# Patient Record
Sex: FEMALE | Race: AMERICAN INDIAN OR ALASKA NATIVE | Employment: UNEMPLOYED
[De-identification: names, ages, dates, MRNs, and addresses within clinical notes are randomized per-mention and may not be internally consistent; named-entity substitution may affect disease eponyms.]

---

## 2017-06-03 ENCOUNTER — HEALTH MAINTENANCE LETTER (OUTPATIENT)
Age: 37
End: 2017-06-03

## 2021-07-01 ENCOUNTER — APPOINTMENT (OUTPATIENT)
Dept: CT IMAGING | Facility: CLINIC | Age: 41
End: 2021-07-01
Attending: EMERGENCY MEDICINE
Payer: MEDICAID

## 2021-07-01 ENCOUNTER — HOSPITAL ENCOUNTER (INPATIENT)
Facility: CLINIC | Age: 41
LOS: 62 days | Discharge: HOME | End: 2021-09-01
Attending: EMERGENCY MEDICINE | Admitting: INTERNAL MEDICINE
Payer: MEDICAID

## 2021-07-01 ENCOUNTER — APPOINTMENT (OUTPATIENT)
Dept: GENERAL RADIOLOGY | Facility: CLINIC | Age: 41
End: 2021-07-01
Attending: EMERGENCY MEDICINE
Payer: MEDICAID

## 2021-07-01 DIAGNOSIS — I33.0 ENDOCARDITIS DUE TO METHICILLIN SUSCEPTIBLE STAPHYLOCOCCUS AUREUS (MSSA): ICD-10-CM

## 2021-07-01 DIAGNOSIS — R65.21 SEPTIC SHOCK (H): Primary | ICD-10-CM

## 2021-07-01 DIAGNOSIS — B95.61 ENDOCARDITIS DUE TO METHICILLIN SUSCEPTIBLE STAPHYLOCOCCUS AUREUS (MSSA): ICD-10-CM

## 2021-07-01 DIAGNOSIS — F11.20 OPIOID USE DISORDER, SEVERE, DEPENDENCE (H): ICD-10-CM

## 2021-07-01 DIAGNOSIS — B20 HUMAN IMMUNODEFICIENCY VIRUS (HIV) DISEASE (H): ICD-10-CM

## 2021-07-01 DIAGNOSIS — M86.9 OSTEOMYELITIS OF THIRD TOE OF LEFT FOOT (H): ICD-10-CM

## 2021-07-01 DIAGNOSIS — L97.524: ICD-10-CM

## 2021-07-01 DIAGNOSIS — A41.9 SEPTIC SHOCK (H): Primary | ICD-10-CM

## 2021-07-01 DIAGNOSIS — N10 PYELONEPHRITIS, ACUTE: ICD-10-CM

## 2021-07-01 DIAGNOSIS — R41.82 ALTERED MENTAL STATUS, UNSPECIFIED ALTERED MENTAL STATUS TYPE: ICD-10-CM

## 2021-07-01 DIAGNOSIS — A41.9 SEVERE SEPSIS (H): ICD-10-CM

## 2021-07-01 DIAGNOSIS — I05.9 ENDOCARDITIS OF MITRAL VALVE: ICD-10-CM

## 2021-07-01 DIAGNOSIS — F19.10 POLYSUBSTANCE ABUSE (H): ICD-10-CM

## 2021-07-01 DIAGNOSIS — R65.20 SEVERE SEPSIS (H): ICD-10-CM

## 2021-07-01 LAB
ABO + RH BLD: NORMAL
ABO + RH BLD: NORMAL
ALBUMIN SERPL-MCNC: 1.5 G/DL (ref 3.4–5)
ALBUMIN SERPL-MCNC: 1.7 G/DL (ref 3.4–5)
ALBUMIN UR-MCNC: 100 MG/DL
ALP SERPL-CCNC: 293 U/L (ref 40–150)
ALP SERPL-CCNC: 307 U/L (ref 40–150)
ALT SERPL W P-5'-P-CCNC: 31 U/L (ref 0–50)
ALT SERPL W P-5'-P-CCNC: 35 U/L (ref 0–50)
AMPHETAMINES UR QL SCN: POSITIVE
ANION GAP SERPL CALCULATED.3IONS-SCNC: 12 MMOL/L (ref 3–14)
ANION GAP SERPL CALCULATED.3IONS-SCNC: 12 MMOL/L (ref 3–14)
APPEARANCE UR: ABNORMAL
AST SERPL W P-5'-P-CCNC: 35 U/L (ref 0–45)
AST SERPL W P-5'-P-CCNC: 55 U/L (ref 0–45)
BACTERIA #/AREA URNS HPF: ABNORMAL /HPF
BARBITURATES UR QL: NEGATIVE
BASE DEFICIT BLDA-SCNC: 4.1 MMOL/L
BASE DEFICIT BLDA-SCNC: 4.2 MMOL/L
BASE DEFICIT BLDV-SCNC: 14.7 MMOL/L
BASOPHILS # BLD AUTO: 0 10E9/L (ref 0–0.2)
BASOPHILS NFR BLD AUTO: 0 %
BENZODIAZ UR QL: NEGATIVE
BILIRUB SERPL-MCNC: 1.7 MG/DL (ref 0.2–1.3)
BILIRUB SERPL-MCNC: 2 MG/DL (ref 0.2–1.3)
BILIRUB UR QL STRIP: ABNORMAL
BLD GP AB SCN SERPL QL: NORMAL
BLD PROD TYP BPU: NORMAL
BLD PROD TYP BPU: NORMAL
BLD UNIT ID BPU: 0
BLOOD BANK CMNT PATIENT-IMP: NORMAL
BLOOD PRODUCT CODE: NORMAL
BPU ID: NORMAL
BUN SERPL-MCNC: 27 MG/DL (ref 7–30)
BUN SERPL-MCNC: 35 MG/DL (ref 7–30)
CALCIUM SERPL-MCNC: 7 MG/DL (ref 8.5–10.1)
CALCIUM SERPL-MCNC: 7.7 MG/DL (ref 8.5–10.1)
CANNABINOIDS UR QL SCN: NEGATIVE
CHLORIDE SERPL-SCNC: 102 MMOL/L (ref 94–109)
CHLORIDE SERPL-SCNC: 108 MMOL/L (ref 94–109)
CK SERPL-CCNC: 68 U/L (ref 30–225)
CO2 BLDCOV-SCNC: 19 MMOL/L (ref 21–28)
CO2 SERPL-SCNC: 19 MMOL/L (ref 20–32)
CO2 SERPL-SCNC: 20 MMOL/L (ref 20–32)
COCAINE UR QL: NEGATIVE
COLOR UR AUTO: YELLOW
CREAT SERPL-MCNC: 1.29 MG/DL (ref 0.52–1.04)
CREAT SERPL-MCNC: 1.59 MG/DL (ref 0.52–1.04)
DIFFERENTIAL METHOD BLD: ABNORMAL
EOSINOPHIL # BLD AUTO: 0 10E9/L (ref 0–0.7)
EOSINOPHIL NFR BLD AUTO: 0 %
ERYTHROCYTE [DISTWIDTH] IN BLOOD BY AUTOMATED COUNT: 17 % (ref 10–15)
ERYTHROCYTE [DISTWIDTH] IN BLOOD BY AUTOMATED COUNT: 17.1 % (ref 10–15)
ETHANOL SERPL-MCNC: <0.01 G/DL
GFR SERPL CREATININE-BSD FRML MDRD: 40 ML/MIN/{1.73_M2}
GFR SERPL CREATININE-BSD FRML MDRD: 52 ML/MIN/{1.73_M2}
GLUCOSE SERPL-MCNC: 115 MG/DL (ref 70–99)
GLUCOSE SERPL-MCNC: 130 MG/DL (ref 70–99)
GLUCOSE UR STRIP-MCNC: NEGATIVE MG/DL
HCG SERPL QL: NEGATIVE
HCO3 BLD-SCNC: 20 MMOL/L (ref 21–28)
HCO3 BLD-SCNC: 20 MMOL/L (ref 21–28)
HCO3 BLDV-SCNC: 10 MMOL/L (ref 21–28)
HCT VFR BLD AUTO: 20.8 % (ref 35–47)
HCT VFR BLD AUTO: 22.4 % (ref 35–47)
HGB BLD-MCNC: 6.4 G/DL (ref 11.7–15.7)
HGB BLD-MCNC: 6.8 G/DL (ref 11.7–15.7)
HGB UR QL STRIP: ABNORMAL
HYALINE CASTS #/AREA URNS LPF: 3 /LPF (ref 0–2)
INR PPP: 1.23 (ref 0.86–1.14)
KETONES UR STRIP-MCNC: NEGATIVE MG/DL
LABORATORY COMMENT REPORT: NORMAL
LACTATE BLD-SCNC: 2.3 MMOL/L (ref 0.7–2)
LACTATE BLD-SCNC: 2.3 MMOL/L (ref 0.7–2.1)
LACTATE BLD-SCNC: 3.4 MMOL/L (ref 0.7–2)
LEUKOCYTE ESTERASE UR QL STRIP: ABNORMAL
LIPASE SERPL-CCNC: 23 U/L (ref 73–393)
LYMPHOCYTES # BLD AUTO: 0 10E9/L (ref 0.8–5.3)
LYMPHOCYTES NFR BLD AUTO: 0 %
MCH RBC QN AUTO: 20.4 PG (ref 26.5–33)
MCH RBC QN AUTO: 20.6 PG (ref 26.5–33)
MCHC RBC AUTO-ENTMCNC: 30.4 G/DL (ref 31.5–36.5)
MCHC RBC AUTO-ENTMCNC: 30.8 G/DL (ref 31.5–36.5)
MCV RBC AUTO: 67 FL (ref 78–100)
MCV RBC AUTO: 67 FL (ref 78–100)
MONOCYTES # BLD AUTO: 0.2 10E9/L (ref 0–1.3)
MONOCYTES NFR BLD AUTO: 1 %
MUCOUS THREADS #/AREA URNS LPF: PRESENT /LPF
NEUTROPHILS # BLD AUTO: 21.3 10E9/L (ref 1.6–8.3)
NEUTROPHILS NFR BLD AUTO: 99 %
NITRATE UR QL: NEGATIVE
NUM BPU REQUESTED: 2
O2/TOTAL GAS SETTING VFR VENT: 40 %
O2/TOTAL GAS SETTING VFR VENT: ABNORMAL %
O2/TOTAL GAS SETTING VFR VENT: ABNORMAL %
OPIATES UR QL SCN: POSITIVE
OXYHGB MFR BLD: 96 % (ref 92–100)
OXYHGB MFR BLD: 97 % (ref 92–100)
OXYHGB MFR BLDV: 89 %
PCO2 BLD: 31 MM HG (ref 35–45)
PCO2 BLD: 32 MM HG (ref 35–45)
PCO2 BLDV: 20 MM HG (ref 40–50)
PCO2 BLDV: 34 MM HG (ref 40–50)
PCP UR QL SCN: NEGATIVE
PH BLD: 7.41 PH (ref 7.35–7.45)
PH BLD: 7.42 PH (ref 7.35–7.45)
PH BLDV: 7.32 PH (ref 7.32–7.43)
PH BLDV: 7.36 PH (ref 7.32–7.43)
PH UR STRIP: 6 PH (ref 5–7)
PLATELET # BLD AUTO: 114 10E9/L (ref 150–450)
PLATELET # BLD AUTO: 153 10E9/L (ref 150–450)
PLATELET # BLD EST: ABNORMAL 10*3/UL
PO2 BLD: 93 MM HG (ref 80–105)
PO2 BLD: 99 MM HG (ref 80–105)
PO2 BLDV: 45 MM HG (ref 25–47)
PO2 BLDV: 60 MM HG (ref 25–47)
POIKILOCYTOSIS BLD QL SMEAR: SLIGHT
POTASSIUM SERPL-SCNC: 2.7 MMOL/L (ref 3.4–5.3)
POTASSIUM SERPL-SCNC: 2.9 MMOL/L (ref 3.4–5.3)
PROT SERPL-MCNC: 5.8 G/DL (ref 6.8–8.8)
PROT SERPL-MCNC: 6.6 G/DL (ref 6.8–8.8)
RBC # BLD AUTO: 3.1 10E12/L (ref 3.8–5.2)
RBC # BLD AUTO: 3.34 10E12/L (ref 3.8–5.2)
RBC #/AREA URNS AUTO: 93 /HPF (ref 0–2)
SAO2 % BLDV FROM PO2: 80 %
SARS-COV-2 RNA RESP QL NAA+PROBE: NEGATIVE
SODIUM SERPL-SCNC: 134 MMOL/L (ref 133–144)
SODIUM SERPL-SCNC: 139 MMOL/L (ref 133–144)
SOURCE: ABNORMAL
SP GR UR STRIP: 1.02 (ref 1–1.03)
SPECIMEN EXP DATE BLD: NORMAL
SPECIMEN SOURCE: NORMAL
SQUAMOUS #/AREA URNS AUTO: <1 /HPF (ref 0–1)
TRANSFUSION STATUS PATIENT QL: NORMAL
TRANSFUSION STATUS PATIENT QL: NORMAL
TROPONIN I SERPL-MCNC: 0.37 UG/L (ref 0–0.04)
TROPONIN I SERPL-MCNC: 0.86 UG/L (ref 0–0.04)
TSH SERPL DL<=0.005 MIU/L-ACNC: 0.58 MU/L (ref 0.4–4)
UROBILINOGEN UR STRIP-MCNC: 8 MG/DL (ref 0–2)
WBC # BLD AUTO: 21.5 10E9/L (ref 4–11)
WBC # BLD AUTO: 21.7 10E9/L (ref 4–11)
WBC #/AREA URNS AUTO: 124 /HPF (ref 0–5)
WBC CLUMPS #/AREA URNS HPF: PRESENT /HPF

## 2021-07-01 PROCEDURE — 80307 DRUG TEST PRSMV CHEM ANLYZR: CPT | Performed by: EMERGENCY MEDICINE

## 2021-07-01 PROCEDURE — P9016 RBC LEUKOCYTES REDUCED: HCPCS | Performed by: EMERGENCY MEDICINE

## 2021-07-01 PROCEDURE — 84703 CHORIONIC GONADOTROPIN ASSAY: CPT | Performed by: EMERGENCY MEDICINE

## 2021-07-01 PROCEDURE — 86900 BLOOD TYPING SEROLOGIC ABO: CPT | Performed by: EMERGENCY MEDICINE

## 2021-07-01 PROCEDURE — 83605 ASSAY OF LACTIC ACID: CPT | Mod: 91 | Performed by: INTERNAL MEDICINE

## 2021-07-01 PROCEDURE — 85025 COMPLETE CBC W/AUTO DIFF WBC: CPT | Performed by: EMERGENCY MEDICINE

## 2021-07-01 PROCEDURE — 36600 WITHDRAWAL OF ARTERIAL BLOOD: CPT

## 2021-07-01 PROCEDURE — 82805 BLOOD GASES W/O2 SATURATION: CPT | Mod: 91 | Performed by: ANESTHESIOLOGY

## 2021-07-01 PROCEDURE — 96376 TX/PRO/DX INJ SAME DRUG ADON: CPT

## 2021-07-01 PROCEDURE — 71260 CT THORAX DX C+: CPT

## 2021-07-01 PROCEDURE — 82803 BLOOD GASES ANY COMBINATION: CPT

## 2021-07-01 PROCEDURE — 258N000003 HC RX IP 258 OP 636: Performed by: INTERNAL MEDICINE

## 2021-07-01 PROCEDURE — 36620 INSERTION CATHETER ARTERY: CPT | Performed by: ANESTHESIOLOGY

## 2021-07-01 PROCEDURE — 83605 ASSAY OF LACTIC ACID: CPT

## 2021-07-01 PROCEDURE — 96374 THER/PROPH/DIAG INJ IV PUSH: CPT | Mod: 59

## 2021-07-01 PROCEDURE — 86923 COMPATIBILITY TEST ELECTRIC: CPT | Performed by: EMERGENCY MEDICINE

## 2021-07-01 PROCEDURE — 87086 URINE CULTURE/COLONY COUNT: CPT | Performed by: EMERGENCY MEDICINE

## 2021-07-01 PROCEDURE — 70491 CT SOFT TISSUE NECK W/DYE: CPT

## 2021-07-01 PROCEDURE — 82805 BLOOD GASES W/O2 SATURATION: CPT | Mod: 91 | Performed by: INTERNAL MEDICINE

## 2021-07-01 PROCEDURE — 81001 URINALYSIS AUTO W/SCOPE: CPT | Mod: XU | Performed by: EMERGENCY MEDICINE

## 2021-07-01 PROCEDURE — 51702 INSERT TEMP BLADDER CATH: CPT

## 2021-07-01 PROCEDURE — 87635 SARS-COV-2 COVID-19 AMP PRB: CPT | Performed by: EMERGENCY MEDICINE

## 2021-07-01 PROCEDURE — 96368 THER/DIAG CONCURRENT INF: CPT

## 2021-07-01 PROCEDURE — 87800 DETECT AGNT MULT DNA DIREC: CPT | Performed by: EMERGENCY MEDICINE

## 2021-07-01 PROCEDURE — 999N000157 HC STATISTIC RCP TIME EA 10 MIN

## 2021-07-01 PROCEDURE — 999N001017 HC STATISTIC GLUCOSE BY METER IP

## 2021-07-01 PROCEDURE — 96372 THER/PROPH/DIAG INJ SC/IM: CPT | Performed by: EMERGENCY MEDICINE

## 2021-07-01 PROCEDURE — 250N000009 HC RX 250: Performed by: EMERGENCY MEDICINE

## 2021-07-01 PROCEDURE — 87040 BLOOD CULTURE FOR BACTERIA: CPT | Performed by: EMERGENCY MEDICINE

## 2021-07-01 PROCEDURE — 82550 ASSAY OF CK (CPK): CPT | Performed by: INTERNAL MEDICINE

## 2021-07-01 PROCEDURE — 999N000009 HC STATISTIC AIRWAY CARE

## 2021-07-01 PROCEDURE — 250N000011 HC RX IP 250 OP 636: Performed by: INTERNAL MEDICINE

## 2021-07-01 PROCEDURE — 85610 PROTHROMBIN TIME: CPT | Performed by: EMERGENCY MEDICINE

## 2021-07-01 PROCEDURE — 85027 COMPLETE CBC AUTOMATED: CPT | Performed by: INTERNAL MEDICINE

## 2021-07-01 PROCEDURE — 86850 RBC ANTIBODY SCREEN: CPT | Performed by: EMERGENCY MEDICINE

## 2021-07-01 PROCEDURE — 999N000158 HC STATISTIC RCP TIME ED VENT EA 10 MIN

## 2021-07-01 PROCEDURE — 250N000009 HC RX 250: Performed by: INTERNAL MEDICINE

## 2021-07-01 PROCEDURE — 200N000001 HC R&B ICU

## 2021-07-01 PROCEDURE — 82805 BLOOD GASES W/O2 SATURATION: CPT | Performed by: EMERGENCY MEDICINE

## 2021-07-01 PROCEDURE — 999N000065 XR CHEST PORT 1 VIEW

## 2021-07-01 PROCEDURE — 87077 CULTURE AEROBIC IDENTIFY: CPT | Performed by: INTERNAL MEDICINE

## 2021-07-01 PROCEDURE — 84484 ASSAY OF TROPONIN QUANT: CPT | Performed by: INTERNAL MEDICINE

## 2021-07-01 PROCEDURE — 96361 HYDRATE IV INFUSION ADD-ON: CPT

## 2021-07-01 PROCEDURE — 258N000003 HC RX IP 258 OP 636: Performed by: ANESTHESIOLOGY

## 2021-07-01 PROCEDURE — 86901 BLOOD TYPING SEROLOGIC RH(D): CPT | Performed by: EMERGENCY MEDICINE

## 2021-07-01 PROCEDURE — 5A1955Z RESPIRATORY VENTILATION, GREATER THAN 96 CONSECUTIVE HOURS: ICD-10-PCS | Performed by: INTERNAL MEDICINE

## 2021-07-01 PROCEDURE — 87040 BLOOD CULTURE FOR BACTERIA: CPT | Performed by: INTERNAL MEDICINE

## 2021-07-01 PROCEDURE — 96375 TX/PRO/DX INJ NEW DRUG ADDON: CPT

## 2021-07-01 PROCEDURE — 999N000040 HC STATISTIC CONSULT NO CHARGE VASC ACCESS

## 2021-07-01 PROCEDURE — 96365 THER/PROPH/DIAG IV INF INIT: CPT | Mod: 59

## 2021-07-01 PROCEDURE — 31500 INSERT EMERGENCY AIRWAY: CPT

## 2021-07-01 PROCEDURE — 83690 ASSAY OF LIPASE: CPT | Performed by: EMERGENCY MEDICINE

## 2021-07-01 PROCEDURE — 84484 ASSAY OF TROPONIN QUANT: CPT | Performed by: EMERGENCY MEDICINE

## 2021-07-01 PROCEDURE — 99292 CRITICAL CARE ADDL 30 MIN: CPT | Mod: 25

## 2021-07-01 PROCEDURE — 83605 ASSAY OF LACTIC ACID: CPT | Performed by: EMERGENCY MEDICINE

## 2021-07-01 PROCEDURE — 99291 CRITICAL CARE FIRST HOUR: CPT | Mod: 25

## 2021-07-01 PROCEDURE — 258N000003 HC RX IP 258 OP 636: Performed by: EMERGENCY MEDICINE

## 2021-07-01 PROCEDURE — 87088 URINE BACTERIA CULTURE: CPT | Performed by: EMERGENCY MEDICINE

## 2021-07-01 PROCEDURE — 250N000013 HC RX MED GY IP 250 OP 250 PS 637: Performed by: INTERNAL MEDICINE

## 2021-07-01 PROCEDURE — 80053 COMPREHEN METABOLIC PANEL: CPT | Performed by: EMERGENCY MEDICINE

## 2021-07-01 PROCEDURE — 250N000011 HC RX IP 250 OP 636

## 2021-07-01 PROCEDURE — 70450 CT HEAD/BRAIN W/O DYE: CPT

## 2021-07-01 PROCEDURE — C9803 HOPD COVID-19 SPEC COLLECT: HCPCS

## 2021-07-01 PROCEDURE — 250N000011 HC RX IP 250 OP 636: Performed by: ANESTHESIOLOGY

## 2021-07-01 PROCEDURE — 82077 ASSAY SPEC XCP UR&BREATH IA: CPT | Performed by: EMERGENCY MEDICINE

## 2021-07-01 PROCEDURE — 36556 INSERT NON-TUNNEL CV CATH: CPT

## 2021-07-01 PROCEDURE — 999N000185 HC STATISTIC TRANSPORT TIME EA 15 MIN

## 2021-07-01 PROCEDURE — 94002 VENT MGMT INPAT INIT DAY: CPT

## 2021-07-01 PROCEDURE — 84443 ASSAY THYROID STIM HORMONE: CPT | Performed by: EMERGENCY MEDICINE

## 2021-07-01 PROCEDURE — 99291 CRITICAL CARE FIRST HOUR: CPT | Performed by: INTERNAL MEDICINE

## 2021-07-01 PROCEDURE — 250N000011 HC RX IP 250 OP 636: Performed by: EMERGENCY MEDICINE

## 2021-07-01 PROCEDURE — 87186 SC STD MICRODIL/AGAR DIL: CPT | Performed by: EMERGENCY MEDICINE

## 2021-07-01 PROCEDURE — 80053 COMPREHEN METABOLIC PANEL: CPT | Performed by: INTERNAL MEDICINE

## 2021-07-01 PROCEDURE — 87077 CULTURE AEROBIC IDENTIFY: CPT | Performed by: EMERGENCY MEDICINE

## 2021-07-01 RX ORDER — DEXTROSE MONOHYDRATE 25 G/50ML
25-50 INJECTION, SOLUTION INTRAVENOUS
Status: DISCONTINUED | OUTPATIENT
Start: 2021-07-01 | End: 2021-07-03

## 2021-07-01 RX ORDER — WATER 10 ML/10ML
INJECTION INTRAMUSCULAR; INTRAVENOUS; SUBCUTANEOUS
Status: DISCONTINUED
Start: 2021-07-01 | End: 2021-07-01 | Stop reason: HOSPADM

## 2021-07-01 RX ORDER — ALBUTEROL SULFATE 0.83 MG/ML
2.5 SOLUTION RESPIRATORY (INHALATION) EVERY 6 HOURS PRN
Status: DISCONTINUED | OUTPATIENT
Start: 2021-07-01 | End: 2021-07-06

## 2021-07-01 RX ORDER — PROPOFOL 10 MG/ML
5-75 INJECTION, EMULSION INTRAVENOUS CONTINUOUS
Status: DISCONTINUED | OUTPATIENT
Start: 2021-07-01 | End: 2021-07-03

## 2021-07-01 RX ORDER — NALOXONE HYDROCHLORIDE 0.4 MG/ML
0.4 INJECTION, SOLUTION INTRAMUSCULAR; INTRAVENOUS; SUBCUTANEOUS
Status: DISCONTINUED | OUTPATIENT
Start: 2021-07-01 | End: 2021-09-01 | Stop reason: HOSPADM

## 2021-07-01 RX ORDER — SODIUM CHLORIDE 9 MG/ML
INJECTION, SOLUTION INTRAVENOUS CONTINUOUS
Status: DISCONTINUED | OUTPATIENT
Start: 2021-07-01 | End: 2021-07-13

## 2021-07-01 RX ORDER — FENTANYL CITRATE 50 UG/ML
100 INJECTION, SOLUTION INTRAMUSCULAR; INTRAVENOUS ONCE
Status: COMPLETED | OUTPATIENT
Start: 2021-07-01 | End: 2021-07-01

## 2021-07-01 RX ORDER — IOPAMIDOL 755 MG/ML
125 INJECTION, SOLUTION INTRAVASCULAR ONCE
Status: COMPLETED | OUTPATIENT
Start: 2021-07-01 | End: 2021-07-01

## 2021-07-01 RX ORDER — OLANZAPINE 10 MG/2ML
10 INJECTION, POWDER, FOR SOLUTION INTRAMUSCULAR ONCE
Status: COMPLETED | OUTPATIENT
Start: 2021-07-01 | End: 2021-07-01

## 2021-07-01 RX ORDER — MIDAZOLAM HCL IN 0.9 % NACL/PF 1 MG/ML
1-8 PLASTIC BAG, INJECTION (ML) INTRAVENOUS CONTINUOUS
Status: DISCONTINUED | OUTPATIENT
Start: 2021-07-01 | End: 2021-07-01

## 2021-07-01 RX ORDER — NICOTINE POLACRILEX 4 MG
15-30 LOZENGE BUCCAL
Status: DISCONTINUED | OUTPATIENT
Start: 2021-07-01 | End: 2021-07-03

## 2021-07-01 RX ORDER — NALOXONE HYDROCHLORIDE 0.4 MG/ML
0.2 INJECTION, SOLUTION INTRAMUSCULAR; INTRAVENOUS; SUBCUTANEOUS
Status: DISCONTINUED | OUTPATIENT
Start: 2021-07-01 | End: 2021-09-01 | Stop reason: HOSPADM

## 2021-07-01 RX ORDER — MORPHINE SULFATE 4 MG/ML
4 INJECTION, SOLUTION INTRAMUSCULAR; INTRAVENOUS ONCE
Status: COMPLETED | OUTPATIENT
Start: 2021-07-01 | End: 2021-07-01

## 2021-07-01 RX ORDER — PIPERACILLIN SODIUM, TAZOBACTAM SODIUM 3; .375 G/15ML; G/15ML
3.38 INJECTION, POWDER, LYOPHILIZED, FOR SOLUTION INTRAVENOUS EVERY 6 HOURS
Status: DISCONTINUED | OUTPATIENT
Start: 2021-07-01 | End: 2021-07-02

## 2021-07-01 RX ORDER — SODIUM CHLORIDE, SODIUM LACTATE, POTASSIUM CHLORIDE, CALCIUM CHLORIDE 600; 310; 30; 20 MG/100ML; MG/100ML; MG/100ML; MG/100ML
INJECTION, SOLUTION INTRAVENOUS CONTINUOUS
Status: DISCONTINUED | OUTPATIENT
Start: 2021-07-01 | End: 2021-07-05

## 2021-07-01 RX ORDER — PIPERACILLIN SODIUM, TAZOBACTAM SODIUM 4; .5 G/20ML; G/20ML
4.5 INJECTION, POWDER, LYOPHILIZED, FOR SOLUTION INTRAVENOUS ONCE
Status: COMPLETED | OUTPATIENT
Start: 2021-07-01 | End: 2021-07-01

## 2021-07-01 RX ORDER — CHLORHEXIDINE GLUCONATE ORAL RINSE 1.2 MG/ML
15 SOLUTION DENTAL EVERY 12 HOURS
Status: DISCONTINUED | OUTPATIENT
Start: 2021-07-01 | End: 2021-07-11 | Stop reason: CLARIF

## 2021-07-01 RX ORDER — PROPOFOL 10 MG/ML
INJECTION, EMULSION INTRAVENOUS
Status: COMPLETED
Start: 2021-07-01 | End: 2021-07-01

## 2021-07-01 RX ORDER — PROPOFOL 10 MG/ML
5-75 INJECTION, EMULSION INTRAVENOUS CONTINUOUS
Status: DISCONTINUED | OUTPATIENT
Start: 2021-07-01 | End: 2021-07-01

## 2021-07-01 RX ORDER — POTASSIUM CHLORIDE 29.8 MG/ML
20 INJECTION INTRAVENOUS ONCE
Status: COMPLETED | OUTPATIENT
Start: 2021-07-01 | End: 2021-07-01

## 2021-07-01 RX ORDER — HYDROMORPHONE HYDROCHLORIDE 1 MG/ML
INJECTION, SOLUTION INTRAMUSCULAR; INTRAVENOUS; SUBCUTANEOUS
Status: COMPLETED
Start: 2021-07-01 | End: 2021-07-01

## 2021-07-01 RX ORDER — PROPOFOL 10 MG/ML
10-20 INJECTION, EMULSION INTRAVENOUS EVERY 30 MIN PRN
Status: DISCONTINUED | OUTPATIENT
Start: 2021-07-01 | End: 2021-07-01

## 2021-07-01 RX ORDER — ALBUTEROL SULFATE 90 UG/1
6 AEROSOL, METERED RESPIRATORY (INHALATION) EVERY 4 HOURS
Status: DISCONTINUED | OUTPATIENT
Start: 2021-07-01 | End: 2021-07-01

## 2021-07-01 RX ORDER — NOREPINEPHRINE BITARTRATE 0.02 MG/ML
.01-.6 INJECTION, SOLUTION INTRAVENOUS CONTINUOUS
Status: DISCONTINUED | OUTPATIENT
Start: 2021-07-01 | End: 2021-07-12

## 2021-07-01 RX ORDER — POTASSIUM CHLORIDE 29.8 MG/ML
20 INJECTION INTRAVENOUS
Status: COMPLETED | OUTPATIENT
Start: 2021-07-01 | End: 2021-07-02

## 2021-07-01 RX ORDER — LORAZEPAM 2 MG/ML
2 INJECTION INTRAMUSCULAR ONCE
Status: COMPLETED | OUTPATIENT
Start: 2021-07-01 | End: 2021-07-01

## 2021-07-01 RX ORDER — SODIUM CHLORIDE 9 MG/ML
INJECTION, SOLUTION INTRAVENOUS ONCE
Status: COMPLETED | OUTPATIENT
Start: 2021-07-01 | End: 2021-07-01

## 2021-07-01 RX ORDER — MIDAZOLAM HCL IN 0.9 % NACL/PF 1 MG/ML
1-8 PLASTIC BAG, INJECTION (ML) INTRAVENOUS CONTINUOUS
Status: DISCONTINUED | OUTPATIENT
Start: 2021-07-01 | End: 2021-07-11

## 2021-07-01 RX ORDER — ACETAMINOPHEN 325 MG/1
650 TABLET ORAL EVERY 4 HOURS PRN
Status: DISCONTINUED | OUTPATIENT
Start: 2021-07-01 | End: 2021-08-03

## 2021-07-01 RX ORDER — PROPOFOL 10 MG/ML
10-20 INJECTION, EMULSION INTRAVENOUS EVERY 30 MIN PRN
Status: DISCONTINUED | OUTPATIENT
Start: 2021-07-01 | End: 2021-07-03

## 2021-07-01 RX ORDER — FENTANYL CITRATE 50 UG/ML
25-50 INJECTION, SOLUTION INTRAMUSCULAR; INTRAVENOUS
Status: DISCONTINUED | OUTPATIENT
Start: 2021-07-01 | End: 2021-07-01

## 2021-07-01 RX ORDER — CEFAZOLIN SODIUM 1 G/50ML
1750 SOLUTION INTRAVENOUS EVERY 24 HOURS
Status: DISCONTINUED | OUTPATIENT
Start: 2021-07-02 | End: 2021-07-01 | Stop reason: DRUGHIGH

## 2021-07-01 RX ADMIN — SODIUM CHLORIDE: 9 INJECTION, SOLUTION INTRAVENOUS at 16:30

## 2021-07-01 RX ADMIN — POTASSIUM CHLORIDE 20 MEQ: 400 INJECTION, SOLUTION INTRAVENOUS at 19:05

## 2021-07-01 RX ADMIN — FENTANYL CITRATE 100 MCG: 50 INJECTION INTRAMUSCULAR; INTRAVENOUS at 13:16

## 2021-07-01 RX ADMIN — MORPHINE SULFATE 4 MG: 4 INJECTION, SOLUTION INTRAMUSCULAR; INTRAVENOUS at 12:28

## 2021-07-01 RX ADMIN — Medication 1 MG: at 14:40

## 2021-07-01 RX ADMIN — PIPERACILLIN SODIUM AND TAZOBACTAM SODIUM 3.38 G: 3; .375 INJECTION, POWDER, LYOPHILIZED, FOR SOLUTION INTRAVENOUS at 20:53

## 2021-07-01 RX ADMIN — SODIUM CHLORIDE 70 ML: 9 INJECTION, SOLUTION INTRAVENOUS at 13:40

## 2021-07-01 RX ADMIN — SODIUM CHLORIDE, POTASSIUM CHLORIDE, SODIUM LACTATE AND CALCIUM CHLORIDE 1000 ML: 600; 310; 30; 20 INJECTION, SOLUTION INTRAVENOUS at 12:29

## 2021-07-01 RX ADMIN — SODIUM CHLORIDE, POTASSIUM CHLORIDE, SODIUM LACTATE AND CALCIUM CHLORIDE 2580 ML: 600; 310; 30; 20 INJECTION, SOLUTION INTRAVENOUS at 13:59

## 2021-07-01 RX ADMIN — PROPOFOL 50 MCG/KG/MIN: 10 INJECTION, EMULSION INTRAVENOUS at 20:52

## 2021-07-01 RX ADMIN — OLANZAPINE 10 MG: 10 INJECTION, POWDER, FOR SOLUTION INTRAMUSCULAR at 13:54

## 2021-07-01 RX ADMIN — CHLORHEXIDINE GLUCONATE 15 ML: 1.2 SOLUTION ORAL at 19:09

## 2021-07-01 RX ADMIN — VANCOMYCIN HYDROCHLORIDE 2000 MG: 5 INJECTION, POWDER, LYOPHILIZED, FOR SOLUTION INTRAVENOUS at 16:27

## 2021-07-01 RX ADMIN — LORAZEPAM 2 MG: 2 INJECTION INTRAMUSCULAR; INTRAVENOUS at 15:32

## 2021-07-01 RX ADMIN — IOPAMIDOL 125 ML: 755 INJECTION, SOLUTION INTRAVENOUS at 13:40

## 2021-07-01 RX ADMIN — PROPOFOL 40 MCG/KG/MIN: 10 INJECTION, EMULSION INTRAVENOUS at 16:44

## 2021-07-01 RX ADMIN — HYDROMORPHONE HYDROCHLORIDE 1 MG: 1 INJECTION, SOLUTION INTRAMUSCULAR; INTRAVENOUS; SUBCUTANEOUS at 14:40

## 2021-07-01 RX ADMIN — Medication 2 MG/HR: at 16:42

## 2021-07-01 RX ADMIN — Medication 25 MCG/HR: at 19:26

## 2021-07-01 RX ADMIN — SODIUM CHLORIDE: 9 INJECTION, SOLUTION INTRAVENOUS at 19:06

## 2021-07-01 RX ADMIN — MIDAZOLAM HYDROCHLORIDE 2 MG: 1 INJECTION, SOLUTION INTRAMUSCULAR; INTRAVENOUS at 13:53

## 2021-07-01 RX ADMIN — Medication 0.03 MCG/KG/MIN: at 20:36

## 2021-07-01 RX ADMIN — OLANZAPINE 10 MG: 10 INJECTION, POWDER, FOR SOLUTION INTRAMUSCULAR at 12:29

## 2021-07-01 RX ADMIN — SODIUM CHLORIDE, POTASSIUM CHLORIDE, SODIUM LACTATE AND CALCIUM CHLORIDE 500 ML: 600; 310; 30; 20 INJECTION, SOLUTION INTRAVENOUS at 19:32

## 2021-07-01 RX ADMIN — MIDAZOLAM HYDROCHLORIDE 2 MG: 1 INJECTION, SOLUTION INTRAMUSCULAR; INTRAVENOUS at 13:32

## 2021-07-01 RX ADMIN — POTASSIUM CHLORIDE 20 MEQ: 400 INJECTION, SOLUTION INTRAVENOUS at 21:19

## 2021-07-01 RX ADMIN — PIPERACILLIN SODIUM AND TAZOBACTAM SODIUM 4.5 G: 4; .5 INJECTION, POWDER, LYOPHILIZED, FOR SOLUTION INTRAVENOUS at 13:59

## 2021-07-01 RX ADMIN — SODIUM CHLORIDE, POTASSIUM CHLORIDE, SODIUM LACTATE AND CALCIUM CHLORIDE: 600; 310; 30; 20 INJECTION, SOLUTION INTRAVENOUS at 22:44

## 2021-07-01 RX ADMIN — POTASSIUM CHLORIDE 20 MEQ: 400 INJECTION, SOLUTION INTRAVENOUS at 23:00

## 2021-07-01 RX ADMIN — SODIUM CHLORIDE, POTASSIUM CHLORIDE, SODIUM LACTATE AND CALCIUM CHLORIDE: 600; 310; 30; 20 INJECTION, SOLUTION INTRAVENOUS at 18:27

## 2021-07-01 ASSESSMENT — MIFFLIN-ST. JEOR: SCORE: 1598.5

## 2021-07-01 ASSESSMENT — ACTIVITIES OF DAILY LIVING (ADL): ADLS_ACUITY_SCORE: 19

## 2021-07-01 NOTE — H&P
[facility] Intensive Care Unit  History and Physical  July 1, 2021    Manas Hernandez   MRN# [id number]   Age: 41 year old YOB: 1980     Date of Admission: 7/1/2021    Primary care provider: Lisa Pollock          Assessment and plan :   Manas Hernandez is a 41 year old female with a history of polysubstance abuse admitted on 7/1/2021 for metabolic/toxic encephalopathy and pyelonephritis. She remains intubated for airway protection.      My assessment and plan for this patient is as follows:    Neurology/Psychiatry:   1. Toxic/metabolic encephalopathy, septic encephalopathy thought to be secondary to drug use vs infection. Positive UDS for amphetamine and opiates  2. Acute to subacute ischemic infarct, undetermined etiology.   3. ICU sedation on propofol.  4. Agitation  5. Analgesic  Plan  - Neuro CC consulted, appreciate assistance   - MRI per neuro  - we have ordered echocardiogram, Hgb A1c, lipid panel for stroke work up.   - Neuro checks q4h  - Propofol for sedation  - midazolam for sedation, wean as able, but may be needed given agitation in ED  - Fentanyl prn for pain  - RASS goal of -1 to -2.   - monitor for withdrawal.     Cardiovascular:    1. Hypotension responsive to fluids, likely secondary to sepsis  2. Elevated troponin, likely in the setting of hypotension/increased demand/meth use/sepsis.  3. Systolic murmur: unknown if new, with possible osler nodules on exam, concern for endocarditis   Plan  - Trend troponin to peak   - trend lactate  - Echocardiogram  - EKG pending  - no current pressor need     Pulmonary/Ventilator Management:   1. Mechanically ventilated for airway protection - CMV/AC (16/450/5/40%)  Plan  - Continue mechanical ventilation, wean as able  - if improving/stable, consider PST in AM with plan to extubate if successful.     GI/Nutrition :   1. Mildly elevated alkaline phosphate   2. Hyperbilirubinemia  3. Malnutrition  Plan  - Monitor CMP daily.  - NPO  while intubated  - if she cannot be extubated tomorrow, would start TF tomorrow.      Renal/Fluids/Electrolytes:   1. Pyelonephritis, with severe sepsis.   2. Hypokalemia  3. FORD - likely prerenal in the setting of low perfusion. R/o ATN with history of chronic drug use.  4. Metabolic acidosis with respiratory compensation, improving.  Plan  - Check ABG  - K+ supplementation: 20 mEq PO, 20 mEq IV  - Recheck K, likely needs further resuscitation  - MIVF tonight with LR at 125/hr  - AM CMP  - Avoid nephrotoxic agents such as NSAID, IV contrast unless specifically required  - Follow I/O's as appropriate.     Infectious Disease:   1. Severe sepsis, likely secondary to pyelonephritis.  2. Concern for possible endocarditis: murmur on exam, oslers nodules, new possible stroke (embolic?). Only one blood culture was collected in the ED prior to antibiotics, unfortunately   Plan  - Follow blood cultures, repeat and get second set now  - Vancomycin 1750 mg IV, then dose by level  - Zosyn 3.375 g IV q6h  - follow urine culture  - may need urology consult for source control pending course    Endocrine:   1. Stress induced hyperglycemia  Plan  - ICU insulin protocol, goal sugar <180  - follow BGs     Hematology/Oncology:   1. Microcytic anemia, likely 2/2 malnutrition and chronic polysubstance use; no signs of acute blood loss. S/p 1 unit pRBC.  2. Hx anemia (Hgb 7.3 in 11/2007).  Plan  - Recheck Hgb. Transfuse with Hgb < 7.  - Daily CBC     IV/Access:   1. Venous access - PIV right foot, right femoral CVC  2. Arterial access - none  Plan  - central access required and necessary     ICU Prophylaxis:   1. DVT: Mechanical   2. VAP: HOB 30 degrees, chlorhexidine rinse  3. Stress Ulcer: PPI  4. Restraints: Nonviolent soft two point restraints required and necessary for patient safety and continued cares and good effect as patient continues to pull at necessary lines, tubes despite education and distraction. Will readdress daily.   5.  Wound care - per unit routine   6. Feeding - NPO.  7. Family Update: updated by ICU team  8. Disposition - critically ill in ICU    Medications     lactated ringers       midazolam 2 mg/hr (07/01/21 1642)       sodium chloride (PF)  10 mL Intracatheter Q8H     sterile water (preservative free)         sterile water (preservative free)           History of Present Illness          Chief Complaint/ Reason for ICU Admission and HPI     Admitted for :   Chief Complaint   Patient presents with     Altered Mental Status      HPI obtained from: chart review    Ms. Hernandez is a 41 year old female with chronic polysubstance abuse reported by community home member to be mentally altered with days of intense vomiting, today not able to clean herself. Brought in by EMS with concerns for encephalopathy and hypotension responsive to fluid resusictation. She was agitated in the ED, sedated and intubated for airway protection. CT head showed possible new acute to subacute parietal lobe ischemic infarct, CXR without abnormalities, and CT abdomen concerning for pyelonephritis. Blood cultures were drawn and she was started on vancomycin and Zosyn. Transferred to the ICU for continued infectious workup and airway management.         Past Medical History:   Polysubstance abuse   Methadone maintenance treatment complicating pregnancy  Heroin addiction  Tobacco use disorder  Learning disabilities  Adult antisocial behavior         Past Surgical History:   Dilation and curettage - 2005         Social History:     Social History     Socioeconomic History     Marital status: Single     Spouse name: Not on file     Number of children: Not on file     Years of education: Not on file     Highest education level: Not on file   Occupational History     Not on file   Social Needs     Financial resource strain: Not on file     Food insecurity     Worry: Not on file     Inability: Not on file     Transportation needs     Medical: Not on file      Non-medical: Not on file   Tobacco Use     Smoking status: Current Every Day Smoker     Types: Cigarettes     Tobacco comment: 1-2   Substance and Sexual Activity     Alcohol use: No     Drug use: Yes     Types: Amphetamines, Methamphetamines, Opiates     Sexual activity: Yes     Partners: Male     Comment: pregnant   Lifestyle     Physical activity     Days per week: Not on file     Minutes per session: Not on file     Stress: Not on file   Relationships     Social connections     Talks on phone: Not on file     Gets together: Not on file     Attends [employer] service: Not on file     Active member of club or organization: Not on file     Attends meetings of clubs or organizations: Not on file     Relationship status: Not on file     Intimate partner violence     Fear of current or ex partner: Not on file     Emotionally abused: Not on file     Physically abused: Not on file     Forced sexual activity: Not on file   Other Topics Concern     Not on file   Social History Narrative     Not on file          Family History:     Family History   Problem Relation Age of Onset     Alcoholism Mother      Alcoholism Father      Family History Negative No family hx of             Allergies:     Codeine - rash         Medications:     Current Facility-Administered Medications   Medication     lactated ringers infusion     lidocaine 1 % 0.1-5 mL     midazolam (VERSED) drip - ADULT 100 mg/100 mL in NS (pre-mix)     sodium chloride (PF) 0.9% PF flush 10 mL     sodium chloride (PF) 0.9% PF flush 10-20 mL     sodium chloride (PF) 0.9% PF flush 5-50 mL     sterile water (preservative free) injection     sterile water (preservative free) injection     No current outpatient medications on file.            Review of Systems:   Unable to obtain, patient intubated/sedated         Physical Exam:   Vitals were reviewed  Physical Exam   Temp: 99.4  F (37.4  C) Temp src: Axillary Temp  Min: 99.4  F (37.4  C)  Max: 99.4  F (37.4  C) BP:  102/57 Pulse: 102   Resp: 25 SpO2: 100 % O2 Device: Mechanical Ventilator      Vitals:    07/01/21 1257   Weight: 86 kg (189 lb 9.5 oz)     No intake or output data in the 24 hours ending 07/01/21 1546    GEN: sedated, in no acute distress  HEENT: sclera anicteric, trachea midline, NGT in place. ETT in place.   PULM: unlabored synchronous with vent, clear anteriorly.   CV/COR: tachycardic, normal S1 and S2. 3/6 systolic murmur auscultated over LLSB  ABD: Mildly distended, soft. Hypoactive bowel sounds, no mass  MSK/EXT: No pitting edema.  WWP.  NEURO: sedated and not responsive to noxious stimuli. PEERL  : don in place draining dark yellow urine.  SKIN: Purple, nodular lesions of right 4th finger (Oslers nodes?). Dark red lesion on the medial great toe. Scattered petechial lesions on the soles of the feet bilaterally. No nail changes noted.   LINES: clean, dry intact        Ancillary Data:   All laboratory and imaging data reviewed.   ABG/vent data:   Ventilation Mode: CMV/AC  (Continuous Mandatory Ventilation/ Assist Control)  FiO2 (%): 60 %  Rate Set (breaths/minute): 16 breaths/min  Tidal Volume Set (mL): 450 mL  PEEP (cm H2O): 5 cmH2O  Oxygen Concentration (%): 60 %  Resp: 25    Recent Labs   Lab 07/01/21  1537   O2PER 50%        ROUTINE IP LABS  Recent Labs   Lab 07/01/21  1239   WBC 21.5*   HGB 6.8*   MCV 67*      INR 1.23*      POTASSIUM 2.9*   CHLORIDE 102   CO2 20   BUN 35*   CR 1.59*   ANIONGAP 12   DEREK 7.7*   *   ALBUMIN 1.7*   PROTTOTAL 6.6*   BILITOTAL 1.7*   ALKPHOS 307*   ALT 31   AST 35   TROPI 0.367*     Recent Results (from the past 24 hour(s))   CT Head w/o Contrast    Narrative    CT SCAN OF THE HEAD WITHOUT CONTRAST   7/1/2021 1:47 PM     HISTORY: Delirium; altered mental status    TECHNIQUE:  Axial images of the head and coronal reformations without  IV contrast material.  Radiation dose for this scan was reduced using  automated exposure control, adjustment of the mA  and/or kV according  to patient size, or iterative reconstruction technique.    COMPARISON: None.    FINDINGS: There is a focal 2 cm area of hypodensity involving gray and  white matter in the medial left parietal lobe consistent with acute to  subacute ischemic infarct. There is a minimal incidental arachnoid  cyst in the anterior portion of left middle cranial fossa. Ventricles  and subarachnoid spaces are otherwise within normal limits. There is  no evidence for intracranial hemorrhage, significant mass effect, or  skull fracture. Exam is mildly limited by motion artifact. Visualized  paranasal sinuses and mastoid air cells are clear.      Impression    IMPRESSION: Focal hypodensity in the medial left parietal lobe  consistent with acute to subacute ischemic infarct.    BRIT MARIANO MD          SYSTEM ID:  [id number]   CT Chest/Abdomen/Pelvis w Contrast    Narrative    CT CHEST/ABDOMEN/PELVIS WITH CONTRAST 7/1/2021 1:48 PM    CLINICAL HISTORY: Fever of unknown origin.    TECHNIQUE: CT scan of the chest, abdomen, and pelvis was performed  following injection of IV contrast. Multiplanar reformats were  obtained. Dose reduction techniques were used.   CONTRAST:  125 mL Isovue-370   COMPARISON: None.    FINDINGS: Multiple images were degraded due to patient motion  artifact.    LUNGS AND PLEURA: No pleural effusions. Mild scarring and/or  atelectasis at both lung bases. The lungs are otherwise clear where  visualized.    MEDIASTINUM/AXILLAE: No enlarged lymph nodes are identified. There is  a small pericardial effusion.    CORONARY ARTERY CALCIFICATION: None.    HEPATOBILIARY: The gallbladder appears contracted, and may contain  small gallstones. No hepatic masses are seen.    PANCREAS: Normal.    SPLEEN: Mild splenomegaly. The spleen measures up to 15.2 cm in  length.    ADRENAL GLANDS: Normal.    KIDNEYS/BLADDER: Patchy hypoenhancement in the mid and upper pole of  the right kidney posteriorly is suspicious for  pyelonephritis. The  left kidney is unremarkable. No hydronephrosis. Neri catheter in the  bladder. Small amount of air within the urinary bladder may be related  to the presence of the Neri catheter.    BOWEL: No bowel obstruction. No convincing evidence for colitis or  diverticulitis. Unremarkable appendix.    PELVIC ORGANS: Unremarkable.    LYMPH NODES: No enlarged lymph nodes are identified in the abdomen or  pelvis.    VASCULATURE: Unremarkable.    ADDITIONAL FINDINGS: Moderate-sized fat-containing paraumbilical  hernia.    MUSCULOSKELETAL: Degenerative changes in the lower lumbar spine.  Thoracic curve, convex left.      Impression    IMPRESSION:   1.  Patchy hypoenhancement in the mid and upper poles of the right  kidney posteriorly, suspicious for pyelonephritis.  2.  Moderate-sized fat-containing paraumbilical hernia.  3.  Mild splenomegaly.  4.  There are possible small gallstones within a contracted  gallbladder.    JEANETTE AMAYA MD          SYSTEM ID:  [id number]   Soft tissue neck CT w contrast    Narrative    CT SCAN OF THE NECK WITH CONTRAST  7/1/2021 1:50 PM     HISTORY: Evaluate abscess or deep space infection.    TECHNIQUE:  Axial images and coronal reformations.  A total of 125 mL  Isovue-370 was used for both the CAP and STN IV.  Radiation dose for  this scan was reduced using automated exposure control, adjustment of  the mA and/or kV according to patient size, or iterative  reconstruction technique.    COMPARISON: None.    FINDINGS: Exam is limited due to patient motion artifact. The  visualized paranasal sinuses and mastoid air cells are clear. The  visualized orbits are normal. The pharynx, larynx, and subglottic  trachea are normal. The thyroid gland and salivary glands appear  normal. There is no evidence for any lymphadenopathy. Vascular  structures unremarkable. Some mild degenerative changes are seen in  the lower cervical spine. No abnormal fluid collections are present.  Jugular  veins are very prominent, but this is felt to be within the  normal limits. Lung apices are clear.      Impression    IMPRESSION: Negative neck CT with contrast. Exam slightly limited by  motion artifact.    BRIT MARIANO MD          SYSTEM ID:  [id number]   XR Chest Port 1 View    Narrative    CHEST ONE VIEW PORTABLE    7/1/2021 2:33 PM     HISTORY: Post intubation    COMPARISON: CT chest, abdomen and pelvis 7/1/2021.      Impression    IMPRESSION: Portable chest. Lungs are hypoaerated, but otherwise  clear. Heart is enlarged. No pneumothorax. No definite pleural  effusions. Endotracheal tube terminates approximately 2 cm above the  arvind. Nasogastric tube extends below the left hemidiaphragm  presumably in the stomach.    RADHA PHILLIPS MD          SYSTEM ID:  [id number]       Time Spent on this Encounter   Billing:  I spent 60 minutes bedside and on the inpatient unit today managing the critical care of Manas Hernandez in relation to the issues listed in this note, excluding procedures and teaching.     Austin León MD    Department of Medicine, Division of Pulmonary, Allergy, Critical Care, and Sleep Medicine

## 2021-07-01 NOTE — PROGRESS NOTES
Pt transported from ED to ICU on arvind ventilatory with no respiratory complications.     David Medina, RT

## 2021-07-01 NOTE — ED PROVIDER NOTES
History   Chief Complaint:  Altered Mental Status     The history is provided by the EMS personnel. The history is limited by the condition of the patient.      Manas Hernandez is a 41 year old female with history of heroin dependence who presents with altered mental status. EMS tells us that they were called to a homeless community where a group of the patients friends told EMS that she recently had a miscarriage, was altered mentally to the point at which she was no longer able to go to the bathroom and clean herself. In addition, she had been vomiting intensely for a few days.     EMS tells us that the patient is frequently picked up for heroin overdose.     Review of Systems   Unable to perform ROS: Mental status change     Allergies:  Codeine     Medications:  The patient is not currently taking any prescribed medications.     Past Medical History:    Alcohol dependence  Anemia   Heroin dependence     Past Surgical History:    The patient denies past surgical history.      Family History:    The patient denies past family history.     Social History:  The patient arrives to the ED via EMS.   The patient uses heroin.     Physical Exam     Patient Vitals for the past 24 hrs:   BP Temp Temp src Pulse Resp SpO2 Weight   07/01/21 1650 106/58 -- -- 103 -- 100 % --   07/01/21 1645 105/60 -- -- 103 -- 99 % --   07/01/21 1620 -- -- -- -- -- 99 % --   07/01/21 1615 109/57 -- -- 105 -- 100 % --   07/01/21 1610 98/57 -- -- 105 -- 100 % --   07/01/21 1605 98/60 -- -- 105 -- 100 % --   07/01/21 1600 103/61 -- -- 106 -- 100 % --   07/01/21 1555 104/62 -- -- 108 -- 99 % --   07/01/21 1550 111/66 -- -- 106 -- 100 % --   07/01/21 1545 105/65 -- -- 110 -- 100 % --   07/01/21 1515 115/63 -- -- 110 -- 99 % --   07/01/21 1510 112/56 -- -- 110 -- 99 % --   07/01/21 1505 109/60 -- -- 112 -- 99 % --   07/01/21 1500 100/62 -- -- 111 -- 99 % --   07/01/21 1455 109/55 -- -- 112 -- 99 % --   07/01/21 1450 107/55 -- -- 112 -- 100 % --    07/01/21 1445 103/63 -- -- 112 25 100 % --   07/01/21 1440 102/60 -- -- 112 13 99 % --   07/01/21 1435 101/65 -- -- 111 13 99 % --   07/01/21 1430 127/76 -- -- 111 20 99 % --   07/01/21 1426 127/76 -- -- 110 -- 99 % --   07/01/21 1415 99/60 -- -- 111 27 100 % --   07/01/21 1400 99/56 -- -- 111 20 93 % --   07/01/21 1345 (!) 82/34 -- -- 109 23 -- --   07/01/21 1315 97/63 -- -- 111 -- -- --   07/01/21 1300 (!) 85/64 -- -- 105 -- 96 % --   07/01/21 1257 -- -- -- -- -- -- 86 kg (189 lb 9.5 oz)   07/01/21 1245 97/58 -- -- 104 -- 99 % --   07/01/21 1243 -- -- -- -- -- 99 % --   07/01/21 1230 (!) 105/90 -- -- 110 -- -- --   07/01/21 1222 99/63 99.4  F (37.4  C) Axillary 110 20 -- --       Physical Exam  Constitutional: Well developed, agitated tox appearance  Head: Atraumatic.   Neck:  no stridor  Eyes: no scleral icterus  Cardiovascular: Reg tachycardia, 2+ bilat radial pulses  Pulmonary/Chest: nml resp effort, Clear BS bilat  Abdominal: ND, soft, NT, no rebound or guarding   Ext: Warm, well perfused, no edema  Neurological: disoriented, GCS 12, symmetric facies, moves ext x4  Skin: Skin is warm and dry.   Psychiatric: Behavior is normal. Thought content normal.   Nursing note and vitals reviewed.      Emergency Department Course     Imaging:    CT Chest/Abdomen/Pelvis w contrast:   1.  Patchy hypoenhancement in the mid and upper poles of the right  kidney posteriorly, suspicious for pyelonephritis.  2.  Moderate-sized fat-containing paraumbilical hernia.  3.  Mild splenomegaly.  4.  There are possible small gallstones within a contracted  gallbladder. Reading per radiology.     CT Head w/o contrast:   Focal hypodensity in the medial left parietal lobe  consistent with acute to subacute ischemic infarct. Reading per radiology.     CT Soft Tissue Neck w contrast:   Negative neck CT with contrast. Exam slightly limited by  motion artifact. Reading per radiology.     XR Port Chest 1 View:  Portable chest. Lungs are  hypoaerated, but otherwise  clear. Heart is enlarged. No pneumothorax. No definite pleural  effusions. Endotracheal tube terminates approximately 2 cm above the  arvind. Nasogastric tube extends below the left hemidiaphragm  presumably in the stomach. Reading per radiology.    Laboratory:     CBC: WBC 21.5(H), HGB 6.8(L),    CMP: Potassium 2.9(L); Glucose 130(H); Urea Nitrogen 35(H); Creatinine 1.59(H); GFR 40(L); Calcium 7.7(L); Bilirubin 1.7(H); Albumin 1.7(L)Protein total 6.6(L); Alkaline Phosphate 307(H) o/w WNL   UA with microscopic: Bilirubin Small; Blood Large; Protein Albumins 100; Urobilinogen 8.0(H); Leukocyte Large; (H); RBC 93(H); WBC Clumps Present; Bacteria Moderate; Mucous Urine Present; Hyaline Casts 3(H) o/w WNL   Alcohol Ethyl - <0.01  Drug Abuse Screen Urine - Amphetamines Positive; Opiates Positive   TSH with free T4 reflex - 0.58  ABO/Rh - O positive   Lipase - 23(L)  HCG Qualitative Pregnancy - Negative  INR - 1.23(H)  Troponin (Collected 1239): 0.367(H)  Lactic acid (result time 1239) 3.4(H)   Symptomatic COVID19 Virus PCR by nasopharyngeal swab: Negative  Blood culture x 2 - Pending   Urine Culture - Pending     [facility]    -Intubation    Date/Time: 7/1/2021 2:08 PM  Performed by: Ayan Kowalski MD  Authorized by: Ayan Kowalski MD     ED EVALUATION:      Assessment Time: 7/1/2021 2:10 PM      I have performed an Emergency Department Evaluation including taking a history and physical examination, this evaluation will be documented in the electronic medical record for this ED encounter.     Indication: altered mental status, treatment facilitation    ASA Class: Class 2- mild systemic disease, no acute problems, no functional limitations    Mallampati: Grade 2- soft palate, base of uvula, tonsillar pillars, and portion of posterior pharyngeal wall visible    NPO Status: not NPO, emergent situation  UNIVERSAL PROTOCOL   Site Marked:  Yes  Prior Images Obtained and Reviewed:  Yes  Required items: Required blood products, implants, devices and special equipment available    Patient identity confirmed:  Arm band, provided demographic data and hospital-assigned identification number  Patient was reevaluated immediately before administering moderate or deep sedation or anesthesia  Confirmation Checklist:  Patient's identity using two indicators, relevant allergies, procedure was appropriate and matched the consent or emergent situation and correct equipment/implants were available  Time out: Immediately prior to the procedure a time out was called    Universal Protocol: the Joint Commission Universal Protocol was followed    Preparation: Patient was prepped and draped in usual sterile fashion          PRE-PROCEDURE DETAILS     Patient status:  Altered mental status    Mallampati score:  II    Pretreatment medications:  None    Paralytics:  Succinylcholine        SEDATION    Patient Sedated: Yes    Sedation Type:  Deep  Sedation:  Etomidate  Vital signs: Vital signs monitored during sedation    PROCEDURE DETAILS     Preoxygenation:  Nonrebreather mask    CPR in progress: no      Intubation method:  Oral    Oral intubation technique:  Video-assisted    Laryngoscope size: Hyperangulated     Tube size (mm):  7.5    Tube type:  Cuffed    Number of attempts:  1    Ventilation between attempts: no      Cricoid pressure: no      Tube visualized through cords: yes      PLACEMENT ASSESSMENT     ETT to lip:  23    Tube secured with:  Adhesive tape    Breath sounds:  Equal    Placement verification: chest rise, CXR verification, direct visualization, equal breath sounds and ETCO2 detector      CXR findings:  ETT in proper place  PROCEDURE   Patient Tolerance:  Patient tolerated the procedure well with no immediate complications    Length of time physician/provider present for 1:1 monitoring during sedation: 10M [facility]  Line    Date/Time: 7/1/2021 2:33 PM  Performed by: Claudy Brantley  Authorized by: Claudy Brantley       PRE-PROCEDURE DETAILS:     Hand hygiene: Hand hygiene performed prior to insertion      Sterile barrier technique: All elements of maximal sterile technique followed      Skin preparation:  2% chlorhexidine    Skin preparation agent: Skin preparation agent completely dried prior to procedure      PROCEDURE DETAILS:     Location:  R femoral    Site selection rationale:  Puncture to R neck    Patient position:  Flat    Procedural supplies:  Triple lumen    Catheter size:  7 Fr    Landmarks identified: yes      Ultrasound guidance: yes      Sterile ultrasound techniques: Sterile gel and sterile probe covers were used      Number of attempts:  1    Successful placement: yes      POST PROCEDURE DETAILS:      Post-procedure:  Dressing applied and line sutured    Assessment:  No pneumothorax on x-ray and free fluid flow (brown port w/o blood return but flushes)  PROCEDURE   Patient Tolerance:  Patient tolerated the procedure well with no immediate complications        Emergency Department Course:    Reviewed:  I reviewed nursing notes, vitals, past medical history and care everywhere    1216 I arrive at the patients bedside  1340 I recheck the patient at this time   1400 The decision to intubate the patient was made at this time  1415 Intubation begins  1422 Intubation noted as a success, central line placement next   1511 I spoke with Dr. Shetty, ICU, regarding this patient.     Interventions:  1228 Morphine 4 mg IV  1229 Zyprexa 10 mg IM   1316 Sublimaze 100 mcg IV  1332 Versed 2 mg IV  1340 Iopamidol 125 mL IV  1354 Zyprexa 10 mg IM  1359 Zosyn 3.5 g IV   1440 Dilaudid 1 mg  IV    Medications   sterile water (preservative free) injection (has no administration in time range)   lactated ringers infusion (has no administration in time range)   lidocaine 1 % 0.1-5 mL (has no administration in time range)   sodium  chloride (PF) 0.9% PF flush 5-50 mL (has no administration in time range)   sodium chloride (PF) 0.9% PF flush 10-20 mL (has no administration in time range)   sodium chloride (PF) 0.9% PF flush 10 mL (has no administration in time range)   vancomycin 2000 mg in 0.9% NaCl 500 ml intermittent infusion 2,000 mg (2,000 mg Intravenous New Bag 7/1/21 1627)   sterile water (preservative free) injection (has no administration in time range)   midazolam (VERSED) drip - ADULT 100 mg/100 mL in NS (pre-mix) (2 mg/hr Intravenous New Bag 7/1/21 1642)   OLANZapine (zyPREXA) injection 10 mg (10 mg Intramuscular Given 7/1/21 1229)   morphine (PF) injection 4 mg (4 mg Intravenous Given 7/1/21 1228)   lactated ringers BOLUS 1,000 mL (0 mLs Intravenous Stopped 7/1/21 1329)   fentaNYL (PF) (SUBLIMAZE) injection 100 mcg (100 mcg Intravenous Given 7/1/21 1316)   lactated ringers BOLUS 2,580 mL (2,580 mLs Intravenous New Bag 7/1/21 1359)   piperacillin-tazobactam (ZOSYN) 4.5 g vial to attach to  mL bag (0 g Intravenous Stopped 7/1/21 1407)   iopamidol (ISOVUE-370) solution 125 mL (125 mLs Intravenous Given 7/1/21 1340)   Saline (70 mLs Intravenous Given 7/1/21 1340)   midazolam (VERSED) injection 2 mg (2 mg Intravenous Given 7/1/21 1332)   OLANZapine (zyPREXA) injection 10 mg (10 mg Intramuscular Given 7/1/21 1354)   midazolam (VERSED) injection 2 mg (2 mg Intravenous Given 7/1/21 1353)   HYDROmorphone (DILAUDID) injection 1 mg (1 mg Intravenous Given 7/1/21 1440)   LORazepam (ATIVAN) injection 2 mg (2 mg Intravenous Given 7/1/21 1532)   propofol (DIPRIVAN) 1000 MG/100ML infusion (40 mcg/kg/min  New Bag 7/1/21 1644)     Disposition:  The patient was admitted to the hospital under the care of Dr. Troy, ICU.     Impression & Plan     CMS Diagnoses:   The patient has signs of Severe Sepsis        If one the following conditions is present, a 30 mL/kg bolus is recommended as part of the 6 hour bundle (IBW can be used for BMI >30,  "or document refusal/contraindication):      1.   Initial hypotension  defined as 2 bps < 90 or map < 65 in the 6hrs before or 6hrs after time zero.     2.  Lactate >4.     The patient has signs of Severe Sepsis as evidenced by:    1. 2 SIRS criteria, AND  2. Suspected infection, AND   3. Organ dysfunction: Lactic Acidosis with value >2.0    Time severe sepsis diagnosis confirmed: 1240  21 as this was the time when Lactate resulted, and the level was > 2.0    3 Hour Severe Sepsis Bundle Completion:    1. Initial Lactic Acid Result:   Recent Labs   Lab Test 21  1655 21  1239   LACT 2.3* 3.4*     2. Blood Cultures before Antibiotics: Yes  3. Broad Spectrum Antibiotics Administered:  yes       Anti-infectives (From admission through now)    Start     Dose/Rate Route Frequency Ordered Stop    21 1335  vancomycin 2000 mg in 0.9% NaCl 500 ml intermittent infusion 2,000 mg      2,000 mg  over 2 Hours Intravenous ONCE 21 1331      21 1315  piperacillin-tazobactam (ZOSYN) 4.5 g vial to attach to  mL bag     Note to Pharmacy: For [facility], [facility] and [facility]: For Zosyn-naive patients, use the \"Zosyn initial dose + extended infusion\" order panel.    4.5 g  over 30 Minutes Intravenous ONCE 21 1312 21 1407          4. Fluid volume administered in ED:  Full 30 mL/kg bolus given (see amount below).    BMI Readings from Last 1 Encounters:   No data found for BMI     30 mL/kg fluids based on weight: 2,580 mL  30 mL/kg fluids based on IBW (must be >= 60 inches tall): Patient ideal weight not available.                Severe Sepsis reassessment:  1. Repeat Lactic Acid Level: 2.4  2. MAP>65 after initial IVF bolus, will continue to monitor fluid status and vital signs        Medical Decision Makin-year-old female presenting with altered mental status, concern fever    Differential diagnosis includes sepsis, severe sepsis, bacteremia, UTI, meningitis, pneumonia, endocarditis.  Patient was " given sedation as noted above until facility work-up but given her continued agitation she was eventually intubated to facilitate treatment after returning from CT.   Labs demonstrate UA consistent with infection, elevated lactate level consistent with severe sepsis, elevated troponin likely secondary to demand ischemia, leukocytosis, anemia.  Upon establishment of severe sepsis, patient given broad-spectrum antibiotics and fluids.  CT imaging as noted above with findings concerning for pyelonephritis.  Patient was sedated with propofol and midazolam.  Her blood pressure improved and her lactate was noted to be improved on repeat check.  Blood transfusion ordered given significant anemia.  A central line was placed for access given the ability to place other peripherals.  Patient was admitted to the ICU for further evaluation and management.    Critical Care Time: was 65 minutes for this patient excluding procedures    Covid-19  Manas Hernandez was evaluated during a global COVID-19 pandemic, which necessitated consideration that the patient might be at risk for infection with the SARS-CoV-2 virus that causes COVID-19.   Applicable protocols for evaluation were followed during the patient's care.   COVID-19 was considered as part of the patient's evaluation. The plan for testing is:  a test was obtained during this visit.    Diagnosis:    ICD-10-CM    1. Pyelonephritis, acute  N10 Blood culture     ABO/Rh type and screen     Blood component     Blood component     Blood component     Blood component     Blood gas venous and oxyhgb     Lactic acid whole blood     ISTAT gases lactate clayton POCT     ISTAT gases lactate clayton POCT     CANCELED: Lactic acid whole blood   2. Altered mental status, unspecified altered mental status type  R41.82          Scribe Disclosure:  Claudy HATFIELD, am serving as a scribe at 12:18 AM on 7/1/2021 to document services personally performed by Ayan Kowalski MD, based on my  observations and the provider's statements to me         Ayan Kowalski MD  07/02/21 0906

## 2021-07-01 NOTE — ED NOTES
Report received. Patient visualized. Vitals are stable. Patient is intubated and sedated using propofol. Maximum dose of propofol is infusing. Patient remains restless. Soft restraints are in place. Versed drip to be initiated per order when central line is placed. Second set of blood cultures to taken prior to Abx start. Fluids are running per MD order. Patient to be admitted to ICU when bed is ready.

## 2021-07-01 NOTE — CONSULTS
"Vascular neurology note     We were consulted on this 41 year old lady who was found unresponsive, hypotensive and acidotic. She has history of polysubstance abuse and was found to be positive for amphetamine and opiates. She intubated for airway protection. Head CT showed an area of hypodensity in the left occipital lobe which could be acute or subacute stroke. I personally reviewed the images.    Antithrombotic therapy: hold until we rule out infective endocarditis. She already has blood cultures and TTE ordered.   BP goal: normotensive.   I will order MRI brain with contrast to better characterize this lesion and MRA head and neck to assess for any arterial stenosis or occlusion.     Will see her tomorrow morning. Please call with questions.     Kylie Garces MD, Msc, SALAZAR, FAAN   of Neurology  [facility]     07/01/2021 6:41 PM  To page me or covering stroke neurology team member, click here: [facility]COM  Choose \"On Call\" tab at top, then search dropdown box for \"Neurology Adult\" & press Enter, look for Neuro ICU/Stroke    "

## 2021-07-01 NOTE — PROGRESS NOTES
Prabhu's test performed prior to radial ABG draw. Collateral circulation confirmed.  Site:Right radial  Device:  Ventilation Mode: CMV/AC  (Continuous Mandatory Ventilation/ Assist Control)  FiO2 (%): 40 %  Rate Set (breaths/minute): 16 breaths/min  Tidal Volume Set (mL): 450 mL  PEEP (cm H2O): 5 cmH2O  Oxygen Concentration (%): 60 %  Resp: 26  Pat Heck, RT

## 2021-07-01 NOTE — ED NOTES
DATE:  7/1/2021   TIME OF RECEIPT FROM LAB:  1638  LAB TEST:  bicarb  LAB VALUE: 10  RESULTS GIVEN WITH READ-BACK TO (PROVIDER):  Dr. Kowalski   TIME LAB VALUE REPORTED TO PROVIDER: 4:43 PM

## 2021-07-01 NOTE — PROGRESS NOTES
RT at beside to assist with intubation. Pt sedated and intubated approximately 1410 with ETT size 7.5 at lip, tube placement confirmed with bilateral breath sounds and end-tidal CO2 color change,  then placed on ventilatory: AC/VC: , RR 16,+ 5 and 60% FiO2. Oxygen saturation at 99%. Will continue to monitor per MD order.       David Medina, RT

## 2021-07-01 NOTE — ED TRIAGE NOTES
Patient presents to the ED complaining of altered mental status.  Patient is having difficulty following directions upon arrival, but unable to walk.

## 2021-07-01 NOTE — ED NOTES
Report called to ICU RN. Patients vitally stable. To be transported to ICU at this time with RT, RN and EDT.

## 2021-07-01 NOTE — PROGRESS NOTES
Critical Care Progress Note  07/02/2021    Name: Manas Hernandez MRN#: [id number]   Age: 41 year old YOB: 1980     [facility] [place] Day[facility] 1           Problem List:   Active Problems:    Pyelonephritis, acute    Altered mental status, unspecified altered mental status type      Summary/Hospital Course:   Ms. Hernandez is a 41 year old female with chronic polysubstance abuse and IVDU presented on 7/1 with encephalopathy, hypotension, anemia, and hypotension responsive to fluid resusictation. She was agitated in the ED, sedated and intubated for airway protection. CT head showed possible new acute to subacute parietal lobe ischemic infarct, CXR without abnormalities, and CT abdomen concerning for pyelonephritis empirically treated with vancomycin and Zosyn. Transferred to the ICU for continued infectious workup and airway management. On exam there was concern for endocarditis. On 7/2, admission blood cultures returned with MSSA. Remains intubated on broad spectrum IV antibiotics (vancomycin, Zosyn) for suspected infective endocarditis (JAE pending) and possible pyelonephritis.     Assessment and plan :     Manas Hernandez is a 41 year old female with a history of chronic polysubstance abuse admitted on 7/1/2021 for encephalopathy requiring intubation, hypotension and acidosis concerning for sepsis secondary to S. Aureus bacteremia and infective endocarditis.  I have personally reviewed the daily labs, imaging studies, cultures and discussed the case with referring physician and consulting physicians.   My assessment and plan by system for this patient is as follows:    Neurology/Psychiatry:   1. Acute or subacute ischemic infarct  2. Toxic encephalopathy, septic encephalopathy - methicillin S. Aureus bacteremia on blood cultures (7/1)  3. ICU sedation - propofol and midazolam  3. Analgesia  4. Agitation  5. Polysubstance use  Plan  - Neuro CC following, appreciate assistance   - MRI and MRA for stroke workup  - Wean  propofol based on elevated triglycerides, will increase midazolam drip for sedation in its place. RASS goal of -1 to -2.  - Chemical dependency evaluation once extubated if agreeable   - Fentanyl drip and prn for pain  - Monitor for signs of withdrawal - UDS positive for amphetamines and opiates on admission    Cardiovascular:   1. Septic shock: now on norepinephrine and vasopressin  2. Suspect infective endocarditis 2/2 IVDU - TR on exam, possible osler nodules, acute infarct on head CT.   3. Elevated troponin   4. Hypertriglyceridemia - elevated TG, low HDL  Plan  - Continue vasopressors with MAP goal >65; wean as able. Currently on vasopressin and norepinephrine  - Add stress dose steroids with hydrocortisone 50 mg IV q6h  - EKG   - TTE to be done today, likely will need JAE eventually  - Appreciate cardiology assistance   - Trend troponin q4h to peak   - IV antibiotics as below    Pulmonary/Ventilator Management:   1. Mechanically ventilated for airway protection - CMV/AC (16/450/5/40)  Plan  - Continue full ventilatory support, hold on attempts at extubation today given worsening septic shock on vasopressors    GI/Nutrition :   1. Mildly elevated alkaline phosphate  2. Hyperbilirubinemia  3. Malnutrition  4. Stress ulcer prophylaxis   Plan  - Monitor CMP daily.  - NPO, will start tube feeds  - Continue pantoprazole 40 mg IV    Renal/Fluids/Electrolytes:   1. Possible pyelonephritis with septic shock - urine culture results pending.  2. Hypokalemia, resolved with supplementation but suspect still deficient.  3. FORD - likely secondary to severe sepsis, improving.  4. Metabolic acidosis with respiratory compensation, improving.  Plan  - Continue IV antibiotics as below  - K+ supplementation: 20 mEq IV  - Continue MIVF: /hr until tube feeds started.    - Monitor CMP daily  - Avoid nephrotoxic agents such as NSAID, IV contrast unless specifically required  - Follow I/O's as appropriate.    Infectious Disease:    1. Severe sepsis secondary to methicillin S. Aureus bacteremia, likely in the setting of infective endocarditis (Definite by [facility] criteria, with 1 major, 3 minor)  2. Possible pyelonephritis: based on CT and UA, awaiting UC (vs septic emboli to R kidney)   Plan  - Follow blood cultures, one culture positive for methicillin S. Aureus from 7/1  - Repeat blood cultures daily  - Follow UC  - Continue vancomycin IV (pharmacy dosing), Zosyn 3.375 g IV q6h, likely discontinue vancomycin tomorrow if cultures continue to show only MSSA  - Consider ID consult once echo results return.  - Monitor urine output while on IV antibiotics.    Endocrine:   1. Probable relative adrenal insufficiency in the setting of severe sepsis  2. Stress induced hyperglycemia, improved. A1c 5.7 at admit  Plan  - ICU insulin protocol, goal sugar <180  - Follow BGs  - Stress dose steroids - IV hydrocortisone 50 mg q6h as above     Hematology/Oncology:   1. Microcytic anemia, likely 2/2 malnutrition and chronic polysubstance use; no signs of acute blood loss. Stable Hgb (8.1) 2 units pRBC given.  2. Hx anemia (Hgb 7.3 in 11/2007).  Plan  - Daily CBC, Transfuse with Hgb < 7.    IV/Access:   1. Venous access - PIV right foot, right femoral CVC  2. Arterial access - femoral  Plan  - central access required and necessary    ICU Prophylaxis:   1. DVT: mechanical  2. VAP: HOB 30 degrees, chlorhexidine rinse  3. Stress Ulcer: PPI  4. Restraints: Nonviolent soft two point restraints required and necessary for patient safety and continued cares and good effect as patient continues to pull at necessary lines, tubes despite education and distraction. Will readdress daily.   5. Wound care - per unit routine   6. Feeding - TF  7. Family Update: unclear family contact.  8. Disposition - critically ill in ICU    Key goals for next 24 hours:   1.   Obtain TTE and possible JAE to evaluate for infective endocarditis  2.   Follow blood cultures  3.   Monitor BP, wean  pressors as able  4.   MRI brain         Interim History/Overnight Events:   Hypotensive overnight requiring pressors (vasopressin and norepinephrine). Blood culture positive for MSSA. Patient is intubated and unable to obtain subjective history.          Key Medications:       chlorhexidine  15 mL Mouth/Throat Q12H     hydrocortisone sodium succinate PF  50 mg Intravenous Q6H     pantoprazole (PROTONIX) IV  40 mg Intravenous Daily with breakfast     piperacillin-tazobactam  3.375 g Intravenous Q6H     potassium chloride  20 mEq Intravenous Once     sodium chloride (PF)  10 mL Intracatheter Q8H     vancomycin (VANCOCIN) IV  1,750 mg Intravenous Once     vancomycin place allen - receiving intermittent dosing  1 each Does not apply See Admin Instructions       fentaNYL 50 mcg/hr (07/02/21 1000)     lactated ringers 125 mL/hr at 07/02/21 1001     midazolam 3 mg/hr (07/02/21 1001)     norepinephrine 0.24 mcg/kg/min (07/02/21 1003)     propofol (DIPRIVAN) infusion 50 mcg/kg/min (07/02/21 1003)     sodium chloride 10 mL/hr at 07/02/21 1003     vasopressin 0.5 Units/hr (07/02/21 1004)            Physical Examination:   Temp:  [98.7  F (37.1  C)-100.4  F (38  C)] 100.4  F (38  C)  Pulse:  [] 97  Resp:  [13-64] 29  BP: ()/(34-90) 101/51  MAP:  [58 mmHg-73 mmHg] 70 mmHg  Arterial Line BP: ()/(45-56) 107/51  FiO2 (%):  [40 %-60 %] 40 %  SpO2:  [93 %-100 %] 98 %  No intake or output data in the 24 hours ending 07/01/21 1542  Wt Readings from Last 4 Encounters:   07/02/21 87.6 kg (193 lb 1.6 oz)   11/05/07 103.1 kg (227 lb 4 oz)   09/26/07 99.3 kg (219 lb)     BP - Mean:  [52-94] 91  Ventilation Mode: CMV/AC  (Continuous Mandatory Ventilation/ Assist Control)  FiO2 (%): 40 %  Rate Set (breaths/minute): 16 breaths/min  Tidal Volume Set (mL): 450 mL  PEEP (cm H2O): 5 cmH2O  Oxygen Concentration (%): 40 %  Resp: 29    Recent Labs   Lab 07/01/21  2251 07/01/21  1830 07/01/21  1537   PH 7.42 7.41  --    PCO2 31*  32*  --    PO2 93 99  --    HCO3 20* 20*  --    O2PER 40 40% 50%     GEN: sedated, in no acute distress  HEENT: sclera anicteric, trachea midline, NGT in place. ETT in place.   PULM: unlabored synchronous with vent, clear anteriorly.   CV/COR: tachycardic, normal S1 and S2. 3/6 systolic murmur auscultated over LLSB  ABD: Mildly distended, soft. Hypoactive bowel sounds, large midline hernia.  MSK/EXT: No pitting edema. WWP.  NEURO: sedated and not responsive to noxious stimuli. PEERL  : don in place draining dark yellow urine.  SKIN: Purple, nodular lesions of right 4th finger concerning for Oslers nodes. Dark red lesion on the medial great toe. Scattered petechial lesions on the soles of the feet bilaterally. No nail changes noted.   LINES: clean, dry intact         Data:   All data and imaging reviewed.    ROUTINE ICU LABS (Last four results)  CMP  Recent Labs   Lab 07/01/21  1239      POTASSIUM 2.9*   CHLORIDE 102   CO2 20   ANIONGAP 12   *   BUN 35*   CR 1.59*   GFRESTIMATED 40*   GFRESTBLACK 47*   DEREK 7.7*   PROTTOTAL 6.6*   ALBUMIN 1.7*   BILITOTAL 1.7*   ALKPHOS 307*   AST 35   ALT 31     CBC  Recent Labs   Lab 07/01/21  1239   WBC 21.5*   RBC 3.34*   HGB 6.8*   HCT 22.4*   MCV 67*   MCH 20.4*   MCHC 30.4*   RDW 17.0*        INR  Recent Labs   Lab 07/01/21  1239   INR 1.23*     Arterial Blood GasNo lab results found in last 7 days.    All cultures:  No results for input(s): CULT in the last 168 hours.  Recent Results (from the past 24 hour(s))   CT Head w/o Contrast    Narrative    CT SCAN OF THE HEAD WITHOUT CONTRAST   7/1/2021 1:47 PM     HISTORY: Delirium; altered mental status    TECHNIQUE:  Axial images of the head and coronal reformations without  IV contrast material.  Radiation dose for this scan was reduced using  automated exposure control, adjustment of the mA and/or kV according  to patient size, or iterative reconstruction technique.    COMPARISON: None.    FINDINGS: There  is a focal 2 cm area of hypodensity involving gray and  white matter in the medial left parietal lobe consistent with acute to  subacute ischemic infarct. There is a minimal incidental arachnoid  cyst in the anterior portion of left middle cranial fossa. Ventricles  and subarachnoid spaces are otherwise within normal limits. There is  no evidence for intracranial hemorrhage, significant mass effect, or  skull fracture. Exam is mildly limited by motion artifact. Visualized  paranasal sinuses and mastoid air cells are clear.      Impression    IMPRESSION: Focal hypodensity in the medial left parietal lobe  consistent with acute to subacute ischemic infarct.    BRIT MARIANO MD          SYSTEM ID:  [id number]   CT Chest/Abdomen/Pelvis w Contrast    Narrative    CT CHEST/ABDOMEN/PELVIS WITH CONTRAST 7/1/2021 1:48 PM    CLINICAL HISTORY: Fever of unknown origin.    TECHNIQUE: CT scan of the chest, abdomen, and pelvis was performed  following injection of IV contrast. Multiplanar reformats were  obtained. Dose reduction techniques were used.   CONTRAST:  125 mL Isovue-370   COMPARISON: None.    FINDINGS: Multiple images were degraded due to patient motion  artifact.    LUNGS AND PLEURA: No pleural effusions. Mild scarring and/or  atelectasis at both lung bases. The lungs are otherwise clear where  visualized.    MEDIASTINUM/AXILLAE: No enlarged lymph nodes are identified. There is  a small pericardial effusion.    CORONARY ARTERY CALCIFICATION: None.    HEPATOBILIARY: The gallbladder appears contracted, and may contain  small gallstones. No hepatic masses are seen.    PANCREAS: Normal.    SPLEEN: Mild splenomegaly. The spleen measures up to 15.2 cm in  length.    ADRENAL GLANDS: Normal.    KIDNEYS/BLADDER: Patchy hypoenhancement in the mid and upper pole of  the right kidney posteriorly is suspicious for pyelonephritis. The  left kidney is unremarkable. No hydronephrosis. Neri catheter in the  bladder. Small amount of air  within the urinary bladder may be related  to the presence of the Neri catheter.    BOWEL: No bowel obstruction. No convincing evidence for colitis or  diverticulitis. Unremarkable appendix.    PELVIC ORGANS: Unremarkable.    LYMPH NODES: No enlarged lymph nodes are identified in the abdomen or  pelvis.    VASCULATURE: Unremarkable.    ADDITIONAL FINDINGS: Moderate-sized fat-containing paraumbilical  hernia.    MUSCULOSKELETAL: Degenerative changes in the lower lumbar spine.  Thoracic curve, convex left.      Impression    IMPRESSION:   1.  Patchy hypoenhancement in the mid and upper poles of the right  kidney posteriorly, suspicious for pyelonephritis.  2.  Moderate-sized fat-containing paraumbilical hernia.  3.  Mild splenomegaly.  4.  There are possible small gallstones within a contracted  gallbladder.   Soft tissue neck CT w contrast    Narrative    CT SCAN OF THE NECK WITH CONTRAST  7/1/2021 1:50 PM     HISTORY: Evaluate abscess or deep space infection.    TECHNIQUE:  Axial images and coronal reformations.  A total of 125 mL  Isovue-370 was used for both the CAP and STN IV.  Radiation dose for  this scan was reduced using automated exposure control, adjustment of  the mA and/or kV according to patient size, or iterative  reconstruction technique.    COMPARISON: None.    FINDINGS: Exam is limited due to patient motion artifact. The  visualized paranasal sinuses and mastoid air cells are clear. The  visualized orbits are normal. The pharynx, larynx, and subglottic  trachea are normal. The thyroid gland and salivary glands appear  normal. There is no evidence for any lymphadenopathy. Vascular  structures unremarkable. Some mild degenerative changes are seen in  the lower cervical spine. No abnormal fluid collections are present.  Jugular veins are very prominent, but this is felt to be within the  normal limits. Lung apices are clear.      Impression    IMPRESSION: Negative neck CT with contrast. Exam slightly  limited by  motion artifact.    BRIT MARIANO MD          SYSTEM ID:  [id number]   XR Chest Port 1 View    Narrative    CHEST ONE VIEW PORTABLE    7/1/2021 2:33 PM     HISTORY: Post intubation    COMPARISON: CT chest, abdomen and pelvis 7/1/2021.      Impression    IMPRESSION: Portable chest. Lungs are hypoaerated, but otherwise  clear. Heart is enlarged. No pneumothorax. No definite pleural  effusions. Endotracheal tube terminates approximately 2 cm above the  arvind. Nasogastric tube extends below the left hemidiaphragm  presumably in the stomach.    ARDHA PHILLIPS MD          SYSTEM ID:  [id number]                 Billing: This patient is critically ill: Yes. Total critical care time today 45 min, excluding procedures and teaching.     Austin León MD    Department of Medicine, Division of Pulmonary, Allergy, Critical Care, and Sleep Medicine

## 2021-07-02 ENCOUNTER — APPOINTMENT (OUTPATIENT)
Dept: CARDIOLOGY | Facility: CLINIC | Age: 41
End: 2021-07-02
Attending: INTERNAL MEDICINE
Payer: MEDICAID

## 2021-07-02 PROBLEM — I33.0 ENDOCARDITIS DUE TO METHICILLIN SUSCEPTIBLE STAPHYLOCOCCUS AUREUS (MSSA): Status: ACTIVE | Noted: 2021-07-02

## 2021-07-02 PROBLEM — B95.61 ENDOCARDITIS DUE TO METHICILLIN SUSCEPTIBLE STAPHYLOCOCCUS AUREUS (MSSA): Status: ACTIVE | Noted: 2021-07-02

## 2021-07-02 LAB
ANION GAP SERPL CALCULATED.3IONS-SCNC: 10 MMOL/L (ref 3–14)
BUN SERPL-MCNC: 26 MG/DL (ref 7–30)
CALCIUM SERPL-MCNC: 7.3 MG/DL (ref 8.5–10.1)
CHLORIDE SERPL-SCNC: 110 MMOL/L (ref 94–109)
CHOLEST SERPL-MCNC: 80 MG/DL
CO2 SERPL-SCNC: 20 MMOL/L (ref 20–32)
CREAT SERPL-MCNC: 1.29 MG/DL (ref 0.52–1.04)
GFR SERPL CREATININE-BSD FRML MDRD: 52 ML/MIN/{1.73_M2}
GLUCOSE BLDC GLUCOMTR-MCNC: 107 MG/DL (ref 70–99)
GLUCOSE BLDC GLUCOMTR-MCNC: 113 MG/DL (ref 70–99)
GLUCOSE BLDC GLUCOMTR-MCNC: 116 MG/DL (ref 70–99)
GLUCOSE BLDC GLUCOMTR-MCNC: 124 MG/DL (ref 70–99)
GLUCOSE BLDC GLUCOMTR-MCNC: 143 MG/DL (ref 70–99)
GLUCOSE SERPL-MCNC: 109 MG/DL (ref 70–99)
HBA1C MFR BLD: 5.7 % (ref 0–5.6)
HDLC SERPL-MCNC: 8 MG/DL
HGB BLD-MCNC: 7.9 G/DL (ref 11.7–15.7)
HGB BLD-MCNC: 8.1 G/DL (ref 11.7–15.7)
LACTATE BLD-SCNC: 1.5 MMOL/L (ref 0.7–2)
LACTATE BLD-SCNC: 2.6 MMOL/L (ref 0.7–2)
LDLC SERPL CALC-MCNC: <1 MG/DL
MAGNESIUM SERPL-MCNC: 1.9 MG/DL (ref 1.6–2.3)
NONHDLC SERPL-MCNC: 72 MG/DL
PHOSPHATE SERPL-MCNC: 4 MG/DL (ref 2.5–4.5)
POTASSIUM SERPL-SCNC: 3.5 MMOL/L (ref 3.4–5.3)
POTASSIUM SERPL-SCNC: 4.9 MMOL/L (ref 3.4–5.3)
SODIUM SERPL-SCNC: 140 MMOL/L (ref 133–144)
TRIGL SERPL-MCNC: 382 MG/DL
TROPONIN I SERPL-MCNC: 0.52 UG/L (ref 0–0.04)
TROPONIN I SERPL-MCNC: 0.59 UG/L (ref 0–0.04)
TROPONIN I SERPL-MCNC: 0.63 UG/L (ref 0–0.04)
VANCOMYCIN SERPL-MCNC: 12.1 MG/L

## 2021-07-02 PROCEDURE — 94002 VENT MGMT INPAT INIT DAY: CPT

## 2021-07-02 PROCEDURE — 85018 HEMOGLOBIN: CPT | Performed by: INTERNAL MEDICINE

## 2021-07-02 PROCEDURE — 200N000001 HC R&B ICU

## 2021-07-02 PROCEDURE — 83605 ASSAY OF LACTIC ACID: CPT | Performed by: ANESTHESIOLOGY

## 2021-07-02 PROCEDURE — 250N000011 HC RX IP 250 OP 636: Performed by: INTERNAL MEDICINE

## 2021-07-02 PROCEDURE — 250N000013 HC RX MED GY IP 250 OP 250 PS 637: Performed by: INTERNAL MEDICINE

## 2021-07-02 PROCEDURE — 87389 HIV-1 AG W/HIV-1&-2 AB AG IA: CPT | Performed by: INTERNAL MEDICINE

## 2021-07-02 PROCEDURE — 999N000157 HC STATISTIC RCP TIME EA 10 MIN

## 2021-07-02 PROCEDURE — 999N000009 HC STATISTIC AIRWAY CARE

## 2021-07-02 PROCEDURE — 86803 HEPATITIS C AB TEST: CPT | Performed by: INTERNAL MEDICINE

## 2021-07-02 PROCEDURE — C9113 INJ PANTOPRAZOLE SODIUM, VIA: HCPCS | Performed by: INTERNAL MEDICINE

## 2021-07-02 PROCEDURE — 80061 LIPID PANEL: CPT | Performed by: INTERNAL MEDICINE

## 2021-07-02 PROCEDURE — 85018 HEMOGLOBIN: CPT | Performed by: ANESTHESIOLOGY

## 2021-07-02 PROCEDURE — 83735 ASSAY OF MAGNESIUM: CPT | Performed by: INTERNAL MEDICINE

## 2021-07-02 PROCEDURE — 84484 ASSAY OF TROPONIN QUANT: CPT | Performed by: INTERNAL MEDICINE

## 2021-07-02 PROCEDURE — 93306 TTE W/DOPPLER COMPLETE: CPT

## 2021-07-02 PROCEDURE — 250N000011 HC RX IP 250 OP 636: Performed by: ANESTHESIOLOGY

## 2021-07-02 PROCEDURE — 93005 ELECTROCARDIOGRAM TRACING: CPT

## 2021-07-02 PROCEDURE — 80048 BASIC METABOLIC PNL TOTAL CA: CPT | Performed by: ANESTHESIOLOGY

## 2021-07-02 PROCEDURE — 258N000003 HC RX IP 258 OP 636: Performed by: ANESTHESIOLOGY

## 2021-07-02 PROCEDURE — 86701 HIV-1ANTIBODY: CPT | Performed by: INTERNAL MEDICINE

## 2021-07-02 PROCEDURE — 84100 ASSAY OF PHOSPHORUS: CPT | Performed by: INTERNAL MEDICINE

## 2021-07-02 PROCEDURE — 250N000009 HC RX 250: Performed by: INTERNAL MEDICINE

## 2021-07-02 PROCEDURE — 86706 HEP B SURFACE ANTIBODY: CPT | Performed by: INTERNAL MEDICINE

## 2021-07-02 PROCEDURE — 83605 ASSAY OF LACTIC ACID: CPT | Performed by: INTERNAL MEDICINE

## 2021-07-02 PROCEDURE — 87077 CULTURE AEROBIC IDENTIFY: CPT | Performed by: INTERNAL MEDICINE

## 2021-07-02 PROCEDURE — 86702 HIV-2 ANTIBODY: CPT | Performed by: INTERNAL MEDICINE

## 2021-07-02 PROCEDURE — 99222 1ST HOSP IP/OBS MODERATE 55: CPT | Mod: 25 | Performed by: INTERNAL MEDICINE

## 2021-07-02 PROCEDURE — 87340 HEPATITIS B SURFACE AG IA: CPT | Performed by: INTERNAL MEDICINE

## 2021-07-02 PROCEDURE — 93306 TTE W/DOPPLER COMPLETE: CPT | Mod: 26 | Performed by: INTERNAL MEDICINE

## 2021-07-02 PROCEDURE — 3E043XZ INTRODUCTION OF VASOPRESSOR INTO CENTRAL VEIN, PERCUTANEOUS APPROACH: ICD-10-PCS | Performed by: INTERNAL MEDICINE

## 2021-07-02 PROCEDURE — 250N000009 HC RX 250: Performed by: SPECIALIST

## 2021-07-02 PROCEDURE — 99291 CRITICAL CARE FIRST HOUR: CPT | Performed by: INTERNAL MEDICINE

## 2021-07-02 PROCEDURE — 99222 1ST HOSP IP/OBS MODERATE 55: CPT | Performed by: SURGERY

## 2021-07-02 PROCEDURE — 258N000003 HC RX IP 258 OP 636: Performed by: INTERNAL MEDICINE

## 2021-07-02 PROCEDURE — 999N001017 HC STATISTIC GLUCOSE BY METER IP

## 2021-07-02 PROCEDURE — 80202 ASSAY OF VANCOMYCIN: CPT | Performed by: INTERNAL MEDICINE

## 2021-07-02 PROCEDURE — 99291 CRITICAL CARE FIRST HOUR: CPT | Mod: 25 | Performed by: INTERNAL MEDICINE

## 2021-07-02 PROCEDURE — 83036 HEMOGLOBIN GLYCOSYLATED A1C: CPT | Performed by: INTERNAL MEDICINE

## 2021-07-02 PROCEDURE — 84132 ASSAY OF SERUM POTASSIUM: CPT | Performed by: ANESTHESIOLOGY

## 2021-07-02 PROCEDURE — 87040 BLOOD CULTURE FOR BACTERIA: CPT | Performed by: INTERNAL MEDICINE

## 2021-07-02 RX ORDER — POTASSIUM CHLORIDE 29.8 MG/ML
20 INJECTION INTRAVENOUS ONCE
Status: COMPLETED | OUTPATIENT
Start: 2021-07-02 | End: 2021-07-02

## 2021-07-02 RX ORDER — CEFAZOLIN SODIUM 2 G/100ML
2 INJECTION, SOLUTION INTRAVENOUS EVERY 8 HOURS
Status: CANCELLED | OUTPATIENT
Start: 2021-07-02

## 2021-07-02 RX ORDER — ATORVASTATIN CALCIUM 10 MG/1
10 TABLET, FILM COATED ORAL DAILY
Status: CANCELLED | OUTPATIENT
Start: 2021-07-02

## 2021-07-02 RX ORDER — NAFCILLIN SODIUM 2 G/8ML
2 INJECTION, POWDER, FOR SOLUTION INTRAMUSCULAR; INTRAVENOUS EVERY 6 HOURS
Status: DISCONTINUED | OUTPATIENT
Start: 2021-07-02 | End: 2021-07-05

## 2021-07-02 RX ORDER — CEFAZOLIN SODIUM 1 G/50ML
1750 SOLUTION INTRAVENOUS ONCE
Status: DISCONTINUED | OUTPATIENT
Start: 2021-07-02 | End: 2021-07-02 | Stop reason: DRUGHIGH

## 2021-07-02 RX ADMIN — SODIUM CHLORIDE, POTASSIUM CHLORIDE, SODIUM LACTATE AND CALCIUM CHLORIDE: 600; 310; 30; 20 INJECTION, SOLUTION INTRAVENOUS at 22:30

## 2021-07-02 RX ADMIN — Medication 0.24 MCG/KG/MIN: at 12:40

## 2021-07-02 RX ADMIN — SODIUM CHLORIDE 500 ML: 9 INJECTION, SOLUTION INTRAVENOUS at 04:21

## 2021-07-02 RX ADMIN — VASOPRESSIN 2.4 UNITS/HR: 20 INJECTION INTRAVENOUS at 00:35

## 2021-07-02 RX ADMIN — PROPOFOL 50 MCG/KG/MIN: 10 INJECTION, EMULSION INTRAVENOUS at 08:39

## 2021-07-02 RX ADMIN — PIPERACILLIN SODIUM AND TAZOBACTAM SODIUM 3.38 G: 3; .375 INJECTION, POWDER, LYOPHILIZED, FOR SOLUTION INTRAVENOUS at 14:37

## 2021-07-02 RX ADMIN — Medication 15 ML: at 15:19

## 2021-07-02 RX ADMIN — PROPOFOL 50 MCG/KG/MIN: 10 INJECTION, EMULSION INTRAVENOUS at 00:17

## 2021-07-02 RX ADMIN — VANCOMYCIN HYDROCHLORIDE 1500 MG: 5 INJECTION, POWDER, LYOPHILIZED, FOR SOLUTION INTRAVENOUS at 11:47

## 2021-07-02 RX ADMIN — CHLORHEXIDINE GLUCONATE 15 ML: 1.2 SOLUTION ORAL at 08:32

## 2021-07-02 RX ADMIN — CHLORHEXIDINE GLUCONATE 15 ML: 1.2 SOLUTION ORAL at 19:46

## 2021-07-02 RX ADMIN — Medication 6 MG/HR: at 17:33

## 2021-07-02 RX ADMIN — PIPERACILLIN SODIUM AND TAZOBACTAM SODIUM 3.38 G: 3; .375 INJECTION, POWDER, LYOPHILIZED, FOR SOLUTION INTRAVENOUS at 08:35

## 2021-07-02 RX ADMIN — PROPOFOL 30 MCG/KG/MIN: 10 INJECTION, EMULSION INTRAVENOUS at 15:25

## 2021-07-02 RX ADMIN — Medication 0.17 MCG/KG/MIN: at 16:16

## 2021-07-02 RX ADMIN — HYDROCORTISONE SODIUM SUCCINATE 50 MG: 100 INJECTION, POWDER, FOR SOLUTION INTRAMUSCULAR; INTRAVENOUS at 09:06

## 2021-07-02 RX ADMIN — POTASSIUM CHLORIDE 20 MEQ: 400 INJECTION, SOLUTION INTRAVENOUS at 10:35

## 2021-07-02 RX ADMIN — Medication 50 MCG: at 19:39

## 2021-07-02 RX ADMIN — SODIUM CHLORIDE, POTASSIUM CHLORIDE, SODIUM LACTATE AND CALCIUM CHLORIDE: 600; 310; 30; 20 INJECTION, SOLUTION INTRAVENOUS at 14:09

## 2021-07-02 RX ADMIN — HYDROCORTISONE SODIUM SUCCINATE 50 MG: 100 INJECTION, POWDER, FOR SOLUTION INTRAMUSCULAR; INTRAVENOUS at 19:46

## 2021-07-02 RX ADMIN — Medication 0.09 MCG/KG/MIN: at 22:30

## 2021-07-02 RX ADMIN — PANTOPRAZOLE SODIUM 40 MG: 40 INJECTION, POWDER, FOR SOLUTION INTRAVENOUS at 09:06

## 2021-07-02 RX ADMIN — PROPOFOL 50 MCG/KG/MIN: 10 INJECTION, EMULSION INTRAVENOUS at 10:53

## 2021-07-02 RX ADMIN — Medication 0.24 MCG/KG/MIN: at 06:29

## 2021-07-02 RX ADMIN — Medication 0.25 MCG/KG/MIN: at 00:17

## 2021-07-02 RX ADMIN — PROPOFOL 50 MCG/KG/MIN: 10 INJECTION, EMULSION INTRAVENOUS at 04:09

## 2021-07-02 RX ADMIN — NAFCILLIN SODIUM 2 G: 2 INJECTION, POWDER, LYOPHILIZED, FOR SOLUTION INTRAMUSCULAR; INTRAVENOUS at 16:56

## 2021-07-02 RX ADMIN — HYDROCORTISONE SODIUM SUCCINATE 50 MG: 100 INJECTION, POWDER, FOR SOLUTION INTRAMUSCULAR; INTRAVENOUS at 14:35

## 2021-07-02 RX ADMIN — PIPERACILLIN SODIUM AND TAZOBACTAM SODIUM 3.38 G: 3; .375 INJECTION, POWDER, LYOPHILIZED, FOR SOLUTION INTRAVENOUS at 02:07

## 2021-07-02 RX ADMIN — Medication 0.24 MCG/KG/MIN: at 09:34

## 2021-07-02 RX ADMIN — NAFCILLIN SODIUM 2 G: 2 INJECTION, POWDER, LYOPHILIZED, FOR SOLUTION INTRAMUSCULAR; INTRAVENOUS at 22:39

## 2021-07-02 ASSESSMENT — ACTIVITIES OF DAILY LIVING (ADL)
ADLS_ACUITY_SCORE: 19
ADLS_ACUITY_SCORE: 24
ADLS_ACUITY_SCORE: 26
ADLS_ACUITY_SCORE: 24

## 2021-07-02 ASSESSMENT — MIFFLIN-ST. JEOR: SCORE: 1599.4

## 2021-07-02 NOTE — PROGRESS NOTES
41-year-old female admitted to the ICU for acute pyelonephritis and altered mental status requiring intubation.  Patient is currently on full ventilatory support.  Patient was found to be anemic.  Patient has multifactorial hypotension most likely secondary to hypovolemia, anemia and sepsis.  Patient was initiated on norepinephrine infusion and will receive a unit of packed red blood cells for anemia.  Given the patient's escalating norepinephrine dose the decision was made to place arterial line.  Emergency contact was called twice and the line was disconnected.  Given the emergency nature of this decision was made to proceed with the arterial line.  The area over the patient's right radial artery was prepped and draped in the usual fashion and a timeout was done in accordance to [facility] policy.  Under ultrasound guidance the patient's radial artery was identified and a quick cath Angiocath was introduced into the artery under direct visualization.  The wire passed without resistance however there was difficulty threading the cath and there was no blood return.  Ultrasound guidance showed that the catheter appeared to be in the vessel and they may have been clot preventing adequate arterial line tracing.  Attention was then turned to the patient's left femoral artery.  The area over the patient's left femoral artery was prepped draped in usual fashion.  Using palpation the left femoral artery was identified and a hollow bore needle was introduced in the femoral artery with adequate blood return.  The syringe was removed and arterial flow was confirmed.  Using the Seldinger technique a guidewire was threaded through the hollow bore needle the needle was removed a long 20-gauge catheter was threaded over the wire and the wire was removed in usual fashion.  The wire was then disposed of.  The 20-gauge catheter was connected to transducer which showed arterial waveform of 90/60.  The line was sutured in place and  dressed in the usual fashion.  The patient tolerated procedure well.  I personally performed this procedure.    Jovanny Lopez MD  Critical Care Medicine

## 2021-07-02 NOTE — PROGRESS NOTES
[facility] ICU RESPIRATORY NOTE        Date of Admission: 7/1/2021    Date of Intubation: 7/1/2021    Reason for Mechanical Ventilation: Airway Protection    Number of Days on Mechanical Ventilation: 2    Met Criteria for Spontaneous Breathing Trial: No    Reason for No Spontaneous Breathing Trial: Per MD    Significant Events Today: None    ABG Results:   Recent Labs   Lab 07/01/21  2251 07/01/21  1830 07/01/21  1537   PH 7.42 7.41  --    PCO2 31* 32*  --    PO2 93 99  --    HCO3 20* 20*  --    O2PER 40 40% 50%       Current Vent Settings: Ventilation Mode: CMV/AC  (Continuous Mandatory Ventilation/ Assist Control)  FiO2 (%): 40 %  Rate Set (breaths/minute): 16 breaths/min  Tidal Volume Set (mL): 450 mL  PEEP (cm H2O): 5 cmH2O  Oxygen Concentration (%): 40 %  Resp: 29      Skin Assessment: Clean, dry & intact    Plan: Patient will remain on full ventilatory support. RT will continue to follow.     Asad Ochoa, RT

## 2021-07-02 NOTE — PHARMACY-VANCOMYCIN DOSING SERVICE
"Pharmacy Vancomycin Initial Note  Date of Service 2021  Patient's  1980  41 year old, female    Indication: Bacteremia    Current estimated CrCl = Estimated Creatinine Clearance: 67.8 mL/min (A) (based on SCr of 1.29 mg/dL (H)).    Creatinine for last 3 days  2021: 12:39 PM Creatinine 1.59 mg/dL;  6:39 PM Creatinine 1.29 mg/dL  2021:  2:14 AM Creatinine 1.29 mg/dL    Recent Vancomycin Level(s) for last 3 days  2021:  9:15 AM Vancomycin Level 12.1 mg/L      Vancomycin IV Administrations (past 72 hours)                   vancomycin 2000 mg in 0.9% NaCl 500 ml intermittent infusion 2,000 mg (mg) 2,000 mg New Bag 21 1627                Nephrotoxins and other renal medications (From now, onward)    Start     Dose/Rate Route Frequency Ordered Stop    21 1030  vancomycin 1500 mg in 0.9% NaCl 250 ml intermittent infusion 1,500 mg      1,500 mg  over 90 Minutes Intravenous EVERY 24 HOURS 21 1026      21 0030  vasopressin 40 units in NS 40 mL (PITRESSIN) infusion      2.4 Units/hr  2.4 mL/hr  Intravenous CONTINUOUS 21 0013      21  piperacillin-tazobactam (ZOSYN) 3.375 g vial to attach to  mL bag     Note to Pharmacy: For [facility], [facility] and [facility]: For Zosyn-naive patients, use the \"Zosyn initial dose + extended infusion\" order panel.    3.375 g  over 30 Minutes Intravenous EVERY 6 HOURS 21 1809      21 1930  norepinephrine (LEVOPHED) 4 mg in  mL infusion PREMIX      0.01-0.6 mcg/kg/min × 87.5 kg  3.3-196.9 mL/hr  Intravenous CONTINUOUS 21 1902            Contrast Orders - past 72 hours (72h ago, onward)    Start     Dose/Rate Route Frequency Ordered Stop    21 1325  iopamidol (ISOVUE-370) solution 125 mL      125 mL Intravenous ONCE 21 1321 21 1340          Loading dose: N/A  Regimen: 1500 mg IV every 24 hours.  Start time: 10:23 on 2021  Exposure target: AUC24 (range)400-600 mg/L.hr   AUC24,ss: 474 " mg/L.hr  Probability of AUC24 > 400: 80 %  Ctrough,ss: 13.3 mg/L  Probability of Ctrough,ss > 20: 9 %  Probability of nephrotoxicity (Lodise DONA 2009): 8 %      Plan:  1. Start vancomycin  1500 mg IV q24h.   2. Vancomycin monitoring method: AUC  3. Vancomycin therapeutic monitoring goal: 400-600 mg*h/L  4. Pharmacy will check vancomycin levels as appropriate in 1-3 Days.    5. Serum creatinine levels will be ordered daily for the first week of therapy and at least twice weekly for subsequent weeks.      Jeannine Jerome, [facility], PharmD

## 2021-07-02 NOTE — CONSULTS
[facility] Cardiology Consultation     Date of Admission:  7/1/2021  Date of Consult: 07/02/21  Requesting Physician: Dr. León  Primary care physician: Lisa Pollock  Primary cardiologist: None  Staff Cardiologist:  Dr. Feliz     Reason for consult: pericardial effusion    Assessment:   Manas Hernandez is a 41 year old IVDU female who was admitted on 7/1/2021 with MSSA sepsis and shock. TTE today shows a small mitral valve vegetation as well as pericardial inflammation with a large localized (posterior) effusion. She is intubated and sedated due to agitation.     1. MSSA sepsis and shock with encephalopathy.  2. Mitral valve endocarditis with a small mobile vegetation, mild-moderate MR and severe LAE.   3. Probable myopericarditis with large localized pericardial effusion and troponin rise.  4. Preserved LVEF.  5. Ischemic CVA, possibly related to MV vegetation embolization.  6. ?PAF on tele.   7. Polynephritis.  8. Malnourishment with severe anemia, hypoalbuminemia, hypokalemia.  9. Chronic polysubstance and IVDU.    Plan:  1. We will consult CT surgery to determine whether surgical intervention is indicated.    2. JAE had previously been ordered; however, as patient is non-consentable and we are unable to reach her foster parents (phone disconnected), we will defer it. Would not significantly change our management at this time.  3. Obtain EKG.  4. Ongoing antibiotic therapy per ID.    Thank you very much for this consultation. We will follow.     Serene Farris PA-C  [facility]  Pager: [phone]  Text Page  (7:30am - 4pm M-F)   ________________________________________________________________________  History of Present Illness   Manas Hernandez is a 41 year old female with a history of chronic polysubstance abuse and IV drug use, presenting to the hospital yesterday with changes in her mentation, found to be malnourished and hypotensive with a white  count of >21.  Due to extreme agitation she was ultimately sedated and intubated.  Head CT revealed an acute to subacute ischemic infarct in the medial left parietal lobe. Imaging of the abdomen was suspicious for pyelonephritis.  She had a troponin rise of 0.36 initially, which carmencita to 0.86 later that evening.  There is no EKG in the electronic chart.  Not surprisingly, she has been tachycardic.  The majority of telemetry appears to show sinus tachycardia, although runs of atrial fibrillation cannot be excluded.    Echocardiogram was performed today, and showed a very small mobile linear echodensity measuring 1.4 x 0.6 cm on the atrial side of the posterior mitral valve leaflet with mild to moderate MR.  The inferior pericardium appeared moderately thickened and echo bright and there was a large localized pericardial effusion measured at 2.7 cm posterior laterally with multiple free-floating echodense material.  The biventricular function is preserved.  Blood culture is positive for MSSA.  She was placed on Zosyn and vancomycin.  Due to persistent hypotension, she was transfused 1 unit of RBCs, also placed on Levophed and vasopressin.      Code Status    Full Code    History obtained from chart review.   Past Medical History   I have reviewed this patient's medical history and updated it with pertinent information if needed.   Past Medical History:   Diagnosis Date     Heroin abuse (H)      Methamphetamine abuse (H)      Polysubstance abuse (H)        Past Surgical History   I have reviewed this patient's surgical history and updated it with pertinent information if needed.  Past Surgical History:   Procedure Laterality Date     DILATION AND CURETTAGE  2005       Prior to Admission Medications   None     Allergies   No Known Allergies    Social History   I have reviewed this patient's social history and updated it with pertinent information if needed. Manas Hernandez  reports that she has been smoking cigarettes.  She does not have any smokeless tobacco history on file. She reports current drug use. Drugs: Amphetamines, Methamphetamines, and Opiates. She reports that she does not drink alcohol.    Family History   I have reviewed this patient's family history and updated it with pertinent information if needed.   Family History   Problem Relation Age of Onset     Alcoholism Mother      Alcoholism Father      Family History Negative No family hx of        Review of Systems   She remains intubated and sedated and therefore review of systems was not performed.    Physical Exam   Temp: 99.8  F (37.7  C) Temp src: Axillary BP: 101/51 Pulse: 97   Resp: 29 SpO2: 99 % O2 Device: Mechanical Ventilator    Vital Signs with Ranges  Temp:  [98.7  F (37.1  C)-100.4  F (38  C)] 99.8  F (37.7  C)  Pulse:  [] 97  Resp:  [13-64] 29  BP: ()/(34-76) 101/51  MAP:  [58 mmHg-73 mmHg] 70 mmHg  Arterial Line BP: ()/(45-56) 107/51  FiO2 (%):  [40 %-60 %] 40 %  SpO2:  [93 %-100 %] 99 %  193 lbs 1.6 oz    GENERAL: Intubated and sedated.  HEENT: NC/AT, sclera non-icteric, teeth in normal repair   NECK: CVP appears normal, no masses or thyromegaly, no carotid bruits  PULMONARY:  There is a normal respiratory effort. Clear lungs to auscultation anteriorly.  CHEST: Non-tender, normal A/P diameter  CARDIOVASCULAR:  RRR, normal S1 S2, grade 2/6 sm at the LSB.  GI:  Non tender abdomen with normoactive bowel sounds and no hepatosplenomegaly. There are no masses palpable.   EXTREMITIES:  No clubbing, cyanosis or edema  VASCULAR: 2+ Pulses bilaterally in upper and lower extremities   NEURO: No gross motor or sensory deficits.   PSYCH: Appropriate affect  DERM: No rashes or lesions    Data   Most Recent 3 CBC's:  Recent Labs   Lab Test 07/02/21  1049 07/02/21  0020 07/01/21  1839 07/01/21  1239   WBC  --   --  21.7* 21.5*   HGB 7.9* 8.1* 6.4* 6.8*   MCV  --   --  67* 67*   PLT  --   --  114* 153     Most Recent 3 BMP's:  Recent Labs   Lab Test  07/02/21  0214 07/02/21  0020 07/01/21  1839 07/01/21  1239     --  139 134   POTASSIUM 3.5 4.9 2.7* 2.9*   CHLORIDE 110*  --  108 102   CO2 20  --  19* 20   BUN 26  --  27 35*   CR 1.29*  --  1.29* 1.59*   ANIONGAP 10  --  12 12   DEREK 7.3*  --  7.0* 7.7*   *  --  115* 130*     Most Recent 2 LFT's:  Recent Labs   Lab Test 07/01/21 1839 07/01/21  1239   AST 55* 35   ALT 35 31   ALKPHOS 293* 307*   BILITOTAL 2.0* 1.7*     Most Recent 3 Troponin's:  Recent Labs   Lab Test 07/01/21 1839 07/01/21  1239   TROPI 0.865* 0.367*     Most Recent 6 Bacteria Isolates From Any Culture (See EPIC Reports for Culture Details):  Recent Labs   Lab Test 07/01/21 1839 07/01/21  1251 07/01/21  1242   CULT Cultured on the 1st day of incubation:  Gram positive cocci in clusters  *  Critical Value/Significant Value, preliminary result only, called to and read back by  Shanice Ennis RN @ 0089 7.2.21 EDUAR   Culture in progress Cultured on the 1st day of incubation:  Gram positive cocci in clusters  *  Critical Value/Significant Value, preliminary result only, called to and read back by  Mayte Bianchi RN @ 0026 7/2/21 TM.    (Note)  POSITIVE for STAPHYLOCOCCUS AUREUS and NEGATIVE for the mecA gene  (not MRSA) by Napartner multiplex nucleic acid test. The mecA gene was  not detected. Final identification and antimicrobial susceptibility  testing will be verified by standard methods.    Specimen tested with Verigene multiplex, gram-positive blood culture  nucleic acid test for the following targets: Staph aureus, Staph  epidermidis, Staph lugdunensis, other Staph species, Enterococcus  faecalis, Enterococcus faecium, Streptococcus species, S. agalactiae,  S. anginosus grp., S. pneumoniae, S. pyogenes, Listeria sp., mecA  (methicillin resistance) and Brook/B (vancomycin resistance).    Critical Value/Significant Value called to and read back by Deepa Bianchi RN on 7/2/21 at 0304. A

## 2021-07-02 NOTE — PLAN OF CARE
Shift pt arrival to ICU-2300: Hypotensive, continuously tachypneic, now febrile at change of shift. Neuro: KINGA orientation, does not follow, but withdraws in all extremities bilaterally. Pulm: Lungs: clear/diminished throughout, mechanically ventilated, scant amount of secretions w/ in-line suctioning. GI/: BS hypo, no BM; Neri in place: WDL output. Diet: NPO. Access: PIV x 1; Right femoral CVC x 3 lumens, Left femoral A-line; Prop gtt, Fentanyl gtt, versed gtt, levo gtt, NS for TKO, LR for MIVF.    New:  - PRBC x 1 given due to critical hgb value  - Right femoral A-line placed  - K+ replacement given; K+ protocol ordered  - Neuro surg consulted--MRI ordered, but pt not stable enough to attempt at this time  - Unable to reach family as number listed is not in service

## 2021-07-02 NOTE — PLAN OF CARE
Neuro: 2mm PERRL. Withdraws on lower extremities. Slightly withdraws intermittently on upper extremities. No sedation vacation this shift per MD.  Cardio: NSR. Vasopressin on hold. Levophed decreased this shift.   Resp: LS clear.   GI: BS hypoactive  : Adequate UOP  Pain: CPOT 0  [facility] contacted the unit. States patient has a past positive rapid HIV test. That her knee was 3x swollen this week. Upon inspection. Left knee is moderately swollen. This was reported to Dr. Clancy. Also gave name of mother (Shady Oakes) and grandmother (Nazia Ochoa) number has been disconnected.

## 2021-07-02 NOTE — PROGRESS NOTES
Pt stayed on /16/40% Peep+5 overnight. BBS:Clear/Diminished. Suctioned 2X for small amt of thick grey secretions. SPO2 96%.    Plan: To transport pt to MRI when stable.

## 2021-07-02 NOTE — PROGRESS NOTES
Patient remains intubated and sedated. RASS -2/ -3. Withdraws from pain. Tmax 100.4 axillary. Cool cloths and ice packs applied which were effective. PERRL. Lungs diminished but clear, vent settings unchanged. Tolerating ventilator without issues but does remain tachypneic. Sinus tachy throughout night. Dependent edema all over. Bowel sounds active- no bm this shift. Good urine output. 500cc bolus NS given per MD order. See flowsheets for drips currently running.    Deepa Bianchi RN

## 2021-07-02 NOTE — CONSULTS
Cardiovascular and Thoracic Surgery Consultation     Manas Hernandez MRN# [id number]   YOB: 1980 Age: 41 year old   Date of Admission: 7/1/2021     Reason for consult: I was asked by Dr. León to evaluate this patient for mitral valve endocarditis with probably embolism, new large posterior pericardial effusion.           Assessment and Plan:   David Hernandez is a 40 YO female with history of polysubstance abuse including IVDU who presents with altered mental status d/t MSSA sepsis and shock. Intubated and sedated.TTE today shows mitral valve vegetation and pericardial inflammation with large posterior pericardial effusion. CT head showed possible new acute to subacute parietal lobe ischemic infarct. CV surgery consulted to evaluate for possible surgical intervention.     -JAE when able, likely Monday.   -Pericardiocentesis for pericardial fluid sampling if needed to guide antibiotic therapy. No evidence of tamponade and no indication for pericardial window at this time  -ID consult  -Neurology consult, agree with MRI  -Will eventually need CT dental if we proceed with surgical intervention  -Social work consult to assess for any family support  -Continue supportive care. We will continue to follow.    Rebecca Carney MD  Cardiothoracic Fellow  [facility] [phone]                 Chief Complaint:   Altered mental status    History obtained from chart review  Unable to obtain a history from the patient due to intubation         History of Present Illness:   This patient is a 41 year old female with history of polysubstance abuse including IVDU who presents with altered mental status d/t metabolic/toxic encephalopathy and pyelonephritis. EMS was called to a homeless community where EMS was told she recently had a miscarriage and was altered for days with intense vomiting and she could no longer use the bathroom and clean herself. She was brought to the emergency department and was found to be unable to protect  her airway resulting in sedation and intubation. CT head showed possible new acute to subacute parietal lobe ischemic infarct, CT abdomen concern for pyelonephritis. UDS positive for amphetamine and opiates. TTE revealed linear echodensity concerning for vegetation on atrial side of posterior mitral valve leaflet, thickened pericardium which may represent active pericarditis, and large localized pericardial effusion posteriorly/laterally with mobile free floating echodense material which could represent fibrinous stranding in setting of inflammatory pericarditis or infectious material if infectious pericardial effusion. Blood cultures drawn and pending. started on vancomycin and zoysn. JAE ordered.             Past Medical History:     Past Medical History:   Diagnosis Date     Heroin abuse (H)      Methamphetamine abuse (H)      Polysubstance abuse (H)              Past Surgical History:     Past Surgical History:   Procedure Laterality Date     DILATION AND CURETTAGE  2005              Social History:     Social History     Tobacco Use     Smoking status: Current Every Day Smoker     Types: Cigarettes     Tobacco comment: 1-2   Substance Use Topics     Alcohol use: No             Family History:     Family History   Problem Relation Age of Onset     Alcoholism Mother      Alcoholism Father      Family History Negative No family hx of              Immunizations:     Immunization History   Administered Date(s) Administered     TD (ADULT, 7+) 11/09/2002             Allergies:   No Known Allergies          Medications:     No medications prior to admission.             Review of Systems:   Review of systems not obtained due to patient factors - intubation           Physical Exam:   Vitals were reviewed  Temp: 99.8  F (37.7  C) Temp src: Axillary BP: 101/51 Pulse: 97   Resp: 29 SpO2: 99 % O2 Device: Mechanical Ventilator    Constitutional:   Intubated and sedated     Lungs:   clear to auscultation bilaterally, no  crackles or wheezing     Cardiovascular:   Normal apical impulse, regular rate and rhythm, normal S1 and S2, no S3 or S4, and no murmur noted     Abdomen:   normal bowel sounds, soft, non-distended,      Musculoskeletal:   Unable to assess          Data:   All laboratory data reviewed  All cardiac studies reviewed  All imaging studies reviewed

## 2021-07-02 NOTE — CONSULTS
[facility]    Infectious Disease Consultation     Date of Admission:  7/1/2021  Date of Consult : 07/02/21    Assessment :  1. 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 TTE shows small mobile vegetation of the mitral valve with moderate MR as well as a large pericardial effusion. No additional evidence of seeding on CT abdomen.  2. Severe sepsis requiring pressor support and multiorganfailure  3. Large localized pericardial effusion, with concern for purulent pericarditis in this setting  4. FORD related to sepsis.  5. Hematogenous seeding of kidneys and possible evolving renal infarts  6. Marked leukocytosis  7. Severe anemia, likely of chronic disease  8. Polysubstance abuse  9. Splenomegaly     Recommendations  1. Repeat blood cxs X 2 sets until  Negative. Follow culture sensitivities   2. JAE for further evaluation  3. Discontinue Vancomycin, Zosyn  4. Oxacillin for better CNS penetration in the setting of embolic lesions  5. At risk for further embolization  6. Cardiology evaluation has been requested - concern for purulent pericarditis in this setting  7. Thoracic surgery evaluation has also been requested  8. Screen for blood borne pathogens- HIV/Hepatitis B/C    Critically ill with guarded prognosis  Discussed with the ICU team  Marisol Matos MD    Reason for Consult   : I was asked to evaluate this patient for antimicrobial recommendations for endocarditis    Primary Care Physician   Lisa Pollock    Chief Complaint   Altered mental status    History is obtained from medical records    History of Present Illness   Manas Hernandez is a 41 year old female with history of polysubstance abuse who is hospitlaized with severe sepsis and multiorgan failure due to staph aureus and found to have mitral valve endocarditis. Patient is intubated and on pressors, she has a new murmur and was noted to have a mitral  valve vegetation as well as a pericardial effusion on TTE. She additionally has a left parietal lobe embolic infarct. ID has been asked to assist with antibiotic management. Patient is intubated and no further history can be obtained.    Past Medical History   I have reviewed this patient's medical history and updated it with pertinent information if needed.   Past Medical History:   Diagnosis Date     Heroin abuse (H)      Methamphetamine abuse (H)      Polysubstance abuse (H)        Past Surgical History   I have reviewed this patient's surgical history and updated it with pertinent information if needed.  Past Surgical History:   Procedure Laterality Date     DILATION AND CURETTAGE  2005       Prior to Admission Medications   None     Allergies   No Known Allergies    Immunization History   Immunization History   Administered Date(s) Administered     TD (ADULT, 7+) 11/09/2002       Social History   I have reviewed this patient's social history and updated it with pertinent information if needed. Manas Hernandez  reports that she has been smoking cigarettes. She does not have any smokeless tobacco history on file. She reports current drug use. Drugs: Amphetamines, Methamphetamines, and Opiates. She reports that she does not drink alcohol.    Family History   I have reviewed this patient's family history and updated it with pertinent information if needed.   Family History   Problem Relation Age of Onset     Alcoholism Mother      Alcoholism Father      Family History Negative No family hx of        Review of Systems   The 10 point Review of Systems is negative other than noted in the HPI or here.     Physical Exam   Temp: 99.8  F (37.7  C) Temp src: Axillary BP: 101/51 Pulse: 97   Resp: 29 SpO2: 99 % O2 Device: Mechanical Ventilator    Vital Signs with Ranges  Temp:  [98.7  F (37.1  C)-100.4  F (38  C)] 99.8  F (37.7  C)  Pulse:  [] 97  Resp:  [24-64] 29  BP: ()/(45-70) 101/51  MAP:  [58 mmHg-73  mmHg] 70 mmHg  Arterial Line BP: ()/(45-56) 107/51  FiO2 (%):  [40 %] 40 %  SpO2:  [96 %-100 %] 99 %  193 lbs 1.6 oz  Body mass index is 29.36 kg/m .    GENERAL APPEARANCE:  intubated  EYES: conjunctivae and sclerae normal  NECK: no adenopathy  RESP: lungs clear to auscultation - no rales, rhonchi or wheezes  CV: regular rates and rhythm, murmur  ABDOMEN: soft, umbilical hernia  MS: extremities normal- no gross deformities noted  SKIN: no suspicious lesions or rashes      Data   All laboratory data reviewed  Component      Latest Ref Rng & Units 7/1/2021   WBC      4.0 - 11.0 10e9/L 21.7 (H)   RBC Count      3.8 - 5.2 10e12/L 3.10 (L)   Hemoglobin      11.7 - 15.7 g/dL 6.4 (LL)   Hematocrit      35.0 - 47.0 % 20.8 (L)   MCV      78 - 100 fl 67 (L)   MCH      26.5 - 33.0 pg 20.6 (L)   MCHC      31.5 - 36.5 g/dL 30.8 (L)   RDW      10.0 - 15.0 % 17.1 (H)   Platelet Count      150 - 450 10e9/L 114 (L)     Component      Latest Ref Rng & Units 7/2/2021   Creatinine      0.52 - 1.04 mg/dL 1.29 (H)     Recent Labs   Lab 07/01/21  1839 07/01/21  1251 07/01/21  1242   CULT Cultured on the 1st day of incubation:  Gram positive cocci in clusters  *  Critical Value/Significant Value, preliminary result only, called to and read back by  Shanice Ennis RN @ 1557 7.2.21    Culture in progress Cultured on the 1st day of incubation:  Gram positive cocci in clusters  *  Critical Value/Significant Value, preliminary result only, called to and read back by  Mayte Bianchi RN @ 0020 7/2/21 TM.    (Note)  POSITIVE for STAPHYLOCOCCUS AUREUS and NEGATIVE for the mecA gene  (not MRSA) by [place] multiplex nucleic acid test. The mecA gene was  not detected. Final identification and antimicrobial susceptibility  testing will be verified by standard methods.    Specimen tested with [place]adescapeigene multiplex, gram-positive blood culture  nucleic acid test for the following targets: Staph aureus, Staph  epidermidis, Staph lugdunensis, other  Staph species, Enterococcus  faecalis, Enterococcus faecium, Streptococcus species, S. agalactiae,  S. anginosus grp., S. pneumoniae, S. pyogenes, Listeria sp., mecA  (methicillin resistance) and Brook/B (vancomycin resistance).    Critical Value/Significant Value called to and read back by Deepa Bianchi RN on 7/2/21 at 0304. LSA       Recent Labs   Lab Test 07/01/21  1839 07/01/21  1251 07/01/21  1242   CULT Cultured on the 1st day of incubation:  Gram positive cocci in clusters  *  Critical Value/Significant Value, preliminary result only, called to and read back by  Shanice Ennis RN @ 3470 7.2.21 JE   Culture in progress Cultured on the 1st day of incubation:  Gram positive cocci in clusters  *  Critical Value/Significant Value, preliminary result only, called to and read back by  Mayte Bianchi RN @ 0026 7/2/21 TM.    (Note)  POSITIVE for STAPHYLOCOCCUS AUREUS and NEGATIVE for the mecA gene  (not MRSA) by Verigene multiplex nucleic acid test. The mecA gene was  not detected. Final identification and antimicrobial susceptibility  testing will be verified by standard methods.    Specimen tested with Verigene multiplex, gram-positive blood culture  nucleic acid test for the following targets: Staph aureus, Staph  epidermidis, Staph lugdunensis, other Staph species, Enterococcus  faecalis, Enterococcus faecium, Streptococcus species, S. agalactiae,  S. anginosus grp., S. pneumoniae, S. pyogenes, Listeria sp., mecA  (methicillin resistance) and Brook/B (vancomycin resistance).    Critical Value/Significant Value called to and read back by Deepa Bianchi RN on 7/2/21 at 0304. LSA       7/1TTE  Interpretation Summary     Left ventricular systolic function is normal.  The visual ejection fraction is 55-60%.  The right ventricle is mildly dilated.  The right ventricular systolic function is normal.  There is a mobile linear echodensity measuring 1.4 cm x 0.6 cm on the atrial  side of the posterior mitral valve  leaflet.  Other valve structures appear normal without significant dysfunction or  obvious valvular vegetations.  The inferior pericardium appears moderately thickened and echobright which may  represent active pericarditis.  Large localized pericardial effusion (2.7 cm) posteriorly/laterally with  mobile free floating echodense material which could represent fibrinous  stranding in setting of inflammatory pericarditis or infectious material if  infectious pericardial effusion.  Dilation of the inferior vena cava is present with abnormal respiratory  variation in diameter.  Small left pleural effusion     No prior for comparison.    7/1 CT head  CT SCAN OF THE HEAD WITHOUT CONTRAST   7/1/2021 1:47 PM      HISTORY: Delirium; altered mental status     TECHNIQUE:  Axial images of the head and coronal reformations without  IV contrast material.  Radiation dose for this scan was reduced using  automated exposure control, adjustment of the mA and/or kV according  to patient size, or iterative reconstruction technique.     COMPARISON: None.     FINDINGS: There is a focal 2 cm area of hypodensity involving gray and  white matter in the medial left parietal lobe consistent with acute to  subacute ischemic infarct. There is a minimal incidental arachnoid  cyst in the anterior portion of left middle cranial fossa. Ventricles  and subarachnoid spaces are otherwise within normal limits. There is  no evidence for intracranial hemorrhage, significant mass effect, or  skull fracture. Exam is mildly limited by motion artifact. Visualized  paranasal sinuses and mastoid air cells are clear.                                                                      IMPRESSION: Focal hypodensity in the medial left parietal lobe  consistent with acute to subacute ischemic infarct.    7/1 Ct abdomen/pelvis                                                           IMPRESSION:   1.  Patchy hypoenhancement in the mid and upper poles of the  right  kidney posteriorly, suspicious for pyelonephritis.  2.  Moderate-sized fat-containing paraumbilical hernia.  3.  Mild splenomegaly.  4.  There are possible small gallstones within a contracted  gallbladder.

## 2021-07-02 NOTE — CONSULTS
CLINICAL NUTRITION SERVICES  -  ASSESSMENT NOTE      Recommendations Ordered by Registered Dietitian (RD):   Today, begin TF Promote (No Fiber) at 10 mL/hr = 240 kcals (3 kcal/kg), 15 gm pro (0.2 gm/kg), 200 mL H20, 31 gm CHO, no fiber  Free H20 60 mL every 4 hrs  Certavite daily - Per ETOH protocol   Future/Additional Recommendations:   Recommend increase TF as tolerated to eventual preliminary goal (provided Propofol tapered off) Promote (No Fiber) at 55 mL/hr = 1320 kcals (19 kcal/kg), 82 gm pro (1.2 gm/kg), 1108 mL H20, 172 gm CHO, no fiber   Malnutrition:   % Weight Loss:  Unable to determine without recent weight history  % Intake:  </= 50% for >/= 5 days (severe malnutrition) - Suspect as was vomiting several days PTA  Subcutaneous Fat Loss:  None observed  Muscle Loss:  None observed  Fluid Retention:  None noted    Malnutrition Diagnosis: Unable to determine due to lack of recent weight history        REASON FOR ASSESSMENT  Manas Hernandez is a 41 year old female seen by Registered Dietitian for Provider Order - Registered Dietitian to Assess and Order TF per Medical Nutrition Therapy Protocol    DX:  AMS  Pyelonephritis, acute  Severe sepsis  Concern for endocarditis    PMH:  Alcohol dependence  Anemia   Heroin dependence   Tobacco use disorder  Learning disabilities  Adult antisocial behavior    NUTRITION HISTORY  - Unable to obtain nutrition history as patient intubated and no family available.  Per Provider note, pt had been vomiting intensely for a few days PTA and is homeless.  No food allergies/intolerances noted.    CURRENT NUTRITION ORDERS  Diet Order:     NPO     Current Intake/Tolerance:  NPO x 2 days    7/1:  Intubated to protect airway   7/1:  Abd CT   1.  Patchy hypoenhancement in the mid and upper poles of the right kidney posteriorly, suspicious for pyelonephritis.   2.  Moderate-sized fat-containing paraumbilical hernia.   3.  Mild splenomegaly.   4.  There are possible small gallstones  "within a contracted gallbladder.    7/1:  Chest x-ray = Nasogastric tube extends below the left hemidiaphragm   presumably in the stomach (OG)     NUTRITION FOCUSED PHYSICAL ASSESSMENT FOR DIAGNOSING MALNUTRITION)  Yes                Observed:    No nutrition-related physical findings observed    Obtained from Chart/Interdisciplinary Team:  None noted    ANTHROPOMETRICS  Height: 5' 8\"  Admit Weight: 87.5 kg  Body mass index is 29.36 kg/m .  Weight Status:  Overweight BMI 25-29.9  IBW:  63.6 kg  % IBW:  138%  Weight History: No record recent weight history    Wt Readings from Last 10 Encounters:   07/02/21 87.6 kg (193 lb 1.6 oz)   11/05/07 103.1 kg (227 lb 4 oz)   09/26/07 99.3 kg (219 lb)     LABS  Labs reviewed   (H)  Hgb A1C 5.7 (H)  Cr 1.29 (H)  Total Bili 2.0 (H)    MEDICATIONS  Medications reviewed  Propofol at 20.6 mL/hr = 544 kcals (lipid) - for sedation --> Plan to wean this off today per MD request to RN in rounds  Norepinephrine and Vasopressin - for hypotension  LR at 125 mL/hr      ASSESSED NUTRITION NEEDS PER APPROVED PRACTICE GUIDELINES:    Dosing Weight:  69.6 kg (adjusted for overwt)  Preliminary Energy Needs: 7019-8046 kcals (18-21 Kcal/Kg)  Justification: vented, overweight and inflammation  Final Energy Needs:  2586-5906 kcals (25-30 kcal/kg)  Justification: overweight and vented   Protein Needs:   grams protein (1.2-1.5 g pro/Kg)  Justification: hypercatabolism with critical illness and preservation of lean body mass  Estimated Fluid Needs:  6125-6727 mL (1 mL/Kcal)  Justification: maintenance    MALNUTRITION:  % Weight Loss:  Unable to determine without recent weight history  % Intake:  </= 50% for >/= 5 days (severe malnutrition) - Suspect as was vomiting several days PTA  Subcutaneous Fat Loss:  None observed  Muscle Loss:  None observed  Fluid Retention:  None noted    Malnutrition Diagnosis: Unable to determine due to lack of weight history    NUTRITION DIAGNOSIS:  Inadequate " protein-energy intake related to vomiting PTA and now intubated as evidenced by suspect limited nutritional intake over past 5 days, NPO status, Propofol meeting 45% preliminary energy and 0% protein needs and TF planned    NUTRITION INTERVENTIONS  Recommendations / Nutrition Prescription  Today, begin TF Promote (No Fiber) at 10 mL/hr = 240 kcals (3 kcal/kg), 15 gm pro (0.2 gm/kg), 200 mL H20, 31 gm CHO, no fiber  Free H20 60 mL every 4 hrs  Certavite daily - Per ETOH protocol    Recommend increase TF as tolerated to eventual preliminary goal (provided Propofol tapered off) Promote (No Fiber) at 55 mL/hr = 1320 kcals (19 kcal/kg), 82 gm pro (1.2 gm/kg), 1108 mL H20, 172 gm CHO, no fiber    Implementation  Nutrition education: Not appropriate at this time due to patient condition  Collaboration and Referral of Nutrition care - Pt was discussed during ICU interdisciplinary rounds this morning  EN Composition, EN Schedule, Feeding Tube Flush, and Multivitamin/Mineral - Above ordered in Epic    Nutrition Goals  TF Promote (No Fiber) at 55 mL/hr will meet % preliminary needs in the next 48-72 hrs    MONITORING AND EVALUATION:  Progress towards goals will be monitored and evaluated per protocol and Practice Guidelines    Loree Orozco, RD, LD, CNSC

## 2021-07-03 ENCOUNTER — APPOINTMENT (OUTPATIENT)
Dept: ULTRASOUND IMAGING | Facility: CLINIC | Age: 41
End: 2021-07-03
Attending: INTERNAL MEDICINE
Payer: MEDICAID

## 2021-07-03 ENCOUNTER — APPOINTMENT (OUTPATIENT)
Dept: CT IMAGING | Facility: CLINIC | Age: 41
End: 2021-07-03
Attending: PHYSICIAN ASSISTANT
Payer: MEDICAID

## 2021-07-03 ENCOUNTER — APPOINTMENT (OUTPATIENT)
Dept: MRI IMAGING | Facility: CLINIC | Age: 41
End: 2021-07-03
Attending: PHYSICIAN ASSISTANT
Payer: MEDICAID

## 2021-07-03 LAB
ALBUMIN SERPL-MCNC: 1.3 G/DL (ref 3.4–5)
ALP SERPL-CCNC: 333 U/L (ref 40–150)
ALT SERPL W P-5'-P-CCNC: 38 U/L (ref 0–50)
ANION GAP SERPL CALCULATED.3IONS-SCNC: 8 MMOL/L (ref 3–14)
AST SERPL W P-5'-P-CCNC: 41 U/L (ref 0–45)
BACTERIA SPEC CULT: ABNORMAL
BACTERIA SPEC CULT: ABNORMAL
BASE DEFICIT BLDA-SCNC: 0.9 MMOL/L
BILIRUB DIRECT SERPL-MCNC: 2.2 MG/DL (ref 0–0.2)
BILIRUB SERPL-MCNC: 2.8 MG/DL (ref 0.2–1.3)
BILIRUB SERPL-MCNC: 3 MG/DL (ref 0.2–1.3)
BUN SERPL-MCNC: 25 MG/DL (ref 7–30)
CALCIUM SERPL-MCNC: 7.6 MG/DL (ref 8.5–10.1)
CHLORIDE SERPL-SCNC: 115 MMOL/L (ref 94–109)
CO2 SERPL-SCNC: 21 MMOL/L (ref 20–32)
CREAT SERPL-MCNC: 0.99 MG/DL (ref 0.52–1.04)
ERYTHROCYTE [DISTWIDTH] IN BLOOD BY AUTOMATED COUNT: 19.4 % (ref 10–15)
GFR SERPL CREATININE-BSD FRML MDRD: 71 ML/MIN/{1.73_M2}
GLUCOSE BLDC GLUCOMTR-MCNC: 126 MG/DL (ref 70–99)
GLUCOSE BLDC GLUCOMTR-MCNC: 131 MG/DL (ref 70–99)
GLUCOSE BLDC GLUCOMTR-MCNC: 144 MG/DL (ref 70–99)
GLUCOSE BLDC GLUCOMTR-MCNC: 147 MG/DL (ref 70–99)
GLUCOSE SERPL-MCNC: 145 MG/DL (ref 70–99)
HBA1C MFR BLD: 5.9 % (ref 0–5.6)
HCO3 BLD-SCNC: 22 MMOL/L (ref 21–28)
HCT VFR BLD AUTO: 22.6 % (ref 35–47)
HGB BLD-MCNC: 7.2 G/DL (ref 11.7–15.7)
MCH RBC QN AUTO: 21.8 PG (ref 26.5–33)
MCHC RBC AUTO-ENTMCNC: 31.9 G/DL (ref 31.5–36.5)
MCV RBC AUTO: 68 FL (ref 78–100)
O2/TOTAL GAS SETTING VFR VENT: 40 %
OXYHGB MFR BLD: 97 % (ref 92–100)
PCO2 BLD: 29 MM HG (ref 35–45)
PH BLD: 7.49 PH (ref 7.35–7.45)
PLATELET # BLD AUTO: 173 10E9/L (ref 150–450)
PO2 BLD: 99 MM HG (ref 80–105)
POTASSIUM SERPL-SCNC: 3.4 MMOL/L (ref 3.4–5.3)
POTASSIUM SERPL-SCNC: 4.1 MMOL/L (ref 3.4–5.3)
PROT SERPL-MCNC: 6.1 G/DL (ref 6.8–8.8)
RBC # BLD AUTO: 3.31 10E12/L (ref 3.8–5.2)
SODIUM SERPL-SCNC: 144 MMOL/L (ref 133–144)
SPECIMEN SOURCE: ABNORMAL
TROPONIN I SERPL-MCNC: 0.4 UG/L (ref 0–0.04)
TROPONIN I SERPL-MCNC: 0.41 UG/L (ref 0–0.04)
TROPONIN I SERPL-MCNC: 0.43 UG/L (ref 0–0.04)
TROPONIN I SERPL-MCNC: 0.5 UG/L (ref 0–0.04)
TROPONIN I SERPL-MCNC: 0.51 UG/L (ref 0–0.04)
WBC # BLD AUTO: 28.5 10E9/L (ref 4–11)

## 2021-07-03 PROCEDURE — 94003 VENT MGMT INPAT SUBQ DAY: CPT

## 2021-07-03 PROCEDURE — 82247 BILIRUBIN TOTAL: CPT | Performed by: INTERNAL MEDICINE

## 2021-07-03 PROCEDURE — 70553 MRI BRAIN STEM W/O & W/DYE: CPT

## 2021-07-03 PROCEDURE — 87040 BLOOD CULTURE FOR BACTERIA: CPT | Performed by: SPECIALIST

## 2021-07-03 PROCEDURE — 250N000013 HC RX MED GY IP 250 OP 250 PS 637: Performed by: INTERNAL MEDICINE

## 2021-07-03 PROCEDURE — 999N001017 HC STATISTIC GLUCOSE BY METER IP

## 2021-07-03 PROCEDURE — 258N000003 HC RX IP 258 OP 636: Performed by: INTERNAL MEDICINE

## 2021-07-03 PROCEDURE — 99291 CRITICAL CARE FIRST HOUR: CPT | Mod: GC | Performed by: PSYCHIATRY & NEUROLOGY

## 2021-07-03 PROCEDURE — A9585 GADOBUTROL INJECTION: HCPCS | Performed by: INTERNAL MEDICINE

## 2021-07-03 PROCEDURE — 76705 ECHO EXAM OF ABDOMEN: CPT

## 2021-07-03 PROCEDURE — 84132 ASSAY OF SERUM POTASSIUM: CPT | Performed by: INTERNAL MEDICINE

## 2021-07-03 PROCEDURE — 999N000157 HC STATISTIC RCP TIME EA 10 MIN

## 2021-07-03 PROCEDURE — 250N000009 HC RX 250: Performed by: SPECIALIST

## 2021-07-03 PROCEDURE — 87077 CULTURE AEROBIC IDENTIFY: CPT | Performed by: SPECIALIST

## 2021-07-03 PROCEDURE — 999N000009 HC STATISTIC AIRWAY CARE

## 2021-07-03 PROCEDURE — 83036 HEMOGLOBIN GLYCOSYLATED A1C: CPT | Performed by: INTERNAL MEDICINE

## 2021-07-03 PROCEDURE — 70496 CT ANGIOGRAPHY HEAD: CPT

## 2021-07-03 PROCEDURE — 250N000011 HC RX IP 250 OP 636: Performed by: INTERNAL MEDICINE

## 2021-07-03 PROCEDURE — 200N000001 HC R&B ICU

## 2021-07-03 PROCEDURE — 255N000002 HC RX 255 OP 636: Performed by: INTERNAL MEDICINE

## 2021-07-03 PROCEDURE — 82805 BLOOD GASES W/O2 SATURATION: CPT | Performed by: INTERNAL MEDICINE

## 2021-07-03 PROCEDURE — 85027 COMPLETE CBC AUTOMATED: CPT | Performed by: INTERNAL MEDICINE

## 2021-07-03 PROCEDURE — 250N000009 HC RX 250: Performed by: INTERNAL MEDICINE

## 2021-07-03 PROCEDURE — 999N000185 HC STATISTIC TRANSPORT TIME EA 15 MIN

## 2021-07-03 PROCEDURE — 80053 COMPREHEN METABOLIC PANEL: CPT | Performed by: INTERNAL MEDICINE

## 2021-07-03 PROCEDURE — 250N000013 HC RX MED GY IP 250 OP 250 PS 637: Performed by: SPECIALIST

## 2021-07-03 PROCEDURE — 250N000011 HC RX IP 250 OP 636: Performed by: SURGERY

## 2021-07-03 PROCEDURE — 84484 ASSAY OF TROPONIN QUANT: CPT | Performed by: INTERNAL MEDICINE

## 2021-07-03 PROCEDURE — 99291 CRITICAL CARE FIRST HOUR: CPT | Performed by: INTERNAL MEDICINE

## 2021-07-03 PROCEDURE — 82248 BILIRUBIN DIRECT: CPT | Performed by: INTERNAL MEDICINE

## 2021-07-03 RX ORDER — IOPAMIDOL 755 MG/ML
70 INJECTION, SOLUTION INTRAVASCULAR ONCE
Status: COMPLETED | OUTPATIENT
Start: 2021-07-03 | End: 2021-07-03

## 2021-07-03 RX ORDER — DEXTROSE MONOHYDRATE 25 G/50ML
25-50 INJECTION, SOLUTION INTRAVENOUS
Status: DISCONTINUED | OUTPATIENT
Start: 2021-07-03 | End: 2021-09-01 | Stop reason: HOSPADM

## 2021-07-03 RX ORDER — DEXMEDETOMIDINE HYDROCHLORIDE 4 UG/ML
.2-1.2 INJECTION, SOLUTION INTRAVENOUS CONTINUOUS
Status: DISCONTINUED | OUTPATIENT
Start: 2021-07-03 | End: 2021-07-13

## 2021-07-03 RX ORDER — NICOTINE POLACRILEX 4 MG
15-30 LOZENGE BUCCAL
Status: DISCONTINUED | OUTPATIENT
Start: 2021-07-03 | End: 2021-09-01 | Stop reason: HOSPADM

## 2021-07-03 RX ORDER — QUETIAPINE FUMARATE 50 MG/1
50 TABLET, FILM COATED ORAL 2 TIMES DAILY
Status: DISCONTINUED | OUTPATIENT
Start: 2021-07-03 | End: 2021-07-05

## 2021-07-03 RX ORDER — POTASSIUM CHLORIDE 29.8 MG/ML
20 INJECTION INTRAVENOUS
Status: COMPLETED | OUTPATIENT
Start: 2021-07-03 | End: 2021-07-03

## 2021-07-03 RX ORDER — HYDROMORPHONE HYDROCHLORIDE 1 MG/ML
.5-1 INJECTION, SOLUTION INTRAMUSCULAR; INTRAVENOUS; SUBCUTANEOUS
Status: DISCONTINUED | OUTPATIENT
Start: 2021-07-03 | End: 2021-07-17

## 2021-07-03 RX ORDER — HEPARIN SODIUM 5000 [USP'U]/.5ML
5000 INJECTION, SOLUTION INTRAVENOUS; SUBCUTANEOUS EVERY 8 HOURS
Status: DISCONTINUED | OUTPATIENT
Start: 2021-07-03 | End: 2021-07-29

## 2021-07-03 RX ORDER — CIPROFLOXACIN 500 MG/1
500 TABLET, FILM COATED ORAL EVERY 12 HOURS SCHEDULED
Status: DISCONTINUED | OUTPATIENT
Start: 2021-07-03 | End: 2021-07-07

## 2021-07-03 RX ORDER — GADOBUTROL 604.72 MG/ML
9 INJECTION INTRAVENOUS ONCE
Status: COMPLETED | OUTPATIENT
Start: 2021-07-03 | End: 2021-07-03

## 2021-07-03 RX ADMIN — CIPROFLOXACIN 500 MG: 500 TABLET, FILM COATED ORAL at 20:39

## 2021-07-03 RX ADMIN — SODIUM CHLORIDE, POTASSIUM CHLORIDE, SODIUM LACTATE AND CALCIUM CHLORIDE 500 ML: 600; 310; 30; 20 INJECTION, SOLUTION INTRAVENOUS at 18:13

## 2021-07-03 RX ADMIN — HYDROCORTISONE SODIUM SUCCINATE 50 MG: 100 INJECTION, POWDER, FOR SOLUTION INTRAMUSCULAR; INTRAVENOUS at 08:16

## 2021-07-03 RX ADMIN — Medication 50 MCG: at 16:40

## 2021-07-03 RX ADMIN — HYDROCORTISONE SODIUM SUCCINATE 50 MG: 100 INJECTION, POWDER, FOR SOLUTION INTRAMUSCULAR; INTRAVENOUS at 02:35

## 2021-07-03 RX ADMIN — Medication 8 MG/HR: at 19:26

## 2021-07-03 RX ADMIN — HYDROMORPHONE HYDROCHLORIDE 0.5 MG: 1 INJECTION, SOLUTION INTRAMUSCULAR; INTRAVENOUS; SUBCUTANEOUS at 14:44

## 2021-07-03 RX ADMIN — Medication 50 MCG: at 19:36

## 2021-07-03 RX ADMIN — Medication 50 MCG: at 00:27

## 2021-07-03 RX ADMIN — SODIUM CHLORIDE, POTASSIUM CHLORIDE, SODIUM LACTATE AND CALCIUM CHLORIDE: 600; 310; 30; 20 INJECTION, SOLUTION INTRAVENOUS at 05:45

## 2021-07-03 RX ADMIN — Medication 50 MCG: at 06:34

## 2021-07-03 RX ADMIN — CHLORHEXIDINE GLUCONATE 15 ML: 1.2 SOLUTION ORAL at 07:57

## 2021-07-03 RX ADMIN — HYDROCORTISONE SODIUM SUCCINATE 50 MG: 100 INJECTION, POWDER, FOR SOLUTION INTRAMUSCULAR; INTRAVENOUS at 13:54

## 2021-07-03 RX ADMIN — Medication 50 MCG: at 08:07

## 2021-07-03 RX ADMIN — POTASSIUM CHLORIDE 20 MEQ: 400 INJECTION, SOLUTION INTRAVENOUS at 07:51

## 2021-07-03 RX ADMIN — DEXMEDETOMIDINE HYDROCHLORIDE 0.2 MCG/KG/HR: 400 INJECTION INTRAVENOUS at 11:55

## 2021-07-03 RX ADMIN — QUETIAPINE FUMARATE 50 MG: 50 TABLET ORAL at 20:39

## 2021-07-03 RX ADMIN — ACETAMINOPHEN 650 MG: 325 TABLET, FILM COATED ORAL at 20:39

## 2021-07-03 RX ADMIN — POTASSIUM CHLORIDE 20 MEQ: 400 INJECTION, SOLUTION INTRAVENOUS at 06:05

## 2021-07-03 RX ADMIN — Medication 50 MCG: at 23:00

## 2021-07-03 RX ADMIN — Medication 50 MCG/HR: at 12:10

## 2021-07-03 RX ADMIN — NAFCILLIN SODIUM 2 G: 2 INJECTION, POWDER, LYOPHILIZED, FOR SOLUTION INTRAMUSCULAR; INTRAVENOUS at 04:41

## 2021-07-03 RX ADMIN — QUETIAPINE FUMARATE 50 MG: 50 TABLET ORAL at 11:17

## 2021-07-03 RX ADMIN — CHLORHEXIDINE GLUCONATE 15 ML: 1.2 SOLUTION ORAL at 20:39

## 2021-07-03 RX ADMIN — SODIUM CHLORIDE 90 ML: 9 INJECTION, SOLUTION INTRAVENOUS at 14:27

## 2021-07-03 RX ADMIN — PANTOPRAZOLE SODIUM 40 MG: 40 TABLET, DELAYED RELEASE ORAL at 07:57

## 2021-07-03 RX ADMIN — GADOBUTROL 9 ML: 604.72 INJECTION INTRAVENOUS at 14:44

## 2021-07-03 RX ADMIN — Medication 8 MG/HR: at 07:55

## 2021-07-03 RX ADMIN — NAFCILLIN SODIUM 2 G: 2 INJECTION, POWDER, LYOPHILIZED, FOR SOLUTION INTRAMUSCULAR; INTRAVENOUS at 22:05

## 2021-07-03 RX ADMIN — NAFCILLIN SODIUM 2 G: 2 INJECTION, POWDER, LYOPHILIZED, FOR SOLUTION INTRAMUSCULAR; INTRAVENOUS at 09:47

## 2021-07-03 RX ADMIN — Medication 0.05 MCG/KG/MIN: at 12:28

## 2021-07-03 RX ADMIN — HYDROCORTISONE SODIUM SUCCINATE 50 MG: 100 INJECTION, POWDER, FOR SOLUTION INTRAMUSCULAR; INTRAVENOUS at 20:40

## 2021-07-03 RX ADMIN — HEPARIN SODIUM 5000 UNITS: 5000 INJECTION, SOLUTION INTRAVENOUS; SUBCUTANEOUS at 22:06

## 2021-07-03 RX ADMIN — IOPAMIDOL 70 ML: 755 INJECTION, SOLUTION INTRAVENOUS at 14:26

## 2021-07-03 RX ADMIN — CIPROFLOXACIN 500 MG: 500 TABLET, FILM COATED ORAL at 11:17

## 2021-07-03 RX ADMIN — HEPARIN SODIUM 5000 UNITS: 5000 INJECTION, SOLUTION INTRAVENOUS; SUBCUTANEOUS at 13:55

## 2021-07-03 RX ADMIN — NAFCILLIN SODIUM 2 G: 2 INJECTION, POWDER, LYOPHILIZED, FOR SOLUTION INTRAMUSCULAR; INTRAVENOUS at 16:32

## 2021-07-03 RX ADMIN — Medication 15 ML: at 08:19

## 2021-07-03 ASSESSMENT — ACTIVITIES OF DAILY LIVING (ADL)
ADLS_ACUITY_SCORE: 26

## 2021-07-03 ASSESSMENT — MIFFLIN-ST. JEOR: SCORE: 1647.5

## 2021-07-03 NOTE — PROVIDER NOTIFICATION
Dr. Piña (ICU) told verbally of positive blood culture from an arterial line on 7-2-21 @ 1223 called by lab - no new orders received.

## 2021-07-03 NOTE — PROGRESS NOTES
[facility]    Infectious Disease Progress Note    Date of Service : 07/03/2021     Assessment :  1. 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 TTE shows mobile 1.4 cm vegetation of the mitral valve with moderate MR as well as a large pericardial effusion without tamponade. No additional evidence of seeding on CT abdomen.  Cardiology/ CT surgery consults noted.  With rising leukocytosis, worry about loculated infection/abscess, and feel JAE may be helpful to assess for loculated pericardial effusion/valvular abscess   2. Severe sepsis requiring pressor support and multiorgan failure  3. Large localized pericardial effusion, with concern for purulent pericarditis .  4. FORD related to sepsis.  5. Klebsiella pneumoniae UTI  6. Worsening leukocytosis  7. Severe anemia, likely of chronic disease  8. Polysubstance abuse  9. Splenomegaly     Recommendations:  1. Follow repeat blood cultures and culture sensitivities  2. Continue Nafcillin, add cipro for klebsiella UTI/peylo  3. Follow WBC. If continues to rise despite appropriate antibiotics, she will need further assessment of pericadrial effusion for infection / valvular abscess -JAE    4. Appreciate cardiology, CT surgery input -will discuss with them    She is at high risk for further embolization and additional complications with seeding   Will continue to monitor closely    Discussed with the critical care team  Marisol Matos MD    Interval History   Remains intubated, no new overnight events.    Antimicrobial therapy  7/2 Nafcillin  prior  7/1 Zosyn-7/2 7/1 Vancomycin    Physical Exam   Temp: 99.7  F (37.6  C) Temp src: Axillary BP: 102/63 Pulse: 90   Resp: (!) 35 SpO2: 100 % O2 Device: Mechanical Ventilator    Vitals:    07/01/21 1730 07/02/21 0200 07/03/21 0530   Weight: 87.5 kg (192 lb 14.4 oz) 87.6 kg (193 lb 1.6 oz) 92.4 kg (203 lb 11.3 oz)     Vital Signs with  Ranges  Temp:  [98.5  F (36.9  C)-99.8  F (37.7  C)] 99.7  F (37.6  C)  Pulse:  [77-98] 90  Resp:  [16-39] 35  BP: (102-132)/(62-88) 102/63  MAP:  [56 mmHg-103 mmHg] 83 mmHg  Arterial Line BP: ()/(47-79) 116/66  FiO2 (%):  [40 %] 40 %  SpO2:  [95 %-100 %] 100 %    GENERAL APPEARANCE:  intubated  EYES: conjunctivae and sclerae normal  NECK: no adenopathy  RESP: lungs clear to auscultation - no rales, rhonchi or wheezes  CV: regular rates and rhythm, murmur  ABDOMEN: soft, umbilical hernia  MS: extremities normal- no gross deformities noted  SKIN: no suspicious lesions or rashes    Other:    Medications     fentaNYL 50 mcg/hr (07/02/21 1933)     lactated ringers 125 mL/hr at 07/03/21 0746     midazolam 8 mg/hr (07/03/21 0755)     norepinephrine 0.05 mcg/kg/min (07/03/21 0746)     propofol (DIPRIVAN) infusion Stopped (07/02/21 1934)     sodium chloride 10 mL/hr at 07/02/21 1934     vasopressin Stopped (07/02/21 1240)       chlorhexidine  15 mL Mouth/Throat Q12H     hydrocortisone sodium succinate PF  50 mg Intravenous Q6H     multivitamins w/minerals  15 mL Per Feeding Tube Daily     nafcillin  2 g Intravenous Q6H     pantoprazole  40 mg Per Feeding Tube QAM AC     potassium chloride  20 mEq Intravenous Q1H     sodium chloride (PF)  10 mL Intracatheter Q8H     Data   All microbiology laboratory data reviewed.  Recent Labs   Lab Test 07/03/21  0400 07/02/21  1049 07/02/21  0020 07/01/21  1839 07/01/21  1239   WBC 28.5*  --   --  21.7* 21.5*   HGB 7.2* 7.9* 8.1* 6.4* 6.8*   HCT 22.6*  --   --  20.8* 22.4*   MCV 68*  --   --  67* 67*     --   --  114* 153     Recent Labs   Lab Test 07/03/21  0400 07/02/21  0214 07/01/21  1839   CR 0.99 1.29* 1.29*     No lab results found.  Recent Labs   Lab Test 07/02/21  1238 07/02/21  1225 07/01/21  1839 07/01/21  1251 07/01/21  1242   CULT No growth after 15 hours No growth after 15 hours Cultured on the 1st day of incubation:  Gram positive cocci in clusters  *   Critical Value/Significant Value, preliminary result only, called to and read back by  Shanice Ennis RN @ 6101 7.2.21 JE   >100,000 colonies/mL  Klebsiella pneumoniae  *  >100,000 colonies/mL  Strain 2  Klebsiella pneumoniae  *  Susceptibility testing in progress Cultured on the 1st day of incubation:  Staphylococcus aureus  Susceptibility testing in progress  *  Critical Value/Significant Value, preliminary result only, called to and read back by  Mayte Bianchi RN @ 0026 7/2/21 TM.    (Note)  POSITIVE for STAPHYLOCOCCUS AUREUS and NEGATIVE for the mecA gene  (not MRSA) by EBIQUOUSigene multiplex nucleic acid test. The mecA gene was  not detected. Final identification and antimicrobial susceptibility  testing will be verified by standard methods.    Specimen tested with Verigene multiplex, gram-positive blood culture  nucleic acid test for the following targets: Staph aureus, Staph  epidermidis, Staph lugdunensis, other Staph species, Enterococcus  faecalis, Enterococcus faecium, Streptococcus species, S. agalactiae,  S. anginosus grp., S. pneumoniae, S. pyogenes, Listeria sp., mecA  (methicillin resistance) and Brook/B (vancomycin resistance).    Critical Value/Significant Value called to and read back by Deepa Bianchi RN on 7/2/21 at 0304. LSA       7/1TTE  Interpretation Summary     Left ventricular systolic function is normal.  The visual ejection fraction is 55-60%.  The right ventricle is mildly dilated.  The right ventricular systolic function is normal.  There is a mobile linear echodensity measuring 1.4 cm x 0.6 cm on the atrial  side of the posterior mitral valve leaflet.  Other valve structures appear normal without significant dysfunction or  obvious valvular vegetations.  The inferior pericardium appears moderately thickened and echobright which may  represent active pericarditis.  Large localized pericardial effusion (2.7 cm) posteriorly/laterally with  mobile free floating echodense material which could  represent fibrinous  stranding in setting of inflammatory pericarditis or infectious material if  infectious pericardial effusion.  Dilation of the inferior vena cava is present with abnormal respiratory  variation in diameter.  Small left pleural effusion     No prior for comparison.     7/1 CT head  CT SCAN OF THE HEAD WITHOUT CONTRAST   7/1/2021 1:47 PM      HISTORY: Delirium; altered mental status     TECHNIQUE:  Axial images of the head and coronal reformations without  IV contrast material.  Radiation dose for this scan was reduced using  automated exposure control, adjustment of the mA and/or kV according  to patient size, or iterative reconstruction technique.     COMPARISON: None.     FINDINGS: There is a focal 2 cm area of hypodensity involving gray and  white matter in the medial left parietal lobe consistent with acute to  subacute ischemic infarct. There is a minimal incidental arachnoid  cyst in the anterior portion of left middle cranial fossa. Ventricles  and subarachnoid spaces are otherwise within normal limits. There is  no evidence for intracranial hemorrhage, significant mass effect, or  skull fracture. Exam is mildly limited by motion artifact. Visualized  paranasal sinuses and mastoid air cells are clear.                                                                      IMPRESSION: Focal hypodensity in the medial left parietal lobe  consistent with acute to subacute ischemic infarct.     7/1 Ct abdomen/pelvis                                                           IMPRESSION:   1.  Patchy hypoenhancement in the mid and upper poles of the right  kidney posteriorly, suspicious for pyelonephritis.  2.  Moderate-sized fat-containing paraumbilical hernia.  3.  Mild splenomegaly.  4.  There are possible small gallstones within a contracted  gallbladder.     Attestation:  Total time on the floor involved in the patient's care: 35 minutes. Total time spent in counseling/care coordination:  >50%

## 2021-07-03 NOTE — CONSULTS
D: Consult for finding patient's next of kin. Reviewed chart, noted patient's grandparent listed as Dary Ochoa ([phone]). Spoke with Kae Ochoa and she confirms that she is her grandmother and lives in [facility]. Updated patient's bedside nurse on this.

## 2021-07-03 NOTE — CONSULTS
[facility]    Stroke Consult Note    Reason for Consult:    We were consulted on this 41 year old lady who was found unresponsive, hypotensive and acidotic. She has history of polysubstance abuse and was found to be positive for amphetamine and opiates. She intubated for airway protection. Head CT showed an area of hypodensity in the left occipital lobe which could be acute or subacute stroke.    Chief Complaint: Altered Mental Status   Acute encephalopathy  Septic encephalopathy   ICU Sedation  Amphetamine abuse  Opiate Abuse  IVDU  Anemia      HPI  Manas Silvina Hernandez is a 41 year old female  has a past medical history of Heroin abuse (H), Methamphetamine abuse (H), and Polysubstance abuse (H).   Was noted by community home member to have altered mental status associated with intense vomiting. She was brought in by EMS due to encephalopathy and hypotension which was responsive to fluid administration. While in ED she was agitated and intubated for airway protection. CT head revealing for new acute to subacute PCA territory ischemic stroke.     Echocardiogram 7/2/21:  There is a mobile linear echodensity measuring 1.4 cm x 0.6 cm on the atrial  side of the posterior mitral valve leaflet.    JAE Pending    Concern is for endocarditis. ID has her on Ciprofloxacin and Nafcillin    For hypotension has required norepi and vasopressin; currently only on norepi.     CT Abd concerning for pyelonephritis.     Impression  Acute ischemic stroke of MCA due to other etiology (infection)  Evidence of PCA infarction on CT may be due to infectious etiology such as endocarditis however is in large territory and may be more related to thrombus and in IV drug abusers as well as methamphetamine abusers difficult to pinpoint whether or not related to a vasculitic process.  Would recommend vessel imaging with CTA to evaluate for mycotic aneurysms as well as look at tortuosity or for any vessel  "abnormalities.  Furthermore would recommend brain MRI for further characterization and evaluation.    Recommendations  - Neurochecks and Vital Signs every 2 hours   - goal SBP < 140 mmHg  - Hold antiplatelet and anticoagulation agents in setting of ongoing investigation into infective endocarditis.   - Statin: Atorvastatin 40mg qday  - MRI Brain with and without contrast  - CTA head and Neck  - JAE  - Telemetry, EKG  - Bedside Glucose Monitoring  - A1c, Lipid Panel, Troponin x 3  - PT/OT/SLP  - Stroke Education  - Smoking Cessation, drugs of abuse cessation  - Euthermia, Euglycemia    Patient Follow-up    - final recommendation pending work-up    Thank you for this consult. We will continue to follow.     The Stroke Staff is Dr. Garces.    Sedrick Abrams MD  Vascular Neurology Fellow  To page me or covering stroke neurology team member, click here: AMCOM   Choose \"On Call\" tab at top, then search dropdown box for \"Neurology Adult\", select location, press Enter, then look for stroke/neuro ICU/telestroke.    _____________________________________________________    Risk Factors Present on Admission                   Past Medical History   Past Medical History:   Diagnosis Date     Heroin abuse (H)      Methamphetamine abuse (H)      Polysubstance abuse (H)      Past Surgical History   Past Surgical History:   Procedure Laterality Date     DILATION AND CURETTAGE  2005     Medications   Home Meds  Prior to Admission medications    Not on File       Scheduled Meds    chlorhexidine  15 mL Mouth/Throat Q12H     ciprofloxacin  500 mg Per Feeding Tube Q12H JENARO     heparin ANTICOAGULANT  5,000 Units Subcutaneous Q8H     hydrocortisone sodium succinate PF  50 mg Intravenous Q6H     [START ON 7/4/2021] multivitamins w/minerals  15 mL Per Feeding Tube Daily     nafcillin  2 g Intravenous Q6H     pantoprazole  40 mg Per Feeding Tube QAM AC     QUEtiapine  50 mg Oral BID     sodium chloride (PF)  10 mL Intracatheter Q8H "       Infusion Meds    dexmedetomidine       fentaNYL 50 mcg/hr (07/02/21 1933)     lactated ringers 50 mL/hr at 07/03/21 1102     midazolam 8 mg/hr (07/03/21 0755)     norepinephrine 0.05 mcg/kg/min (07/03/21 0746)     sodium chloride 10 mL/hr at 07/02/21 1934     vasopressin Stopped (07/02/21 1240)       PRN Meds  acetaminophen **OR** acetaminophen, albuterol, glucose **OR** dextrose **OR** glucagon, fentaNYL, HYDROmorphone, lidocaine (buffered or not buffered), naloxone **OR** naloxone **OR** naloxone **OR** naloxone, sodium chloride (PF), sodium chloride (PF)    Allergies   No Known Allergies  Family History   Family History   Problem Relation Age of Onset     Alcoholism Mother      Alcoholism Father      Family History Negative No family hx of      Social History   Social History     Tobacco Use     Smoking status: Current Every Day Smoker     Types: Cigarettes     Tobacco comment: 1-2   Substance Use Topics     Alcohol use: No     Drug use: Yes     Types: Amphetamines, Methamphetamines, Opiates       Review of Systems   Unable to complete due to mental status       PHYSICAL EXAMINATION   Temp:  [98.5  F (36.9  C)-99.8  F (37.7  C)] 99.7  F (37.6  C)  Pulse:  [] 96  Resp:  [14-41] 39  BP: (102-132)/(62-88) 102/63  MAP:  [64 mmHg-103 mmHg] 97 mmHg  Arterial Line BP: ()/(49-79) 130/78  FiO2 (%):  [40 %] 40 %  SpO2:  [95 %-100 %] 96 %    Marsing coma score:  E: 1  M: 4  V: 1    Neuro:  Mental Status: Intubated and sedated   Cranial Nerves:   - EOM: minimal movement noted  - Grimace/facial movement: to pain  Motor: moves to noxious stim - withdrawal  Sensory:  moves to noxious stim - withdrawal    Lungs: Intubated, equal chest rise  Heart: Regular rate   Abdomen: non-distended Extremities: well purfused Skin: no lesions noted      Imaging  I personally reviewed all imaging; relevant findings per HPI.    Labs Data   CBC  Recent Labs   Lab 07/03/21  0400 07/02/21  1049 07/02/21  0020 07/01/21  1839  07/01/21  1239   WBC 28.5*  --   --  21.7* 21.5*   RBC 3.31*  --   --  3.10* 3.34*   HGB 7.2* 7.9* 8.1* 6.4* 6.8*   HCT 22.6*  --   --  20.8* 22.4*     --   --  114* 153     Basic Metabolic Panel   Recent Labs   Lab 07/03/21  0956 07/03/21  0400 07/02/21  0214 07/01/21 1839 07/01/21  1839   NA  --  144 140  --  139   POTASSIUM 4.1 3.4 3.5   < > 2.7*   CHLORIDE  --  115* 110*  --  108   CO2  --  21 20  --  19*   BUN  --  25 26  --  27   CR  --  0.99 1.29*  --  1.29*   GLC  --  145* 109*  --  115*   DEREK  --  7.6* 7.3*  --  7.0*    < > = values in this interval not displayed.     Liver Panel  Recent Labs   Lab 07/03/21  0956 07/03/21  0400 07/01/21 1839 07/01/21  1239   PROTTOTAL  --  6.1* 5.8* 6.6*   ALBUMIN  --  1.3* 1.5* 1.7*   BILITOTAL 2.8* 3.0* 2.0* 1.7*   ALKPHOS  --  333* 293* 307*   AST  --  41 55* 35   ALT  --  38 35 31     INR    Recent Labs   Lab Test 07/01/21  1239   INR 1.23*      Lipid Profile    Recent Labs   Lab Test 07/02/21  0435   CHOL 80   HDL 8*   LDL <1   TRIG 382*     A1C    Recent Labs   Lab Test 07/02/21  0435   A1C 5.7*     Troponin I    Recent Labs   Lab 07/03/21  0956 07/03/21  0400 07/03/21  0239   TROPI 0.430* 0.498* 0.513*

## 2021-07-03 NOTE — PLAN OF CARE
Neuro: PERRL, withdraws in all extremities, q4h neuro checks. Versed gtt titrated for adequate sedation and ventilator synchrony. PRN fentanyl given off the pump with good results.   CV: murmur detected, SR/ST  Resp: tolerating vent settings, no changes. Thick creamy secretions from ETT.   GI/: Tolerating TF with minimal residual. Neri with adequate urine output.  Skin: L knee slightly swollen. Unkept appearance.   Social work consult placed to help find family to contact. Continue to monitor.

## 2021-07-03 NOTE — PROGRESS NOTES
[facility] ICU RESPIRATORY NOTE           Date of Admission: 7/1/2021     Date of Intubation: 7/1/2021     Reason for Mechanical Ventilation: Airway Protection     Number of Days on Mechanical Ventilation: 3     Met Criteria for Spontaneous Breathing Trial: No     Reason for No Spontaneous Breathing Trial: Per MD     Significant Events Today: None     ABG Results:   Recent Labs   Lab 07/01/21  2251 07/01/21  1830 07/01/21  1537   PH 7.42 7.41  --    PCO2 31* 32*  --    PO2 93 99  --    HCO3 20* 20*  --    O2PER 40 40% 50%     Ventilation Mode: CMV/AC  (Continuous Mandatory Ventilation/ Assist Control)  FiO2 (%): 40 %  Rate Set (breaths/minute): 16 breaths/min  Tidal Volume Set (mL): 450 mL  PEEP (cm H2O): 5 cmH2O  Oxygen Concentration (%): 40 %  Resp: 24    AURELIA Forbes, RRT

## 2021-07-03 NOTE — PROGRESS NOTES
Critical Care Progress Note  07/03/2021    Name: Manas Hernandez MRN#: [id number]   Age: 41 year old YOB: 1980     [facility] Day# 2           Problem List:   Principal Problem:    Endocarditis due to methicillin susceptible Staphylococcus aureus (MSSA)  Active Problems:    Pyelonephritis, acute    Altered mental status, unspecified altered mental status type      Summary/Hospital Course:   Ms. Hernandez is a 41 year old female with chronic polysubstance abuse and IVDU presented on 7/1 with encephalopathy, hypotension, anemia, and hypotension responsive to fluid resusictation. She was agitated in the ED, sedated and intubated for airway protection. CT head showed possible new acute to subacute parietal lobe ischemic infarct, CXR without abnormalities, and CT abdomen concerning for pyelonephritis empirically treated with vancomycin and Zosyn. Transferred to the ICU for continued infectious workup and airway management. On exam there was concern for endocarditis. On 7/2, admission blood cultures returned with MSSA. Remains intubated on nafcillin for suspected infective endocarditis (JAE pending) and possible pyelonephritis.  Urine culture with klebsiella.      Assessment and plan :     Manas Hernandez is a 41 year old female with a history of chronic polysubstance abuse admitted on 7/1/2021 for encephalopathy requiring intubation, hypotension and acidosis concerning for sepsis secondary to S. Aureus bacteremia and infective endocarditis.  I have personally reviewed the daily labs, imaging studies, cultures and discussed the case with referring physician and consulting physicians.   My assessment and plan by system for this patient is as follows:    Neurology/Psychiatry:   1. Acute or subacute ischemic infarct on CT scan  2. Toxic encephalopathy, septic encephalopathy - methicillin S. Aureus bacteremia on blood cultures (7/1)  3. ICU sedation - propofol and midazolam  3. Analgesia  4. Agitation  5.  Polysubstance use  Plan  - Neuro CC following, appreciate assistance   - MRI and MRA for stroke workup  - Wean propofol based on elevated triglycerides, will increase midazolam drip for sedation in its place, also adding precedex. RASS goal of -1 to -2.  - Chemical dependency evaluation once extubated if agreeable   - Fentanyl drip and prn for pain and withdrawal prevention  - Monitor for signs of withdrawal - UDS positive for amphetamines and opiates on admission  - adding seroquel    Cardiovascular:   1. Septic shock: now on norepinephrine  2. Infective endocarditis - mitral valve veg seen on TTE.  Acute vs subacute infarct on head CT.   3. Elevated troponin   4. Hypertriglyceridemia - elevated TG, low HDL  5.  Large loculated pericardial effusion without tamponade physiology  Plan  - Continue vasopressors with MAP goal >65; wean as able. Currently on norepinephrine  - Continue stress dose steroids with hydrocortisone 50 mg IV q6h  - EKG   - TTE done, likely will need JAE, possibly Monday per cardiology notes  - Appreciate cardiology assistance--they don't not feel there's an indication for effusion drainage.  Will re-assess on JAE.  - Trend troponin q4h to peak   - IV antibiotics as below    Pulmonary/Ventilator Management:   1. Mechanically ventilated for airway protection - CMV/AC (16/450/5/40)  Plan  - Continue full ventilatory support, initiate ps trials when awakening    GI/Nutrition :   1. Mildly elevated alkaline phosphate  2. Hyperbilirubinemia -- slow uptrend to 2.8; direct 2.2  3. Stress ulcer prophylaxis   4.  Hypoalbuminemia 1.3  Plan  - Monitor CMP daily.  - tube feeds  - Continue pantoprazole 40 mg IV  - [facility]    Renal/Fluids/Electrolytes:   1. Possible pyelonephritis with septic shock - urine culture results pending.  2. Hypokalemia, resolved with supplementation   3. FORD - likely secondary to severe sepsis, improving. Creat to 0.99  4. Metabolic acidosis with respiratory compensation,  improving.  Plan  - Continue IV antibiotics as below  - K+ supplementation: 20 mEq IV  - Continue MIVF: LR down to 50/hr while in shock, then kvo  - Monitor CMP daily  - Avoid nephrotoxic agents such as NSAID, IV contrast unless specifically required  - Follow I/O's as appropriate.    Infectious Disease:   1. Severe sepsis secondary to methicillin S. Aureus bacteremia, and mitral valve endocarditis  2. Possible pyelonephritis: based on CT and UA, awaiting UC (vs septic emboli to R kidney)   3.  Klebsiella UTI and pyelo  Plan  - Follow blood cultures, one culture positive for methicillin S. Aureus from 7/1  - Repeat blood cultures daily  - continue cipro  - Continue nafcillin  - appreciate ID input  - Monitor urine output while on IV antibiotics.    Endocrine:   1. Probable relative adrenal insufficiency in the setting of severe sepsis  2. Stress induced hyperglycemia, improved. A1c 5.7 at admit  Plan  - ICU insulin protocol, goal sugar <180  - Follow BGs  - Stress dose steroids - IV hydrocortisone 50 mg q6h as above     Hematology/Oncology:   1. Microcytic anemia, likely 2/2 malnutrition and chronic polysubstance use; no signs of acute blood loss. Hgb 7.2.  2. Hx anemia (Hgb 7.3 in 11/2007).  3.  Thrombocytopenia, NOS:  Present on 7/1 to 114; now resolved.  Likely due to sepsis.  Plan  - Daily CBC, Transfuse with Hgb < 7.    IV/Access:   1. Venous access - PIV right foot, right femoral CVC  2. Arterial access - femoral  Plan  - central access required and necessary    ICU Prophylaxis:   1. DVT: mechanical, subcut heparin  2. VAP: HOB 30 degrees, chlorhexidine rinse  3. Stress Ulcer: PPI  4. Restraints: Nonviolent soft two point restraints required and necessary for patient safety and continued cares and good effect as patient continues to pull at necessary lines, tubes despite education and distraction. Will readdress daily.   5. Wound care - per unit routine   6. Feeding - TF  7. Family Update: unclear family  contact.  8. Disposition - critically ill in ICU           Interim History/Overnight Events:   No events overnight. Patient is intubated and unable to obtain subjective history.          Key Medications:       chlorhexidine  15 mL Mouth/Throat Q12H     hydrocortisone sodium succinate PF  50 mg Intravenous Q6H     multivitamins w/minerals  15 mL Per Feeding Tube Daily     nafcillin  2 g Intravenous Q6H     pantoprazole  40 mg Per Feeding Tube QAM AC     potassium chloride  20 mEq Intravenous Q1H     sodium chloride (PF)  10 mL Intracatheter Q8H       fentaNYL 50 mcg/hr (07/02/21 1933)     lactated ringers 125 mL/hr at 07/03/21 0545     midazolam 8 mg/hr (07/02/21 2318)     norepinephrine 0.05 mcg/kg/min (07/03/21 0602)     propofol (DIPRIVAN) infusion Stopped (07/02/21 1934)     sodium chloride 10 mL/hr at 07/02/21 1934     vasopressin Stopped (07/02/21 1240)            Physical Examination:   Temp:  [98.5  F (36.9  C)-99.8  F (37.7  C)] 99.7  F (37.6  C)  Pulse:  [77-98] 90  Resp:  [16-39] 35  BP: (102-132)/(62-88) 102/63  MAP:  [56 mmHg-103 mmHg] 83 mmHg  Arterial Line BP: ()/(47-79) 116/66  FiO2 (%):  [40 %] 40 %  SpO2:  [95 %-100 %] 100 %  No intake or output data in the 24 hours ending 07/01/21 1542  Wt Readings from Last 4 Encounters:   07/03/21 92.4 kg (203 lb 11.3 oz)   11/05/07 103.1 kg (227 lb 4 oz)   09/26/07 99.3 kg (219 lb)     Arterial Line BP: ()/(47-79) 116/66  MAP:  [56 mmHg-103 mmHg] 83 mmHg  BP - Mean:  [] 72  Ventilation Mode: CMV/AC  (Continuous Mandatory Ventilation/ Assist Control)  FiO2 (%): 40 %  Rate Set (breaths/minute): 16 breaths/min  Tidal Volume Set (mL): 450 mL  PEEP (cm H2O): 5 cmH2O  Oxygen Concentration (%): 40 %  Resp: (!) 35    Recent Labs   Lab 07/01/21  2251 07/01/21  1830 07/01/21  1537   PH 7.42 7.41  --    PCO2 31* 32*  --    PO2 93 99  --    HCO3 20* 20*  --    O2PER 40 40% 50%     GEN: sedated, in no acute distress  HEENT: sclera anicteric, trachea  midline, NGT in place. ETT in place.   PULM: unlabored synchronous with vent, clear anteriorly.   CV/COR: tachycardic, normal S1 and S2. 3/6 systolic murmur auscultated over LLSB  ABD: Mildly distended, soft. Hypoactive bowel sounds, large midline hernia.  MSK/EXT: No pitting edema. WWP.  NEURO: sedated. Withdraws. PEERL  : don in place draining dark yellow urine.  SKIN: Purple, nodular lesions of right 4th finger concerning for Oslers nodes. Dark red lesion on the medial great toe. Scattered petechial lesions on the soles of the feet bilaterally. No nail changes noted.   LINES: clean, dry intact         Data:   All data and imaging reviewed.    ROUTINE ICU LABS (Last four results)  CMP  Recent Labs   Lab 07/03/21  0400 07/02/21  0435 07/02/21  0214 07/02/21  0020 07/01/21  1839 07/01/21  1239     --  140  --  139 134   POTASSIUM 3.4  --  3.5 4.9 2.7* 2.9*   CHLORIDE 115*  --  110*  --  108 102   CO2 21  --  20  --  19* 20   ANIONGAP 8  --  10  --  12 12   *  --  109*  --  115* 130*   BUN 25  --  26  --  27 35*   CR 0.99  --  1.29*  --  1.29* 1.59*   GFRESTIMATED 71  --  52*  --  52* 40*   GFRESTBLACK 83  --  60*  --  60* 47*   DEREK 7.6*  --  7.3*  --  7.0* 7.7*   MAG  --  1.9  --   --   --   --    PHOS  --  4.0  --   --   --   --    PROTTOTAL 6.1*  --   --   --  5.8* 6.6*   ALBUMIN 1.3*  --   --   --  1.5* 1.7*   BILITOTAL 3.0*  --   --   --  2.0* 1.7*   ALKPHOS 333*  --   --   --  293* 307*   AST 41  --   --   --  55* 35   ALT 38  --   --   --  35 31     CBC  Recent Labs   Lab 07/03/21  0400 07/02/21  1049 07/02/21  0020 07/01/21  1839 07/01/21  1239   WBC 28.5*  --   --  21.7* 21.5*   RBC 3.31*  --   --  3.10* 3.34*   HGB 7.2* 7.9* 8.1* 6.4* 6.8*   HCT 22.6*  --   --  20.8* 22.4*   MCV 68*  --   --  67* 67*   MCH 21.8*  --   --  20.6* 20.4*   MCHC 31.9  --   --  30.8* 30.4*   RDW 19.4*  --   --  17.1* 17.0*     --   --  114* 153     INR  Recent Labs   Lab 07/01/21  1239   INR 1.23*      Arterial Blood Gas  Recent Labs   Lab 21  2251 21  1830 21  1537   PH 7.42 7.41  --    PCO2 31* 32*  --    PO2 93 99  --    HCO3 20* 20*  --    O2PER 40 40% 50%       All cultures:  Recent Labs   Lab 21  1238 21  1225 21  1839 21  1251 21  1242   CULT No growth after 15 hours No growth after 15 hours Cultured on the 1st day of incubation:  Gram positive cocci in clusters  *  Critical Value/Significant Value, preliminary result only, called to and read back by  Shanice Ennis RN @ 7050 21    Culture in progress Cultured on the 1st day of incubation:  Staphylococcus aureus  Susceptibility testing in progress  *  Critical Value/Significant Value, preliminary result only, called to and read back by  Mayte Bianchi RN @ 0026 21 TM.    (Note)  POSITIVE for STAPHYLOCOCCUS AUREUS and NEGATIVE for the mecA gene  (not MRSA) by blink[employer] music multiplex nucleic acid test. The mecA gene was  not detected. Final identification and antimicrobial susceptibility  testing will be verified by standard methods.    Specimen tested with Verigene multiplex, gram-positive blood culture  nucleic acid test for the following targets: Staph aureus, Staph  epidermidis, Staph lugdunensis, other Staph species, Enterococcus  faecalis, Enterococcus faecium, Streptococcus species, S. agalactiae,  S. anginosus grp., S. pneumoniae, S. pyogenes, Listeria sp., mecA  (methicillin resistance) and Brook/B (vancomycin resistance).    Critical Value/Significant Value called to and read back by Deepa Bianchi RN on 21 at 0304. LSA       Recent Results (from the past 24 hour(s))   Echocardiogram Complete    Narrative    [id number]  [id number]  [id number]  [id number]^CARYN^ADÁN^GIRISH     [facility]  Echocardiography Laboratory  [facility] [place] [address]     Name: JOAQUÍN SAAVEDRA  MRN: [id number]  : 1980  Study Date: 2021 10:51 AM  Age: 41 yrs  Gender:  Female  Patient Location: [facility]  Reason For Study: Endocarditis  Ordering Physician: ADÁN RUBIN  Referring Physician: Lisa Pollock  Performed By: Melvin Corrales     BSA: 2.0 m2  Height: 68 in  Weight: 193 lb  HR: 92  BP: 125/66 mmHg  ______________________________________________________________________________  Procedure  Complete Portable Echo Adult.  ______________________________________________________________________________  Interpretation Summary     Left ventricular systolic function is normal.  The visual ejection fraction is 55-60%.  The right ventricle is mildly dilated.  The right ventricular systolic function is normal.  There is a mobile linear echodensity measuring 1.4 cm x 0.6 cm on the atrial  side of the posterior mitral valve leaflet.  Other valve structures appear normal without significant dysfunction or  obvious valvular vegetations.  The inferior pericardium appears moderately thickened and echobright which may  represent active pericarditis.  Large localized pericardial effusion (2.7 cm) posteriorly/laterally with  mobile free floating echodense material which could represent fibrinous  stranding in setting of inflammatory pericarditis or infectious material if  infectious pericardial effusion.  Dilation of the inferior vena cava is present with abnormal respiratory  variation in diameter.  Small left pleural effusion     No prior for comparison.  ______________________________________________________________________________  Left Ventricle  The left ventricle is normal in size. There is normal left ventricular wall  thickness. Left ventricular systolic function is normal. The visual ejection  fraction is 55-60%. Left ventricular diastolic function is normal.     Right Ventricle  The right ventricle is mildly dilated. The right ventricular systolic function  is normal.     Atria  The left atrium is severely dilated. The right atrium is mildly dilated.     Mitral Valve  Vegetation  on mitral valve. There is a mobile linear echodensity measuring 1.4  cm x 0.6 cm on the atrial side of the posterior mitral valve leaflet. There is  mild to moderate (1-2+) mitral regurgitation.     Tricuspid Valve  Normal tricuspid valve. There is mild (1+) tricuspid regurgitation. The right  ventricular systolic pressure is approximated at 25.8 mmHg plus the right  atrial pressure.     Vessels  The aortic root is normal size. Normal size ascending aorta. Dilation of the  inferior vena cava is present with abnormal respiratory variation in diameter.     Pericardium  The pericardium appears moderately thickened. Large localized pericardial  effusion (2.7 cm) posteriorly/laterally with mobile free floating echodense  material which could represent fibrinous stranding in setting of inflammatory  pericarditis or infectious material if infectious pericardial effusion. Small  left pleural effusion.  ______________________________________________________________________________  MMode/2D Measurements & Calculations  IVSd: 1.0 cm  LVIDd: 4.7 cm  LVIDs: 3.4 cm  LVPWd: 1.3 cm  FS: 27.6 %  LV mass(C)d: 198.2 grams  LV mass(C)dI: 98.4 grams/m2  LA dimension: 4.1 cm  asc Aorta Diam: 2.8 cm  LVOT diam: 1.9 cm  LVOT area: 2.9 cm2  LA Volume (BP): 104.0 ml     LA Volume Index (BP): 51.7 ml/m2  RWT: 0.56     Doppler Measurements & Calculations  MV E max franchesca: 85.6 cm/sec  MV A max franchesca: 102.8 cm/sec  MV E/A: 0.83  MV dec slope: 841.2 cm/sec2  PA acc time: 0.08 sec  TR max franchesca: 253.8 cm/sec  TR max P.8 mmHg  E/E' av.3  Lateral E/e': 7.5  Medial E/e': 11.1     ______________________________________________________________________________  Report approved by: Rashi Welch 2021 01:03 PM                       Billing: This patient is critically ill: Yes. Total critical care time today 45 min, excluding procedures and teaching.

## 2021-07-03 NOTE — PROGRESS NOTES
[facility] ICU RESPIRATORY NOTE        Date of Admission: 7/1/2021    Date of Intubation (most recent): 7/1/2021    Reason for Mechanical Ventilation: Airway protection    Number of Days on Mechanical Ventilation: 3    Met Criteria for Spontaneous Breathing Trial: No    Reason for No Spontaneous Breathing Trial: per MD    Significant Events Today: CT and MRI transport done today    ABG Results:   Recent Labs   Lab 07/03/21  1132 07/01/21  2251 07/01/21  1830 07/01/21  1537   PH 7.49* 7.42 7.41  --    PCO2 29* 31* 32*  --    PO2 99 93 99  --    HCO3 22 20* 20*  --    O2PER 40 40 40% 50%       Current Vent Settings: Ventilation Mode: CMV/AC  (Continuous Mandatory Ventilation/ Assist Control)  FiO2 (%): 40 %  Rate Set (breaths/minute): 16 breaths/min  Tidal Volume Set (mL): 450 mL  PEEP (cm H2O): 5 cmH2O  Oxygen Concentration (%): 40 %  Resp: 25        Plan: continue full vent support.    Homer Parham, RT

## 2021-07-04 LAB
ALBUMIN SERPL-MCNC: 1.2 G/DL (ref 3.4–5)
ALP SERPL-CCNC: 260 U/L (ref 40–150)
ALT SERPL W P-5'-P-CCNC: 25 U/L (ref 0–50)
ANION GAP SERPL CALCULATED.3IONS-SCNC: 6 MMOL/L (ref 3–14)
AST SERPL W P-5'-P-CCNC: 22 U/L (ref 0–45)
BASE DEFICIT BLDA-SCNC: 1.5 MMOL/L
BILIRUB SERPL-MCNC: 1.9 MG/DL (ref 0.2–1.3)
BLD PROD TYP BPU: NORMAL
BLD UNIT ID BPU: 0
BLOOD PRODUCT CODE: NORMAL
BPU ID: NORMAL
BUN SERPL-MCNC: 33 MG/DL (ref 7–30)
CALCIUM SERPL-MCNC: 7.5 MG/DL (ref 8.5–10.1)
CHLORIDE SERPL-SCNC: 121 MMOL/L (ref 94–109)
CO2 SERPL-SCNC: 21 MMOL/L (ref 20–32)
CREAT SERPL-MCNC: 1.05 MG/DL (ref 0.52–1.04)
ERYTHROCYTE [DISTWIDTH] IN BLOOD BY AUTOMATED COUNT: 19.9 % (ref 10–15)
GFR SERPL CREATININE-BSD FRML MDRD: 67 ML/MIN/{1.73_M2}
GLUCOSE BLDC GLUCOMTR-MCNC: 115 MG/DL (ref 70–99)
GLUCOSE BLDC GLUCOMTR-MCNC: 115 MG/DL (ref 70–99)
GLUCOSE BLDC GLUCOMTR-MCNC: 116 MG/DL (ref 70–99)
GLUCOSE BLDC GLUCOMTR-MCNC: 124 MG/DL (ref 70–99)
GLUCOSE BLDC GLUCOMTR-MCNC: 129 MG/DL (ref 70–99)
GLUCOSE BLDC GLUCOMTR-MCNC: 131 MG/DL (ref 70–99)
GLUCOSE BLDC GLUCOMTR-MCNC: 138 MG/DL (ref 70–99)
GLUCOSE SERPL-MCNC: 138 MG/DL (ref 70–99)
HCO3 BLD-SCNC: 22 MMOL/L (ref 21–28)
HCT VFR BLD AUTO: 19.1 % (ref 35–47)
HGB BLD-MCNC: 6 G/DL (ref 11.7–15.7)
MAGNESIUM SERPL-MCNC: 2.6 MG/DL (ref 1.6–2.3)
MCH RBC QN AUTO: 21.6 PG (ref 26.5–33)
MCHC RBC AUTO-ENTMCNC: 31.4 G/DL (ref 31.5–36.5)
MCV RBC AUTO: 69 FL (ref 78–100)
O2/TOTAL GAS SETTING VFR VENT: ABNORMAL %
OXYHGB MFR BLD: 98 % (ref 92–100)
PCO2 BLD: 29 MM HG (ref 35–45)
PH BLD: 7.49 PH (ref 7.35–7.45)
PHOSPHATE SERPL-MCNC: 5 MG/DL (ref 2.5–4.5)
PLATELET # BLD AUTO: 172 10E9/L (ref 150–450)
PO2 BLD: 148 MM HG (ref 80–105)
POTASSIUM SERPL-SCNC: 4.2 MMOL/L (ref 3.4–5.3)
PROT SERPL-MCNC: 6 G/DL (ref 6.8–8.8)
RBC # BLD AUTO: 2.78 10E12/L (ref 3.8–5.2)
SODIUM SERPL-SCNC: 148 MMOL/L (ref 133–144)
TRANSFUSION STATUS PATIENT QL: NORMAL
TRANSFUSION STATUS PATIENT QL: NORMAL
TROPONIN I SERPL-MCNC: 0.43 UG/L (ref 0–0.04)
TROPONIN I SERPL-MCNC: 0.44 UG/L (ref 0–0.04)
WBC # BLD AUTO: 14 10E9/L (ref 4–11)

## 2021-07-04 PROCEDURE — 250N000011 HC RX IP 250 OP 636: Performed by: INTERNAL MEDICINE

## 2021-07-04 PROCEDURE — 250N000009 HC RX 250: Performed by: INTERNAL MEDICINE

## 2021-07-04 PROCEDURE — 83735 ASSAY OF MAGNESIUM: CPT | Performed by: INTERNAL MEDICINE

## 2021-07-04 PROCEDURE — P9016 RBC LEUKOCYTES REDUCED: HCPCS | Performed by: EMERGENCY MEDICINE

## 2021-07-04 PROCEDURE — 94003 VENT MGMT INPAT SUBQ DAY: CPT

## 2021-07-04 PROCEDURE — 258N000003 HC RX IP 258 OP 636: Performed by: INTERNAL MEDICINE

## 2021-07-04 PROCEDURE — 99232 SBSQ HOSP IP/OBS MODERATE 35: CPT | Performed by: INTERNAL MEDICINE

## 2021-07-04 PROCEDURE — 999N001017 HC STATISTIC GLUCOSE BY METER IP

## 2021-07-04 PROCEDURE — 84484 ASSAY OF TROPONIN QUANT: CPT | Performed by: INTERNAL MEDICINE

## 2021-07-04 PROCEDURE — 85027 COMPLETE CBC AUTOMATED: CPT | Performed by: INTERNAL MEDICINE

## 2021-07-04 PROCEDURE — 999N000157 HC STATISTIC RCP TIME EA 10 MIN

## 2021-07-04 PROCEDURE — 250N000013 HC RX MED GY IP 250 OP 250 PS 637: Performed by: INTERNAL MEDICINE

## 2021-07-04 PROCEDURE — 82805 BLOOD GASES W/O2 SATURATION: CPT | Performed by: INTERNAL MEDICINE

## 2021-07-04 PROCEDURE — 80053 COMPREHEN METABOLIC PANEL: CPT | Performed by: INTERNAL MEDICINE

## 2021-07-04 PROCEDURE — 84100 ASSAY OF PHOSPHORUS: CPT | Performed by: INTERNAL MEDICINE

## 2021-07-04 PROCEDURE — 200N000001 HC R&B ICU

## 2021-07-04 PROCEDURE — 99291 CRITICAL CARE FIRST HOUR: CPT | Performed by: INTERNAL MEDICINE

## 2021-07-04 PROCEDURE — 99291 CRITICAL CARE FIRST HOUR: CPT | Mod: GC | Performed by: PSYCHIATRY & NEUROLOGY

## 2021-07-04 PROCEDURE — 250N000009 HC RX 250: Performed by: SPECIALIST

## 2021-07-04 RX ADMIN — HYDROMORPHONE HYDROCHLORIDE 0.5 MG: 1 INJECTION, SOLUTION INTRAMUSCULAR; INTRAVENOUS; SUBCUTANEOUS at 07:28

## 2021-07-04 RX ADMIN — HYDROMORPHONE HYDROCHLORIDE 0.5 MG: 1 INJECTION, SOLUTION INTRAMUSCULAR; INTRAVENOUS; SUBCUTANEOUS at 12:13

## 2021-07-04 RX ADMIN — Medication 8 MG/HR: at 07:06

## 2021-07-04 RX ADMIN — Medication 15 ML: at 12:00

## 2021-07-04 RX ADMIN — HYDROMORPHONE HYDROCHLORIDE 0.5 MG: 1 INJECTION, SOLUTION INTRAMUSCULAR; INTRAVENOUS; SUBCUTANEOUS at 21:10

## 2021-07-04 RX ADMIN — NAFCILLIN SODIUM 2 G: 2 INJECTION, POWDER, LYOPHILIZED, FOR SOLUTION INTRAMUSCULAR; INTRAVENOUS at 04:04

## 2021-07-04 RX ADMIN — Medication 50 MCG: at 04:11

## 2021-07-04 RX ADMIN — SODIUM CHLORIDE, POTASSIUM CHLORIDE, SODIUM LACTATE AND CALCIUM CHLORIDE: 600; 310; 30; 20 INJECTION, SOLUTION INTRAVENOUS at 04:23

## 2021-07-04 RX ADMIN — CHLORHEXIDINE GLUCONATE 15 ML: 1.2 SOLUTION ORAL at 08:18

## 2021-07-04 RX ADMIN — DEXMEDETOMIDINE HYDROCHLORIDE 0.3 MCG/KG/HR: 400 INJECTION INTRAVENOUS at 12:13

## 2021-07-04 RX ADMIN — Medication 8 MG/HR: at 19:10

## 2021-07-04 RX ADMIN — CIPROFLOXACIN 500 MG: 500 TABLET, FILM COATED ORAL at 20:11

## 2021-07-04 RX ADMIN — CHLORHEXIDINE GLUCONATE 15 ML: 1.2 SOLUTION ORAL at 20:11

## 2021-07-04 RX ADMIN — HYDROMORPHONE HYDROCHLORIDE 0.5 MG: 1 INJECTION, SOLUTION INTRAMUSCULAR; INTRAVENOUS; SUBCUTANEOUS at 18:02

## 2021-07-04 RX ADMIN — HYDROMORPHONE HYDROCHLORIDE 0.5 MG: 1 INJECTION, SOLUTION INTRAMUSCULAR; INTRAVENOUS; SUBCUTANEOUS at 09:52

## 2021-07-04 RX ADMIN — NAFCILLIN SODIUM 2 G: 2 INJECTION, POWDER, LYOPHILIZED, FOR SOLUTION INTRAMUSCULAR; INTRAVENOUS at 09:54

## 2021-07-04 RX ADMIN — Medication 50 MCG: at 14:00

## 2021-07-04 RX ADMIN — DEXMEDETOMIDINE HYDROCHLORIDE 0.5 MCG/KG/HR: 400 INJECTION INTRAVENOUS at 01:29

## 2021-07-04 RX ADMIN — Medication 50 MCG: at 17:20

## 2021-07-04 RX ADMIN — HYDROCORTISONE SODIUM SUCCINATE 50 MG: 100 INJECTION, POWDER, FOR SOLUTION INTRAMUSCULAR; INTRAVENOUS at 01:29

## 2021-07-04 RX ADMIN — ACETAMINOPHEN 650 MG: 325 TABLET, FILM COATED ORAL at 04:04

## 2021-07-04 RX ADMIN — Medication 0.03 MCG/KG/MIN: at 07:22

## 2021-07-04 RX ADMIN — PANTOPRAZOLE SODIUM 40 MG: 40 TABLET, DELAYED RELEASE ORAL at 07:30

## 2021-07-04 RX ADMIN — NAFCILLIN SODIUM 2 G: 2 INJECTION, POWDER, LYOPHILIZED, FOR SOLUTION INTRAMUSCULAR; INTRAVENOUS at 16:09

## 2021-07-04 RX ADMIN — HYDROMORPHONE HYDROCHLORIDE 0.5 MG: 1 INJECTION, SOLUTION INTRAMUSCULAR; INTRAVENOUS; SUBCUTANEOUS at 18:42

## 2021-07-04 RX ADMIN — HEPARIN SODIUM 5000 UNITS: 5000 INJECTION, SOLUTION INTRAVENOUS; SUBCUTANEOUS at 13:52

## 2021-07-04 RX ADMIN — HEPARIN SODIUM 5000 UNITS: 5000 INJECTION, SOLUTION INTRAVENOUS; SUBCUTANEOUS at 21:11

## 2021-07-04 RX ADMIN — Medication 50 MCG: at 18:25

## 2021-07-04 RX ADMIN — CIPROFLOXACIN 500 MG: 500 TABLET, FILM COATED ORAL at 08:18

## 2021-07-04 RX ADMIN — DEXMEDETOMIDINE HYDROCHLORIDE 0.6 MCG/KG/HR: 400 INJECTION INTRAVENOUS at 22:20

## 2021-07-04 RX ADMIN — HEPARIN SODIUM 5000 UNITS: 5000 INJECTION, SOLUTION INTRAVENOUS; SUBCUTANEOUS at 05:26

## 2021-07-04 RX ADMIN — Medication 50 MCG: at 01:20

## 2021-07-04 RX ADMIN — QUETIAPINE FUMARATE 50 MG: 50 TABLET ORAL at 20:11

## 2021-07-04 RX ADMIN — NAFCILLIN SODIUM 2 G: 2 INJECTION, POWDER, LYOPHILIZED, FOR SOLUTION INTRAMUSCULAR; INTRAVENOUS at 21:08

## 2021-07-04 RX ADMIN — QUETIAPINE FUMARATE 50 MG: 50 TABLET ORAL at 08:18

## 2021-07-04 ASSESSMENT — MIFFLIN-ST. JEOR: SCORE: 1662.5

## 2021-07-04 ASSESSMENT — ACTIVITIES OF DAILY LIVING (ADL)
ADLS_ACUITY_SCORE: 26

## 2021-07-04 NOTE — PROGRESS NOTES
[facility]    Infectious Disease Progress Note    Date of Service : 07/04/2021     Assessment :  1. S.aureus MSSA native mitral valve endocarditis with septic shock in the setting of IVDA complicated by embolic L parietal lobe infarction. TTE shows mobile 1.4 cm vegetation of the mitral valve with moderate MR as well as a large pericardial effusion without tamponade. No additional evidence of seeding on CT abdomen.  Needs JAE for further evaluation of valve anatomy and pericardial effusion  2. Severe sepsis requiring pressor support and multiorgan failure  3. Large localized pericardial effusion, worrisome for purulent pericarditis .  4. FORD related to sepsis.  5. Klebsiella pneumoniae UTI  6. Hypernatremia  7. Severe anemia multifactorial. Has baseline anemia, exacerbated by chronic disease  8. Polysubstance abuse  9. Splenomegaly     Recommendations  1. Repeat Blood cxs X 2 sets, daily until negative  2. Nafcillin + Cipro  3. JAE  4. Follow clinical status and labs  5. Complex situation with severe infection with IVDA history.   6. CT surgery and cardiology following  Remains very ill with high risk for further complications  Discussed with the critical care team    Marisol Matos MD    Interval History   Intubated, no new overnight events.    Antimicrobial therapy  7/2 Nafcillin  7/3 Cipro  prior  7/1 Zosyn-7/2 7/1 Vancomycin    Physical Exam   Temp: 99.1  F (37.3  C) Temp src: Axillary BP: 106/65 Pulse: 75   Resp: 25 SpO2: 99 % O2 Device: Mechanical Ventilator    Vitals:    07/02/21 0200 07/03/21 0530 07/04/21 0000   Weight: 87.6 kg (193 lb 1.6 oz) 92.4 kg (203 lb 11.3 oz) 93.9 kg (207 lb 0.2 oz)     Vital Signs with Ranges  Temp:  [99.1  F (37.3  C)-100.4  F (38  C)] 99.1  F (37.3  C)  Pulse:  [] 75  Resp:  [14-39] 25  BP: (106-118)/(65-73) 106/65  MAP:  [65 mmHg-103 mmHg] 81 mmHg  Arterial Line BP: ()/(31-82) 113/62  FiO2 (%):  [30 %-40 %] 30 %  SpO2:  [93 %-100 %] 99  %    GENERAL APPEARANCE:  intubated  EYES: conjunctivae and sclerae normal  NECK: no adenopathy  RESP: lungs clear to auscultation - no rales, rhonchi or wheezes  CV: regular rates and rhythm, murmur  ABDOMEN: soft, umbilical hernia  MS: extremities normal- no gross deformities noted  SKIN: Peripheral embolization  Other:    Medications     dexmedetomidine 0.3 mcg/kg/hr (07/04/21 0600)     fentaNYL 50 mcg/hr (07/03/21 2000)     lactated ringers 50 mL/hr at 07/04/21 0423     midazolam 8 mg/hr (07/04/21 0706)     norepinephrine 0.03 mcg/kg/min (07/04/21 0722)     sodium chloride 10 mL/hr at 07/02/21 1934     vasopressin Stopped (07/02/21 1240)       chlorhexidine  15 mL Mouth/Throat Q12H     ciprofloxacin  500 mg Per Feeding Tube Q12H JENARO     heparin ANTICOAGULANT  5,000 Units Subcutaneous Q8H     hydrocortisone sodium succinate PF  50 mg Intravenous Q6H     insulin aspart  1-6 Units Subcutaneous Q4H     multivitamins w/minerals  15 mL Per Feeding Tube Daily     nafcillin  2 g Intravenous Q6H     pantoprazole  40 mg Per Feeding Tube QAM AC     QUEtiapine  50 mg Oral BID     sodium chloride (PF)  10 mL Intracatheter Q8H       Data   All microbiology laboratory data reviewed.  Recent Labs   Lab Test 07/04/21  0537 07/03/21  0400 07/02/21  1049 07/01/21  1839 07/01/21  1839   WBC 14.0* 28.5*  --   --  21.7*   HGB 6.0* 7.2* 7.9*   < > 6.4*   HCT 19.1* 22.6*  --   --  20.8*   MCV 69* 68*  --   --  67*    173  --   --  114*    < > = values in this interval not displayed.     Recent Labs   Lab Test 07/04/21  0537 07/03/21  0400 07/02/21  0214   CR 1.05* 0.99 1.29*     No lab results found.  Recent Labs   Lab Test 07/03/21  1730 07/03/21  1722 07/02/21  1238 07/02/21  1225 07/01/21  1839 07/01/21  1251 07/01/21  1242   CULT No growth after 11 hours No growth after 13 hours Cultured on the 1st day of incubation:  Gram positive cocci in clusters  *  Critical Value/Significant Value called to and read back by  Lashanda Barnes  RN @1522 7/3/2021 gd   Cultured on the 1st day of incubation:  Gram positive cocci in clusters  *  Critical Value/Significant Value, preliminary result only, called to and read back by   ANGELIAN DE DIOS RN @0054 7.3.21 LM   Cultured on the 1st day of incubation:  Staphylococcus aureus  Susceptibility testing done on previous specimen  *  Critical Value/Significant Value, preliminary result only, called to and read back by  Shanice Ennis RN @ 6320 7.2.21 JE   >100,000 colonies/mL  Klebsiella pneumoniae  *  >100,000 colonies/mL  Strain 2  Klebsiella pneumoniae  * Cultured on the 1st day of incubation:  Staphylococcus aureus  *  Critical Value/Significant Value, preliminary result only, called to and read back by  Mayte Bianchi RN @ 0026 7/2/21 TM.    Culture in progress  (Note)  POSITIVE for STAPHYLOCOCCUS AUREUS and NEGATIVE for the mecA gene  (not MRSA) by PowWowHRigene multiplex nucleic acid test. The mecA gene was  not detected. Final identification and antimicrobial susceptibility  testing will be verified by standard methods.    Specimen tested with Verigene multiplex, gram-positive blood culture  nucleic acid test for the following targets: Staph aureus, Staph  epidermidis, Staph lugdunensis, other Staph species, Enterococcus  faecalis, Enterococcus faecium, Streptococcus species, S. agalactiae,  S. anginosus grp., S. pneumoniae, S. pyogenes, Listeria sp., mecA  (methicillin resistance) and Brook/B (vancomycin resistance).    Critical Value/Significant Value called to and read back by Deepa Bianchi RN on 7/2/21 at 0304. LSA       Susceptibility     Klebsiella pneumoniae (1) Klebsiella pneumoniae (2)     BOBBY BOBBY     AMPICILLIN >=32 ug/mL Resistant1 >=32 ug/mL Resistant1     AMPICILLIN/SULBACTAM 4 ug/mL Sensitive 8 ug/mL Sensitive     CEFAZOLIN <=4 ug/mL Sensitive2 <=4 ug/mL Sensitive2     CEFEPIME <=1 ug/mL Sensitive <=1 ug/mL Sensitive     CEFOXITIN <=4 ug/mL Sensitive <=4 ug/mL Sensitive     CEFTAZIDIME <=1  ug/mL Sensitive <=1 ug/mL Sensitive     CEFTRIAXONE <=1 ug/mL Sensitive <=1 ug/mL Sensitive     CIPROFLOXACIN <=0.25 ug/mL Sensitive <=0.25 ug/mL Sensitive     GENTAMICIN <=1 ug/mL Sensitive <=1 ug/mL Sensitive     LEVOFLOXACIN <=0.12 ug/mL Sensitive <=0.12 ug/mL Sensitive     NITROFURANTOIN 32 ug/mL Sensitive 32 ug/mL Sensitive     Piperacillin/Tazo <=4 ug/mL Sensitive <=4 ug/mL Sensitive     TOBRAMYCIN <=1 ug/mL Sensitive <=1 ug/mL Sensitive     Trimethoprim/Sulfa <=1/19 ug/mL Sensitive <=1/19 ug/mL Sensitive      7/1TTE  Interpretation Summary     Left ventricular systolic function is normal.  The visual ejection fraction is 55-60%.  The right ventricle is mildly dilated.  The right ventricular systolic function is normal.  There is a mobile linear echodensity measuring 1.4 cm x 0.6 cm on the atrial  side of the posterior mitral valve leaflet.  Other valve structures appear normal without significant dysfunction or  obvious valvular vegetations.  The inferior pericardium appears moderately thickened and echobright which may  represent active pericarditis.  Large localized pericardial effusion (2.7 cm) posteriorly/laterally with  mobile free floating echodense material which could represent fibrinous  stranding in setting of inflammatory pericarditis or infectious material if  infectious pericardial effusion.  Dilation of the inferior vena cava is present with abnormal respiratory  variation in diameter.  Small left pleural effusion     No prior for comparison.     7/1 CT head  CT SCAN OF THE HEAD WITHOUT CONTRAST   7/1/2021 1:47 PM      HISTORY: Delirium; altered mental status     TECHNIQUE:  Axial images of the head and coronal reformations without  IV contrast material.  Radiation dose for this scan was reduced using  automated exposure control, adjustment of the mA and/or kV according  to patient size, or iterative reconstruction technique.     COMPARISON: None.     FINDINGS: There is a focal 2 cm area of  hypodensity involving gray and  white matter in the medial left parietal lobe consistent with acute to  subacute ischemic infarct. There is a minimal incidental arachnoid  cyst in the anterior portion of left middle cranial fossa. Ventricles  and subarachnoid spaces are otherwise within normal limits. There is  no evidence for intracranial hemorrhage, significant mass effect, or  skull fracture. Exam is mildly limited by motion artifact. Visualized  paranasal sinuses and mastoid air cells are clear.                                                                      IMPRESSION: Focal hypodensity in the medial left parietal lobe  consistent with acute to subacute ischemic infarct.     7/1 Ct abdomen/pelvis                                                           IMPRESSION:   1.  Patchy hypoenhancement in the mid and upper poles of the right  kidney posteriorly, suspicious for pyelonephritis.  2.  Moderate-sized fat-containing paraumbilical hernia.  3.  Mild splenomegaly.  4.  There are possible small gallstones within a contracted  gallbladder.  Attestation:  Total time on the floor involved in the patient's care: 35 minutes. Total time spent in counseling/care coordination: >50%

## 2021-07-04 NOTE — PROGRESS NOTES
[facility] ICU RESPIRATORY NOTE        Date of Admission: 7/1/2021    Date of Intubation (most recent): 7/1/21    Reason for Mechanical Ventilation:  AW protection    Number of Days on Mechanical Ventilation:  4    Met Criteria for Spontaneous Breathing Trial: No    Reason for No Spontaneous Breathing Trial: Per MD    Significant Events Today: None    ABG Results:   Recent Labs   Lab 07/04/21  0613 07/03/21  1132 07/01/21  2251 07/01/21  1830   PH 7.49* 7.49* 7.42 7.41   PCO2 29* 29* 31* 32*   PO2 148* 99 93 99   HCO3 22 22 20* 20*   O2PER 40% 40 40 40%       Current Vent Settings: Ventilation Mode: CMV/AC  (Continuous Mandatory Ventilation/ Assist Control)  FiO2 (%): 30 %  Rate Set (breaths/minute): 16 breaths/min  Tidal Volume Set (mL): 450 mL  PEEP (cm H2O): 5 cmH2O  Oxygen Concentration (%): 30 %  Resp: 8      Plan:  Pt to remain on full vent support overnight    Hubert Harrison, RT

## 2021-07-04 NOTE — PROGRESS NOTES
[facility]    Stroke Progress Note    Interval EventsNAEO.   Exam stable    HPI Summary  Manas Hernandez is a 41 year old female  has a past medical history of Heroin abuse (H), Methamphetamine abuse (H), and Polysubstance abuse (H).   Was noted by community home member to have altered mental status associated with intense vomiting. She was brought in by EMS due to encephalopathy and hypotension which was responsive to fluid administration. While in ED she was agitated and intubated for airway protection. CT head revealing for new acute to subacute PCA territory ischemic stroke.      Echocardiogram 7/2/21:  There is a mobile linear echodensity measuring 1.4 cm x 0.6 cm on the atrial  side of the posterior mitral valve leaflet.    Stroke Summary and Impression    Acute ischemic stroke of Left MCA, PCA terriories and scattered due to other etiology (Infective Endocarditis)     Evidence of multifocal infarcts including MCA and PCA territories on the left hemisphere as well as various throughout would be evident in cases such as this of endocarditis.     Assessment   CT Head Focal hypodensity in the medial left parietal lobe  consistent with acute to subacute ischemic infarct.   MRI Progressive, multifocal, acute infarcts without MR  evidence for hemorrhage or midline shift. Numerous small infarcts are present and may be below the limits of detection by CT.   Vessel Imaging CTA: Patent arteries in the head and neck without vascular  cutoff. No evidence of dissection. No aneurysm identified. No  significant stenosis.   Cardiac TTE: There is a mobile linear echodensity measuring 1.4 cm x 0.6 cm on the atrialside of the posterior mitral valve leaflet.   Blood Pressure Goal: < 130/80   Antiplt/Anticoag    LDL Lab Results   Component Value Date/Time    LDL <1 07/02/2021 04:35 AM      A1C Lab Results   Component Value Date/Time    A1C 5.9 (H) 07/03/2021 05:22 PM      Troponin Lab Results  "  Component Value Date/Time    TROPI 0.431 () 07/04/2021 05:37 AM    TROPI 0.439 () 07/04/2021 02:20 AM            PLAN  Further Imaging Pending JAE   Blood Pressure Goal: < 130/80   Antiplt/Anticoag >  hold in setting of endocarditis   LDL   > Goal < 70  > Old statin therapy given low LDL likely secondary to low protein state   A1C Defer to primary team, goal A1c < 7   Tobacco Recommend tobacco cessation if applicable   Rehab Physical Therapy   Occupational Therapy    Speech Therapy    Secondary Stroke Prevention - Alcohol consumption: men< or= 2 drinks per day, nonpregnant women< or= 1 drink per day  - Physical activity: at least 30 minutes of moderate intensity exercise 1-3 times per week  - Diet: low fat, low sodium, Mediterranean diet  - Goal BMI 18.5-25   - Cessation from drugs of abuse           Patient Follow-up    - final recommendation pending work-up    Will follow peripherally.  Please page to reengage vascular neurology.    The Stroke Staff is Dr. Garces.    Sedrick Abrams MD  Vascular Neurology Fellow  To page me or covering stroke neurology team member, click here: [email or web address]   Choose \"On Call\" tab at top, then search dropdown box for \"Neurology Adult\", select location, press Enter, then look for stroke/neuro ICU/telestroke.    ______________________________________________________    Risk Factors Present on Admission   Infective endocarditis       Medications   Scheduled Meds    chlorhexidine  15 mL Mouth/Throat Q12H     ciprofloxacin  500 mg Per Feeding Tube Q12H JENARO     heparin ANTICOAGULANT  5,000 Units Subcutaneous Q8H     insulin aspart  1-6 Units Subcutaneous Q4H     multivitamins w/minerals  15 mL Per Feeding Tube Daily     nafcillin  2 g Intravenous Q6H     pantoprazole  40 mg Per Feeding Tube QAM AC     QUEtiapine  50 mg Oral BID     sodium chloride (PF)  10 mL Intracatheter Q8H       Infusion Meds    dexmedetomidine 0.3 mcg/kg/hr (07/04/21 1213)     fentaNYL 50 mcg/hr (07/04/21 0938)     " lactated ringers 50 mL/hr at 07/04/21 0939     midazolam 8 mg/hr (07/04/21 0937)     norepinephrine 0.03 mcg/kg/min (07/04/21 0939)     sodium chloride 10 mL/hr at 07/02/21 1934     vasopressin Stopped (07/02/21 1240)       PRN Meds  acetaminophen **OR** acetaminophen, albuterol, glucose **OR** dextrose **OR** glucagon, fentaNYL, HYDROmorphone, lidocaine (buffered or not buffered), naloxone **OR** naloxone **OR** naloxone **OR** naloxone, sodium chloride (PF), sodium chloride (PF)       PHYSICAL EXAMINATION  Temp:  [99.1  F (37.3  C)-100.4  F (38  C)] 99.3  F (37.4  C)  Pulse:  [67-94] 71  Resp:  [13-52] 22  BP: (106-118)/(65-73) 106/65  MAP:  [66 mmHg-100 mmHg] 83 mmHg  Arterial Line BP: ()/(31-81) 116/64  FiO2 (%):  [30 %-40 %] 30 %  SpO2:  [93 %-100 %] 98 %      D[place]ont coma score:   GCS:   Motor 2=Extension to pain   Verbal 1=None   Eye Opening 1=None   Total: 4     FOUR SCORE   Eye response:    E0 = eyelids remain closed with pain.  Motor response:   M1 = extension response to pain  Brainstem reflexes:  B4 = pupil and corneal reflexes present  Respiration pattern:  R1 = breathes above ventilatory rate      Neuro:  Mental Status: Intubated and sedated   Cranial Nerves:   - Pupil exam: Size: pin Reactivity: min  - EOM: no dolls eye to right  - Corneal reflex: R - present L - presnet  - Grimace/facial movement: present  - Cough: present    - Gag reflex: present     Motor:  Extensor posturing to pain  Sensory: Extensor posturing to noxious stimuli/pain  Coordination: Deferred  Gait: Deferred    Lungs: Intubated, equal chest rise  Heart: Regular rate   Abdomen: non-distended   Extremities: Necrotic toe bilaterally  Skin: no lesions noted      Imaging  I personally reviewed all imaging; relevant findings per HPI.     Lab Results Data   CBC  Recent Labs   Lab 07/04/21  0537 07/03/21  0400 07/02/21  1049 07/01/21 1839 07/01/21 1839   WBC 14.0* 28.5*  --   --  21.7*   RBC 2.78* 3.31*  --   --  3.10*   HGB 6.0*  7.2* 7.9*   < > 6.4*   HCT 19.1* 22.6*  --   --  20.8*    173  --   --  114*    < > = values in this interval not displayed.     Basic Metabolic Panel    Recent Labs   Lab 07/04/21  0537 07/03/21  0956 07/03/21  0400 07/02/21  0214   *  --  144 140   POTASSIUM 4.2 4.1 3.4 3.5   CHLORIDE 121*  --  115* 110*   CO2 21 --  21 20   BUN 33*  --  25 26   CR 1.05*  --  0.99 1.29*   *  --  145* 109*   DEREK 7.5*  --  7.6* 7.3*     Liver Panel  Recent Labs   Lab 07/04/21 0537 07/03/21  0956 07/03/21  0400 07/01/21  1839   PROTTOTAL 6.0*  --  6.1* 5.8*   ALBUMIN 1.2*  --  1.3* 1.5*   BILITOTAL 1.9* 2.8* 3.0* 2.0*   ALKPHOS 260*  --  333* 293*   AST 22  --  41 55*   ALT 25  --  38 35     INR    Recent Labs   Lab Test 07/01/21  1239   INR 1.23*      Lipid Profile    Recent Labs   Lab Test 07/02/21  0435   CHOL 80   HDL 8*   LDL <1   TRIG 382*     A1C    Recent Labs   Lab Test 07/03/21  1722 07/02/21  0435   A1C 5.9* 5.7*     Troponin I    Recent Labs   Lab 07/04/21  0537 07/04/21  0220 07/03/21  2210   TROPI 0.431* 0.439* 0.407*

## 2021-07-04 NOTE — PROGRESS NOTES
Critical Care Progress Note  07/04/2021    Name: Manas Hernandez MRN#: [id number]   Age: 41 year old YOB: 1980     [facility]           Problem List:   Principal Problem:    Endocarditis due to methicillin susceptible Staphylococcus aureus (MSSA)  Active Problems:    Pyelonephritis, acute    Altered mental status, unspecified altered mental status type      Summary/Hospital Course:   Ms. Hernandez is a 41 year old female with chronic polysubstance abuse and IVDU presented on 7/1 with encephalopathy, hypotension, anemia, and hypotension responsive to fluid resusictation. She was agitated in the ED, sedated and intubated for airway protection. CT head showed possible new acute to subacute parietal lobe ischemic infarct, CXR without abnormalities, and CT abdomen concerning for pyelonephritis empirically treated with vancomycin and Zosyn. Transferred to the ICU for continued infectious workup and airway management. On exam there was concern for endocarditis. On 7/2, admission blood cultures returned with MSSA. Remains intubated on nafcillin for suspected infective endocarditis (JAE pending) and possible pyelonephritis.  Urine culture with klebsiella.      Assessment and plan :     Manas Hernandez is a 41 year old female with a history of chronic polysubstance abuse admitted on 7/1/2021 for encephalopathy requiring intubation, hypotension and acidosis concerning for sepsis secondary to S. Aureus bacteremia and infective endocarditis.  I have personally reviewed the daily labs, imaging studies, cultures and discussed the case with referring physician and consulting physicians.   My assessment and plan by system for this patient is as follows:    Neurology/Psychiatry:   1. Acute or subacute ischemic infarct on CT scan, acute emoblic strokes confirmed on MRI.  2. Toxic encephalopathy, septic encephalopathy - methicillin S. Aureus bacteremia on blood cultures (7/1)  3. ICU sedation - propofol and  midazolam  3. Analgesia  4. Agitation  5. Polysubstance use  Plan  - Neuro CC following, appreciate assistance   - MRI and MRA for stroke workup  - Wean propofol based on elevated triglycerides, will increase midazolam drip for sedation in its place, also adding precedex. RASS goal of -1 to -2.  - Chemical dependency evaluation once extubated if agreeable   - Fentanyl drip and prn for pain and withdrawal prevention  - Monitor for signs of withdrawal - UDS positive for amphetamines and opiates on admission  - adding seroquel    Cardiovascular:   1. Septic shock: now on norepinephrine  2. Infective endocarditis - mitral valve veg seen on TTE.  Acute vs subacute infarct on head CT.   3. Elevated troponin   4. Hypertriglyceridemia - elevated TG, low HDL  5.  Large loculated pericardial effusion without tamponade physiology  Plan  - Continue vasopressors with MAP goal >65; wean as able. Currently on norepinephrine  - Stop stress dose steroids today  - EKG   - TTE done, likely will need JAE, possibly Monday per cardiology notes  - Appreciate cardiology assistance--they don't not feel there's an indication for effusion drainage.  Will re-assess on JAE.  - Trend troponin q4h to peak   - IV antibiotics as below    Pulmonary/Ventilator Management:   1. Mechanically ventilated for airway protection - CMV/AC (16/450/5/40)  2.  Respiratory alkalosis:  7.49/29, overbreathing vent;  Increasing use of pain/sedation prn's today.  Plan  - Continue full ventilatory support, initiate ps trials when awakening    GI/Nutrition :   1. Mildly elevated alkaline phosphate  2. Hyperbilirubinemia -- now downtrending to 1.9; mild biliary dilitation on US (cbd 9 mm).    3. Stress ulcer prophylaxis   4.  Hypoalbuminemia 1.2  Plan  - Monitor CMP daily.  - tube feeds  - Continue pantoprazole 40 mg IV  - Despite cbd dilitation seen on US, total bili is dropping slightly today.  Will monitor for now. Repeat US for monitoring in a few days  time.    Renal/Fluids/Electrolytes:   1. Possible pyelonephritis with septic shock - urine culture with klebsiella.  2. Hypokalemia, resolved with supplementation   3. FORD - relapsed somewhat following IV contrast administration 7/3, creat up to 1.05 today.  UOP marginal but acceptable  Plan  - Continue IV antibiotics as below  - K+ supplementation: 20 mEq IV  - Continue MIVF: LR down to 50/hr while in shock, then kvo  - Monitor CMP daily  - Avoid nephrotoxic agents such as NSAID, IV contrast unless specifically required  - Follow I/O's as appropriate.    Infectious Disease:   1. Severe sepsis secondary to methicillin S. Aureus bacteremia, and mitral valve endocarditis  2. Possible pyelonephritis: based on CT and UA, awaiting UC (vs septic emboli to R kidney)   3.  Klebsiella UTI and pyelo  Plan  - Follow blood cultures, one culture positive for methicillin S. Aureus from 7/1  - Repeat blood cultures daily  - continue cipro  - Continue nafcillin  - appreciate ID input  - Monitor urine output while on IV antibiotics.    Endocrine:   1. Probable relative adrenal insufficiency in the setting of severe sepsis--trial off steroids today  2. Stress induced hyperglycemia, improved. A1c 5.7 at admit  Plan  - ICU insulin protocol, goal sugar <180  - Follow BGs    Hematology/Oncology:   1. Microcytic anemia, acute on chronic (hgb 7.3 in 11/2007): likely 2/2 malnutrition and chronic polysubstance use; no signs of acute blood loss, possibly some hemolysis in setting of valvular abnormality and elevated bilirubin. Hgb 6.0 today, getting 1 prbc.  2.  Thrombocytopenia, NOS:  Present on 7/1 to 114; now resolved.  Likely due to sepsis.  Plan  - Daily CBC, Transfuse with Hgb < 7.    IV/Access:   1. Venous access - PIV right foot, right femoral CVC  2. Arterial access - femoral  Plan  - central access required and necessary    ICU Prophylaxis:   1. DVT: mechanical, subcut heparin  2. VAP: HOB 30 degrees, chlorhexidine rinse  3. Stress  Ulcer: PPI  4. Restraints: Nonviolent soft two point restraints required and necessary for patient safety and continued cares and good effect as patient continues to pull at necessary lines, tubes despite education and distraction. Will readdress daily.   5. Wound care - per unit routine   6. Feeding - TF  7. Family Update: unclear family contact.  8. Disposition - critically ill in ICU           Interim History/Overnight Events:   No events overnight. Patient is intubated and unable to obtain subjective history.          Key Medications:       chlorhexidine  15 mL Mouth/Throat Q12H     ciprofloxacin  500 mg Per Feeding Tube Q12H JENARO     heparin ANTICOAGULANT  5,000 Units Subcutaneous Q8H     hydrocortisone sodium succinate PF  50 mg Intravenous Q6H     insulin aspart  1-6 Units Subcutaneous Q4H     multivitamins w/minerals  15 mL Per Feeding Tube Daily     nafcillin  2 g Intravenous Q6H     pantoprazole  40 mg Per Feeding Tube QAM AC     QUEtiapine  50 mg Oral BID     sodium chloride (PF)  10 mL Intracatheter Q8H       dexmedetomidine 0.3 mcg/kg/hr (07/04/21 0600)     fentaNYL 50 mcg/hr (07/03/21 2000)     lactated ringers 50 mL/hr at 07/04/21 0423     midazolam 8 mg/hr (07/04/21 0706)     norepinephrine 0.03 mcg/kg/min (07/04/21 0722)     sodium chloride 10 mL/hr at 07/02/21 1934     vasopressin Stopped (07/02/21 1240)            Physical Examination:   Temp:  [99.1  F (37.3  C)-100.4  F (38  C)] 99.1  F (37.3  C)  Pulse:  [] 75  Resp:  [14-39] 25  BP: (106-118)/(65-73) 106/65  MAP:  [65 mmHg-103 mmHg] 81 mmHg  Arterial Line BP: ()/(31-82) 113/62  FiO2 (%):  [30 %-40 %] 30 %  SpO2:  [93 %-100 %] 99 %  No intake or output data in the 24 hours ending 07/01/21 1542  Wt Readings from Last 4 Encounters:   07/04/21 93.9 kg (207 lb 0.2 oz)   11/05/07 103.1 kg (227 lb 4 oz)   09/26/07 99.3 kg (219 lb)     Arterial Line BP: ()/(31-82) 113/62  MAP:  [65 mmHg-103 mmHg] 81 mmHg  BP - Mean:  [78]  78  Ventilation Mode: CMV/AC  (Continuous Mandatory Ventilation/ Assist Control)  FiO2 (%): 30 %  Rate Set (breaths/minute): 16 breaths/min  Tidal Volume Set (mL): 450 mL  PEEP (cm H2O): 5 cmH2O  Oxygen Concentration (%): 40 %  Resp: 25    Recent Labs   Lab 07/04/21  0613 07/03/21  1132 07/01/21  2251 07/01/21  1830   PH 7.49* 7.49* 7.42 7.41   PCO2 29* 29* 31* 32*   PO2 148* 99 93 99   HCO3 22 22 20* 20*   O2PER 40% 40 40 40%     GEN: sedated, in no acute distress  HEENT: sclera anicteric, trachea midline, NGT in place. ETT in place.   PULM: unlabored synchronous with vent, clear anteriorly.   CV/COR: tachycardic, normal S1 and S2. 3/6 systolic murmur auscultated over LLSB  ABD: Mildly distended, soft. Hypoactive bowel sounds, large midline hernia.  MSK/EXT: No pitting edema. WWP.  NEURO: sedated. Withdraws. PEERL  : don in place draining dark yellow urine.  SKIN: Purple, nodular lesions of right 4th finger concerning for Oslers nodes. Dark red lesion on the medial great toe. Scattered petechial lesions on the soles of the feet bilaterally. No nail changes noted.   LINES: clean, dry intact         Data:   All data and imaging reviewed.    ROUTINE ICU LABS (Last four results)  CMP  Recent Labs   Lab 07/04/21  0537 07/03/21  0956 07/03/21  0400 07/02/21  0435 07/02/21  0214 07/01/21  1839 07/01/21  1839 07/01/21  1239   *  --  144  --  140  --  139 134   POTASSIUM 4.2 4.1 3.4  --  3.5   < > 2.7* 2.9*   CHLORIDE 121*  --  115*  --  110*  --  108 102   CO2 21  --  21  --  20  --  19* 20   ANIONGAP 6  --  8  --  10  --  12 12   *  --  145*  --  109*  --  115* 130*   BUN 33*  --  25  --  26  --  27 35*   CR 1.05*  --  0.99  --  1.29*  --  1.29* 1.59*   GFRESTIMATED 67  --  71  --  52*  --  52* 40*   GFRESTBLACK 77  --  83  --  60*  --  60* 47*   DEREK 7.5*  --  7.6*  --  7.3*  --  7.0* 7.7*   MAG 2.6*  --   --  1.9  --   --   --   --    PHOS 5.0*  --   --  4.0  --   --   --   --    PROTTOTAL 6.0*  --  6.1*   --   --   --  5.8* 6.6*   ALBUMIN 1.2*  --  1.3*  --   --   --  1.5* 1.7*   BILITOTAL 1.9* 2.8* 3.0*  --   --   --  2.0* 1.7*   ALKPHOS 260*  --  333*  --   --   --  293* 307*   AST 22  --  41  --   --   --  55* 35   ALT 25  --  38  --   --   --  35 31    < > = values in this interval not displayed.     CBC  Recent Labs   Lab 07/04/21  0537 07/03/21  0400 07/02/21  1049 07/02/21  0020 07/01/21  1839 07/01/21  1239   WBC 14.0* 28.5*  --   --  21.7* 21.5*   RBC 2.78* 3.31*  --   --  3.10* 3.34*   HGB 6.0* 7.2* 7.9* 8.1* 6.4* 6.8*   HCT 19.1* 22.6*  --   --  20.8* 22.4*   MCV 69* 68*  --   --  67* 67*   MCH 21.6* 21.8*  --   --  20.6* 20.4*   MCHC 31.4* 31.9  --   --  30.8* 30.4*   RDW 19.9* 19.4*  --   --  17.1* 17.0*    173  --   --  114* 153     INR  Recent Labs   Lab 07/01/21  1239   INR 1.23*     Arterial Blood Gas  Recent Labs   Lab 07/04/21  0613 07/03/21  1132 07/01/21  2251 07/01/21  1830   PH 7.49* 7.49* 7.42 7.41   PCO2 29* 29* 31* 32*   PO2 148* 99 93 99   HCO3 22 22 20* 20*   O2PER 40% 40 40 40%       All cultures:  Recent Labs   Lab 07/03/21  1730 07/03/21  1722 07/02/21  1238 07/02/21  1225 07/01/21  1839 07/01/21  1251 07/01/21  1242   CULT No growth after 11 hours No growth after 13 hours Cultured on the 1st day of incubation:  Gram positive cocci in clusters  *  Critical Value/Significant Value called to and read back by  Lashanda Barnes RN @1522 7/3/2021 gd   Cultured on the 1st day of incubation:  Gram positive cocci in clusters  *  Critical Value/Significant Value, preliminary result only, called to and read back by   ANGELINA DE DIOS RN @8457 7.3.21 LM   Cultured on the 1st day of incubation:  Staphylococcus aureus  Susceptibility testing done on previous specimen  *  Critical Value/Significant Value, preliminary result only, called to and read back by  Shanice Ennis RN @ 2771 7.2.21 JE   >100,000 colonies/mL  Klebsiella pneumoniae  *  >100,000 colonies/mL  Strain 2  Klebsiella  pneumoniae  * Cultured on the 1st day of incubation:  Staphylococcus aureus  *  Critical Value/Significant Value, preliminary result only, called to and read back by  Mayte Bianchi RN @ 0026 7/2/21 TM.    Culture in progress  (Note)  POSITIVE for STAPHYLOCOCCUS AUREUS and NEGATIVE for the mecA gene  (not MRSA) by TopVisibleigene multiplex nucleic acid test. The mecA gene was  not detected. Final identification and antimicrobial susceptibility  testing will be verified by standard methods.    Specimen tested with Verigene multiplex, gram-positive blood culture  nucleic acid test for the following targets: Staph aureus, Staph  epidermidis, Staph lugdunensis, other Staph species, Enterococcus  faecalis, Enterococcus faecium, Streptococcus species, S. agalactiae,  S. anginosus grp., S. pneumoniae, S. pyogenes, Listeria sp., mecA  (methicillin resistance) and Brook/B (vancomycin resistance).    Critical Value/Significant Value called to and read back by Deepa Bianchi RN on 7/2/21 at 0304. LSA       Recent Results (from the past 24 hour(s))   CTA Head Neck with Contrast    Narrative    CT ANGIOGRAM OF THE HEAD AND NECK WITH CONTRAST  7/3/2021 2:41 PM     HISTORY: Stroke/TIA, assess intracranial arteries. Stroke/TIA, assess  extracranial arteries. Endocarditis.    TECHNIQUE:  CT angiography with an injection of 70 mL Isovue-370 IV  with scans through the head and neck. Images were transferred to a  separate 3-D workstation where multiplanar reformations and 3-D images  were created. Estimates of carotid stenoses are made relative to the  distal internal carotid artery diameters except as noted. Radiation  dose for this scan was reduced using automated exposure control,  adjustment of the mA and/or kV according to patient size, or iterative  reconstruction technique.    COMPARISON: Head CT 7/1/2021.     CT HEAD FINDINGS: No contrast enhancing lesions. Parenchymal  hypodensities in the left posterotemporal and left parietal lobes.      CT ANGIOGRAM HEAD FINDINGS:  The major intracranial arteries including  the proximal branches of the anterior cerebral, middle cerebral, and  posterior cerebral arteries appear patent without vascular cutoff. No  aneurysm identified. No significant stenosis. Venous circulation is  unremarkable.     CT ANGIOGRAM NECK FINDINGS:   Normal origin of the great vessels from the aortic arch.     Right carotid artery: The right common and internal carotid arteries  are patent. No significant stenosis or atherosclerotic disease in the  carotid artery.     Left carotid artery: The left common and internal carotid arteries are  patent. No significant stenosis or atherosclerotic disease in the  carotid artery.     Vertebral arteries: Vertebral arteries are patent without evidence of  dissection. No significant stenosis.     Other findings: Streaky opacities in the superior segments of the left  lower lobe. Endotracheal and orogastric tubes are present. Extensive  periodontal disease and dental caries noted.       Impression    IMPRESSION: Patent arteries in the head and neck without vascular  cutoff. No evidence of dissection. No aneurysm identified. No  significant stenosis.  CTA and MRA findings called to [facility] in the [facility] ICU  on the day of the exam at 1645 hours.    CYNTHIA MELISSA MD          SYSTEM ID:  [id number]   MR Brain w/o & w Contrast    Narrative    MRI BRAIN WITHOUT AND WITH CONTRAST  7/3/2021 3:23 PM     HISTORY:  Stroke, follow up.     TECHNIQUE:  Multiplanar, multisequence MRI of the brain without and  with 9 mL Gadavist     COMPARISON: Head CT 7/1/2021     FINDINGS: Interval increase in size of left parietal lobe. Acute  infarct noted measuring 2.7 cm in diameter, previously measuring 2 cm  in diameter. Interval development of a left temporal occipital acute  infarct measuring 4.2 x 2.9 cm transverse noted. Tiny, punctate acute  cerebellar infarcts are present bilaterally measuring 2 to  3 mm in  diameter. Small right occipital cortical infarct noted measuring 1 cm  in diameter. There are some smaller occipital white matter infarcts  measuring 2 to 3 mm in diameter. Development of small, superior  parietal cortical infarct/ischemia measuring up to 1.5 cm on the left  and 1 cm on the right noted. There are also some small punctate  infarcts in the border zone white matter of both hemispheres. Tiny  acute infarct in the left caudate head. Several of these very small  infarcts are too small to be detected by CT. No MR evidence for  hemorrhagic transformation. No midline shift. There is no abnormal  intracranial enhancement. Negative for hydrocephalus or midline shift.  Brainstem unremarkable. Normal sulci. Basal cisterns intact. Small  left middle fossa arachnoid cyst without appreciable mass effect.    The facial structures appear normal. The major arterial T2 flow voids  at the base of the brain appear patent.     Mild paranasal sinus membrane thickening without air-fluid levels.  Scattered mastoid membrane thickening and fluid bilaterally.  Endotracheal tube is present.      Impression    IMPRESSION:  Progressive, multifocal, acute infarcts without MR  evidence for hemorrhage or midline shift. Numerous small infarcts are  present and may be below the limits of detection by CT.      CYNTHIA MELISSA MD          SYSTEM ID:  [id number]   US Abdomen Limited    Narrative    US ABDOMEN LIMITED 7/3/2021 4:53 PM    CLINICAL HISTORY: Elevated direct bilirubin. Assess for obstruction.    TECHNIQUE: Limited abdominal ultrasound.    COMPARISON: None.    FINDINGS:    GALLBLADDER: There is shadowing in the region of the gallbladder, it  is unclear if this is air within bowel or shadowing in the wall of the  gallbladder versus stones.    BILE DUCTS: There is extrahepatic biliary dilatation. The common duct  measures 9 mm.    LIVER: No focal lesions.    RIGHT KIDNEY: No hydronephrosis.    PANCREAS: The  visualized portions of the pancreas are normal.    Some suspected adenopathy is seen in the region of the kenny hepatis  measuring up to 2.7 cm. No ascites.      Impression    IMPRESSION:  1.  Biliary dilatation of uncertain etiology.  2.  Possible adenopathy in the kenny hepatis.  3.  Nonspecific shadowing in the expected location of the gallbladder.    PABLO XAVIER MD          SYSTEM ID:  [id number]                 Billing: This patient is critically ill: Yes. Total critical care time today 45 min, excluding procedures and teaching.

## 2021-07-04 NOTE — PLAN OF CARE
[facility] Intensive Care Unit   Nursing Note                                                       Neuro:  PERRL.  Moves all extremities.  Does not follow commands.  Cardiovascular:  NSR,  Hemodynamically stable on 0.03 levophed.  Pulmonary:  Tolerating ventilator on versed/precedex/fentanyl.  Oxygen saturation > 92 on 40% FiO2, PEEP 5.  GI/:  Adequate UOP.  Endocrine: Glucose well controlled.  Skin:  Intact.  Protective mepilex applied to sacrum.  Restraints:  In use and necessary.  AM labs noted, 1 unit PRBC given per MD order for Hgb 6.0.  Continue to monitor closely.    Recent Labs   Lab 07/04/21  0613 07/03/21  1132 07/01/21  2251 07/01/21  1830   PH 7.49* 7.49* 7.42 7.41   PCO2 29* 29* 31* 32*   PO2 148* 99 93 99   HCO3 22 22 20* 20*   O2PER 40% 40 40 40%       Lab Results   Component Value Date    TROPI 0.431 () 07/04/2021    TROPI 0.439 () 07/04/2021    TROPI 0.407 () 07/03/2021    TROPI 0.395 () 07/03/2021    TROPI 0.430 () 07/03/2021       ROUTINE IP LABS (Last four results)  BMP  Recent Labs   Lab 07/04/21  0537 07/03/21  0956 07/03/21  0400 07/02/21  0214 07/01/21 1839 07/01/21  1839   *  --  144 140  --  139   POTASSIUM 4.2 4.1 3.4 3.5   < > 2.7*   CHLORIDE 121*  --  115* 110*  --  108   DEREK 7.5*  --  7.6* 7.3*  --  7.0*   CO2 21  --  21 20  --  19*   BUN 33*  --  25 26  --  27   CR 1.05*  --  0.99 1.29*  --  1.29*   *  --  145* 109*  --  115*    < > = values in this interval not displayed.     CBC  Recent Labs   Lab 07/04/21  0537 07/03/21  0400 07/02/21  1049 07/02/21  0020 07/01/21  1839 07/01/21  1239   WBC 14.0* 28.5*  --   --  21.7* 21.5*   RBC 2.78* 3.31*  --   --  3.10* 3.34*   HGB 6.0* 7.2* 7.9* 8.1* 6.4* 6.8*   HCT 19.1* 22.6*  --   --  20.8* 22.4*   MCV 69* 68*  --   --  67* 67*   MCH 21.6* 21.8*  --   --  20.6* 20.4*   MCHC 31.4* 31.9  --   --  30.8* 30.4*   RDW 19.9* 19.4*  --   --  17.1* 17.0*    173  --   --  114* 153     INR  Recent Labs   Lab  07/01/21  1239   INR 1.23*     PTTNo lab results found in last 7 days.  LACTIC ACID  Lactic Acid (mmol/L)   Date Value   07/02/2021 1.5   07/02/2021 2.6 (H)   07/01/2021 2.3 (H)   07/01/2021 2.3 (H)     Blood Glucose  Glucose (mg/dL)   Date Value   07/04/2021 124 (H)   07/04/2021 129 (H)   07/03/2021 126 (H)   07/03/2021 131 (H)   07/03/2021 144 (H)   07/03/2021 147 (H)       Intake/Output Summary (Last 24 hours) at 7/4/2021 0635  Last data filed at 7/4/2021 0600  Gross per 24 hour   Intake 3171.81 ml   Output 930 ml   Net 2241.81 ml       Rachelle VÁZQUEZN RN   [facility]  Intensive Care Unit

## 2021-07-04 NOTE — PROVIDER NOTIFICATION
Dr. Serene Tafoya informed of positive blood culture/ gram positive cocci called from lab from 7-3-21 @ 1722 and 1730 from both and art-line and CVL.  No orders received.

## 2021-07-04 NOTE — PLAN OF CARE
Pt. Remains on vent., no changes this 12H shift, good perfusion, sinus rhythm, levo. Drip unchanged, fentanyl and versed drip unchanged, fentanyl boluses at times for overbreathing ventilator with good resolve, pt. Went to CT and MRI, abdominal ultrasound performed, trickle feeds paused for abdominal ultrasound, no BM, urine output low to acceptable and tapered off after CT scan, MD aware and LR bolus given, pt. Reacting to pain and pupils ewual, no following, pt's mother talked to and updated.

## 2021-07-04 NOTE — PROGRESS NOTES
"       Assessment and Plan:     Assessment:   Manas Hernandez is a 41 year old IVDU female who was admitted on 7/1/2021 with MSSA sepsis and shock. TTE shows a small mitral valve vegetation as well as pericardial inflammation with a large localized (posterior) effusion. She is intubated and sedated due to agitation and inability to protect her airway.      1. MSSA sepsis and shock with encephalopathy.  2. Mitral valve endocarditis with a small mobile vegetation, mild-moderate MR and severe LAE.   3. Probable myopericarditis with large localized pericardial effusion and troponin rise.  4. Preserved LVEF.  5. Ischemic CVA, possibly related to MV vegetation embolization..   6. Pyelonephritis.  7. Malnourishment with severe anemia, hypoalbuminemia, hypokalemia.  8. Chronic polysubstance and IVDU.    JAE recommended by Dr. Matos, Infectious disease. CV surgery is awaiting JAE results before considering further treatment.   I will arrange JAE for tomorrow, but patient may not be stable enough at that time. OK to defer JAE if pt is felt to be at high risk of complications.     Please keep pt NPO except meds after midnight and obtain consent if possible. If not possible, we will proceed with JAE as a medically necessary intervention.     SYEDA MARIE MD        Interval History:   Intubated and sedated          Medications:       chlorhexidine  15 mL Mouth/Throat Q12H     ciprofloxacin  500 mg Per Feeding Tube Q12H JENARO     heparin ANTICOAGULANT  5,000 Units Subcutaneous Q8H     insulin aspart  1-6 Units Subcutaneous Q4H     multivitamins w/minerals  15 mL Per Feeding Tube Daily     nafcillin  2 g Intravenous Q6H     pantoprazole  40 mg Per Feeding Tube QAM AC     QUEtiapine  50 mg Oral BID     sodium chloride (PF)  10 mL Intracatheter Q8H            Physical Exam:   Blood pressure 106/65, pulse 68, temperature 99.2  F (37.3  C), temperature source Axillary, resp. rate 19, height 1.727 m (5' 8\"), weight 93.9 kg (207 lb " 0.2 oz), SpO2 99 %.    Vitals:    07/01/21 1257 07/01/21 1730 07/02/21 0200 07/03/21 0530   Weight: 86 kg (189 lb 9.5 oz) 87.5 kg (192 lb 14.4 oz) 87.6 kg (193 lb 1.6 oz) 92.4 kg (203 lb 11.3 oz)    07/04/21 0000   Weight: 93.9 kg (207 lb 0.2 oz)         Intake/Output Summary (Last 24 hours) at 7/4/2021 1415  Last data filed at 7/4/2021 1000  Gross per 24 hour   Intake 3544.15 ml   Output 805 ml   Net 2739.15 ml       06/29 0700 - 07/04 0659  In: 20941.44 [I.V.:26942.44]  Out: 4505 [Urine:4330]  Net: 9383.44    Exam:  GENERAL APPEARANCE ADULT: Alert, in no acute distress  RESP: lungs clear to auscultation   CV: regular rate and rhythm, no murmur, rub, or gallop  ABDOMEN: normal bowel sounds, soft, nontender, no hepatosplenomegaly or other masses  EXTREMITIES: No edema         Data:   LABS (Last four results)  CMP  Recent Labs   Lab 07/04/21  0537 07/03/21  0956 07/03/21  0400 07/02/21  0435 07/02/21  0214 07/01/21  1839 07/01/21  1839 07/01/21  1239   *  --  144  --  140  --  139 134   POTASSIUM 4.2 4.1 3.4  --  3.5   < > 2.7* 2.9*   CHLORIDE 121*  --  115*  --  110*  --  108 102   CO2 21  --  21  --  20  --  19* 20   ANIONGAP 6  --  8  --  10  --  12 12   *  --  145*  --  109*  --  115* 130*   BUN 33*  --  25  --  26  --  27 35*   CR 1.05*  --  0.99  --  1.29*  --  1.29* 1.59*   GFRESTIMATED 67  --  71  --  52*  --  52* 40*   GFRESTBLACK 77  --  83  --  60*  --  60* 47*   DEREK 7.5*  --  7.6*  --  7.3*  --  7.0* 7.7*   MAG 2.6*  --   --  1.9  --   --   --   --    PHOS 5.0*  --   --  4.0  --   --   --   --    PROTTOTAL 6.0*  --  6.1*  --   --   --  5.8* 6.6*   ALBUMIN 1.2*  --  1.3*  --   --   --  1.5* 1.7*   BILITOTAL 1.9* 2.8* 3.0*  --   --   --  2.0* 1.7*   ALKPHOS 260*  --  333*  --   --   --  293* 307*   AST 22  --  41  --   --   --  55* 35   ALT 25  --  38  --   --   --  35 31    < > = values in this interval not displayed.     CBC  Recent Labs   Lab 07/04/21  0537 07/03/21  0400 07/02/21  1047  07/02/21  0020 07/01/21  1839 07/01/21  1239   WBC 14.0* 28.5*  --   --  21.7* 21.5*   RBC 2.78* 3.31*  --   --  3.10* 3.34*   HGB 6.0* 7.2* 7.9* 8.1* 6.4* 6.8*   HCT 19.1* 22.6*  --   --  20.8* 22.4*   MCV 69* 68*  --   --  67* 67*   MCH 21.6* 21.8*  --   --  20.6* 20.4*   MCHC 31.4* 31.9  --   --  30.8* 30.4*   RDW 19.9* 19.4*  --   --  17.1* 17.0*    173  --   --  114* 153     INR  Recent Labs   Lab 07/01/21  1239   INR 1.23*     TROPONINS   Lab Results   Component Value Date    TROPI 0.431 () 07/04/2021    TROPI 0.439 () 07/04/2021    TROPI 0.407 () 07/03/2021    TROPI 0.395 () 07/03/2021    TROPI 0.430 () 07/03/2021                                                                                                               SYEDA MARIE MD

## 2021-07-04 NOTE — PROGRESS NOTES
[facility] ICU RESPIRATORY NOTE        Date of Admission: 7/1/2021    Date of Intubation (most recent): 7/1/2021    Reason for Mechanical Ventilation: Airway protection     Number of Days on Mechanical Ventilation: 4    Met Criteria for Spontaneous Breathing Trial: No   Reason for No Spontaneous Breathing Trial: per MD     Significant Events Today: none overnight     ABG Results:   Recent Labs   Lab 07/03/21  1132 07/01/21  2251 07/01/21  1830 07/01/21  1537   PH 7.49* 7.42 7.41  --    PCO2 29* 31* 32*  --    PO2 99 93 99  --    HCO3 22 20* 20*  --    O2PER 40 40 40% 50%       Current Vent Settings: Ventilation Mode: CMV/AC  (Continuous Mandatory Ventilation/ Assist Control)  FiO2 (%): 40 %  Rate Set (breaths/minute): 16 breaths/min  Tidal Volume Set (mL): 450 mL  PEEP (cm H2O): 5 cmH2O  Oxygen Concentration (%): 40 %  Resp: 27      Plan: Continue on full ventilator support     Keith Combs, RT

## 2021-07-05 ENCOUNTER — APPOINTMENT (OUTPATIENT)
Dept: CARDIOLOGY | Facility: CLINIC | Age: 41
End: 2021-07-05
Attending: INTERNAL MEDICINE
Payer: MEDICAID

## 2021-07-05 LAB
ALBUMIN SERPL-MCNC: 1.2 G/DL (ref 3.4–5)
ALP SERPL-CCNC: 241 U/L (ref 40–150)
ALT SERPL W P-5'-P-CCNC: 23 U/L (ref 0–50)
ANION GAP SERPL CALCULATED.3IONS-SCNC: 4 MMOL/L (ref 3–14)
AST SERPL W P-5'-P-CCNC: 24 U/L (ref 0–45)
BACTERIA SPEC CULT: ABNORMAL
BASE DEFICIT BLDA-SCNC: 1.2 MMOL/L
BILIRUB SERPL-MCNC: 1.2 MG/DL (ref 0.2–1.3)
BLD PROD TYP BPU: NORMAL
BLD UNIT ID BPU: 0
BLOOD PRODUCT CODE: NORMAL
BPU ID: NORMAL
BUN SERPL-MCNC: 34 MG/DL (ref 7–30)
CALCIUM SERPL-MCNC: 7.4 MG/DL (ref 8.5–10.1)
CHLORIDE SERPL-SCNC: 122 MMOL/L (ref 94–109)
CO2 SERPL-SCNC: 22 MMOL/L (ref 20–32)
CREAT SERPL-MCNC: 0.88 MG/DL (ref 0.52–1.04)
ERYTHROCYTE [DISTWIDTH] IN BLOOD BY AUTOMATED COUNT: 21.5 % (ref 10–15)
GFR SERPL CREATININE-BSD FRML MDRD: 82 ML/MIN/{1.73_M2}
GLUCOSE BLDC GLUCOMTR-MCNC: 100 MG/DL (ref 70–99)
GLUCOSE BLDC GLUCOMTR-MCNC: 111 MG/DL (ref 70–99)
GLUCOSE BLDC GLUCOMTR-MCNC: 95 MG/DL (ref 70–99)
GLUCOSE BLDC GLUCOMTR-MCNC: 98 MG/DL (ref 70–99)
GLUCOSE BLDC GLUCOMTR-MCNC: 99 MG/DL (ref 70–99)
GLUCOSE SERPL-MCNC: 112 MG/DL (ref 70–99)
HCO3 BLD-SCNC: 22 MMOL/L (ref 21–28)
HCT VFR BLD AUTO: 21.4 % (ref 35–47)
HGB BLD-MCNC: 6.6 G/DL (ref 11.7–15.7)
MAGNESIUM SERPL-MCNC: 2.4 MG/DL (ref 1.6–2.3)
MCH RBC QN AUTO: 22.2 PG (ref 26.5–33)
MCHC RBC AUTO-ENTMCNC: 30.8 G/DL (ref 31.5–36.5)
MCV RBC AUTO: 72 FL (ref 78–100)
O2/TOTAL GAS SETTING VFR VENT: 30 %
OXYHGB MFR BLD: 98 % (ref 92–100)
PCO2 BLD: 28 MM HG (ref 35–45)
PH BLD: 7.5 PH (ref 7.35–7.45)
PHOSPHATE SERPL-MCNC: 4 MG/DL (ref 2.5–4.5)
PLATELET # BLD AUTO: 204 10E9/L (ref 150–450)
PO2 BLD: 216 MM HG (ref 80–105)
POTASSIUM SERPL-SCNC: 4 MMOL/L (ref 3.4–5.3)
PROT SERPL-MCNC: 6.2 G/DL (ref 6.8–8.8)
RBC # BLD AUTO: 2.97 10E12/L (ref 3.8–5.2)
SODIUM SERPL-SCNC: 148 MMOL/L (ref 133–144)
SODIUM SERPL-SCNC: 151 MMOL/L (ref 133–144)
SPECIMEN SOURCE: ABNORMAL
TRANSFUSION STATUS PATIENT QL: NORMAL
TRANSFUSION STATUS PATIENT QL: NORMAL
WBC # BLD AUTO: 11.4 10E9/L (ref 4–11)

## 2021-07-05 PROCEDURE — 93320 DOPPLER ECHO COMPLETE: CPT | Mod: 26 | Performed by: INTERNAL MEDICINE

## 2021-07-05 PROCEDURE — 200N000001 HC R&B ICU

## 2021-07-05 PROCEDURE — 86901 BLOOD TYPING SEROLOGIC RH(D): CPT | Performed by: INTERNAL MEDICINE

## 2021-07-05 PROCEDURE — 99291 CRITICAL CARE FIRST HOUR: CPT | Performed by: INTERNAL MEDICINE

## 2021-07-05 PROCEDURE — 80053 COMPREHEN METABOLIC PANEL: CPT | Performed by: INTERNAL MEDICINE

## 2021-07-05 PROCEDURE — 94003 VENT MGMT INPAT SUBQ DAY: CPT

## 2021-07-05 PROCEDURE — 86850 RBC ANTIBODY SCREEN: CPT | Performed by: INTERNAL MEDICINE

## 2021-07-05 PROCEDURE — 250N000013 HC RX MED GY IP 250 OP 250 PS 637: Performed by: INTERNAL MEDICINE

## 2021-07-05 PROCEDURE — 84100 ASSAY OF PHOSPHORUS: CPT | Performed by: INTERNAL MEDICINE

## 2021-07-05 PROCEDURE — 93325 DOPPLER ECHO COLOR FLOW MAPG: CPT

## 2021-07-05 PROCEDURE — 250N000009 HC RX 250: Performed by: SPECIALIST

## 2021-07-05 PROCEDURE — 86803 HEPATITIS C AB TEST: CPT | Performed by: SPECIALIST

## 2021-07-05 PROCEDURE — 86704 HEP B CORE ANTIBODY TOTAL: CPT | Performed by: SPECIALIST

## 2021-07-05 PROCEDURE — 84295 ASSAY OF SERUM SODIUM: CPT | Performed by: INTERNAL MEDICINE

## 2021-07-05 PROCEDURE — 86900 BLOOD TYPING SEROLOGIC ABO: CPT | Performed by: INTERNAL MEDICINE

## 2021-07-05 PROCEDURE — 87186 SC STD MICRODIL/AGAR DIL: CPT | Performed by: SPECIALIST

## 2021-07-05 PROCEDURE — 99232 SBSQ HOSP IP/OBS MODERATE 35: CPT | Performed by: INTERNAL MEDICINE

## 2021-07-05 PROCEDURE — 87077 CULTURE AEROBIC IDENTIFY: CPT | Performed by: SPECIALIST

## 2021-07-05 PROCEDURE — 250N000009 HC RX 250: Performed by: INTERNAL MEDICINE

## 2021-07-05 PROCEDURE — 250N000011 HC RX IP 250 OP 636: Performed by: INTERNAL MEDICINE

## 2021-07-05 PROCEDURE — 93320 DOPPLER ECHO COMPLETE: CPT

## 2021-07-05 PROCEDURE — 999N000157 HC STATISTIC RCP TIME EA 10 MIN

## 2021-07-05 PROCEDURE — 82805 BLOOD GASES W/O2 SATURATION: CPT | Performed by: INTERNAL MEDICINE

## 2021-07-05 PROCEDURE — 93325 DOPPLER ECHO COLOR FLOW MAPG: CPT | Mod: 26 | Performed by: INTERNAL MEDICINE

## 2021-07-05 PROCEDURE — 83735 ASSAY OF MAGNESIUM: CPT | Performed by: INTERNAL MEDICINE

## 2021-07-05 PROCEDURE — P9016 RBC LEUKOCYTES REDUCED: HCPCS | Performed by: INTERNAL MEDICINE

## 2021-07-05 PROCEDURE — 36415 COLL VENOUS BLD VENIPUNCTURE: CPT | Performed by: SPECIALIST

## 2021-07-05 PROCEDURE — 87340 HEPATITIS B SURFACE AG IA: CPT | Performed by: SPECIALIST

## 2021-07-05 PROCEDURE — 87389 HIV-1 AG W/HIV-1&-2 AB AG IA: CPT | Performed by: SPECIALIST

## 2021-07-05 PROCEDURE — 86923 COMPATIBILITY TEST ELECTRIC: CPT | Performed by: INTERNAL MEDICINE

## 2021-07-05 PROCEDURE — 86706 HEP B SURFACE ANTIBODY: CPT | Performed by: SPECIALIST

## 2021-07-05 PROCEDURE — 999N000009 HC STATISTIC AIRWAY CARE

## 2021-07-05 PROCEDURE — 85027 COMPLETE CBC AUTOMATED: CPT | Performed by: INTERNAL MEDICINE

## 2021-07-05 PROCEDURE — 87040 BLOOD CULTURE FOR BACTERIA: CPT | Performed by: SPECIALIST

## 2021-07-05 PROCEDURE — 999N001017 HC STATISTIC GLUCOSE BY METER IP

## 2021-07-05 PROCEDURE — 93312 ECHO TRANSESOPHAGEAL: CPT | Mod: 26 | Performed by: INTERNAL MEDICINE

## 2021-07-05 RX ORDER — PROPOFOL 10 MG/ML
5-75 INJECTION, EMULSION INTRAVENOUS CONTINUOUS
Status: DISCONTINUED | OUTPATIENT
Start: 2021-07-05 | End: 2021-07-11

## 2021-07-05 RX ORDER — AMINO AC/PROTEIN HYDR/WHEY PRO 10G-100/30
1 LIQUID (ML) ORAL 4 TIMES DAILY
Status: DISCONTINUED | OUTPATIENT
Start: 2021-07-05 | End: 2021-07-08 | Stop reason: CLARIF

## 2021-07-05 RX ORDER — QUETIAPINE FUMARATE 100 MG/1
100 TABLET, FILM COATED ORAL 2 TIMES DAILY
Status: DISCONTINUED | OUTPATIENT
Start: 2021-07-05 | End: 2021-07-06

## 2021-07-05 RX ORDER — NAFCILLIN SODIUM 2 G/8ML
2 INJECTION, POWDER, FOR SOLUTION INTRAMUSCULAR; INTRAVENOUS EVERY 4 HOURS
Status: DISCONTINUED | OUTPATIENT
Start: 2021-07-05 | End: 2021-09-01 | Stop reason: HOSPADM

## 2021-07-05 RX ADMIN — QUETIAPINE FUMARATE 100 MG: 100 TABLET ORAL at 20:18

## 2021-07-05 RX ADMIN — Medication 8 MG/HR: at 08:54

## 2021-07-05 RX ADMIN — NAFCILLIN SODIUM 2 G: 2 INJECTION, POWDER, LYOPHILIZED, FOR SOLUTION INTRAMUSCULAR; INTRAVENOUS at 14:20

## 2021-07-05 RX ADMIN — NAFCILLIN SODIUM 2 G: 2 INJECTION, POWDER, LYOPHILIZED, FOR SOLUTION INTRAMUSCULAR; INTRAVENOUS at 03:55

## 2021-07-05 RX ADMIN — QUETIAPINE FUMARATE 50 MG: 50 TABLET ORAL at 08:53

## 2021-07-05 RX ADMIN — PROPOFOL 35 MCG/KG/MIN: 10 INJECTION, EMULSION INTRAVENOUS at 10:16

## 2021-07-05 RX ADMIN — CHLORHEXIDINE GLUCONATE 15 ML: 1.2 SOLUTION ORAL at 20:18

## 2021-07-05 RX ADMIN — HEPARIN SODIUM 5000 UNITS: 5000 INJECTION, SOLUTION INTRAVENOUS; SUBCUTANEOUS at 14:20

## 2021-07-05 RX ADMIN — ACETAMINOPHEN 650 MG: 325 TABLET, FILM COATED ORAL at 04:01

## 2021-07-05 RX ADMIN — DEXMEDETOMIDINE HYDROCHLORIDE 0.6 MCG/KG/HR: 400 INJECTION INTRAVENOUS at 06:17

## 2021-07-05 RX ADMIN — PROPOFOL 50 MCG/KG/MIN: 10 INJECTION, EMULSION INTRAVENOUS at 22:00

## 2021-07-05 RX ADMIN — NAFCILLIN SODIUM 2 G: 2 INJECTION, POWDER, LYOPHILIZED, FOR SOLUTION INTRAMUSCULAR; INTRAVENOUS at 22:45

## 2021-07-05 RX ADMIN — Medication 15 ML: at 11:23

## 2021-07-05 RX ADMIN — HYDROMORPHONE HYDROCHLORIDE 0.5 MG: 1 INJECTION, SOLUTION INTRAMUSCULAR; INTRAVENOUS; SUBCUTANEOUS at 03:55

## 2021-07-05 RX ADMIN — CIPROFLOXACIN 500 MG: 500 TABLET, FILM COATED ORAL at 07:56

## 2021-07-05 RX ADMIN — NAFCILLIN SODIUM 2 G: 2 INJECTION, POWDER, LYOPHILIZED, FOR SOLUTION INTRAMUSCULAR; INTRAVENOUS at 18:09

## 2021-07-05 RX ADMIN — Medication 1 PACKET: at 22:49

## 2021-07-05 RX ADMIN — NAFCILLIN SODIUM 2 G: 2 INJECTION, POWDER, LYOPHILIZED, FOR SOLUTION INTRAMUSCULAR; INTRAVENOUS at 10:12

## 2021-07-05 RX ADMIN — HEPARIN SODIUM 5000 UNITS: 5000 INJECTION, SOLUTION INTRAVENOUS; SUBCUTANEOUS at 22:45

## 2021-07-05 RX ADMIN — PROPOFOL 50 MCG/KG/MIN: 10 INJECTION, EMULSION INTRAVENOUS at 18:09

## 2021-07-05 RX ADMIN — CIPROFLOXACIN 500 MG: 500 TABLET, FILM COATED ORAL at 20:18

## 2021-07-05 RX ADMIN — HEPARIN SODIUM 5000 UNITS: 5000 INJECTION, SOLUTION INTRAVENOUS; SUBCUTANEOUS at 05:27

## 2021-07-05 RX ADMIN — Medication 50 MCG: at 02:26

## 2021-07-05 RX ADMIN — PANTOPRAZOLE SODIUM 40 MG: 40 TABLET, DELAYED RELEASE ORAL at 07:56

## 2021-07-05 RX ADMIN — CHLORHEXIDINE GLUCONATE 15 ML: 1.2 SOLUTION ORAL at 07:56

## 2021-07-05 RX ADMIN — Medication 50 MCG/HR: at 02:24

## 2021-07-05 RX ADMIN — PROPOFOL 40 MCG/KG/MIN: 10 INJECTION, EMULSION INTRAVENOUS at 15:21

## 2021-07-05 RX ADMIN — Medication 1 PACKET: at 17:44

## 2021-07-05 ASSESSMENT — ACTIVITIES OF DAILY LIVING (ADL)
ADLS_ACUITY_SCORE: 26

## 2021-07-05 ASSESSMENT — MIFFLIN-ST. JEOR: SCORE: 1687.5

## 2021-07-05 NOTE — PROGRESS NOTES
VSS, pt had a JAE at bedside. See report for results. Prop add for sedation in order to get versed off. Tube feed increased to 20cc/h w goal of 40cc/h. Need to increase tube feed every 8 hours by 10cc until goal. Patient received 1 unit of blood for hgb 6.6.

## 2021-07-05 NOTE — PROGRESS NOTES
[facility] ICU RESPIRATORY NOTE        Date of Admission: 7/1/2021    Date of Intubation (most recent): 7/1/2021    Reason for Mechanical Ventilation: Airway Protection    Number of Days on Mechanical Ventilation: 5    Met Criteria for Spontaneous Breathing Trial: No    Reason for No Spontaneous Breathing Trial: Per MD    Significant Events Today: None    ABG Results:   Recent Labs   Lab 07/05/21  0412 07/04/21  0613 07/03/21  1132 07/01/21  2251   PH 7.50* 7.49* 7.49* 7.42   PCO2 28* 29* 29* 31*   PO2 216* 148* 99 93   HCO3 22 22 22 20*   O2PER 30 40% 40 40       Current Vent Settings: Ventilation Mode: CMV/AC  (Continuous Mandatory Ventilation/ Assist Control)  FiO2 (%): 30 %  Rate Set (breaths/minute): 16 breaths/min  Tidal Volume Set (mL): 450 mL  PEEP (cm H2O): 5 cmH2O  Oxygen Concentration (%): 30 %  Resp: 24      Skin Assessment: Clean, Dry & intact     Plan: Patient will remain on full ventilatory support. RT will continue to follow.    Asad Ochoa, RT

## 2021-07-05 NOTE — PROGRESS NOTES
[facility]    Infectious Disease Progress Note    Date of Service : 07/05/2021     Assessment :  1. S.aureus MSSA native mitral valve endocarditis with septic shock in the setting of IVDA complicated by embolic L parietal lobe infarction and multiple peripheral emboli.   (TTE shows a large mobile 1.8 cm vegetation on the posterior mitral valve leaflet with extension to left atrium and small ASD , no valvular abscess and no additional valvular involvement. Pericardial effusion is small). No additional evidence of seeding on CT abdomen but at risk for seeding additional sites with persistent bacteremia.    2. New swelling right finger concerning for septic arthritis/oseomyelitis  3. Severe sepsis requiring pressor support and multiorgan failure  4. Pericardial effusion is small on JAE and does not require drainage.  5. Multiple peripheral emboli. Right finger swelling and discoloration of left 4th toe  5. FORD related to sepsis- resolved  6. Embolic stroke  7. Klebsiella pneumoniae UTI  8. Hypernatremia  9. Severe anemia multifactorial. Has baseline anemia, exacerbated by chronic disease  10. Polysubstance abuse  11. Splenomegaly   12. LFT abnormality    Recommendations  1. Repeat blood cxs X 2 sets  2. JAE results noted. Difficult situation with large moble vegetation and IVDA , making valve replacement challenging. She is on optimal antibiotic therapy, but remains persistently bacteremic. It may be challenging to clear bacteremia with high burden of infection. Will pursue further imaging for additional foci of infection. She will need  FUP JAE to assess for improvement in a week or so.  3. MRI right hand to assess for septic arthritis /osteomyelitis given new swelling and redness  4. May need additional imaging of left foot for toe discoloration, will follow for now  5. Screen for blood borne pathogens   6. Increase Nafcillin dose to 2 grams Q4H   7. Cipro X 7 days  High risk for further  embolic complications    Very sick patient with guarded prognosis  Discussed with the [facility] are team    Marisol Matos MD    Interval History   Remains intubated. Low grade fever this morning, leukocytosis improving, remains persistently bacteremic    Physical Exam   Temp: 99.2  F (37.3  C) Temp src: Oral BP: 116/81 Pulse: 80   Resp: (!) 35 SpO2: 99 % O2 Device: Mechanical Ventilator    Vitals:    07/03/21 0530 07/04/21 0000 07/05/21 0000   Weight: 92.4 kg (203 lb 11.3 oz) 93.9 kg (207 lb 0.2 oz) 96.4 kg (212 lb 8.4 oz)     Vital Signs with Ranges  Temp:  [98.9  F (37.2  C)-100.8  F (38.2  C)] 99.2  F (37.3  C)  Pulse:  [61-85] 80  Resp:  [0-35] 35  BP: (116)/(81) 116/81  MAP:  [66 mmHg-108 mmHg] 90 mmHg  Arterial Line BP: ()/(47-86) 126/69  FiO2 (%):  [30 %] 30 %  SpO2:  [95 %-100 %] 99 %    GENERAL APPEARANCE:  intubated  EYES: conjunctivae and sclerae normal  NECK: no adenopathy  RESP: lungs clear to auscultation - no rales, rhonchi or wheezes  CV: regular rates and rhythm, murmur  ABDOMEN: soft, umbilical hernia  MS: extremities normal- no gross deformities noted  SKIN: right finger with swelling and redness, multiple osler's nodes and splinter hemorrhages, multiple embolic infarcts on toes, black discoloration of left toe    Other:    Medications     dexmedetomidine Stopped (07/05/21 1052)     fentaNYL 50 mcg/hr (07/05/21 1100)     - MEDICATION INSTRUCTIONS -       lactated ringers 50 mL/hr at 07/04/21 0939     - MEDICATION INSTRUCTIONS -       midazolam 5 mg/hr (07/05/21 1134)     norepinephrine 0.02 mcg/kg/min (07/05/21 1100)     propofol (DIPRIVAN) infusion 35 mcg/kg/min (07/05/21 1100)     sodium chloride 10 mL/hr at 07/02/21 1934     vasopressin Stopped (07/02/21 1240)       chlorhexidine  15 mL Mouth/Throat Q12H     ciprofloxacin  500 mg Per Feeding Tube Q12H JENARO     heparin ANTICOAGULANT  5,000 Units Subcutaneous Q8H     insulin aspart  1-6 Units Subcutaneous Q4H     multivitamins w/minerals  15 mL  Per Feeding Tube Daily     nafcillin  2 g Intravenous Q6H     pantoprazole  40 mg Per Feeding Tube QAM AC     QUEtiapine  100 mg Oral BID     sodium chloride (PF)  10 mL Intracatheter Q8H     sodium chloride (PF)  3 mL Intravenous Q8H       Data   All microbiology laboratory data reviewed.  Recent Labs   Lab Test 07/05/21  0413 07/04/21  0537 07/03/21  0400   WBC 11.4* 14.0* 28.5*   HGB 6.6* 6.0* 7.2*   HCT 21.4* 19.1* 22.6*   MCV 72* 69* 68*    172 173     Recent Labs   Lab Test 07/05/21  0413 07/04/21  0537 07/03/21  0400   CR 0.88 1.05* 0.99     No lab results found.  Recent Labs   Lab Test 07/03/21  1730 07/03/21  1722 07/02/21  1238 07/02/21  1225 07/01/21  1839 07/01/21  1251 07/01/21  1242   CULT Cultured on the 1st day of incubation:  Staphylococcus aureus  *  Critical Value/Significant Value called to and read back by  Royal Ayala RN @1345 7/4/2021 gd    Susceptibility testing done on previous specimen Cultured on the 1st day of incubation:  Staphylococcus aureus  *  Critical Value/Significant Value called to and read back by  Royal Ayala RN @1347 07/04/2021 gd   Cultured on the 1st day of incubation:  Staphylococcus aureus  Susceptibility testing done on previous specimen  *  Critical Value/Significant Value called to and read back by  Lashanda Barnes RN @1522 7/3/2021 gd   Cultured on the 1st day of incubation:  Staphylococcus aureus  Susceptibility testing done on previous specimen  *  Critical Value/Significant Value, preliminary result only, called to and read back by   ROYAL DE DIOS RN @7929 7.3.21 LM   Cultured on the 1st day of incubation:  Staphylococcus aureus  Susceptibility testing done on previous specimen  *  Critical Value/Significant Value, preliminary result only, called to and read back by  Shanice Ennis RN @ 1409 7.2.21 JE   >100,000 colonies/mL  Klebsiella pneumoniae  *  >100,000 colonies/mL  Strain 2  Klebsiella pneumoniae  * Cultured on the 1st day of  incubation:  Staphylococcus aureus  *  Critical Value/Significant Value, preliminary result only, called to and read back by  Mayte Bianchi RN @ 0026 7/2/21 TM.    Cultured on the 1st day of incubation:  Strain 2  Staphylococcus aureus  *  (Note)  POSITIVE for STAPHYLOCOCCUS AUREUS and NEGATIVE for the mecA gene  (not MRSA) by [facility] multiplex nucleic acid test. The mecA gene was  not detected. Final identification and antimicrobial susceptibility  testing will be verified by standard methods.    Specimen tested with Verigene multiplex, gram-positive blood culture  nucleic acid test for the following targets: Staph aureus, Staph  epidermidis, Staph lugdunensis, other Staph species, Enterococcus  faecalis, Enterococcus faecium, Streptococcus species, S. agalactiae,  S. anginosus grp., S. pneumoniae, S. pyogenes, Listeria sp., mecA  (methicillin resistance) and Brook/B (vancomycin resistance).    Critical Value/Significant Value called to and read back by Deepa Bianchi RN on 7/2/21 at 0304. LSA       Susceptibility     Staphylococcus aureus (1) Staphylococcus aureus (3)     BOBBY BOBBY     CLINDAMYCIN <=0.25 ug/mL Sensitive <=0.25 ug/mL Sensitive     ERYTHROMYCIN <=0.25 ug/mL Sensitive <=0.25 ug/mL Sensitive     GENTAMICIN <=0.5 ug/mL Sensitive <=0.5 ug/mL Sensitive     OXACILLIN 0.5 ug/mL Sensitive 0.5 ug/mL Sensitive     TETRACYCLINE <=1 ug/mL Sensitive <=1 ug/mL Sensitive     Trimethoprim/Sulfa <=0.5/9.5 u... Sensitive <=0.5/9.5 u... Sensitive     VANCOMYCIN 1 ug/mL Sensitive 1 ug/mL Sensitive      7/1TTE  Interpretation Summary     Left ventricular systolic function is normal.  The visual ejection fraction is 55-60%.  The right ventricle is mildly dilated.  The right ventricular systolic function is normal.  There is a mobile linear echodensity measuring 1.4 cm x 0.6 cm on the atrial  side of the posterior mitral valve leaflet.  Other valve structures appear normal without significant dysfunction or  obvious valvular  vegetations.  The inferior pericardium appears moderately thickened and echobright which may  represent active pericarditis.  Large localized pericardial effusion (2.7 cm) posteriorly/laterally with  mobile free floating echodense material which could represent fibrinous  stranding in setting of inflammatory pericarditis or infectious material if  infectious pericardial effusion.  Dilation of the inferior vena cava is present with abnormal respiratory  variation in diameter.  Small left pleural effusion     No prior for comparison.     7/1 CT head  CT SCAN OF THE HEAD WITHOUT CONTRAST   7/1/2021 1:47 PM      HISTORY: Delirium; altered mental status     TECHNIQUE:  Axial images of the head and coronal reformations without  IV contrast material.  Radiation dose for this scan was reduced using  automated exposure control, adjustment of the mA and/or kV according  to patient size, or iterative reconstruction technique.     COMPARISON: None.     FINDINGS: There is a focal 2 cm area of hypodensity involving gray and  white matter in the medial left parietal lobe consistent with acute to  subacute ischemic infarct. There is a minimal incidental arachnoid  cyst in the anterior portion of left middle cranial fossa. Ventricles  and subarachnoid spaces are otherwise within normal limits. There is  no evidence for intracranial hemorrhage, significant mass effect, or  skull fracture. Exam is mildly limited by motion artifact. Visualized  paranasal sinuses and mastoid air cells are clear.                                                                      IMPRESSION: Focal hypodensity in the medial left parietal lobe  consistent with acute to subacute ischemic infarct.     7/1 Ct abdomen/pelvis                                                           IMPRESSION:   1.  Patchy hypoenhancement in the mid and upper poles of the right  kidney posteriorly, suspicious for pyelonephritis.  2.  Moderate-sized fat-containing  paraumbilical hernia.  3.  Mild splenomegaly.  4.  There are possible small gallstones within a contracted  gallbladder.  Attestation:  Total time on the floor involved in the patient's care: 60 minutes. Total time spent in counseling/care coordination: >50%

## 2021-07-05 NOTE — PLAN OF CARE
[facility] Intensive Care Unit   Nursing Note                                                       Neuro:  PERRL.  Localizes pain, Does not follow commands, sedated.  Cardiovascular:  NSR.  Hemodynamically stable on 0.01-0.02.  Pulmonary:  Tolerating ventilator on versed/precedex/fentanyl.  Oxygen saturation > 92 on 30% FiO2, PEEP 5. Moderate amount creamy/TK secretions sxn'd from ETT.  GI/:  Adequate UOP, don in place. X 1 SM formed BM this shift. NPO since midnight for planned JAE today.  Endocrine: Glucose well controlled.  Skin:  Intact.  Protective mepilex applied to sacrum.  Restraints:  In use and necessary.  AM labs noted.  Continue to monitor closely.    Recent Labs   Lab 07/05/21  0412 07/04/21  0613 07/03/21  1132 07/01/21  2251   PH 7.50* 7.49* 7.49* 7.42   PCO2 28* 29* 29* 31*   PO2 216* 148* 99 93   HCO3 22 22 22 20*   O2PER 30 40% 40 40       Lab Results   Component Value Date    TROPI 0.431 () 07/04/2021    TROPI 0.439 () 07/04/2021    TROPI 0.407 () 07/03/2021    TROPI 0.395 () 07/03/2021    TROPI 0.430 () 07/03/2021       ROUTINE IP LABS (Last four results)  BMP  Recent Labs   Lab 07/05/21  0413 07/04/21  0537 07/03/21  0956 07/03/21  0400 07/02/21  0214   * 148*  --  144 140   POTASSIUM 4.0 4.2 4.1 3.4 3.5   CHLORIDE 122* 121*  --  115* 110*   DEREK 7.4* 7.5*  --  7.6* 7.3*   CO2 22 21  --  21 20   BUN 34* 33*  --  25 26   CR 0.88 1.05*  --  0.99 1.29*   * 138*  --  145* 109*     CBC  Recent Labs   Lab 07/05/21  0413 07/04/21  0537 07/03/21  0400 07/02/21  1049 07/01/21  1839 07/01/21  1839   WBC 11.4* 14.0* 28.5*  --   --  21.7*   RBC 2.97* 2.78* 3.31*  --   --  3.10*   HGB 6.6* 6.0* 7.2* 7.9*   < > 6.4*   HCT 21.4* 19.1* 22.6*  --   --  20.8*   MCV 72* 69* 68*  --   --  67*   MCH 22.2* 21.6* 21.8*  --   --  20.6*   MCHC 30.8* 31.4* 31.9  --   --  30.8*   RDW 21.5* 19.9* 19.4*  --   --  17.1*    172 173  --   --  114*    < > = values in this interval not  displayed.     INR  Recent Labs   Lab 07/01/21  1239   INR 1.23*     PTTNo lab results found in last 7 days.  LACTIC ACID  Lactic Acid (mmol/L)   Date Value   07/02/2021 1.5   07/02/2021 2.6 (H)   07/01/2021 2.3 (H)   07/01/2021 2.3 (H)     Blood Glucose  Glucose (mg/dL)   Date Value   07/05/2021 100 (H)   07/04/2021 115 (H)   07/04/2021 115 (H)   07/04/2021 116 (H)   07/04/2021 131 (H)   07/04/2021 138 (H)       Intake/Output Summary (Last 24 hours) at 7/5/2021 0520  Last data filed at 7/5/2021 0400  Gross per 24 hour   Intake 3297.81 ml   Output 975 ml   Net 2322.81 ml       Rachelle Meléndez BSN RN   [facility]  Intensive Care Unit

## 2021-07-05 NOTE — PROGRESS NOTES
Critical Care Progress Note  07/05/2021    Name: Manas Hernandez MRN#: [id number]   Age: 41 year old YOB: 1980     [facility] [place] Day# 4           Problem List:   Principal Problem:    Endocarditis due to methicillin susceptible Staphylococcus aureus (MSSA)  Active Problems:    Pyelonephritis, acute    Altered mental status, unspecified altered mental status type           Summary/Hospital Course:   Ms. Hernandez is a 41 year old female with chronic polysubstance abuse and IVDU presented on 7/1 with encephalopathy, hypotension, anemia, and hypotension responsive to fluid resusictation. She was agitated in the ED, sedated and intubated for airway protection. CT head showed possible new acute to subacute parietal lobe ischemic infarct, CXR without abnormalities, and CT abdomen concerning for pyelonephritis empirically treated with vancomycin and Zosyn. Transferred to the ICU for continued infectious workup and airway management. On exam there was concern for endocarditis. On 7/2, admission blood cultures returned with MSSA. Remains intubated on nafcillin for suspected infective endocarditis (JAE pending--planned for today) and pyelonephritis, urine culture with klebsiella treated with ciprofloxacin.       Assessment and plan :     Manas Hernandez is a 41 year old female with a history of chronic polysubstance abuse/IVDU admitted on 7/1/2021 for encephalopathy requiring intubation, hypotension and acidosis. In the ICU for management of MSSA native mitral valve endocarditis with septic shock infective endocarditis and large pericardial effusion without tamponade.  I have personally reviewed the daily labs, imaging studies, cultures and discussed the case with referring physician and consulting physicians.     My assessment and plan by system for this patient is as follows:    Neurology/Psychiatry:   1. Acute ischemic stroke of left MCA, PCA territories secondary to infective endocarditis.  2. Toxic encephalopathy,  septic encephalopathy - methicillin S. Aureus bacteremia on blood cultures (7/3)  3. ICU sedation - propofol and midazolam (wean midazolam today)  3. Analgesia  4. Agitation  5. Polysubstance use  Plan  - Neuro CC following, appreciate assistance   - Stroke risk - monitor BP, goal <130/80  - Re-add propofol for sedation -- previously mildly elevated trigylcerides, but will monitor this.  Taper off versed today.  - Fentanyl drip and dilaudid prn for pain and withdrawal prevention  - Monitor for signs of withdrawal - UDS positive for amphetamines and opiates on admission  - Increase seroquel to 100 mg bid.  - Chemical dependency evaluation once extubated if agreeable      Cardiovascular:   1. MSSA septic shock: on norepinephrine, weaning as able.  2. Mitral valve endocarditis - small mobile vegetation, mild-moderate MR and severe LAE.  3. Elevated troponin, improved - peaked 7/1, no longer trending.  4. Hypertriglyceridemia - elevated TG, low HDL  5.  Large loculated pericardial effusion without tamponade physiology.  Plan  - CV surgery following, appreciate assistance.  - Continue vasopressors with MAP goal >65; wean as able. Currently on norepinephrine, weaning as able.  - JAE obtained, pending results. Per CV surgery, awaiting results before determining further treatment.  - IV antibiotics as below     Pulmonary/Ventilator Management:   1. Mechanically ventilated for airway protection - CMV/AC (16/450/5/30)  2.  Respiratory alkalosis:  7.50/28, likely due to overbreathing vent; no change with increased pain medications yesterday.  Plan  - Continue full ventilatory support, initiate ps trials when awakening     GI/Nutrition :   1. Mildly elevated alkaline phosphate  2. Hyperbilirubinemia now resolved to 1.2.  Mild biliary dilitation on US (cbd 9 mm)  3. Stress ulcer prophylaxis   4.  Hypoalbuminemia 1.2  Plan  - Monitor CMP daily.  - NPO for JAE, restart tube feeds once procedure complete.  - Continue pantoprazole  40 mg IV  - Despite cbd dilitation seen on US, total bili is dropping slightly today.  Will monitor for now. Repeat US for monitoring in a few days.     Renal/Fluids/Electrolytes:   1.Klebsiella pneumoniae UTI with septic shock - urine culture with klebsiella on 7/4.  2. Hypernatremia, up to 148 in setting of NPO status.  Resume free water at 60 q4; recheck Na at 1600.  3. FORD - resolved with Cr 0.88  Plan  - Continue IV antibiotics as below  - Continue MIVF: LR down to 50/hr while in shock, then kvo  - Monitor CMP daily  - Avoid nephrotoxic agents such as NSAID, IV contrast unless specifically required  - Follow I/O's as appropriate.     Infectious Disease:   1. Severe sepsis secondary to methicillin S. Aureus bacteremia, and mitral valve endocarditis  2. Klebsiella pneumoniae UTI  Plan  - Follow blood cultures, one culture positive for methicillin S. Aureus from 7/3  - Repeat blood cultures daily  - continue cipro 500mg q12h  - Continue nafcillin 12g IV q6h  - appreciate ID input  - Monitor urine output while on IV antibiotics.     Endocrine:   1. Probable relative adrenal insufficiency in the setting of severe sepsis, now off steriods.  2. Stress induced hyperglycemia, improved. A1c 5.7 at admit.  Plan  - ICU insulin protocol, goal sugar <180  - Follow BGs     Hematology/Oncology:   1. Chronic microcytic anemia, acute on chronic (hgb 7.3 in 11/2007): likely 2/2 malnutrition and chronic polysubstance use; no signs of acute blood loss, possibly some hemolysis in setting of valvular abnormality and elevated bilirubin.   2.  Thrombocytopenia, NOS, likely due to sepsis, resolved.  Plan  - Daily CBC  - No signs of active bleeding, Hgb 6.6.  Transfuse 1 prbc today.     IV/Access:   1. Venous access - PIV right foot, right femoral CVC  2. Arterial access - femoral  Plan  - central access required and necessary     ICU Prophylaxis:   1. DVT: mechanical, subcut heparin  2. VAP: HOB 30 degrees, chlorhexidine rinse  3.  Stress Ulcer: PPI  4. Restraints: Nonviolent soft two point restraints required and necessary for patient safety and continued cares and good effect as patient continues to pull at necessary lines, tubes despite education and distraction. Will readdress daily.   5. Wound care - per unit routine   6. Feeding - NPO for JAE.  7. Family Update: mother by phone  8. Disposition - critically ill in ICU          Interim History/Overnight Events:   No events overnight, chronic anemia. Patient is intubated and unable to obtain subjective history.         Key Medications:       chlorhexidine  15 mL Mouth/Throat Q12H     ciprofloxacin  500 mg Per Feeding Tube Q12H JENARO     heparin ANTICOAGULANT  5,000 Units Subcutaneous Q8H     insulin aspart  1-6 Units Subcutaneous Q4H     multivitamins w/minerals  15 mL Per Feeding Tube Daily     nafcillin  2 g Intravenous Q6H     pantoprazole  40 mg Per Feeding Tube QAM AC     QUEtiapine  50 mg Oral BID     sodium chloride (PF)  10 mL Intracatheter Q8H     sodium chloride (PF)  3 mL Intravenous Q8H       dexmedetomidine 0.6 mcg/kg/hr (07/05/21 1000)     fentaNYL 50 mcg/hr (07/05/21 1000)     - MEDICATION INSTRUCTIONS -       lactated ringers 50 mL/hr at 07/04/21 0939     - MEDICATION INSTRUCTIONS -       midazolam 8 mg/hr (07/05/21 1000)     norepinephrine 0.02 mcg/kg/min (07/05/21 1000)     propofol (DIPRIVAN) infusion       sodium chloride 10 mL/hr at 07/02/21 1934     vasopressin Stopped (07/02/21 1240)               Physical Examination:   Temp:  [98.9  F (37.2  C)-100.8  F (38.2  C)] 99.2  F (37.3  C)  Pulse:  [61-85] 72  Resp:  [0-31] 24  BP: (116)/(81) 116/81  MAP:  [66 mmHg-108 mmHg] 100 mmHg  Arterial Line BP: ()/(47-86) 137/79  FiO2 (%):  [30 %] 30 %  SpO2:  [95 %-100 %] 100 %      Intake/Output Summary (Last 24 hours) at 7/5/2021 1256  Last data filed at 7/5/2021 1200  Gross per 24 hour   Intake 2837.78 ml   Output 1175 ml   Net 1662.78 ml         Wt Readings from Last 4  Encounters:   07/05/21 96.4 kg (212 lb 8.4 oz)   11/05/07 103.1 kg (227 lb 4 oz)   09/26/07 99.3 kg (219 lb)     Arterial Line BP: ()/(47-86) 137/79  MAP:  [66 mmHg-108 mmHg] 100 mmHg  BP - Mean:  [93] 93  Ventilation Mode: CMV/AC  (Continuous Mandatory Ventilation/ Assist Control)  FiO2 (%): 30 %  Rate Set (breaths/minute): 16 breaths/min  Tidal Volume Set (mL): 450 mL  PEEP (cm H2O): 5 cmH2O  Oxygen Concentration (%): 30 %  Resp: 24    Recent Labs   Lab 07/05/21  0412 07/04/21  0613 07/03/21  1132 07/01/21  2251   PH 7.50* 7.49* 7.49* 7.42   PCO2 28* 29* 29* 31*   PO2 216* 148* 99 93   HCO3 22 22 22 20*   O2PER 30 40% 40 40       GEN: sedated, in no acute distress  HEENT: sclera anicteric, trachea midline, NGT in place. ETT in place.   PULM: unlabored synchronous with vent, clear anteriorly.   CV/COR: tachycardic, normal S1 and S2. 3/6 systolic murmur auscultated over LLSB.  ABD: Mildly distended, soft. Hypoactive bowel sounds, large midline hernia.  MSK/EXT: Bilateral pitting edema of the feet to the ankle. Left dorsal hand pitting edema.   NEURO: sedated. Withdraws. PEERL  : don in place draining dark yellow urine.  SKIN: Purple, nodular lesions of right 4th finger concerning for Oslers nodes. Dark red lesion on the medial great toe. Scattered petechial lesions on the soles of the feet bilaterally. New onset darkening of the left distal toe. No nail changes noted.   LINES: clean, dry intact         Data:   All data and imaging reviewed.    CT Abdomen, 7/3/2021:  1.  Biliary dilatation of uncertain etiology.  2.  Possible adenopathy in the kenny hepatis.  3.  Nonspecific shadowing in the expected location of the gallbladder.     ROUTINE ICU LABS (Last four results)  CMP  Recent Labs   Lab 07/05/21  0413 07/04/21  0537 07/03/21  0956 07/03/21  0400 07/02/21  0435 07/02/21  0214 07/01/21  1839 07/01/21  1839   * 148*  --  144  --  140  --  139   POTASSIUM 4.0 4.2 4.1 3.4  --  3.5   < > 2.7*    CHLORIDE 122* 121*  --  115*  --  110*  --  108   CO2 22 21  --  21  --  20  --  19*   ANIONGAP 4 6  --  8  --  10  --  12   * 138*  --  145*  --  109*  --  115*   BUN 34* 33*  --  25  --  26  --  27   CR 0.88 1.05*  --  0.99  --  1.29*  --  1.29*   GFRESTIMATED 82 67  --  71  --  52*  --  52*   GFRESTBLACK >90 77  --  83  --  60*  --  60*   DEREK 7.4* 7.5*  --  7.6*  --  7.3*  --  7.0*   MAG 2.4* 2.6*  --   --  1.9  --   --   --    PHOS 4.0 5.0*  --   --  4.0  --   --   --    PROTTOTAL 6.2* 6.0*  --  6.1*  --   --   --  5.8*   ALBUMIN 1.2* 1.2*  --  1.3*  --   --   --  1.5*   BILITOTAL 1.2 1.9* 2.8* 3.0*  --   --   --  2.0*   ALKPHOS 241* 260*  --  333*  --   --   --  293*   AST 24 22  --  41  --   --   --  55*   ALT 23 25  --  38  --   --   --  35    < > = values in this interval not displayed.     CBC  Recent Labs   Lab 07/05/21  0413 07/04/21  0537 07/03/21  0400 07/02/21  1049 07/01/21  1839 07/01/21  1839   WBC 11.4* 14.0* 28.5*  --   --  21.7*   RBC 2.97* 2.78* 3.31*  --   --  3.10*   HGB 6.6* 6.0* 7.2* 7.9*   < > 6.4*   HCT 21.4* 19.1* 22.6*  --   --  20.8*   MCV 72* 69* 68*  --   --  67*   MCH 22.2* 21.6* 21.8*  --   --  20.6*   MCHC 30.8* 31.4* 31.9  --   --  30.8*   RDW 21.5* 19.9* 19.4*  --   --  17.1*    172 173  --   --  114*    < > = values in this interval not displayed.     INR  Recent Labs   Lab 07/01/21  1239   INR 1.23*     Arterial Blood Gas  Recent Labs   Lab 07/05/21  0412 07/04/21  0613 07/03/21  1132 07/01/21  2251   PH 7.50* 7.49* 7.49* 7.42   PCO2 28* 29* 29* 31*   PO2 216* 148* 99 93   HCO3 22 22 22 20*   O2PER 30 40% 40 40       All cultures:  Recent Labs   Lab 07/03/21  1730 07/03/21  1722 07/02/21  1238 07/02/21  1225 07/01/21  1839 07/01/21  1251 07/01/21  1242   CULT Cultured on the 1st day of incubation:  Gram positive cocci in clusters  *  Critical Value/Significant Value called to and read back by  Royal Ayala RN @1345 7/4/2021 gd    Susceptibility testing done  on previous specimen Cultured on the 1st day of incubation:  Gram positive cocci in clusters  *  Critical Value/Significant Value called to and read back by  Royal Ayala RN @1347 07/04/2021 gd   Cultured on the 1st day of incubation:  Staphylococcus aureus  Susceptibility testing done on previous specimen  *  Critical Value/Significant Value called to and read back by  Lashanda Barnes RN @1522 7/3/2021 gd   Cultured on the 1st day of incubation:  Staphylococcus aureus  Susceptibility testing done on previous specimen  *  Critical Value/Significant Value, preliminary result only, called to and read back by   ROYAL DE DIOS RN @1743 7.3.21 LM   Cultured on the 1st day of incubation:  Staphylococcus aureus  Susceptibility testing done on previous specimen  *  Critical Value/Significant Value, preliminary result only, called to and read back by  Shanice Ennis RN @ 1330 7.2.21 JE   >100,000 colonies/mL  Klebsiella pneumoniae  *  >100,000 colonies/mL  Strain 2  Klebsiella pneumoniae  * Cultured on the 1st day of incubation:  Staphylococcus aureus  *  Critical Value/Significant Value, preliminary result only, called to and read back by  Mayte Bianchi RN @ 0026 7/2/21 TM.    Cultured on the 1st day of incubation:  Strain 2  Staphylococcus aureus  *  (Note)  POSITIVE for STAPHYLOCOCCUS AUREUS and NEGATIVE for the mecA gene  (not MRSA) by [employer] multiplex nucleic acid test. The mecA gene was  not detected. Final identification and antimicrobial susceptibility  testing will be verified by standard methods.    Specimen tested with Verigene multiplex, gram-positive blood culture  nucleic acid test for the following targets: Staph aureus, Staph  epidermidis, Staph lugdunensis, other Staph species, Enterococcus  faecalis, Enterococcus faecium, Streptococcus species, S. agalactiae,  S. anginosus grp., S. pneumoniae, S. pyogenes, Listeria sp., mecA  (methicillin resistance) and Brook/B (vancomycin resistance).    Critical  Value/Significant Value called to and read back by Deepa Bianchi, RN on 7/2/21 at 0304. LSA       No results found for this or any previous visit (from the past 24 hour(s)).                Korin Aquino, MS4    Physician Attestation   I, Miki Piña, was present with the medical/DEEPTI student who participated in the service and in the documentation of the note.  I have verified the history and personally performed the physical exam and medical decision making.  I agree with the assessment and plan of care as documented in the note.      I personally reviewed vital signs, medications and labs.    I personally managed the following critical care conditions:  1.  Septic shock  2. Loss of protective airway reflexes resulting in need for intubation/mechanical ventilation  3.  Bacteremia in setting of endocarditis  4.  Hypernatremia  5.  Urinary tract infection and pyelonephritis    See also my changes in blue above.    D/w ID doctor Dr. Carlo Siegel: This patient is critically ill: Yes. Total critical care time today 45 min, excluding procedures.     Miki Piña MD  Date of Service (when I saw the patient): 07/05/21

## 2021-07-05 NOTE — PROGRESS NOTES
CLINICAL NUTRITION SERVICES - REASSESSMENT NOTE      Recommendations Ordered by Registered Dietitian (RD): Increase TF regimen to preliminary goal -->  Promote 1.0 (NO FIBER) at 40 mL/hr x 20 hours per day to provide:  800 kcal, 50 g protein, 104 g CHO, no fiber, 671 mL H2O  Add ProSource 4 pkts per day= 160 kcal and 44 g protein   Total (TF + ProSource + Propofol)= 1419 kcal (20 kcal/kg) and 94 g protein (1.4 g/kg)   Malnutrition: (7/2)  % Weight Loss:  Unable to determine without recent weight history  % Intake:  </= 50% for >/= 5 days (severe malnutrition) - Suspect as was vomiting several days PTA  Subcutaneous Fat Loss:  None observed  Muscle Loss:  None observed  Fluid Retention:  None noted     Malnutrition Diagnosis: Unable to determine due to lack of recent weight history       EVALUATION OF PROGRESS TOWARD GOALS   Diet:  NPO on vent     Nutrition Support:  Patient started trophic TF 7/2 and continues as follows ~    Nutrition Support Enteral:  Type of Feeding Tube: OG  Enteral Frequency:  Continuous  Enteral Regimen: Promote (NO FIBER) at 10 mL/hr x 20 hours per day (holding TF 1 hour before and 1 hour after Cipro administration)  Total Enteral Provisions: 200 kcal, 12 g protein (0.2 g/kg), 168 mL H2O, 26 g CHO, no fiber   Free Water Flush: 60 mL every 4 hours   Propofol at 17.4 mL/hr= 459 kcal   Total = 659 kcal (9 kcal/kg)    Intake/Tolerance:    TF was off this am for JAE but back at goal   Na 148 (H), K and Phos normal, Mg 2.4 (H)  BGM < 150  BM x 1 today  I/O 3312/1070, wt 96.4 kg (up overall)  Albumin 1.2 (L) - Albumin is not an evidence based indicator of nutrition status/malnutrition.  Albumin is a negative acute phase protein and will be low in states of inflammation.    Patient now day #5 sub-optimal nutrition         ASSESSED NUTRITION NEEDS:  Dosing Weight:  69.6 kg (adjusted for overwt)  Preliminary Energy Needs: 7268-2554 kcals (18-21 Kcal/Kg)  Justification: vented, overweight and  inflammation  Final Energy Needs:  5119-6946 kcals (25-30 kcal/kg)  Justification: overweight and vented   Protein Needs:   grams protein (1.2-1.5 g pro/Kg)  Justification: hypercatabolism with critical illness and preservation of lean body mass      NEW FINDINGS:   Patient started on Cipro down FT BID on 7/3 (holding 1 hour before and 1 hour after administration)    Norepi drip continues, Vaso off   Patient receiving Certavite daily per ETOH Protocol     7/5:  JAE=Moderate MR (at most) with vegetation on posterior leaflet of MV. The vegetation is a large, thick, highly-mobile, finger-like mass attached to the atrial side of the posterior leaflet, extending into the left atrium, measuring about 1.8 cm in length. Also, a small ASD is present with L to R shunt.     Previous Goals (7/2):   TF Promote (No Fiber) at 55 mL/hr will meet % preliminary needs in the next 48-72 hrs  Evaluation: Not met    Previous Nutrition Diagnosis (7/2):   Inadequate protein-energy intake related to vomiting PTA and now intubated as evidenced by suspect limited nutritional intake over past 5 days, NPO status, Propofol meeting 45% preliminary energy and 0% protein needs and TF planned  Evaluation: Improving      CURRENT NUTRITION DIAGNOSIS  Inadequate enteral nutrition infusion related to TF remains at 10 mL/hr, down to 1 pressor as evidenced by meeting 12% protein and 35% energy needs from TF + Propofol     INTERVENTIONS  Recommendations / Nutrition Prescription  Increase TF regimen to preliminary goal -->  Promote 1.0 (NO FIBER) at 40 mL/hr x 20 hours per day to provide:  800 kcal, 50 g protein, 104 g CHO, no fiber, 671 mL H2O  Add ProSource 4 pkts per day= 160 kcal and 44 g protein   Total (TF + ProSource + Propofol)= 1419 kcal (20 kcal/kg) and 94 g protein (1.4 g/kg)    Implementation  EN Composition, Medical Food Supplement:  Orders written to increase TF rate to 20 mL/hr now and advance every 8 hours by 10 mL to goal.   ProSource as above.  Collaboration and Referral of Nutrition care:  Patient discussed today during interdisciplinary bedside rounds.     Goals  TF + ProSource + Propofol will meet % needs     MONITORING AND EVALUATION:  Progress towards goals will be monitored and evaluated per protocol and Practice Guidelines    Ayde Ramachandran RD, LD, CNSC   Clinical Dietitian - [facility]

## 2021-07-05 NOTE — PROGRESS NOTES
[facility] ICU RESPIRATORY NOTE        Date of Admission: 7/1/2021    Date of Intubation (most recent): 7/1/2021    Reason for Mechanical Ventilation: Airway protection    Number of Days on Mechanical Ventilation: 5    Met Criteria for Spontaneous Breathing Trial: No    Reason for No Spontaneous Breathing Trial: Per MD    Significant Events Today: None    ABG Results:   Recent Labs   Lab 07/05/21  0412 07/04/21  0613 07/03/21  1132 07/01/21  2251   PH 7.50* 7.49* 7.49* 7.42   PCO2 28* 29* 29* 31*   PO2 216* 148* 99 93   HCO3 22 22 22 20*   O2PER 30 40% 40 40       Current Vent Settings: Ventilation Mode: CMV/AC  (Continuous Mandatory Ventilation/ Assist Control)  FiO2 (%): 30 %  Rate Set (breaths/minute): 16 breaths/min  Tidal Volume Set (mL): 450 mL  PEEP (cm H2O): 5 cmH2O  Oxygen Concentration (%): 30 %  Resp: 13      Skin Assessment: Clean, Dry & intact    Plan: Patient will remain on full ventilatory support. RT will continue to follow.    Asad Ochoa, RT

## 2021-07-05 NOTE — PRE-PROCEDURE
GENERAL PRE-PROCEDURE:   Procedure:  JAE  Date/Time:  7/5/2021 10:29 AM    Verbal consent obtained?: No    Emergent situation    Risks and benefits: Risks, benefits and alternatives were discussed    Patient states understanding of procedure being performed: Yes    Patient's understanding of procedure matches consent: Yes    Procedure consent matches procedure scheduled: Yes    Appropriately NPO:  Yes  ASA Class:  Class 4- Severe systemic disease, acute unstable problems  Lungs:  Lungs clear with good breath sounds bilaterally  Heart:  Normal heart sounds and rate  History & Physical reviewed:  History and physical reviewed and no updates needed  Statement of review:  I have reviewed the lab findings, diagnostic data, medications, and the plan for sedation    JAE is being done as an emergent procedure due to mitral suzie endocarditis.  Family has been attempted to be contact, but is unavailable.  Patient is intubated and sedated in the ICU.    Chava Zavaleta MD  Cardiology - [facility]  Pager: [phone]  Text Page  July 5, 2021

## 2021-07-05 NOTE — PROGRESS NOTES
Assessment and Plan:     Assessment:   Manas Hernandez is a 41 year old IVDU female who was admitted on 7/1/2021 with MSSA sepsis and shock. TTE shows a small mitral valve vegetation as well as pericardial inflammation with a large localized (posterior) effusion. She is intubated and sedated due to agitation and inability to protect her airway.      1. MSSA sepsis and shock with encephalopathy. On low dose pressors  2. Mitral valve endocarditis with a small mobile vegetation, mild-moderate MR and severe LAE.   3. Probable myopericarditis with large localized pericardial effusion and troponin rise. Low level of persistently elevated troponin is probably due to inflammation of myocardium.   4. Preserved LVEF.  5. Ischemic CVA, possibly related to MV vegetation embolization..   6. Pyelonephritis.  7. Malnourishment with severe anemia, hypoalbuminemia, hypokalemia.  8. Chronic polysubstance and IVDU.  9. Malnutrition with albumin 1.2    JAE completed this am.   Moderate MR (at most) with vegetation on posterior leaflet of MV. The vegetation is a large, thick, highly-mobile, finger-like mass attached to the atrial side of the posterior leaflet, extending into the left atrium, measuring about 1.8 cm in length. Also, a small ASD is present with L to R shunt.   No other valves appear to be affected. No clear evidence of perivalvular abscess.   A small pericardial effusion is present without tamponade. This would be difficult to safely sample by percutaneous needle.  Continue supportive care.   Even thought this vegetation is large with increased embolic potential, I would hope to cure this with antibiotic therapy and avoid surgical MVR due to the patient's history of IVDU and increased future risk of prosthetic valve endocarditis. The recent stroke, anemia, septic shock and malnutrition increase her risk of complications with surgery.     SYEDA MARIE MD        Interval History:   Intubated and sedated          " Medications:       chlorhexidine  15 mL Mouth/Throat Q12H     ciprofloxacin  500 mg Per Feeding Tube Q12H JENARO     heparin ANTICOAGULANT  5,000 Units Subcutaneous Q8H     insulin aspart  1-6 Units Subcutaneous Q4H     multivitamins w/minerals  15 mL Per Feeding Tube Daily     nafcillin  2 g Intravenous Q6H     pantoprazole  40 mg Per Feeding Tube QAM AC     QUEtiapine  100 mg Oral BID     sodium chloride (PF)  10 mL Intracatheter Q8H     sodium chloride (PF)  3 mL Intravenous Q8H            Physical Exam:   Blood pressure 116/81, pulse 80, temperature 99.2  F (37.3  C), temperature source Oral, resp. rate (!) 35, height 1.727 m (5' 8\"), weight 96.4 kg (212 lb 8.4 oz), SpO2 99 %.    Vitals:    07/01/21 1257 07/01/21 1730 07/02/21 0200 07/03/21 0530   Weight: 86 kg (189 lb 9.5 oz) 87.5 kg (192 lb 14.4 oz) 87.6 kg (193 lb 1.6 oz) 92.4 kg (203 lb 11.3 oz)    07/04/21 0000 07/05/21 0000   Weight: 93.9 kg (207 lb 0.2 oz) 96.4 kg (212 lb 8.4 oz)         Intake/Output Summary (Last 24 hours) at 7/4/2021 1415  Last data filed at 7/4/2021 1000  Gross per 24 hour   Intake 3544.15 ml   Output 805 ml   Net 2739.15 ml       06/30 0700 - 07/05 0659  In: 54429.46 [I.V.:29962.46]  Out: 5575 [Urine:5400]  Net: 42759.46    Exam:  GENERAL APPEARANCE ADULT: Alert, in no acute distress  RESP: lungs clear to auscultation   CV: regular rate and rhythm, no murmur, rub, or gallop  ABDOMEN: normal bowel sounds, soft, nontender, no hepatosplenomegaly or other masses  EXTREMITIES: No edema         Data:   LABS (Last four results)  CMP  Recent Labs   Lab 07/05/21  0413 07/04/21  0537 07/03/21  0956 07/03/21  0400 07/02/21  0435 07/02/21  0214 07/01/21 1839 07/01/21 1839   * 148*  --  144  --  140  --  139   POTASSIUM 4.0 4.2 4.1 3.4  --  3.5   < > 2.7*   CHLORIDE 122* 121*  --  115*  --  110*  --  108   CO2 22 21  --  21  --  20  --  19*   ANIONGAP 4 6  --  8  --  10  --  12   * 138*  --  145*  --  109*  --  115*   BUN 34* 33*  -- "  25  --  26  --  27   CR 0.88 1.05*  --  0.99  --  1.29*  --  1.29*   GFRESTIMATED 82 67  --  71  --  52*  --  52*   GFRESTBLACK >90 77  --  83  --  60*  --  60*   DEREK 7.4* 7.5*  --  7.6*  --  7.3*  --  7.0*   MAG 2.4* 2.6*  --   --  1.9  --   --   --    PHOS 4.0 5.0*  --   --  4.0  --   --   --    PROTTOTAL 6.2* 6.0*  --  6.1*  --   --   --  5.8*   ALBUMIN 1.2* 1.2*  --  1.3*  --   --   --  1.5*   BILITOTAL 1.2 1.9* 2.8* 3.0*  --   --   --  2.0*   ALKPHOS 241* 260*  --  333*  --   --   --  293*   AST 24 22  --  41  --   --   --  55*   ALT 23 25  --  38  --   --   --  35    < > = values in this interval not displayed.     CBC  Recent Labs   Lab 07/05/21  0413 07/04/21  0537 07/03/21  0400 07/02/21  1049 07/01/21  1839 07/01/21  1839   WBC 11.4* 14.0* 28.5*  --   --  21.7*   RBC 2.97* 2.78* 3.31*  --   --  3.10*   HGB 6.6* 6.0* 7.2* 7.9*   < > 6.4*   HCT 21.4* 19.1* 22.6*  --   --  20.8*   MCV 72* 69* 68*  --   --  67*   MCH 22.2* 21.6* 21.8*  --   --  20.6*   MCHC 30.8* 31.4* 31.9  --   --  30.8*   RDW 21.5* 19.9* 19.4*  --   --  17.1*    172 173  --   --  114*    < > = values in this interval not displayed.     INR  Recent Labs   Lab 07/01/21  1239   INR 1.23*     TROPONINS   Lab Results   Component Value Date    TROPI 0.431 () 07/04/2021    TROPI 0.439 () 07/04/2021    TROPI 0.407 () 07/03/2021    TROPI 0.395 () 07/03/2021    TROPI 0.430 () 07/03/2021                                                                                                               SYEDA MARIE MD

## 2021-07-05 NOTE — PROGRESS NOTES
[facility] ICU RESPIRATORY NOTE        Date of Admission: 7/1/2021    Date of Intubation (most recent): 7/1/21    Reason for Mechanical Ventilation: Airway protection     Number of Days on Mechanical Ventilation: 5    Met Criteria for Spontaneous Breathing Trial: No  Reason for No Spontaneous Breathing Trial: Per MD     Significant Events Today: none overnight     ABG Results:   Recent Labs   Lab 07/04/21  0613 07/03/21  1132 07/01/21  2251 07/01/21  1830   PH 7.49* 7.49* 7.42 7.41   PCO2 29* 29* 31* 32*   PO2 148* 99 93 99   HCO3 22 22 20* 20*   O2PER 40% 40 40 40%       Current Vent Settings: Ventilation Mode: CMV/AC  (Continuous Mandatory Ventilation/ Assist Control)  FiO2 (%): 30 %  Rate Set (breaths/minute): 16 breaths/min  Tidal Volume Set (mL): 450 mL  PEEP (cm H2O): 5 cmH2O  Oxygen Concentration (%): 30 %  Resp: 22      Plan: Continue on full ventilator support.   Keith Combs, RT

## 2021-07-05 NOTE — PLAN OF CARE
Pt. Remains on vent., no changes this 12H shift, good perfusion, sinus rhythm, levo. Drip unchanged, fentanyl and versed drip unchanged, fentanyl and dilauded  boluses at times for overbreathing ventilator, precedex drip increased towards end of shift, with good resolve, no BM, urine output low to, pt. Neuro exam unchanged: Reacting to pain and pupils ewual, no following, pt's mother talked to and updated.

## 2021-07-06 ENCOUNTER — APPOINTMENT (OUTPATIENT)
Dept: ULTRASOUND IMAGING | Facility: CLINIC | Age: 41
End: 2021-07-06
Attending: SPECIALIST
Payer: MEDICAID

## 2021-07-06 LAB
ACANTHOCYTES BLD QL SMEAR: SLIGHT
ALBUMIN SERPL-MCNC: 1.3 G/DL (ref 3.4–5)
ALP SERPL-CCNC: 219 U/L (ref 40–150)
ALT SERPL W P-5'-P-CCNC: 18 U/L (ref 0–50)
ANION GAP SERPL CALCULATED.3IONS-SCNC: 6 MMOL/L (ref 3–14)
ANISOCYTOSIS BLD QL SMEAR: ABNORMAL
AST SERPL W P-5'-P-CCNC: 18 U/L (ref 0–45)
BASOPHILS # BLD AUTO: 0 10E9/L (ref 0–0.2)
BASOPHILS NFR BLD AUTO: 0 %
BILIRUB SERPL-MCNC: 1.7 MG/DL (ref 0.2–1.3)
BLD PROD TYP BPU: NORMAL
BLD UNIT ID BPU: 0
BLOOD PRODUCT CODE: NORMAL
BPU ID: NORMAL
BUN SERPL-MCNC: 28 MG/DL (ref 7–30)
CALCIUM SERPL-MCNC: 7.6 MG/DL (ref 8.5–10.1)
CHLORIDE SERPL-SCNC: 122 MMOL/L (ref 94–109)
CO2 SERPL-SCNC: 22 MMOL/L (ref 20–32)
CREAT SERPL-MCNC: 0.82 MG/DL (ref 0.52–1.04)
DIFFERENTIAL METHOD BLD: ABNORMAL
EOSINOPHIL # BLD AUTO: 0.5 10E9/L (ref 0–0.7)
EOSINOPHIL NFR BLD AUTO: 4 %
ERYTHROCYTE [DISTWIDTH] IN BLOOD BY AUTOMATED COUNT: 23.4 % (ref 10–15)
GFR SERPL CREATININE-BSD FRML MDRD: >90 ML/MIN/{1.73_M2}
GLUCOSE BLDC GLUCOMTR-MCNC: 110 MG/DL (ref 70–99)
GLUCOSE BLDC GLUCOMTR-MCNC: 116 MG/DL (ref 70–99)
GLUCOSE BLDC GLUCOMTR-MCNC: 118 MG/DL (ref 70–99)
GLUCOSE BLDC GLUCOMTR-MCNC: 121 MG/DL (ref 70–99)
GLUCOSE BLDC GLUCOMTR-MCNC: 124 MG/DL (ref 70–99)
GLUCOSE BLDC GLUCOMTR-MCNC: 125 MG/DL (ref 70–99)
GLUCOSE SERPL-MCNC: 131 MG/DL (ref 70–99)
HBV CORE AB SERPL QL IA: NORMAL
HBV SURFACE AB SERPL IA-ACNC: 0.58 M[IU]/ML
HBV SURFACE AB SERPL IA-ACNC: NORMAL M[IU]/ML
HBV SURFACE AG SERPL QL IA: NONREACTIVE
HBV SURFACE AG SERPL QL IA: NORMAL
HCT VFR BLD AUTO: 22.4 % (ref 35–47)
HCV AB SERPL QL IA: NORMAL
HCV AB SERPL QL IA: REACTIVE
HGB BLD-MCNC: 6.9 G/DL (ref 11.7–15.7)
HIV 1 & 2 AB SERPL IA.RAPID: ABNORMAL
HIV 1 & 2 AB SERPL IA.RAPID: REACTIVE
HIV 1+2 AB+HIV1 P24 AG SERPL QL IA: NORMAL
HIV 1+2 AB+HIV1 P24 AG SERPL QL IA: REACTIVE
HIV 1+2 AB+HIV1P24 AG SERPLBLD IA.RAPID: ABNORMAL
INTERPRETATION ECG - MUSE: NORMAL
LYMPHOCYTES # BLD AUTO: 3.7 10E9/L (ref 0.8–5.3)
LYMPHOCYTES NFR BLD AUTO: 31 %
MAGNESIUM SERPL-MCNC: 2.4 MG/DL (ref 1.6–2.3)
MCH RBC QN AUTO: 23.5 PG (ref 26.5–33)
MCHC RBC AUTO-ENTMCNC: 30.8 G/DL (ref 31.5–36.5)
MCV RBC AUTO: 76 FL (ref 78–100)
METAMYELOCYTES # BLD: 0.4 10E9/L
METAMYELOCYTES NFR BLD MANUAL: 3 %
MONOCYTES # BLD AUTO: 0.2 10E9/L (ref 0–1.3)
MONOCYTES NFR BLD AUTO: 2 %
NEUTROPHILS # BLD AUTO: 7.1 10E9/L (ref 1.6–8.3)
NEUTROPHILS NFR BLD AUTO: 60 %
PHOSPHATE SERPL-MCNC: 3.9 MG/DL (ref 2.5–4.5)
PLATELET # BLD AUTO: 236 10E9/L (ref 150–450)
PLATELET # BLD EST: ABNORMAL 10*3/UL
POTASSIUM SERPL-SCNC: 3.5 MMOL/L (ref 3.4–5.3)
PROT SERPL-MCNC: 6.7 G/DL (ref 6.8–8.8)
RBC # BLD AUTO: 2.94 10E12/L (ref 3.8–5.2)
SODIUM SERPL-SCNC: 150 MMOL/L (ref 133–144)
TRANSFUSION STATUS PATIENT QL: NORMAL
TRANSFUSION STATUS PATIENT QL: NORMAL
WBC # BLD AUTO: 11.9 10E9/L (ref 4–11)

## 2021-07-06 PROCEDURE — 80053 COMPREHEN METABOLIC PANEL: CPT | Performed by: INTERNAL MEDICINE

## 2021-07-06 PROCEDURE — 250N000011 HC RX IP 250 OP 636: Performed by: INTERNAL MEDICINE

## 2021-07-06 PROCEDURE — 84100 ASSAY OF PHOSPHORUS: CPT | Performed by: INTERNAL MEDICINE

## 2021-07-06 PROCEDURE — P9016 RBC LEUKOCYTES REDUCED: HCPCS | Performed by: INTERNAL MEDICINE

## 2021-07-06 PROCEDURE — 250N000009 HC RX 250: Performed by: INTERNAL MEDICINE

## 2021-07-06 PROCEDURE — 99291 CRITICAL CARE FIRST HOUR: CPT | Performed by: INTERNAL MEDICINE

## 2021-07-06 PROCEDURE — 200N000001 HC R&B ICU

## 2021-07-06 PROCEDURE — 76882 US LMTD JT/FCL EVL NVASC XTR: CPT | Mod: RT

## 2021-07-06 PROCEDURE — 250N000009 HC RX 250: Performed by: SPECIALIST

## 2021-07-06 PROCEDURE — 87536 HIV-1 QUANT&REVRSE TRNSCRPJ: CPT | Performed by: INTERNAL MEDICINE

## 2021-07-06 PROCEDURE — 87040 BLOOD CULTURE FOR BACTERIA: CPT | Performed by: INTERNAL MEDICINE

## 2021-07-06 PROCEDURE — 85004 AUTOMATED DIFF WBC COUNT: CPT | Performed by: INTERNAL MEDICINE

## 2021-07-06 PROCEDURE — 999N001017 HC STATISTIC GLUCOSE BY METER IP

## 2021-07-06 PROCEDURE — 99233 SBSQ HOSP IP/OBS HIGH 50: CPT | Performed by: INTERNAL MEDICINE

## 2021-07-06 PROCEDURE — 86359 T CELLS TOTAL COUNT: CPT | Performed by: SPECIALIST

## 2021-07-06 PROCEDURE — 999N000157 HC STATISTIC RCP TIME EA 10 MIN

## 2021-07-06 PROCEDURE — 85027 COMPLETE CBC AUTOMATED: CPT | Performed by: INTERNAL MEDICINE

## 2021-07-06 PROCEDURE — 250N000013 HC RX MED GY IP 250 OP 250 PS 637: Performed by: INTERNAL MEDICINE

## 2021-07-06 PROCEDURE — 94640 AIRWAY INHALATION TREATMENT: CPT

## 2021-07-06 PROCEDURE — 94640 AIRWAY INHALATION TREATMENT: CPT | Mod: 76

## 2021-07-06 PROCEDURE — 86360 T CELL ABSOLUTE COUNT/RATIO: CPT | Performed by: SPECIALIST

## 2021-07-06 PROCEDURE — 87522 HEPATITIS C REVRS TRNSCRPJ: CPT | Performed by: SPECIALIST

## 2021-07-06 PROCEDURE — 94003 VENT MGMT INPAT SUBQ DAY: CPT

## 2021-07-06 PROCEDURE — 83735 ASSAY OF MAGNESIUM: CPT | Performed by: INTERNAL MEDICINE

## 2021-07-06 RX ORDER — ALBUTEROL SULFATE 0.83 MG/ML
2.5 SOLUTION RESPIRATORY (INHALATION)
Status: DISCONTINUED | OUTPATIENT
Start: 2021-07-06 | End: 2021-07-11

## 2021-07-06 RX ORDER — QUETIAPINE FUMARATE 100 MG/1
100 TABLET, FILM COATED ORAL 2 TIMES DAILY
Status: DISCONTINUED | OUTPATIENT
Start: 2021-07-06 | End: 2021-07-08

## 2021-07-06 RX ADMIN — PANTOPRAZOLE SODIUM 40 MG: 40 TABLET, DELAYED RELEASE ORAL at 07:30

## 2021-07-06 RX ADMIN — HEPARIN SODIUM 5000 UNITS: 5000 INJECTION, SOLUTION INTRAVENOUS; SUBCUTANEOUS at 21:06

## 2021-07-06 RX ADMIN — ALBUTEROL SULFATE 2.5 MG: 2.5 SOLUTION RESPIRATORY (INHALATION) at 17:05

## 2021-07-06 RX ADMIN — PROPOFOL 50 MCG/KG/MIN: 10 INJECTION, EMULSION INTRAVENOUS at 10:20

## 2021-07-06 RX ADMIN — HYDROMORPHONE HYDROCHLORIDE 1 MG: 1 INJECTION, SOLUTION INTRAMUSCULAR; INTRAVENOUS; SUBCUTANEOUS at 21:49

## 2021-07-06 RX ADMIN — PROPOFOL 55 MCG/KG/MIN: 10 INJECTION, EMULSION INTRAVENOUS at 16:04

## 2021-07-06 RX ADMIN — CHLORHEXIDINE GLUCONATE 15 ML: 1.2 SOLUTION ORAL at 07:31

## 2021-07-06 RX ADMIN — Medication 1 PACKET: at 13:23

## 2021-07-06 RX ADMIN — QUETIAPINE FUMARATE 100 MG: 100 TABLET ORAL at 21:06

## 2021-07-06 RX ADMIN — PROPOFOL 70 MCG/KG/MIN: 10 INJECTION, EMULSION INTRAVENOUS at 18:36

## 2021-07-06 RX ADMIN — PROPOFOL 50 MCG/KG/MIN: 10 INJECTION, EMULSION INTRAVENOUS at 03:45

## 2021-07-06 RX ADMIN — PROPOFOL 75 MCG/KG/MIN: 10 INJECTION, EMULSION INTRAVENOUS at 23:53

## 2021-07-06 RX ADMIN — PROPOFOL 50 MCG/KG/MIN: 10 INJECTION, EMULSION INTRAVENOUS at 13:25

## 2021-07-06 RX ADMIN — HEPARIN SODIUM 5000 UNITS: 5000 INJECTION, SOLUTION INTRAVENOUS; SUBCUTANEOUS at 13:23

## 2021-07-06 RX ADMIN — NAFCILLIN SODIUM 2 G: 2 INJECTION, POWDER, LYOPHILIZED, FOR SOLUTION INTRAMUSCULAR; INTRAVENOUS at 21:21

## 2021-07-06 RX ADMIN — PROPOFOL 70 MCG/KG/MIN: 10 INJECTION, EMULSION INTRAVENOUS at 21:06

## 2021-07-06 RX ADMIN — QUETIAPINE FUMARATE 100 MG: 100 TABLET ORAL at 08:43

## 2021-07-06 RX ADMIN — Medication 1 PACKET: at 08:43

## 2021-07-06 RX ADMIN — NAFCILLIN SODIUM 2 G: 2 INJECTION, POWDER, LYOPHILIZED, FOR SOLUTION INTRAMUSCULAR; INTRAVENOUS at 06:56

## 2021-07-06 RX ADMIN — NAFCILLIN SODIUM 2 G: 2 INJECTION, POWDER, LYOPHILIZED, FOR SOLUTION INTRAMUSCULAR; INTRAVENOUS at 10:18

## 2021-07-06 RX ADMIN — CIPROFLOXACIN 500 MG: 500 TABLET, FILM COATED ORAL at 08:41

## 2021-07-06 RX ADMIN — CHLORHEXIDINE GLUCONATE 15 ML: 1.2 SOLUTION ORAL at 21:06

## 2021-07-06 RX ADMIN — NAFCILLIN SODIUM 2 G: 2 INJECTION, POWDER, LYOPHILIZED, FOR SOLUTION INTRAMUSCULAR; INTRAVENOUS at 17:53

## 2021-07-06 RX ADMIN — Medication 15 ML: at 11:49

## 2021-07-06 RX ADMIN — NAFCILLIN SODIUM 2 G: 2 INJECTION, POWDER, LYOPHILIZED, FOR SOLUTION INTRAMUSCULAR; INTRAVENOUS at 03:25

## 2021-07-06 RX ADMIN — Medication 50 MCG: at 21:25

## 2021-07-06 RX ADMIN — HEPARIN SODIUM 5000 UNITS: 5000 INJECTION, SOLUTION INTRAVENOUS; SUBCUTANEOUS at 06:56

## 2021-07-06 RX ADMIN — PROPOFOL 50 MCG/KG/MIN: 10 INJECTION, EMULSION INTRAVENOUS at 06:56

## 2021-07-06 RX ADMIN — CIPROFLOXACIN 500 MG: 500 TABLET, FILM COATED ORAL at 21:06

## 2021-07-06 RX ADMIN — PROPOFOL 50 MCG/KG/MIN: 10 INJECTION, EMULSION INTRAVENOUS at 01:18

## 2021-07-06 RX ADMIN — NAFCILLIN SODIUM 2 G: 2 INJECTION, POWDER, LYOPHILIZED, FOR SOLUTION INTRAMUSCULAR; INTRAVENOUS at 13:32

## 2021-07-06 RX ADMIN — Medication 50 MCG/HR: at 23:52

## 2021-07-06 RX ADMIN — ALBUTEROL SULFATE 2.5 MG: 2.5 SOLUTION RESPIRATORY (INHALATION) at 19:20

## 2021-07-06 ASSESSMENT — MIFFLIN-ST. JEOR: SCORE: 1695.5

## 2021-07-06 ASSESSMENT — ACTIVITIES OF DAILY LIVING (ADL)
ADLS_ACUITY_SCORE: 26

## 2021-07-06 NOTE — PROGRESS NOTES
Assessment and Plan:     Assessment:   Manas Hernandez is a 41 year old IVDU female who was admitted on 7/1/2021 with MSSA sepsis and shock. TTE shows a small mitral valve vegetation as well as pericardial inflammation with a large localized (posterior) effusion. She is intubated and sedated due to agitation and inability to protect her airway.      1. MSSA sepsis and shock with encephalopathy.  Not on any pressors now.  2. Mitral valve endocarditis with a small mobile vegetation, mild-moderate MR and severe LAE, embolic episodes to the extremities noted  3. Probable myopericarditis with large localized pericardial effusion and troponin rise. Low level of persistently elevated troponin is probably due to inflammation of myocardium.   4. Preserved LVEF.  5. Ischemic CVA, possibly related to MV vegetation embolization..   6. Pyelonephritis.  7. Malnourishment with severe anemia, hypoalbuminemia, hypokalemia.  8. Chronic polysubstance and IVDU.  9. Malnutrition with albumin 1.2  10. Question of septic elbow joint    Patient hemodynamically stable.  On IV antibiotics.  Prognosis guarded.  Will need repeat echocardiogram later this week to reassess pericardial effusion.    Tristan Bishop MD        Interval History:   Intubated and sedated          Medications:       chlorhexidine  15 mL Mouth/Throat Q12H     ciprofloxacin  500 mg Per Feeding Tube Q12H JENARO     heparin ANTICOAGULANT  5,000 Units Subcutaneous Q8H     insulin aspart  1-6 Units Subcutaneous Q4H     multivitamins w/minerals  15 mL Per Feeding Tube Daily     nafcillin  2 g Intravenous Q4H     pantoprazole  40 mg Per Feeding Tube QAM AC     protein modular  1 packet Per Feeding Tube 4x Daily     QUEtiapine  100 mg Oral or Feeding Tube BID     sodium chloride (PF)  10 mL Intracatheter Q8H     sodium chloride (PF)  3 mL Intravenous Q8H            Physical Exam:   Blood pressure 116/81, pulse 87, temperature 99.2  F (37.3  C), temperature source  "Oral, resp. rate 19, height 1.727 m (5' 8\"), weight 97.2 kg (214 lb 4.6 oz), SpO2 98 %.    Vitals:    07/01/21 1257 07/01/21 1730 07/02/21 0200 07/03/21 0530   Weight: 86 kg (189 lb 9.5 oz) 87.5 kg (192 lb 14.4 oz) 87.6 kg (193 lb 1.6 oz) 92.4 kg (203 lb 11.3 oz)    07/04/21 0000 07/05/21 0000 07/06/21 0400   Weight: 93.9 kg (207 lb 0.2 oz) 96.4 kg (212 lb 8.4 oz) 97.2 kg (214 lb 4.6 oz)         Intake/Output Summary (Last 24 hours) at 7/4/2021 1415  Last data filed at 7/4/2021 1000  Gross per 24 hour   Intake 3544.15 ml   Output 805 ml   Net 2739.15 ml       07/01 0700 - 07/06 0659  In: 56626.4 [I.V.:29496.4]  Out: 6860 [Urine:6685]  Net: 08562.4    Exam:  GENERAL APPEARANCE ADULT: Intubated and sedated.    RESP: lungs clear to auscultation   CV: 2 x 6 pansystolic murmur in the mitral area.  ABDOMEN: normal bowel sounds, soft, nontender, no hepatosplenomegaly or other masses  EXTREMITIES: Embolic papular lesions, discoloration noted         Data:   LABS (Last four results)  CMP  Recent Labs   Lab 07/06/21  0500 07/05/21  1545 07/05/21  0413 07/04/21  0537 07/03/21  0956 07/03/21  0400 07/02/21  0435   * 151* 148* 148*  --  144  --    POTASSIUM 3.5  --  4.0 4.2 4.1 3.4  --    CHLORIDE 122*  --  122* 121*  --  115*  --    CO2 22  --  22 21  --  21  --    ANIONGAP 6  --  4 6  --  8  --    *  --  112* 138*  --  145*  --    BUN 28  --  34* 33*  --  25  --    CR 0.82  --  0.88 1.05*  --  0.99  --    GFRESTIMATED >90  --  82 67  --  71  --    GFRESTBLACK >90  --  >90 77  --  83  --    DEREK 7.6*  --  7.4* 7.5*  --  7.6*  --    MAG 2.4*  --  2.4* 2.6*  --   --  1.9   PHOS 3.9  --  4.0 5.0*  --   --  4.0   PROTTOTAL 6.7*  --  6.2* 6.0*  --  6.1*  --    ALBUMIN 1.3*  --  1.2* 1.2*  --  1.3*  --    BILITOTAL 1.7*  --  1.2 1.9* 2.8* 3.0*  --    ALKPHOS 219*  --  241* 260*  --  333*  --    AST 18  --  24 22  --  41  --    ALT 18  --  23 25  --  38  --      CBC  Recent Labs   Lab 07/06/21  0500 07/05/21  0561 " 07/04/21  0537 07/03/21  0400   WBC 11.9* 11.4* 14.0* 28.5*   RBC 2.94* 2.97* 2.78* 3.31*   HGB 6.9* 6.6* 6.0* 7.2*   HCT 22.4* 21.4* 19.1* 22.6*   MCV 76* 72* 69* 68*   MCH 23.5* 22.2* 21.6* 21.8*   MCHC 30.8* 30.8* 31.4* 31.9   RDW 23.4* 21.5* 19.9* 19.4*    204 172 173     INR  Recent Labs   Lab 07/01/21  1239   INR 1.23*     TROPONINS   Lab Results   Component Value Date    TROPI 0.431 () 07/04/2021    TROPI 0.439 () 07/04/2021    TROPI 0.407 () 07/03/2021    TROPI 0.395 () 07/03/2021    TROPI 0.430 () 07/03/2021                                                                                                               Tristan Bishop MD

## 2021-07-06 NOTE — PROGRESS NOTES
[facility]    Infectious Disease Progress Note    Date of Service : 07/06/2021        Assessment :  1. S.aureus MSSA native mitral valve endocarditis with septic shock in the setting of IVDA complicated by embolic L parietal lobe infarction and multiple peripheral emboli.   (TTE shows a large mobile 1.4 cm vegetation on the posterior mitral valve leaflet with extension to left atrium and small ASD per cardiology notes , no valvular abscess and no additional valvular involvement. Pericardial effusion is small). No seeding on CT abdomen but has swelling of right 4th finger and toe discoloration with concern for seeding.  2. New swelling right finger concerning for septic arthritis/oseomyelitis  3. Severe sepsis requiring pressor support and multiorgan failure  4. Pericardial effusion is small on JAE and does not require drainage.  5. Multiple peripheral emboli. Right finger swelling and discoloration of left 4th toe  5. FORD related to sepsis- resolved  6. Embolic stroke  7. Klebsiella pneumoniae UTI  8. Hypernatremia  9. Severe anemia multifactorial. Has baseline anemia, exacerbated by chronic disease  10. Polysubstance abuse  11. Splenomegaly   12. LFT abnormality  13. Hepatitis c Ab +    Recommendations  1. MRI right hand cannot be done while on vent per MRI team, advised US septic joint for right hand (ordered)  2. Nafcillin 2 grams Q4H  3. cipro X 1 week  4. Follow blood cxs until clear  5. Check HCV RNA    Marisol Matos MD    Interval History   Remains critically ill, sedated and intubated, afebrile, last set of blood cxs negative so far    Antimicrobial therapy  7/2 Nafcillin  7/4 Cipro  prior  7/1-7/2 Vancomycin, zosyn    Physical Exam   Temp: 98.7  F (37.1  C) Temp src: Axillary   Pulse: 85   Resp: 24 SpO2: 98 % O2 Device: Mechanical Ventilator    Vitals:    07/04/21 0000 07/05/21 0000 07/06/21 0400   Weight: 93.9 kg (207 lb 0.2 oz) 96.4 kg (212 lb 8.4 oz) 97.2 kg (214 lb 4.6 oz)      Vital Signs with Ranges  Temp:  [98.7  F (37.1  C)-99.4  F (37.4  C)] 98.7  F (37.1  C)  Pulse:  [] 85  Resp:  [12-52] 24  MAP:  [75 mmHg-100 mmHg] 92 mmHg  Arterial Line BP: (107-137)/(47-79) 131/71  FiO2 (%):  [30 %] 30 %  SpO2:  [96 %-100 %] 98 %    GENERAL APPEARANCE:  intubated  EYES: conjunctivae and sclerae normal  NECK: no adenopathy  RESP: lungs clear to auscultation - no rales, rhonchi or wheezes  CV: regular rates and rhythm, murmur  ABDOMEN: soft, umbilical hernia  MS: extremities normal- no gross deformities noted  SKIN: right finger with swelling and redness, multiple osler's nodes and splinter hemorrhages, multiple embolic infarcts on toes, black discoloration of left toe    Other:    Medications     dexmedetomidine Stopped (07/05/21 1052)     fentaNYL 50 mcg/hr (07/05/21 2003)     - MEDICATION INSTRUCTIONS -       - MEDICATION INSTRUCTIONS -       midazolam Stopped (07/06/21 0418)     norepinephrine Stopped (07/05/21 1421)     propofol (DIPRIVAN) infusion 50 mcg/kg/min (07/06/21 0656)     sodium chloride 10 mL/hr at 07/02/21 1934     vasopressin Stopped (07/02/21 1240)       chlorhexidine  15 mL Mouth/Throat Q12H     ciprofloxacin  500 mg Per Feeding Tube Q12H JENARO     heparin ANTICOAGULANT  5,000 Units Subcutaneous Q8H     insulin aspart  1-6 Units Subcutaneous Q4H     multivitamins w/minerals  15 mL Per Feeding Tube Daily     nafcillin  2 g Intravenous Q4H     pantoprazole  40 mg Per Feeding Tube QAM AC     protein modular  1 packet Per Feeding Tube 4x Daily     QUEtiapine  100 mg Oral BID     sodium chloride (PF)  10 mL Intracatheter Q8H     sodium chloride (PF)  3 mL Intravenous Q8H       Data   All microbiology laboratory data reviewed.  Recent Labs   Lab Test 07/06/21  0500 07/05/21  0413 07/04/21  0537   WBC 11.9* 11.4* 14.0*   HGB 6.9* 6.6* 6.0*   HCT 22.4* 21.4* 19.1*   MCV 76* 72* 69*    204 172     Recent Labs   Lab Test 07/06/21  0500 07/05/21  0413 07/04/21  0537   CR  0.82 0.88 1.05*     No lab results found.  Recent Labs   Lab Test 07/06/21  0500 07/05/21  1232 07/05/21  1104 07/03/21  1730 07/03/21  1722 07/02/21  1238 07/02/21  1225 07/01/21  1839 07/01/21  1251   CULT No growth after 1 hour  No growth after 1 hour No growth after 15 hours No growth after 15 hours Cultured on the 1st day of incubation:  Staphylococcus aureus  Susceptibility testing done on previous specimen  *  Critical Value/Significant Value called to and read back by  Royal Ayala RN @1345 7/4/2021 gd   Cultured on the 1st day of incubation:  Staphylococcus aureus  Susceptibility testing done on previous specimen  *  Critical Value/Significant Value called to and read back by  Royal Ayala RN @1347 07/04/2021 gd   Cultured on the 1st day of incubation:  Staphylococcus aureus  Susceptibility testing done on previous specimen  *  Critical Value/Significant Value called to and read back by  Lashanda Barnes RN @1522 7/3/2021 gd   Cultured on the 1st day of incubation:  Staphylococcus aureus  Susceptibility testing done on previous specimen  *  Critical Value/Significant Value, preliminary result only, called to and read back by   ROYAL DE DIOS RN @0943 7.3.21 LM   Cultured on the 1st day of incubation:  Staphylococcus aureus  Susceptibility testing done on previous specimen  *  Critical Value/Significant Value, preliminary result only, called to and read back by  Shanice Ennis RN @ 1330 7.2.21 JE   >100,000 colonies/mL  Klebsiella pneumoniae  *  >100,000 colonies/mL  Strain 2  Klebsiella pneumoniae  *     Susceptibility                Staphylococcus aureus (1) Staphylococcus aureus (3)       BOBBY BOBBY       CLINDAMYCIN <=0.25 ug/mL Sensitive <=0.25 ug/mL Sensitive       ERYTHROMYCIN <=0.25 ug/mL Sensitive <=0.25 ug/mL Sensitive       GENTAMICIN <=0.5 ug/mL Sensitive <=0.5 ug/mL Sensitive       OXACILLIN 0.5 ug/mL Sensitive 0.5 ug/mL Sensitive       TETRACYCLINE <=1 ug/mL Sensitive <=1 ug/mL Sensitive        Trimethoprim/Sulfa <=0.5/9.5 u... Sensitive <=0.5/9.5 u... Sensitive       VANCOMYCIN 1 ug/mL Sensitive 1 ug/mL Sensitive       7/5 JAE  Interpretation Summary     1. The left ventricle is normal in structure, function and size. The visual  ejection fraction is estimated at 60%.  2. The right ventricle is normal in structure, function and size.  3. Small atrial septal defect, secundum type 5mm in diameter. Left to right  shunt. Rim appears adequate in size for closure device.  4. Moderate to large mobile vegetation (14mm long, 6mm wide) of the P2 segment  of the mitral leaflet. No valve perforation. There is moderate (2+) mitral  regurgitation.  5. Small pericardial effusion    7/1TTE  Interpretation Summary     Left ventricular systolic function is normal.  The visual ejection fraction is 55-60%.  The right ventricle is mildly dilated.  The right ventricular systolic function is normal.  There is a mobile linear echodensity measuring 1.4 cm x 0.6 cm on the atrial  side of the posterior mitral valve leaflet.  Other valve structures appear normal without significant dysfunction or  obvious valvular vegetations.  The inferior pericardium appears moderately thickened and echobright which may  represent active pericarditis.  Large localized pericardial effusion (2.7 cm) posteriorly/laterally with  mobile free floating echodense material which could represent fibrinous  stranding in setting of inflammatory pericarditis or infectious material if  infectious pericardial effusion.  Dilation of the inferior vena cava is present with abnormal respiratory  variation in diameter.  Small left pleural effusion     No prior for comparison.     7/1 CT head  CT SCAN OF THE HEAD WITHOUT CONTRAST   7/1/2021 1:47 PM      HISTORY: Delirium; altered mental status     TECHNIQUE:  Axial images of the head and coronal reformations without  IV contrast material.  Radiation dose for this scan was reduced using  automated exposure control,  adjustment of the mA and/or kV according  to patient size, or iterative reconstruction technique.     COMPARISON: None.     FINDINGS: There is a focal 2 cm area of hypodensity involving gray and  white matter in the medial left parietal lobe consistent with acute to  subacute ischemic infarct. There is a minimal incidental arachnoid  cyst in the anterior portion of left middle cranial fossa. Ventricles  and subarachnoid spaces are otherwise within normal limits. There is  no evidence for intracranial hemorrhage, significant mass effect, or  skull fracture. Exam is mildly limited by motion artifact. Visualized  paranasal sinuses and mastoid air cells are clear.                                                                      IMPRESSION: Focal hypodensity in the medial left parietal lobe  consistent with acute to subacute ischemic infarct.     7/1 Ct abdomen/pelvis                                                           IMPRESSION:   1.  Patchy hypoenhancement in the mid and upper poles of the right  kidney posteriorly, suspicious for pyelonephritis.  2.  Moderate-sized fat-containing paraumbilical hernia.  3.  Mild splenomegaly.  4.  There are possible small gallstones within a contracted  gallbladder.    Attestation:  Total time on the floor involved in the patient's care: 35 minutes. Total time spent in counseling/care coordination: >50%

## 2021-07-06 NOTE — PROGRESS NOTES
Asked to assess this patient for high peak airway pressures and ventilator dyssynchrony. Patient with prolonged expiratory phase. Plateau pressure in teens. Improved with larger TV. Was unable to tolerate PS mode.  Subsequently gave neb. Peak pressures decreased, RR decreased and was able to tolerate PS comfortably. I note that she is a smoker but no report of RAD or COPD.  - Schedule bronchodilators  - Increase PRN frequency when peak airway pressures occur  - Increase TV slightly to allow more time for exhalation.    Serene Tafoya MD  CC time separate to that of Dr. Piña: 20 minutes

## 2021-07-06 NOTE — PROGRESS NOTES
CV Surgery    JAE reviewed along with other notes. Given multiple co morbidities, will hope for IV abx to treat MV vegetation at this point.     Will continue to follow peripherally. Call with questions.    Rachelle Rhoades PA-C

## 2021-07-06 NOTE — PROGRESS NOTES
[facility] ICU RESPIRATORY NOTE        Date of Admission: 7/1/2021    Date of Intubation (most recent): 7/1/2021.     Reason for Mechanical Ventilation: Airway Protection.    Number of Days on Mechanical Ventilation: 6    Met Criteria for Spontaneous Breathing Trial: No    Reason for No Spontaneous Breathing Trial: Per MD.     Significant Events Today: None.     ABG Results:   Recent Labs   Lab 07/05/21  0412 07/04/21  0613 07/03/21  1132 07/01/21  2251   PH 7.50* 7.49* 7.49* 7.42   PCO2 28* 29* 29* 31*   PO2 216* 148* 99 93   HCO3 22 22 22 20*   O2PER 30 40% 40 40       Current Vent Settings: Ventilation Mode: CMV/AC  (Continuous Mandatory Ventilation/ Assist Control)  FiO2 (%): 30 %  Rate Set (breaths/minute): 16 breaths/min  Tidal Volume Set (mL): 450 mL  PEEP (cm H2O): 5 cmH2O  Oxygen Concentration (%): 30 %  Resp: 27    Plan: Continue to monitor on full ventilatory support.     Neymar Harris

## 2021-07-06 NOTE — PROGRESS NOTES
CNS: Pt not following, pupils/sluggish, withdraws, heavy sedation  CVS: SR/ST, BP stable  Resp: Mech vent 30% PEEP 5  GI: TF  Increased to 30, medium loose inc stool  : Neri in place, adequate output,   Skin: R fingers and R toes have black spots present, MD aware-MRI potentially changed to US, blister on R buttocks.   Access: R fem CVC, L A-line  Plan: Attempt to treat vegetation with abx per CV surg, Q4H neuros

## 2021-07-06 NOTE — PROVIDER NOTIFICATION
Notified Dr Piña about patient's lab results . 7/5 blood culture positive for gram positive cocci in clusters and HIV 1 is reactive. Have page out to ROSY GOLDBERG.

## 2021-07-06 NOTE — PROGRESS NOTES
Critical Care Progress Note  07/06/2021    Name: Manas Hernandez MRN#: [id number]   Age: 41 year old YOB: 1980     [facility] Day# 5           Problem List:   Principal Problem:    Endocarditis due to methicillin susceptible Staphylococcus aureus (MSSA)  Active Problems:    Pyelonephritis, acute    Altered mental status, unspecified altered mental status type           Summary/Hospital Course:   Ms. Hernandez is a 41 year old female with chronic polysubstance abuse and IVDU presented on 7/1 with encephalopathy, hypotension, anemia, and hypotension responsive to fluid resusictation. She was agitated in the ED, sedated and intubated for airway protection. CT head showed possible new acute to subacute parietal lobe ischemic infarct, CXR without abnormalities, and CT abdomen concerning for pyelonephritis empirically treated with vancomycin and Zosyn. Transferred to the ICU for continued infectious workup and airway management. On exam there was concern for endocarditis. On 7/2, admission blood cultures returned with MSSA. Remains intubated on nafcillin for infective endocarditis and pyelonephritis, urine culture with klebsiella treated with ciprofloxacin. On 7/5, JAE performed showing vegetation 1.8cm in length on posterior leaflet of MV, small ASD with L to R shunt, small pericardial effusion without tamponade.      Assessment and plan :     Manas Hernandez is a 41 year old female with a history of chronic polysubstance abuse/IVDU admitted on 7/1/2021 for encephalopathy requiring intubation, hypotension and acidosis. She is intubated in the ICU for management of native mitral valve endocarditis complicated by MSSA bacteremia and large pericardial effusion without tamponade.  I have personally reviewed the daily labs, imaging studies, cultures and discussed the case with referring physician and consulting physicians.   My assessment and plan by system for this patient is as follows:    Neurology/Psychiatry:    1. Acute ischemic stroke of left MCA, PCA territories secondary to infective endocarditis.  2. Toxic encephalopathy, septic encephalopathy - methicillin S. Aureus bacteremia on blood cultures (7/3).    3. ICU Sedation - on propofol, weaned off midazolam  4. Analgesia  5. Agitation  6. Polysubstance use  Plan  - Neuro CC following, appreciate assistance  - Stroke risk - monitor BP, goal <130/80  - Continue to follow blood cultures  - Continue propofol for sedation, wean as able with RASS goal 0 to -1  - Fentanyl prn for pain and withdrawal prevention  - Tylenol prn for pain  - Monitor for signs of withdrawal - UDS positive for amphetamines and opiates on admission  - Continue seroquel 100 mg bid  - Chemical dependency evaluation once extubated if agreeable     Cardiovascular:   1. MSSA septic shock - resolved.  stable off pressors.  2. Mitral valve endocarditis - small mobile vegetation, mild-moderate MR and severe LAE. No abscess or perivalvular abscess on JAE (7/5).  3. Elevated troponin likely secondary to myocardial inflammation, improved - peaked 7/1, no longer trending.  4. Hypertriglyceridemia - elevated TG, low HDL  5. Small pericardial effusion without tamponade physiology - JAE performed, difficult to sample safely.  Plan  - CV surgery following - hope to avoid surgical MVR due to pt's history of IVDU and risk of prosthetic valve endocarditis. Prefer to treat with IV antibiotic therapy.  - Continue IV antibiotics as below.  - Repeat JAE in 1 week to assess for improvement in 1 week or so, per ID.    Pulmonary/Ventilator Management:   1. Mechanically ventilated for airway protection - CMV/AC (16/450/30/5).  2. Respiratory alkalosis, likely secondary to overventilation. Expect to improve post extubation.  Plan  - Continue full ventilatory support, initiate PST when awakening  - still to encephalopathic to extubate today    GI/Nutrition :   1. Mildly elevated alkaline phosphate  2. Hyperbilirubinemia with  mild biliary dilation on US (CDB 9mm) -- likely due to underlying sepsis, monitor as needed.  3. Malnutrition - hypoalbuminemia 1.7, severe anemia  4. Stress ulcer prophylaxis  Plan  - Intubated, on tube feeds  - Monitor CMP daily  - Continue pantoprazole 40 mg IV  - Continue to monitor bilirubin and alk phos, repeat US in a few days.    Renal/Fluids/Electrolytes:   1. Klebsiella pneumoniae UTI with septic shock - urine culture with klebsiella on 7/4.  2. Hypernatremia, free water increased from 60 q4 to 150 q4  3. FORD - resolved.  Plan  - Continue ciprofloxacin 500 mg q12h x 7 days (4 days remain)  - Monitor CMP daily  - Avoid nephrotoxic agents such as NSAID, IV contrast unless specifically required  - Follow I/O's as appropriate.    Infectious Disease:   1. Severe sepsis secondary to MSSA bacteremia and MV endocarditis, leukocytosis down trending, blood cultures from 7/5 show no growth to date.  2. Klebsiella pneumoniae UTI  Plan  - Continue nafcillin 2 g IV q4h  - ID following, appreciate assistance  - Continue to follow blood cultures, most recent growth 7/3 (MSSA)  - Continue to monitor urine output while on IV antibiotics  - Hep C and HIV testing positive.  Appreciate ID input.    Endocrine:   1. Probable relative adrenal insufficiency in the setting of severe sepsis  2. Stress induced hyperglycemia, improved  3. Prediabetes, A1c 5.7 at admission  Plan  - ICU insulin protocol, goal sugar <180  - Follow BG levels    Hematology/Oncology:   1. Chronic anemia, acute on chronic (hgb 7.3 in 11/2007): likely 2/2 malnutrition and chronic polysubstance use; no signs of acute blood loss.   2.  Thrombocytopenia, NOS, likely due to sepsis, resolved.  Plan  - Daily CBC  - No signs of active bleeding, Hgb 6.9.   - Transfuse 1 prbc today for Hgb <7    MSK/Rheum:  1. Suspect septic emboli/osteomyelitis of fingers and toes, ischemic left 3rd toe and right finger discoloration on exam.  Plan  - Ultrasound right hand, per ID  recommendations  - Continue to monitor left 3rd toe, vascular surgery already following    IV/Access:   1. Venous access - right femoral CVC  2. Arterial access - femoral  Plan  - central access required and necessary    ICU Prophylaxis:   1. DVT: Hep subcutaneous, mechanical  2. VAP: HOB 30 degrees, chlorhexidine rinse  3. Stress Ulcer: PPI  4. Restraints: Nonviolent soft two point restraints required and necessary for patient safety and continued cares and good effect as patient continues to pull at necessary lines, tubes despite education and distraction. Will readdress daily.   5. Wound care - per unit routine   6. Feeding - tube feeds  7. Family Update: mother updated by phone  8. Disposition - critically ill in ICU        Interim History/Overnight Events:   No acute events overnight, continues sedation and off pressors. Patient is intubated and not able to obtain subjective history.          Key Medications:       chlorhexidine  15 mL Mouth/Throat Q12H     ciprofloxacin  500 mg Per Feeding Tube Q12H JENARO     heparin ANTICOAGULANT  5,000 Units Subcutaneous Q8H     insulin aspart  1-6 Units Subcutaneous Q4H     multivitamins w/minerals  15 mL Per Feeding Tube Daily     nafcillin  2 g Intravenous Q4H     pantoprazole  40 mg Per Feeding Tube QAM AC     protein modular  1 packet Per Feeding Tube 4x Daily     QUEtiapine  100 mg Oral BID     sodium chloride (PF)  10 mL Intracatheter Q8H     sodium chloride (PF)  3 mL Intravenous Q8H       dexmedetomidine Stopped (07/05/21 1052)     fentaNYL 50 mcg/hr (07/05/21 2003)     - MEDICATION INSTRUCTIONS -       - MEDICATION INSTRUCTIONS -       midazolam Stopped (07/06/21 0418)     norepinephrine Stopped (07/05/21 1421)     propofol (DIPRIVAN) infusion 50 mcg/kg/min (07/06/21 0656)     sodium chloride 10 mL/hr at 07/02/21 1934     vasopressin Stopped (07/02/21 1240)               Physical Examination:   Temp:  [98.7  F (37.1  C)-99.4  F (37.4  C)] 99.2  F (37.3  C)  Pulse:   [] 90  Resp:  [12-52] 26  MAP:  [75 mmHg-100 mmHg] 96 mmHg  Arterial Line BP: (107-137)/(47-79) 135/74  FiO2 (%):  [30 %] 30 %  SpO2:  [96 %-100 %] 98 %      Intake/Output Summary (Last 24 hours) at 7/6/2021 1300  Last data filed at 7/6/2021 1200  Gross per 24 hour   Intake 2752.18 ml   Output 1180 ml   Net 1572.18 ml         Wt Readings from Last 4 Encounters:   07/06/21 97.2 kg (214 lb 4.6 oz)   11/05/07 103.1 kg (227 lb 4 oz)   09/26/07 99.3 kg (219 lb)     Arterial Line BP: (107-137)/(47-79) 135/74  MAP:  [75 mmHg-100 mmHg] 96 mmHg  Ventilation Mode: CMV/AC  (Continuous Mandatory Ventilation/ Assist Control)  FiO2 (%): 30 %  Rate Set (breaths/minute): 16 breaths/min  Tidal Volume Set (mL): 450 mL  PEEP (cm H2O): 5 cmH2O  Oxygen Concentration (%): 30 %  Resp: 26    Recent Labs   Lab 07/05/21  0412 07/04/21  0613 07/03/21  1132 07/01/21  2251   PH 7.50* 7.49* 7.49* 7.42   PCO2 28* 29* 29* 31*   PO2 216* 148* 99 93   HCO3 22 22 22 20*   O2PER 30 40% 40 40       GEN: sedated, in no acute distress  HEENT: sclera anicteric, trachea midline, NGT in place. ETT in place.   PULM: unlabored synchronous with vent, clear anteriorly.   CV/COR: tachycardic, normal S1 and S2. 2/6 systolic murmur auscultated over mitral area.  ABD: Mildly distended, soft. Hypoactive bowel sounds, large midline hernia.  MSK/EXT: Bilateral dorsal hand pitting edema.  NEURO: sedated. Withdraws. PEERL  : don in place draining dark yellow urine.  SKIN: Purple, nodular lesions of right 4th finger concerning for Oslers nodes with warmth and erythema. Dark red lesion on the medial great toe. Discoloration of the distal left 3rd toe. Possible splinter hemorrhages of left fingernails.  LINES: clean, dry intact         Data:   All data and imaging reviewed.    JAE, 7/5/2021:   1. The left ventricle is normal in structure, function and size. The visual  ejection fraction is estimated at 60%.  2. The right ventricle is normal in structure, function  and size.  3. Small atrial septal defect, secundum type 5mm in diameter. Left to right  shunt. Rim appears adequate in size for closure device.  4. Moderate to large mobile vegetation (14mm long, 6mm wide) of the P2 segment  of the mitral leaflet. No valve perforation. There is moderate (2+) mitral  regurgitation.  5. Small pericardial effusion     Echo from 7-2-21 showed EF 55%, MV vegetation similar to above, 1-2+ MR.     ROUTINE ICU LABS (Last four results)  CMP  Recent Labs   Lab 07/06/21  0500 07/05/21  1545 07/05/21  0413 07/04/21  0537 07/03/21  0956 07/03/21  0400 07/02/21  0435   * 151* 148* 148*  --  144  --    POTASSIUM 3.5  --  4.0 4.2 4.1 3.4  --    CHLORIDE 122*  --  122* 121*  --  115*  --    CO2 22  --  22 21  --  21  --    ANIONGAP 6  --  4 6  --  8  --    *  --  112* 138*  --  145*  --    BUN 28  --  34* 33*  --  25  --    CR 0.82  --  0.88 1.05*  --  0.99  --    GFRESTIMATED >90  --  82 67  --  71  --    GFRESTBLACK >90  --  >90 77  --  83  --    DEREK 7.6*  --  7.4* 7.5*  --  7.6*  --    MAG 2.4*  --  2.4* 2.6*  --   --  1.9   PHOS 3.9  --  4.0 5.0*  --   --  4.0   PROTTOTAL 6.7*  --  6.2* 6.0*  --  6.1*  --    ALBUMIN 1.3*  --  1.2* 1.2*  --  1.3*  --    BILITOTAL 1.7*  --  1.2 1.9* 2.8* 3.0*  --    ALKPHOS 219*  --  241* 260*  --  333*  --    AST 18  --  24 22  --  41  --    ALT 18  --  23 25  --  38  --      CBC  Recent Labs   Lab 07/06/21  0500 07/05/21  0413 07/04/21  0537 07/03/21  0400   WBC 11.9* 11.4* 14.0* 28.5*   RBC 2.94* 2.97* 2.78* 3.31*   HGB 6.9* 6.6* 6.0* 7.2*   HCT 22.4* 21.4* 19.1* 22.6*   MCV 76* 72* 69* 68*   MCH 23.5* 22.2* 21.6* 21.8*   MCHC 30.8* 30.8* 31.4* 31.9   RDW 23.4* 21.5* 19.9* 19.4*    204 172 173     INR  Recent Labs   Lab 07/01/21  1239   INR 1.23*     Arterial Blood Gas  Recent Labs   Lab 07/05/21  0412 07/04/21  0613 07/03/21  1132 07/01/21  2251   PH 7.50* 7.49* 7.49* 7.42   PCO2 28* 29* 29* 31*   PO2 216* 148* 99 93   HCO3 22 22 22 20*    O2PER 30 40% 40 40       All cultures:  Recent Labs   Lab 07/06/21  0500 07/05/21  1232 07/05/21  1104 07/03/21  1730 07/03/21  1722 07/02/21  1238 07/02/21  1225 07/01/21  1839 07/01/21  1251 07/01/21  1242   CULT No growth after 1 hour  No growth after 1 hour No growth after 15 hours No growth after 15 hours Cultured on the 1st day of incubation:  Staphylococcus aureus  Susceptibility testing done on previous specimen  *  Critical Value/Significant Value called to and read back by  Royal Ayala RN @1345 7/4/2021 gd   Cultured on the 1st day of incubation:  Staphylococcus aureus  Susceptibility testing done on previous specimen  *  Critical Value/Significant Value called to and read back by  Royal Ayala RN @1347 07/04/2021 gd   Cultured on the 1st day of incubation:  Staphylococcus aureus  Susceptibility testing done on previous specimen  *  Critical Value/Significant Value called to and read back by  Lashanda Barnes RN @1522 7/3/2021 gd   Cultured on the 1st day of incubation:  Staphylococcus aureus  Susceptibility testing done on previous specimen  *  Critical Value/Significant Value, preliminary result only, called to and read back by   ROYAL DE DIOS RN @1743 7.3.21 LM   Cultured on the 1st day of incubation:  Staphylococcus aureus  Susceptibility testing done on previous specimen  *  Critical Value/Significant Value, preliminary result only, called to and read back by  Shanice Ennis RN @ 1330 7.2.21 JE   >100,000 colonies/mL  Klebsiella pneumoniae  *  >100,000 colonies/mL  Strain 2  Klebsiella pneumoniae  * Cultured on the 1st day of incubation:  Staphylococcus aureus  *  Critical Value/Significant Value, preliminary result only, called to and read back by  Mayte Bianchi, RN @ 0026 7/2/21 TM.    Cultured on the 1st day of incubation:  Strain 2  Staphylococcus aureus  *  (Note)  POSITIVE for STAPHYLOCOCCUS AUREUS and NEGATIVE for the mecA gene  (not MRSA) by [employer] multiplex nucleic acid test.  The mecA gene was  not detected. Final identification and antimicrobial susceptibility  testing will be verified by standard methods.    Specimen tested with Verigene multiplex, gram-positive blood culture  nucleic acid test for the following targets: Staph aureus, Staph  epidermidis, Staph lugdunensis, other Staph species, Enterococcus  faecalis, Enterococcus faecium, Streptococcus species, S. agalactiae,  S. anginosus grp., S. pneumoniae, S. pyogenes, Listeria sp., mecA  (methicillin resistance) and Brook/B (vancomycin resistance).    Critical Value/Significant Value called to and read back by Deepa Bianchi RN on 21 at 0304. LSA       Recent Results (from the past 24 hour(s))   Transesophageal Echocardiogram    Narrative    [id number]  [id number]  [id number]^FRANK^SYEDA^ROSIO     [facility]  Echocardiography Laboratory  [address] [place], [place] [address]     Name: JOAQUÍN SAAVEDRA  MRN: [id number]  : 1980  Study Date: 2021 10:26 AM  Age: 41 yrs  Gender: Female  Patient Location: [facility]  Reason For Study: Endocarditis  Ordering Physician: SYEDA MARIE  Referring Physician: SYEDA MARIE  Performed By: IZAIAH Miramontes     BSA: 2.1 m2  Height: 68 in  Weight: 212 lb  HR: 75  ______________________________________________________________________________  Procedure  Complete Portable JAE Adult.  ______________________________________________________________________________  Interpretation Summary     1. The left ventricle is normal in structure, function and size. The visual  ejection fraction is estimated at 60%.  2. The right ventricle is normal in structure, function and size.  3. Small atrial septal defect, secundum type 5mm in diameter. Left to right  shunt. Rim appears adequate in size for closure device.  4. Moderate to large mobile vegetation (14mm long, 6mm wide) of the P2 segment  of the mitral leaflet. No valve perforation. There is moderate (2+)  mitral  regurgitation.  5. Small pericardial effusion     Echo from 7-2-21 showed EF 55%, MV vegetation similar to above, 1-2+ MR.  ______________________________________________________________________________  JAE  Consent to the procedure was obtained prior to sedation. The transesophageal  probe was passed without difficulty. There were no complications associated  with this procedure. Patient was intubated and sedated in the ICU.     Left Ventricle  The left ventricle is normal in structure, function and size. The visual  ejection fraction is estimated at 60%. Normal left ventricular wall motion.     Right Ventricle  The right ventricle is normal in structure, function and size.     Atria  Small atrial septal defect, secundum type. 5mm in diameter. Left to right  shunt. Rim appears adequate in size for closure device. No thrombus is  detected in the left atrial appendage.     Mitral Valve  Moderate to large mobile vegetation (14mm long, 6mm wide) of the P2 segment of  the mitral leaflet. No valve perforation. There is moderate (2+) mitral  regurgitation.     Tricuspid Valve  There is mild (1+) tricuspid regurgitation. Right ventricular systolic  pressure could not be approximated due to inadequate tricuspid regurgitation.     Aortic Valve  Normal tricuspid aortic valve.     Pulmonic Valve  The pulmonic valve is normal in structure and function.     Vessels  Normal ascending, transverse (arch), and descending aorta. Normal pulmonary  vein velocity.     Pericardial/Pleural  Small pericardial effusion.     Rhythm  Sinus rhythm was noted.  ______________________________________________________________________________  MMode/2D Measurements & Calculations  asc Aorta Diam: 3.4 cm     ______________________________________________________________________________  Report approved by: Rashi Bhardwaj 07/05/2021 01:09 PM                       Korin Aquino, MS4    Physician Attestation     I, Miki Piña,  was present with the medical/DEEPTI student who participated in the service and in the documentation of the note.  I have verified the history and personally performed the physical exam and medical decision making.  I agree with the assessment and plan of care as documented in the note.       I personally reviewed vital signs, medications and labs.     I personally managed the following critical care conditions:  1.  Loss of protective airway reflexes resulting in need for intubation/mechanical ventilation  2.  Bacteremia in setting of endocarditis  3.  Hypernatremia  4.  Urinary tract infection and pyelonephritis     See also my changes in blue above.     D/w ID doctor Dr. Carlo Siegel: This patient is critically ill: Yes. Total critical care time today 40 min, excluding procedures.      Miki Piña MD  Date of Service (when I saw the patient): 07/05/21

## 2021-07-06 NOTE — PROGRESS NOTES
VSS, TF at goal. Patient had positive lab results. Multiple loose stools. No other changes during the shift.

## 2021-07-06 NOTE — PROGRESS NOTES
[facility] ICU RESPIRATORY NOTE    Date of Admission:  7/1/2021    Date of Intubation (most recent): 7/1/2021    Reason for Mechanical Ventilation: Airway protection    Number of Days on Mechanical Ventilation: 6    Significant Events Today: none    ABG Results: Results for JOAQUÍN SAAVEDRA (MRN [id number]) as of 7/6/2021 04:51   Ref. Range 7/5/2021 04:12   pH Arterial Latest Ref Range: 7.35 - 7.45 pH 7.50 (H)   pCO2 Arterial Latest Ref Range: 35 - 45 mm Hg 28 (L)   PO2 Arterial Latest Ref Range: 80 - 105 mm Hg 216 (H)   Bicarbonate Arterial Latest Ref Range: 21 - 28 mmol/L 22   Base Deficit Art Latest Units: mmol/L 1.2   FIO2 Unknown 30   Oxyhemoglobin Arterial Latest Ref Range: 92 - 100 % 98     Ventilation Mode: CMV/AC  (Continuous Mandatory Ventilation/ Assist Control)  FiO2 (%): 30 %  Rate Set (breaths/minute): 16 breaths/min  Tidal Volume Set (mL): 450 mL  PEEP (cm H2O): 5 cmH2O  Oxygen Concentration (%): 30 %  Resp: 25      Plan:  Continue on full vent support

## 2021-07-07 LAB
ALBUMIN SERPL-MCNC: 1.2 G/DL (ref 3.4–5)
ALP SERPL-CCNC: 180 U/L (ref 40–150)
ALT SERPL W P-5'-P-CCNC: 15 U/L (ref 0–50)
ANION GAP SERPL CALCULATED.3IONS-SCNC: 2 MMOL/L (ref 3–14)
AST SERPL W P-5'-P-CCNC: 23 U/L (ref 0–45)
BASE DEFICIT BLDA-SCNC: 0.1 MMOL/L
BILIRUB SERPL-MCNC: 1.2 MG/DL (ref 0.2–1.3)
BUN SERPL-MCNC: 24 MG/DL (ref 7–30)
C DIFF TOX B STL QL: NEGATIVE
CALCIUM SERPL-MCNC: 7.2 MG/DL (ref 8.5–10.1)
CD3 CELLS # BLD: 1317 CELLS/UL (ref 603–2990)
CD3 CELLS NFR BLD: 76 % (ref 49–84)
CD3+CD4+ CELLS # BLD: 579 CELLS/UL (ref 441–2156)
CD3+CD4+ CELLS NFR BLD: 34 % (ref 28–63)
CD3+CD4+ CELLS/CD3+CD8+ CLL BLD: 0.92 % (ref 1.4–2.6)
CD3+CD8+ CELLS # BLD: 640 CELLS/UL (ref 125–1312)
CD3+CD8+ CELLS NFR BLD: 37 % (ref 10–40)
CHLORIDE SERPL-SCNC: 124 MMOL/L (ref 94–109)
CO2 SERPL-SCNC: 24 MMOL/L (ref 20–32)
CREAT SERPL-MCNC: 0.69 MG/DL (ref 0.52–1.04)
ERYTHROCYTE [DISTWIDTH] IN BLOOD BY AUTOMATED COUNT: 24.6 % (ref 10–15)
GFR SERPL CREATININE-BSD FRML MDRD: >90 ML/MIN/{1.73_M2}
GLUCOSE BLDC GLUCOMTR-MCNC: 104 MG/DL (ref 70–99)
GLUCOSE BLDC GLUCOMTR-MCNC: 115 MG/DL (ref 70–99)
GLUCOSE BLDC GLUCOMTR-MCNC: 125 MG/DL (ref 70–99)
GLUCOSE BLDC GLUCOMTR-MCNC: 90 MG/DL (ref 70–99)
GLUCOSE SERPL-MCNC: 131 MG/DL (ref 70–99)
HCO3 BLD-SCNC: 24 MMOL/L (ref 21–28)
HCT VFR BLD AUTO: 23.5 % (ref 35–47)
HCV RNA SERPL NAA+PROBE-ACNC: NORMAL [IU]/ML
HCV RNA SERPL NAA+PROBE-LOG IU: NORMAL LOG IU/ML
HGB BLD-MCNC: 7.1 G/DL (ref 11.7–15.7)
IFC SPECIMEN: ABNORMAL
MAGNESIUM SERPL-MCNC: 2.1 MG/DL (ref 1.6–2.3)
MCH RBC QN AUTO: 23.7 PG (ref 26.5–33)
MCHC RBC AUTO-ENTMCNC: 30.2 G/DL (ref 31.5–36.5)
MCV RBC AUTO: 78 FL (ref 78–100)
O2/TOTAL GAS SETTING VFR VENT: 30 %
OXYHGB MFR BLD: 97 % (ref 92–100)
PCO2 BLD: 33 MM HG (ref 35–45)
PH BLD: 7.46 PH (ref 7.35–7.45)
PHOSPHATE SERPL-MCNC: 3.9 MG/DL (ref 2.5–4.5)
PLATELET # BLD AUTO: 301 10E9/L (ref 150–450)
PO2 BLD: 107 MM HG (ref 80–105)
POTASSIUM SERPL-SCNC: 3.8 MMOL/L (ref 3.4–5.3)
PROT SERPL-MCNC: 6.6 G/DL (ref 6.8–8.8)
RBC # BLD AUTO: 3 10E12/L (ref 3.8–5.2)
SODIUM SERPL-SCNC: 150 MMOL/L (ref 133–144)
SPECIMEN SOURCE: NORMAL
WBC # BLD AUTO: 10 10E9/L (ref 4–11)

## 2021-07-07 PROCEDURE — 250N000009 HC RX 250: Performed by: INTERNAL MEDICINE

## 2021-07-07 PROCEDURE — 250N000011 HC RX IP 250 OP 636: Performed by: INTERNAL MEDICINE

## 2021-07-07 PROCEDURE — 87901 NFCT AGT GNTYP ALYS HIV1 REV: CPT | Performed by: INTERNAL MEDICINE

## 2021-07-07 PROCEDURE — 250N000009 HC RX 250: Performed by: SPECIALIST

## 2021-07-07 PROCEDURE — 83735 ASSAY OF MAGNESIUM: CPT | Performed by: INTERNAL MEDICINE

## 2021-07-07 PROCEDURE — 94003 VENT MGMT INPAT SUBQ DAY: CPT

## 2021-07-07 PROCEDURE — 82805 BLOOD GASES W/O2 SATURATION: CPT | Performed by: INTERNAL MEDICINE

## 2021-07-07 PROCEDURE — 87900 PHENOTYPE INFECT AGENT DRUG: CPT | Performed by: INTERNAL MEDICINE

## 2021-07-07 PROCEDURE — 94640 AIRWAY INHALATION TREATMENT: CPT | Mod: 76

## 2021-07-07 PROCEDURE — 999N000009 HC STATISTIC AIRWAY CARE

## 2021-07-07 PROCEDURE — 87903 PHENOTYPE DNA HIV W/CULTURE: CPT | Performed by: INTERNAL MEDICINE

## 2021-07-07 PROCEDURE — 200N000001 HC R&B ICU

## 2021-07-07 PROCEDURE — 250N000013 HC RX MED GY IP 250 OP 250 PS 637: Performed by: INTERNAL MEDICINE

## 2021-07-07 PROCEDURE — 250N000013 HC RX MED GY IP 250 OP 250 PS 637: Performed by: SPECIALIST

## 2021-07-07 PROCEDURE — 87904 PHENOTYPE DNA HIV W/CLT ADD: CPT | Performed by: INTERNAL MEDICINE

## 2021-07-07 PROCEDURE — 99291 CRITICAL CARE FIRST HOUR: CPT | Performed by: INTERNAL MEDICINE

## 2021-07-07 PROCEDURE — 999N001017 HC STATISTIC GLUCOSE BY METER IP

## 2021-07-07 PROCEDURE — 87493 C DIFF AMPLIFIED PROBE: CPT | Performed by: INTERNAL MEDICINE

## 2021-07-07 PROCEDURE — 85027 COMPLETE CBC AUTOMATED: CPT | Performed by: INTERNAL MEDICINE

## 2021-07-07 PROCEDURE — 84100 ASSAY OF PHOSPHORUS: CPT | Performed by: INTERNAL MEDICINE

## 2021-07-07 PROCEDURE — 80053 COMPREHEN METABOLIC PANEL: CPT | Performed by: INTERNAL MEDICINE

## 2021-07-07 PROCEDURE — 87040 BLOOD CULTURE FOR BACTERIA: CPT | Performed by: INTERNAL MEDICINE

## 2021-07-07 PROCEDURE — 999N000157 HC STATISTIC RCP TIME EA 10 MIN

## 2021-07-07 RX ORDER — FUROSEMIDE 20 MG
20 TABLET ORAL EVERY 8 HOURS
Status: COMPLETED | OUTPATIENT
Start: 2021-07-07 | End: 2021-07-08

## 2021-07-07 RX ORDER — CIPROFLOXACIN 500 MG/1
500 TABLET, FILM COATED ORAL EVERY 12 HOURS SCHEDULED
Status: COMPLETED | OUTPATIENT
Start: 2021-07-07 | End: 2021-07-07

## 2021-07-07 RX ADMIN — PROPOFOL 70 MCG/KG/MIN: 10 INJECTION, EMULSION INTRAVENOUS at 03:40

## 2021-07-07 RX ADMIN — Medication 1 PACKET: at 12:57

## 2021-07-07 RX ADMIN — FUROSEMIDE 20 MG: 20 TABLET ORAL at 20:37

## 2021-07-07 RX ADMIN — Medication 1 PACKET: at 21:28

## 2021-07-07 RX ADMIN — PROPOFOL 75 MCG/KG/MIN: 10 INJECTION, EMULSION INTRAVENOUS at 10:40

## 2021-07-07 RX ADMIN — FUROSEMIDE 20 MG: 20 TABLET ORAL at 11:56

## 2021-07-07 RX ADMIN — HYDROMORPHONE HYDROCHLORIDE 1 MG: 1 INJECTION, SOLUTION INTRAMUSCULAR; INTRAVENOUS; SUBCUTANEOUS at 02:30

## 2021-07-07 RX ADMIN — CIPROFLOXACIN 500 MG: 500 TABLET, FILM COATED ORAL at 20:37

## 2021-07-07 RX ADMIN — HEPARIN SODIUM 5000 UNITS: 5000 INJECTION, SOLUTION INTRAVENOUS; SUBCUTANEOUS at 05:51

## 2021-07-07 RX ADMIN — PROPOFOL 30 MCG/KG/MIN: 10 INJECTION, EMULSION INTRAVENOUS at 17:53

## 2021-07-07 RX ADMIN — DEXMEDETOMIDINE HYDROCHLORIDE 1.2 MCG/KG/HR: 400 INJECTION INTRAVENOUS at 12:21

## 2021-07-07 RX ADMIN — ALBUTEROL SULFATE 2.5 MG: 2.5 SOLUTION RESPIRATORY (INHALATION) at 19:24

## 2021-07-07 RX ADMIN — HEPARIN SODIUM 5000 UNITS: 5000 INJECTION, SOLUTION INTRAVENOUS; SUBCUTANEOUS at 21:28

## 2021-07-07 RX ADMIN — QUETIAPINE FUMARATE 100 MG: 100 TABLET ORAL at 20:37

## 2021-07-07 RX ADMIN — Medication 1 PACKET: at 09:44

## 2021-07-07 RX ADMIN — Medication 1 PACKET: at 00:18

## 2021-07-07 RX ADMIN — Medication 15 ML: at 11:56

## 2021-07-07 RX ADMIN — NAFCILLIN SODIUM 2 G: 2 INJECTION, POWDER, LYOPHILIZED, FOR SOLUTION INTRAMUSCULAR; INTRAVENOUS at 12:54

## 2021-07-07 RX ADMIN — NAFCILLIN SODIUM 2 G: 2 INJECTION, POWDER, LYOPHILIZED, FOR SOLUTION INTRAMUSCULAR; INTRAVENOUS at 05:51

## 2021-07-07 RX ADMIN — PROPOFOL 70 MCG/KG/MIN: 10 INJECTION, EMULSION INTRAVENOUS at 05:51

## 2021-07-07 RX ADMIN — HYDROMORPHONE HYDROCHLORIDE 1 MG: 1 INJECTION, SOLUTION INTRAMUSCULAR; INTRAVENOUS; SUBCUTANEOUS at 04:19

## 2021-07-07 RX ADMIN — NAFCILLIN SODIUM 2 G: 2 INJECTION, POWDER, LYOPHILIZED, FOR SOLUTION INTRAMUSCULAR; INTRAVENOUS at 17:51

## 2021-07-07 RX ADMIN — CHLORHEXIDINE GLUCONATE 15 ML: 1.2 SOLUTION ORAL at 07:39

## 2021-07-07 RX ADMIN — DEXMEDETOMIDINE HYDROCHLORIDE 1 MCG/KG/HR: 400 INJECTION INTRAVENOUS at 22:33

## 2021-07-07 RX ADMIN — QUETIAPINE FUMARATE 100 MG: 100 TABLET ORAL at 09:43

## 2021-07-07 RX ADMIN — PANTOPRAZOLE SODIUM 40 MG: 40 TABLET, DELAYED RELEASE ORAL at 07:39

## 2021-07-07 RX ADMIN — Medication 1 PACKET: at 17:51

## 2021-07-07 RX ADMIN — PROPOFOL 50 MCG/KG/MIN: 10 INJECTION, EMULSION INTRAVENOUS at 12:54

## 2021-07-07 RX ADMIN — NAFCILLIN SODIUM 2 G: 2 INJECTION, POWDER, LYOPHILIZED, FOR SOLUTION INTRAMUSCULAR; INTRAVENOUS at 01:54

## 2021-07-07 RX ADMIN — PROPOFOL 70 MCG/KG/MIN: 10 INJECTION, EMULSION INTRAVENOUS at 01:54

## 2021-07-07 RX ADMIN — ALBUTEROL SULFATE 2.5 MG: 2.5 SOLUTION RESPIRATORY (INHALATION) at 01:43

## 2021-07-07 RX ADMIN — DEXMEDETOMIDINE HYDROCHLORIDE 1 MCG/KG/HR: 400 INJECTION INTRAVENOUS at 18:45

## 2021-07-07 RX ADMIN — HEPARIN SODIUM 5000 UNITS: 5000 INJECTION, SOLUTION INTRAVENOUS; SUBCUTANEOUS at 13:35

## 2021-07-07 RX ADMIN — PROPOFOL 75 MCG/KG/MIN: 10 INJECTION, EMULSION INTRAVENOUS at 08:00

## 2021-07-07 RX ADMIN — CHLORHEXIDINE GLUCONATE 15 ML: 1.2 SOLUTION ORAL at 20:37

## 2021-07-07 RX ADMIN — ALBUTEROL SULFATE 2.5 MG: 2.5 SOLUTION RESPIRATORY (INHALATION) at 07:15

## 2021-07-07 RX ADMIN — DEXMEDETOMIDINE HYDROCHLORIDE 1 MCG/KG/HR: 400 INJECTION INTRAVENOUS at 15:02

## 2021-07-07 RX ADMIN — NAFCILLIN SODIUM 2 G: 2 INJECTION, POWDER, LYOPHILIZED, FOR SOLUTION INTRAMUSCULAR; INTRAVENOUS at 09:43

## 2021-07-07 RX ADMIN — ALBUTEROL SULFATE 2.5 MG: 2.5 SOLUTION RESPIRATORY (INHALATION) at 12:11

## 2021-07-07 RX ADMIN — CIPROFLOXACIN 500 MG: 500 TABLET, FILM COATED ORAL at 08:43

## 2021-07-07 RX ADMIN — PROPOFOL 30 MCG/KG/MIN: 10 INJECTION, EMULSION INTRAVENOUS at 23:44

## 2021-07-07 RX ADMIN — NAFCILLIN SODIUM 2 G: 2 INJECTION, POWDER, LYOPHILIZED, FOR SOLUTION INTRAMUSCULAR; INTRAVENOUS at 20:37

## 2021-07-07 ASSESSMENT — ACTIVITIES OF DAILY LIVING (ADL)
ADLS_ACUITY_SCORE: 26
ADLS_ACUITY_SCORE: 26
ADLS_ACUITY_SCORE: 24
ADLS_ACUITY_SCORE: 24
ADLS_ACUITY_SCORE: 26
ADLS_ACUITY_SCORE: 24

## 2021-07-07 ASSESSMENT — MIFFLIN-ST. JEOR: SCORE: 1724.5

## 2021-07-07 NOTE — PLAN OF CARE
AVSS on mech vent. Pain and sedation controlled with Fent gtt and Propofol gtt. Skin unchanged. LS dim coarse at times; frequent suctioning with. Diet NPO tube feed continued at goal rate. Bedrest maintained. BS active and audible. Neri with adequate output.

## 2021-07-07 NOTE — PROGRESS NOTES
Chart reviewed. No new recommendations. Recommend limited echo tomorrow to reassess pericardial fluid. Will follow up tomorrow.

## 2021-07-07 NOTE — PROGRESS NOTES
[facility]    Infectious Disease Progress Note    Date of Service : 07/07/2021     Assessment :  1. S.aureus MSSA native mitral valve endocarditis with septic shock in the setting of IVDA complicated by embolic L parietal lobe infarction and multiple peripheral emboli.   (TTE shows a large mobile 1.4 cm vegetation on the posterior mitral valve leaflet with extension to left atrium and small ASD per cardiology notes , no valvular abscess and no additional valvular involvement. Pericardial effusion is small). No seeding on CT abdomen but has swelling of right 4th finger and toe discoloration with concern for seeding.  2. New untreated HIV diagnosis-HIV VL, CD4, genotype pending  3. Hepatitis C   4. Severe sepsis requiring pressor support and multiorgan failure  5. Multiple peripheral emboli. Right finger swelling and discoloration of left 3rd toe. US right finger without evidence of infection but sub-optimal study. Will continue to follow and will need MRI if remains bacteremic  6. FORD related to sepsis- resolved  7. Embolic stroke (small ASD with Left to right shunt)  8. Klebsiella pneumoniae UTI  9. Hypernatremia- Na 150 today  10. Severe anemia multifactorial. Has baseline anemia, exacerbated by chronic disease  11. Polysubstance abuse  12. Splenomegaly   13. LFT abnormality    Recommendations   1. Continue Nafcillin  2. Discontinue Ciprofloxacin after completing dose today  3. Await HIV VL, CD4 count, HCV RNA  4. Follow blood cxs until clear   5. Short interval FUP echocardiogram    Marisol Matos MD    Interval History   Remains critically ill, sedated and intubated, afebrile, last set of blood cxs negative so far     Antimicrobial therapy  7/2 Nafcillin  7/4 Cipro  prior  7/1-7/2 Vancomycin, zosyn    Physical Exam   Temp: 100.4  F (38  C) Temp src: Oral   Pulse: 95   Resp: 27 SpO2: 98 % O2 Device: Mechanical Ventilator    Vitals:    07/05/21 0000 07/06/21 0400 07/07/21 0600   Weight: 96.4  kg (212 lb 8.4 oz) 97.2 kg (214 lb 4.6 oz) 100.1 kg (220 lb 10.9 oz)     Vital Signs with Ranges  Temp:  [98.8  F (37.1  C)-100.4  F (38  C)] 100.4  F (38  C)  Pulse:  [] 95  Resp:  [14-28] 27  MAP:  [76 mmHg-209 mmHg] 84 mmHg  Arterial Line BP: (110-243)/(54-82) 119/65  FiO2 (%):  [30 %] 30 %  SpO2:  [95 %-100 %] 98 %       GENERAL APPEARANCE:  intubated  EYES: conjunctivae and sclerae normal  NECK: no adenopathy  RESP: lungs clear to auscultation - no rales, rhonchi or wheezes  CV: regular rates and rhythm, murmur  ABDOMEN: soft, umbilical hernia  MS: extremities normal- no gross deformities noted  SKIN: right finger with swelling and redness, multiple osler's nodes and splinter hemorrhages, multiple embolic infarcts on toes, black discoloration of left toe    Other:    Medications     dexmedetomidine Stopped (07/05/21 1052)     fentaNYL 50 mcg/hr (07/06/21 2352)     - MEDICATION INSTRUCTIONS -       - MEDICATION INSTRUCTIONS -       midazolam Stopped (07/06/21 0418)     norepinephrine Stopped (07/05/21 1421)     propofol (DIPRIVAN) infusion 75 mcg/kg/min (07/07/21 0800)     sodium chloride 10 mL/hr at 07/02/21 1934     vasopressin Stopped (07/02/21 1240)       albuterol  2.5 mg Nebulization Q6H     chlorhexidine  15 mL Mouth/Throat Q12H     ciprofloxacin  500 mg Per Feeding Tube Q12H JENARO     heparin ANTICOAGULANT  5,000 Units Subcutaneous Q8H     insulin aspart  1-6 Units Subcutaneous Q4H     multivitamins w/minerals  15 mL Per Feeding Tube Daily     nafcillin  2 g Intravenous Q4H     pantoprazole  40 mg Per Feeding Tube QAM AC     protein modular  1 packet Per Feeding Tube 4x Daily     QUEtiapine  100 mg Oral or Feeding Tube BID     sodium chloride (PF)  10 mL Intracatheter Q8H     sodium chloride (PF)  3 mL Intravenous Q8H       Data   All microbiology laboratory data reviewed.  Recent Labs   Lab Test 07/07/21  0515 07/06/21  0500 07/05/21  0413   WBC 10.0 11.9* 11.4*   HGB 7.1* 6.9* 6.6*   HCT 23.5*  22.4* 21.4*   MCV 78 76* 72*    236 204     Recent Labs   Lab Test 07/07/21  0515 07/06/21  0500 07/05/21  0413   CR 0.69 0.82 0.88     No lab results found.  Recent Labs   Lab Test 07/06/21  0500 07/05/21  1232 07/05/21  1104 07/03/21  1730 07/03/21  1722 07/02/21  1238 07/02/21  1225 07/01/21  1839 07/01/21  1251   CULT No growth after 19 hours  No growth after 22 hours Cultured on the 2nd day of incubation:  Gram positive cocci in clusters  *  Critical Value/Significant Value, preliminary result only, called to and read back by  IVETT WARE RN 07/06/21 1258 EH.   Cultured on the 2nd day of incubation:  Gram positive cocci in clusters  *  Critical Value/Significant Value, preliminary result only, called to and read back by  Sin Jack RN at 0448 7/7/21 hg   Cultured on the 1st day of incubation:  Staphylococcus aureus  Susceptibility testing done on previous specimen  *  Critical Value/Significant Value called to and read back by  Royal Ayala RN @1345 7/4/2021 gd   Cultured on the 1st day of incubation:  Staphylococcus aureus  Susceptibility testing done on previous specimen  *  Critical Value/Significant Value called to and read back by  Royal Ayala RN @1347 07/04/2021 gd   Cultured on the 1st day of incubation:  Staphylococcus aureus  Susceptibility testing done on previous specimen  *  Critical Value/Significant Value called to and read back by  Lashanda Barnes RN @1522 7/3/2021 gd   Cultured on the 1st day of incubation:  Staphylococcus aureus  Susceptibility testing done on previous specimen  *  Critical Value/Significant Value, preliminary result only, called to and read back by   ROYAL DE DIOS RN @7717 7.3.21 LM   Cultured on the 1st day of incubation:  Staphylococcus aureus  Susceptibility testing done on previous specimen  *  Critical Value/Significant Value, preliminary result only, called to and read back by  Shanice Ennis RN @ 6826 7.2.21 JE   >100,000 colonies/mL  Klebsiella  pneumoniae  *  >100,000 colonies/mL  Strain 2  Klebsiella pneumoniae  *   Susceptibility                       Staphylococcus aureus (1) Staphylococcus aureus (3)       BOBBY BOBBY       CLINDAMYCIN <=0.25 ug/mL Sensitive <=0.25 ug/mL Sensitive       ERYTHROMYCIN <=0.25 ug/mL Sensitive <=0.25 ug/mL Sensitive       GENTAMICIN <=0.5 ug/mL Sensitive <=0.5 ug/mL Sensitive       OXACILLIN 0.5 ug/mL Sensitive 0.5 ug/mL Sensitive       TETRACYCLINE <=1 ug/mL Sensitive <=1 ug/mL Sensitive       Trimethoprim/Sulfa <=0.5/9.5 u... Sensitive <=0.5/9.5 u... Sensitive       VANCOMYCIN 1 ug/mL Sensitive 1 ug/mL Sensitive        7/5 JAE  Interpretation Summary     1. The left ventricle is normal in structure, function and size. The visual  ejection fraction is estimated at 60%.  2. The right ventricle is normal in structure, function and size.  3. Small atrial septal defect, secundum type 5mm in diameter. Left to right  shunt. Rim appears adequate in size for closure device.  4. Moderate to large mobile vegetation (14mm long, 6mm wide) of the P2 segment  of the mitral leaflet. No valve perforation. There is moderate (2+) mitral  regurgitation.  5. Small pericardial effusion     7/6 US extremity R hand  US EXTREMITY NON VASCULAR RIGHT 7/6/2021 11:39 AM      HISTORY: Right 4th finger concern for septic arthritis/osteomyelitis-  Please do US right hand 4th finger.      FINDINGS: Sonography was performed in the area of clinical concern  (fourth finger).                                                                      IMPRESSION: No fluid collection or significant joint effusion is  appreciated sonographically. Clinical follow-up recommended. If  clinical suspicion persists, MRI could be performed.    7/1TTE  Interpretation Summary     Left ventricular systolic function is normal.  The visual ejection fraction is 55-60%.  The right ventricle is mildly dilated.  The right ventricular systolic function is normal.  There is a mobile  linear echodensity measuring 1.4 cm x 0.6 cm on the atrial  side of the posterior mitral valve leaflet.  Other valve structures appear normal without significant dysfunction or  obvious valvular vegetations.  The inferior pericardium appears moderately thickened and echobright which may  represent active pericarditis.  Large localized pericardial effusion (2.7 cm) posteriorly/laterally with  mobile free floating echodense material which could represent fibrinous  stranding in setting of inflammatory pericarditis or infectious material if  infectious pericardial effusion.  Dilation of the inferior vena cava is present with abnormal respiratory  variation in diameter.  Small left pleural effusion     No prior for comparison.     7/1 CT head  CT SCAN OF THE HEAD WITHOUT CONTRAST   7/1/2021 1:47 PM      HISTORY: Delirium; altered mental status     TECHNIQUE:  Axial images of the head and coronal reformations without  IV contrast material.  Radiation dose for this scan was reduced using  automated exposure control, adjustment of the mA and/or kV according  to patient size, or iterative reconstruction technique.     COMPARISON: None.     FINDINGS: There is a focal 2 cm area of hypodensity involving gray and  white matter in the medial left parietal lobe consistent with acute to  subacute ischemic infarct. There is a minimal incidental arachnoid  cyst in the anterior portion of left middle cranial fossa. Ventricles  and subarachnoid spaces are otherwise within normal limits. There is  no evidence for intracranial hemorrhage, significant mass effect, or  skull fracture. Exam is mildly limited by motion artifact. Visualized  paranasal sinuses and mastoid air cells are clear.                                                                      IMPRESSION: Focal hypodensity in the medial left parietal lobe  consistent with acute to subacute ischemic infarct.     7/1 Ct  abdomen/pelvis                                                           IMPRESSION:   1.  Patchy hypoenhancement in the mid and upper poles of the right  kidney posteriorly, suspicious for pyelonephritis.  2.  Moderate-sized fat-containing paraumbilical hernia.  3.  Mild splenomegaly.  4.  There are possible small gallstones within a contracted  gallbladder.     Attestation:  Total time on the floor involved in the patient's care: 35 minutes. Total time spent in counseling/care coordination: >50%

## 2021-07-07 NOTE — PROGRESS NOTES
Critical Care Progress Note  07/07/2021    Name: Manas Hernandez MRN#: [id number]   Age: 41 year old YOB: 1980     [facility] Day# 6           Problem List:   Principal Problem:    Endocarditis due to methicillin susceptible Staphylococcus aureus (MSSA)  Active Problems:    Pyelonephritis, acute    Altered mental status, unspecified altered mental status type           Summary/Hospital Course:   Ms. Hernandez is a 41 year old female with chronic polysubstance abuse and IVDU presented on 7/1 with encephalopathy, hypotension, anemia, and hypotension responsive to fluid resusictation. She was agitated in the ED, sedated and intubated for airway protection. CT head showed possible new acute to subacute parietal lobe ischemic infarct, CXR without abnormalities, and CT abdomen concerning for pyelonephritis empirically treated with vancomycin and Zosyn. Transferred to the ICU for continued infectious workup and airway management. On exam there was concern for endocarditis. On 7/2, admission blood cultures positive for MSSA. Remains intubated on nafcillin for infective endocarditis and pyelonephritis, urine culture with klebsiella treated with ciprofloxacin. On 7/5, JAE performed showing vegetation 1.8cm in length on posterior leaflet of MV, small ASD with L to R shunt, small pericardial effusion without tamponade. Plan to continue IV antibiotic treatment without surgical intervention. On 7/6, HIV and HCV testing positive; per [facility] notes, had previously screened HIV positive in 2018 (though never had confirmatory testing).      Assessment and plan :     Manas Henrandez is a 41 year old female with a history of chronic polysubstance abuse/IVDU admitted on 7/1/2021 for encephalopathy requiring intubation, hypotension and acidosis. She is intubated in the ICU for management of native mitral valve endocarditis complicated by MSSA bacteremia treated with IV antibiotics.    I have personally  reviewed the daily labs, imaging studies, cultures and discussed the case with referring physician and consulting physicians.     My assessment and plan by system for this patient is as follows:     Neurology/Psychiatry:   1. Acute ischemic stroke of left MCA, PCA territories secondary to infective endocarditis.  2. Toxic encephalopathy, septic encephalopathy - methicillin S. Aureus bacteremia on blood cultures (7/5).    3. ICU Sedation - on propofol, weaning as able  4. Analgesia  5. Agitation  6. Polysubstance use  Plan  - Neuro CC following, appreciate assistance  - Stroke risk - monitor BP, goal <130/80  - Continue to follow blood cultures  - Continue propofol for sedation, wean as able with RASS goal 0 to -1, also midazolam prn and adding back dexmedtomidate to minimize propofol use  - Fentanyl prn for pain and withdrawal prevention  - Tylenol prn for pain  - Monitor for signs of withdrawal - UDS positive for amphetamines and opiates on admission  - Continue seroquel 100 mg bid  - Chemical dependency evaluation once extubated if agreeable      Cardiovascular:   1. MSSA septic shock - resolved. Stable off pressors.  2. Mitral valve endocarditis - small mobile vegetation, mild-moderate MR and severe LAE. No abscess or perivalvular abscess on JAE (7/5).   3. Elevated troponin likely secondary to myocardial inflammation, resolved - peaked 7/1.  4. Hypertriglyceridemia - elevated TG, low HDL; trying to limit propofol use  5. Small pericardial effusion without tamponade physiology - JAE performed, difficult to sample safely.  Plan  - CV surgery following - continue IV antibiotic therapy as below, no plans for surgical intervention at this time.  - Repeat JAE in 1 week to assess for improvement in 1 week or so, per ID.     Pulmonary/Ventilator Management:   1. Mechanically ventilated for airway protection - CMV/AC (16/500/30/5).  2. Respiratory alkalosis, overbreathing vent. Expect to improve post extubation.    Plan  - Will continue mechanical ventilation, she is still encephalopathic with pending HIV labs and needing follow-up cardiac imaging.  - Continue albuterol neb q6h  - diuresis initiating today  -  Not passing sbt's today;  Anticipate this will imrpove with diuresis--continue sbt's     GI/Nutrition :   1. Mildly elevated alkaline phosphate  2. Hyperbilirubinemia with mild biliary dilation on US (CDB 9mm) - likely due to underlying sepsis, monitor as needed.  3. Malnutrition - hypoalbuminemia 1.2, severe anemia  4. Stress ulcer prophylaxis  5. Diarrhea, likely in the setting of tube feeds and antibiotics.  Plan  - Intubated, on tube feeds  - Monitor CMP daily  - Continue pantoprazole 40 mg IV  - Continue to monitor bilirubin and alk phos, repeat US in a few days.  - Continue to monitor BMs, if continues or worsens, obtain C diff testing     Renal/Fluids/Electrolytes:   1. Klebsiella pneumoniae UTI with septic shock - urine culture with klebsiella on 7/4.  2. Hypernatremia, Na remains 150 despite increased free water.  3. FORD - resolved.  Plan  - Continue ciprofloxacin 500 mg q12h, last dose today  - Monitor CMP daily  - Avoid nephrotoxic agents such as NSAID, IV contrast unless specifically required  - Follow I/O's as appropriate.  - increased fw from 150 to 200 q4hr     Infectious Disease:   1. Severe sepsis secondary to MSSA bacteremia and MV endocarditis - likely improving, delayed growth on blood cultures positive from 7/5.  2. Klebsiella pneumoniae UTI (now believed unlikely to be pyelo)  3. HIV, unclear if new diagnosis - positive screening test from 2018 per chart review without follow-up or treatment initiation.   4. Hepatitis C, LFTs wnl  Plan  - Continue nafcillin 2 g IV q4h  - Continue ciprofloxacin (stop date 7/8)  - HIV VL pending; cd4 appears to be 500s -- no prophylaxis needed for now  - HCV quant pending  - ID following, appreciate assistance  - Continue to follow blood cultures, most recent growth  7/5 (MSSA)  - Continue to monitor urine output while on IV antibiotics     Endocrine:   1. Stress induced hyperglycemia, improved  2. Prediabetes, A1c 5.7 at admission  Plan  - ICU insulin protocol, goal sugar <180  - Follow BG levels     Hematology/Oncology:   1. Chronic anemia, acute on chronic (hgb 7.3 in 11/2007): likely 2/2 malnutrition and chronic polysubstance use; no signs of acute blood loss.   2.  Thrombocytopenia, NOS, likely due to sepsis, resolved.  Plan  - Daily CBC  - No signs of active bleeding, Hgb 7.1.   - Transfuse when Hgb <7     MSK/Rheum:  1. Suspect septic emboli/osteomyelitis of fingers and toes - no changes on exam, U/S right finger without evidence of infection but sub-optimal study.  Plan  - Continue to monitor for signs of septic arthritis or ischemia, vascular surgery following    IV/Access:   1. Venous access - right femoral CVC  2. Arterial access - femoral  Plan  - central access required and necessary     ICU Prophylaxis:   1. DVT: Hep subcutaneous, mechanical  2. VAP: HOB 30 degrees, chlorhexidine rinse  3. Stress Ulcer: PPI  4. Restraints: Nonviolent soft two point restraints required and necessary for patient safety and continued cares and good effect as patient continues to pull at necessary lines, tubes despite education and distraction. Will readdress daily.   5. Wound care - per unit routine   6. Feeding - tube feeds  7. Family Update: mother updated by phone  8. Disposition - critically ill in ICU        Interim History/Overnight Events:   Multiple loose stools per RN on 7/6. Unable to tolerate PST, will continue bronchodilators and diurese. Patient is intubated and sedated, subjective history unable to obtain.         Key Medications:       albuterol  2.5 mg Nebulization Q6H     chlorhexidine  15 mL Mouth/Throat Q12H     ciprofloxacin  500 mg Per Feeding Tube Q12H JENARO     heparin ANTICOAGULANT  5,000 Units Subcutaneous Q8H     insulin aspart  1-6 Units Subcutaneous Q4H      multivitamins w/minerals  15 mL Per Feeding Tube Daily     nafcillin  2 g Intravenous Q4H     pantoprazole  40 mg Per Feeding Tube QAM AC     protein modular  1 packet Per Feeding Tube 4x Daily     QUEtiapine  100 mg Oral or Feeding Tube BID     sodium chloride (PF)  10 mL Intracatheter Q8H     sodium chloride (PF)  3 mL Intravenous Q8H       dexmedetomidine Stopped (07/05/21 1052)     fentaNYL 50 mcg/hr (07/07/21 0800)     - MEDICATION INSTRUCTIONS -       - MEDICATION INSTRUCTIONS -       midazolam Stopped (07/06/21 0418)     norepinephrine Stopped (07/05/21 1421)     propofol (DIPRIVAN) infusion 75 mcg/kg/min (07/07/21 0800)     sodium chloride 10 mL/hr at 07/02/21 1934     vasopressin Stopped (07/02/21 1240)              Physical Examination:   Temp:  [98.8  F (37.1  C)-100.4  F (38  C)] 100.4  F (38  C)  Pulse:  [] 99  Resp:  [14-28] 27  MAP:  [76 mmHg-209 mmHg] 91 mmHg  Arterial Line BP: (110-243)/(54-82) 128/69  FiO2 (%):  [30 %] 30 %  SpO2:  [95 %-100 %] 99 %    Intake/Output Summary (Last 24 hours) at 7/7/2021 0843  Last data filed at 7/7/2021 0800  Gross per 24 hour   Intake 2559.59 ml   Output 1210 ml   Net 1349.59 ml     Wt Readings from Last 4 Encounters:   07/07/21 100.1 kg (220 lb 10.9 oz)   11/05/07 103.1 kg (227 lb 4 oz)   09/26/07 99.3 kg (219 lb)     Arterial Line BP: (110-243)/(54-82) 128/69  MAP:  [76 mmHg-209 mmHg] 91 mmHg  Ventilation Mode: CMV/AC  (Continuous Mandatory Ventilation/ Assist Control)  FiO2 (%): 30 %  Rate Set (breaths/minute): 16 breaths/min  Tidal Volume Set (mL): 500 mL  PEEP (cm H2O): 5 cmH2O  Oxygen Concentration (%): 30 %  Resp: 27    Recent Labs   Lab 07/07/21  0450 07/05/21  0412 07/04/21  0613 07/03/21  1132   PH 7.46* 7.50* 7.49* 7.49*   PCO2 33* 28* 29* 29*   PO2 107* 216* 148* 99   HCO3 24 22 22 22   O2PER 30 30 40% 40     GEN: sedated, in no acute distress  HEENT: sclera anicteric, trachea midline, NGT in place. ETT in place.   PULM: unlabored synchronous with  vent, clear anteriorly.   CV/COR: tachycardic, normal S1 and S2. Systolic murmur auscultated over mitral area.  ABD: Mildly distended, soft. Hypoactive bowel sounds, large midline hernia.  MSK/EXT: Bilateral dorsal hand pitting edema.  NEURO: sedated. Withdraws. PEERL  : don in place draining dark yellow urine.  SKIN: Warmth, erythema, discoloration of the distal 4th right finger. Dark red lesion on the medial great toe. Discoloration of the distal left 3rd toe. Possible splinter hemorrhages of left fingernails.  LINES: clean, dry intact         Data:   All data and imaging reviewed.     US Right Extremity, 7/6/2021:  IMPRESSION: No fluid collection or significant joint effusion is  appreciated sonographically. Clinical follow-up recommended. If  clinical suspicion persists, MRI could be performed.    ROUTINE ICU LABS (Last four results)  CMP  Recent Labs   Lab 07/07/21  0515 07/06/21  0500 07/05/21  1545 07/05/21  0413 07/04/21  0537   * 150* 151* 148* 148*   POTASSIUM 3.8 3.5  --  4.0 4.2   CHLORIDE 124* 122*  --  122* 121*   CO2 24 22  --  22 21   ANIONGAP 2* 6  --  4 6   * 131*  --  112* 138*   BUN 24 28  --  34* 33*   CR 0.69 0.82  --  0.88 1.05*   GFRESTIMATED >90 >90  --  82 67   GFRESTBLACK >90 >90  --  >90 77   DEREK 7.2* 7.6*  --  7.4* 7.5*   MAG 2.1 2.4*  --  2.4* 2.6*   PHOS 3.9 3.9  --  4.0 5.0*   PROTTOTAL 6.6* 6.7*  --  6.2* 6.0*   ALBUMIN 1.2* 1.3*  --  1.2* 1.2*   BILITOTAL 1.2 1.7*  --  1.2 1.9*   ALKPHOS 180* 219*  --  241* 260*   AST 23 18  --  24 22   ALT 15 18  --  23 25     CBC  Recent Labs   Lab 07/07/21  0515 07/06/21  0500 07/05/21  0413 07/04/21  0537   WBC 10.0 11.9* 11.4* 14.0*   RBC 3.00* 2.94* 2.97* 2.78*   HGB 7.1* 6.9* 6.6* 6.0*   HCT 23.5* 22.4* 21.4* 19.1*   MCV 78 76* 72* 69*   MCH 23.7* 23.5* 22.2* 21.6*   MCHC 30.2* 30.8* 30.8* 31.4*   RDW 24.6* 23.4* 21.5* 19.9*    236 204 172     INR  Recent Labs   Lab 07/01/21  1239   INR 1.23*     Arterial Blood  Gas  Recent Labs   Lab 07/07/21  0450 07/05/21  0412 07/04/21  0613 07/03/21  1132   PH 7.46* 7.50* 7.49* 7.49*   PCO2 33* 28* 29* 29*   PO2 107* 216* 148* 99   HCO3 24 22 22 22   O2PER 30 30 40% 40       All cultures:  Recent Labs   Lab 07/06/21  0500 07/05/21  1232 07/05/21  1104 07/03/21  1730 07/03/21  1722 07/02/21  1238 07/02/21  1225 07/01/21  1839 07/01/21  1251 07/01/21  1242   CULT No growth after 19 hours  No growth after 22 hours Cultured on the 2nd day of incubation:  Gram positive cocci in clusters  *  Critical Value/Significant Value, preliminary result only, called to and read back by  IVETT WARE RN 07/06/21 1258 EH.   Cultured on the 2nd day of incubation:  Gram positive cocci in clusters  *  Critical Value/Significant Value, preliminary result only, called to and read back by  Sin Jack RN at 0448 7/7/21 hg   Cultured on the 1st day of incubation:  Staphylococcus aureus  Susceptibility testing done on previous specimen  *  Critical Value/Significant Value called to and read back by  Royal Ayala RN @1345 7/4/2021 gd   Cultured on the 1st day of incubation:  Staphylococcus aureus  Susceptibility testing done on previous specimen  *  Critical Value/Significant Value called to and read back by  Royal Ayala RN @1347 07/04/2021 gd   Cultured on the 1st day of incubation:  Staphylococcus aureus  Susceptibility testing done on previous specimen  *  Critical Value/Significant Value called to and read back by  Lashanda Barnes RN @1522 7/3/2021 gd   Cultured on the 1st day of incubation:  Staphylococcus aureus  Susceptibility testing done on previous specimen  *  Critical Value/Significant Value, preliminary result only, called to and read back by   ROYAL DE DIOS RN @1743 7.3.21 LM   Cultured on the 1st day of incubation:  Staphylococcus aureus  Susceptibility testing done on previous specimen  *  Critical Value/Significant Value, preliminary result only, called to and read back by  Shanice  Raymon RN @ 1330 7.2.21 JE   >100,000 colonies/mL  Klebsiella pneumoniae  *  >100,000 colonies/mL  Strain 2  Klebsiella pneumoniae  * Cultured on the 1st day of incubation:  Staphylococcus aureus  *  Critical Value/Significant Value, preliminary result only, called to and read back by  Mayte Bianchi RN @ 0026 7/2/21 TM.    Cultured on the 1st day of incubation:  Strain 2  Staphylococcus aureus  *  (Note)  POSITIVE for STAPHYLOCOCCUS AUREUS and NEGATIVE for the mecA gene  (not MRSA) by [employer] multiplex nucleic acid test. The mecA gene was  not detected. Final identification and antimicrobial susceptibility  testing will be verified by standard methods.    Specimen tested with [employer] multiplex, gram-positive blood culture  nucleic acid test for the following targets: Staph aureus, Staph  epidermidis, Staph lugdunensis, other Staph species, Enterococcus  faecalis, Enterococcus faecium, Streptococcus species, S. agalactiae,  S. anginosus grp., S. pneumoniae, S. pyogenes, Listeria sp., mecA  (methicillin resistance) and Brook/B (vancomycin resistance).    Critical Value/Significant Value called to and read back by Deepa Bianchi RN on 7/2/21 at 0304. LSA       Recent Results (from the past 24 hour(s))   US Extremity Non Vascular Right    Narrative    US EXTREMITY NON VASCULAR RIGHT 7/6/2021 11:39 AM     HISTORY: Right 4th finger concern for septic arthritis/osteomyelitis-  Please do US right hand 4th finger.     FINDINGS: Sonography was performed in the area of clinical concern  (fourth finger).      Impression    IMPRESSION: No fluid collection or significant joint effusion is  appreciated sonographically. Clinical follow-up recommended. If  clinical suspicion persists, MRI could be performed.    GILLES SHIRLEY MD         SYSTEM ID:  ANISA Aquino, [id number]      I, Miki Piña, was present with the medical/DEEPTI student who participated in the service and in the documentation of the note.  I have  verified the history and personally performed the physical exam and medical decision making.  I agree with the assessment and plan of care as documented in the note.       I personally reviewed vital signs, medications and labs.     I personally managed the following critical care conditions:  1.  Acute hypoxemic respiratory failure requiring mechanical ventilation--not passing sbt today  2.  Bacteremia in setting of endocarditis  3.  Hypernatremia  4.  Urinary tract infection     See also my changes in blue above.     D/w ID doctor Dr. Carlo Siegel: This patient is critically ill: Yes. Total critical care time today 40 min, excluding procedures.      Miki Piña MD  Date of Service (when I saw the patient): 07/07/21

## 2021-07-07 NOTE — PROGRESS NOTES
[facility] ICU RESPIRATORY NOTE        Date of Admission: 7/1/2021    Date of Intubation (most recent): 7/1/2021     Reason for Mechanical Ventilation: Airway Protection    Number of Days on Mechanical Ventilation: 7     Met Criteria for Spontaneous Breathing Trial: No    Reason for No Spontaneous Breathing Trial: Per MD    Significant Events Today: None    ABG Results:   Recent Labs   Lab 07/07/21  0450 07/05/21  0412 07/04/21  0613 07/03/21  1132   PH 7.46* 7.50* 7.49* 7.49*   PCO2 33* 28* 29* 29*   PO2 107* 216* 148* 99   HCO3 24 22 22 22   O2PER 30 30 40% 40       Current Vent Settings: Ventilation Mode: CMV/AC  (Continuous Mandatory Ventilation/ Assist Control)  FiO2 (%): 30 %  Rate Set (breaths/minute): 16 breaths/min  Tidal Volume Set (mL): 500 mL  PEEP (cm H2O): 5 cmH2O  Oxygen Concentration (%): 30 %  Resp: 21      Skin Assessment: Clean & dry    Plan: Patient will remain on full ventilatory support. RT will continue to follow.    Asad Ochoa, RT

## 2021-07-07 NOTE — PROGRESS NOTES
[facility] ICU RESPIRATORY NOTE        Date of Admission: 7/1/2021    Date of Intubation (most recent): 7/1/21    Reason for Mechanical Ventilation: Airway protection    Number of Days on Mechanical Ventilation: 7    Met Criteria for Spontaneous Breathing Trial: No    Reason for No Spontaneous Breathing Trial: Per MD    Significant Events Today: None    ABG Results:   Recent Labs   Lab 07/05/21  0412 07/04/21  0613 07/03/21  1132 07/01/21  2251   PH 7.50* 7.49* 7.49* 7.42   PCO2 28* 29* 29* 31*   PO2 216* 148* 99 93   HCO3 22 22 22 20*   O2PER 30 40% 40 40       Current Vent Settings: Ventilation Mode: CMV/AC  (Continuous Mandatory Ventilation/ Assist Control)  FiO2 (%): 30 %  Rate Set (breaths/minute): 16 breaths/min  Tidal Volume Set (mL): 500 mL  PEEP (cm H2O): 5 cmH2O  Oxygen Concentration (%): 30 %  Resp: 25        Plan: Continue to monitor and wean as tolerated.    Criselda Walton, RT

## 2021-07-08 ENCOUNTER — APPOINTMENT (OUTPATIENT)
Dept: CARDIOLOGY | Facility: CLINIC | Age: 41
End: 2021-07-08
Attending: INTERNAL MEDICINE
Payer: MEDICAID

## 2021-07-08 LAB
ABO + RH BLD: NORMAL
ABO + RH BLD: NORMAL
ALBUMIN SERPL-MCNC: 1.2 G/DL (ref 3.4–5)
ALP SERPL-CCNC: 172 U/L (ref 40–150)
ALT SERPL W P-5'-P-CCNC: 14 U/L (ref 0–50)
ANION GAP SERPL CALCULATED.3IONS-SCNC: 5 MMOL/L (ref 3–14)
AST SERPL W P-5'-P-CCNC: 16 U/L (ref 0–45)
BACTERIA SPEC CULT: ABNORMAL
BACTERIA SPEC CULT: ABNORMAL
BASE DEFICIT BLDA-SCNC: 0.4 MMOL/L
BILIRUB SERPL-MCNC: 1 MG/DL (ref 0.2–1.3)
BLD GP AB SCN SERPL QL: NORMAL
BLD PROD TYP BPU: NORMAL
BLOOD BANK CMNT PATIENT-IMP: NORMAL
BUN SERPL-MCNC: 20 MG/DL (ref 7–30)
CALCIUM SERPL-MCNC: 7.4 MG/DL (ref 8.5–10.1)
CHLORIDE SERPL-SCNC: 120 MMOL/L (ref 94–109)
CO2 SERPL-SCNC: 22 MMOL/L (ref 20–32)
CREAT SERPL-MCNC: 0.68 MG/DL (ref 0.52–1.04)
ERYTHROCYTE [DISTWIDTH] IN BLOOD BY AUTOMATED COUNT: 25.4 % (ref 10–15)
GFR SERPL CREATININE-BSD FRML MDRD: >90 ML/MIN/{1.73_M2}
GLUCOSE BLDC GLUCOMTR-MCNC: 110 MG/DL (ref 70–99)
GLUCOSE BLDC GLUCOMTR-MCNC: 115 MG/DL (ref 70–99)
GLUCOSE BLDC GLUCOMTR-MCNC: 119 MG/DL (ref 70–99)
GLUCOSE BLDC GLUCOMTR-MCNC: 120 MG/DL (ref 70–99)
GLUCOSE BLDC GLUCOMTR-MCNC: 123 MG/DL (ref 70–99)
GLUCOSE BLDC GLUCOMTR-MCNC: 91 MG/DL (ref 70–99)
GLUCOSE SERPL-MCNC: 124 MG/DL (ref 70–99)
HCO3 BLD-SCNC: 23 MMOL/L (ref 21–28)
HCT VFR BLD AUTO: 22.6 % (ref 35–47)
HGB BLD-MCNC: 6.9 G/DL (ref 11.7–15.7)
LVEF ECHO: NORMAL
MAGNESIUM SERPL-MCNC: 2.1 MG/DL (ref 1.6–2.3)
MCH RBC QN AUTO: 24.2 PG (ref 26.5–33)
MCHC RBC AUTO-ENTMCNC: 30.5 G/DL (ref 31.5–36.5)
MCV RBC AUTO: 79 FL (ref 78–100)
NUM BPU REQUESTED: 3
OXYHGB MFR BLD: 98 % (ref 92–100)
PCO2 BLD: 32 MM HG (ref 35–45)
PH BLD: 7.47 PH (ref 7.35–7.45)
PHOSPHATE SERPL-MCNC: 4.3 MG/DL (ref 2.5–4.5)
PLATELET # BLD AUTO: 343 10E9/L (ref 150–450)
PO2 BLD: 133 MM HG (ref 80–105)
POTASSIUM SERPL-SCNC: 3.8 MMOL/L (ref 3.4–5.3)
PROT SERPL-MCNC: 6.9 G/DL (ref 6.8–8.8)
RBC # BLD AUTO: 2.85 10E12/L (ref 3.8–5.2)
SODIUM SERPL-SCNC: 147 MMOL/L (ref 133–144)
SPECIMEN EXP DATE BLD: NORMAL
SPECIMEN SOURCE: ABNORMAL
WBC # BLD AUTO: 8.9 10E9/L (ref 4–11)

## 2021-07-08 PROCEDURE — 250N000009 HC RX 250: Performed by: INTERNAL MEDICINE

## 2021-07-08 PROCEDURE — 99291 CRITICAL CARE FIRST HOUR: CPT | Performed by: INTERNAL MEDICINE

## 2021-07-08 PROCEDURE — 250N000013 HC RX MED GY IP 250 OP 250 PS 637: Performed by: INTERNAL MEDICINE

## 2021-07-08 PROCEDURE — 200N000001 HC R&B ICU

## 2021-07-08 PROCEDURE — 93325 DOPPLER ECHO COLOR FLOW MAPG: CPT | Mod: 26 | Performed by: INTERNAL MEDICINE

## 2021-07-08 PROCEDURE — P9016 RBC LEUKOCYTES REDUCED: HCPCS | Performed by: INTERNAL MEDICINE

## 2021-07-08 PROCEDURE — 80053 COMPREHEN METABOLIC PANEL: CPT | Performed by: INTERNAL MEDICINE

## 2021-07-08 PROCEDURE — 94640 AIRWAY INHALATION TREATMENT: CPT | Mod: 76

## 2021-07-08 PROCEDURE — 250N000011 HC RX IP 250 OP 636: Performed by: INTERNAL MEDICINE

## 2021-07-08 PROCEDURE — 94003 VENT MGMT INPAT SUBQ DAY: CPT

## 2021-07-08 PROCEDURE — 250N000009 HC RX 250: Performed by: SPECIALIST

## 2021-07-08 PROCEDURE — 999N000009 HC STATISTIC AIRWAY CARE

## 2021-07-08 PROCEDURE — 999N001017 HC STATISTIC GLUCOSE BY METER IP

## 2021-07-08 PROCEDURE — 83735 ASSAY OF MAGNESIUM: CPT | Performed by: INTERNAL MEDICINE

## 2021-07-08 PROCEDURE — 999N000157 HC STATISTIC RCP TIME EA 10 MIN

## 2021-07-08 PROCEDURE — 93308 TTE F-UP OR LMTD: CPT | Mod: 26 | Performed by: INTERNAL MEDICINE

## 2021-07-08 PROCEDURE — 82805 BLOOD GASES W/O2 SATURATION: CPT | Performed by: INTERNAL MEDICINE

## 2021-07-08 PROCEDURE — 250N000013 HC RX MED GY IP 250 OP 250 PS 637: Performed by: SPECIALIST

## 2021-07-08 PROCEDURE — 93325 DOPPLER ECHO COLOR FLOW MAPG: CPT

## 2021-07-08 PROCEDURE — 85027 COMPLETE CBC AUTOMATED: CPT | Performed by: INTERNAL MEDICINE

## 2021-07-08 PROCEDURE — 84100 ASSAY OF PHOSPHORUS: CPT | Performed by: INTERNAL MEDICINE

## 2021-07-08 PROCEDURE — 93321 DOPPLER ECHO F-UP/LMTD STD: CPT | Mod: 26 | Performed by: INTERNAL MEDICINE

## 2021-07-08 PROCEDURE — 99232 SBSQ HOSP IP/OBS MODERATE 35: CPT | Performed by: INTERNAL MEDICINE

## 2021-07-08 PROCEDURE — 87040 BLOOD CULTURE FOR BACTERIA: CPT | Performed by: INTERNAL MEDICINE

## 2021-07-08 RX ORDER — QUETIAPINE FUMARATE 100 MG/1
100 TABLET, FILM COATED ORAL 3 TIMES DAILY
Status: DISCONTINUED | OUTPATIENT
Start: 2021-07-08 | End: 2021-07-16

## 2021-07-08 RX ORDER — FUROSEMIDE 10 MG/ML
20 INJECTION INTRAMUSCULAR; INTRAVENOUS EVERY 8 HOURS
Status: DISCONTINUED | OUTPATIENT
Start: 2021-07-08 | End: 2021-07-09

## 2021-07-08 RX ADMIN — DEXMEDETOMIDINE HYDROCHLORIDE 1 MCG/KG/HR: 400 INJECTION INTRAVENOUS at 14:56

## 2021-07-08 RX ADMIN — BICTEGRAVIR SODIUM, EMTRICITABINE, AND TENOFOVIR ALAFENAMIDE FUMARATE 1 TABLET: 50; 200; 25 TABLET ORAL at 13:33

## 2021-07-08 RX ADMIN — CHLORHEXIDINE GLUCONATE 15 ML: 1.2 SOLUTION ORAL at 20:28

## 2021-07-08 RX ADMIN — NAFCILLIN SODIUM 2 G: 2 INJECTION, POWDER, LYOPHILIZED, FOR SOLUTION INTRAMUSCULAR; INTRAVENOUS at 01:58

## 2021-07-08 RX ADMIN — DEXMEDETOMIDINE HYDROCHLORIDE 1 MCG/KG/HR: 400 INJECTION INTRAVENOUS at 02:40

## 2021-07-08 RX ADMIN — PROPOFOL 30 MCG/KG/MIN: 10 INJECTION, EMULSION INTRAVENOUS at 10:09

## 2021-07-08 RX ADMIN — NAFCILLIN SODIUM 2 G: 2 INJECTION, POWDER, LYOPHILIZED, FOR SOLUTION INTRAMUSCULAR; INTRAVENOUS at 20:25

## 2021-07-08 RX ADMIN — HEPARIN SODIUM 5000 UNITS: 5000 INJECTION, SOLUTION INTRAVENOUS; SUBCUTANEOUS at 06:00

## 2021-07-08 RX ADMIN — PROPOFOL 30 MCG/KG/MIN: 10 INJECTION, EMULSION INTRAVENOUS at 19:29

## 2021-07-08 RX ADMIN — FUROSEMIDE 20 MG: 10 INJECTION, SOLUTION INTRAVENOUS at 20:28

## 2021-07-08 RX ADMIN — FUROSEMIDE 20 MG: 10 INJECTION, SOLUTION INTRAVENOUS at 13:21

## 2021-07-08 RX ADMIN — NAFCILLIN SODIUM 2 G: 2 INJECTION, POWDER, LYOPHILIZED, FOR SOLUTION INTRAMUSCULAR; INTRAVENOUS at 08:54

## 2021-07-08 RX ADMIN — PROPOFOL 30 MCG/KG/MIN: 10 INJECTION, EMULSION INTRAVENOUS at 05:59

## 2021-07-08 RX ADMIN — DEXMEDETOMIDINE HYDROCHLORIDE 1 MCG/KG/HR: 400 INJECTION INTRAVENOUS at 10:39

## 2021-07-08 RX ADMIN — HYDROMORPHONE HYDROCHLORIDE 0.5 MG: 1 INJECTION, SOLUTION INTRAMUSCULAR; INTRAVENOUS; SUBCUTANEOUS at 12:18

## 2021-07-08 RX ADMIN — HEPARIN SODIUM 5000 UNITS: 5000 INJECTION, SOLUTION INTRAVENOUS; SUBCUTANEOUS at 13:37

## 2021-07-08 RX ADMIN — ACETAMINOPHEN 650 MG: 325 TABLET, FILM COATED ORAL at 20:27

## 2021-07-08 RX ADMIN — PANTOPRAZOLE SODIUM 40 MG: 40 TABLET, DELAYED RELEASE ORAL at 07:52

## 2021-07-08 RX ADMIN — Medication 50 MCG: at 22:30

## 2021-07-08 RX ADMIN — QUETIAPINE FUMARATE 100 MG: 100 TABLET ORAL at 15:48

## 2021-07-08 RX ADMIN — DEXMEDETOMIDINE HYDROCHLORIDE 1 MCG/KG/HR: 400 INJECTION INTRAVENOUS at 23:15

## 2021-07-08 RX ADMIN — ALBUTEROL SULFATE 2.5 MG: 2.5 SOLUTION RESPIRATORY (INHALATION) at 02:21

## 2021-07-08 RX ADMIN — ALBUTEROL SULFATE 2.5 MG: 2.5 SOLUTION RESPIRATORY (INHALATION) at 07:15

## 2021-07-08 RX ADMIN — NAFCILLIN SODIUM 2 G: 2 INJECTION, POWDER, LYOPHILIZED, FOR SOLUTION INTRAMUSCULAR; INTRAVENOUS at 13:28

## 2021-07-08 RX ADMIN — QUETIAPINE FUMARATE 100 MG: 100 TABLET ORAL at 22:03

## 2021-07-08 RX ADMIN — PROPOFOL 30 MCG/KG/MIN: 10 INJECTION, EMULSION INTRAVENOUS at 15:44

## 2021-07-08 RX ADMIN — HEPARIN SODIUM 5000 UNITS: 5000 INJECTION, SOLUTION INTRAVENOUS; SUBCUTANEOUS at 22:02

## 2021-07-08 RX ADMIN — QUETIAPINE FUMARATE 100 MG: 100 TABLET ORAL at 08:12

## 2021-07-08 RX ADMIN — HYDROMORPHONE HYDROCHLORIDE 0.5 MG: 1 INJECTION, SOLUTION INTRAMUSCULAR; INTRAVENOUS; SUBCUTANEOUS at 09:39

## 2021-07-08 RX ADMIN — DEXMEDETOMIDINE HYDROCHLORIDE 1 MCG/KG/HR: 400 INJECTION INTRAVENOUS at 19:04

## 2021-07-08 RX ADMIN — Medication 1 PACKET: at 08:13

## 2021-07-08 RX ADMIN — FUROSEMIDE 20 MG: 20 TABLET ORAL at 04:47

## 2021-07-08 RX ADMIN — NAFCILLIN SODIUM 2 G: 2 INJECTION, POWDER, LYOPHILIZED, FOR SOLUTION INTRAMUSCULAR; INTRAVENOUS at 04:47

## 2021-07-08 RX ADMIN — Medication 15 ML: at 20:27

## 2021-07-08 RX ADMIN — NAFCILLIN SODIUM 2 G: 2 INJECTION, POWDER, LYOPHILIZED, FOR SOLUTION INTRAMUSCULAR; INTRAVENOUS at 16:54

## 2021-07-08 RX ADMIN — CHLORHEXIDINE GLUCONATE 15 ML: 1.2 SOLUTION ORAL at 07:53

## 2021-07-08 RX ADMIN — ALBUTEROL SULFATE 2.5 MG: 2.5 SOLUTION RESPIRATORY (INHALATION) at 13:58

## 2021-07-08 RX ADMIN — DEXMEDETOMIDINE HYDROCHLORIDE 1 MCG/KG/HR: 400 INJECTION INTRAVENOUS at 06:46

## 2021-07-08 RX ADMIN — HYDROMORPHONE HYDROCHLORIDE 0.5 MG: 1 INJECTION, SOLUTION INTRAMUSCULAR; INTRAVENOUS; SUBCUTANEOUS at 16:52

## 2021-07-08 RX ADMIN — ACETAMINOPHEN 650 MG: 325 TABLET, FILM COATED ORAL at 12:05

## 2021-07-08 RX ADMIN — Medication 50 MCG: at 21:18

## 2021-07-08 ASSESSMENT — ACTIVITIES OF DAILY LIVING (ADL)
ADLS_ACUITY_SCORE: 24

## 2021-07-08 ASSESSMENT — MIFFLIN-ST. JEOR: SCORE: 1736.5

## 2021-07-08 NOTE — PROGRESS NOTES
[facility] ICU RESPIRATORY NOTE        Date of Admission: 7/1/2021    Date of Intubation (most recent): 7/1/21    Reason for Mechanical Ventilation: airway protection    Number of Days on Mechanical Ventilation: 7    Met Criteria for Spontaneous Breathing Trial: No    Reason for No Spontaneous Breathing Trial: Per MD    Significant Events Today: None    ABG Results:   Recent Labs   Lab 07/07/21  0450 07/05/21  0412 07/04/21  0613 07/03/21  1132   PH 7.46* 7.50* 7.49* 7.49*   PCO2 33* 28* 29* 29*   PO2 107* 216* 148* 99   HCO3 24 22 22 22   O2PER 30 30 40% 40       Current Vent Settings: Ventilation Mode: CMV/AC  (Continuous Mandatory Ventilation/ Assist Control)  FiO2 (%): 30 %  Rate Set (breaths/minute): 16 breaths/min  Tidal Volume Set (mL): 500 mL  PEEP (cm H2O): 5 cmH2O  Oxygen Concentration (%): 30 %  Resp: 22      Plan: Will continue to monitor the pt on full ventilatory support.    Criselda Walton, RT

## 2021-07-08 NOTE — PROGRESS NOTES
Assessment and Plan:     Assessment:   Manas Hernandez is a 41 year old IVDU female who was admitted on 7/1/2021 with MSSA sepsis and shock. TTE shows a small mitral valve vegetation as well as pericardial inflammation with a large localized (posterior) effusion. She is intubated and sedated due to agitation and inability to protect her airway.      1. MSSA sepsis and shock with encephalopathy.  Not on any pressors now.  Altered mental sensorium continues.  Also on sedation, ICU managing and switching from propofol.  2. Mitral valve endocarditis with a large mobile vegetation, mild-moderate MR and severe LAE, embolic episodes to the extremities noted.  Fever subsided.  Had CVA likely from embolic episode.  3. Probable myopericarditis with  localized pericardial effusion and troponin rise. Low level of persistently elevated troponin is probably due to inflammation of myocardium.   Repeat echocardiogram today was reviewed.  It reveals both organized and free-flowing pericardial effusion, reported a small size.  Slightly improved compared to prior study per report.  Organized portion could suggest infectious pericarditis although patient has clinically improved and sepsis has resolved.  Consider follow-up echocardiogram in 7 to 10 days or earlier as clinically indicated.  4. Preserved LVEF.  5. Ischemic CVA, possibly related to MV vegetation embolization..   6. Pyelonephritis.  7. Malnourishment with severe anemia, hypoalbuminemia, hypokalemia.  8. Chronic polysubstance and IVDU.  9. Malnutrition with albumin 1.2  10. Question of septic elbow joint  11. New diagnosis of HIV, starting antiretroviral therapy per ID    Overall, stable from cardiac perspective.  I had a discussion with Dr. Piña, the ICU physician.  We talked about see her on as needed basis.  He is agreeable.  I recommended that the repeat an echocardiogram in 7 to 10 days to reassess the pericardial fluid.  They will call us again if clinical  "situation changes.  We will sign off.          Tristan Bishop MD        Interval History:   Intubated and sedated          Medications:       albuterol  2.5 mg Nebulization Q6H     bictegravir-emtricitabine-tenofovir  1 tablet Oral Daily     chlorhexidine  15 mL Mouth/Throat Q12H     furosemide  20 mg Intravenous Q8H     heparin ANTICOAGULANT  5,000 Units Subcutaneous Q8H     insulin aspart  1-6 Units Subcutaneous Q4H     multivitamins w/minerals  15 mL Per Feeding Tube Daily     nafcillin  2 g Intravenous Q4H     pantoprazole  40 mg Per Feeding Tube QAM AC     perflutren diluted 1mL to 2mL with saline  5 mL Intravenous Once     QUEtiapine  100 mg Oral or Feeding Tube TID     sodium chloride (PF)  10 mL Intravenous Once     sodium chloride (PF)  10 mL Intracatheter Q8H     sodium chloride (PF)  3 mL Intravenous Q8H            Physical Exam:   Blood pressure 139/72, pulse 64, temperature 100.8  F (38.2  C), temperature source Axillary, resp. rate 20, height 1.727 m (5' 8\"), weight 101.3 kg (223 lb 5.2 oz), SpO2 97 %.    Vitals:    07/01/21 1257 07/01/21 1730 07/02/21 0200 07/03/21 0530   Weight: 86 kg (189 lb 9.5 oz) 87.5 kg (192 lb 14.4 oz) 87.6 kg (193 lb 1.6 oz) 92.4 kg (203 lb 11.3 oz)    07/04/21 0000 07/05/21 0000 07/06/21 0400 07/07/21 0600   Weight: 93.9 kg (207 lb 0.2 oz) 96.4 kg (212 lb 8.4 oz) 97.2 kg (214 lb 4.6 oz) 100.1 kg (220 lb 10.9 oz)    07/08/21 0600   Weight: 101.3 kg (223 lb 5.2 oz)         Intake/Output Summary (Last 24 hours) at 7/4/2021 1415  Last data filed at 7/4/2021 1000  Gross per 24 hour   Intake 3544.15 ml   Output 805 ml   Net 2739.15 ml       07/03 0700 - 07/08 0659  In: 29897.99 [I.V.:9315.99]  Out: 6115 [Urine:6115]  Net: 40297.99    Exam:  GENERAL APPEARANCE ADULT: Intubated and sedated.    RESP: lungs clear to auscultation   CV: 2 x 6 pansystolic murmur in the mitral area.  ABDOMEN: normal bowel sounds, soft, nontender, no hepatosplenomegaly or other masses  EXTREMITIES: " Embolic papular lesions, discoloration noted         Data:   LABS (Last four results)  CMP  Recent Labs   Lab 07/08/21  0500 07/07/21  0515 07/06/21  0500 07/05/21  1545 07/05/21  0413   * 150* 150* 151* 148*   POTASSIUM 3.8 3.8 3.5  --  4.0   CHLORIDE 120* 124* 122*  --  122*   CO2 22 24 22  --  22   ANIONGAP 5 2* 6  --  4   * 131* 131*  --  112*   BUN 20 24 28  --  34*   CR 0.68 0.69 0.82  --  0.88   GFRESTIMATED >90 >90 >90  --  82   GFRESTBLACK >90 >90 >90  --  >90   DEREK 7.4* 7.2* 7.6*  --  7.4*   MAG 2.1 2.1 2.4*  --  2.4*   PHOS 4.3 3.9 3.9  --  4.0   PROTTOTAL 6.9 6.6* 6.7*  --  6.2*   ALBUMIN 1.2* 1.2* 1.3*  --  1.2*   BILITOTAL 1.0 1.2 1.7*  --  1.2   ALKPHOS 172* 180* 219*  --  241*   AST 16 23 18  --  24   ALT 14 15 18  --  23     CBC  Recent Labs   Lab 07/08/21  0500 07/07/21  0515 07/06/21  0500 07/05/21  0413   WBC 8.9 10.0 11.9* 11.4*   RBC 2.85* 3.00* 2.94* 2.97*   HGB 6.9* 7.1* 6.9* 6.6*   HCT 22.6* 23.5* 22.4* 21.4*   MCV 79 78 76* 72*   MCH 24.2* 23.7* 23.5* 22.2*   MCHC 30.5* 30.2* 30.8* 30.8*   RDW 25.4* 24.6* 23.4* 21.5*    301 236 204     INR  No lab results found in last 7 days.  TROPONINS   Lab Results   Component Value Date    TROPI 0.431 () 07/04/2021    TROPI 0.439 () 07/04/2021    TROPI 0.407 () 07/03/2021    TROPI 0.395 () 07/03/2021    TROPI 0.430 () 07/03/2021                                                                                                               Tristan Bishop MD

## 2021-07-08 NOTE — PROGRESS NOTES
CLINICAL NUTRITION SERVICES - REASSESSMENT NOTE      Recommendations Ordered by Registered Dietitian (RD): Increase TF to goal --> Promote 1.0 (NO FIBER) at 65 mL/hr to provide:  1560 kcal, 98 g protein (1.4 g/kg), 203 g CHO, no fiber, 1309 mL H2O  Discontinue the ProSource as no longer needed   Total with Propofol = 2019 kcal (29 kcal/kg)   Malnutrition: (7/2)  % Weight Loss:  Unable to determine without recent weight history  % Intake:  </= 50% for >/= 5 days (severe malnutrition) - Suspect as was vomiting several days PTA  Subcutaneous Fat Loss:  None observed  Muscle Loss:  None observed  Fluid Retention:  None noted     Malnutrition Diagnosis: Unable to determine due to lack of recent weight history       EVALUATION OF PROGRESS TOWARD GOALS   Diet:  NPO on vent     Nutrition Support:  Patient's TF was increased to preliminary goal on 7/6 and continues as follows ~    Nutrition Support Enteral:  Type of Feeding Tube: OG  Enteral Frequency:  Continuous  Enteral Regimen: Promote 1.0 (NO FIBER) at 40 mL/hr (now running 24 hours per day as Cipro has been discontinued so no need to hold TF any longer)  Total Enteral Provisions: 960 kcal, 60 g protein, 125 g CHO, no fiber, 805 mL H2O  Free Water Flush: 200 mL every 4 hours   ProSource 4 pkts per day = 160 kcal and 44 g protein  Propofol at 17.4 mL/hr= 459 kcal   Total (TF + ProSource + Propofol)= 1579 kcal (23 kcal/kg), 94 g protein (1.4 g/kg)      Intake/Tolerance:    Na 147 (H) - flushes increased yesterday as above   K/Mg/Phos all WNL  BGM < 150 with Medium sliding scale insulin   Last BM 7/6 (rectal tube out)  I/O 3683/1620, wt 101.3 kg (up overall) - IV Lasix       ASSESSED NUTRITION NEEDS:  Dosing Weight:  69.6 kg (adjusted for overwt)  Energy Needs:  3349-6224 kcals (25-30 kcal/kg)  Justification: overweight and vented   Protein Needs:   grams protein (1.2-1.5 g pro/Kg)  Justification: hypercatabolism with critical illness and preservation of lean body  mass      NEW FINDINGS:   Discontinued preliminary estimated energy needs as patient off both pressors    Cipro discontinued 7/8 (no longer in need of holding TF for administration)    Patient receiving Certavite daily per ETOH protocol       Previous Goals (7/5):   TF + ProSource + Propofol will meet % needs   Evaluation: Not met - Meeting 90% low-end energy needs     Previous Nutrition Diagnosis (7/5):   Inadequate enteral nutrition infusion related to TF remains at 10 mL/hr, down to 1 pressor as evidenced by meeting 12% protein and 35% energy needs from TF + Propofol   Evaluation: Improving      CURRENT NUTRITION DIAGNOSIS  Inadequate enteral nutrition infusion related to TF at 40 mL/hr, now off pressors and Propofol level decreased as evidenced by meeting 90% low-end energy needs     INTERVENTIONS  Recommendations / Nutrition Prescription  Increase TF to goal --> Promote 1.0 (NO FIBER) at 65 mL/hr to provide:  1560 kcal, 98 g protein (1.4 g/kg), 203 g CHO, no fiber, 1309 mL H2O  Discontinue the ProSource as no longer needed   Total with Propofol = 2019 kcal (29 kcal/kg)    Implementation  EN Composition:  Orders written to increase TF rate to 50 mL/hr now and then in 6 hours increase to goal.  Medical Food Supplement:  Orders written to discontinue the ProSource.   Collaboration and Referral of Nutrition care:  Discussed today during interdisciplinary bedside rounds.     Goals  TF + Propofol will meet % needs     MONITORING AND EVALUATION:  Progress towards goals will be monitored and evaluated per protocol and Practice Guidelines    Ayde Ramachandran RD, LD, CNSC   Clinical Dietitian - [facility]

## 2021-07-08 NOTE — PROGRESS NOTES
Neuro: Patient Withdraws to pain, on moderate amount of sedation. Does fight with cares, but ultimately does not follow commands or wake. PERRLA.   CV: HR SR, BP WNL, edema in joints, pulses palpable.   Resp: Vent settings unchanged, tolerated well. Large amounts of secretions in mouth and down the tube.   GI/Nutrition: TF infusing, tolerating well. Patient FlexiSeal removed, no BM.   : Neri in place, adequate UO.   Skin: R Buttock blister popped, mepilex in place, Dark spots on finger tips and toes.   Critical Labs: HGB low, 1 unit of blood given  ID: ABX ordered and given.  Social/Psych: No contact with family.   Therapy: NA  Plan: Continue to monitor.     Lashanda Corbett RN

## 2021-07-08 NOTE — PROGRESS NOTES
[facility]    Infectious Disease Progress Note    Date of Service : 07/08/2021     Assessment :  1. S.aureus MSSA native mitral valve endocarditis with septic shock in the setting of IVDA complicated by embolic L parietal lobe infarction and multiple peripheral emboli.   (TTE shows a large mobile 1.4 cm vegetation on the posterior mitral valve leaflet with extension to left atrium and small ASD per cardiology notes , no valvular abscess and no additional valvular involvement. Pericardial effusion is small). No seeding on CT abdomen but has peripheral emboli.  2. Untreated HIV diagnosed 2018, CD4 count maintained at >500, VL pending  3. Hepatitis C Ab+ but RNA -.   4. Severe sepsis requiring pressor support and multiorgan failure  5. Multiple peripheral emboli. Right finger swelling and discoloration of left 3rd toe. US right finger without evidence of infection but sub-optimal study. Appears stable  6. FORD related to sepsis- resolved  7. Embolic stroke (small ASD with Left to right shunt)  8. Klebsiella pneumoniae UTI  9. Hypernatremia- Na 150 today  10. Severe anemia multifactorial. Has baseline anemia, exacerbated by chronic disease  11. Polysubstance abuse  12. Splenomegaly   13. LFT abnormality    Recommendations  1. Continue Nafcillin  2. Initiate antiretroviral therapy with Biktarvy  3. Follow blood cultures  4. Await HIV VL, genotype  5. Interval FUP echocardiogram next week    Marisol Matos MD    Interval History    Remains intubated    Antimicrobial therapy  7/2 Nafcillin  Prior  7/4-7/7 Cipro  7/1-7/2 Vancomycin, zosyn    Physical Exam   Temp: 100  F (37.8  C) Temp src: Axillary BP: 139/72 Pulse: 64   Resp: 17 SpO2: 100 % O2 Device: Mechanical Ventilator    Vitals:    07/06/21 0400 07/07/21 0600 07/08/21 0600   Weight: 97.2 kg (214 lb 4.6 oz) 100.1 kg (220 lb 10.9 oz) 101.3 kg (223 lb 5.2 oz)     Vital Signs with Ranges  Temp:  [97.6  F (36.4  C)-100.5  F (38.1  C)] 100  F (37.8   C)  Pulse:  [] 64  Resp:  [13-30] 17  BP: (139)/(72) 139/72  MAP:  [72 mmHg-100 mmHg] 92 mmHg  Arterial Line BP: (101-144)/(53-79) 138/70  FiO2 (%):  [30 %] 30 %  SpO2:  [96 %-100 %] 100 %    GENERAL APPEARANCE:  intubated  EYES: conjunctivae and sclerae normal  NECK: no adenopathy  RESP: lungs clear to auscultation - no rales, rhonchi or wheezes  CV: regular rates and rhythm, murmur  ABDOMEN: soft, umbilical hernia  MS: extremities normal- no gross deformities noted  SKIN: right finger with swelling and redness, multiple osler's nodes and splinter hemorrhages, multiple embolic infarcts on toes, black discoloration of left toe    Other:    Medications     dexmedetomidine 1 mcg/kg/hr (07/08/21 0743)     fentaNYL 50 mcg/hr (07/08/21 0743)     - MEDICATION INSTRUCTIONS -       - MEDICATION INSTRUCTIONS -       midazolam Stopped (07/06/21 0418)     norepinephrine Stopped (07/05/21 1421)     propofol (DIPRIVAN) infusion 30 mcg/kg/min (07/08/21 0743)     sodium chloride 10 mL/hr at 07/02/21 1934     vasopressin Stopped (07/02/21 1240)       albuterol  2.5 mg Nebulization Q6H     chlorhexidine  15 mL Mouth/Throat Q12H     heparin ANTICOAGULANT  5,000 Units Subcutaneous Q8H     insulin aspart  1-6 Units Subcutaneous Q4H     multivitamins w/minerals  15 mL Per Feeding Tube Daily     nafcillin  2 g Intravenous Q4H     pantoprazole  40 mg Per Feeding Tube QAM AC     protein modular  1 packet Per Feeding Tube 4x Daily     QUEtiapine  100 mg Oral or Feeding Tube BID     sodium chloride (PF)  10 mL Intracatheter Q8H     sodium chloride (PF)  3 mL Intravenous Q8H       Data   All microbiology laboratory data reviewed.  Recent Labs   Lab Test 07/08/21  0500 07/07/21  0515 07/06/21  0500   WBC 8.9 10.0 11.9*   HGB 6.9* 7.1* 6.9*   HCT 22.6* 23.5* 22.4*   MCV 79 78 76*    301 236     Recent Labs   Lab Test 07/08/21  0500 07/07/21  0515 07/06/21  0500   CR 0.68 0.69 0.82     No lab results found.  Recent Labs   Lab Test  07/08/21  0500 07/07/21  0450 07/06/21  0500 07/05/21  1232 07/05/21  1104 07/03/21  1730 07/03/21  1722 07/02/21  1238 07/02/21  1225   CULT No growth after 1 hour No growth after 22 hours  No growth after 22 hours No growth after 2 days  No growth after 2 days Cultured on the 2nd day of incubation:  Staphylococcus epidermidis  *  Critical Value/Significant Value, preliminary result only, called to and read back by  IVETT WARE RN 07/06/21 1258 EH.   Cultured on the 2nd day of incubation:  Gram positive cocci in clusters  *  Critical Value/Significant Value, preliminary result only, called to and read back by  Sin Jack RN at 0448 7/7/21 hg   Cultured on the 1st day of incubation:  Staphylococcus aureus  Susceptibility testing done on previous specimen  *  Critical Value/Significant Value called to and read back by  Royal Ayala RN @1345 7/4/2021 gd   Cultured on the 1st day of incubation:  Staphylococcus aureus  Susceptibility testing done on previous specimen  *  Critical Value/Significant Value called to and read back by  Royal Ayala RN @1347 07/04/2021 gd   Cultured on the 1st day of incubation:  Staphylococcus aureus  Susceptibility testing done on previous specimen  *  Critical Value/Significant Value called to and read back by  Lashanda Barnes RN @1522 7/3/2021 gd   Cultured on the 1st day of incubation:  Staphylococcus aureus  Susceptibility testing done on previous specimen  *  Critical Value/Significant Value, preliminary result only, called to and read back by   ROYAL DE DIOS RN @1743 7.3.21 LM       Susceptibility                       Staphylococcus aureus (1) Staphylococcus aureus (3)       BOBBY BOBBY       CLINDAMYCIN <=0.25 ug/mL Sensitive <=0.25 ug/mL Sensitive       ERYTHROMYCIN <=0.25 ug/mL Sensitive <=0.25 ug/mL Sensitive       GENTAMICIN <=0.5 ug/mL Sensitive <=0.5 ug/mL Sensitive       OXACILLIN 0.5 ug/mL Sensitive 0.5 ug/mL Sensitive       TETRACYCLINE <=1 ug/mL Sensitive <=1  ug/mL Sensitive       Trimethoprim/Sulfa <=0.5/9.5 u... Sensitive <=0.5/9.5 u... Sensitive       VANCOMYCIN 1 ug/mL Sensitive 1 ug/mL Sensitive           7/5 JAE  Interpretation Summary     1. The left ventricle is normal in structure, function and size. The visual  ejection fraction is estimated at 60%.  2. The right ventricle is normal in structure, function and size.  3. Small atrial septal defect, secundum type 5mm in diameter. Left to right  shunt. Rim appears adequate in size for closure device.  4. Moderate to large mobile vegetation (14mm long, 6mm wide) of the P2 segment  of the mitral leaflet. No valve perforation. There is moderate (2+) mitral  regurgitation.  5. Small pericardial effusion     7/6 US extremity R hand  US EXTREMITY NON VASCULAR RIGHT 7/6/2021 11:39 AM      HISTORY: Right 4th finger concern for septic arthritis/osteomyelitis-  Please do US right hand 4th finger.      FINDINGS: Sonography was performed in the area of clinical concern  (fourth finger).                                                                      IMPRESSION: No fluid collection or significant joint effusion is  appreciated sonographically. Clinical follow-up recommended. If  clinical suspicion persists, MRI could be performed.     7/1TTE  Interpretation Summary     Left ventricular systolic function is normal.  The visual ejection fraction is 55-60%.  The right ventricle is mildly dilated.  The right ventricular systolic function is normal.  There is a mobile linear echodensity measuring 1.4 cm x 0.6 cm on the atrial  side of the posterior mitral valve leaflet.  Other valve structures appear normal without significant dysfunction or  obvious valvular vegetations.  The inferior pericardium appears moderately thickened and echobright which may  represent active pericarditis.  Large localized pericardial effusion (2.7 cm) posteriorly/laterally with  mobile free floating echodense material which could represent  fibrinous  stranding in setting of inflammatory pericarditis or infectious material if  infectious pericardial effusion.  Dilation of the inferior vena cava is present with abnormal respiratory  variation in diameter.  Small left pleural effusion     No prior for comparison.     7/1 CT head  CT SCAN OF THE HEAD WITHOUT CONTRAST   7/1/2021 1:47 PM      HISTORY: Delirium; altered mental status     TECHNIQUE:  Axial images of the head and coronal reformations without  IV contrast material.  Radiation dose for this scan was reduced using  automated exposure control, adjustment of the mA and/or kV according  to patient size, or iterative reconstruction technique.     COMPARISON: None.     FINDINGS: There is a focal 2 cm area of hypodensity involving gray and  white matter in the medial left parietal lobe consistent with acute to  subacute ischemic infarct. There is a minimal incidental arachnoid  cyst in the anterior portion of left middle cranial fossa. Ventricles  and subarachnoid spaces are otherwise within normal limits. There is  no evidence for intracranial hemorrhage, significant mass effect, or  skull fracture. Exam is mildly limited by motion artifact. Visualized  paranasal sinuses and mastoid air cells are clear.                                                                      IMPRESSION: Focal hypodensity in the medial left parietal lobe  consistent with acute to subacute ischemic infarct.     7/1 Ct abdomen/pelvis                                                           IMPRESSION:   1.  Patchy hypoenhancement in the mid and upper poles of the right  kidney posteriorly, suspicious for pyelonephritis.  2.  Moderate-sized fat-containing paraumbilical hernia.  3.  Mild splenomegaly.  4.  There are possible small gallstones within a contracted  gallbladder.    Attestation:  Total time on the floor involved in the patient's care: 35 minutes. Total time spent in counseling/care coordination: >50%

## 2021-07-08 NOTE — PROGRESS NOTES
[facility] ICU RESPIRATORY NOTE    Date of Admission: 7/1/2021    Date of Intubation (most recent): 7/1/21    Reason for Mechanical Ventilation: airway protection    Number of Days on Mechanical Ventilation: 8    Met Criteria for Pressure Support Trial: no    Reason for No Pressure Support Trial: Per MD    Significant Events Today: None    ABG Results: Results for JOAQUÍN SAAVEDRA (MRN [id number]) as of 7/8/2021 03:27   Ref. Range 7/7/2021 04:50   pH Arterial Latest Ref Range: 7.35 - 7.45 pH 7.46 (H)   pCO2 Arterial Latest Ref Range: 35 - 45 mm Hg 33 (L)   PO2 Arterial Latest Ref Range: 80 - 105 mm Hg 107 (H)   Bicarbonate Arterial Latest Ref Range: 21 - 28 mmol/L 24   Base Deficit Art Latest Units: mmol/L 0.1   FIO2 Unknown 30   Oxyhemoglobin Arterial Latest Ref Range: 92 - 100 % 97     Ventilation Mode: CMV/AC  (Continuous Mandatory Ventilation/ Assist Control)  FiO2 (%): 30 %  Rate Set (breaths/minute): 16 breaths/min  Tidal Volume Set (mL): 500 mL  PEEP (cm H2O): 5 cmH2O  Oxygen Concentration (%): 30 %  Resp: 21      Plan:  Continue on full vent support

## 2021-07-08 NOTE — PLAN OF CARE
Pt. Remains on vent., no changes this 12H shift, good perfusion, sinus rhythm, fentanyl, propofol and precedex drips all unchanged, dilauded  prn doses X 3 for overbreathing ventilator and overall discomfort with good result, OG feedings tolerated and increased. one loose BM, urine output good with good response to lasix, , pt. Neuro exam unchanged: Reacting to pain and pupils ewual, no following, no contact from pt's family.

## 2021-07-08 NOTE — PROGRESS NOTES
Critical Care Progress Note  07/08/2021    Name: Manas Hernandez MRN#: [id number]   Age: 41 year old YOB: 1980     [facility] Day# 7           Problem List:   Principal Problem:    Endocarditis due to methicillin susceptible Staphylococcus aureus (MSSA)  Active Problems:    Pyelonephritis, acute    Altered mental status, unspecified altered mental status type           Summary/Hospital Course:   Ms. Hernandez is a 41 year old female with chronic polysubstance abuse and IVDU presented on 7/1 with encephalopathy, hypotension, anemia, and hypotension responsive to fluid resusictation. She was agitated in the ED, sedated and intubated for airway protection. CT head showed possible new acute to subacute parietal lobe ischemic infarct, CXR without abnormalities, and CT abdomen concerning for pyelonephritis empirically treated with vancomycin and Zosyn. Transferred to the ICU for continued infectious workup and airway management. On exam there was concern for endocarditis. On 7/2, admission blood cultures positive for MSSA. Remains intubated on nafcillin for infective endocarditis and pyelonephritis, urine culture with klebsiella treated with ciprofloxacin. On 7/5, JAE performed showing vegetation 1.8cm in length on posterior leaflet of MV, small ASD with L to R shunt, small pericardial effusion without tamponade. Plan to continue IV antibiotic treatment without surgical intervention. On 7/6, HIV and HCV testing positive; per [facility] notes, had previously screened HIV positive in 2018 (though never had confirmatory testing).      Assessment and plan :     Manas Hernandez is a 41 year old female with a history of chronic polysubstance abuse/IVDU admitted on 7/1/2021 for encephalopathy requiring intubation, hypotension and acidosis. She is intubated in the ICU for management of native mitral valve endocarditis complicated by MSSA bacteremia treated with IV antibiotics.    I have personally  reviewed the daily labs, imaging studies, cultures and discussed the case with referring physician and consulting physicians.     My assessment and plan by system for this patient is as follows:     Neurology/Psychiatry:   1. Acute ischemic stroke of left MCA, PCA territories secondary to infective endocarditis.  2. Toxic encephalopathy, septic encephalopathy - methicillin S. Aureus bacteremia on blood cultures (7/5).    3. ICU Sedation - on propofol and precedex (to reduce propofol in setting of borderline hypertriglycerides), weaning as able  4. Analgesia  5. Agitation  6. Polysubstance use  Plan  - Neuro CC following, appreciate assistance  - Stroke risk - monitor BP, goal <130/80  - Continue to follow blood cultures  - Continue propofol for sedation, wean as able with RASS goal 0 to -1, also midazolam prn and adding back dexmedtomidate to minimize propofol use  - Fentanyl prn for pain and withdrawal prevention  - Tylenol prn for pain  - Monitor for signs of withdrawal - UDS positive for amphetamines and opiates on admission  - Increase seroquel 100 mg to q8hr  - Chemical dependency evaluation once extubated if agreeable      Cardiovascular:   1. MSSA septic shock - resolved. Stable off pressors.  2. Mitral valve endocarditis - small mobile vegetation, mild-moderate MR and severe LAE. No abscess or perivalvular abscess on JAE (7/5).   3. Elevated troponin likely secondary to myocardial inflammation, resolved - peaked 7/1.  4. Hypertriglyceridemia - elevated TG, low HDL; trying to limit propofol use  5. Small pericardial effusion without tamponade physiology - JAE performed, difficult to sample safely.  Plan  - CV surgery following - continue IV antibiotic therapy as below, no plans for surgical intervention at this time.  - Repeat TTE today to assess effusion.  May need repeat JAE at some point.     Pulmonary/Ventilator Management:   1. Mechanically ventilated for airway protection - CMV/AC (16/500/30/5).  2.  Respiratory alkalosis, overbreathing vent. Expect to improve post extubation.   Plan  - Will continue mechanical ventilation, she is still encephalopathic with pending HIV labs and needing follow-up cardiac imaging.  - Continue albuterol neb q6h  - diuresis initiating today  -  Not passing sbt's today;  Anticipate this will imrpove with diuresis--continue sbt's     GI/Nutrition :   1. Mildly elevated alkaline phosphate  2. Hyperbilirubinemia with mild biliary dilation on US (CDB 9mm) - likely due to underlying sepsis, monitor as needed.  Tbili 1.0 today  3. Hypoalbuminemia 1.2  4. Stress ulcer prophylaxis  5. Diarrhea, likely in the setting of tube feeds and antibiotics. Only on 7/7, resolved 7/8.  cdiff negative.  Plan  - Intubated, on tube feeds  - Monitor CMP daily  - Continue pantoprazole 40 mg IV   - Continue to monitor bilirubin and alk phos, holding repeat US for now as tbili has been decreasing.     Renal/Fluids/Electrolytes:   1. Klebsiella pneumoniae UTI with septic shock - urine culture with klebsiella on 7/4.  2. Hypernatremia, Na improved to 147 with increased free water.  3. FORD - resolved.  Plan  - Completed ciprofloxacin 500 mg q12h  - Monitor CMP daily  - Avoid nephrotoxic agents such as NSAID, IV contrast unless specifically required  - Follow I/O's as appropriate.  - continue fw at 200 q4hr     Infectious Disease:   1. Severe sepsis secondary to MSSA bacteremia and MV endocarditis - likely improving, delayed growth on blood cultures positive from 7/5.  2. Klebsiella pneumoniae UTI (now believed unlikely to be pyelo)  3. HIV, unclear if new diagnosis - positive screening test from 2018 per chart review without follow-up or treatment initiation.   4. Hepatitis C, LFTs wnl  Plan  - Continue nafcillin 2 g IV q4h  - Completed ciprofloxacin  - HIV VL pending; cd4 appears to be 500s -- no prophylaxis needed for now  - HCV quant pending  - ID following, appreciate assistance  - Continue to follow blood  cultures, most recent growth 7/5 (MSSA), cutlures from 7/6 and 7/7 remain negative.  - Continue to monitor urine output while on IV antibiotics  - starting ART therapy 7/8 per ID     Endocrine:   1. Stress induced hyperglycemia, improved  2. Prediabetes, A1c 5.7 at admission  Plan  - ICU insulin protocol, goal sugar <180  - Follow BG levels     Hematology/Oncology:   1. Chronic anemia, acute on chronic (hgb 7.3 in 11/2007): likely 2/2 malnutrition and chronic polysubstance use; no signs of acute blood loss.   Plan  - Daily CBC  - No signs of active bleeding, Hgb 6.9 today; 1 prbc  - Transfuse when Hgb <7     MSK/Rheum:  1. Suspect septic emboli of fingers and toes - no changes on exam, U/S right finger without evidence of infection but sub-optimal study.  Plan  - Continue to monitor for signs of septic arthritis or ischemia, vascular surgery following    IV/Access:   1. Venous access - right femoral CVC  2. Arterial access - femoral  Plan  - central access required and necessary     ICU Prophylaxis:   1. DVT: Hep subcutaneous, mechanical  2. VAP: HOB 30 degrees, chlorhexidine rinse  3. Stress Ulcer: PPI  4. Restraints: Nonviolent soft two point restraints required and necessary for patient safety and continued cares and good effect as patient continues to pull at necessary lines, tubes despite education and distraction. Will readdress daily.   5. Wound care - per unit routine   6. Feeding - tube feeds  7. Family Update: mother updated by phone  8. Disposition - critically ill in ICU        Interim History/Overnight Events:   No events overnight.  Loose stools improving this morning.  Didn't pass sbt but tolerates minimal vent settings. Patient is intubated and sedated, subjective history unable to obtain.         Key Medications:       albuterol  2.5 mg Nebulization Q6H     bictegravir-emtricitabine-tenofovir  1 tablet Oral Daily     chlorhexidine  15 mL Mouth/Throat Q12H     furosemide  20 mg Intravenous Q8H      heparin ANTICOAGULANT  5,000 Units Subcutaneous Q8H     insulin aspart  1-6 Units Subcutaneous Q4H     multivitamins w/minerals  15 mL Per Feeding Tube Daily     nafcillin  2 g Intravenous Q4H     pantoprazole  40 mg Per Feeding Tube QAM AC     perflutren diluted 1mL to 2mL with saline  5 mL Intravenous Once     QUEtiapine  100 mg Oral or Feeding Tube TID     sodium chloride (PF)  10 mL Intravenous Once     sodium chloride (PF)  10 mL Intracatheter Q8H     sodium chloride (PF)  3 mL Intravenous Q8H       dexmedetomidine 1 mcg/kg/hr (07/08/21 1039)     fentaNYL 50 mcg/hr (07/08/21 0743)     - MEDICATION INSTRUCTIONS -       - MEDICATION INSTRUCTIONS -       midazolam Stopped (07/06/21 0418)     norepinephrine Stopped (07/05/21 1421)     propofol (DIPRIVAN) infusion 30 mcg/kg/min (07/08/21 1009)     sodium chloride 10 mL/hr at 07/02/21 1934     vasopressin Stopped (07/02/21 1240)              Physical Examination:   Temp:  [97.6  F (36.4  C)-100.5  F (38.1  C)] 100.2  F (37.9  C)  Pulse:  [55-97] 67  Resp:  [13-30] 21  BP: (139)/(72) 139/72  MAP:  [72 mmHg-99 mmHg] 94 mmHg  Arterial Line BP: (101-147)/(53-78) 140/73  FiO2 (%):  [30 %] 30 %  SpO2:  [96 %-100 %] 97 %      Intake/Output Summary (Last 24 hours) at 7/8/2021 1158  Last data filed at 7/8/2021 1100  Gross per 24 hour   Intake 3709.22 ml   Output 1760 ml   Net 1949.22 ml         Wt Readings from Last 4 Encounters:   07/08/21 101.3 kg (223 lb 5.2 oz)   11/05/07 103.1 kg (227 lb 4 oz)   09/26/07 99.3 kg (219 lb)     Arterial Line BP: (101-147)/(53-78) 140/73  MAP:  [72 mmHg-99 mmHg] 94 mmHg  Ventilation Mode: CMV/AC  (Continuous Mandatory Ventilation/ Assist Control)  FiO2 (%): 30 %  Rate Set (breaths/minute): 16 breaths/min  Tidal Volume Set (mL): 500 mL  PEEP (cm H2O): 5 cmH2O  Oxygen Concentration (%): 30 %  Resp: 21    Recent Labs   Lab 07/08/21  0500 07/07/21  0450 07/05/21  0412 07/04/21  0613 07/03/21  1132   PH 7.47* 7.46* 7.50* 7.49* 7.49*   PCO2 32*  33* 28* 29* 29*   PO2 133* 107* 216* 148* 99   HCO3 23 24 22 22 22   O2PER  --  30 30 40% 40     GEN: sedated, in no acute distress  HEENT: sclera anicteric, trachea midline, NGT in place. ETT in place.   PULM: unlabored synchronous with vent, clear anteriorly.   CV/COR: tachycardic, normal S1 and S2. Systolic murmur auscultated over mitral area.  ABD: Mildly distended, soft. Hypoactive bowel sounds, large midline hernia.  MSK/EXT: Bilateral dorsal hand pitting edema.  NEURO: sedated. Withdraws. PEERL  : don in place draining dark yellow urine.  SKIN: Warmth, erythema, discoloration of the distal 4th right finger. Dark red lesion on the medial great toe. Discoloration of the distal left 3rd toe. Possible splinter hemorrhages of left fingernails.  LINES: clean, dry intact         Data:   All data and imaging reviewed.     US Right Extremity, 7/6/2021:  IMPRESSION: No fluid collection or significant joint effusion is  appreciated sonographically. Clinical follow-up recommended. If  clinical suspicion persists, MRI could be performed.    ROUTINE ICU LABS (Last four results)  CMP  Recent Labs   Lab 07/08/21  0500 07/07/21  0515 07/06/21  0500 07/05/21  1545 07/05/21  0413   * 150* 150* 151* 148*   POTASSIUM 3.8 3.8 3.5  --  4.0   CHLORIDE 120* 124* 122*  --  122*   CO2 22 24 22  --  22   ANIONGAP 5 2* 6  --  4   * 131* 131*  --  112*   BUN 20 24 28  --  34*   CR 0.68 0.69 0.82  --  0.88   GFRESTIMATED >90 >90 >90  --  82   GFRESTBLACK >90 >90 >90  --  >90   DEREK 7.4* 7.2* 7.6*  --  7.4*   MAG 2.1 2.1 2.4*  --  2.4*   PHOS 4.3 3.9 3.9  --  4.0   PROTTOTAL 6.9 6.6* 6.7*  --  6.2*   ALBUMIN 1.2* 1.2* 1.3*  --  1.2*   BILITOTAL 1.0 1.2 1.7*  --  1.2   ALKPHOS 172* 180* 219*  --  241*   AST 16 23 18  --  24   ALT 14 15 18  --  23     CBC  Recent Labs   Lab 07/08/21  0500 07/07/21  0515 07/06/21  0500 07/05/21  0413   WBC 8.9 10.0 11.9* 11.4*   RBC 2.85* 3.00* 2.94* 2.97*   HGB 6.9* 7.1* 6.9* 6.6*   HCT 22.6*  23.5* 22.4* 21.4*   MCV 79 78 76* 72*   MCH 24.2* 23.7* 23.5* 22.2*   MCHC 30.5* 30.2* 30.8* 30.8*   RDW 25.4* 24.6* 23.4* 21.5*    301 236 204     INR  Recent Labs   Lab 07/01/21  1239   INR 1.23*     Arterial Blood Gas  Recent Labs   Lab 07/08/21  0500 07/07/21  0450 07/05/21  0412 07/04/21  0613 07/03/21  1132   PH 7.47* 7.46* 7.50* 7.49* 7.49*   PCO2 32* 33* 28* 29* 29*   PO2 133* 107* 216* 148* 99   HCO3 23 24 22 22 22   O2PER  --  30 30 40% 40       All cultures:  Recent Labs   Lab 07/08/21  0500 07/07/21  0450 07/06/21  0500 07/05/21  1232 07/05/21  1104 07/03/21  1730 07/03/21  1722 07/02/21  1238 07/02/21  1225 07/01/21  1839 07/01/21  1251 07/01/21  1242   CULT No growth after 1 hour No growth after 22 hours  No growth after 22 hours No growth after 2 days  No growth after 2 days Cultured on the 2nd day of incubation:  Staphylococcus epidermidis  *  Critical Value/Significant Value, preliminary result only, called to and read back by  IVETT WARE RN 07/06/21 1258 EH.   Cultured on the 2nd day of incubation:  Gram positive cocci in clusters  *  Critical Value/Significant Value, preliminary result only, called to and read back by  Sin Jack RN at 0448 7/7/21 hg    Susceptibility testing done on previous specimen Cultured on the 1st day of incubation:  Staphylococcus aureus  Susceptibility testing done on previous specimen  *  Critical Value/Significant Value called to and read back by  Royal Ayala RN @1345 7/4/2021 gd   Cultured on the 1st day of incubation:  Staphylococcus aureus  Susceptibility testing done on previous specimen  *  Critical Value/Significant Value called to and read back by  Royal Ayala RN @1347 07/04/2021 gd   Cultured on the 1st day of incubation:  Staphylococcus aureus  Susceptibility testing done on previous specimen  *  Critical Value/Significant Value called to and read back by  Lashanda Barnes RN @1522 7/3/2021 gd   Cultured on the 1st day of  incubation:  Staphylococcus aureus  Susceptibility testing done on previous specimen  *  Critical Value/Significant Value, preliminary result only, called to and read back by   ANGELINA DE DIOS RN @0298 7.3.21 LM   Cultured on the 1st day of incubation:  Staphylococcus aureus  Susceptibility testing done on previous specimen  *  Critical Value/Significant Value, preliminary result only, called to and read back by  Shanice Ennis RN @ 2219 21 JE   >100,000 colonies/mL  Klebsiella pneumoniae  *  >100,000 colonies/mL  Strain 2  Klebsiella pneumoniae  * Cultured on the 1st day of incubation:  Staphylococcus aureus  *  Critical Value/Significant Value, preliminary result only, called to and read back by  Mayte Bianchi RN @ 0026 21 TM.    Cultured on the 1st day of incubation:  Strain 2  Staphylococcus aureus  *  (Note)  POSITIVE for STAPHYLOCOCCUS AUREUS and NEGATIVE for the mecA gene  (not MRSA) by Solasta multiplex nucleic acid test. The mecA gene was  not detected. Final identification and antimicrobial susceptibility  testing will be verified by standard methods.    Specimen tested with Verigene multiplex, gram-positive blood culture  nucleic acid test for the following targets: Staph aureus, Staph  epidermidis, Staph lugdunensis, other Staph species, Enterococcus  faecalis, Enterococcus faecium, Streptococcus species, S. agalactiae,  S. anginosus grp., S. pneumoniae, S. pyogenes, Listeria sp., mecA  (methicillin resistance) and Brook/B (vancomycin resistance).    Critical Value/Significant Value called to and read back by Deepa Bianchi RN on 21 at 0304. LSA       Recent Results (from the past 24 hour(s))   Echocardiogram Limited   Result Value    LVEF  55-60%    Narrative    [id number]  [id number]  [id number]  [id number]^[email or web address]^VERONICA^RATILAL     [facility]  Echocardiography Laboratory  [address] [place] [address]     Name: JOAQUÍN SAAVEDRA  MRN: [id number]  : 1980  Study  Date: 07/08/2021 09:42 AM  Age: 41 yrs  Gender: Female  Patient [facility]: [facility]  Reason For Study: Pericardial Effusion  Ordering Physician: VERONICA LUCIO  Referring Physician: Lisa Pollock  Performed By: Hreberth Vides RDCS     BSA: 2.1 m2  Height: 68 in  Weight: 220 lb  HR: 68  BP: 134/70 mmHg  ______________________________________________________________________________  Procedure  Limited Portable Echo Adult.  ______________________________________________________________________________  Interpretation Summary     Small pericardial effusion  There are no echocardiographic indications of cardiac tamponade. The  pericardial effusion may be slightly less than previously and appears to be  organizing.  The visual ejection fraction is 55-60%.  Left ventricular systolic function is normal.  The right ventricle is mild to moderately dilated.  The right ventricular systolic function is normal.  There is a mobile mass measuring 1.5 cm attached to the atrial side of the  posterior leaflet of the mitral valve. This was noted on 7/5/2021  Left to right atrial shunt, small  ______________________________________________________________________________  Left Ventricle  The left ventricle is normal in size. The visual ejection fraction is 55-60%.  Left ventricular systolic function is normal.     Right Ventricle  The right ventricle is mild to moderately dilated. The right ventricular  systolic function is normal.     Atria  Left to right atrial shunt, small. Small secundum ASD noted on JAE previously.     Mitral Valve  There is a mobile mass measuring 1.5 cm attached to the atrial side of the  posterior leaflet of the mitral valve. This was noted on 7/5/2021. There is  mild to moderate (1-2+) mitral regurgitation. The mitral regurgitant jet is  anteriorly directed, which is consistent with posterior leaflet pathology.     Tricuspid Valve  There is mild (1+) tricuspid regurgitation. The right ventricular  systolic  pressure is approximated at 32.5 mmHg plus the right atrial pressure.     Aortic Valve  The aortic valve is trileaflet. There is trivial trileaflet aortic sclerosis.  There is trace aortic regurgitation. No hemodynamically significant valvular  aortic stenosis.     Pulmonic Valve  There is trace pulmonic valvular regurgitation.     Vessels  Unable to assess mean RA pressure given the patient is on a ventilator.     Pericardium  Small pericardial effusion. The pericardial effusion measures 1.73 cm at the  base of the inferolateral wall, 0.717 cm at the RV free wall, 1.59 cm at the  basal anterolateral wall. There are no echocardiographic indications of  cardiac tamponade. Moderate left pleural effusion.     Rhythm  Sinus rhythm was noted.  ______________________________________________________________________________  Doppler Measurements & Calculations  TR max franchesca: 285.0 cm/sec  TR max P.5 mmHg     ______________________________________________________________________________  Report approved by: Rashi Hernandez 2021 11:20 AM                       I spent 35 minutes providing critical care for this patient.

## 2021-07-08 NOTE — PROGRESS NOTES
[facility] ICU RESPIRATORY NOTE        Date of Admission: 7/1/2021    Date of Intubation (most recent): 7/1/2021    Reason for Mechanical Ventilation: Airway Protection    Number of Days on Mechanical Ventilation: 8    Met Criteria for Spontaneous Breathing Trial: No    Reason for No Spontaneous Breathing Trial: Per MD    Significant Events Today: None    ABG Results:   Recent Labs   Lab 07/08/21  0500 07/07/21  0450 07/05/21  0412 07/04/21  0613 07/03/21  1132   PH 7.47* 7.46* 7.50* 7.49* 7.49*   PCO2 32* 33* 28* 29* 29*   PO2 133* 107* 216* 148* 99   HCO3 23 24 22 22 22   O2PER  --  30 30 40% 40       Current Vent Settings: Ventilation Mode: CMV/AC  (Continuous Mandatory Ventilation/ Assist Control)  FiO2 (%): 30 %  Rate Set (breaths/minute): 16 breaths/min  Tidal Volume Set (mL): 500 mL  PEEP (cm H2O): 5 cmH2O  Oxygen Concentration (%): 30 %  Resp: 23      Skin Assessment: Clean & dry    Plan: Patient remains on full ventilatory support. RT will continue to follow.    Asad Ochoa, RT

## 2021-07-09 ENCOUNTER — APPOINTMENT (OUTPATIENT)
Dept: ULTRASOUND IMAGING | Facility: CLINIC | Age: 41
End: 2021-07-09
Attending: INTERNAL MEDICINE
Payer: MEDICAID

## 2021-07-09 LAB
ALBUMIN SERPL-MCNC: 1.1 G/DL (ref 3.4–5)
ALP SERPL-CCNC: 168 U/L (ref 40–150)
ALT SERPL W P-5'-P-CCNC: 12 U/L (ref 0–50)
ANION GAP SERPL CALCULATED.3IONS-SCNC: 3 MMOL/L (ref 3–14)
ANION GAP SERPL CALCULATED.3IONS-SCNC: 4 MMOL/L (ref 3–14)
AST SERPL W P-5'-P-CCNC: 14 U/L (ref 0–45)
BACTERIA SPEC CULT: ABNORMAL
BASE EXCESS BLDA CALC-SCNC: 1.2 MMOL/L
BILIRUB SERPL-MCNC: 0.9 MG/DL (ref 0.2–1.3)
BLD PROD TYP BPU: NORMAL
BLD UNIT ID BPU: 0
BLOOD PRODUCT CODE: NORMAL
BPU ID: NORMAL
BUN SERPL-MCNC: 17 MG/DL (ref 7–30)
BUN SERPL-MCNC: 18 MG/DL (ref 7–30)
CALCIUM SERPL-MCNC: 7.2 MG/DL (ref 8.5–10.1)
CALCIUM SERPL-MCNC: 7.4 MG/DL (ref 8.5–10.1)
CHLORIDE SERPL-SCNC: 110 MMOL/L (ref 94–109)
CHLORIDE SERPL-SCNC: 115 MMOL/L (ref 94–109)
CO2 SERPL-SCNC: 24 MMOL/L (ref 20–32)
CO2 SERPL-SCNC: 27 MMOL/L (ref 20–32)
CREAT SERPL-MCNC: 0.69 MG/DL (ref 0.52–1.04)
CREAT SERPL-MCNC: 0.7 MG/DL (ref 0.52–1.04)
ERYTHROCYTE [DISTWIDTH] IN BLOOD BY AUTOMATED COUNT: 25.1 % (ref 10–15)
GFR SERPL CREATININE-BSD FRML MDRD: >90 ML/MIN/{1.73_M2}
GFR SERPL CREATININE-BSD FRML MDRD: >90 ML/MIN/{1.73_M2}
GLUCOSE BLDC GLUCOMTR-MCNC: 121 MG/DL (ref 70–99)
GLUCOSE BLDC GLUCOMTR-MCNC: 124 MG/DL (ref 70–99)
GLUCOSE BLDC GLUCOMTR-MCNC: 125 MG/DL (ref 70–99)
GLUCOSE BLDC GLUCOMTR-MCNC: 125 MG/DL (ref 70–99)
GLUCOSE BLDC GLUCOMTR-MCNC: 129 MG/DL (ref 70–99)
GLUCOSE SERPL-MCNC: 136 MG/DL (ref 70–99)
GLUCOSE SERPL-MCNC: 145 MG/DL (ref 70–99)
HCO3 BLD-SCNC: 24 MMOL/L (ref 21–28)
HCT VFR BLD AUTO: 24.1 % (ref 35–47)
HGB BLD-MCNC: 7.4 G/DL (ref 11.7–15.7)
HIV1 RNA # PLAS NAA DL=20: 3695 {COPIES}/ML
HIV1 RNA SERPL NAA+PROBE-LOG#: 3.6 {LOG_COPIES}/ML
MAGNESIUM SERPL-MCNC: 1.8 MG/DL (ref 1.6–2.3)
MCH RBC QN AUTO: 23.9 PG (ref 26.5–33)
MCHC RBC AUTO-ENTMCNC: 30.7 G/DL (ref 31.5–36.5)
MCV RBC AUTO: 78 FL (ref 78–100)
OXYHGB MFR BLD: 97 % (ref 92–100)
PCO2 BLD: 32 MM HG (ref 35–45)
PH BLD: 7.49 PH (ref 7.35–7.45)
PHOSPHATE SERPL-MCNC: 4.2 MG/DL (ref 2.5–4.5)
PLATELET # BLD AUTO: 385 10E9/L (ref 150–450)
PO2 BLD: 120 MM HG (ref 80–105)
POTASSIUM SERPL-SCNC: 3.2 MMOL/L (ref 3.4–5.3)
POTASSIUM SERPL-SCNC: 3.4 MMOL/L (ref 3.4–5.3)
POTASSIUM SERPL-SCNC: 3.7 MMOL/L (ref 3.4–5.3)
PROT SERPL-MCNC: 7.1 G/DL (ref 6.8–8.8)
RBC # BLD AUTO: 3.1 10E12/L (ref 3.8–5.2)
SODIUM SERPL-SCNC: 140 MMOL/L (ref 133–144)
SODIUM SERPL-SCNC: 143 MMOL/L (ref 133–144)
SPECIMEN SOURCE: ABNORMAL
SPECIMEN SOURCE: ABNORMAL
TRANSFUSION STATUS PATIENT QL: NORMAL
TRANSFUSION STATUS PATIENT QL: NORMAL
WBC # BLD AUTO: 9.8 10E9/L (ref 4–11)

## 2021-07-09 PROCEDURE — 250N000011 HC RX IP 250 OP 636: Performed by: INTERNAL MEDICINE

## 2021-07-09 PROCEDURE — 250N000013 HC RX MED GY IP 250 OP 250 PS 637: Performed by: INTERNAL MEDICINE

## 2021-07-09 PROCEDURE — 250N000009 HC RX 250: Performed by: INTERNAL MEDICINE

## 2021-07-09 PROCEDURE — 94640 AIRWAY INHALATION TREATMENT: CPT | Mod: 76

## 2021-07-09 PROCEDURE — 85027 COMPLETE CBC AUTOMATED: CPT | Performed by: INTERNAL MEDICINE

## 2021-07-09 PROCEDURE — 80053 COMPREHEN METABOLIC PANEL: CPT | Performed by: INTERNAL MEDICINE

## 2021-07-09 PROCEDURE — 258N000003 HC RX IP 258 OP 636: Performed by: INTERNAL MEDICINE

## 2021-07-09 PROCEDURE — 94003 VENT MGMT INPAT SUBQ DAY: CPT

## 2021-07-09 PROCEDURE — 82805 BLOOD GASES W/O2 SATURATION: CPT | Performed by: INTERNAL MEDICINE

## 2021-07-09 PROCEDURE — 36415 COLL VENOUS BLD VENIPUNCTURE: CPT | Performed by: INTERNAL MEDICINE

## 2021-07-09 PROCEDURE — 200N000001 HC R&B ICU

## 2021-07-09 PROCEDURE — 999N000157 HC STATISTIC RCP TIME EA 10 MIN

## 2021-07-09 PROCEDURE — 250N000009 HC RX 250: Performed by: SPECIALIST

## 2021-07-09 PROCEDURE — 250N000013 HC RX MED GY IP 250 OP 250 PS 637: Performed by: SPECIALIST

## 2021-07-09 PROCEDURE — 99291 CRITICAL CARE FIRST HOUR: CPT | Performed by: INTERNAL MEDICINE

## 2021-07-09 PROCEDURE — 84100 ASSAY OF PHOSPHORUS: CPT | Performed by: INTERNAL MEDICINE

## 2021-07-09 PROCEDURE — 84132 ASSAY OF SERUM POTASSIUM: CPT | Performed by: INTERNAL MEDICINE

## 2021-07-09 PROCEDURE — 87040 BLOOD CULTURE FOR BACTERIA: CPT | Performed by: INTERNAL MEDICINE

## 2021-07-09 PROCEDURE — 80048 BASIC METABOLIC PNL TOTAL CA: CPT | Performed by: INTERNAL MEDICINE

## 2021-07-09 PROCEDURE — 83735 ASSAY OF MAGNESIUM: CPT | Performed by: INTERNAL MEDICINE

## 2021-07-09 PROCEDURE — 250N000013 HC RX MED GY IP 250 OP 250 PS 637: Performed by: NURSE PRACTITIONER

## 2021-07-09 PROCEDURE — 94640 AIRWAY INHALATION TREATMENT: CPT

## 2021-07-09 PROCEDURE — 999N001017 HC STATISTIC GLUCOSE BY METER IP

## 2021-07-09 PROCEDURE — 999N000009 HC STATISTIC AIRWAY CARE

## 2021-07-09 PROCEDURE — 93971 EXTREMITY STUDY: CPT | Mod: LT

## 2021-07-09 RX ORDER — POTASSIUM CHLORIDE 1.5 G/1.58G
40 POWDER, FOR SOLUTION ORAL ONCE
Status: COMPLETED | OUTPATIENT
Start: 2021-07-09 | End: 2021-07-09

## 2021-07-09 RX ORDER — FUROSEMIDE 10 MG/ML
40 INJECTION INTRAMUSCULAR; INTRAVENOUS EVERY 8 HOURS
Status: COMPLETED | OUTPATIENT
Start: 2021-07-09 | End: 2021-07-10

## 2021-07-09 RX ORDER — LOPERAMIDE HCL 1 MG/7.5ML
2 SUSPENSION ORAL 4 TIMES DAILY PRN
Status: DISCONTINUED | OUTPATIENT
Start: 2021-07-09 | End: 2021-08-06

## 2021-07-09 RX ADMIN — POTASSIUM CHLORIDE 40 MEQ: 1.5 POWDER, FOR SOLUTION ORAL at 05:11

## 2021-07-09 RX ADMIN — CHLORHEXIDINE GLUCONATE 15 ML: 1.2 SOLUTION ORAL at 20:02

## 2021-07-09 RX ADMIN — NAFCILLIN SODIUM 2 G: 2 INJECTION, POWDER, LYOPHILIZED, FOR SOLUTION INTRAMUSCULAR; INTRAVENOUS at 00:37

## 2021-07-09 RX ADMIN — DEXMEDETOMIDINE HYDROCHLORIDE 1.2 MCG/KG/HR: 400 INJECTION INTRAVENOUS at 20:02

## 2021-07-09 RX ADMIN — QUETIAPINE FUMARATE 100 MG: 100 TABLET ORAL at 22:33

## 2021-07-09 RX ADMIN — NAFCILLIN SODIUM 2 G: 2 INJECTION, POWDER, LYOPHILIZED, FOR SOLUTION INTRAMUSCULAR; INTRAVENOUS at 20:03

## 2021-07-09 RX ADMIN — PROPOFOL 30 MCG/KG/MIN: 10 INJECTION, EMULSION INTRAVENOUS at 06:39

## 2021-07-09 RX ADMIN — ALBUTEROL SULFATE 2.5 MG: 2.5 SOLUTION RESPIRATORY (INHALATION) at 12:46

## 2021-07-09 RX ADMIN — Medication 15 ML: at 20:16

## 2021-07-09 RX ADMIN — PROPOFOL 10 MCG/KG/MIN: 10 INJECTION, EMULSION INTRAVENOUS at 14:30

## 2021-07-09 RX ADMIN — DEXMEDETOMIDINE HYDROCHLORIDE 1.2 MCG/KG/HR: 400 INJECTION INTRAVENOUS at 16:17

## 2021-07-09 RX ADMIN — HEPARIN SODIUM 5000 UNITS: 5000 INJECTION, SOLUTION INTRAVENOUS; SUBCUTANEOUS at 14:24

## 2021-07-09 RX ADMIN — HEPARIN SODIUM 5000 UNITS: 5000 INJECTION, SOLUTION INTRAVENOUS; SUBCUTANEOUS at 05:11

## 2021-07-09 RX ADMIN — Medication 50 MCG: at 04:38

## 2021-07-09 RX ADMIN — FUROSEMIDE 40 MG: 10 INJECTION, SOLUTION INTRAVENOUS at 20:16

## 2021-07-09 RX ADMIN — ALBUTEROL SULFATE 2.5 MG: 2.5 SOLUTION RESPIRATORY (INHALATION) at 07:25

## 2021-07-09 RX ADMIN — NAFCILLIN SODIUM 2 G: 2 INJECTION, POWDER, LYOPHILIZED, FOR SOLUTION INTRAMUSCULAR; INTRAVENOUS at 16:29

## 2021-07-09 RX ADMIN — POTASSIUM CHLORIDE 40 MEQ: 1.5 POWDER, FOR SOLUTION ORAL at 22:33

## 2021-07-09 RX ADMIN — FUROSEMIDE 40 MG: 10 INJECTION, SOLUTION INTRAVENOUS at 12:50

## 2021-07-09 RX ADMIN — QUETIAPINE FUMARATE 100 MG: 100 TABLET ORAL at 08:38

## 2021-07-09 RX ADMIN — ALBUTEROL SULFATE 2.5 MG: 2.5 SOLUTION RESPIRATORY (INHALATION) at 00:44

## 2021-07-09 RX ADMIN — PANTOPRAZOLE SODIUM 40 MG: 40 TABLET, DELAYED RELEASE ORAL at 08:34

## 2021-07-09 RX ADMIN — QUETIAPINE FUMARATE 100 MG: 100 TABLET ORAL at 16:29

## 2021-07-09 RX ADMIN — VALPROATE SODIUM 500 MG: 100 INJECTION, SOLUTION INTRAVENOUS at 14:22

## 2021-07-09 RX ADMIN — DEXMEDETOMIDINE HYDROCHLORIDE 1 MCG/KG/HR: 400 INJECTION INTRAVENOUS at 08:34

## 2021-07-09 RX ADMIN — ACETAMINOPHEN 650 MG: 325 TABLET, FILM COATED ORAL at 02:02

## 2021-07-09 RX ADMIN — ACETAMINOPHEN 650 MG: 325 TABLET, FILM COATED ORAL at 06:17

## 2021-07-09 RX ADMIN — ACETAMINOPHEN 650 MG: 325 TABLET, FILM COATED ORAL at 20:21

## 2021-07-09 RX ADMIN — HEPARIN SODIUM 5000 UNITS: 5000 INJECTION, SOLUTION INTRAVENOUS; SUBCUTANEOUS at 22:33

## 2021-07-09 RX ADMIN — ALBUTEROL SULFATE 2.5 MG: 2.5 SOLUTION RESPIRATORY (INHALATION) at 19:07

## 2021-07-09 RX ADMIN — NAFCILLIN SODIUM 2 G: 2 INJECTION, POWDER, LYOPHILIZED, FOR SOLUTION INTRAMUSCULAR; INTRAVENOUS at 12:50

## 2021-07-09 RX ADMIN — PROPOFOL 30 MCG/KG/MIN: 10 INJECTION, EMULSION INTRAVENOUS at 01:44

## 2021-07-09 RX ADMIN — CHLORHEXIDINE GLUCONATE 15 ML: 1.2 SOLUTION ORAL at 08:34

## 2021-07-09 RX ADMIN — DEXMEDETOMIDINE HYDROCHLORIDE 1.2 MCG/KG/HR: 400 INJECTION INTRAVENOUS at 12:37

## 2021-07-09 RX ADMIN — NAFCILLIN SODIUM 2 G: 2 INJECTION, POWDER, LYOPHILIZED, FOR SOLUTION INTRAMUSCULAR; INTRAVENOUS at 05:11

## 2021-07-09 RX ADMIN — NAFCILLIN SODIUM 2 G: 2 INJECTION, POWDER, LYOPHILIZED, FOR SOLUTION INTRAMUSCULAR; INTRAVENOUS at 08:34

## 2021-07-09 RX ADMIN — FUROSEMIDE 20 MG: 10 INJECTION, SOLUTION INTRAVENOUS at 05:11

## 2021-07-09 RX ADMIN — DEXMEDETOMIDINE HYDROCHLORIDE 1.2 MCG/KG/HR: 400 INJECTION INTRAVENOUS at 23:31

## 2021-07-09 RX ADMIN — BICTEGRAVIR SODIUM, EMTRICITABINE, AND TENOFOVIR ALAFENAMIDE FUMARATE 1 TABLET: 50; 200; 25 TABLET ORAL at 08:34

## 2021-07-09 RX ADMIN — Medication 50 MCG/HR: at 00:02

## 2021-07-09 RX ADMIN — DEXMEDETOMIDINE HYDROCHLORIDE 1 MCG/KG/HR: 400 INJECTION INTRAVENOUS at 03:38

## 2021-07-09 RX ADMIN — LOPERAMIDE HCL 2 MG: 1 SOLUTION ORAL at 11:11

## 2021-07-09 ASSESSMENT — ACTIVITIES OF DAILY LIVING (ADL)
ADLS_ACUITY_SCORE: 25
ADLS_ACUITY_SCORE: 28
ADLS_ACUITY_SCORE: 25
ADLS_ACUITY_SCORE: 28
ADLS_ACUITY_SCORE: 25
ADLS_ACUITY_SCORE: 28

## 2021-07-09 ASSESSMENT — MIFFLIN-ST. JEOR: SCORE: 1730.5

## 2021-07-09 NOTE — PROGRESS NOTES
Critical Care Progress Note  07/09/2021    Name: Manas Hernandez MRN#: [id number]   Age: 41 year old YOB: 1980     [facility] Day# 8           Problem List:   Principal Problem:    Endocarditis due to methicillin susceptible Staphylococcus aureus (MSSA)  Active Problems:    Pyelonephritis, acute    Altered mental status, unspecified altered mental status type           Summary/Hospital Course:   Ms. Hernandez is a 41 year old female with chronic polysubstance abuse and IVDU presented on 7/1 with encephalopathy, hypotension, anemia, and hypotension responsive to fluid resusictation. She was agitated in the ED, sedated and intubated for airway protection. CT head showed possible new acute to subacute parietal lobe ischemic infarct, CXR without abnormalities, and CT abdomen concerning for pyelonephritis empirically treated with vancomycin and zosyn. Transferred to the ICU for continued infectious workup and airway management. On exam there was concern for endocarditis. On 7/2, admission blood cultures positive for MSSA. Remains intubated on nafcillin for infective endocarditis and pyelonephritis, urine culture with klebsiella treated with ciprofloxacin. On 7/5, JAE performed showing vegetation 1.8cm in length on posterior leaflet of MV, small ASD with L to R shunt, small pericardial effusion without tamponade. CV surgery consulted, no indication for surgical intervention, rather continue on IV antibiotic therapy. On 7/6, HIV and HCV testing positive; per [facility] notes, had previously screened HIV positive in 2018 (though never had confirmatory testing and never treated). Started on ART (Biktarvy) on 7/8. No new growth on blood cultures since 7/3 (7/5 S. Epi contaminant). She has had several failed PST and continues to be intubated on minimal vent settings.      Assessment and plan :     Manas Hernandez is a 41 year old female with a history of chronic polysubstance abuse/IVDU admitted on  7/1/2021 for encephalopathy requiring intubation, hypotension and acidosis. She is intubated in the ICU for management of native mitral valve endocarditis complicated by MSSA bacteremia treated with IV antibiotics.    I have personally reviewed the daily labs, imaging studies, cultures and discussed the case with referring physician and consulting physicians.     My assessment and plan by system for this patient is as follows:     Neurology/Psychiatry:    1. Acute ischemic CVA, likely secondary to MV vegetation embolization.  2. Toxic encephalopathy, septic encephalopathy - methicillin S. Aureus bacteremia on blood cultures (7/5).    3. ICU Sedation - on propofol and precedex, weaning as able  4. Analgesia  5. Agitation  6. Polysubstance use  Plan  - Neuro CC following, appreciate assistance  - Stroke risk - monitor BP, goal <130/80  - Continue propofol and dexmedtomidate with RASS goal 0 to -1, wean propofol as able due to hypertriglyceridemia  - Fentanyl prn for pain and withdrawal prevention  - Tylenol prn for pain  - Monitor for signs of withdrawal - UDS positive for amphetamines and opiates on admission  - Seroquel 100 mg TID, added valproate bid x6 doses today  - Chemical dependency evaluation once extubated if agreeable      Cardiovascular:   1. MSSA septic shock - resolved. Stable off pressors.  2. Mitral valve endocarditis - mobile vegetation (1.4cm), mild-moderate MR and severe LAE. No abscess or perivalvular abscess on JAE (7/5). Several peripheral emboli (distal fingers and toes).  3. Elevated troponin likely secondary to myocardial inflammation, resolved - peaked 7/1.  4. Hypertriglyceridemia - elevated TG, low HDL; weaning propofol as able to limit use  5. Small pericardial effusion without tamponade physiology - echo (7/7) showed slightly less effusion than prior (7/5).  Plan  - No plans for valvular surgery at this time, continue IV antibiotic therapy as below per CV surgery.  - Cardiology  recommended repeat echocardiogram in 7 to 10 days to reassess pericardial fluid. Cardiology no longer following.     Pulmonary/Ventilator Management:   1. Acute hypoxemic respiratory failure requiring mechanically ventilation - CMV/AC (16/500/30/5).  2. Respiratory alkalosis, overbreathing vent (RR 26, pH 7.49, CO2 32). Expect to improve post extubation.   3.  Suspected hypervolemia  Plan  -  Not passing previous sbt's, will try again today after 3 days of lasix. Anticipate will need to keep patient sedated during PS mode based on agitation with previous attempts to wean and IVDU history.  - Continue albuterol neb q6h  - Increase lasix to 40 mg q8h     GI/Nutrition :   1. Mildly elevated alkaline phosphate, improving.  2. Hyperbilirubinemia with mild biliary dilation on US (CDB 9mm) - likely due to underlying sepsis, now Tbili normal (0.9).  3. Malnutrition - hypoalbuminemia 1.1, chronic anemia  4. Stress ulcer prophylaxis  Plan  - Intubated, on tube feeds  - Consider IV albumin, as hypoalbuminemia may worsen as increase diuresis. Monitor for now, albumin has been around 1.1-1.2 for several days.  - Monitor CMP daily  - Continue pantoprazole 40 mg IV   - Alk phos and bilirubin down trending, no need to repeat abdominal U/S at this time. Continue to monitor.     Renal/Fluids/Electrolytes:   1. Klebsiella pneumoniae UTI - treated - completed course of ciprofloxacin on 7/7 (5 day course).  2. Hypernatremia, resolved. Likely secondary to free water deficit and many medications given in NS. Expect to continue to improve as diurese. Decreased FW today.  3. Hypokalemia, likely secondary to diuresis, s/p 40 mEq supplementation.  3. FORD related to sepsis, resolved.  Plan  - Monitor CMP daily  - Supplement K+ as needed per protocol.  - decreased free water to 150 q4 today (from 200 q4)  - Avoid nephrotoxic agents such as NSAID, IV contrast unless specifically required  - Follow I/O's as appropriate.     Infectious Disease:    1. Severe sepsis secondary to MSSA bacteremia and MV endocarditis - normal WBC count, no growth on recent blood cultures.  3. Untreated HIV - positive screening test from 2018 per [facility]. CD4 >500 (no prophylaxis needed), VL pending. ART initiated on 7/9.  4. Hepatitis C Ab+, RNA negative.  Plan  - Continue nafcillin 2 g IV q4h  - Discontinue daily blood cultures, cultures from 7/6, 7/7, 7/8 remain negative.   - Continue Biktarvy 1 tab PO daily  - ID following, appreciate assistance  - Continue to monitor urine output while on IV antibiotics     Endocrine:   1. Stress induced hyperglycemia, improved  2. Prediabetes, A1c 5.7 at admission  Plan  - ICU insulin protocol, goal sugar <180  - Follow BG levels     Hematology/Oncology:   1. Chronic anemia, acute on chronic (hgb 7.3 in 11/2007): likely 2/2 malnutrition and chronic polysubstance use; no signs of acute blood loss. Hgb near baseline - 7.4  Plan  - Daily CBC  - Transfuse when Hgb <7     MSK/Rheum:  1. Peripheral emboli of fingers and toes - no new findings on exam (left 3rd toe discoloration, right great toe, right 4th finger). US right finger without evidence of infection. No indication for MRI at this time.  Plan  - Continue to monitor for signs of septic arthritis or ischemia, vascular surgery following peripherally.    IV/Access:   1. Venous access - right femoral CVC  2. Arterial access - femoral  Plan  - central access required and necessary     ICU Prophylaxis:   1. DVT: Hep subcutaneous, mechanical  2. VAP: HOB 30 degrees, chlorhexidine rinse  3. Stress Ulcer: PPI  4. Restraints: Nonviolent soft two point restraints required and necessary for patient safety and continued cares and good effect as patient continues to pull at necessary lines, tubes despite education and distraction. Will readdress daily.   5. Wound care - per unit routine   6. Feeding - tube feeds  7. Family Update: mother updated by phone  8. Disposition - critically  ill in ICU        Interim History/Overnight Events:   No events overnight, remains intubated. Low grade fever this AM, resolved. Fentanyl, propofol, precedex drips unchanged. One loose BM overnight, good urine output with lasix. Patient is intubated and sedated, subjective history unable to obtain.         Key Medications:       albuterol  2.5 mg Nebulization Q6H     bictegravir-emtricitabine-tenofovir  1 tablet Oral Daily     chlorhexidine  15 mL Mouth/Throat Q12H     furosemide  40 mg Intravenous Q8H     heparin ANTICOAGULANT  5,000 Units Subcutaneous Q8H     insulin aspart  1-6 Units Subcutaneous Q4H     multivitamins w/minerals  15 mL Per Feeding Tube Daily     nafcillin  2 g Intravenous Q4H     pantoprazole  40 mg Per Feeding Tube QAM AC     perflutren diluted 1mL to 2mL with saline  5 mL Intravenous Once     QUEtiapine  100 mg Oral or Feeding Tube TID     sodium chloride (PF)  10 mL Intravenous Once     sodium chloride (PF)  10 mL Intracatheter Q8H       dexmedetomidine 1 mcg/kg/hr (07/09/21 0834)     fentaNYL 50 mcg/hr (07/09/21 0839)     - MEDICATION INSTRUCTIONS -       - MEDICATION INSTRUCTIONS -       midazolam Stopped (07/06/21 0418)     norepinephrine Stopped (07/05/21 1421)     propofol (DIPRIVAN) infusion 30 mcg/kg/min (07/09/21 0840)     sodium chloride 10 mL/hr at 07/09/21 0200     vasopressin Stopped (07/02/21 1240)              Physical Examination:   Temp:  [99.5  F (37.5  C)-100.9  F (38.3  C)] 99.5  F (37.5  C)  Pulse:  [63-83] 82  Resp:  [14-29] 26  BP: (118)/(74) 118/74  MAP:  [69 mmHg-99 mmHg] 69 mmHg  Arterial Line BP: (104-147)/(51-78) 104/51  FiO2 (%):  [30 %] 30 %  SpO2:  [93 %-100 %] 97 %      Intake/Output Summary (Last 24 hours) at 7/9/2021 1228  Last data filed at 7/9/2021 1100  Gross per 24 hour   Intake 3643 ml   Output 4765 ml   Net -1122 ml         Wt Readings from Last 4 Encounters:   07/09/21 100.7 kg (222 lb 0.1 oz)   11/05/07 103.1 kg (227 lb 4 oz)   09/26/07 99.3 kg (219 lb)      Arterial Line BP: (104-147)/(51-78) 104/51  MAP:  [69 mmHg-99 mmHg] 69 mmHg  BP - Mean:  [85] 85  Ventilation Mode: CMV/AC  (Continuous Mandatory Ventilation/ Assist Control)  FiO2 (%): 30 %  Rate Set (breaths/minute): 16 breaths/min  Tidal Volume Set (mL): 500 mL  PEEP (cm H2O): 5 cmH2O  Oxygen Concentration (%): 30 %  Resp: 26    Recent Labs   Lab 07/09/21  0415 07/08/21  0500 07/07/21  0450 07/05/21  0412 07/04/21  0613 07/03/21  1132   PH 7.49* 7.47* 7.46* 7.50* 7.49* 7.49*   PCO2 32* 32* 33* 28* 29* 29*   PO2 120* 133* 107* 216* 148* 99   HCO3 24 23 24 22 22 22   O2PER  --   --  30 30 40% 40     GEN: sedated, does not arouse to voice   HEENT: sclera anicteric, trachea midline, NGT in place. ETT in place.   PULM: unlabored synchronous with vent, clear anteriorly.   CV/COR: tachycardic, normal S1 and S2. Systolic murmur auscultated over mitral area.  ABD: Mildly distended, soft. Normal bowel sounds, large midline hernia.  MSK/EXT: Bilateral dorsal hand pitting edema. Bilateral dorsal feet pitting edema.  NEURO: Sedated, unable to assess strength. PEERL  : Neri in place draining clear yellow urine.  SKIN: Discoloration of the distal 4th right finger, right great toe, ischemic left 3rd toe. Splinter hemorrhages several fingernails bilaterally.  LINES: clean, dry intact         Data:   All data and imaging reviewed.     Limited ECHO, 7/8/2021:  Small pericardial effusion  There are no echocardiographic indications of cardiac tamponade. The  pericardial effusion may be slightly less than previously and appears to be  organizing.  The visual ejection fraction is 55-60%.  Left ventricular systolic function is normal.  The right ventricle is mild to moderately dilated.  The right ventricular systolic function is normal.  There is a mobile mass measuring 1.5 cm attached to the atrial side of the  posterior leaflet of the mitral valve. This was noted on 7/5/2021  Left to right atrial shunt, small    ROUTINE ICU  LABS (Last four results)  CMP  Recent Labs   Lab 07/09/21  0415 07/08/21  0500 07/07/21  0515 07/06/21  0500    147* 150* 150*   POTASSIUM 3.2* 3.8 3.8 3.5   CHLORIDE 115* 120* 124* 122*   CO2 24 22 24 22   ANIONGAP 4 5 2* 6   * 124* 131* 131*   BUN 18 20 24 28   CR 0.70 0.68 0.69 0.82   GFRESTIMATED >90 >90 >90 >90   GFRESTBLACK >90 >90 >90 >90   DEREK 7.2* 7.4* 7.2* 7.6*   MAG 1.8 2.1 2.1 2.4*   PHOS 4.2 4.3 3.9 3.9   PROTTOTAL 7.1 6.9 6.6* 6.7*   ALBUMIN 1.1* 1.2* 1.2* 1.3*   BILITOTAL 0.9 1.0 1.2 1.7*   ALKPHOS 168* 172* 180* 219*   AST 14 16 23 18   ALT 12 14 15 18     CBC  Recent Labs   Lab 07/09/21  0415 07/08/21  0500 07/07/21  0515 07/06/21  0500   WBC 9.8 8.9 10.0 11.9*   RBC 3.10* 2.85* 3.00* 2.94*   HGB 7.4* 6.9* 7.1* 6.9*   HCT 24.1* 22.6* 23.5* 22.4*   MCV 78 79 78 76*   MCH 23.9* 24.2* 23.7* 23.5*   MCHC 30.7* 30.5* 30.2* 30.8*   RDW 25.1* 25.4* 24.6* 23.4*    343 301 236     INR  No lab results found in last 7 days.  Arterial Blood Gas  Recent Labs   Lab 07/09/21  0415 07/08/21  0500 07/07/21  0450 07/05/21  0412 07/04/21  0613 07/03/21  1132   PH 7.49* 7.47* 7.46* 7.50* 7.49* 7.49*   PCO2 32* 32* 33* 28* 29* 29*   PO2 120* 133* 107* 216* 148* 99   HCO3 24 23 24 22 22 22   O2PER  --   --  30 30 40% 40       All cultures:  Recent Labs   Lab 07/09/21  0454 07/09/21  0415 07/08/21  1417 07/08/21  0500 07/07/21  0450 07/06/21  0500 07/05/21  1232 07/05/21  1104 07/03/21  1730 07/03/21  1722 07/02/21  1238 07/02/21  1225   CULT No growth after 1 hour No growth after 2 hours No growth after 12 hours No growth after 20 hours No growth after 2 days  No growth after 2 days No growth after 3 days  No growth after 3 days Cultured on the 2nd day of incubation:  Staphylococcus epidermidis  *  Critical Value/Significant Value, preliminary result only, called to and read back by  IVETT WARE RN 07/06/21 1258 EH.   Cultured on the 2nd day of incubation:  Staphylococcus aureus  *  Critical  Value/Significant Value, preliminary result only, called to and read back by  Sin Jack RN at 0448 21 hg    Susceptibility testing done on previous specimen Cultured on the 1st day of incubation:  Staphylococcus aureus  Susceptibility testing done on previous specimen  *  Critical Value/Significant Value called to and read back by  Royal Ayala RN @1345 2021 gd   Cultured on the 1st day of incubation:  Staphylococcus aureus  Susceptibility testing done on previous specimen  *  Critical Value/Significant Value called to and read back by  Royal Ayala RN @1347 2021 gd   Cultured on the 1st day of incubation:  Staphylococcus aureus  Susceptibility testing done on previous specimen  *  Critical Value/Significant Value called to and read back by  Lashanda Barnes RN @1522 7/3/2021 gd   Cultured on the 1st day of incubation:  Staphylococcus aureus  Susceptibility testing done on previous specimen  *  Critical Value/Significant Value, preliminary result only, called to and read back by   ROYAL DE DIOS RN @1743 7.3. LM       Recent Results (from the past 24 hour(s))   Echocardiogram Limited   Result Value    LVEF  55-60%    Narrative    [id number]  [id number]  [id number]  [id number]^[email or web address]^VERONICA^KATT     [facility]  Echocardiography Laboratory  [address] [place] [address]     Name: JOAQUÍN SAAVEDRA  MRN: [id number]  : 1980  Study Date: 2021 09:42 AM  Age: 41 yrs  Gender: Female  Patient Location: [facility]  Reason For Study: Pericardial Effusion  Ordering Physician: VERONICA LUCIO  Referring Physician: Lisa Pollock  Performed By: Herberth Vides RDCS     BSA: 2.1 m2  Height: 68 in  Weight: 220 lb  HR: 68  BP: 134/70 mmHg  ______________________________________________________________________________  Procedure  Limited Portable Echo Adult.  ______________________________________________________________________________  Interpretation Summary     Small  pericardial effusion  There are no echocardiographic indications of cardiac tamponade. The  pericardial effusion may be slightly less than previously and appears to be  organizing.  The visual ejection fraction is 55-60%.  Left ventricular systolic function is normal.  The right ventricle is mild to moderately dilated.  The right ventricular systolic function is normal.  There is a mobile mass measuring 1.5 cm attached to the atrial side of the  posterior leaflet of the mitral valve. This was noted on 7/5/2021  Left to right atrial shunt, small  ______________________________________________________________________________  Left Ventricle  The left ventricle is normal in size. The visual ejection fraction is 55-60%.  Left ventricular systolic function is normal.     Right Ventricle  The right ventricle is mild to moderately dilated. The right ventricular  systolic function is normal.     Atria  Left to right atrial shunt, small. Small secundum ASD noted on JAE previously.     Mitral Valve  There is a mobile mass measuring 1.5 cm attached to the atrial side of the  posterior leaflet of the mitral valve. This was noted on 7/5/2021. There is  mild to moderate (1-2+) mitral regurgitation. The mitral regurgitant jet is  anteriorly directed, which is consistent with posterior leaflet pathology.     Tricuspid Valve  There is mild (1+) tricuspid regurgitation. The right ventricular systolic  pressure is approximated at 32.5 mmHg plus the right atrial pressure.     Aortic Valve  The aortic valve is trileaflet. There is trivial trileaflet aortic sclerosis.  There is trace aortic regurgitation. No hemodynamically significant valvular  aortic stenosis.     Pulmonic Valve  There is trace pulmonic valvular regurgitation.     Vessels  Unable to assess mean RA pressure given the patient is on a ventilator.     Pericardium  Small pericardial effusion. The pericardial effusion measures 1.73 cm at the  base of the inferolateral wall,  0.717 cm at the RV free wall, 1.59 cm at the  basal anterolateral wall. There are no echocardiographic indications of  cardiac tamponade. Moderate left pleural effusion.     Rhythm  Sinus rhythm was noted.  ______________________________________________________________________________  Doppler Measurements & Calculations  TR max franhcesca: 285.0 cm/sec  TR max P.5 mmHg     ______________________________________________________________________________  Report approved by: Rashi Hernandez 2021 11:20 AM                         Korin Aquino, MS4        I, Miki Piña, was present with the medical/DEEPTI student who participated in the service and in the documentation of the note.  I have verified the history and personally performed the physical exam and medical decision making.  I agree with the assessment and plan of care as documented in the note.       I personally reviewed vital signs, medications and labs.     I personally managed the following critical care conditions:  1.  Acute hypoxemic respiratory failure requiring mechanical ventilation--continue sbt's today with goal of extubating.  Suspect underlying pulmonary edema in setting of being up ~15L.  Diuresing.  2.  Bacteremia in setting of endocarditis, continue antibiotic therapy as mentioned.  3.  Acute agitated delirium:  In setting of intubation and recent sedatives; gets agitated when sedation weaned.  Increased seroquel and added bid valproate.      See also my changes in blue above.     Billing: This patient is critically ill: Yes. Total critical care time today 35 min, excluding procedures.      Miki Piña MD  Date of Service (when I saw the patient): 21

## 2021-07-09 NOTE — PLAN OF CARE
Pt remains intubated and sedated in ICU. Adequately sedated with short bursts of agitation- prn fentanyl boluses effective. 3x loose bowel movements. Tube feeding at goal rate. Febrile overnight, improved after prn tylenol. Possible extubation today if patient remains stable.

## 2021-07-09 NOTE — PROGRESS NOTES
[facility] ICU RESPIRATORY NOTE        Date of Admission: 7/1/2021    Date of Intubation (most recent): 7/1/2021    Reason for Mechanical Ventilation: Airway protection    Number of Days on Mechanical Ventilation: 9    Met Criteria for Spontaneous Breathing Trial: no    Reason for No Spontaneous Breathing Trial: per MD,     Significant Events Today: none    ABG Results:   Recent Labs   Lab 07/08/21  0500 07/07/21  0450 07/05/21  0412 07/04/21  0613 07/03/21  1132   PH 7.47* 7.46* 7.50* 7.49* 7.49*   PCO2 32* 33* 28* 29* 29*   PO2 133* 107* 216* 148* 99   HCO3 23 24 22 22 22   O2PER  --  30 30 40% 40       Current Vent Settings: Ventilation Mode: CMV/AC  (Continuous Mandatory Ventilation/ Assist Control)  FiO2 (%): 30 %  Rate Set (breaths/minute): 16 breaths/min  Tidal Volume Set (mL): 500 mL  PEEP (cm H2O): 5 cmH2O  Oxygen Concentration (%): 30 %  Resp: 23          Plan: continue full ventilatory support, RT to follow    Flavia Auguste, RT

## 2021-07-09 NOTE — PROGRESS NOTES
Neuro: Open eyes with voice. Not following  Pulm: Coarse LS. On full vent support. PS 5/5 for 30min  CV: SR w/ BP WNL  : Neri patent with adequate UOP  GI: Watery stools. Rectal tube in place with small leakage. TF formula changed. TF at her goal via OG.  Extremities: Purposeful movements on BUE. Localize movements on BLE  Skin: Blacken digits. Swollen labia. Coccyx intact. Scattered bruises.   Lines: R femoral central line.   Family: No family.   Plan: Continue diuresis. Weaning sedation. PS.      1900 Follows commands. Answering to simple questions with nodding. Denies pain.

## 2021-07-09 NOTE — PROGRESS NOTES
[facility] ICU RESPIRATORY NOTE        Date of Admission: 7/1/2021    Date of Intubation (most recent): 7/1/2021    Reason for Mechanical Ventilation: Airway Protection    Number of Days on Mechanical Ventilation: 9    Met Criteria for Spontaneous Breathing Trial: No    Reason for No Spontaneous Breathing Trial: Per MD    Significant Events Today: None    ABG Results:   Recent Labs   Lab 07/09/21  0415 07/08/21  0500 07/07/21  0450 07/05/21  0412 07/04/21  0613 07/03/21  1132   PH 7.49* 7.47* 7.46* 7.50* 7.49* 7.49*   PCO2 32* 32* 33* 28* 29* 29*   PO2 120* 133* 107* 216* 148* 99   HCO3 24 23 24 22 22 22   O2PER  --   --  30 30 40% 40       Current Vent Settings: Ventilation Mode: CMV/AC  (Continuous Mandatory Ventilation/ Assist Control)  FiO2 (%): 25 %  Rate Set (breaths/minute): 16 breaths/min  Tidal Volume Set (mL): 500 mL  PEEP (cm H2O): 5 cmH2O  Oxygen Concentration (%): 25 %  Resp: 24      Skin Assessment: Clean & Dry    Plan: Patient to remain on full ventilatory support, RT will continue to follow.      Asad Ochoa, RT

## 2021-07-09 NOTE — PROGRESS NOTES
Chart reviewed, patient was seen yesterday for a complete reassessment - see note dated 7/8/21 for details.    Patient currently receiving the following enteral nutrition regimen ~    Nutrition Support Enteral:  Type of Feeding Tube: OG  Enteral Frequency:  Continuous  Enteral Regimen: Promote 1.0 (NO FIBER) at 65 mL/hr  Total Enteral Provisions: 1560 kcal, 98 g protein (1.4 g/kg), 203 g CHO, no fiber, 1309 mL H2O  Free Water Flush: 150 mL every 4 hours   Propofol at 17.4 mL/hr = 459 kcal   Total = 2019 kcal (29 kcal/kg)    TF at goal as of last night (2000)  K 3.2 (L) - Getting replacement   Mg and Phos normal  BGM < 150  BM x 3 today and x 3 yesterday   I/O 3901/4575, wt 100.7 kg (up overall) - IV Lasix     ASSESSED NUTRITION NEEDS:  Dosing Weight:  69.6 kg (adjusted for overwt)  Energy Needs:  4766-5642 kcals (25-30 kcal/kg)  Justification: overweight and vented   Protein Needs:   grams protein (1.2-1.5 g pro/Kg)  Justification: hypercatabolism with critical illness and preservation of lean body mass    Discussed during rounds - RN concerned about high volume of stool   Note patient discontinued Cipro 7/8    Patient may benefit from a change to a fiber-containing formula now that pressors off and she is having loose stools    Orders written to change formula to Promote with Fiber, begin at 20 mL/hr and increase every 12 hours by 15 mL to goal of 65 mL/hr to provide:  1560 kcal, 97 g protein (1.4 g/kg), 215 g CHO, 22 g fiber, 1296 mL H2O  Total with Propofol = 2019 kcal (29 kcal/kg)    Ayde Ramachandran, RD, LD, CNSC   Clinical Dietitian - [facility]

## 2021-07-09 NOTE — PROGRESS NOTES
[facility]    Infectious Disease Progress Note    Date of Service : 07/09/2021     Assessment :  1. S.aureus MSSA native mitral valve endocarditis with septic shock in the setting of IVDA complicated by embolic L parietal lobe infarction and multiple peripheral emboli.   (TTE shows a large mobile 1.4 cm vegetation on the posterior mitral valve leaflet with extension to left atrium and small ASD per cardiology notes , no valvular abscess and no additional valvular involvement. Pericardial effusion is small). No seeding on CT abdomen but has peripheral emboli.  2. Untreated HIV diagnosed 2018, CD4 count maintained at >500, VL pending  3. Hepatitis C Ab+ but RNA -.   4. Severe sepsis requiring pressor support and multiorgan failure. Off pressors now  5. Multiple peripheral emboli. Right finger swelling and discoloration of left 3rd toe. US right finger without evidence of infection but sub-optimal study. Appears stable  6. FORD related to sepsis- resolved  7. Embolic stroke (small ASD with Left to right shunt)  8. Klebsiella pneumoniae UTI treated  9. Hypernatremia and hypokalemia  10. Severe anemia multifactorial. Has baseline anemia, exacerbated by chronic disease  11. Polysubstance abuse  12. Splenomegaly   13. LFT abnormality resolved    Recommendations:  1. Nafcillin  2. Biktarvy  3. Follow HIV VL & genotype  4. Blood cxs - since 7/6, discontinue daily blood cxs  5. Echocardiogram next week  6. Supportive care  Follow clinical status and labs    Marisol Matos MD    Interval History   Remained sedated and intubated. Low grade fever. No new overnight events    Antimicrobial therapy  7/2 Nafcillin  7/8 Biktarvy  Prior  7/4-7/7 Cipro  7/1-7/2 Vancomycin, zosyn    Physical Exam   Temp: 100.9  F (38.3  C) Temp src: Axillary BP: 118/74 Pulse: 67   Resp: 22 SpO2: 99 % O2 Device: Mechanical Ventilator    Vitals:    07/07/21 0600 07/08/21 0600 07/09/21 0000   Weight: 100.1 kg (220 lb 10.9 oz) 101.3 kg  (223 lb 5.2 oz) 100.7 kg (222 lb 0.1 oz)     Vital Signs with Ranges  Temp:  [99.7  F (37.6  C)-100.9  F (38.3  C)] 100.9  F (38.3  C)  Pulse:  [63-83] 67  Resp:  [14-29] 22  BP: (118)/(74) 118/74  MAP:  [69 mmHg-99 mmHg] 88 mmHg  Arterial Line BP: (105-147)/(52-78) 131/69  FiO2 (%):  [30 %] 30 %  SpO2:  [93 %-100 %] 99 %    GENERAL APPEARANCE:  intubated  EYES: conjunctivae and sclerae normal  NECK: no adenopathy  RESP: lungs clear to auscultation - no rales, rhonchi or wheezes  CV: regular rates and rhythm, murmur  ABDOMEN: soft, umbilical hernia  MS: extremities normal- no gross deformities noted  SKIN: right finger with swelling and redness, multiple osler's nodes and splinter hemorrhages, multiple embolic infarcts on toes, black discoloration of left toe    Other:    Medications     dexmedetomidine 1 mcg/kg/hr (07/09/21 0338)     fentaNYL 50 mcg/hr (07/09/21 0002)     - MEDICATION INSTRUCTIONS -       - MEDICATION INSTRUCTIONS -       midazolam Stopped (07/06/21 0418)     norepinephrine Stopped (07/05/21 1421)     propofol (DIPRIVAN) infusion 30 mcg/kg/min (07/09/21 0639)     sodium chloride 10 mL/hr at 07/09/21 0200     vasopressin Stopped (07/02/21 1240)       albuterol  2.5 mg Nebulization Q6H     bictegravir-emtricitabine-tenofovir  1 tablet Oral Daily     chlorhexidine  15 mL Mouth/Throat Q12H     furosemide  40 mg Intravenous Q8H     heparin ANTICOAGULANT  5,000 Units Subcutaneous Q8H     insulin aspart  1-6 Units Subcutaneous Q4H     multivitamins w/minerals  15 mL Per Feeding Tube Daily     nafcillin  2 g Intravenous Q4H     pantoprazole  40 mg Per Feeding Tube QAM AC     perflutren diluted 1mL to 2mL with saline  5 mL Intravenous Once     QUEtiapine  100 mg Oral or Feeding Tube TID     sodium chloride (PF)  10 mL Intravenous Once     sodium chloride (PF)  10 mL Intracatheter Q8H       Data   All microbiology laboratory data reviewed.  Recent Labs   Lab Test 07/09/21 0415 07/08/21  0500 07/07/21  0515    WBC 9.8 8.9 10.0   HGB 7.4* 6.9* 7.1*   HCT 24.1* 22.6* 23.5*   MCV 78 79 78    343 301     Recent Labs   Lab Test 07/09/21  0415 07/08/21  0500 07/07/21  0515   CR 0.70 0.68 0.69     No lab results found.  Recent Labs   Lab Test 07/09/21  0454 07/09/21  0415 07/08/21  1417 07/08/21  0500 07/07/21  0450 07/06/21  0500 07/05/21  1232 07/05/21  1104 07/03/21  1730   CULT No growth after 1 hour No growth after 2 hours No growth after 12 hours No growth after 20 hours No growth after 2 days  No growth after 2 days No growth after 3 days  No growth after 3 days Cultured on the 2nd day of incubation:  Staphylococcus epidermidis  *  Critical Value/Significant Value, preliminary result only, called to and read back by  IVETT WARE RN 07/06/21 1258 EH.   Cultured on the 2nd day of incubation:  Staphylococcus aureus  *  Critical Value/Significant Value, preliminary result only, called to and read back by  Sin Jack RN at 0448 7/7/21 hg    Susceptibility testing done on previous specimen Cultured on the 1st day of incubation:  Staphylococcus aureus  Susceptibility testing done on previous specimen  *  Critical Value/Significant Value called to and read back by  Royal Ayala RN @1345 7/4/2021 gd       Component      Latest Ref Rng & Units 7/6/2021   IFC Specimen       Blood   CD3 Mature T      49 - 84 % 76   CD4 [place] T      28 - 63 % 34   CD8 Suppressor T      10 - 40 % 37   CD4:CD8 Ratio      1.40 - 2.60 0.92 (L)   Absolute CD3      603 - 2,990 cells/uL 1,317   Absolute CD4      441 - 2,156 cells/uL 579   Absolute CD8      125 - 1,312 cells/uL 640     Component      Latest Ref Rng & Units 7/2/2021   HIV 1 Result      NR:Nonreactive Reactive (A)   HIV 2 Result      NR:Nonreactive Indeterminate (A)   HIV Interpretation       See Comment Below   HIV Antigen Antibody Combo      NR:Nonreactive     Reactive (A)   Hepatitis B Surface Antibody      <8.00 m[IU]/mL 0.58   Hep B Surface Agn      NR:Nonreactive  Nonreactive   Hepatitis C Antibody      NR:Nonreactive Reactive (A)   Susceptibility                       Staphylococcus aureus (1) Staphylococcus aureus (3)       BOBBY BOBBY       CLINDAMYCIN <=0.25 ug/mL Sensitive <=0.25 ug/mL Sensitive       ERYTHROMYCIN <=0.25 ug/mL Sensitive <=0.25 ug/mL Sensitive       GENTAMICIN <=0.5 ug/mL Sensitive <=0.5 ug/mL Sensitive       OXACILLIN 0.5 ug/mL Sensitive 0.5 ug/mL Sensitive       TETRACYCLINE <=1 ug/mL Sensitive <=1 ug/mL Sensitive       Trimethoprim/Sulfa <=0.5/9.5 u... Sensitive <=0.5/9.5 u... Sensitive       VANCOMYCIN 1 ug/mL Sensitive 1 ug/mL Sensitive           7/5 JAE  Interpretation Summary     1. The left ventricle is normal in structure, function and size. The visual  ejection fraction is estimated at 60%.  2. The right ventricle is normal in structure, function and size.  3. Small atrial septal defect, secundum type 5mm in diameter. Left to right  shunt. Rim appears adequate in size for closure device.  4. Moderate to large mobile vegetation (14mm long, 6mm wide) of the P2 segment  of the mitral leaflet. No valve perforation. There is moderate (2+) mitral  regurgitation.  5. Small pericardial effusion     7/6 US extremity R hand  US EXTREMITY NON VASCULAR RIGHT 7/6/2021 11:39 AM      HISTORY: Right 4th finger concern for septic arthritis/osteomyelitis-  Please do US right hand 4th finger.      FINDINGS: Sonography was performed in the area of clinical concern  (fourth finger).                                                                      IMPRESSION: No fluid collection or significant joint effusion is  appreciated sonographically. Clinical follow-up recommended. If  clinical suspicion persists, MRI could be performed.     7/1TTE  Interpretation Summary     Left ventricular systolic function is normal.  The visual ejection fraction is 55-60%.  The right ventricle is mildly dilated.  The right ventricular systolic function is normal.  There is a mobile linear  echodensity measuring 1.4 cm x 0.6 cm on the atrial  side of the posterior mitral valve leaflet.  Other valve structures appear normal without significant dysfunction or  obvious valvular vegetations.  The inferior pericardium appears moderately thickened and echobright which may  represent active pericarditis.  Large localized pericardial effusion (2.7 cm) posteriorly/laterally with  mobile free floating echodense material which could represent fibrinous  stranding in setting of inflammatory pericarditis or infectious material if  infectious pericardial effusion.  Dilation of the inferior vena cava is present with abnormal respiratory  variation in diameter.  Small left pleural effusion     No prior for comparison.     7/1 CT head  CT SCAN OF THE HEAD WITHOUT CONTRAST   7/1/2021 1:47 PM      HISTORY: Delirium; altered mental status     TECHNIQUE:  Axial images of the head and coronal reformations without  IV contrast material.  Radiation dose for this scan was reduced using  automated exposure control, adjustment of the mA and/or kV according  to patient size, or iterative reconstruction technique.     COMPARISON: None.     FINDINGS: There is a focal 2 cm area of hypodensity involving gray and  white matter in the medial left parietal lobe consistent with acute to  subacute ischemic infarct. There is a minimal incidental arachnoid  cyst in the anterior portion of left middle cranial fossa. Ventricles  and subarachnoid spaces are otherwise within normal limits. There is  no evidence for intracranial hemorrhage, significant mass effect, or  skull fracture. Exam is mildly limited by motion artifact. Visualized  paranasal sinuses and mastoid air cells are clear.                                                                      IMPRESSION: Focal hypodensity in the medial left parietal lobe  consistent with acute to subacute ischemic infarct.     7/1 Ct  abdomen/pelvis                                                           IMPRESSION:   1.  Patchy hypoenhancement in the mid and upper poles of the right  kidney posteriorly, suspicious for pyelonephritis.  2.  Moderate-sized fat-containing paraumbilical hernia.  3.  Mild splenomegaly.  4.  There are possible small gallstones within a contracted  gallbladder.    Attestation:  Total time on the floor involved in the patient's care: 35 minutes. Total time spent in counseling/care coordination: >50%

## 2021-07-10 ENCOUNTER — APPOINTMENT (OUTPATIENT)
Dept: GENERAL RADIOLOGY | Facility: CLINIC | Age: 41
End: 2021-07-10
Attending: INTERNAL MEDICINE
Payer: MEDICAID

## 2021-07-10 LAB
ALBUMIN SERPL-MCNC: 1.1 G/DL (ref 3.4–5)
ALBUMIN UR-MCNC: NEGATIVE MG/DL
ALP SERPL-CCNC: 162 U/L (ref 40–150)
ALT SERPL W P-5'-P-CCNC: 12 U/L (ref 0–50)
AMORPH CRY #/AREA URNS HPF: ABNORMAL /HPF
ANION GAP SERPL CALCULATED.3IONS-SCNC: 5 MMOL/L (ref 3–14)
APPEARANCE UR: ABNORMAL
AST SERPL W P-5'-P-CCNC: 17 U/L (ref 0–45)
BACTERIA #/AREA URNS HPF: ABNORMAL /HPF
BACTERIA SPEC CULT: ABNORMAL
BILIRUB SERPL-MCNC: 0.8 MG/DL (ref 0.2–1.3)
BILIRUB UR QL STRIP: NEGATIVE
BUN SERPL-MCNC: 18 MG/DL (ref 7–30)
CALCIUM SERPL-MCNC: 7.4 MG/DL (ref 8.5–10.1)
CHLORIDE SERPL-SCNC: 108 MMOL/L (ref 94–109)
CO2 SERPL-SCNC: 27 MMOL/L (ref 20–32)
COLOR UR AUTO: ABNORMAL
CREAT SERPL-MCNC: 0.69 MG/DL (ref 0.52–1.04)
ERYTHROCYTE [DISTWIDTH] IN BLOOD BY AUTOMATED COUNT: 25.4 % (ref 10–15)
GFR SERPL CREATININE-BSD FRML MDRD: >90 ML/MIN/{1.73_M2}
GLUCOSE BLDC GLUCOMTR-MCNC: 122 MG/DL (ref 70–99)
GLUCOSE BLDC GLUCOMTR-MCNC: 132 MG/DL (ref 70–99)
GLUCOSE BLDC GLUCOMTR-MCNC: 133 MG/DL (ref 70–99)
GLUCOSE BLDC GLUCOMTR-MCNC: 138 MG/DL (ref 70–99)
GLUCOSE BLDC GLUCOMTR-MCNC: 140 MG/DL (ref 70–99)
GLUCOSE BLDC GLUCOMTR-MCNC: 142 MG/DL (ref 70–99)
GLUCOSE BLDC GLUCOMTR-MCNC: 143 MG/DL (ref 70–99)
GLUCOSE SERPL-MCNC: 147 MG/DL (ref 70–99)
GLUCOSE UR STRIP-MCNC: NEGATIVE MG/DL
HCT VFR BLD AUTO: 22.8 % (ref 35–47)
HGB BLD-MCNC: 7 G/DL (ref 11.7–15.7)
HGB UR QL STRIP: ABNORMAL
HYALINE CASTS #/AREA URNS LPF: 3 /LPF (ref 0–2)
KETONES UR STRIP-MCNC: NEGATIVE MG/DL
LEUKOCYTE ESTERASE UR QL STRIP: ABNORMAL
MAGNESIUM SERPL-MCNC: 1.8 MG/DL (ref 1.6–2.3)
MCH RBC QN AUTO: 24.1 PG (ref 26.5–33)
MCHC RBC AUTO-ENTMCNC: 30.7 G/DL (ref 31.5–36.5)
MCV RBC AUTO: 78 FL (ref 78–100)
MUCOUS THREADS #/AREA URNS LPF: PRESENT /LPF
NITRATE UR QL: NEGATIVE
PH UR STRIP: 5.5 PH (ref 5–7)
PHOSPHATE SERPL-MCNC: 4.2 MG/DL (ref 2.5–4.5)
PLATELET # BLD AUTO: 469 10E9/L (ref 150–450)
POTASSIUM SERPL-SCNC: 3.8 MMOL/L (ref 3.4–5.3)
PROT SERPL-MCNC: 7.2 G/DL (ref 6.8–8.8)
RBC # BLD AUTO: 2.91 10E12/L (ref 3.8–5.2)
RBC #/AREA URNS AUTO: 5 /HPF (ref 0–2)
SODIUM SERPL-SCNC: 140 MMOL/L (ref 133–144)
SOURCE: ABNORMAL
SP GR UR STRIP: 1.01 (ref 1–1.03)
SPECIMEN SOURCE: ABNORMAL
SPECIMEN SOURCE: ABNORMAL
SQUAMOUS #/AREA URNS AUTO: 0 /HPF (ref 0–1)
UROBILINOGEN UR STRIP-MCNC: 0 MG/DL (ref 0–2)
WBC # BLD AUTO: 8.6 10E9/L (ref 4–11)
WBC #/AREA URNS AUTO: 11 /HPF (ref 0–5)
WBC CLUMPS #/AREA URNS HPF: PRESENT /HPF

## 2021-07-10 PROCEDURE — 999N000040 HC STATISTIC CONSULT NO CHARGE VASC ACCESS

## 2021-07-10 PROCEDURE — 200N000001 HC R&B ICU

## 2021-07-10 PROCEDURE — 87088 URINE BACTERIA CULTURE: CPT | Performed by: INTERNAL MEDICINE

## 2021-07-10 PROCEDURE — 250N000009 HC RX 250: Performed by: SPECIALIST

## 2021-07-10 PROCEDURE — 250N000012 HC RX MED GY IP 250 OP 636 PS 637: Performed by: INTERNAL MEDICINE

## 2021-07-10 PROCEDURE — 80053 COMPREHEN METABOLIC PANEL: CPT | Performed by: INTERNAL MEDICINE

## 2021-07-10 PROCEDURE — 250N000011 HC RX IP 250 OP 636: Performed by: INTERNAL MEDICINE

## 2021-07-10 PROCEDURE — 71045 X-RAY EXAM CHEST 1 VIEW: CPT

## 2021-07-10 PROCEDURE — 84100 ASSAY OF PHOSPHORUS: CPT | Performed by: INTERNAL MEDICINE

## 2021-07-10 PROCEDURE — 258N000003 HC RX IP 258 OP 636: Performed by: INTERNAL MEDICINE

## 2021-07-10 PROCEDURE — 250N000009 HC RX 250: Performed by: INTERNAL MEDICINE

## 2021-07-10 PROCEDURE — 999N000157 HC STATISTIC RCP TIME EA 10 MIN

## 2021-07-10 PROCEDURE — 81001 URINALYSIS AUTO W/SCOPE: CPT | Performed by: INTERNAL MEDICINE

## 2021-07-10 PROCEDURE — 94640 AIRWAY INHALATION TREATMENT: CPT | Mod: 76

## 2021-07-10 PROCEDURE — 999N000009 HC STATISTIC AIRWAY CARE

## 2021-07-10 PROCEDURE — 99291 CRITICAL CARE FIRST HOUR: CPT | Performed by: INTERNAL MEDICINE

## 2021-07-10 PROCEDURE — 87186 SC STD MICRODIL/AGAR DIL: CPT | Performed by: INTERNAL MEDICINE

## 2021-07-10 PROCEDURE — 87086 URINE CULTURE/COLONY COUNT: CPT | Performed by: INTERNAL MEDICINE

## 2021-07-10 PROCEDURE — 83735 ASSAY OF MAGNESIUM: CPT | Performed by: INTERNAL MEDICINE

## 2021-07-10 PROCEDURE — 250N000013 HC RX MED GY IP 250 OP 250 PS 637: Performed by: INTERNAL MEDICINE

## 2021-07-10 PROCEDURE — 250N000013 HC RX MED GY IP 250 OP 250 PS 637: Performed by: SPECIALIST

## 2021-07-10 PROCEDURE — 85027 COMPLETE CBC AUTOMATED: CPT | Performed by: INTERNAL MEDICINE

## 2021-07-10 PROCEDURE — 94640 AIRWAY INHALATION TREATMENT: CPT

## 2021-07-10 PROCEDURE — 94003 VENT MGMT INPAT SUBQ DAY: CPT

## 2021-07-10 PROCEDURE — 999N001017 HC STATISTIC GLUCOSE BY METER IP

## 2021-07-10 RX ORDER — POTASSIUM CHLORIDE 20MEQ/15ML
20 LIQUID (ML) ORAL ONCE
Status: COMPLETED | OUTPATIENT
Start: 2021-07-10 | End: 2021-07-10

## 2021-07-10 RX ORDER — FUROSEMIDE 10 MG/ML
40 INJECTION INTRAMUSCULAR; INTRAVENOUS EVERY 8 HOURS
Status: COMPLETED | OUTPATIENT
Start: 2021-07-10 | End: 2021-07-11

## 2021-07-10 RX ADMIN — INSULIN ASPART 1 UNITS: 100 INJECTION, SOLUTION INTRAVENOUS; SUBCUTANEOUS at 16:33

## 2021-07-10 RX ADMIN — HEPARIN SODIUM 5000 UNITS: 5000 INJECTION, SOLUTION INTRAVENOUS; SUBCUTANEOUS at 05:53

## 2021-07-10 RX ADMIN — NAFCILLIN SODIUM 2 G: 2 INJECTION, POWDER, LYOPHILIZED, FOR SOLUTION INTRAMUSCULAR; INTRAVENOUS at 12:19

## 2021-07-10 RX ADMIN — ACETAMINOPHEN 650 MG: 325 TABLET, FILM COATED ORAL at 20:48

## 2021-07-10 RX ADMIN — DEXMEDETOMIDINE HYDROCHLORIDE 1 MCG/KG/HR: 400 INJECTION INTRAVENOUS at 23:18

## 2021-07-10 RX ADMIN — QUETIAPINE FUMARATE 100 MG: 100 TABLET ORAL at 08:01

## 2021-07-10 RX ADMIN — ALBUTEROL SULFATE 2.5 MG: 2.5 SOLUTION RESPIRATORY (INHALATION) at 18:11

## 2021-07-10 RX ADMIN — DEXMEDETOMIDINE HYDROCHLORIDE 1.2 MCG/KG/HR: 400 INJECTION INTRAVENOUS at 10:11

## 2021-07-10 RX ADMIN — PROPOFOL 20 MCG/KG/MIN: 10 INJECTION, EMULSION INTRAVENOUS at 03:24

## 2021-07-10 RX ADMIN — NAFCILLIN SODIUM 2 G: 2 INJECTION, POWDER, LYOPHILIZED, FOR SOLUTION INTRAMUSCULAR; INTRAVENOUS at 05:53

## 2021-07-10 RX ADMIN — ALBUTEROL SULFATE 2.5 MG: 2.5 SOLUTION RESPIRATORY (INHALATION) at 13:06

## 2021-07-10 RX ADMIN — Medication 15 ML: at 20:48

## 2021-07-10 RX ADMIN — NAFCILLIN SODIUM 2 G: 2 INJECTION, POWDER, LYOPHILIZED, FOR SOLUTION INTRAMUSCULAR; INTRAVENOUS at 00:09

## 2021-07-10 RX ADMIN — ALBUTEROL SULFATE 2.5 MG: 2.5 SOLUTION RESPIRATORY (INHALATION) at 06:44

## 2021-07-10 RX ADMIN — FUROSEMIDE 40 MG: 10 INJECTION, SOLUTION INTRAVENOUS at 13:26

## 2021-07-10 RX ADMIN — INSULIN ASPART 1 UNITS: 100 INJECTION, SOLUTION INTRAVENOUS; SUBCUTANEOUS at 12:17

## 2021-07-10 RX ADMIN — PANTOPRAZOLE SODIUM 40 MG: 40 TABLET, DELAYED RELEASE ORAL at 08:01

## 2021-07-10 RX ADMIN — FUROSEMIDE 40 MG: 10 INJECTION, SOLUTION INTRAVENOUS at 20:44

## 2021-07-10 RX ADMIN — DEXMEDETOMIDINE HYDROCHLORIDE 1.2 MCG/KG/HR: 400 INJECTION INTRAVENOUS at 02:48

## 2021-07-10 RX ADMIN — DEXMEDETOMIDINE HYDROCHLORIDE 1 MCG/KG/HR: 400 INJECTION INTRAVENOUS at 14:09

## 2021-07-10 RX ADMIN — DEXMEDETOMIDINE HYDROCHLORIDE 1 MCG/KG/HR: 400 INJECTION INTRAVENOUS at 18:35

## 2021-07-10 RX ADMIN — NAFCILLIN SODIUM 2 G: 2 INJECTION, POWDER, LYOPHILIZED, FOR SOLUTION INTRAMUSCULAR; INTRAVENOUS at 20:45

## 2021-07-10 RX ADMIN — DEXMEDETOMIDINE HYDROCHLORIDE 1.2 MCG/KG/HR: 400 INJECTION INTRAVENOUS at 06:30

## 2021-07-10 RX ADMIN — CHLORHEXIDINE GLUCONATE 15 ML: 1.2 SOLUTION ORAL at 20:44

## 2021-07-10 RX ADMIN — FUROSEMIDE 40 MG: 10 INJECTION, SOLUTION INTRAVENOUS at 05:53

## 2021-07-10 RX ADMIN — CHLORHEXIDINE GLUCONATE 15 ML: 1.2 SOLUTION ORAL at 08:01

## 2021-07-10 RX ADMIN — ALBUTEROL SULFATE 2.5 MG: 2.5 SOLUTION RESPIRATORY (INHALATION) at 01:19

## 2021-07-10 RX ADMIN — ACETAMINOPHEN 650 MG: 325 TABLET, FILM COATED ORAL at 08:01

## 2021-07-10 RX ADMIN — QUETIAPINE FUMARATE 100 MG: 100 TABLET ORAL at 21:01

## 2021-07-10 RX ADMIN — VALPROATE SODIUM 500 MG: 100 INJECTION, SOLUTION INTRAVENOUS at 01:32

## 2021-07-10 RX ADMIN — NAFCILLIN SODIUM 2 G: 2 INJECTION, POWDER, LYOPHILIZED, FOR SOLUTION INTRAMUSCULAR; INTRAVENOUS at 16:29

## 2021-07-10 RX ADMIN — POTASSIUM CHLORIDE 20 MEQ: 20 SOLUTION ORAL at 05:53

## 2021-07-10 RX ADMIN — BICTEGRAVIR SODIUM, EMTRICITABINE, AND TENOFOVIR ALAFENAMIDE FUMARATE 1 TABLET: 50; 200; 25 TABLET ORAL at 08:01

## 2021-07-10 RX ADMIN — INSULIN ASPART 1 UNITS: 100 INJECTION, SOLUTION INTRAVENOUS; SUBCUTANEOUS at 03:56

## 2021-07-10 RX ADMIN — Medication 25 MCG/HR: at 20:48

## 2021-07-10 RX ADMIN — NAFCILLIN SODIUM 2 G: 2 INJECTION, POWDER, LYOPHILIZED, FOR SOLUTION INTRAMUSCULAR; INTRAVENOUS at 08:50

## 2021-07-10 RX ADMIN — VALPROATE SODIUM 500 MG: 100 INJECTION, SOLUTION INTRAVENOUS at 14:13

## 2021-07-10 RX ADMIN — HEPARIN SODIUM 5000 UNITS: 5000 INJECTION, SOLUTION INTRAVENOUS; SUBCUTANEOUS at 21:01

## 2021-07-10 RX ADMIN — QUETIAPINE FUMARATE 100 MG: 100 TABLET ORAL at 16:28

## 2021-07-10 RX ADMIN — HEPARIN SODIUM 5000 UNITS: 5000 INJECTION, SOLUTION INTRAVENOUS; SUBCUTANEOUS at 13:26

## 2021-07-10 ASSESSMENT — ACTIVITIES OF DAILY LIVING (ADL)
ADLS_ACUITY_SCORE: 26
ADLS_ACUITY_SCORE: 26
ADLS_ACUITY_SCORE: 24
ADLS_ACUITY_SCORE: 26
ADLS_ACUITY_SCORE: 24
ADLS_ACUITY_SCORE: 24

## 2021-07-10 ASSESSMENT — MIFFLIN-ST. JEOR: SCORE: 1705.5

## 2021-07-10 NOTE — PROGRESS NOTES
Unable to place piv. Attempted x1 with ultrasound, good blood return but unable to thread iv. No other visible veins seen to place line. Nurse aware and will notify MD. Pt. has a femoral line in now for iv access.

## 2021-07-10 NOTE — PLAN OF CARE
Shift: [id number]      Neuro: Pt follows commands and is able to shake her head/nod yes and no; resting between cares, sedation titrated down as tolerated     CV: Heart rhythm is sinus; BP's within limits; T-max this shift was 101.3    Resp: Lungs are coarse, moderate amount of inline and oral secretions; pt clamps down and makes it difficult to suction orally     GI: Rectal tube in place, moderate amount of leakage around the tube, watery stool output     : Neri in place, pt responds well to lasix    Skin: intact, pt has black nodules on her fingers and toes, which are not new    Mobility/Activity: turning and repositioning in the bed     Pain: pt has denied pain throughout the night and has appeared comfortable    Family Updated: Pt had a visitor for about 30 mins, who claimed at the visitor desk he was her spouse. He came up at change of shift and said his name was Wes. No contact to call to verify this visitor.

## 2021-07-10 NOTE — PROGRESS NOTES
Critical Care Progress Note  07/10/2021    Name: Manas Hernandez MRN#: [id number]   Age: 41 year old YOB: 1980     [facility] Day# 9           Problem List:   Principal Problem:    Endocarditis due to methicillin susceptible Staphylococcus aureus (MSSA)  Active Problems:    Pyelonephritis, acute    Altered mental status, unspecified altered mental status type           Summary/Hospital Course:   Ms. Hernandez is a 41 year old female with chronic polysubstance abuse and IVDU presented on 7/1 with encephalopathy, hypotension, anemia, and hypotension responsive to fluid resusictation. She was agitated in the ED, sedated and intubated for airway protection. CT head showed possible new acute to subacute parietal lobe ischemic infarct, CXR without abnormalities, and CT abdomen concerning for pyelonephritis empirically treated with vancomycin and zosyn. Transferred to the ICU for continued infectious workup and airway management. On exam there was concern for endocarditis. On 7/2, admission blood cultures positive for MSSA. Remains intubated on nafcillin for infective endocarditis and pyelonephritis, urine culture with klebsiella treated with ciprofloxacin. On 7/5, JAE performed showing vegetation 1.8cm in length on posterior leaflet of MV, small ASD with L to R shunt, small pericardial effusion without tamponade. CV surgery consulted, no indication for surgical intervention, rather continue on IV antibiotic therapy. On 7/6, HIV and HCV testing positive; per [facility] notes, had previously screened HIV positive in 2018 (though never had confirmatory testing and never treated). Started on ART (Biktarvy) on 7/8. No new growth on blood cultures since 7/5. She has failed several PST due to rapid breathing and continues to be intubated on minimal vent settings. Expect this will improve as continue to diuresis.      Assessment and plan :     Manas Hernandez is a 41 year old female with a history of  chronic polysubstance abuse/IVDU admitted on 7/1/2021 for encephalopathy requiring intubation, hypotension and acidosis. She is intubated in the ICU for management of native mitral valve endocarditis complicated by MSSA bacteremia treated with IV antibiotics.    I have personally reviewed the daily labs, imaging studies, cultures and discussed the case with referring physician and consulting physicians.     My assessment and plan by system for this patient is as follows:     Neurology/Psychiatry:    1. Acute ischemic CVA, likely secondary to MV vegetation embolization.  2. Toxic encephalopathy, septic encephalopathy - methicillin S. Aureus bacteremia on blood cultures (7/5).  negative since  3. ICU Sedation - on precedex, weaning as able  4. Analgesia  5. Agitation  6. Polysubstance use  Plan  - Neuro CC following, appreciate assistance  - Stroke risk - monitor BP, goal <130/80  - Continue dexmedtomidate with RASS goal 0 to -1  - Fentanyl prn for pain and withdrawal prevention  - Tylenol prn for pain  - Monitor for signs of withdrawal - UDS positive for amphetamines and opiates on admission  - Continue seroquel 100 mg TID and valproate q12h - she is cooperative and appears comfortable without agitation with the addition of valproate and increase of seroquel.  - Chemical dependency evaluation once extubated if agreeable      Cardiovascular:   1. MSSA septic shock - resolved. Stable off pressors.  2. Mitral valve endocarditis - mobile vegetation (1.4cm), mild-moderate MR and severe LAE. No abscess or perivalvular abscess on JAE (7/5). Several peripheral emboli (distal fingers and toes).  3. Elevated troponin likely secondary to myocardial inflammation, resolved - peaked 7/1.  4. Hypertriglyceridemia - elevated TG, low HDL; weaning propofol as able to limit use  5. Small pericardial effusion without tamponade physiology - echo (7/7) showed slightly less effusion than prior (7/5).  Plan  - No plans for valvular surgery  at this time, continue IV antibiotic therapy as below per CV surgery.  - Cardiology recommended repeat echocardiogram in one week to reassess pericardial fluid. Cardiology no longer following.     Pulmonary/Ventilator Management:   1. Acute hypoxemic respiratory failure requiring mechanically ventilation - resolved CMV/AC (16/500/25/5).  2. Respiratory alkalosis, overbreathing vent in the setting of prior drug use and pain (RR 26 with vent setting of 16). with plan to wean and extubate will avoid increasing sedative use.   3. Suspected hypervolemia likely secondary to mitral and tricuspid regurgitation.    Plan  -  Continues to be marginal on spontaneous breathing trial, with rapid shallow breathing. Today RR 33 and Vt 330.  - Continue albuterol neb q6h  - Continue lasix to 40 mg q8h     GI/Nutrition :   1. Mildly elevated alkaline phosphate, improving.  2. Hyperbilirubinemia with mild biliary dilation on US (CDB 9mm) - likely due to underlying sepsis, now Tbili normal (0.8).  3. Malnutrition - hypoalbuminemia 1.1, chronic anemia  4. Stress ulcer prophylaxis  Plan  - Intubated, on tube feeds  - Consider IV albumin, as hypoalbuminemia may worsen as increase diuresis. Monitor for now, albumin has been around 1.1-1.2 for several days.  - Monitor CMP daily  - Continue pantoprazole 40 mg IV   - Alk phos and bilirubin down trending, no need to repeat abdominal U/S at this time. Continue to monitor.     Renal/Fluids/Electrolytes:   1. Klebsiella pneumoniae UTI - treated.  2. Hypernatremia, resolved.  3. FORD related to sepsis, resolved.  Plan  - Monitor CMP daily  - Continue free water to 150 q4   - Avoid nephrotoxic agents such as NSAID, IV contrast unless specifically required  - Follow I/O's as appropriate.     Infectious Disease:   1. Severe sepsis secondary to MSSA bacteremia and MV endocarditis - normal WBC count, no growth on recent blood cultures.  3. HIV - positive screening test from 2018 per [facility]. CD4 >500 (no prophylaxis needed), VL pending. ART initiated on 7/9.  4. Hepatitis C Ab+, RNA negative.  5. Fever - low grade fever for several days, concerning for new infectious source.    Plan  - Continue nafcillin 2 g IV q4h  - No longer collecting daily blood cultures, cultures from 7/6, 7/7, 7/8 remain negative.   - Continue Biktarvy 1 tab PO daily  - ID following, appreciate assistance  - CXR, remove central line, repeat UA and culture.  - Continue to monitor urine output while on IV antibiotics     Endocrine:   1. Stress induced hyperglycemia, improved  2. Prediabetes, A1c 5.7 at admission  Plan  - ICU insulin protocol, goal sugar <180  - Follow BG levels     Hematology/Oncology:   1. Chronic anemia, acute on chronic (hgb 7.3 in 11/2007): likely 2/2 malnutrition and chronic polysubstance use; no signs of acute blood loss. Hgb near baseline - 7.0  Plan  - Daily CBC  - Transfuse when Hgb <7     MSK/Rheum:  1. Peripheral emboli of fingers and toes - discoloration of left 3rd toe, right great toe, right 4th finger, and now increased discoloration of left great toe. US right finger without evidence of infection. No indication for MRI at this time.  Plan  - Continue to monitor for signs of septic arthritis or ischemia, vascular surgery following peripherally.    IV/Access:   1. Venous access - right femoral CVC  2. Arterial access - none  Plan  - central access required and necessary     ICU Prophylaxis:   1. DVT: Hep subcutaneous, mechanical  2. VAP: HOB 30 degrees, chlorhexidine rinse  3. Stress Ulcer: PPI  4. Restraints: Nonviolent soft two point restraints required and necessary for patient safety and continued cares and good effect as patient continues to pull at necessary lines, tubes despite education and distraction. Will readdress daily.   5. Wound care - per unit routine   6. Feeding - tube feeds  7. Family Update: mother updated by phone  8. Disposition - critically ill in ICU        Interim  History/Overnight Events:   No events overnight, remains intubated. Low grade fever this AM. Failed spontaneous breathing trial (5/5) this AM - RR 33 and . Continues to have watery stools. Less sedated, opens eyes to voice, following commands, cooperative with nursing staff.         Key Medications:       albuterol  2.5 mg Nebulization Q6H     bictegravir-emtricitabine-tenofovir  1 tablet Oral Daily     chlorhexidine  15 mL Mouth/Throat Q12H     heparin ANTICOAGULANT  5,000 Units Subcutaneous Q8H     insulin aspart  1-6 Units Subcutaneous Q4H     multivitamins w/minerals  15 mL Per Feeding Tube Daily     nafcillin  2 g Intravenous Q4H     pantoprazole  40 mg Per Feeding Tube QAM AC     perflutren diluted 1mL to 2mL with saline  5 mL Intravenous Once     QUEtiapine  100 mg Oral or Feeding Tube TID     sodium chloride (PF)  10 mL Intravenous Once     sodium chloride (PF)  10 mL Intracatheter Q8H     valproate (DEPACON) in NS intermittent infusion  500 mg Intravenous Q12H       dexmedetomidine 1.2 mcg/kg/hr (07/10/21 1011)     fentaNYL 25 mcg/hr (07/10/21 1120)     - MEDICATION INSTRUCTIONS -       - MEDICATION INSTRUCTIONS -       midazolam Stopped (07/06/21 0418)     norepinephrine Stopped (07/05/21 1421)     propofol (DIPRIVAN) infusion Stopped (07/10/21 0631)     sodium chloride Stopped (07/10/21 0800)     vasopressin Stopped (07/02/21 1240)              Physical Examination:   Temp:  [99  F (37.2  C)-101.3  F (38.5  C)] 100.8  F (38.2  C)  Pulse:  [62-96] 72  Resp:  [0-32] 32  BP: (100-129)/(52-88) 115/71  MAP:  [105 mmHg] 105 mmHg  Arterial Line BP: (159)/(80) 159/80  FiO2 (%):  [25 %] 25 %  SpO2:  [93 %-100 %] 97 %      Intake/Output Summary (Last 24 hours) at 7/10/2021 1208  Last data filed at 7/10/2021 1200  Gross per 24 hour   Intake 3593.98 ml   Output 7105 ml   Net -3511.02 ml       Wt Readings from Last 4 Encounters:   07/10/21 98.2 kg (216 lb 7.9 oz)   11/05/07 103.1 kg (227 lb 4 oz)   09/26/07 99.3  kg (219 lb)     Arterial Line BP: (159)/(80) 159/80  MAP:  [105 mmHg] 105 mmHg  BP - Mean:  [66-99] 85  Ventilation Mode: CMV/AC  (Continuous Mandatory Ventilation/ Assist Control)  FiO2 (%): 25 %  Rate Set (breaths/minute): 16 breaths/min  Tidal Volume Set (mL): 500 mL  PEEP (cm H2O): 5 cmH2O  Pressure Support (cm H2O): 5 cmH2O  Oxygen Concentration (%): 25 %  Resp: (!) 32    Recent Labs   Lab 07/09/21  0415 07/08/21  0500 07/07/21  0450 07/05/21  0412 07/04/21  0613   PH 7.49* 7.47* 7.46* 7.50* 7.49*   PCO2 32* 32* 33* 28* 29*   PO2 120* 133* 107* 216* 148*   HCO3 24 23 24 22 22   O2PER  --   --  30 30 40%     GEN: sedated, does not arouse to voice   HEENT: sclera anicteric, trachea midline, NGT in place. ETT in place.   PULM: unlabored synchronous with vent, clear anteriorly.   CV/COR: tachycardic, normal S1 and S2. Systolic murmur auscultated over mitral area.  ABD: Mildly distended, soft. Normal bowel sounds, large midline hernia.  MSK/EXT: Bilateral dorsal hand pitting edema. Bilateral dorsal feet pitting edema.  NEURO: Sedated, unable to assess strength. PEERL  : Neri in place draining clear yellow urine.  SKIN: Discoloration of the distal 4th right finger, right great toe, left great toe, ischemic left 3rd toe. Splinter hemorrhages several fingernails bilaterally.  LINES: clean, dry intact         Data:   All data and imaging reviewed.     Limited ECHO, 7/8/2021:  Small pericardial effusion  There are no echocardiographic indications of cardiac tamponade. The  pericardial effusion may be slightly less than previously and appears to be  organizing.  The visual ejection fraction is 55-60%.  Left ventricular systolic function is normal.  The right ventricle is mild to moderately dilated.  The right ventricular systolic function is normal.  There is a mobile mass measuring 1.5 cm attached to the atrial side of the  posterior leaflet of the mitral valve. This was noted on 7/5/2021  Left to right atrial shunt,  small    ROUTINE ICU LABS (Last four results)  CMP  Recent Labs   Lab 07/10/21  0352 07/09/21  1931 07/09/21  1044 07/09/21  0415 07/08/21  0500 07/07/21  0515    140  --  143 147* 150*   POTASSIUM 3.8 3.4 3.7 3.2* 3.8 3.8   CHLORIDE 108 110*  --  115* 120* 124*   CO2 27 27  --  24 22 24   ANIONGAP 5 3  --  4 5 2*   * 145*  --  136* 124* 131*   BUN 18 17  --  18 20 24   CR 0.69 0.69  --  0.70 0.68 0.69   GFRESTIMATED >90 >90  --  >90 >90 >90   GFRESTBLACK >90 >90  --  >90 >90 >90   DEREK 7.4* 7.4*  --  7.2* 7.4* 7.2*   MAG 1.8  --   --  1.8 2.1 2.1   PHOS 4.2  --   --  4.2 4.3 3.9   PROTTOTAL 7.2  --   --  7.1 6.9 6.6*   ALBUMIN 1.1*  --   --  1.1* 1.2* 1.2*   BILITOTAL 0.8  --   --  0.9 1.0 1.2   ALKPHOS 162*  --   --  168* 172* 180*   AST 17  --   --  14 16 23   ALT 12  --   --  12 14 15     CBC  Recent Labs   Lab 07/10/21  0352 07/09/21  0415 07/08/21  0500 07/07/21  0515   WBC 8.6 9.8 8.9 10.0   RBC 2.91* 3.10* 2.85* 3.00*   HGB 7.0* 7.4* 6.9* 7.1*   HCT 22.8* 24.1* 22.6* 23.5*   MCV 78 78 79 78   MCH 24.1* 23.9* 24.2* 23.7*   MCHC 30.7* 30.7* 30.5* 30.2*   RDW 25.4* 25.1* 25.4* 24.6*   * 385 343 301     INR  No lab results found in last 7 days.  Arterial Blood Gas  Recent Labs   Lab 07/09/21  0415 07/08/21  0500 07/07/21  0450 07/05/21  0412 07/04/21  0613   PH 7.49* 7.47* 7.46* 7.50* 7.49*   PCO2 32* 32* 33* 28* 29*   PO2 120* 133* 107* 216* 148*   HCO3 24 23 24 22 22   O2PER  --   --  30 30 40%       All cultures:  Recent Labs   Lab 07/09/21  0454 07/09/21  0415 07/08/21  1417 07/08/21  0500 07/07/21  0450 07/06/21  0500 07/05/21  1232 07/05/21  1104 07/03/21  1730 07/03/21  1722   CULT No growth after 21 hours No growth after 21 hours No growth after 2 days No growth after 2 days No growth after 3 days  No growth after 3 days No growth after 4 days  No growth after 4 days Cultured on the 2nd day of incubation:  Staphylococcus epidermidis  *  Critical Value/Significant Value, preliminary  result only, called to and read back by  IVETT WARE RN 07/06/21 1258 EH.   Cultured on the 2nd day of incubation:  Staphylococcus aureus  *  Critical Value/Significant Value, preliminary result only, called to and read back by  Sin Jack RN at 0448 7/7/21 hg    Susceptibility testing done on previous specimen Cultured on the 1st day of incubation:  Staphylococcus aureus  Susceptibility testing done on previous specimen  *  Critical Value/Significant Value called to and read back by  Royal Ayala RN @1345 7/4/2021 gd   Cultured on the 1st day of incubation:  Staphylococcus aureus  Susceptibility testing done on previous specimen  *  Critical Value/Significant Value called to and read back by  Royal Ayala RN @1347 07/04/2021 gd       No results found for this or any previous visit (from the past 24 hour(s)).                Korin Aquino, MS4    I have evaluated the patient, discussed the case with the medical student and edited the note as appropriate. I spent 30 minutes on critical care time today, excluding procedures    India Hananh MD

## 2021-07-10 NOTE — PROGRESS NOTES
[facility] ICU RESPIRATORY NOTE    Date of Admission: 7/1/2021     Date of Intubation (most recent): 7/1/2021     Reason for Mechanical Ventilation: Airway Protection     Number of Days on Mechanical Ventilation: 10    Met Criteria for Spontaneous Breathing Trial:Yes, PS 5/5 for about 45 minutes and became to tachypneic and pt was placed back on full support.    ABG Results:   Recent Labs   Lab 07/09/21  0415 07/08/21  0500 07/07/21  0450 07/05/21  0412 07/04/21  0613   PH 7.49* 7.47* 7.46* 7.50* 7.49*   PCO2 32* 32* 33* 28* 29*   PO2 120* 133* 107* 216* 148*   HCO3 24 23 24 22 22   O2PER  --   --  30 30 40%       Current Vent Settings: Ventilation Mode: CMV/AC  (Continuous Mandatory Ventilation/ Assist Control)  FiO2 (%): 25 %  Rate Set (breaths/minute): 16 breaths/min  Tidal Volume Set (mL): 500 mL  PEEP (cm H2O): 5 cmH2O  Pressure Support (cm H2O): 5 cmH2O  Oxygen Concentration (%): 25 %  Resp: 23      Plan: Continue full ventilator support and assess in the morning for daily weaning trial.    Saira Chaudhary, RT  7/10/2021

## 2021-07-10 NOTE — PROGRESS NOTES
Neuro: Calm and cooperative. Following commands.  Pulm: LS clear. Large oral and ETT secretion. PS 5/5 for [id number], became tachypnic. On full vent support now.   CV: SR w/ BP WNL  : Neri patent with adequate UOP  GI: Liquidy stools. Rectal tube in place with minimal  Leakage. TF at her goal via OG.  Extremities: Purposeful movements  Skin: Blackened digits. Swollen labia. Coccyx intact. Scattered bruises.   Lines: R femoral central line. Unable to obtain PIVs per IV team.  Family: Mother updated via phone  Misc: Low grade fever. Denies pain  Plan: Continue diuresis. PS. Possible extubation.

## 2021-07-10 NOTE — PROGRESS NOTES
[facility] ICU RESPIRATORY NOTE           Date of Admission: 7/1/2021     Date of Intubation (most recent): 7/1/2021     Reason for Mechanical Ventilation: Airway Protection     Number of Days on Mechanical Ventilation: 10     Met Criteria for Spontaneous Breathing Trial: No     Reason for No Spontaneous Breathing Trial: Per MD     Significant Events Today: None overnight     ABG Results:   Recent Labs   Lab 07/09/21  0415 07/08/21  0500 07/07/21  0450 07/05/21  0412 07/04/21  0613 07/03/21  1132   PH 7.49* 7.47* 7.46* 7.50* 7.49* 7.49*   PCO2 32* 32* 33* 28* 29* 29*   PO2 120* 133* 107* 216* 148* 99   HCO3 24 23 24 22 22 22   O2PER  --   --  30 30 40% 40     Ventilation Mode: CMV/AC  (Continuous Mandatory Ventilation/ Assist Control)  FiO2 (%): 25 %  Rate Set (breaths/minute): 16 breaths/min  Tidal Volume Set (mL): 500 mL  PEEP (cm H2O): 5 cmH2O  Oxygen Concentration (%): 25 %  Resp: 19    AURELIA Forbes, RRT

## 2021-07-11 LAB
ALBUMIN SERPL-MCNC: 1.2 G/DL (ref 3.4–5)
ALP SERPL-CCNC: 152 U/L (ref 40–150)
ALT SERPL W P-5'-P-CCNC: 12 U/L (ref 0–50)
ANION GAP SERPL CALCULATED.3IONS-SCNC: 3 MMOL/L (ref 3–14)
AST SERPL W P-5'-P-CCNC: 19 U/L (ref 0–45)
BILIRUB SERPL-MCNC: 0.7 MG/DL (ref 0.2–1.3)
BUN SERPL-MCNC: 17 MG/DL (ref 7–30)
CALCIUM SERPL-MCNC: 7.6 MG/DL (ref 8.5–10.1)
CHLORIDE BLD-SCNC: 108 MMOL/L (ref 94–109)
CO2 SERPL-SCNC: 30 MMOL/L (ref 20–32)
CREAT SERPL-MCNC: 0.62 MG/DL (ref 0.52–1.04)
ERYTHROCYTE [DISTWIDTH] IN BLOOD BY AUTOMATED COUNT: 25.5 % (ref 10–15)
GFR SERPL CREATININE-BSD FRML MDRD: >90 ML/MIN/1.73M2
GLUCOSE BLD-MCNC: 155 MG/DL (ref 70–99)
GLUCOSE BLDC GLUCOMTR-MCNC: 109 MG/DL (ref 70–99)
GLUCOSE BLDC GLUCOMTR-MCNC: 113 MG/DL (ref 70–99)
GLUCOSE BLDC GLUCOMTR-MCNC: 137 MG/DL (ref 70–99)
GLUCOSE BLDC GLUCOMTR-MCNC: 139 MG/DL (ref 70–99)
GLUCOSE BLDC GLUCOMTR-MCNC: 98 MG/DL (ref 70–99)
GLUCOSE BLDC GLUCOMTR-MCNC: 99 MG/DL (ref 70–99)
HCT VFR BLD AUTO: 23.3 % (ref 35–47)
HGB BLD-MCNC: 7.2 G/DL (ref 11.7–15.7)
MAGNESIUM SERPL-MCNC: 2 MG/DL (ref 1.6–2.3)
MCH RBC QN AUTO: 24.2 PG (ref 26.5–33)
MCHC RBC AUTO-ENTMCNC: 30.9 G/DL (ref 31.5–36.5)
MCV RBC AUTO: 79 FL (ref 78–100)
PHOSPHATE SERPL-MCNC: 4.3 MG/DL (ref 2.5–4.5)
PLATELET # BLD AUTO: 559 10E3/UL (ref 150–450)
POTASSIUM BLD-SCNC: 3.3 MMOL/L (ref 3.4–5.3)
POTASSIUM BLD-SCNC: 3.3 MMOL/L (ref 3.4–5.3)
PROT SERPL-MCNC: 7.8 G/DL (ref 6.8–8.8)
RBC # BLD AUTO: 2.97 10E6/UL (ref 3.8–5.2)
SARS-COV-2 RNA RESP QL NAA+PROBE: NEGATIVE
SODIUM SERPL-SCNC: 141 MMOL/L (ref 133–144)
WBC # BLD AUTO: 7.7 10E3/UL (ref 4–11)

## 2021-07-11 PROCEDURE — 999N000157 HC STATISTIC RCP TIME EA 10 MIN

## 2021-07-11 PROCEDURE — 250N000013 HC RX MED GY IP 250 OP 250 PS 637: Performed by: INTERNAL MEDICINE

## 2021-07-11 PROCEDURE — 80053 COMPREHEN METABOLIC PANEL: CPT | Performed by: INTERNAL MEDICINE

## 2021-07-11 PROCEDURE — 250N000011 HC RX IP 250 OP 636: Performed by: INTERNAL MEDICINE

## 2021-07-11 PROCEDURE — 250N000009 HC RX 250: Performed by: INTERNAL MEDICINE

## 2021-07-11 PROCEDURE — 250N000013 HC RX MED GY IP 250 OP 250 PS 637: Performed by: SPECIALIST

## 2021-07-11 PROCEDURE — 82962 GLUCOSE BLOOD TEST: CPT

## 2021-07-11 PROCEDURE — 84132 ASSAY OF SERUM POTASSIUM: CPT | Performed by: INTERNAL MEDICINE

## 2021-07-11 PROCEDURE — 84100 ASSAY OF PHOSPHORUS: CPT | Performed by: INTERNAL MEDICINE

## 2021-07-11 PROCEDURE — 999N001017 HC STATISTIC GLUCOSE BY METER IP

## 2021-07-11 PROCEDURE — 250N000009 HC RX 250: Performed by: SPECIALIST

## 2021-07-11 PROCEDURE — 258N000003 HC RX IP 258 OP 636: Performed by: INTERNAL MEDICINE

## 2021-07-11 PROCEDURE — 87635 SARS-COV-2 COVID-19 AMP PRB: CPT | Performed by: INTERNAL MEDICINE

## 2021-07-11 PROCEDURE — 99291 CRITICAL CARE FIRST HOUR: CPT | Performed by: INTERNAL MEDICINE

## 2021-07-11 PROCEDURE — 200N000001 HC R&B ICU

## 2021-07-11 PROCEDURE — 94003 VENT MGMT INPAT SUBQ DAY: CPT

## 2021-07-11 PROCEDURE — 83735 ASSAY OF MAGNESIUM: CPT | Performed by: INTERNAL MEDICINE

## 2021-07-11 PROCEDURE — 94640 AIRWAY INHALATION TREATMENT: CPT

## 2021-07-11 PROCEDURE — 85027 COMPLETE CBC AUTOMATED: CPT | Performed by: INTERNAL MEDICINE

## 2021-07-11 RX ORDER — POTASSIUM CHLORIDE 1.5 G/1.58G
40 POWDER, FOR SOLUTION ORAL ONCE
Status: COMPLETED | OUTPATIENT
Start: 2021-07-11 | End: 2021-07-11

## 2021-07-11 RX ORDER — POTASSIUM CHLORIDE 7.45 MG/ML
10 INJECTION INTRAVENOUS
Status: DISCONTINUED | OUTPATIENT
Start: 2021-07-11 | End: 2021-07-11 | Stop reason: DRUGHIGH

## 2021-07-11 RX ORDER — POTASSIUM CHLORIDE 29.8 MG/ML
20 INJECTION INTRAVENOUS
Status: DISPENSED | OUTPATIENT
Start: 2021-07-11 | End: 2021-07-11

## 2021-07-11 RX ORDER — ALBUTEROL SULFATE 0.83 MG/ML
2.5 SOLUTION RESPIRATORY (INHALATION) EVERY 6 HOURS PRN
Status: DISCONTINUED | OUTPATIENT
Start: 2021-07-11 | End: 2021-09-01 | Stop reason: HOSPADM

## 2021-07-11 RX ORDER — LIDOCAINE 40 MG/G
CREAM TOPICAL
Status: ACTIVE | OUTPATIENT
Start: 2021-07-11 | End: 2021-07-14

## 2021-07-11 RX ORDER — POTASSIUM CHLORIDE 29.8 MG/ML
20 INJECTION INTRAVENOUS ONCE
Status: COMPLETED | OUTPATIENT
Start: 2021-07-11 | End: 2021-07-11

## 2021-07-11 RX ADMIN — ALBUTEROL SULFATE 2.5 MG: 2.5 SOLUTION RESPIRATORY (INHALATION) at 00:22

## 2021-07-11 RX ADMIN — NAFCILLIN SODIUM 2 G: 2 INJECTION, POWDER, LYOPHILIZED, FOR SOLUTION INTRAMUSCULAR; INTRAVENOUS at 09:08

## 2021-07-11 RX ADMIN — POTASSIUM CHLORIDE 20 MEQ: 400 INJECTION, SOLUTION INTRAVENOUS at 21:25

## 2021-07-11 RX ADMIN — HEPARIN SODIUM 5000 UNITS: 5000 INJECTION, SOLUTION INTRAVENOUS; SUBCUTANEOUS at 14:11

## 2021-07-11 RX ADMIN — DEXMEDETOMIDINE HYDROCHLORIDE 1.2 MCG/KG/HR: 400 INJECTION INTRAVENOUS at 11:30

## 2021-07-11 RX ADMIN — VALPROATE SODIUM 500 MG: 100 INJECTION, SOLUTION INTRAVENOUS at 14:11

## 2021-07-11 RX ADMIN — POTASSIUM CHLORIDE 40 MEQ: 1.5 POWDER, FOR SOLUTION ORAL at 07:41

## 2021-07-11 RX ADMIN — QUETIAPINE FUMARATE 100 MG: 100 TABLET ORAL at 16:05

## 2021-07-11 RX ADMIN — NAFCILLIN SODIUM 2 G: 2 INJECTION, POWDER, LYOPHILIZED, FOR SOLUTION INTRAMUSCULAR; INTRAVENOUS at 20:08

## 2021-07-11 RX ADMIN — QUETIAPINE FUMARATE 100 MG: 100 TABLET ORAL at 07:41

## 2021-07-11 RX ADMIN — FUROSEMIDE 40 MG: 10 INJECTION, SOLUTION INTRAVENOUS at 05:22

## 2021-07-11 RX ADMIN — BICTEGRAVIR SODIUM, EMTRICITABINE, AND TENOFOVIR ALAFENAMIDE FUMARATE 1 TABLET: 50; 200; 25 TABLET ORAL at 07:40

## 2021-07-11 RX ADMIN — DEXMEDETOMIDINE HYDROCHLORIDE 0.7 MCG/KG/HR: 400 INJECTION INTRAVENOUS at 23:58

## 2021-07-11 RX ADMIN — DEXMEDETOMIDINE HYDROCHLORIDE 1 MCG/KG/HR: 400 INJECTION INTRAVENOUS at 07:40

## 2021-07-11 RX ADMIN — CHLORHEXIDINE GLUCONATE 15 ML: 1.2 SOLUTION ORAL at 07:40

## 2021-07-11 RX ADMIN — ALBUTEROL SULFATE 2.5 MG: 2.5 SOLUTION RESPIRATORY (INHALATION) at 07:33

## 2021-07-11 RX ADMIN — NAFCILLIN SODIUM 2 G: 2 INJECTION, POWDER, LYOPHILIZED, FOR SOLUTION INTRAMUSCULAR; INTRAVENOUS at 05:22

## 2021-07-11 RX ADMIN — VALPROATE SODIUM 500 MG: 100 INJECTION, SOLUTION INTRAVENOUS at 02:12

## 2021-07-11 RX ADMIN — NAFCILLIN SODIUM 2 G: 2 INJECTION, POWDER, LYOPHILIZED, FOR SOLUTION INTRAMUSCULAR; INTRAVENOUS at 16:04

## 2021-07-11 RX ADMIN — HEPARIN SODIUM 5000 UNITS: 5000 INJECTION, SOLUTION INTRAVENOUS; SUBCUTANEOUS at 21:26

## 2021-07-11 RX ADMIN — NAFCILLIN SODIUM 2 G: 2 INJECTION, POWDER, LYOPHILIZED, FOR SOLUTION INTRAMUSCULAR; INTRAVENOUS at 12:44

## 2021-07-11 RX ADMIN — DEXMEDETOMIDINE HYDROCHLORIDE 0.5 MCG/KG/HR: 400 INJECTION INTRAVENOUS at 16:04

## 2021-07-11 RX ADMIN — HEPARIN SODIUM 5000 UNITS: 5000 INJECTION, SOLUTION INTRAVENOUS; SUBCUTANEOUS at 05:22

## 2021-07-11 RX ADMIN — POTASSIUM CHLORIDE 20 MEQ: 400 INJECTION, SOLUTION INTRAVENOUS at 20:08

## 2021-07-11 RX ADMIN — ACETAMINOPHEN 650 MG: 325 TABLET, FILM COATED ORAL at 07:40

## 2021-07-11 RX ADMIN — PANTOPRAZOLE SODIUM 40 MG: 40 TABLET, DELAYED RELEASE ORAL at 07:40

## 2021-07-11 RX ADMIN — NAFCILLIN SODIUM 2 G: 2 INJECTION, POWDER, LYOPHILIZED, FOR SOLUTION INTRAMUSCULAR; INTRAVENOUS at 00:22

## 2021-07-11 ASSESSMENT — ACTIVITIES OF DAILY LIVING (ADL)
ADLS_ACUITY_SCORE: 26
ADLS_ACUITY_SCORE: 24
ADLS_ACUITY_SCORE: 26
ADLS_ACUITY_SCORE: 26
ADLS_ACUITY_SCORE: 24
ADLS_ACUITY_SCORE: 26

## 2021-07-11 ASSESSMENT — MIFFLIN-ST. JEOR: SCORE: 1659.27

## 2021-07-11 NOTE — PROVIDER NOTIFICATION
BC + with second organism, Staph Aureus from July 5th 1232pm. Intensivist notified. Awaiting new orders.

## 2021-07-11 NOTE — PLAN OF CARE
Shift: 1900-0730      Neuro: Pt follows commands and moves all extremities; able to nod and shake head in response to simple questions     CV: Heart rhythm is sinus; BP's within limits; tmax 101    Resp: Lungs are clear, large amounts of inline and oral secretions     GI: Tube feeds remain at goal; rectal tube in place with watery stool output     : Neri in place, adequate output and pt very responsive to lasix     Skin: Skin is intact; black nodules on fingers and toes-no change     Mobility/Activity: turning and repositioning in the bed     Pain: pt denies pain and appears comfortable     Family Updated: No contact with family this shift

## 2021-07-11 NOTE — PROGRESS NOTES
Pt extubated to 5L oxymask per MD order.  LS are diminished t/o and no stridor noted.  Will continue to follow  Hubert Harrison, RT  7/11/2021

## 2021-07-11 NOTE — PROGRESS NOTES
Critical Care Progress Note  07/11/2021    Name: Manas Hernandez MRN#: [id number]   Age: 41 year old YOB: 1980     [facility] [facility] Day# 10           Problem List:   Principal Problem:    Endocarditis due to methicillin susceptible Staphylococcus aureus (MSSA)  Active Problems:    Pyelonephritis, acute    Altered mental status, unspecified altered mental status type           Summary/Hospital Course:   Ms. Hernandez is a 41 year old female presented on 7/1 with encephalopathy, hypotension, anemia, and hypotension responsive to fluid resusictation. She was agitated in the ED, sedated and intubated for airway protection. CT head showed possible new acute to subacute parietal lobe ischemic infarct, CXR without abnormalities, and CT abdomen concerning for pyelonephritis empirically treated with vancomycin and zosyn. Transferred to the ICU for continued infectious workup and airway management. On exam there was concern for endocarditis because of chronic polysubstance abuse and IVDU . On 7/2, admission blood cultures positive for MSSA. Remains intubated on nafcillin for infective endocarditis and pyelonephritis, urine culture with klebsiella treated with ciprofloxacin. On 7/5, JAE performed showing vegetation 1.8cm in length on posterior leaflet of MV, small ASD with L to R shunt, small pericardial effusion without tamponade. CV surgery consulted, no indication for surgical intervention, rather continue on IV antibiotic therapy. On 7/6, HIV and HCV testing positive; per [facility] notes, had previously screened HIV positive in 2018 (though never had confirmatory testing and never treated). Started on ART (Biktarvy) on 7/8. No new growth on blood cultures since 7/5. She has failed several PST due to rapid breathing and continues to be intubated on minimal vent settings. Expect this will improve as continue to diuresis.      Assessment and plan :     Manas Hernandez is a 41 year old female with native  mitral valve endocarditis complicated by MSSA bacteremia treated with IV antibiotics.    I have personally reviewed the daily labs, imaging studies, cultures and discussed the case with referring physician and consulting physicians.     My assessment and plan by system for this patient is as follows:     Neurology/Psychiatry:    1. Acute ischemic CVA, likely secondary to MV vegetation embolization.  2. Toxic encephalopathy, septic encephalopathy - methicillin S. Aureus bacteremia on blood cultures (7/5).  negative since then  3. ICU Sedation - on precedex, weaning as able  4. Analgesia  5. Agitation  6. Polysubstance use  Plan  - Neuro CC following, appreciate assistance  - Stroke risk - monitor BP, goal <130/80  - Continue dexmedtomidate with RASS goal 0 to -1  - Fentanyl prn for pain and withdrawal prevention  - Tylenol prn for pain  - Monitor for signs of withdrawal - UDS positive for amphetamines and opiates on admission  - Continue seroquel 100 mg TID and valproate q12h - she is cooperative and appears comfortable without agitation with the addition of valproate and increase of seroquel.  - Chemical dependency evaluation once extubated if agreeable      Cardiovascular:   1. MSSA septic shock - resolved. Stable off pressors.  2. Mitral valve endocarditis - mobile vegetation (1.4cm), mild-moderate MR and severe LAE. No abscess or perivalvular abscess on JAE (7/5). Several peripheral emboli (distal fingers and toes).  3. Elevated troponin likely secondary to myocardial inflammation, resolved - peaked 7/1.  4. Hypertriglyceridemia - elevated TG, low HDL; propofol is stopped  5. Small pericardial effusion without tamponade physiology - echo (7/7) showed slightly less effusion than prior (7/5).  Plan  - No plans for valvular surgery at this time, continue IV antibiotic therapy as below per CV surgery.  - Cardiology recommended repeat echocardiogram in one week (7/14) to reassess pericardial fluid. Cardiology no  longer following.     Pulmonary/Ventilator Management:   1. Acute hypoxemic respiratory failure requiring mechanically ventilation - resolved CMV/AC (16/500/25/5).  2. Respiratory alkalosis, overbreathing vent in the setting of prior drug use and pain (RR 26 with vent setting of 16). with plan to wean and extubate will avoid increasing sedative use.   3. Suspected hypervolemia likely secondary to mitral and tricuspid regurgitation.    Plan  -  Improved spontaneous breathing trial, with rapid shallow breathing. Extubated today  - albuterol neb q6h prn  - Continue lasix to 40 mg q8h  - consider CPAP or BiPAP if having increased work of breathing     GI/Nutrition :   1. Mildly elevated alkaline phosphate, improving.  2. Hyperbilirubinemia with mild biliary dilation on US (CDB 9mm) - likely due to underlying sepsis, now Tbili normal (0.8).  3. Malnutrition - hypoalbuminemia 1.1, chronic anemia  4. Stress ulcer prophylaxis  Plan  - extubated today, orogastric tube removed  - Consider IV albumin, as hypoalbuminemia may worsen as increase diuresis. Monitor for now, albumin has been around 1.1-1.2 for several days.  - Monitor CMP daily  - Continue pantoprazole 40 mg IV   - Alk phos and bilirubin down trending, no need to repeat abdominal U/S at this time. Continue to monitor.     Renal/Fluids/Electrolytes:   1. Klebsiella pneumoniae UTI - treated.  2. Hypernatremia, resolved.  3. FORD related to sepsis, resolved.  Plan  - Monitor CMP daily  - Continue free water to 150 q4   - Avoid nephrotoxic agents such as NSAID, IV contrast unless specifically required  - Follow I/O's as appropriate.     Infectious Disease:   1. Severe sepsis secondary to MSSA bacteremia and MV endocarditis - normal WBC count, no growth on recent blood cultures.  3. HIV - positive screening test from 2018 per [facility]. CD4 >500 (no prophylaxis needed), VL pending. ART initiated on 7/9.  4. Hepatitis C Ab+, RNA negative.  5. Fever - low  grade fever for several days, improved today No obvious new infectious source.    Plan  - Continue nafcillin 2 g IV q4h  - No longer collecting daily blood cultures, cultures from 7/6, 7/7, 7/8 remain negative.   - Continue Biktarvy 1 tab PO daily  - ID following, appreciate assistance  - attempted to remove central line, no peripheral IVs could be obtained.   - place PICC for longterm antibiotics and now cultures have cleared     Endocrine:   1. Stress induced hyperglycemia, improved  2. Prediabetes, A1c 5.7 at admission  Plan  - ICU insulin protocol, goal sugar <180  - Follow BG levels     Hematology/Oncology:   1. Chronic anemia, acute on chronic (hgb 7.3 in 11/2007): likely 2/2 malnutrition and chronic polysubstance use; no signs of acute blood loss. Hgb near baseline - 7.0  Plan  - Daily CBC  - Transfuse when Hgb <7     MSK/Rheum:  1. Peripheral emboli of fingers and toes - discoloration of left 3rd toe, right great toe, right 4th finger, and now increased discoloration of left great toe. US right finger without evidence of infection. No indication for MRI at this time.  Plan  - Continue to monitor for signs of septic arthritis or ischemia, vascular surgery following peripherally.    IV/Access:   1. Venous access - right femoral CVC  2. Arterial access - none  Plan  - central access required and necessary     ICU Prophylaxis:   1. DVT: Hep subcutaneous, mechanical  2. VAP: HOB 30 degrees, chlorhexidine rinse  3. Stress Ulcer: PPI  4. Restraints: Nonviolent soft two point restraints required and necessary for patient safety and continued cares and good effect as patient continues to pull at necessary lines, tubes despite education and distraction. Will readdress daily.   5. Wound care - per unit routine   6. Feeding - tube feeds  7. Family Update: mother updated by phone  8. Disposition - critically ill in ICU        Interim History/Overnight Events:   Doing better on spontaneous breathing trial today. Following  commands.          Key Medications:       bictegravir-emtricitabine-tenofovir  1 tablet Oral Daily     chlorhexidine  15 mL Mouth/Throat Q12H     heparin ANTICOAGULANT  5,000 Units Subcutaneous Q8H     insulin aspart  1-6 Units Subcutaneous Q4H     multivitamins w/minerals  15 mL Per Feeding Tube Daily     nafcillin  2 g Intravenous Q4H     pantoprazole  40 mg Per Feeding Tube QAM AC     perflutren diluted 1mL to 2mL with saline  5 mL Intravenous Once     QUEtiapine  100 mg Oral or Feeding Tube TID     sodium chloride (PF)  10 mL Intravenous Once     sodium chloride (PF)  10 mL Intracatheter Q8H     valproate (DEPACON) in NS intermittent infusion  500 mg Intravenous Q12H       dexmedetomidine 1.2 mcg/kg/hr (07/11/21 0915)     - MEDICATION INSTRUCTIONS -       - MEDICATION INSTRUCTIONS -       norepinephrine Stopped (07/05/21 1421)     sodium chloride Stopped (07/11/21 0800)              Physical Examination:   Temp:  [99.2  F (37.3  C)-101  F (38.3  C)] 99.4  F (37.4  C)  Pulse:  [] 70  Resp:  [11-29] 20  BP: (104-125)/(54-80) 118/80  FiO2 (%):  [25 %] 25 %  SpO2:  [94 %-100 %] 98 %        Intake/Output Summary (Last 24 hours) at 7/11/2021 1403  Last data filed at 7/11/2021 1300  Gross per 24 hour   Intake 3026.99 ml   Output 7105 ml   Net -4078.01 ml       Wt Readings from Last 4 Encounters:   07/11/21 93.6 kg (206 lb 4.8 oz)   11/05/07 103.1 kg (227 lb 4 oz)   09/26/07 99.3 kg (219 lb)     BP - Mean:  [71-98] 91  Ventilation Mode: PS  (Pressure Support)  FiO2 (%): 25 %  Rate Set (breaths/minute): 16 breaths/min  Tidal Volume Set (mL): 500 mL  PEEP (cm H2O): 5 cmH2O  Pressure Support (cm H2O): 5 cmH2O  Oxygen Concentration (%): 25 %  Resp: 20    Recent Labs   Lab 07/09/21  0415 07/08/21  0500 07/07/21  0450 07/05/21  0412   PH 7.49* 7.47* 7.46* 7.50*   PCO2 32* 32* 33* 28*   PO2 120* 133* 107* 216*   HCO3 24 23 24 22   O2PER  --   --  30 30     GEN: sedated, does not arouse to voice   HEENT: sclera anicteric,  trachea midline, NGT in place. ETT in place.   PULM: unlabored synchronous with vent, clear anteriorly.   CV/COR: tachycardic, normal S1 and S2. Systolic murmur auscultated over mitral area.  ABD: Mildly distended, soft. Normal bowel sounds, large midline hernia.  MSK/EXT: Bilateral dorsal hand pitting edema. Bilateral dorsal feet pitting edema.  NEURO: Sedated, unable to assess strength. PEERL  : Neri in place draining clear yellow urine.  SKIN: Discoloration of the distal 4th right finger, right great toe, left great toe, ischemic left 3rd toe. Splinter hemorrhages several fingernails bilaterally.  LINES: clean, dry intact         Data:   All data and imaging reviewed.     Limited ECHO, 7/8/2021:  Small pericardial effusion  There are no echocardiographic indications of cardiac tamponade. The  pericardial effusion may be slightly less than previously and appears to be  organizing.  The visual ejection fraction is 55-60%.  Left ventricular systolic function is normal.  The right ventricle is mild to moderately dilated.  The right ventricular systolic function is normal.  There is a mobile mass measuring 1.5 cm attached to the atrial side of the  posterior leaflet of the mitral valve. This was noted on 7/5/2021  Left to right atrial shunt, small    ROUTINE ICU LABS (Last four results)  CMP  Recent Labs   Lab 07/11/21  0914 07/11/21  0546 07/10/21  0352 07/09/21  1931 07/09/21  1044 07/09/21  0415 07/08/21  0500   NA  --  141 140 140  --  143 147*   POTASSIUM  --  3.3* 3.8 3.4 3.7 3.2* 3.8   CHLORIDE  --  108 108 110*  --  115* 120*   CO2  --  30 27 27  --  24 22   ANIONGAP  --  3 5 3  --  4 5   * 155* 147* 145*  --  136* 124*   BUN  --  17 18 17  --  18 20   CR  --  0.62 0.69 0.69  --  0.70 0.68   GFRESTIMATED  --  >90 >90 >90  --  >90 >90   GFRESTBLACK  --   --  >90 >90  --  >90 >90   DEREK  --  7.6* 7.4* 7.4*  --  7.2* 7.4*   MAG  --  2.0 1.8  --   --  1.8 2.1   PHOS  --  4.3 4.2  --   --  4.2 4.3    PROTTOTAL  --  7.8 7.2  --   --  7.1 6.9   ALBUMIN  --  1.2* 1.1*  --   --  1.1* 1.2*   BILITOTAL  --  0.7 0.8  --   --  0.9 1.0   ALKPHOS  --  152* 162*  --   --  168* 172*   AST  --  19 17  --   --  14 16   ALT  --  12 12  --   --  12 14     CBC  Recent Labs   Lab 07/11/21  0546 07/10/21  0352 07/09/21  0415 07/08/21  0500   WBC 7.7 8.6 9.8 8.9   RBC 2.97* 2.91* 3.10* 2.85*   HGB 7.2* 7.0* 7.4* 6.9*   HCT 23.3* 22.8* 24.1* 22.6*   MCV 79 78 78 79   MCH 24.2* 24.1* 23.9* 24.2*   MCHC 30.9* 30.7* 30.7* 30.5*   RDW 25.5* 25.4* 25.1* 25.4*   * 469* 385 343     INR  No lab results found in last 7 days.  Arterial Blood Gas  Recent Labs   Lab 07/09/21  0415 07/08/21  0500 07/07/21  0450 07/05/21  0412   PH 7.49* 7.47* 7.46* 7.50*   PCO2 32* 32* 33* 28*   PO2 120* 133* 107* 216*   HCO3 24 23 24 22   O2PER  --   --  30 30       All cultures:  Recent Labs   Lab 07/10/21  2243 07/09/21  0454 07/09/21  0415 07/08/21  1417 07/08/21  0500 07/07/21  0450 07/06/21  0500 07/05/21  1232 07/05/21  1104   CULT PENDING No growth after 2 days No growth after 2 days No growth after 3 days No growth after 3 days No growth after 4 days  No growth after 4 days No growth after 5 days  No growth after 5 days Cultured on the 2nd day of incubation:  Staphylococcus epidermidis  *  Critical Value/Significant Value, preliminary result only, called to and read back by  IVETT WARE RN 07/06/21 1258 EH.   Cultured on the 2nd day of incubation:  Staphylococcus aureus  Susceptibility testing done on previous specimen  *  Critical Value/Significant Value, preliminary result only, called to and read back by  Sin Jack RN at 0448 7/7/21 hg       Recent Results (from the past 24 hour(s))   XR Chest Port 1 View    Narrative    CHEST PORTABLE ONE VIEW   7/10/2021 4:21 PM     HISTORY: New onset low grade fever.    COMPARISON: Chest x-rays dated 7/1/2021.      Impression    IMPRESSION:  Endotracheal tube is in stable and satisfactory  position.  Enteric sump is again projected over the mediastinum with tip off the  inferior aspect of the image, similar to the prior study. Left basilar  opacity could represent infiltrate, atelectasis, effusion, and/or  scarring. Right lung is grossly clear. There is slight increased  aeration of lungs as compared to the prior study. No other changes.    NATHANAEL MICHAUD MD         SYSTEM ID:  [id number]                   I spent 30 minutes on critical care time today, excluding procedures    India Hannah MD

## 2021-07-11 NOTE — DOWNTIME EVENT NOTE
The EMR was down for 4 hours on 7/11/2021.      The following information was re-entered into the system by Fide Ennis RN: Flowsheet data; I&O's, medication administration     The following information will remain in the paper chart: NONE    Fide Ennis RN  7/11/2021

## 2021-07-11 NOTE — PROGRESS NOTES
Neuro: Only alert to her name. Calm and cooperative. Following commands.   Pulm: Extubation at 1040 without complications. LS clear. On RA now.  CV: SR w/ BP WNL  : Neri patent with adequate UOP  GI: Liquidy stools. Rectal tube in place with minimal  Leakage. No swallow study 24hr post extubation per intensivist  Extremities: Purposeful movements  Skin: Blackened digits. Swollen labia. Coccyx intact. Scattered bruises.   Lines: R femoral central line. PICC ordered.  Family: No calls  Misc: Denies pain  Plan: Continue to monitor and support.

## 2021-07-11 NOTE — PROGRESS NOTES
[facility] ICU RESPIRATORY NOTE     Date of Admission: 7/1/2021     Date of Intubation (most recent): 7/1/2021     Reason for Mechanical Ventilation: Airway Protection     Number of Days on Mechanical Ventilation: 11  Met Criteria for Spontaneous Breathing Trial:Yes,      ABG Results:   Recent Labs   Lab 07/09/21  0415 07/08/21  0500 07/07/21  0450 07/05/21  0412 07/04/21  0613   PH 7.49* 7.47* 7.46* 7.50* 7.49*   PCO2 32* 32* 33* 28* 29*   PO2 120* 133* 107* 216* 148*   HCO3 24 23 24 22 22   O2PER  --   --  30 30 40%     Ventilation Mode: CMV/AC  (Continuous Mandatory Ventilation/ Assist Control)  FiO2 (%): 25 %  Rate Set (breaths/minute): 16 breaths/min  Tidal Volume Set (mL): 500 mL  PEEP (cm H2O): 5 cmH2O  Pressure Support (cm H2O): 5 cmH2O  Oxygen Concentration (%): 25 %  Resp: 20    AURELIA Forbes, RRT

## 2021-07-12 ENCOUNTER — APPOINTMENT (OUTPATIENT)
Dept: PHYSICAL THERAPY | Facility: CLINIC | Age: 41
End: 2021-07-12
Attending: INTERNAL MEDICINE
Payer: MEDICAID

## 2021-07-12 ENCOUNTER — APPOINTMENT (OUTPATIENT)
Dept: SPEECH THERAPY | Facility: CLINIC | Age: 41
End: 2021-07-12
Attending: NURSE PRACTITIONER
Payer: MEDICAID

## 2021-07-12 LAB
ALBUMIN SERPL-MCNC: 1.2 G/DL (ref 3.4–5)
ALP SERPL-CCNC: 136 U/L (ref 40–150)
ALT SERPL W P-5'-P-CCNC: 12 U/L (ref 0–50)
ANION GAP SERPL CALCULATED.3IONS-SCNC: 4 MMOL/L (ref 3–14)
AST SERPL W P-5'-P-CCNC: 17 U/L (ref 0–45)
BACTERIA SPEC CULT: ABNORMAL
BACTERIA SPEC CULT: NO GROWTH
BACTERIA SPEC CULT: NO GROWTH
BILIRUB SERPL-MCNC: 0.7 MG/DL (ref 0.2–1.3)
BUN SERPL-MCNC: 14 MG/DL (ref 7–30)
CALCIUM SERPL-MCNC: 7.6 MG/DL (ref 8.5–10.1)
CHLORIDE BLD-SCNC: 113 MMOL/L (ref 94–109)
CO2 SERPL-SCNC: 26 MMOL/L (ref 20–32)
CREAT SERPL-MCNC: 0.6 MG/DL (ref 0.52–1.04)
ERYTHROCYTE [DISTWIDTH] IN BLOOD BY AUTOMATED COUNT: 25.2 % (ref 10–15)
GFR SERPL CREATININE-BSD FRML MDRD: >90 ML/MIN/1.73M2
GLUCOSE BLD-MCNC: 110 MG/DL (ref 70–99)
GLUCOSE BLDC GLUCOMTR-MCNC: 107 MG/DL (ref 70–99)
GLUCOSE BLDC GLUCOMTR-MCNC: 108 MG/DL (ref 70–99)
GLUCOSE BLDC GLUCOMTR-MCNC: 109 MG/DL (ref 70–99)
GLUCOSE BLDC GLUCOMTR-MCNC: 86 MG/DL (ref 70–99)
GLUCOSE BLDC GLUCOMTR-MCNC: 90 MG/DL (ref 70–99)
HCT VFR BLD AUTO: 24.4 % (ref 35–47)
HGB BLD-MCNC: 7.6 G/DL (ref 11.7–15.7)
MAGNESIUM SERPL-MCNC: 1.9 MG/DL (ref 1.6–2.3)
MCH RBC QN AUTO: 24.7 PG (ref 26.5–33)
MCHC RBC AUTO-ENTMCNC: 31.1 G/DL (ref 31.5–36.5)
MCV RBC AUTO: 79 FL (ref 78–100)
PHOSPHATE SERPL-MCNC: 3.3 MG/DL (ref 2.5–4.5)
PLATELET # BLD AUTO: 637 10E3/UL (ref 150–450)
POTASSIUM BLD-SCNC: 3.5 MMOL/L (ref 3.4–5.3)
POTASSIUM BLD-SCNC: 3.5 MMOL/L (ref 3.4–5.3)
PROT SERPL-MCNC: 7.9 G/DL (ref 6.8–8.8)
RBC # BLD AUTO: 3.08 10E6/UL (ref 3.8–5.2)
SODIUM SERPL-SCNC: 143 MMOL/L (ref 133–144)
SPECIMEN SOURCE: ABNORMAL
SPECIMEN SOURCE: ABNORMAL
SPECIMEN SOURCE: NORMAL
SPECIMEN SOURCE: NORMAL
T PALLIDUM AB SER QL: NONREACTIVE
WBC # BLD AUTO: 7.2 10E3/UL (ref 4–11)

## 2021-07-12 PROCEDURE — 99291 CRITICAL CARE FIRST HOUR: CPT | Performed by: STUDENT IN AN ORGANIZED HEALTH CARE EDUCATION/TRAINING PROGRAM

## 2021-07-12 PROCEDURE — 92526 ORAL FUNCTION THERAPY: CPT | Mod: GN

## 2021-07-12 PROCEDURE — 250N000009 HC RX 250: Performed by: INTERNAL MEDICINE

## 2021-07-12 PROCEDURE — 250N000013 HC RX MED GY IP 250 OP 250 PS 637: Performed by: INTERNAL MEDICINE

## 2021-07-12 PROCEDURE — 272N000450 HC KIT 4FR POWER PICC SINGLE LUMEN

## 2021-07-12 PROCEDURE — 86780 TREPONEMA PALLIDUM: CPT | Performed by: NURSE PRACTITIONER

## 2021-07-12 PROCEDURE — 82962 GLUCOSE BLOOD TEST: CPT

## 2021-07-12 PROCEDURE — 85027 COMPLETE CBC AUTOMATED: CPT | Performed by: INTERNAL MEDICINE

## 2021-07-12 PROCEDURE — 250N000013 HC RX MED GY IP 250 OP 250 PS 637: Performed by: SPECIALIST

## 2021-07-12 PROCEDURE — 999N000157 HC STATISTIC RCP TIME EA 10 MIN

## 2021-07-12 PROCEDURE — 200N000001 HC R&B ICU

## 2021-07-12 PROCEDURE — 250N000011 HC RX IP 250 OP 636: Performed by: INTERNAL MEDICINE

## 2021-07-12 PROCEDURE — 97161 PT EVAL LOW COMPLEX 20 MIN: CPT | Mod: GP

## 2021-07-12 PROCEDURE — 36569 INSJ PICC 5 YR+ W/O IMAGING: CPT

## 2021-07-12 PROCEDURE — 80053 COMPREHEN METABOLIC PANEL: CPT | Performed by: INTERNAL MEDICINE

## 2021-07-12 PROCEDURE — 250N000009 HC RX 250: Performed by: SPECIALIST

## 2021-07-12 PROCEDURE — 97110 THERAPEUTIC EXERCISES: CPT | Mod: GP

## 2021-07-12 PROCEDURE — 92610 EVALUATE SWALLOWING FUNCTION: CPT | Mod: GN

## 2021-07-12 PROCEDURE — 84100 ASSAY OF PHOSPHORUS: CPT | Performed by: INTERNAL MEDICINE

## 2021-07-12 PROCEDURE — 258N000003 HC RX IP 258 OP 636: Performed by: INTERNAL MEDICINE

## 2021-07-12 PROCEDURE — 97530 THERAPEUTIC ACTIVITIES: CPT | Mod: GP

## 2021-07-12 PROCEDURE — 83735 ASSAY OF MAGNESIUM: CPT | Performed by: INTERNAL MEDICINE

## 2021-07-12 RX ORDER — PANTOPRAZOLE SODIUM 40 MG/1
40 TABLET, DELAYED RELEASE ORAL
Status: DISCONTINUED | OUTPATIENT
Start: 2021-07-13 | End: 2021-09-01 | Stop reason: HOSPADM

## 2021-07-12 RX ORDER — POTASSIUM CHLORIDE 29.8 MG/ML
20 INJECTION INTRAVENOUS ONCE
Status: COMPLETED | OUTPATIENT
Start: 2021-07-12 | End: 2021-07-12

## 2021-07-12 RX ADMIN — NAFCILLIN SODIUM 2 G: 2 INJECTION, POWDER, LYOPHILIZED, FOR SOLUTION INTRAMUSCULAR; INTRAVENOUS at 20:31

## 2021-07-12 RX ADMIN — HYDROMORPHONE HYDROCHLORIDE 1 MG: 1 INJECTION, SOLUTION INTRAMUSCULAR; INTRAVENOUS; SUBCUTANEOUS at 12:16

## 2021-07-12 RX ADMIN — LIDOCAINE HYDROCHLORIDE ANHYDROUS 3 ML: 10 INJECTION, SOLUTION INFILTRATION at 13:16

## 2021-07-12 RX ADMIN — NAFCILLIN SODIUM 2 G: 2 INJECTION, POWDER, LYOPHILIZED, FOR SOLUTION INTRAMUSCULAR; INTRAVENOUS at 04:31

## 2021-07-12 RX ADMIN — HEPARIN SODIUM 5000 UNITS: 5000 INJECTION, SOLUTION INTRAVENOUS; SUBCUTANEOUS at 05:21

## 2021-07-12 RX ADMIN — NAFCILLIN SODIUM 2 G: 2 INJECTION, POWDER, LYOPHILIZED, FOR SOLUTION INTRAMUSCULAR; INTRAVENOUS at 01:13

## 2021-07-12 RX ADMIN — HEPARIN SODIUM 5000 UNITS: 5000 INJECTION, SOLUTION INTRAVENOUS; SUBCUTANEOUS at 13:48

## 2021-07-12 RX ADMIN — BICTEGRAVIR SODIUM, EMTRICITABINE, AND TENOFOVIR ALAFENAMIDE FUMARATE 1 TABLET: 50; 200; 25 TABLET ORAL at 08:13

## 2021-07-12 RX ADMIN — POTASSIUM CHLORIDE 20 MEQ: 400 INJECTION, SOLUTION INTRAVENOUS at 05:21

## 2021-07-12 RX ADMIN — NAFCILLIN SODIUM 2 G: 2 INJECTION, POWDER, LYOPHILIZED, FOR SOLUTION INTRAMUSCULAR; INTRAVENOUS at 17:50

## 2021-07-12 RX ADMIN — HYDROMORPHONE HYDROCHLORIDE 1 MG: 1 INJECTION, SOLUTION INTRAMUSCULAR; INTRAVENOUS; SUBCUTANEOUS at 20:28

## 2021-07-12 RX ADMIN — NAFCILLIN SODIUM 2 G: 2 INJECTION, POWDER, LYOPHILIZED, FOR SOLUTION INTRAMUSCULAR; INTRAVENOUS at 12:16

## 2021-07-12 RX ADMIN — NAFCILLIN SODIUM 2 G: 2 INJECTION, POWDER, LYOPHILIZED, FOR SOLUTION INTRAMUSCULAR; INTRAVENOUS at 08:11

## 2021-07-12 RX ADMIN — DEXMEDETOMIDINE HYDROCHLORIDE 0.4 MCG/KG/HR: 400 INJECTION INTRAVENOUS at 06:55

## 2021-07-12 RX ADMIN — VALPROATE SODIUM 500 MG: 100 INJECTION, SOLUTION INTRAVENOUS at 01:54

## 2021-07-12 RX ADMIN — Medication 15 ML: at 20:20

## 2021-07-12 RX ADMIN — QUETIAPINE FUMARATE 100 MG: 100 TABLET ORAL at 08:13

## 2021-07-12 RX ADMIN — HEPARIN SODIUM 5000 UNITS: 5000 INJECTION, SOLUTION INTRAVENOUS; SUBCUTANEOUS at 22:19

## 2021-07-12 RX ADMIN — QUETIAPINE FUMARATE 100 MG: 100 TABLET ORAL at 16:21

## 2021-07-12 RX ADMIN — QUETIAPINE FUMARATE 100 MG: 100 TABLET ORAL at 22:20

## 2021-07-12 ASSESSMENT — MIFFLIN-ST. JEOR: SCORE: 1608.47

## 2021-07-12 ASSESSMENT — ACTIVITIES OF DAILY LIVING (ADL)
ADLS_ACUITY_SCORE: 24
ADLS_ACUITY_SCORE: 24
ADLS_ACUITY_SCORE: 26

## 2021-07-12 NOTE — PROGRESS NOTES
SP[facility]UAL [facility] Progress Note  [facility] ICU    Length-of-Stay visit.    I visited Manas after ICU Rounds, where I learned she'd been extubated yesterday; nursing thought a visit would be appropriate.    Manas found it difficult to talk but did share she has no one in her life for support. She nodded affirmatively when I asked if another time might be better for a visit.    I offered words of encouragement, affirmed experiences, invited her to reach our to SH as desired, and said a blessing.    SH will follow.    Bruna Mcrae  Chaplain Resident

## 2021-07-12 NOTE — PROGRESS NOTES
07/12/21 1000   Quick Adds   Type of Visit Initial PT Evaluation   Living Environment   People in home alone   Current Living Arrangements shelter   Home Accessibility no concerns   Transportation Anticipated public transportation   Self-Care   Usual Activity Tolerance excellent   Current Activity Tolerance poor   Regular Exercise No   Equipment Currently Used at Home none   Disability/Function   Fall history within last six months no   General Information   Onset of Illness/Injury or Date of Surgery 07/01/21   Referring Physician Dr. Carter   Patient/Family Therapy Goals Statement (PT) To walk   Pertinent History of Current Problem (include personal factors and/or comorbidities that impact the POC) Pt is a 41 year old female with polysubstance abuse history, HIV positive, admitted with endocarditis.   Existing Precautions/Restrictions fall   Cognition   Orientation Status (Cognition) person   Affect/Mental Status (Cognition) low arousal/lethargic   Pain Assessment   Patient Currently in Pain No   Range of Motion (ROM)   ROM Quick Adds ROM WFL   Strength   Strength Comments Gross LE strength 2+/5   Bed Mobility   Comment (Bed Mobility) Max assist   Transfers   Transfer Safety Comments Mod assist x 2   Gait/Stairs (Locomotion)   Comment (Gait/Stairs) Unable to ambulate   Balance   Balance Comments Fair in sitting, poor in standing   Clinical Impression   Criteria for Skilled Therapeutic Intervention yes, treatment indicated   PT Diagnosis (PT) Impaired ambulation   Influenced by the following impairments Decreased strength, decreased endurance, decreased balance   Functional limitations due to impairments Difficulty with bed mobility, transfers, ambulation   Clinical Presentation Stable/Uncomplicated   Clinical Presentation Rationale VSS, pain controlled   Clinical Decision Making (Complexity) low complexity   Therapy Frequency (PT) Daily   Predicted Duration of Therapy Intervention (days/wks) 1 week   Planned  Therapy Interventions (PT) balance training;bed mobility training;gait training;stair training;strengthening;transfer training;home program guidelines;risk factor education;progressive activity/exercise   Risk & Benefits of therapy have been explained evaluation/treatment results reviewed;care plan/treatment goals reviewed;risks/benefits reviewed;current/potential barriers reviewed;participants voiced agreement with care plan;participants included;patient   PT Discharge Planning    PT Discharge Recommendation (DC Rec) [facility]-Motivated patient will benefit from intensive, interdisciplinary therapy.  Anticipate will be able to tolerate 3 hours of therapy per day   PT Rationale for DC Rec Pt is young and was very independent prior to admit, highly motivated to participate in therapy, and has potential to return to baseline independence. Pt will benefit from daily intense physical therapy to address impairments and increase functional independence.    PT Brief overview of current status  Bed mobility max A, transfers mod assist x 2   Total Evaluation Time   Total Evaluation Time (Minutes) 10

## 2021-07-12 NOTE — PROGRESS NOTES
07/12/21 1141   General Information   Onset of Illness/Injury or Date of Surgery 07/01/21   Referring Physician Pankaj Aparicio NP   Patient/Family Therapy Goal Statement (SLP) None stated   Pertinent History of Current Problem 41F PSA, IVDU, HIV admitted 7/1 w/ encephalopathy and hypotension. Intubated in ED for airway protection. NCHCT w/ new parietal lobe infarct. 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 Also w/ Klebsiella UTI (now completed ABx). Extubated 7/11.    General Observations Pt is alert and agreeable to evaluation. She denies a dysphagia hx. RN reports pt has been taking pills with water without difficulty    Past History of Dysphagia none    Type of Evaluation   Type of Evaluation Swallow Evaluation   Oral Motor   Oral Musculature generally intact   Structural Abnormalities none present   Mucosal Quality adequate   Dentition (Oral Motor)   Dentition (Oral Motor) some missing teeth;adequate dentition   Facial Symmetry (Oral Motor)   Facial Symmetry (Oral Motor) WNL   Lip Function (Oral Motor)   Lip Range of Motion (Oral Motor) WNL   Tongue Function (Oral Motor)   Tongue ROM (Oral Motor) WNL   Vocal Quality/Secretion Management (Oral Motor)   Vocal Quality (Oral Motor) hoarse  (Mildly hoarse )   Secretion Management (Oral Motor) WNL  (encouraged pt to cough out phlegm and she was successful)   General Swallowing Observations   Current Diet/Method of Nutritional Intake (General Swallowing Observations, NIS) NPO   Respiratory Support (General Swallowing Observations) nasal cannula   Swallowing Evaluation Clinical swallow evaluation   Clinical Swallow Evaluation   Feeding Assistance frequent cues/help required   Clinical Swallow Evaluation Textures Trialed thin liquids;pureed;solid foods   Clinical Swallow Eval: Thin Liquid Texture Trial   Mode of Presentation, Thin Liquids straw;self-fed;fed by clinician   Volume of Liquid or Food Presented 4 oz   Oral Phase of Swallow WFL    Pharyngeal Phase of Swallow intact   Diagnostic Statement No overt s/sx of aspiration, no changes in breath sounds or vocal quality. Pt presents with congested cough, when cued to cough up phlegm she was successful and this cleared her congested cough.    Clinical Swallow Evaluation: Puree Solid Texture Trial   Mode of Presentation, Puree spoon;self-fed   Volume of Puree Presented 4 oz   Oral Phase, Puree WFL   Pharyngeal Phase, Puree intact   Diagnostic Statement Adequate oral manipulation and clearance, no overt s/sx of aspiration, pt denied any difficulty    Clinical Swallow Evaluation: Solid Food Texture Trial   Mode of Presentation self-fed   Volume Presented 1 cracker    Oral Phase residue in oral cavity;poor AP movement   Oral Residue mid posterior tongue;left anterior lateral sulci;right anterior lateral sulci   Pharyngeal Phase intact   Diagnostic Statement Somewhat impulsive feeding behavior. Prolonged mastication and bolus formation, mild-mod oral residue even after an extended amount of time was provided. Liquid washes improved oral clearance but small amount remained. No overt s/sx of aspiration, pt denies difficulty, pain, or globus sensation.    Esophageal Phase of Swallow   Patient reports or presents with symptoms of esophageal dysphagia No   Swallowing Recommendations   Diet Consistency Recommendations soft & bite-sized (level 6);thin liquids (level 0)   Supervision Level for Intake 1:1 supervision needed  (and assist with self feeding )   Mode of Delivery Recommendations bolus size, small;slow rate of intake   Swallowing Maneuver Recommendations alternate food and liquid intake;extra swallow   Monitoring/Assistance Required (Eating/Swallowing) check mouth frequently for oral residue/pocketing;cue for finger/lingual sweep if oral pocketing present;stop eating activities when fatigue is present;monitor for cough or change in vocal quality with intake   Recommended Feeding/Eating Techniques  (Swallow Eval) maintain upright sitting position for eating;maintain upright posture during/after eating for 30 minutes;minimize distractions during oral intake;provide assist with feeding   Medication Administration Recommendations, Swallowing (SLP) As per pt preference    Instrumental Assessment Recommendations instrumental evaluation not recommended at this time   General Therapy Interventions   Planned Therapy Interventions Dysphagia Treatment   Dysphagia treatment Modified diet education;Instruction of safe swallow strategies   SLP Therapy Assessment/Plan   Criteria for Skilled Therapeutic Interventions Met (SLP Eval) yes;treatment indicated   SLP Diagnosis Mild oropharyngeal dysphagia    Rehab Potential (SLP Eval) good, to achieve stated therapy goals   Therapy Frequency (SLP Eval) 5 times/wk   Predicted Duration of Therapy Intervention (SLP Eval) 1 week    Comment, Therapy Assessment/Plan (SLP) Pt seen for clinical swallow evaluation. Oral mech is unremarkable with the exception of a mildly hoarse voice and some missing teeth.  Pt consumed thin liquids by straw sip without overt s/sx of aspiration, no changes in breath sounds, or vocal quality. Pt has intermittent congested cough and with cues to cough up phlegm she is successful at clearing this congestion, deeming this cough  unrelated to PO intake. Pt also tolerated 4 oz of puree and 1 cracker without overt s/sx of aspiration. However, mastication bolus formation and oral clearance with regular textures is prolonged even with cues for a liquid wash deeming soft solids most appropriate at this time. Swallow appears timely with functional hyolaryngeal excursion suspected. Mild oropharyngeal dysphagia secondary to deconditioned stated and prolonged intubation. Recommend soft and bite sized (6) and thin liquids (0). Pt requires feeding assistance and supervision with PO intake, upright position, small bites/sips, slow rate of intake, alternate solids and  liquids. Initiate SLP services for dysphagia.    Therapy Plan Review/Discharge Plan (SLP)   Therapy Plan Review (SLP) evaluation/treatment results reviewed;care plan/treatment goals reviewed;risks/benefits reviewed;participants voiced agreement with care plan;participants included;patient   SLP Discharge Planning    SLP Discharge Recommendation (DC Rec) [facility] patient will benefit from intensive, interdisciplinary therapy.  Anticipate will be able to tolerate 3 hours of therapy per day   SLP Rationale for DC Rec Dysphagia, below baseline   SLP Brief overview of current status  Mild oropharyngeal dysphagia secondary to deconditioned stated and prolonged intubation. Recommend soft and bite sized (6) and thin liquids (0). Pt requires feeding assistance and supervision with PO intake, upright position, small bites/sips, slow rate of intake, alternate solids and liquids. Initiate SLP services for dysphagia.     Total Evaluation Time   Total Evaluation Time (Minutes) 10

## 2021-07-12 NOTE — PROCEDURES
[facility]    Single Lumen PICC Placement    Date/Time: 7/12/2021 1:12 PM  Performed by: Pat Khan RN  Authorized by: India Hannah MD   Indications: vascular access    UNIVERSAL PROTOCOL   Site Marked: Yes  Prior Images Obtained and Reviewed:  Yes  Required items: Required blood products, implants, devices and special equipment available    Patient identity confirmed:  Arm band and hospital-assigned identification number  NA - No sedation, light sedation, or local anesthesia  Confirmation Checklist:  Patient's identity using two indicators, relevant allergies, procedure was appropriate and matched the consent or emergent situation and correct equipment/implants were available  Time out: Immediately prior to the procedure a time out was called    Universal Protocol: the Joint Commission Universal Protocol was followed    Preparation: Patient was prepped and draped in usual sterile fashion           ANESTHESIA    Anesthesia: Local infiltration  Local Anesthetic:  Lidocaine 1% without epinephrine  Anesthetic Total (mL):  3      SEDATION    Patient Sedated: No        Preparation: skin prepped with 2% chlorhexidine and skin prepped with ChloraPrep  Skin prep agent: skin prep agent completely dried prior to procedure  Sterile barriers: maximum sterile barriers were used: cap, mask, sterile gown, sterile gloves, and large sterile sheet  Hand hygiene: hand hygiene performed prior to central venous catheter insertion  Type of line used: PICC and Power PICC  Catheter type: single lumen  Lumen type: valved  Catheter size: 4 Fr  Brand: Bard  Lot number: [id number]  Placement method: MST and ultrasound  Number of attempts: 1  Successful placement: yes  Orientation: right  Location: brachial vein (lateral)  Arm circumference: adults 10 cm  Extremity circumference: 29.5  Visible catheter length: 4  Internal length: 34 cm  Total catheter length: 38  Dressing and securement: blood removed,  chlorhexidine patch applied, occlusive dressing applied, securement device and sterile dressing applied  Post procedure assessment: blood return through all ports  PROCEDURE   Patient Tolerance:  Patient tolerated the procedure well with no immediate complications  Describe Procedure: Right upper arm picc placed  Ready to use

## 2021-07-12 NOTE — PROGRESS NOTES
ICU Progress Note    A/P:  41F PSA, IVDU, HIV admitted 7/1 w/ encephalopathy and hypotension. Intubated in ED for airway protection. NCHCT w/ new parietal lobe infarct. 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 Also w/ Klebsiella UTI (now completed ABx). Extubated 7/11.     I have personally reviewed the daily labs, imaging studies, cultures and discussed the case with referring physician and consulting physicians.     Neuro  # Acute ischemic CVA - multifocal, likely 2/2 MV vegetation embolization  # Toxic encephalopathy  # Agitation  # Polysubstance Abuse  - neuro crit following  - BP < 130/80  - wean dexmedetomidine, as able, RASS 0  - fentanyl prn  - acetaminophen prn  - seroquel 100mg TID  - previously on valproate     Pulmonary  # Acute hypoxemic respiratory failure  - extubated 7/11  - PAP if increased WOB  - NC for comfort    Cardiac  # Septic shock  # Mitral valve endocarditis - mild-moderate MR, mobile vegetation 1.4cm, no abscess   # Small pericardial effusion - w/o tamponade physiology, improving  - no plan for CV surgery  - repeat TTE 7/14 to assess effusion  - cardiology signed off    Renal  # FROD - resolved, 2/2 sepsis  - monitor UOP, Cr, I/0    GI  # Malnutrition  - NPO  - swallow study    Heme/Onc  # Chronic anemia - baseline ~ 7  - monitor CBC    ID  # MSSA MV endocarditis - septic emboli to brain and fingers/toes  # UTI - Klebsiella  # HIV  - BCx now clear  - nafcillin 2g q4h  - Biktarvy 1 tab daily  - ID following   - monitor peripheral emboli for signs fo worsening ischemia or septic arthritis     Endo  # No acute issues  - sliding scale insulin     PPX  1. DVT: SQH   2. VAP: not indicated  3. Stress Ulcer: PPI  4. Restraints: Nonviolent soft two point restraints required and necessary for patient safety and continued cares and good effect as patient continues to pull at necessary lines, tubes despite education and distraction. Will readdress daily.   5. Wound care - per unit  "routine   6. Feeding - NPO, swallow today  7. Family to be updated via phone.    Interval Hx:  BiPAP ON for SOB. Improved this AM.     10 point ROS negative, aside from that mentioned above.     /74   Pulse 73   Temp 99.7  F (37.6  C) (Axillary)   Resp (!) 32   Ht 1.727 m (5' 8\")   Wt 88.5 kg (195 lb 1.6 oz)   SpO2 99%   BMI 29.66 kg/m    Gen: lying in bed, comfortable  Neuro: pupils equal, oriented to self and place  HEENT: anicteric  Card: systolic murmur over mitral area  Pulm: clear  Abd: soft, NTND  MSK: trace edema  Skin: discoloration of distal R 4th finger, R great toe, L great toe, L 3rd toe, several splinter hemorrhages b/l    Ventilation Mode: PS  (Pressure Support)  FiO2 (%): 30 %  Rate Set (breaths/minute): 16 breaths/min  Tidal Volume Set (mL): 500 mL  PEEP (cm H2O): 5 cmH2O  Pressure Support (cm H2O): 5 cmH2O  Oxygen Concentration (%): 25 %  Resp: (!) 32        Intake/Output Summary (Last 24 hours) at 7/12/2021 0832  Last data filed at 7/12/2021 0600  Gross per 24 hour   Intake 1143.26 ml   Output 3730 ml   Net -2586.74 ml       Labs: reviewed    Imaging: reviewed    Billing: Patient is critically ill. Total critical care time today, excluding procedures, was 35 minutes.    Sedrick Dimas MD  Pulmonary Disease and Critical Care Medicine   [facility]     "

## 2021-07-12 NOTE — PLAN OF CARE
Neuro: opens eyes to voice, follows commands, very weak unable to lift arms off bed, oriented to self only , reoriented to situation time and place, remains off dex  Cardio:  sinus rhythm to sinus tachycardia, afebrile,  Resp: lungs diminished in bases, loose nonproductive cough, sats 98% on room air   GI/:  don patent, rectal tube patent

## 2021-07-12 NOTE — PROGRESS NOTES
Pt stable on Bipap overnight. Skin protective barriers in place, Bipap alarm level set at 10. Will continue to follow.

## 2021-07-12 NOTE — PLAN OF CARE
Shift: [id number]      Neuro: pt is oriented to self only; follows commands and moves all extremities     CV: Heart rhythm is sinus/sinus tachy; BP's within limits; t-max overnight was 100.7    Resp: Lungs are clear/diminished; pt wore Bipap 30% FiO2 until about 0600, then placed on 3L oxymask     GI: Rectal tube in place, loose/watery stool output; pt NPO, speech consult in for today     : Neri in place, adequate output     Skin: Black scabs/nodes on fingers and toes    Mobility/Activity: Turning and repositioning in the bed     Pain: Pt has denied pain overnight     Family Updated: No contact with family this shift

## 2021-07-12 NOTE — PROGRESS NOTES
[facility]    Infectious Disease Progress Note    Date of Service : 07/12/2021     Assessment :  1. S.aureus MSSA native mitral valve endocarditis with septic shock in the setting of IVDA complicated by embolic L parietal lobe infarction and multiple peripheral emboli.   (TTE shows a large mobile 1.4 cm vegetation on the posterior mitral valve leaflet with extension to left atrium and small ASD per cardiology notes , no valvular abscess and no additional valvular involvement. Pericardial effusion is small). No seeding on CT abdomen but has peripheral emboli.  2. Untreated HIV diagnosed 2018, CD4 count maintained at >500, VL pending  3. Hepatitis C Ab+ but RNA -.   4. Severe sepsis requiring pressor support and multiorgan failure. Off pressors now  5. Multiple peripheral emboli. Right finger swelling and discoloration of left 3rd toe. US right finger without evidence of infection but sub-optimal study. Appears stable  6. FORD related to sepsis- resolved  7. Embolic stroke (small ASD with Left to right shunt)  8. Klebsiella pneumoniae UTI treated  9. Hypernatremia and hypokalemia  10. Severe anemia multifactorial. Has baseline anemia, exacerbated by chronic disease  11. Polysubstance abuse  12. Splenomegaly   13. LFT abnormality resolved    Recommendations:  1. Nafcillin  2. Biktarvy  3. Follow HIV genotype  4. Blood cxs - since 7/6  5.  Supportive care  Follow clinical status and labs    Bibi Amador MD    Interval History   Awake   Extubated   Oriented self, does not remember the events that led to hospitalization     Antimicrobial therapy  7/2 Nafcillin  7/8 Biktarvy  Prior  7/4-7/7 Cipro  7/1-7/2 Vancomycin, zosyn    Physical Exam   Temp: 98.9  F (37.2  C) Temp src: Oral BP: 119/75 Pulse: 78   Resp: 27 SpO2: 100 % O2 Device: Nasal cannula Oxygen Delivery: 3 LPM  Vitals:    07/10/21 0400 07/11/21 0200 07/12/21 0415   Weight: 98.2 kg (216 lb 7.9 oz) 93.6 kg (206 lb 4.8 oz) 88.5 kg (195 lb  1.6 oz)     Vital Signs with Ranges  Temp:  [98.9  F (37.2  C)-100.7  F (38.2  C)] 98.9  F (37.2  C)  Pulse:  [59-97] 78  Resp:  [0-41] 27  BP: (102-131)/() 119/75  FiO2 (%):  [30 %] 30 %  SpO2:  [91 %-100 %] 100 %    GENERAL APPEARANCE: awake, alert x 1   EYES: conjunctivae and sclerae normal  NECK: no adenopathy  RESP: lungs clear to auscultation - no rales, rhonchi or wheezes  CV: regular rates and rhythm, murmur  ABDOMEN: soft, umbilical hernia  MS: extremities normal- no gross deformities noted  SKIN: right finger with swelling and redness, multiple osler's nodes and splinter hemorrhages, multiple embolic infarcts on toes, black discoloration of left toe    Other:    Medications     dexmedetomidine 0.4 mcg/kg/hr (07/12/21 0655)     - MEDICATION INSTRUCTIONS -       - MEDICATION INSTRUCTIONS -       sodium chloride Stopped (07/11/21 0800)       bictegravir-emtricitabine-tenofovir  1 tablet Oral Daily     heparin ANTICOAGULANT  5,000 Units Subcutaneous Q8H     insulin aspart  1-6 Units Subcutaneous Q4H     multivitamins w/minerals  15 mL Per Feeding Tube Daily     nafcillin  2 g Intravenous Q4H     pantoprazole  40 mg Per Feeding Tube QAM AC     perflutren diluted 1mL to 2mL with saline  5 mL Intravenous Once     QUEtiapine  100 mg Oral or Feeding Tube TID     sodium chloride (PF)  10 mL Intravenous Once     sodium chloride (PF)  10 mL Intracatheter Q8H       Data   All microbiology laboratory data reviewed.  Recent Labs   Lab Test 07/12/21 0428 07/11/21  0546 07/10/21  0352   WBC 7.2 7.7 8.6   HGB 7.6* 7.2* 7.0*   HCT 24.4* 23.3* 22.8*   MCV 79 79 78   * 559* 469*     Recent Labs   Lab Test 07/12/21 0428 07/11/21  0546 07/10/21  0352   CR 0.60 0.62 0.69     No lab results found.  Recent Labs   Lab Test 07/10/21  2243 07/09/21  0454 07/09/21  0415 07/08/21  1417 07/08/21  0500 07/07/21  0450 07/06/21  0500 07/05/21  1232 07/05/21  1104   CULT Culture in progress No growth after 3 days No growth  after 3 days No growth after 4 days No growth after 4 days No growth after 5 days  No growth after 5 days No growth  No growth Cultured on the 2nd day of incubation:  Staphylococcus epidermidis  *  Critical Value/Significant Value, preliminary result only, called to and read back by  IVETT WARE RN 07/06/21 1258 EH.   Cultured on the 2nd day of incubation:  Staphylococcus aureus  Susceptibility testing done on previous specimen  *  Critical Value/Significant Value, preliminary result only, called to and read back by  Sin Jack RN at 0448 7/7/21 hg       Component      Latest Ref Rng & Units 7/6/2021   IFC Specimen       Blood   CD3 Mature T      49 - 84 % 76   CD4 [place] T      28 - 63 % 34   CD8 Suppressor T      10 - 40 % 37   CD4:CD8 Ratio      1.40 - 2.60 0.92 (L)   Absolute CD3      603 - 2,990 cells/uL 1,317   Absolute CD4      441 - 2,156 cells/uL 579   Absolute CD8      125 - 1,312 cells/uL 640     Component      Latest Ref Rng & Units 7/2/2021   HIV 1 Result      NR:Nonreactive Reactive (A)   HIV 2 Result      NR:Nonreactive Indeterminate (A)   HIV Interpretation       See Comment Below   HIV Antigen Antibody Combo      NR:Nonreactive     Reactive (A)   Hepatitis B Surface Antibody      <8.00 m[IU]/mL 0.58   Hep B Surface Agn      NR:Nonreactive Nonreactive   Hepatitis C Antibody      NR:Nonreactive Reactive (A)   Susceptibility                       Staphylococcus aureus (1) Staphylococcus aureus (3)       BOBBY BOBBY       CLINDAMYCIN <=0.25 ug/mL Sensitive <=0.25 ug/mL Sensitive       ERYTHROMYCIN <=0.25 ug/mL Sensitive <=0.25 ug/mL Sensitive       GENTAMICIN <=0.5 ug/mL Sensitive <=0.5 ug/mL Sensitive       OXACILLIN 0.5 ug/mL Sensitive 0.5 ug/mL Sensitive       TETRACYCLINE <=1 ug/mL Sensitive <=1 ug/mL Sensitive       Trimethoprim/Sulfa <=0.5/9.5 u... Sensitive <=0.5/9.5 u... Sensitive       VANCOMYCIN 1 ug/mL Sensitive 1 ug/mL Sensitive           7/5 JAE  Interpretation Summary     1. The left  ventricle is normal in structure, function and size. The visual  ejection fraction is estimated at 60%.  2. The right ventricle is normal in structure, function and size.  3. Small atrial septal defect, secundum type 5mm in diameter. Left to right  shunt. Rim appears adequate in size for closure device.  4. Moderate to large mobile vegetation (14mm long, 6mm wide) of the P2 segment  of the mitral leaflet. No valve perforation. There is moderate (2+) mitral  regurgitation.  5. Small pericardial effusion     7/6 US extremity R hand  US EXTREMITY NON VASCULAR RIGHT 7/6/2021 11:39 AM      HISTORY: Right 4th finger concern for septic arthritis/osteomyelitis-  Please do US right hand 4th finger.      FINDINGS: Sonography was performed in the area of clinical concern  (fourth finger).                                                                      IMPRESSION: No fluid collection or significant joint effusion is  appreciated sonographically. Clinical follow-up recommended. If  clinical suspicion persists, MRI could be performed.     7/1TTE  Interpretation Summary     Left ventricular systolic function is normal.  The visual ejection fraction is 55-60%.  The right ventricle is mildly dilated.  The right ventricular systolic function is normal.  There is a mobile linear echodensity measuring 1.4 cm x 0.6 cm on the atrial  side of the posterior mitral valve leaflet.  Other valve structures appear normal without significant dysfunction or  obvious valvular vegetations.  The inferior pericardium appears moderately thickened and echobright which may  represent active pericarditis.  Large localized pericardial effusion (2.7 cm) posteriorly/laterally with  mobile free floating echodense material which could represent fibrinous  stranding in setting of inflammatory pericarditis or infectious material if  infectious pericardial effusion.  Dilation of the inferior vena cava is present with abnormal respiratory  variation in  diameter.  Small left pleural effusion     No prior for comparison.     7/1 CT head  CT SCAN OF THE HEAD WITHOUT CONTRAST   7/1/2021 1:47 PM      HISTORY: Delirium; altered mental status     TECHNIQUE:  Axial images of the head and coronal reformations without  IV contrast material.  Radiation dose for this scan was reduced using  automated exposure control, adjustment of the mA and/or kV according  to patient size, or iterative reconstruction technique.     COMPARISON: None.     FINDINGS: There is a focal 2 cm area of hypodensity involving gray and  white matter in the medial left parietal lobe consistent with acute to  subacute ischemic infarct. There is a minimal incidental arachnoid  cyst in the anterior portion of left middle cranial fossa. Ventricles  and subarachnoid spaces are otherwise within normal limits. There is  no evidence for intracranial hemorrhage, significant mass effect, or  skull fracture. Exam is mildly limited by motion artifact. Visualized  paranasal sinuses and mastoid air cells are clear.                                                                      IMPRESSION: Focal hypodensity in the medial left parietal lobe  consistent with acute to subacute ischemic infarct.     7/1 Ct abdomen/pelvis                                                           IMPRESSION:   1.  Patchy hypoenhancement in the mid and upper poles of the right  kidney posteriorly, suspicious for pyelonephritis.  2.  Moderate-sized fat-containing paraumbilical hernia.  3.  Mild splenomegaly.  4.  There are possible small gallstones within a contracted  gallbladder.    Attestation:  Total time on the floor involved in the patient's care: 35 minutes. Total time spent in counseling/care coordination: >50%

## 2021-07-12 NOTE — PLAN OF CARE
Pt alert and oriented to self, time, and place.  Pt not oriented to situation.  Pt profoundly weak, barely able to hold up her arms or legs.  Pt does move all extremities.  Pt with weak hand squeeze bilat and bilat dorsi and plantar flexion weak.  Pt follows commands, slow to speak and appears to have mild aphasia.  Pt picks at things occasionally.  Pt on dex at 0.2 mcg.  PT denies shortness of breath, strong cough, and clear lung sounds. Pt 100% on room air.  Pt in sinus rhythm rate 80s. Pt tolerated 10% of lunch eating a few bites of cake and cream of wheat.  PT put out 150 ml loose stool in rectal tube. Pt up to chair with walker, gait belt and assist of 2.  Pt c/o chronic low back pain.  Pt afebrile.  Pt mother did not answer phone when calling about update.

## 2021-07-12 NOTE — PROGRESS NOTES
Due to SOB was started on BiPAP at 7PM on 7/11/21.    Vipul Chavez MD.  Pulmonary and Critical Care.  07/12/2021  1:24 AM

## 2021-07-13 ENCOUNTER — APPOINTMENT (OUTPATIENT)
Dept: SPEECH THERAPY | Facility: CLINIC | Age: 41
End: 2021-07-13
Payer: MEDICAID

## 2021-07-13 ENCOUNTER — APPOINTMENT (OUTPATIENT)
Dept: PHYSICAL THERAPY | Facility: CLINIC | Age: 41
End: 2021-07-13
Payer: MEDICAID

## 2021-07-13 LAB
ALBUMIN SERPL-MCNC: 1.4 G/DL (ref 3.4–5)
ALP SERPL-CCNC: 133 U/L (ref 40–150)
ALT SERPL W P-5'-P-CCNC: 16 U/L (ref 0–50)
ANION GAP SERPL CALCULATED.3IONS-SCNC: 7 MMOL/L (ref 3–14)
AST SERPL W P-5'-P-CCNC: 40 U/L (ref 0–45)
BACTERIA SPEC CULT: NO GROWTH
BACTERIA SPEC CULT: NO GROWTH
BILIRUB SERPL-MCNC: 0.8 MG/DL (ref 0.2–1.3)
BUN SERPL-MCNC: 9 MG/DL (ref 7–30)
CALCIUM SERPL-MCNC: 7.6 MG/DL (ref 8.5–10.1)
CHLORIDE BLD-SCNC: 109 MMOL/L (ref 94–109)
CO2 SERPL-SCNC: 21 MMOL/L (ref 20–32)
CREAT SERPL-MCNC: 0.47 MG/DL (ref 0.52–1.04)
ERYTHROCYTE [DISTWIDTH] IN BLOOD BY AUTOMATED COUNT: 25.5 % (ref 10–15)
GFR SERPL CREATININE-BSD FRML MDRD: >90 ML/MIN/1.73M2
GLUCOSE BLD-MCNC: 88 MG/DL (ref 70–99)
GLUCOSE BLDC GLUCOMTR-MCNC: 104 MG/DL (ref 70–99)
GLUCOSE BLDC GLUCOMTR-MCNC: 92 MG/DL (ref 70–99)
HCT VFR BLD AUTO: 29.6 % (ref 35–47)
HGB BLD-MCNC: 9.2 G/DL (ref 11.7–15.7)
MAGNESIUM SERPL-MCNC: 2 MG/DL (ref 1.6–2.3)
MCH RBC QN AUTO: 24.4 PG (ref 26.5–33)
MCHC RBC AUTO-ENTMCNC: 31.1 G/DL (ref 31.5–36.5)
MCV RBC AUTO: 79 FL (ref 78–100)
PHOSPHATE SERPL-MCNC: 3.2 MG/DL (ref 2.5–4.5)
PLATELET # BLD AUTO: 711 10E3/UL (ref 150–450)
POTASSIUM BLD-SCNC: 3.9 MMOL/L (ref 3.4–5.3)
PROT SERPL-MCNC: 8.5 G/DL (ref 6.8–8.8)
RBC # BLD AUTO: 3.77 10E6/UL (ref 3.8–5.2)
SODIUM SERPL-SCNC: 137 MMOL/L (ref 133–144)
SPECIMEN SOURCE: NORMAL
SPECIMEN SOURCE: NORMAL
WBC # BLD AUTO: 8.6 10E3/UL (ref 4–11)

## 2021-07-13 PROCEDURE — 250N000013 HC RX MED GY IP 250 OP 250 PS 637: Performed by: STUDENT IN AN ORGANIZED HEALTH CARE EDUCATION/TRAINING PROGRAM

## 2021-07-13 PROCEDURE — 82962 GLUCOSE BLOOD TEST: CPT

## 2021-07-13 PROCEDURE — 999N000040 HC STATISTIC CONSULT NO CHARGE VASC ACCESS

## 2021-07-13 PROCEDURE — 84100 ASSAY OF PHOSPHORUS: CPT | Performed by: INTERNAL MEDICINE

## 2021-07-13 PROCEDURE — 99223 1ST HOSP IP/OBS HIGH 75: CPT | Performed by: STUDENT IN AN ORGANIZED HEALTH CARE EDUCATION/TRAINING PROGRAM

## 2021-07-13 PROCEDURE — 85027 COMPLETE CBC AUTOMATED: CPT | Performed by: INTERNAL MEDICINE

## 2021-07-13 PROCEDURE — 92526 ORAL FUNCTION THERAPY: CPT | Mod: GN | Performed by: SPEECH-LANGUAGE PATHOLOGIST

## 2021-07-13 PROCEDURE — 250N000013 HC RX MED GY IP 250 OP 250 PS 637: Performed by: SPECIALIST

## 2021-07-13 PROCEDURE — 200N000001 HC R&B ICU

## 2021-07-13 PROCEDURE — 97530 THERAPEUTIC ACTIVITIES: CPT | Mod: GP

## 2021-07-13 PROCEDURE — 83735 ASSAY OF MAGNESIUM: CPT | Performed by: INTERNAL MEDICINE

## 2021-07-13 PROCEDURE — 272N000433 HC KIT CATH IV 18 OR 20G CM, POWERGLIDE W MAX BARRIER

## 2021-07-13 PROCEDURE — 250N000013 HC RX MED GY IP 250 OP 250 PS 637: Performed by: INTERNAL MEDICINE

## 2021-07-13 PROCEDURE — 80053 COMPREHEN METABOLIC PANEL: CPT | Performed by: INTERNAL MEDICINE

## 2021-07-13 PROCEDURE — 250N000011 HC RX IP 250 OP 636: Performed by: INTERNAL MEDICINE

## 2021-07-13 PROCEDURE — 36415 COLL VENOUS BLD VENIPUNCTURE: CPT | Performed by: INTERNAL MEDICINE

## 2021-07-13 PROCEDURE — 250N000009 HC RX 250: Performed by: SPECIALIST

## 2021-07-13 PROCEDURE — 36569 INSJ PICC 5 YR+ W/O IMAGING: CPT

## 2021-07-13 RX ORDER — LIDOCAINE 40 MG/G
CREAM TOPICAL
Status: DISCONTINUED | OUTPATIENT
Start: 2021-07-13 | End: 2021-07-30

## 2021-07-13 RX ORDER — MULTIPLE VITAMINS W/ MINERALS TAB 9MG-400MCG
1 TAB ORAL DAILY
Status: DISCONTINUED | OUTPATIENT
Start: 2021-07-13 | End: 2021-08-10

## 2021-07-13 RX ADMIN — ACETAMINOPHEN 650 MG: 325 TABLET, FILM COATED ORAL at 21:44

## 2021-07-13 RX ADMIN — HEPARIN SODIUM 5000 UNITS: 5000 INJECTION, SOLUTION INTRAVENOUS; SUBCUTANEOUS at 21:45

## 2021-07-13 RX ADMIN — NAFCILLIN SODIUM 2 G: 2 INJECTION, POWDER, LYOPHILIZED, FOR SOLUTION INTRAMUSCULAR; INTRAVENOUS at 15:10

## 2021-07-13 RX ADMIN — QUETIAPINE FUMARATE 100 MG: 100 TABLET ORAL at 07:32

## 2021-07-13 RX ADMIN — HYDROMORPHONE HYDROCHLORIDE 0.5 MG: 1 INJECTION, SOLUTION INTRAMUSCULAR; INTRAVENOUS; SUBCUTANEOUS at 16:04

## 2021-07-13 RX ADMIN — QUETIAPINE FUMARATE 100 MG: 100 TABLET ORAL at 15:47

## 2021-07-13 RX ADMIN — BICTEGRAVIR SODIUM, EMTRICITABINE, AND TENOFOVIR ALAFENAMIDE FUMARATE 1 TABLET: 50; 200; 25 TABLET ORAL at 07:33

## 2021-07-13 RX ADMIN — QUETIAPINE FUMARATE 100 MG: 100 TABLET ORAL at 21:45

## 2021-07-13 RX ADMIN — NAFCILLIN SODIUM 2 G: 2 INJECTION, POWDER, LYOPHILIZED, FOR SOLUTION INTRAMUSCULAR; INTRAVENOUS at 20:36

## 2021-07-13 RX ADMIN — HEPARIN SODIUM 5000 UNITS: 5000 INJECTION, SOLUTION INTRAVENOUS; SUBCUTANEOUS at 06:23

## 2021-07-13 RX ADMIN — HEPARIN SODIUM 5000 UNITS: 5000 INJECTION, SOLUTION INTRAVENOUS; SUBCUTANEOUS at 15:04

## 2021-07-13 RX ADMIN — Medication 1 TABLET: at 12:58

## 2021-07-13 RX ADMIN — ACETAMINOPHEN 650 MG: 325 TABLET, FILM COATED ORAL at 15:47

## 2021-07-13 RX ADMIN — NAFCILLIN SODIUM 2 G: 2 INJECTION, POWDER, LYOPHILIZED, FOR SOLUTION INTRAMUSCULAR; INTRAVENOUS at 06:20

## 2021-07-13 RX ADMIN — PANTOPRAZOLE SODIUM 40 MG: 40 TABLET, DELAYED RELEASE ORAL at 06:30

## 2021-07-13 RX ADMIN — NAFCILLIN SODIUM 2 G: 2 INJECTION, POWDER, LYOPHILIZED, FOR SOLUTION INTRAMUSCULAR; INTRAVENOUS at 00:50

## 2021-07-13 ASSESSMENT — ENCOUNTER SYMPTOMS: FEVER: 1

## 2021-07-13 ASSESSMENT — ACTIVITIES OF DAILY LIVING (ADL)
ADLS_ACUITY_SCORE: 24
ADLS_ACUITY_SCORE: 21
ADLS_ACUITY_SCORE: 24
ADLS_ACUITY_SCORE: 24
ADLS_ACUITY_SCORE: 21
ADLS_ACUITY_SCORE: 24

## 2021-07-13 ASSESSMENT — MIFFLIN-ST. JEOR: SCORE: 1613.37

## 2021-07-13 NOTE — PLAN OF CARE
Neuro: A&O x1 (self), follows commands, unable to be reoriented to situation- removed both her central lines and tried removing her Neri. Removed her PIV even with mitts on so antibiotic was unable to be completed.    Cardio: SR to Stach, afebrile, rhythm regular    GI/: Neri and rectal tube remain in place.

## 2021-07-13 NOTE — PROCEDURES
[facility]    Single Lumen Midline Placement    Date/Time: 7/13/2021 2:30 PM  Performed by: Pat Khan RN  Authorized by: Sedrick Dimas MD   Indications: vascular access    UNIVERSAL PROTOCOL   Site Marked: Yes  Prior Images Obtained and Reviewed:  Yes  Required items: Required blood products, implants, devices and special equipment available    Patient identity confirmed:  Verbally with patient and arm band  NA - No sedation, light sedation, or local anesthesia  Confirmation Checklist:  Patient's identity using two indicators, relevant allergies, procedure was appropriate and matched the consent or emergent situation and correct equipment/implants were available  Time out: Immediately prior to the procedure a time out was called    Universal Protocol: the Joint Commission Universal Protocol was followed    Preparation: Patient was prepped and draped in usual sterile fashion    ESBL (mL):  0         ANESTHESIA    Anesthesia: See MAR for details  Local Anesthetic:  Lidocaine 1% without epinephrine  Anesthetic Total (mL):  0      SEDATION    Patient Sedated: No        Preparation: skin prepped with ChloraPrep  Skin prep agent: skin prep agent completely dried prior to procedure  Sterile barriers: maximum sterile barriers were used: cap, mask, sterile gown, sterile gloves, and large sterile sheet  Hand hygiene: hand hygiene performed prior to central venous catheter insertion  Type of line used: Midline  Catheter type: single lumen  Lumen type: non-valved  Catheter size: 20G,8cm.  Brand: esolidar  Lot number: [id number]  Placement method: MST and ultrasound  Number of attempts: 1  Successful placement: yes  Orientation: right  Location: basilic vein  Arm circumference: adults 10 cm  Extremity circumference: 29.5  Visible catheter length: 0  Internal length: 8 cm  Total catheter length: 8  Dressing and securement: occlusive dressing applied, site cleaned, securement device and  sterile dressing applied (gaurdiva disc placed)  Post procedure assessment: blood return through all ports  PROCEDURE   Patient Tolerance:  Patient tolerated the procedure well with no immediate complications  Describe Procedure: Successful placement of single lumen midline RUE basilic vein. Midline is ok for immediate use.

## 2021-07-13 NOTE — PROGRESS NOTES
CLINICAL NUTRITION SERVICES - REASSESSMENT NOTE      Recommendations Ordered by Registered Dietitian (RD):   Ester Ensure Enlive with meals  Change Certavite to Thera-Vit-M daily - per ETOH protocol   Malnutrition:   (7/2)  % Weight Loss:  Unable to determine without recent weight history  % Intake:  </= 50% for >/= 5 days (severe malnutrition) - Suspect as was vomiting several days PTA  Subcutaneous Fat Loss:  None observed  Muscle Loss:  None observed  Fluid Retention:  None noted     Malnutrition Diagnosis: Unable to determine due to lack of recent weight history       EVALUATION OF PROGRESS TOWARD GOALS   Diet:  IDDS 6 (soft, bite sized) with thin liquids    Nutrition Support:    - TF via OG was changed on 7/9 to Promote with Fiber at 65 mL/hr to meet estimated needs  - TF was discontinued 7/11 as OG was pulled with extubation.  - Propofol off 7/10  - Certavite daily still active on MAR    Intake/Tolerance:    Labs and BG noted and acceptable.  I/O:  1077/1926.  Oral 270 mL, Stool 200 mL.  Wt:  89 kg (up 1.5 kg since admit).  Per Care Everywhere, the latest recorded weight PTA was 11/4/15:  84.2 kg.    ASSESSED NUTRITION NEEDS:  Dosing Weight:  69.6 kg (adjusted for overwt)  Energy Needs:  3953-4523 kcals (25-30 kcal/kg)  Justification: overweight    Protein Needs:   grams protein (1.2-1.5 g pro/Kg)  Justification: hypercatabolism with acute illness and preservation of lean body mass    NEW FINDINGS:   7/11:  Extubated and OG out   7/12:  SLP bedside swallow = Mild oropharyngeal dysphagia secondary to deconditioned stated and prolonged intubation --> IDDSI 6 with thin liquids     Patient lethargic, weak, and has a decreased appetite per bedside RN in rounds.  She would benefit from an oral liquid nutritional supplement for nutrition push.    Previous Goals (7/8):   TF + Propofol will meet % needs   Evaluation: Not met    Previous Nutrition Diagnosis (7/8):   Inadequate enteral nutrition infusion  related to TF at 40 mL/hr, now off pressors and Propofol level decreased as evidenced by meeting 90% low-end energy needs   Evaluation:  Completed on 7/9, but now TF off and diet has been advanced.  Modified as below.    CURRENT NUTRITION DIAGNOSIS  Inadequate oral intake related to lethargy, weakness, and decreased appetite as evidenced by oral intake poor.    INTERVENTIONS  Recommendations / Nutrition Prescription  Vanilla Ensure Enlive with meals  Change Certavite to Thera-Vit-M daily - per ETOH protocol    Implementation  Collaboration and Referral of Nutrition care - Pt was discussed during ICU interdisciplinary rounds this morning  Medical Food Supplement and Multivitamin/Mineral - Ordered above in Epic.    Goals  Pt will consume > 75% meals and > 50% supplements in the next 3-5 days    MONITORING AND EVALUATION:  Progress towards goals will be monitored and evaluated per protocol and Practice Guidelines

## 2021-07-13 NOTE — PROGRESS NOTES
Patient c/o feeling anxious and difficulty breathing ,resp rate 28,  lungs clear o2 sats 96%, 02 applied @ 2 liters . scheduled Seroquel given prn tylenol 650 mg given, patient removed o2 was crying, denies pain, dilaudid 0.5 mg given , resp rate 16 ,  patient currently sleeping, will continue to monitor

## 2021-07-13 NOTE — PROGRESS NOTES
[facility]    Infectious Disease Progress Note    Date of Service : 07/13/2021     Assessment :  1. S.aureus MSSA native mitral valve endocarditis with septic shock in the setting of IVDA complicated by embolic L parietal lobe infarction and multiple peripheral emboli.   (TTE shows a large mobile 1.4 cm vegetation on the posterior mitral valve leaflet with extension to left atrium and small ASD per cardiology notes , no valvular abscess and no additional valvular involvement. Pericardial effusion is small). No seeding on CT abdomen but has peripheral emboli.  2. Untreated HIV diagnosed 2018, CD4 count maintained at >500, VL pending  3. Hepatitis C Ab+ but RNA -.   4. Severe sepsis requiring pressor support and multiorgan failure. Off pressors now  5. Multiple peripheral emboli. Right finger swelling and discoloration of left 3rd toe. US right finger without evidence of infection but sub-optimal study. Appears stable  6. FORD related to sepsis- resolved  7. Embolic stroke (small ASD with Left to right shunt)  8. Klebsiella pneumoniae UTI treated  9. Hypernatremia and hypokalemia  10. Severe anemia multifactorial. Has baseline anemia, exacerbated by chronic disease  11. Polysubstance abuse  12. Splenomegaly   13. LFT abnormality resolved    Recommendations:  1. Nafcillin, 3 doses missed as patient pulled lines, waiting for midline.   2. Biktarvy  3. Follow HIV genotype  4. Blood cxs - since 7/6  5.  Supportive care  Follow clinical status and labs    Bibi Amador MD    Interval History   Awake   Extubated   Oriented self, does not remember the events that led to hospitalization     Antimicrobial therapy  7/2 Nafcillin  7/8 Biktarvy  Prior  7/4-7/7 Cipro  7/1-7/2 Vancomycin, zosyn    Physical Exam   Temp: 98  F (36.7  C) Temp src: Oral BP: 118/79 Pulse: 101   Resp: (!) 37 SpO2: 95 % O2 Device: None (Room air)    Vitals:    07/11/21 0200 07/12/21 0415 07/13/21 0200   Weight: 93.6 kg (206 lb 4.8  oz) 88.5 kg (195 lb 1.6 oz) 89 kg (196 lb 3.4 oz)     Vital Signs with Ranges  Temp:  [98  F (36.7  C)-98.9  F (37.2  C)] 98  F (36.7  C)  Pulse:  [] 101  Resp:  [11-39] 37  BP: (109-127)/() 118/79  SpO2:  [94 %-100 %] 95 %    GENERAL APPEARANCE: awake, alert x 1   EYES: conjunctivae and sclerae normal  NECK: no adenopathy  RESP: lungs clear to auscultation - no rales, rhonchi or wheezes  CV: regular rates and rhythm, murmur  ABDOMEN: soft, umbilical hernia  MS: extremities normal- no gross deformities noted  SKIN: right finger with swelling and redness, multiple osler's nodes and splinter hemorrhages, multiple embolic infarcts on toes, black discoloration of left toe    Other:    Medications     dexmedetomidine Stopped (07/12/21 1630)     - MEDICATION INSTRUCTIONS -       - MEDICATION INSTRUCTIONS -       sodium chloride Stopped (07/11/21 0800)       bictegravir-emtricitabine-tenofovir  1 tablet Oral Daily     heparin ANTICOAGULANT  5,000 Units Subcutaneous Q8H     insulin aspart  1-6 Units Subcutaneous Q4H     multivitamins w/minerals  15 mL Per Feeding Tube Daily     nafcillin  2 g Intravenous Q4H     pantoprazole  40 mg Oral QAM AC     perflutren diluted 1mL to 2mL with saline  5 mL Intravenous Once     QUEtiapine  100 mg Oral or Feeding Tube TID     sodium chloride (PF)  10 mL Intravenous Once     sodium chloride (PF)  10 mL Intracatheter Q8H       Data   All microbiology laboratory data reviewed.  Recent Labs   Lab Test 07/13/21  0732 07/12/21  0428 07/11/21  0546   WBC 8.6 7.2 7.7   HGB 9.2* 7.6* 7.2*   HCT 29.6* 24.4* 23.3*   MCV 79 79 79   * 637* 559*     Recent Labs   Lab Test 07/13/21  0732 07/12/21  0428 07/11/21  0546   CR 0.47* 0.60 0.62     No lab results found.  Recent Labs   Lab Test 07/10/21  2243 07/09/21  0454 07/09/21  0415 07/08/21  1417 07/08/21  0500 07/07/21  0450 07/06/21  0500 07/05/21  1232 07/05/21  1104   CULT >100,000 colonies/mL  Escherichia coli  Susceptibility  testing in progress  * No growth after 4 days No growth after 4 days No growth after 5 days No growth after 5 days No growth  No growth No growth  No growth Cultured on the 2nd day of incubation:  Staphylococcus epidermidis  *  Critical Value/Significant Value, preliminary result only, called to and read back by  IVETT WARE RN 07/06/21 1258 EH.    Cultured on the 5th day of incubation:  Staphylococcus aureus  Susceptibility testing done on previous specimen  *  Critical Value/Significant Value called to and read back by  Cherelle Smith RN 1043 7/11/21 AM   Cultured on the 2nd day of incubation:  Staphylococcus aureus  Susceptibility testing done on previous specimen  *  Critical Value/Significant Value, preliminary result only, called to and read back by  Sin Jack RN at 0448 7/7/21 hg       Component      Latest Ref Rng & Units 7/6/2021   IFC Specimen       Blood   CD3 Mature T      49 - 84 % 76   CD4 [place] T      28 - 63 % 34   CD8 Suppressor T      10 - 40 % 37   CD4:CD8 Ratio      1.40 - 2.60 0.92 (L)   Absolute CD3      603 - 2,990 cells/uL 1,317   Absolute CD4      441 - 2,156 cells/uL 579   Absolute CD8      125 - 1,312 cells/uL 640     Component      Latest Ref Rng & Units 7/2/2021   HIV 1 Result      NR:Nonreactive Reactive (A)   HIV 2 Result      NR:Nonreactive Indeterminate (A)   HIV Interpretation       See Comment Below   HIV Antigen Antibody Combo      NR:Nonreactive     Reactive (A)   Hepatitis B Surface Antibody      <8.00 m[IU]/mL 0.58   Hep B Surface Agn      NR:Nonreactive Nonreactive   Hepatitis C Antibody      NR:Nonreactive Reactive (A)   Susceptibility                       Staphylococcus aureus (1) Staphylococcus aureus (3)       BOBBY BOBBY       CLINDAMYCIN <=0.25 ug/mL Sensitive <=0.25 ug/mL Sensitive       ERYTHROMYCIN <=0.25 ug/mL Sensitive <=0.25 ug/mL Sensitive       GENTAMICIN <=0.5 ug/mL Sensitive <=0.5 ug/mL Sensitive       OXACILLIN 0.5 ug/mL Sensitive 0.5 ug/mL Sensitive        TETRACYCLINE <=1 ug/mL Sensitive <=1 ug/mL Sensitive       Trimethoprim/Sulfa <=0.5/9.5 u... Sensitive <=0.5/9.5 u... Sensitive       VANCOMYCIN 1 ug/mL Sensitive 1 ug/mL Sensitive           7/5 JAE  Interpretation Summary     1. The left ventricle is normal in structure, function and size. The visual  ejection fraction is estimated at 60%.  2. The right ventricle is normal in structure, function and size.  3. Small atrial septal defect, secundum type 5mm in diameter. Left to right  shunt. Rim appears adequate in size for closure device.  4. Moderate to large mobile vegetation (14mm long, 6mm wide) of the P2 segment  of the mitral leaflet. No valve perforation. There is moderate (2+) mitral  regurgitation.  5. Small pericardial effusion     7/6 US extremity R hand  US EXTREMITY NON VASCULAR RIGHT 7/6/2021 11:39 AM      HISTORY: Right 4th finger concern for septic arthritis/osteomyelitis-  Please do US right hand 4th finger.      FINDINGS: Sonography was performed in the area of clinical concern  (fourth finger).                                                                      IMPRESSION: No fluid collection or significant joint effusion is  appreciated sonographically. Clinical follow-up recommended. If  clinical suspicion persists, MRI could be performed.     7/1TTE  Interpretation Summary     Left ventricular systolic function is normal.  The visual ejection fraction is 55-60%.  The right ventricle is mildly dilated.  The right ventricular systolic function is normal.  There is a mobile linear echodensity measuring 1.4 cm x 0.6 cm on the atrial  side of the posterior mitral valve leaflet.  Other valve structures appear normal without significant dysfunction or  obvious valvular vegetations.  The inferior pericardium appears moderately thickened and echobright which may  represent active pericarditis.  Large localized pericardial effusion (2.7 cm) posteriorly/laterally with  mobile free floating echodense  material which could represent fibrinous  stranding in setting of inflammatory pericarditis or infectious material if  infectious pericardial effusion.  Dilation of the inferior vena cava is present with abnormal respiratory  variation in diameter.  Small left pleural effusion     No prior for comparison.     7/1 CT head  CT SCAN OF THE HEAD WITHOUT CONTRAST   7/1/2021 1:47 PM      HISTORY: Delirium; altered mental status     TECHNIQUE:  Axial images of the head and coronal reformations without  IV contrast material.  Radiation dose for this scan was reduced using  automated exposure control, adjustment of the mA and/or kV according  to patient size, or iterative reconstruction technique.     COMPARISON: None.     FINDINGS: There is a focal 2 cm area of hypodensity involving gray and  white matter in the medial left parietal lobe consistent with acute to  subacute ischemic infarct. There is a minimal incidental arachnoid  cyst in the anterior portion of left middle cranial fossa. Ventricles  and subarachnoid spaces are otherwise within normal limits. There is  no evidence for intracranial hemorrhage, significant mass effect, or  skull fracture. Exam is mildly limited by motion artifact. Visualized  paranasal sinuses and mastoid air cells are clear.                                                                      IMPRESSION: Focal hypodensity in the medial left parietal lobe  consistent with acute to subacute ischemic infarct.     7/1 Ct abdomen/pelvis                                                           IMPRESSION:   1.  Patchy hypoenhancement in the mid and upper poles of the right  kidney posteriorly, suspicious for pyelonephritis.  2.  Moderate-sized fat-containing paraumbilical hernia.  3.  Mild splenomegaly.  4.  There are possible small gallstones within a contracted  gallbladder.    Attestation:  Total time on the floor involved in the patient's care: 35 minutes. Total time spent in counseling/care  coordination: >50%

## 2021-07-13 NOTE — H&P
[facility]    History and Physical - Hospitalist Service       Date of Admission:  7/1/2021    Assessment & Plan      Manas Hernandez is a 41 year old female admitted on 7/1/2021. She is hospitalized due to MSSA mitral valve endocarditis resulting in septic shock complicated by embolic left parietal lobe infarctions and multiple peripheral emboli.  She presented initially with encephalopathy, and shock.  She was intubated in the emergency department on 7/1/2021 subsequently extubated 7/11/2021.  Blood cultures turned positive on hospital day 1 with MSSA.  Urine culture was positive for Klebsiella.  He was treated initially with ceftriaxone and vancomycin, and has since been narrowed to nafcillin per infectious disease. AMS has complicated stay as she has pulled multiple lines causing her to loose IV access.    MSSA mitral valve endocarditis  Septic shock, resolved  Probable myopericarditis  Small pericardial effusion  Likely from IVDU. Multiple peripheral sites of spread including L parietal lobe.   -Repeat limited echo to reassess pericardial fluid 7/15 per cardiology  -Continue nafcillin  -Has been receiving antibiotics doses she has been pulling out her access sites.  -blood culture negative since 7/6/21  -CV surgery has been following peripherally, no plan for operative intervention currently due to CVA  - Cardiology signed off 7/8/21  - Midline placed for IV Abx on 7/13, has been pulling multiple lines.    IVDU  Polysubstance use disorder  Will need to engage with chemical dependency and other addiction resources prior to discharge.    -Reassess daily if mental status is amenable to interaction with chemical dependency resources.    HIV, untreated  -CD4 count greater than 500, viral load 3695  -Started Biktarvy this day.    Toxic/septic/metabolic encephalopathy, acute, improving  Likely complex encephalopathy picture due to sepsis, drug use, CVA, prolonged ICU stay.    -Has been  pulling lines due to AMS  -Monitor  -Appears more appropriate on 7/13/2021  -Delirium protocol  -Continue quetiapine 3 times daily    Ischemic CVA  Patient does have small left-to-right shunt on JAE, but most likely from embolic spread from endocarditis.    Pyelonephritis  Urine cultures positive for Klebsiella.  Completed course of treatment with ceftriaxone.    Acute hypoxemic respiratory failure, resolved  Intubated 7/1/2021.  Extubated 7/11/2021.  Likely related to septic shock from MSSA mitral valve endocarditis.    FORD, resolved  Due to sepsis  -Monitor BMP    Chronic anemia  Baseline hemoglobin approximately 7.  Likely related to chronic disease, untreated HIV.  -Monitor hemoglobin    Likely severe malnutrition  -Nutrition consult placed, but due to uncertain recent weights unable to make complete diagnosis  -Encourage p.o., with supplements    Prior hepatitis C infection  Antibody positive but RNA negative  -Noted       Diet: Combination Diet Soft and Bite Sized Diet (level 6); Thin Liquids (level 0); No Straws  Snacks/Supplements Adult: Ensure Enlive; With Meals    DVT Prophylaxis: Heparin SQ  Neri Catheter: Not present  Central Lines: None  Code Status: Full Code      Risk Factors Present on Admission                Disposition Plan   Expected discharge: >7 days recommended to transitional care unit once antibiotic plan established and cardiovascular surgery plan.     The patient's care was discussed with the Bedside Nurse, Patient and ICU MD.    Rome Greene MD  [facility]  Securely message with the Vocera Web Console (learn more here)  Text page via [facility] Paging/Directory      ______________________________________________________________________    Chief Complaint   Endocarditis     History is obtained from the patient, electronic health record and ICU MD    History of Present Illness   Manas Hernandez is a 41 year old female who is a history of IV drug use, HIV who  presented to the hospital on 7/1/2021 with AMS.  She was found to be in septic shock from MSSA bacteremia from mitral valve endocarditis.  She was intubated from time of admission until 7/11/2021.  She was on pressors during her time in the intensive care unit.  She was treated with ceftriaxone and vancomycin initially for Klebsiella urinary tract infection as well as her Staphylococcus bacteremia.  She has since been narrowed to nafcillin.  She has been rather agitated, requiring intermittent episodes of Precedex since extubation.  Since the PM on 7/12/2021 she has not required any Precedex.  She has been told multiple IV access sites.    At time of my interview, she was denying any sources of discomfort or pain.  She was oriented to self and place.  She had no questions or concerns for me.  She had a negative review of systems.  Discussed with her at length the importance of not pulling out her IV lines as she required antibiotics to stay alive.    Review of Systems    The 10 point Review of Systems is negative other than noted in the HPI or here.     Past Medical History    I have reviewed this patient's medical history and updated it with pertinent information if needed.   Past Medical History:   Diagnosis Date     Heroin abuse (H)      Methamphetamine abuse (H)      Polysubstance abuse (H)        Past Surgical History   I have reviewed this patient's surgical history and updated it with pertinent information if needed.  Past Surgical History:   Procedure Laterality Date     DILATION AND CURETTAGE  2005       Social History   I have reviewed this patient's social history and updated it with pertinent information if needed.  Social History     Tobacco Use     Smoking status: Current Every Day Smoker     Types: Cigarettes     Tobacco comment: 1-2   Substance Use Topics     Alcohol use: No     Drug use: Yes     Types: Amphetamines, Methamphetamines, Opiates       Family History   I have reviewed this patient's  family history and updated it with pertinent information if needed.  Family History   Problem Relation Age of Onset     Alcoholism Mother      Alcoholism Father      Family History Negative No family hx of        Prior to Admission Medications   None     Allergies   No Known Allergies    Physical Exam   Vital Signs: Temp: 99.6  F (37.6  C) Temp src: Oral BP: 107/59 Pulse: 89   Resp: 26 SpO2: 97 % O2 Device: None (Room air)    Weight: 196 lbs 3.35 oz    Constitutional: Awake, alert, cooperative, no apparent cardiopulmonary distress.  Eyes: Conjunctiva and pupils examined and normal.  HEENT: Moist mucous membranes, normal dentition.  Respiratory: Clear to auscultation bilaterally, no crackles or wheezing.  Cardiovascular: Regular rate and rhythm, normal S1 and S2, and no murmur noted.  GI: Soft, non-distended, non-tender, normal bowel sounds.  Lymph/Hematologic: No anterior cervical or supraclavicular adenopathy.  Skin: right finger 4th digit tip nolvia node, multiple splinter hemorrhages of BLE toes, and left hand. Black discoloration of L toe  Musculoskeletal: No joint swelling, erythema or tenderness. No gross bony abnormalities  Neurologic: Cranial nerves 2-12 grossly intact, normal strength and sensation.  Psychiatric: Alert, oriented to person, place, no obvious anxiety or depression.      Data   Data reviewed today: I reviewed all medications, new labs and imaging results over the last 24 hours. I personally reviewed no images or EKG's today.    Recent Labs   Lab 07/13/21  1250 07/13/21  0732 07/13/21  0725 07/12/21  0428 07/11/21  1732 07/11/21  0546   WBC  --  8.6  --  7.2  --  7.7   HGB  --  9.2*  --  7.6*  --  7.2*   MCV  --  79  --  79  --  79   PLT  --  711*  --  637*  --  559*   NA  --  137  --  143  --  141   POTASSIUM  --  3.9  --  3.5  3.5 3.3* 3.3*   CHLORIDE  --  109  --  113*  --  108   CO2  --  21  --  26  --  30   BUN  --  9  --  14  --  17   CR  --  0.47*  --  0.60  --  0.62   ANIONGAP  --  7   --  4  --  3   DEREK  --  7.6*  --  7.6*  --  7.6*   * 88 92 110*  --  155*   ALBUMIN  --  1.4*  --  1.2*  --  1.2*   PROTTOTAL  --  8.5  --  7.9  --  7.8   BILITOTAL  --  0.8  --  0.7  --  0.7   ALKPHOS  --  133  --  136  --  152*   ALT  --  16  --  12  --  12   AST  --  40  --  17  --  19     Most Recent 3 CBC's:  Recent Labs   Lab Test 07/13/21  0732 07/12/21  0428 07/11/21  0546   WBC 8.6 7.2 7.7   HGB 9.2* 7.6* 7.2*   MCV 79 79 79   * 637* 559*     Most Recent 3 BMP's:  Recent Labs   Lab Test 07/13/21  1250 07/13/21  0732 07/13/21  0725 07/12/21  0428 07/11/21  1732 07/11/21  0546   NA  --  137  --  143  --  141   POTASSIUM  --  3.9  --  3.5  3.5 3.3* 3.3*   CHLORIDE  --  109  --  113*  --  108   CO2  --  21  --  26  --  30   BUN  --  9  --  14  --  17   CR  --  0.47*  --  0.60  --  0.62   ANIONGAP  --  7  --  4  --  3   DEREK  --  7.6*  --  7.6*  --  7.6*   * 88 92 110*  --  155*     Most Recent 2 LFT's:  Recent Labs   Lab Test 07/13/21  0732 07/12/21  0428   AST 40 17   ALT 16 12   ALKPHOS 133 136   BILITOTAL 0.8 0.7     Most Recent 6 Bacteria Isolates From Any Culture (See EPIC Reports for Culture Details):  Recent Labs   Lab Test 07/10/21  2243 07/09/21  0454 07/09/21  0415 07/08/21  1417 07/08/21  0500 07/07/21  0450   CULT >100,000 colonies/mL  Escherichia coli  Susceptibility testing in progress  * No growth after 4 days No growth after 4 days No growth after 5 days No growth after 5 days No growth  No growth     No results found for this or any previous visit (from the past 24 hour(s)).

## 2021-07-13 NOTE — PLAN OF CARE
Patient is calm and cooperative, follows commands, oriented to self only, , sinus rhythm at rest , sinus tach 115-120 with activity,appetite fair needs assistance with feeding, profoundly weak unable to feed self, don and rectal tube discontinued, incontinent of urine x1, new mid line catheter placed, nafcillin resumed

## 2021-07-14 ENCOUNTER — APPOINTMENT (OUTPATIENT)
Dept: PHYSICAL THERAPY | Facility: CLINIC | Age: 41
End: 2021-07-14
Payer: MEDICAID

## 2021-07-14 ENCOUNTER — APPOINTMENT (OUTPATIENT)
Dept: SPEECH THERAPY | Facility: CLINIC | Age: 41
End: 2021-07-14
Payer: MEDICAID

## 2021-07-14 LAB
ALBUMIN SERPL-MCNC: 1.5 G/DL (ref 3.4–5)
ALP SERPL-CCNC: 119 U/L (ref 40–150)
ALT SERPL W P-5'-P-CCNC: 14 U/L (ref 0–50)
ANION GAP SERPL CALCULATED.3IONS-SCNC: 6 MMOL/L (ref 3–14)
AST SERPL W P-5'-P-CCNC: 20 U/L (ref 0–45)
BACTERIA SPEC CULT: ABNORMAL
BACTERIA SPEC CULT: ABNORMAL
BACTERIA SPEC CULT: NO GROWTH
BACTERIA SPEC CULT: NO GROWTH
BILIRUB SERPL-MCNC: 0.9 MG/DL (ref 0.2–1.3)
BUN SERPL-MCNC: 6 MG/DL (ref 7–30)
CALCIUM SERPL-MCNC: 7.6 MG/DL (ref 8.5–10.1)
CHLORIDE BLD-SCNC: 113 MMOL/L (ref 94–109)
CO2 SERPL-SCNC: 27 MMOL/L (ref 20–32)
CREAT SERPL-MCNC: 0.51 MG/DL (ref 0.52–1.04)
ERYTHROCYTE [DISTWIDTH] IN BLOOD BY AUTOMATED COUNT: 25.2 % (ref 10–15)
GFR SERPL CREATININE-BSD FRML MDRD: >90 ML/MIN/1.73M2
GLUCOSE BLD-MCNC: 101 MG/DL (ref 70–99)
HCT VFR BLD AUTO: 27.1 % (ref 35–47)
HGB BLD-MCNC: 8.3 G/DL (ref 11.7–15.7)
Lab: ABNORMAL
MAGNESIUM SERPL-MCNC: 2.1 MG/DL (ref 1.6–2.3)
MCH RBC QN AUTO: 24.2 PG (ref 26.5–33)
MCHC RBC AUTO-ENTMCNC: 30.6 G/DL (ref 31.5–36.5)
MCV RBC AUTO: 79 FL (ref 78–100)
PHOSPHATE SERPL-MCNC: 3.9 MG/DL (ref 2.5–4.5)
PLATELET # BLD AUTO: 838 10E3/UL (ref 150–450)
POTASSIUM BLD-SCNC: 2.7 MMOL/L (ref 3.4–5.3)
POTASSIUM BLD-SCNC: 3.4 MMOL/L (ref 3.4–5.3)
PROT SERPL-MCNC: 7.9 G/DL (ref 6.8–8.8)
RBC # BLD AUTO: 3.43 10E6/UL (ref 3.8–5.2)
SODIUM SERPL-SCNC: 146 MMOL/L (ref 133–144)
SPECIMEN SOURCE: ABNORMAL
SPECIMEN SOURCE: NORMAL
SPECIMEN SOURCE: NORMAL
WBC # BLD AUTO: 6.7 10E3/UL (ref 4–11)

## 2021-07-14 PROCEDURE — 120N000001 HC R&B MED SURG/OB

## 2021-07-14 PROCEDURE — 250N000013 HC RX MED GY IP 250 OP 250 PS 637: Performed by: STUDENT IN AN ORGANIZED HEALTH CARE EDUCATION/TRAINING PROGRAM

## 2021-07-14 PROCEDURE — 999N000190 HC STATISTIC VAT ROUNDS

## 2021-07-14 PROCEDURE — 80053 COMPREHEN METABOLIC PANEL: CPT | Performed by: INTERNAL MEDICINE

## 2021-07-14 PROCEDURE — 250N000011 HC RX IP 250 OP 636: Performed by: INTERNAL MEDICINE

## 2021-07-14 PROCEDURE — 36415 COLL VENOUS BLD VENIPUNCTURE: CPT | Performed by: INTERNAL MEDICINE

## 2021-07-14 PROCEDURE — 250N000009 HC RX 250: Performed by: SPECIALIST

## 2021-07-14 PROCEDURE — 92523 SPEECH SOUND LANG COMPREHEN: CPT | Mod: GN | Performed by: SPEECH-LANGUAGE PATHOLOGIST

## 2021-07-14 PROCEDURE — 250N000013 HC RX MED GY IP 250 OP 250 PS 637: Performed by: INTERNAL MEDICINE

## 2021-07-14 PROCEDURE — 99232 SBSQ HOSP IP/OBS MODERATE 35: CPT | Performed by: HOSPITALIST

## 2021-07-14 PROCEDURE — 83735 ASSAY OF MAGNESIUM: CPT | Performed by: INTERNAL MEDICINE

## 2021-07-14 PROCEDURE — 250N000009 HC RX 250: Performed by: STUDENT IN AN ORGANIZED HEALTH CARE EDUCATION/TRAINING PROGRAM

## 2021-07-14 PROCEDURE — 36415 COLL VENOUS BLD VENIPUNCTURE: CPT | Performed by: STUDENT IN AN ORGANIZED HEALTH CARE EDUCATION/TRAINING PROGRAM

## 2021-07-14 PROCEDURE — 97530 THERAPEUTIC ACTIVITIES: CPT | Mod: GP

## 2021-07-14 PROCEDURE — 85027 COMPLETE CBC AUTOMATED: CPT | Performed by: INTERNAL MEDICINE

## 2021-07-14 PROCEDURE — 92526 ORAL FUNCTION THERAPY: CPT | Mod: GN | Performed by: SPEECH-LANGUAGE PATHOLOGIST

## 2021-07-14 PROCEDURE — 999N000040 HC STATISTIC CONSULT NO CHARGE VASC ACCESS

## 2021-07-14 PROCEDURE — 250N000011 HC RX IP 250 OP 636: Performed by: STUDENT IN AN ORGANIZED HEALTH CARE EDUCATION/TRAINING PROGRAM

## 2021-07-14 PROCEDURE — 84132 ASSAY OF SERUM POTASSIUM: CPT | Performed by: STUDENT IN AN ORGANIZED HEALTH CARE EDUCATION/TRAINING PROGRAM

## 2021-07-14 PROCEDURE — 250N000013 HC RX MED GY IP 250 OP 250 PS 637: Performed by: HOSPITALIST

## 2021-07-14 PROCEDURE — 250N000013 HC RX MED GY IP 250 OP 250 PS 637: Performed by: SPECIALIST

## 2021-07-14 PROCEDURE — 84100 ASSAY OF PHOSPHORUS: CPT | Performed by: INTERNAL MEDICINE

## 2021-07-14 RX ORDER — POTASSIUM CHLORIDE 1.5 G/1.58G
20 POWDER, FOR SOLUTION ORAL ONCE
Status: COMPLETED | OUTPATIENT
Start: 2021-07-14 | End: 2021-07-14

## 2021-07-14 RX ORDER — POTASSIUM CHLORIDE 1.5 G/1.58G
40 POWDER, FOR SOLUTION ORAL ONCE
Status: COMPLETED | OUTPATIENT
Start: 2021-07-14 | End: 2021-07-14

## 2021-07-14 RX ADMIN — HYDROMORPHONE HYDROCHLORIDE 0.5 MG: 1 INJECTION, SOLUTION INTRAMUSCULAR; INTRAVENOUS; SUBCUTANEOUS at 16:53

## 2021-07-14 RX ADMIN — NAFCILLIN SODIUM 2 G: 2 INJECTION, POWDER, LYOPHILIZED, FOR SOLUTION INTRAMUSCULAR; INTRAVENOUS at 04:00

## 2021-07-14 RX ADMIN — NAFCILLIN SODIUM 2 G: 2 INJECTION, POWDER, LYOPHILIZED, FOR SOLUTION INTRAMUSCULAR; INTRAVENOUS at 20:29

## 2021-07-14 RX ADMIN — POTASSIUM CHLORIDE 40 MEQ: 1.5 POWDER, FOR SOLUTION ORAL at 09:00

## 2021-07-14 RX ADMIN — HYDROMORPHONE HYDROCHLORIDE 0.5 MG: 1 INJECTION, SOLUTION INTRAMUSCULAR; INTRAVENOUS; SUBCUTANEOUS at 22:33

## 2021-07-14 RX ADMIN — NAFCILLIN SODIUM 2 G: 2 INJECTION, POWDER, LYOPHILIZED, FOR SOLUTION INTRAMUSCULAR; INTRAVENOUS at 12:52

## 2021-07-14 RX ADMIN — NAFCILLIN SODIUM 2 G: 2 INJECTION, POWDER, LYOPHILIZED, FOR SOLUTION INTRAMUSCULAR; INTRAVENOUS at 17:43

## 2021-07-14 RX ADMIN — HEPARIN SODIUM 5000 UNITS: 5000 INJECTION, SOLUTION INTRAVENOUS; SUBCUTANEOUS at 14:01

## 2021-07-14 RX ADMIN — Medication 1 TABLET: at 12:53

## 2021-07-14 RX ADMIN — QUETIAPINE FUMARATE 100 MG: 100 TABLET ORAL at 09:01

## 2021-07-14 RX ADMIN — ACETAMINOPHEN 650 MG: 325 TABLET, FILM COATED ORAL at 01:43

## 2021-07-14 RX ADMIN — HYDROMORPHONE HYDROCHLORIDE 0.5 MG: 1 INJECTION, SOLUTION INTRAMUSCULAR; INTRAVENOUS; SUBCUTANEOUS at 20:21

## 2021-07-14 RX ADMIN — HYDROMORPHONE HYDROCHLORIDE 0.5 MG: 1 INJECTION, SOLUTION INTRAMUSCULAR; INTRAVENOUS; SUBCUTANEOUS at 10:01

## 2021-07-14 RX ADMIN — NAFCILLIN SODIUM 2 G: 2 INJECTION, POWDER, LYOPHILIZED, FOR SOLUTION INTRAMUSCULAR; INTRAVENOUS at 08:45

## 2021-07-14 RX ADMIN — BICTEGRAVIR SODIUM, EMTRICITABINE, AND TENOFOVIR ALAFENAMIDE FUMARATE 1 TABLET: 50; 200; 25 TABLET ORAL at 09:01

## 2021-07-14 RX ADMIN — QUETIAPINE FUMARATE 100 MG: 100 TABLET ORAL at 16:25

## 2021-07-14 RX ADMIN — HYDROMORPHONE HYDROCHLORIDE 0.5 MG: 1 INJECTION, SOLUTION INTRAMUSCULAR; INTRAVENOUS; SUBCUTANEOUS at 01:41

## 2021-07-14 RX ADMIN — HEPARIN SODIUM 5000 UNITS: 5000 INJECTION, SOLUTION INTRAVENOUS; SUBCUTANEOUS at 06:22

## 2021-07-14 RX ADMIN — ACETAMINOPHEN 650 MG: 325 TABLET, FILM COATED ORAL at 06:22

## 2021-07-14 RX ADMIN — HEPARIN SODIUM 5000 UNITS: 5000 INJECTION, SOLUTION INTRAVENOUS; SUBCUTANEOUS at 22:35

## 2021-07-14 RX ADMIN — POTASSIUM CHLORIDE 20 MEQ: 1.5 POWDER, FOR SOLUTION ORAL at 11:43

## 2021-07-14 RX ADMIN — PANTOPRAZOLE SODIUM 40 MG: 40 TABLET, DELAYED RELEASE ORAL at 09:01

## 2021-07-14 RX ADMIN — POTASSIUM CHLORIDE 40 MEQ: 1.5 POWDER, FOR SOLUTION ORAL at 20:23

## 2021-07-14 RX ADMIN — HYDROMORPHONE HYDROCHLORIDE 0.5 MG: 1 INJECTION, SOLUTION INTRAMUSCULAR; INTRAVENOUS; SUBCUTANEOUS at 14:16

## 2021-07-14 RX ADMIN — HYDROMORPHONE HYDROCHLORIDE 0.5 MG: 1 INJECTION, SOLUTION INTRAMUSCULAR; INTRAVENOUS; SUBCUTANEOUS at 12:40

## 2021-07-14 RX ADMIN — NAFCILLIN SODIUM 2 G: 2 INJECTION, POWDER, LYOPHILIZED, FOR SOLUTION INTRAMUSCULAR; INTRAVENOUS at 00:09

## 2021-07-14 RX ADMIN — QUETIAPINE FUMARATE 100 MG: 100 TABLET ORAL at 22:36

## 2021-07-14 ASSESSMENT — MIFFLIN-ST. JEOR: SCORE: 1565.37

## 2021-07-14 ASSESSMENT — ACTIVITIES OF DAILY LIVING (ADL)
ADLS_ACUITY_SCORE: 25
ADLS_ACUITY_SCORE: 25
ADLS_ACUITY_SCORE: 27
ADLS_ACUITY_SCORE: 27
ADLS_ACUITY_SCORE: 25
ADLS_ACUITY_SCORE: 27

## 2021-07-14 NOTE — PROVIDER NOTIFICATION
Potassium with AM labs was 2.7.  There are no replacement  protocols ordered.  Paged JESSICA Diggs MD to notify.  Awaiting response and will continue to monitor.

## 2021-07-14 NOTE — PLAN OF CARE
No noted neuro changes this shift.  Pt oriented x2 with noted generalized weakness.  Pt SR and on room air throughout shift.  Pt voiding using bed pan and incontinent of bowel x1.  Pt rating pain 0-9/10 this shift.  PRN pain medications given (see MAR), Pt repositioned, heat applied and stimulation decreased.  No new noted skin breakdown.  Will continue to monitor.

## 2021-07-14 NOTE — PLAN OF CARE
Patient is oriented to self and knows she is in the hospital, but unable to recall why she is here, calm and cooperative, follows command, moving all extremities , very weak but strength is improving , using sling ceiling lift to go to chair, lungs sounds diminished in bases, sats 100% on room air, occasional loose nonproductive cough, tolerating diet, needs help with feeding, nodules/ blackened areas on fingers and toed unchanged

## 2021-07-14 NOTE — PROGRESS NOTES
07/14/21 1449   General Information   Onset of Illness/Injury or Date of Surgery 07/01/21   Referring Physician Pankaj Aparicio NP   Patient/Family Therapy Goal Statement (SLP) None stated   Pertinent History of Current Problem Per notes: 41F PSA, IVDU, HIV admitted 7/1 w/ encephalopathy and hypotension. Intubated in ED for airway protection. NCHCT w/ new parietal lobe infarct. 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 Also w/ Klebsiella UTI (now completed ABx).   Extubated 7/11.    General Observations Pt was alert and cooperative.  Pt has a flat affect, but did smile a few times during the evaluation.   Western Aphasia Battery- Revised Bedside Record From   Spontaneous Speech Content Score (out of 10) 6   Spontaneous Speech Fluency Score (out of 10) 9   Auditory Verbal Comprehension Score (out of 10) 10   Sequential Commands Score (out of 10) 8   Repetition Score (out of 10) 9   Object Naming Score (out of 10) 10   Bedside Aphasia Sum 52   WAB-R Bedside Aphasia Score 86.67   Reading Score (out of 10) 7   Aphasia Severity Level Mild Aphasia   Comments Pt presents with decreased verbal initiation and full formulation of responses at times.  Pt demonstrates some word form errors when reading out loud.  Pt also has decreased attention and recall impacting function.  Pt recalled 2/3 items after a delay/distraction.  Pt also has a hoarse vocal quality.    Cognition   Cognitive Status Exam Comments Decreased attention and memory noted   SLP Therapy Assessment/Plan   Criteria for Skilled Therapeutic Interventions Met (SLP Eval) yes   SLP Diagnosis Mild receptive and expressive language deficits, decreased recall/attention   Rehab Potential (SLP Eval) good, to achieve stated therapy goals   Therapy Frequency (SLP Eval) daily   Predicted Duration of Therapy Intervention (SLP Eval) 1 week   Comment, Therapy Assessment/Plan (SLP) Pt presents with mild receptive and expressive language deficits as  well as decreased recall and attention.  Decreased initiation and complete formulation of sentences were observed.  Plan to provide cognitive-linguistic Tx to maximize function for daily needs.     Therapy Plan Review/Discharge Plan (SLP)   Therapy Plan Review (SLP) evaluation/treatment results reviewed;care plan/treatment goals reviewed;risks/benefits reviewed;current/potential barriers reviewed;participants included;participants voiced agreement with care plan;patient   SLP Discharge Planning    SLP Discharge Recommendation (DC Rec) [facility]-Motivated patient will benefit from intensive, interdisciplinary therapy.  Anticipate will be able to tolerate 3 hours of therapy per day   SLP Rationale for DC Rec Swallow and cognitive-linguistic function are below baseline; good participation   SLP Brief overview of current status  Mild receptive and expressive language deficits and decreased recall/attention observed during testing this pm.  Recommend use of visual cues and repetition to increase attention and recall.      Total Evaluation Time   Total Evaluation Time (Minutes) 15

## 2021-07-14 NOTE — PROGRESS NOTES
[facility]  Hospitalist Progress Note        Darwin Darden MD   07/14/2021        Interval History:        - low grade fever, trending down, no leukocytosis  - on Nafciliin  - K 2.7  - 7/10 Urine culture: ESBL E coli         Assessment and Plan:        Manas Hernandez is a 41 year old female with h/o untreated HIV, hepatitis C who was admitted on 7/1/2021 for MSSA mitral valve endocarditis resulting in septic shock complicated by embolic left parietal lobe infarctions and multiple peripheral emboli.    She presented initially with encephalopathy, and shock.  She was intubated in ED on 7/1/2021 subsequently extubated 7/11/2021.  Blood cultures turned positive on hospital day 1 with MSSA.  Urine culture was positive for Klebsiella.  She was treated initially with ceftriaxone and vancomycin, and has since been narrowed to nafcillin per ID. AMS has complicated stay as she has pulled multiple lines causing her to loose IV access.    Care transferred to Hospitalist on 7/13/21     MSSA mitral valve endocarditis  Septic shock, resolved  Probable myopericarditis  Small pericardial effusion  - Likely from IVDU. Multiple peripheral sites of spread including L parietal lobe  - JAE 7/5 noted with small ASD, mod to large mobile vegetation (14mm long, 6mm wide) of the P2 segment of the mitral leaflet. No valve perforation; mod MR  - plan to repeat limited ECHO to reassess pericardial fluid 7/15 per cardiology  - blood cultures on admission with MSSA; blood cultures have been negative since 7/6  -infectious disease following; antibiotic has been narrowed down to Nafcillin  - low grade fever trending down, no leukocytosis  - abx was briefly interrupted as she had been pulling out her access sites; midline placed on 7/13/21  -CV surgery has been following peripherally (last evaluated 7/6), no plan for operative intervention currently due to CVA   - Cardiology signed off 7/8/21     IVDU  Polysubstance use  disorder  - Will need to engage with chemical dependency and other addiction resources prior to discharge; currently patient not appropriately oriented to situation interact with Chem dep     HIV, untreated  - CD4 count greater than 500, viral load 3695  - Started Biktarvy 7/8; ID following     Toxic/septic/metabolic encephalopathy, acute, improving  - Likely encephalopathy due to sepsis, drug use, CVA, prolonged ICU stay; has been pulling lines due to AMS  - mentation seems to be improving; AO X 2-3 on 7/14  - Delirium protocol  - Continue quetiapine 3 times daily     Ischemic CVA, embolic stroke due to infective endocarditis  - CT head on admission notable for new acute to subacute PCA territory ischemic stroke  - MRI 7/3 noted with progressive, multifocal, acute infarcts without MR evidence for hemorrhage or midline shift  - Patient does have small left-to-right shunt on JAE, but most likely from embolic spread from endocarditis  - was evaluated by neurology, to continue antibiotics; per neuro to avoid anticoagulation (except DVT prophylaxis dose)     Pyelonephritis  - Urine cultures positive for Klebsiella.  Completed course of treatment with ceftriaxone  - repeat urine cultures 7/10 with ESBL E coli  - given unimpressive UA and no UTI symptoms , ID suggest holding off on abx for ESBL UTI     Acute hypoxemic respiratory failure, resolved  Intubated 7/1/2021.  Extubated 7/11/2021.  Likely related to septic shock from MSSA mitral valve endocarditis.     FORD, resolved  Hypokalemia  - FORD due to sepsis with Cr on 1.29 on presentation--- improved  - K 2.7-- correct per supple protocol     Chronic anemia  Baseline hemoglobin approximately 7.  Likely related to chronic disease, untreated HIV.  -Monitor hemoglobin; stable around 7-8     Likely severe malnutrition  -Nutrition followed , but due to uncertain recent weights unable to make complete diagnosis  -Encourage p.o., with supplements     Prior hepatitis C  "infection  Antibody positive but RNA negative  -Noted     Orders Placed This Encounter      Combination Diet Soft and Bite Sized Diet (level 6); Thin Liquids (level 0); No Straws     DVT Prophylaxis: Heparin SQ    Code Status: Full Code        Disposition Plan     Expected discharge: unclear, likely >3-5 days, likely to TCU pending clinical stability, antibiotic plan established; await repeat ECHO    Page Me (7 am to 6 pm)                   Physical Exam:      Blood pressure 119/74, pulse 60, temperature 98.5  F (36.9  C), temperature source Oral, resp. rate 21, height 1.727 m (5' 7.99\"), weight 84.2 kg (185 lb 10 oz), SpO2 93 %.  Vitals:    07/12/21 0415 07/13/21 0200 07/14/21 0445   Weight: 88.5 kg (195 lb 1.6 oz) 89 kg (196 lb 3.4 oz) 84.2 kg (185 lb 10 oz)     Vital Signs with Ranges  Temp:  [98  F (36.7  C)-99.6  F (37.6  C)] 98.5  F (36.9  C)  Pulse:  [] 60  Resp:  [6-38] 21  BP: ()/(59-88) 119/74  SpO2:  [92 %-99 %] 93 %  I/O's Last 24 hours  I/O last 3 completed shifts:  In: 1200 [P.O.:800; I.V.:400]  Out: 1400 [Urine:1400]    Constitutional: Alert, awake and oriented X 2-3, seems confused with dates and situation; resting comfortably in no apparent distress   HEENT: Pupils equal and reactive to light and accomodation, neck supple    Oral cavity: Moist mucosa   Cardiovascular: Normal s1 s2, regular rate and rhythm, no murmur   Lungs: B/l clear to auscultation, no wheezes or crepitations   Abdomen: Soft, nt, nd, no guarding, rigidity or rebound; BS +   LE : No edema   Musculoskeletal: Power 5/5 in all extremities   Neuro: No focal neurological deficits noted   Psychiatry: normal mood and affect  Skin: right finger 4th digit tip nolvia node, multiple splinter hemorrhages of BLE toes, and left hand. Black discoloration of L  toe                Medications:          bictegravir-emtricitabine-tenofovir  1 tablet Oral Daily     heparin ANTICOAGULANT  5,000 Units Subcutaneous Q8H     multivitamin " w/minerals  1 tablet Oral Daily     nafcillin  2 g Intravenous Q4H     pantoprazole  40 mg Oral QAM AC     perflutren diluted 1mL to 2mL with saline  5 mL Intravenous Once     potassium chloride  20 mEq Oral or Feeding Tube Once     potassium chloride  40 mEq Oral or Feeding Tube Once     QUEtiapine  100 mg Oral or Feeding Tube TID     sodium chloride (PF)  10 mL Intracatheter Q8H     sodium chloride (PF)  10 mL Intravenous Once     PRN Meds: sodium chloride 0.9%, acetaminophen **OR** acetaminophen, albuterol, glucose **OR** dextrose **OR** glucagon, fentaNYL, HYDROmorphone, lidocaine 4%, lidocaine 4%, lidocaine (buffered or not buffered), lidocaine (buffered or not buffered), loperamide, - MEDICATION INSTRUCTIONS -, naloxone **OR** naloxone **OR** naloxone **OR** naloxone, sodium chloride (PF), sodium chloride (PF), sodium chloride (PF), sodium chloride (PF)         Data:      All new lab and imaging data was reviewed.   Recent Labs   Lab Test 07/14/21  0506 07/13/21  0732 07/12/21  0428 07/01/21  1839 07/01/21  1239   WBC 6.7 8.6 7.2   < > 21.5*   HGB 8.3* 9.2* 7.6*   < > 6.8*   MCV 79 79 79   < > 67*   * 711* 637*   < > 153   INR  --   --   --   --  1.23*    < > = values in this interval not displayed.      Recent Labs   Lab Test 07/14/21  0506 07/13/21  1250 07/13/21  0732 07/12/21  0428   *  --  137 143   POTASSIUM 2.7*  --  3.9 3.5  3.5   CHLORIDE 113*  --  109 113*   CO2 27  --  21 26   BUN 6*  --  9 14   CR 0.51*  --  0.47* 0.60   ANIONGAP 6  --  7 4   DEREK 7.6*  --  7.6* 7.6*   * 104* 88 110*     Recent Labs   Lab Test 07/04/21  0537 07/04/21  0220 07/03/21  2210   TROPI 0.431* 0.439* 0.407*

## 2021-07-15 ENCOUNTER — APPOINTMENT (OUTPATIENT)
Dept: SPEECH THERAPY | Facility: CLINIC | Age: 41
End: 2021-07-15
Payer: MEDICAID

## 2021-07-15 ENCOUNTER — APPOINTMENT (OUTPATIENT)
Dept: OCCUPATIONAL THERAPY | Facility: CLINIC | Age: 41
End: 2021-07-15
Attending: INTERNAL MEDICINE
Payer: MEDICAID

## 2021-07-15 ENCOUNTER — APPOINTMENT (OUTPATIENT)
Dept: CARDIOLOGY | Facility: CLINIC | Age: 41
End: 2021-07-15
Attending: STUDENT IN AN ORGANIZED HEALTH CARE EDUCATION/TRAINING PROGRAM
Payer: MEDICAID

## 2021-07-15 LAB
ALBUMIN SERPL-MCNC: 1.5 G/DL (ref 3.4–5)
ALP SERPL-CCNC: 127 U/L (ref 40–150)
ALT SERPL W P-5'-P-CCNC: 13 U/L (ref 0–50)
ANION GAP SERPL CALCULATED.3IONS-SCNC: 4 MMOL/L (ref 3–14)
AST SERPL W P-5'-P-CCNC: 14 U/L (ref 0–45)
BACTERIA SPEC CULT: NO GROWTH
BACTERIA SPEC CULT: NO GROWTH
BILIRUB SERPL-MCNC: 0.6 MG/DL (ref 0.2–1.3)
BUN SERPL-MCNC: 8 MG/DL (ref 7–30)
CALCIUM SERPL-MCNC: 7.8 MG/DL (ref 8.5–10.1)
CHLORIDE BLD-SCNC: 114 MMOL/L (ref 94–109)
CO2 SERPL-SCNC: 23 MMOL/L (ref 20–32)
CREAT SERPL-MCNC: 0.54 MG/DL (ref 0.52–1.04)
ERYTHROCYTE [DISTWIDTH] IN BLOOD BY AUTOMATED COUNT: 26.2 % (ref 10–15)
GFR SERPL CREATININE-BSD FRML MDRD: >90 ML/MIN/1.73M2
GLUCOSE BLD-MCNC: 125 MG/DL (ref 70–99)
GLUCOSE BLDC GLUCOMTR-MCNC: 127 MG/DL (ref 70–99)
GLUCOSE BLDC GLUCOMTR-MCNC: 90 MG/DL (ref 70–99)
GLUCOSE BLDC GLUCOMTR-MCNC: 97 MG/DL (ref 70–99)
GLUCOSE BLDC GLUCOMTR-MCNC: 98 MG/DL (ref 70–99)
HCT VFR BLD AUTO: 29.6 % (ref 35–47)
HGB BLD-MCNC: 8.9 G/DL (ref 11.7–15.7)
LVEF ECHO: NORMAL
Lab: NORMAL
MAGNESIUM SERPL-MCNC: 2 MG/DL (ref 1.6–2.3)
MCH RBC QN AUTO: 24.2 PG (ref 26.5–33)
MCHC RBC AUTO-ENTMCNC: 30.1 G/DL (ref 31.5–36.5)
MCV RBC AUTO: 80 FL (ref 78–100)
PHOSPHATE SERPL-MCNC: 3.1 MG/DL (ref 2.5–4.5)
PLATELET # BLD AUTO: 859 10E3/UL (ref 150–450)
POTASSIUM BLD-SCNC: 4 MMOL/L (ref 3.4–5.3)
POTASSIUM BLD-SCNC: 4 MMOL/L (ref 3.4–5.3)
PROT SERPL-MCNC: 8.2 G/DL (ref 6.8–8.8)
RBC # BLD AUTO: 3.68 10E6/UL (ref 3.8–5.2)
SODIUM SERPL-SCNC: 141 MMOL/L (ref 133–144)
SPECIMEN SOURCE: NORMAL
SPECIMEN SOURCE: NORMAL
WBC # BLD AUTO: 7.5 10E3/UL (ref 4–11)

## 2021-07-15 PROCEDURE — 99232 SBSQ HOSP IP/OBS MODERATE 35: CPT | Performed by: HOSPITALIST

## 2021-07-15 PROCEDURE — 250N000013 HC RX MED GY IP 250 OP 250 PS 637: Performed by: STUDENT IN AN ORGANIZED HEALTH CARE EDUCATION/TRAINING PROGRAM

## 2021-07-15 PROCEDURE — 92526 ORAL FUNCTION THERAPY: CPT | Mod: GN | Performed by: SPEECH-LANGUAGE PATHOLOGIST

## 2021-07-15 PROCEDURE — 93308 TTE F-UP OR LMTD: CPT | Mod: 26 | Performed by: INTERNAL MEDICINE

## 2021-07-15 PROCEDURE — 97166 OT EVAL MOD COMPLEX 45 MIN: CPT | Mod: GO | Performed by: OCCUPATIONAL THERAPIST

## 2021-07-15 PROCEDURE — 83735 ASSAY OF MAGNESIUM: CPT | Performed by: STUDENT IN AN ORGANIZED HEALTH CARE EDUCATION/TRAINING PROGRAM

## 2021-07-15 PROCEDURE — 97535 SELF CARE MNGMENT TRAINING: CPT | Mod: GO | Performed by: OCCUPATIONAL THERAPIST

## 2021-07-15 PROCEDURE — 93321 DOPPLER ECHO F-UP/LMTD STD: CPT | Mod: 26 | Performed by: INTERNAL MEDICINE

## 2021-07-15 PROCEDURE — 93325 DOPPLER ECHO COLOR FLOW MAPG: CPT | Mod: 26 | Performed by: INTERNAL MEDICINE

## 2021-07-15 PROCEDURE — 92507 TX SP LANG VOICE COMM INDIV: CPT | Mod: GN | Performed by: SPEECH-LANGUAGE PATHOLOGIST

## 2021-07-15 PROCEDURE — 36415 COLL VENOUS BLD VENIPUNCTURE: CPT | Performed by: STUDENT IN AN ORGANIZED HEALTH CARE EDUCATION/TRAINING PROGRAM

## 2021-07-15 PROCEDURE — 80053 COMPREHEN METABOLIC PANEL: CPT | Performed by: STUDENT IN AN ORGANIZED HEALTH CARE EDUCATION/TRAINING PROGRAM

## 2021-07-15 PROCEDURE — 85014 HEMATOCRIT: CPT | Performed by: STUDENT IN AN ORGANIZED HEALTH CARE EDUCATION/TRAINING PROGRAM

## 2021-07-15 PROCEDURE — 93325 DOPPLER ECHO COLOR FLOW MAPG: CPT

## 2021-07-15 PROCEDURE — 97530 THERAPEUTIC ACTIVITIES: CPT | Mod: GO | Performed by: OCCUPATIONAL THERAPIST

## 2021-07-15 PROCEDURE — 250N000011 HC RX IP 250 OP 636: Performed by: STUDENT IN AN ORGANIZED HEALTH CARE EDUCATION/TRAINING PROGRAM

## 2021-07-15 PROCEDURE — 84100 ASSAY OF PHOSPHORUS: CPT | Performed by: STUDENT IN AN ORGANIZED HEALTH CARE EDUCATION/TRAINING PROGRAM

## 2021-07-15 PROCEDURE — 250N000009 HC RX 250: Performed by: STUDENT IN AN ORGANIZED HEALTH CARE EDUCATION/TRAINING PROGRAM

## 2021-07-15 PROCEDURE — 120N000001 HC R&B MED SURG/OB

## 2021-07-15 PROCEDURE — 999N000190 HC STATISTIC VAT ROUNDS

## 2021-07-15 PROCEDURE — 84132 ASSAY OF SERUM POTASSIUM: CPT | Performed by: HOSPITALIST

## 2021-07-15 RX ADMIN — NAFCILLIN SODIUM 2 G: 2 INJECTION, POWDER, LYOPHILIZED, FOR SOLUTION INTRAMUSCULAR; INTRAVENOUS at 12:00

## 2021-07-15 RX ADMIN — HYDROMORPHONE HYDROCHLORIDE 1 MG: 1 INJECTION, SOLUTION INTRAMUSCULAR; INTRAVENOUS; SUBCUTANEOUS at 14:17

## 2021-07-15 RX ADMIN — HYDROMORPHONE HYDROCHLORIDE 1 MG: 1 INJECTION, SOLUTION INTRAMUSCULAR; INTRAVENOUS; SUBCUTANEOUS at 03:17

## 2021-07-15 RX ADMIN — QUETIAPINE FUMARATE 100 MG: 100 TABLET ORAL at 21:00

## 2021-07-15 RX ADMIN — HEPARIN SODIUM 5000 UNITS: 5000 INJECTION, SOLUTION INTRAVENOUS; SUBCUTANEOUS at 06:17

## 2021-07-15 RX ADMIN — HYDROMORPHONE HYDROCHLORIDE 1 MG: 1 INJECTION, SOLUTION INTRAMUSCULAR; INTRAVENOUS; SUBCUTANEOUS at 16:08

## 2021-07-15 RX ADMIN — BICTEGRAVIR SODIUM, EMTRICITABINE, AND TENOFOVIR ALAFENAMIDE FUMARATE 1 TABLET: 50; 200; 25 TABLET ORAL at 08:37

## 2021-07-15 RX ADMIN — Medication 1 TABLET: at 11:37

## 2021-07-15 RX ADMIN — HEPARIN SODIUM 5000 UNITS: 5000 INJECTION, SOLUTION INTRAVENOUS; SUBCUTANEOUS at 21:00

## 2021-07-15 RX ADMIN — NAFCILLIN SODIUM 2 G: 2 INJECTION, POWDER, LYOPHILIZED, FOR SOLUTION INTRAMUSCULAR; INTRAVENOUS at 08:37

## 2021-07-15 RX ADMIN — NAFCILLIN SODIUM 2 G: 2 INJECTION, POWDER, LYOPHILIZED, FOR SOLUTION INTRAMUSCULAR; INTRAVENOUS at 16:08

## 2021-07-15 RX ADMIN — NAFCILLIN SODIUM 2 G: 2 INJECTION, POWDER, LYOPHILIZED, FOR SOLUTION INTRAMUSCULAR; INTRAVENOUS at 04:57

## 2021-07-15 RX ADMIN — HYDROMORPHONE HYDROCHLORIDE 0.5 MG: 1 INJECTION, SOLUTION INTRAMUSCULAR; INTRAVENOUS; SUBCUTANEOUS at 01:01

## 2021-07-15 RX ADMIN — HYDROMORPHONE HYDROCHLORIDE 1 MG: 1 INJECTION, SOLUTION INTRAMUSCULAR; INTRAVENOUS; SUBCUTANEOUS at 19:06

## 2021-07-15 RX ADMIN — NAFCILLIN SODIUM 2 G: 2 INJECTION, POWDER, LYOPHILIZED, FOR SOLUTION INTRAMUSCULAR; INTRAVENOUS at 00:40

## 2021-07-15 RX ADMIN — HYDROMORPHONE HYDROCHLORIDE 1 MG: 1 INJECTION, SOLUTION INTRAMUSCULAR; INTRAVENOUS; SUBCUTANEOUS at 06:13

## 2021-07-15 RX ADMIN — HYDROMORPHONE HYDROCHLORIDE 1 MG: 1 INJECTION, SOLUTION INTRAMUSCULAR; INTRAVENOUS; SUBCUTANEOUS at 08:35

## 2021-07-15 RX ADMIN — QUETIAPINE FUMARATE 100 MG: 100 TABLET ORAL at 08:37

## 2021-07-15 RX ADMIN — HEPARIN SODIUM 5000 UNITS: 5000 INJECTION, SOLUTION INTRAVENOUS; SUBCUTANEOUS at 14:17

## 2021-07-15 RX ADMIN — QUETIAPINE FUMARATE 100 MG: 100 TABLET ORAL at 16:08

## 2021-07-15 RX ADMIN — HYDROMORPHONE HYDROCHLORIDE 1 MG: 1 INJECTION, SOLUTION INTRAMUSCULAR; INTRAVENOUS; SUBCUTANEOUS at 20:55

## 2021-07-15 RX ADMIN — PANTOPRAZOLE SODIUM 40 MG: 40 TABLET, DELAYED RELEASE ORAL at 06:16

## 2021-07-15 RX ADMIN — NAFCILLIN SODIUM 2 G: 2 INJECTION, POWDER, LYOPHILIZED, FOR SOLUTION INTRAMUSCULAR; INTRAVENOUS at 20:55

## 2021-07-15 RX ADMIN — HYDROMORPHONE HYDROCHLORIDE 1 MG: 1 INJECTION, SOLUTION INTRAMUSCULAR; INTRAVENOUS; SUBCUTANEOUS at 11:36

## 2021-07-15 ASSESSMENT — ACTIVITIES OF DAILY LIVING (ADL)
ADLS_ACUITY_SCORE: 25

## 2021-07-15 NOTE — PROGRESS NOTES
"   07/15/21 1350   Quick Adds   Type of Visit Initial Occupational Therapy Evaluation   Living Environment   People in home alone   Current Living Arrangements shelter   Transportation Anticipated public transportation   Living Environment Comments Does not remember where the shelter is that she was staying at   Self-Care   Usual Activity Tolerance excellent   Current Activity Tolerance poor   Activity/Exercise/Self-Care Comment Pt is a poor historian and does not remember how well she is able to complete any I/ADLs. When asked if she had problems with dressing or bathing, pt stated \"I don't remember\"   Disability/Function   Hearing Difficulty or Deaf no   Wear Glasses or Blind no   Concentrating, Remembering or Making Decisions Difficulty yes   Difficulty Communicating no   Difficulty Eating/Swallowing no   Walking or Climbing Stairs Difficulty yes   Dressing/Bathing Difficulty no   Toileting issues yes   Doing Errands Independently Difficulty (such as shopping) other (see comments)  (unknown)   Fall history within last six months   (Unknown )   General Information   Onset of Illness/Injury or Date of Surgery 07/01/21   Referring Physician Rome Greene MD   Patient/Family Therapy Goal Statement (OT) Pt did not state goal.    Additional Occupational Profile Info/Pertinent History of Current Problem Manas Hernandez is a 41 year old female with h/o untreated HIV, hepatitis C who was admitted on 7/1/2021 for MSSA mitral valve endocarditis resulting in septic shock complicated by embolic left parietal lobe infarctions and multiple peripheral emboli.  Polysubstance use disorder   Existing Precautions/Restrictions fall   Cognitive Status Examination   Orientation Status person;place   Affect/Mental Status (Cognitive) anxious;confused;low arousal/lethargic   Follows Commands WFL   Memory Deficit moderate deficit   Attention Deficit minimal deficit   Cognitive Status Comments Pt is a poor historian to events " that occurred prior to her admit. Pt teary throughout session, c/o back and B LE pain.    Visual Perception   Visual Impairment/Limitations WFL   Pain Assessment   Patient Currently in Pain Yes, see Vital Sign flowsheet  (BLE pain 10/10, also complained of low back and UE pain. )   Range of Motion Comprehensive   Comment, General Range of Motion BUE ROM WFL   Strength Comprehensive (MMT)   Comment, General Manual Muscle Testing (MMT) Assessment Unable to test shoulder strength. 4/5 strength at elbow.    Bed Mobility   Bed Mobility sit-supine;supine-sit   Supine-Sit [place] (Bed Mobility) 2 person assist;maximum assist (25% patient effort)   Sit-Supine [place] (Bed Mobility) 2 person assist;moderate assist (50% patient effort)   Assistive Device (Bed Mobility) bed rails   Activities of Daily Living   BADL Assessment lower body dressing   Lower Body Dressing Assessment   [place] Level (Lower Body Dressing) moderate assist (50% patient effort)   Position (Lower Body Dressing) edge of bed sitting   Clinical Impression   Criteria for Skilled Therapeutic Interventions Met (OT) yes;meets criteria;skilled treatment is necessary   OT Diagnosis Impaired (I) with I/ADLs and functional mobility    OT Problem List-Impairments impacting ADL problems related to;activity tolerance impaired;balance;strength;pain;cognition   Assessment of Occupational Performance 3-5 Performance Deficits   Identified Performance Deficits Impaired (I) with dressing, bathing, toileting, toilet transfer, shower transfer, med mngt, etc.    Planned Therapy Interventions (OT) ADL retraining;cognition;strengthening;home program guidelines;progressive activity/exercise;transfer training   Clinical Decision Making Complexity (OT) moderate complexity   Therapy Frequency (OT) Daily   Predicted Duration of Therapy 5 days   Risk & Benefits of therapy have been explained evaluation/treatment results reviewed;risks/benefits reviewed;care  "plan/treatment goals reviewed;current/potential barriers reviewed;participants voiced agreement with care plan;participants included;patient   OT Discharge Planning    OT Discharge Recommendation (DC Rec) [facility] patient will benefit from intensive, interdisciplinary therapy.  Anticipate will be able to tolerate 3 hours of therapy per day   OT Rationale for DC Rec Pt limited by high levels of pain, balance, strength, cognition, and activity tolerance. Recommend ARU pending pain and activity tolerance progression during hospital stay for daily intensive therapy. If not ARU, will require continued OT services at TCU to improve independence in ADLs and functional mobility to PLOF.    OT Brief overview of current status  Supine <> sit Max Ax2. MOD A for LE ADL\"s, Pt reports 10/10 pain oh whole body throughout session.   Total Evaluation Time (Minutes)   Total Evaluation Time (Minutes) 10     "

## 2021-07-15 NOTE — PROGRESS NOTES
[facility]    Infectious Disease Progress Note    Date of Service : 07/15/2021     Assessment :  1. S.aureus MSSA native mitral valve endocarditis with septic shock in the setting of IVDA complicated by embolic L parietal lobe infarction and multiple peripheral emboli.   (TTE shows a large mobile 1.4 cm vegetation on the posterior mitral valve leaflet with extension to left atrium and small ASD per cardiology notes , no valvular abscess and no additional valvular involvement. Pericardial effusion is small). No seeding on CT abdomen but has peripheral emboli.  2. Untreated HIV diagnosed 2018, CD4 count maintained at >500, VL pending  3. Hepatitis C Ab+ but RNA -.   4. Severe sepsis requiring pressor support and multiorgan failure. Off pressors now  5. Multiple peripheral emboli. Right finger swelling and discoloration of left 3rd toe. US right finger without evidence of infection but sub-optimal study. Appears stable  6. FORD related to sepsis- resolved  7. Embolic stroke (small ASD with Left to right shunt)  8. Klebsiella pneumoniae UTI treated  9. Hypernatremia and hypokalemia  10. Severe anemia multifactorial. Has baseline anemia, exacerbated by chronic disease  11. Polysubstance abuse  12. Splenomegaly   13. LFT abnormality resolved    Recommendations:  1. Nafcillin  2. Biktarvy  3. Follow HIV genotype  4. Blood cxs - since 7/6  5.  Supportive care  6. UA not impressive, UC with E coli, ESBL, patient has no UTI symptoms, will hold on antibiotics   Follow clinical status and labs    Bibi Amador MD    Interval History   Awake   Oriented   No new complaint other than chronic back pain     Antimicrobial therapy  7/2 Nafcillin  7/8 Biktarvy  Prior  7/4-7/7 Cipro  7/1-7/2 Vancomycin, zosyn    Physical Exam   Temp: 98.2  F (36.8  C) Temp src: Oral BP: 128/85 Pulse: 91   Resp: 16 SpO2: 99 % O2 Device: None (Room air)    Vitals:    07/12/21 0415 07/13/21 0200 07/14/21 0445   Weight: 88.5 kg (195  lb 1.6 oz) 89 kg (196 lb 3.4 oz) 84.2 kg (185 lb 10 oz)     Vital Signs with Ranges  Temp:  [98.2  F (36.8  C)-98.5  F (36.9  C)] 98.2  F (36.8  C)  Pulse:  [80-99] 91  Resp:  [14-31] 16  BP: (112-131)/(64-99) 128/85  SpO2:  [92 %-100 %] 99 %    GENERAL APPEARANCE: awake, alert x 1   EYES: conjunctivae and sclerae normal  NECK: no adenopathy  RESP: lungs clear to auscultation - no rales, rhonchi or wheezes  CV: regular rates and rhythm, murmur  ABDOMEN: soft, umbilical hernia  MS: extremities normal- no gross deformities noted  SKIN: right finger with swelling and redness, multiple osler's nodes and splinter hemorrhages, multiple embolic infarcts on toes, black discoloration of left toe    Other:    Medications       bictegravir-emtricitabine-tenofovir  1 tablet Oral Daily     heparin ANTICOAGULANT  5,000 Units Subcutaneous Q8H     multivitamin w/minerals  1 tablet Oral Daily     nafcillin  2 g Intravenous Q4H     pantoprazole  40 mg Oral QAM AC     perflutren diluted 1mL to 2mL with saline  5 mL Intravenous Once     QUEtiapine  100 mg Oral or Feeding Tube TID     sodium chloride (PF)  10 mL Intracatheter Q8H     sodium chloride (PF)  10 mL Intravenous Once       Data   All microbiology laboratory data reviewed.  Recent Labs   Lab Test 07/15/21  0009 07/14/21  0506 07/13/21  0732   WBC 7.5 6.7 8.6   HGB 8.9* 8.3* 9.2*   HCT 29.6* 27.1* 29.6*   MCV 80 79 79   * 838* 711*     Recent Labs   Lab Test 07/15/21  0009 07/14/21  0506 07/13/21  0732   CR 0.54 0.51* 0.47*     No lab results found.  Recent Labs   Lab Test 07/10/21  2243 07/09/21  0454 07/09/21  0415 07/08/21  1417 07/08/21  0500 07/07/21  0450 07/06/21  0500 07/05/21  1232 07/05/21  1104   CULT >100,000 colonies/mL  Escherichia coli ESBL  ESBL (extended beta lactamase) producing organisms require contact precautions.  *  Critical Value/Significant Value called to and read back by  Luanne Bettencourt RN @ 7891 7/13/21 TM.   No growth No growth No growth No  growth No growth  No growth No growth  No growth Cultured on the 2nd day of incubation:  Staphylococcus epidermidis  *  Critical Value/Significant Value, preliminary result only, called to and read back by  IVETT WARE RN 07/06/21 1258 EH.    Cultured on the 5th day of incubation:  Staphylococcus aureus  Susceptibility testing done on previous specimen  *  Critical Value/Significant Value called to and read back by  Cherelle Smith RN 1043 7/11/21 AM   Cultured on the 2nd day of incubation:  Staphylococcus aureus  Susceptibility testing done on previous specimen  *  Critical Value/Significant Value, preliminary result only, called to and read back by  Sin Jack RN at 0448 7/7/21 hg       Component      Latest Ref Rng & Units 7/6/2021   IFC Specimen       Blood   CD3 Mature T      49 - 84 % 76   CD4 [place] T      28 - 63 % 34   CD8 Suppressor T      10 - 40 % 37   CD4:CD8 Ratio      1.40 - 2.60 0.92 (L)   Absolute CD3      603 - 2,990 cells/uL 1,317   Absolute CD4      441 - 2,156 cells/uL 579   Absolute CD8      125 - 1,312 cells/uL 640     Component      Latest Ref Rng & Units 7/2/2021   HIV 1 Result      NR:Nonreactive Reactive (A)   HIV 2 Result      NR:Nonreactive Indeterminate (A)   HIV Interpretation       See Comment Below   HIV Antigen Antibody Combo      NR:Nonreactive     Reactive (A)   Hepatitis B Surface Antibody      <8.00 m[IU]/mL 0.58   Hep B Surface Agn      NR:Nonreactive Nonreactive   Hepatitis C Antibody      NR:Nonreactive Reactive (A)   Susceptibility                       Staphylococcus aureus (1) Staphylococcus aureus (3)       BOBBY BOBBY       CLINDAMYCIN <=0.25 ug/mL Sensitive <=0.25 ug/mL Sensitive       ERYTHROMYCIN <=0.25 ug/mL Sensitive <=0.25 ug/mL Sensitive       GENTAMICIN <=0.5 ug/mL Sensitive <=0.5 ug/mL Sensitive       OXACILLIN 0.5 ug/mL Sensitive 0.5 ug/mL Sensitive       TETRACYCLINE <=1 ug/mL Sensitive <=1 ug/mL Sensitive       Trimethoprim/Sulfa <=0.5/9.5 u... Sensitive  <=0.5/9.5 u... Sensitive       VANCOMYCIN 1 ug/mL Sensitive 1 ug/mL Sensitive           7/5 JAE  Interpretation Summary     1. The left ventricle is normal in structure, function and size. The visual  ejection fraction is estimated at 60%.  2. The right ventricle is normal in structure, function and size.  3. Small atrial septal defect, secundum type 5mm in diameter. Left to right  shunt. Rim appears adequate in size for closure device.  4. Moderate to large mobile vegetation (14mm long, 6mm wide) of the P2 segment  of the mitral leaflet. No valve perforation. There is moderate (2+) mitral  regurgitation.  5. Small pericardial effusion     7/6 US extremity R hand  US EXTREMITY NON VASCULAR RIGHT 7/6/2021 11:39 AM      HISTORY: Right 4th finger concern for septic arthritis/osteomyelitis-  Please do US right hand 4th finger.      FINDINGS: Sonography was performed in the area of clinical concern  (fourth finger).                                                                      IMPRESSION: No fluid collection or significant joint effusion is  appreciated sonographically. Clinical follow-up recommended. If  clinical suspicion persists, MRI could be performed.     7/1TTE  Interpretation Summary     Left ventricular systolic function is normal.  The visual ejection fraction is 55-60%.  The right ventricle is mildly dilated.  The right ventricular systolic function is normal.  There is a mobile linear echodensity measuring 1.4 cm x 0.6 cm on the atrial  side of the posterior mitral valve leaflet.  Other valve structures appear normal without significant dysfunction or  obvious valvular vegetations.  The inferior pericardium appears moderately thickened and echobright which may  represent active pericarditis.  Large localized pericardial effusion (2.7 cm) posteriorly/laterally with  mobile free floating echodense material which could represent fibrinous  stranding in setting of inflammatory pericarditis or infectious  material if  infectious pericardial effusion.  Dilation of the inferior vena cava is present with abnormal respiratory  variation in diameter.  Small left pleural effusion     No prior for comparison.     7/1 CT head  CT SCAN OF THE HEAD WITHOUT CONTRAST   7/1/2021 1:47 PM      HISTORY: Delirium; altered mental status     TECHNIQUE:  Axial images of the head and coronal reformations without  IV contrast material.  Radiation dose for this scan was reduced using  automated exposure control, adjustment of the mA and/or kV according  to patient size, or iterative reconstruction technique.     COMPARISON: None.     FINDINGS: There is a focal 2 cm area of hypodensity involving gray and  white matter in the medial left parietal lobe consistent with acute to  subacute ischemic infarct. There is a minimal incidental arachnoid  cyst in the anterior portion of left middle cranial fossa. Ventricles  and subarachnoid spaces are otherwise within normal limits. There is  no evidence for intracranial hemorrhage, significant mass effect, or  skull fracture. Exam is mildly limited by motion artifact. Visualized  paranasal sinuses and mastoid air cells are clear.                                                                      IMPRESSION: Focal hypodensity in the medial left parietal lobe  consistent with acute to subacute ischemic infarct.     7/1 Ct abdomen/pelvis                                                           IMPRESSION:   1.  Patchy hypoenhancement in the mid and upper poles of the right  kidney posteriorly, suspicious for pyelonephritis.  2.  Moderate-sized fat-containing paraumbilical hernia.  3.  Mild splenomegaly.  4.  There are possible small gallstones within a contracted  gallbladder.    Attestation:  Total time on the floor involved in the patient's care: 35 minutes. Total time spent in counseling/care coordination: >50%

## 2021-07-15 NOTE — PLAN OF CARE
Summary:  Encephalopathy and shock  DATE & TIME: 21 Pm shift   Cognitive Concerns/ Orientation : A&disoriented to time and situation. Confuse. Generalized weakness. Sitter at bedside due to patient pulling midline tube.   BEHAVIOR & AGGRESSION TOOL COLOR: yellow  CIWA SCORE: NA   ABNL VS/O2: VSS, RA  MOBILITY: up with lift.  PAIN MANAGMENT: BLE  and arms pain  with dilaudid.  DIET: soft diet.  BOWEL/BLADDER: incontinent.  ABNL LAB/BG: K 3.4, replaced, recheck tomorrow at 12:30am. Hgb 8.3. platelet count 838.  DRAIN/DEVICES: midline SL. On Abx Nafcillin.  TELEMETRY RHYTHM: ST  SKIN: left 3rd toe black.  TESTS/PROCEDURES: none  D/C DAY/GOALS/PLACE: discharge to TCU pending.  OTHER IMPORTANT INFO: ID following. Neuro check q 4 hrs.

## 2021-07-15 NOTE — PLAN OF CARE
Summary:  Endocarditis MSSA  DATE & TIME: 7/15/21 Night shift   Cognitive Concerns/ Orientation : A&Ox3, disoriented to situation.  Sitter outside of the room; pt is restless at times and pulling on tubes.   BEHAVIOR & AGGRESSION TOOL COLOR: green, restless at times   CIWA SCORE: NA   ABNL VS/O2: VSS, RA. LS diminished. Denies chest pain or SOB  MOBILITY: NOB this shift. Able to turn on her own in bed.   PAIN MANAGMENT: BLE (c/o restless legs) and abdominal pain; managed with heat pack and IV dilaudid. Pt able to fall asleep for few hours after pain meds given  DIET: Level 6  BOWEL/BLADDER: incontinent of urine overnight. No BM  ABNL LAB/BG: K 4.0 after replacement. Bg 97. Hg 8.9; platelets 859  DRAIN/DEVICES: R arm midline SL. On IV Abx Nafcillin.  TELEMETRY RHYTHM: SR to ST  SKIN: bruises; discoloration black toes  TESTS/PROCEDURES: none  D/C DAY/GOALS/PLACE: Pending; once mental status stable and antibiotic plan in place.  ID is following.   OTHER IMPORTANT INFO:Neuro check q 4 hrs; able to follow commands but pt is weak and lethargic overnight.

## 2021-07-15 NOTE — PLAN OF CARE
Summary:  Endocarditis MSSA  DATE & TIME: 7/15/21 3116-8504  Cognitive Concerns/ Orientation : A&Ox2, disoriented to time and situation.  Pt mostly calm, sleeping, most of morning.   BEHAVIOR & AGGRESSION TOOL COLOR: green.  CIWA SCORE: NA   ABNL VS/O2: VSS, RA. LS diminished. Denies chest pain or SOB  MOBILITY:Pt only dangled with PT, otherwise turning and repositioning in bed.  PAIN MANAGMENT: BLE aching pain, rating 10/10, managed with heat pack and IV dilaudid.   DIET: Level 6, appetite poor, drinking adequately.  BOWEL/BLADDER: incontinent of urine, no BM.  ABNL LAB/BG: K 4.0 after replacement. Bg 97. Hg 8.9; platelets 859  DRAIN/DEVICES: R arm midline SL. On IV Abx Nafcillin.  TELEMETRY RHYTHM: Sinus Rhythm.  SKIN: bruises; discoloration black bilateral big toes and left middle toes, appears gangrenous.  TESTS/PROCEDURES: none  D/C DAY/GOALS/PLACE: Pending; once mental status stable and antibiotic plan in place.  ID is following. [facility] has been consulted to eval worsening mitral regurgitation.  OTHER IMPORTANT INFO: Generalized weakness noted, especially on the left upper extremity. Otherwise pt is intact, except for disorientation to time and situation.  Pt requesting IV dilaudid on a regular basis, rates feet and leg pain 10/10.

## 2021-07-15 NOTE — PROGRESS NOTES
[facility]  Hospitalist Progress Note        Darwin Darden MD   07/15/2021        Interval History:        - Transferred out of ICU on 7/14  - fever trended down, afebrile since 7/13  - no acute issues overnight  - ECHO repeated 7/15         Assessment and Plan:        Manas Hernandez is a 41 year old female with h/o untreated HIV, hepatitis C who was admitted on 7/1/2021 for MSSA mitral valve endocarditis resulting in septic shock complicated by embolic left parietal lobe infarctions and multiple peripheral emboli.    She presented initially with encephalopathy, and shock.  She was intubated in ED on 7/1/2021 subsequently extubated 7/11/2021.  Blood cultures turned positive on hospital day 1 with MSSA.  Urine culture was positive for Klebsiella.  She was treated initially with ceftriaxone and vancomycin, and has since been narrowed to nafcillin per ID. AMS has complicated stay as she has pulled multiple lines causing her to loose IV access.    Care transferred to Hospitalist on 7/13/21     MSSA mitral valve endocarditis  Septic shock, resolved  Probable myopericarditis  Small pericardial effusion  - Likely from IVDU. Multiple peripheral sites of spread including L parietal lobe  - JAE 7/5 noted with small ASD, mod to large mobile vegetation (14mm long, 6mm wide) of the P2 segment of the mitral leaflet. No valve perforation; mod MR  - blood cultures on admission with MSSA; blood cultures have been negative since 7/6  - infectious disease following; antibiotic has been narrowed down to Nafcillin  - low grade fever trending down, afebrile since 7/13; no leukocytosis  - abx was briefly interrupted as she had been pulling out her access sites; midline placed on 7/13/21  -CV surgery has been following peripherally (last evaluated 7/6), no plan for operative intervention currently due to CVA   - Cardiology signed off 7/8/21; limited ECHO repeated 7/15 to reassess pericardial fluid 7/15 per  cardiology and noted:  organized effusion posteriorly, measuring 1.2-1.5cm, no evidence of tamponade; small mobile vegetation noted attached to the mitral leaflet, mod to severe MR; compared to 7/8/21, effusion size is probably stable, Degree of MR is worse, Vegetation is smaller  - will have CV surgery re-eval     IVDU  Polysubstance use disorder  - will consult chem dep 7/15     HIV, untreated  - CD4 count greater than 500, viral load 3695  - Started Biktarvy 7/8; ID following     Toxic/septic/metabolic encephalopathy, acute, improving  - Likely encephalopathy due to sepsis, drug use, CVA, prolonged ICU stay; has been pulling lines due to AMS  - mentation improved  - Delirium protocol  - Continue quetiapine 3 times daily-- can probably change to prn in 1-2 days     Ischemic CVA, embolic stroke due to infective endocarditis  - CT head on admission notable for new acute to subacute PCA territory ischemic stroke  - MRI 7/3 noted with progressive, multifocal, acute infarcts without MR evidence for hemorrhage or midline shift  - Patient does have small left-to-right shunt on JAE, but most likely from embolic spread from endocarditis  - was evaluated by neurology, to continue antibiotics; per neuro to avoid anticoagulation (except DVT prophylaxis dose)     Pyelonephritis  - Urine cultures positive for Klebsiella.  Completed course of treatment with ceftriaxone  - repeat urine cultures 7/10 with ESBL E coli  - given unimpressive UA and no UTI symptoms , ID suggest holding off on abx for ESBL UTI     Acute hypoxemic respiratory failure, resolved  Intubated 7/1/2021.  Extubated 7/11/2021.  Likely related to septic shock from MSSA mitral valve endocarditis.     FORD, resolved  Hypokalemia  - FORD due to sepsis with Cr on 1.29 on presentation--- improved  - K 2.7-- corrected per supple protocol     Chronic anemia  Baseline hemoglobin approximately 7.  Likely related to chronic disease, untreated HIV.  -Monitor hemoglobin;  "stable around 7-8     Likely severe malnutrition  -Nutrition followed , but due to uncertain recent weights unable to make complete diagnosis  -Encourage p.o., with supplements     Prior hepatitis C infection  Antibody positive but RNA negative  -Noted     Orders Placed This Encounter      Combination Diet Easy to Chew (level 7); Thin Liquids (level 0); No Straws     DVT Prophylaxis: Heparin SQ    Code Status: Full Code        Disposition Plan     Expected discharge: unclear, likely >2-3 days, likely to TCU pending clinical stability, antibiotic plan established    Page Me (7 am to 6 pm)                   Physical Exam:      Blood pressure 128/85, pulse 91, temperature 98.2  F (36.8  C), temperature source Oral, resp. rate 18, height 1.727 m (5' 7.99\"), weight 84.2 kg (185 lb 10 oz), SpO2 99 %.  Vitals:    07/12/21 0415 07/13/21 0200 07/14/21 0445   Weight: 88.5 kg (195 lb 1.6 oz) 89 kg (196 lb 3.4 oz) 84.2 kg (185 lb 10 oz)     Vital Signs with Ranges  Temp:  [98  F (36.7  C)-98.5  F (36.9  C)] 98.2  F (36.8  C)  Pulse:  [] 91  Resp:  [14-31] 18  BP: (110-131)/(64-99) 128/85  SpO2:  [89 %-100 %] 99 %  I/O's Last 24 hours  I/O last 3 completed shifts:  In: 800 [P.O.:600; I.V.:200]  Out: -     Constitutional: Alert, awake and oriented X 2-3, seems slightly confused with dates; resting comfortably in no apparent distress   HEENT: Pupils equal and reactive to light and accomodation, neck supple    Oral cavity: Moist mucosa   Cardiovascular: Normal s1 s2, regular rate and rhythm, no murmur   Lungs: B/l clear to auscultation, no wheezes or crepitations   Abdomen: Soft, nt, nd, no guarding, rigidity or rebound; BS +   LE : No edema   Musculoskeletal: Power 5/5 in all extremities   Neuro: No focal neurological deficits noted   Psychiatry: normal mood and affect  Skin: right finger 4th digit tip nolvia node, multiple splinter hemorrhages of BLE toes, and left hand. Black discoloration of L  toe                " Medications:          bictegravir-emtricitabine-tenofovir  1 tablet Oral Daily     heparin ANTICOAGULANT  5,000 Units Subcutaneous Q8H     multivitamin w/minerals  1 tablet Oral Daily     nafcillin  2 g Intravenous Q4H     pantoprazole  40 mg Oral QAM AC     perflutren diluted 1mL to 2mL with saline  5 mL Intravenous Once     QUEtiapine  100 mg Oral or Feeding Tube TID     sodium chloride (PF)  10 mL Intracatheter Q8H     sodium chloride (PF)  10 mL Intravenous Once     PRN Meds: sodium chloride 0.9%, acetaminophen **OR** [DISCONTINUED] acetaminophen, albuterol, glucose **OR** dextrose **OR** glucagon, HYDROmorphone, lidocaine 4%, lidocaine (buffered or not buffered), loperamide, naloxone **OR** naloxone **OR** naloxone **OR** naloxone, sodium chloride (PF), sodium chloride (PF)         Data:      All new lab and imaging data was reviewed.   Recent Labs   Lab Test 07/15/21  0009 07/14/21  0506 07/13/21  0732 07/01/21  1839 07/01/21  1239   WBC 7.5 6.7 8.6   < > 21.5*   HGB 8.9* 8.3* 9.2*   < > 6.8*   MCV 80 79 79   < > 67*   * 838* 711*   < > 153   INR  --   --   --   --  1.23*    < > = values in this interval not displayed.      Recent Labs   Lab Test 07/15/21  0735 07/15/21  0232 07/15/21  0009 07/14/21  1502 07/14/21  0506 07/13/21  0732   NA  --   --  141  --  146* 137   POTASSIUM  --   --  4.0  4.0 3.4 2.7* 3.9   CHLORIDE  --   --  114*  --  113* 109   CO2  --   --  23  --  27 21   BUN  --   --  8  --  6* 9   CR  --   --  0.54  --  0.51* 0.47*   ANIONGAP  --   --  4  --  6 7   DEREK  --   --  7.8*  --  7.6* 7.6*   GLC 98 97 125*  --  101* 88     Recent Labs   Lab Test 07/04/21  0537 07/04/21  0220 07/03/21  2210   TROPI 0.431* 0.439* 0.407*

## 2021-07-15 NOTE — CONSULTS
CVTS Consult    Asked to re-evaluate patient for surgical candidacy. Recent TTE showed smaller vegetation with mod/severe MR. She does appear to be well compensated from an MR standpoint. There are still several concerns regarding her surgical candidacy, including her recent stroke, her social situation, and her mental status. We continue to recommend antibiotic therapy at this time, and can consider JAE for monitoring if recommended by cardiology. Were she to become a surgical candidate, we would recommend waiting at least 4 weeks from her stroke and she would need repeat MRI imaging to rule out mycotic aneurysms or brain abscesses. Discussed with Dr. Solares.     Rebecca Carney MD  Cardiothoracic Fellow  [facility] [phone]

## 2021-07-16 ENCOUNTER — APPOINTMENT (OUTPATIENT)
Dept: SPEECH THERAPY | Facility: CLINIC | Age: 41
End: 2021-07-16
Payer: MEDICAID

## 2021-07-16 LAB
GLUCOSE BLDC GLUCOMTR-MCNC: 100 MG/DL (ref 70–99)
GLUCOSE BLDC GLUCOMTR-MCNC: 100 MG/DL (ref 70–99)
GLUCOSE BLDC GLUCOMTR-MCNC: 110 MG/DL (ref 70–99)
MAGNESIUM SERPL-MCNC: 2 MG/DL (ref 1.6–2.3)
PHOSPHATE SERPL-MCNC: 4.5 MG/DL (ref 2.5–4.5)

## 2021-07-16 PROCEDURE — 250N000009 HC RX 250: Performed by: STUDENT IN AN ORGANIZED HEALTH CARE EDUCATION/TRAINING PROGRAM

## 2021-07-16 PROCEDURE — 250N000013 HC RX MED GY IP 250 OP 250 PS 637: Performed by: HOSPITALIST

## 2021-07-16 PROCEDURE — 84100 ASSAY OF PHOSPHORUS: CPT | Performed by: HOSPITALIST

## 2021-07-16 PROCEDURE — 250N000011 HC RX IP 250 OP 636: Performed by: STUDENT IN AN ORGANIZED HEALTH CARE EDUCATION/TRAINING PROGRAM

## 2021-07-16 PROCEDURE — 250N000013 HC RX MED GY IP 250 OP 250 PS 637: Performed by: STUDENT IN AN ORGANIZED HEALTH CARE EDUCATION/TRAINING PROGRAM

## 2021-07-16 PROCEDURE — 83735 ASSAY OF MAGNESIUM: CPT | Performed by: HOSPITALIST

## 2021-07-16 PROCEDURE — 92526 ORAL FUNCTION THERAPY: CPT | Mod: GN

## 2021-07-16 PROCEDURE — 120N000001 HC R&B MED SURG/OB

## 2021-07-16 PROCEDURE — 999N000190 HC STATISTIC VAT ROUNDS

## 2021-07-16 PROCEDURE — 99232 SBSQ HOSP IP/OBS MODERATE 35: CPT | Performed by: HOSPITALIST

## 2021-07-16 PROCEDURE — 999N000216 HC STATISTIC ADULT CD FACE TO FACE-NO CHRG

## 2021-07-16 RX ORDER — PRAMIPEXOLE DIHYDROCHLORIDE 0.12 MG/1
0.12 TABLET ORAL DAILY PRN
Status: DISCONTINUED | OUTPATIENT
Start: 2021-07-16 | End: 2021-07-21

## 2021-07-16 RX ORDER — QUETIAPINE FUMARATE 25 MG/1
25 TABLET, FILM COATED ORAL 2 TIMES DAILY PRN
Status: DISCONTINUED | OUTPATIENT
Start: 2021-07-16 | End: 2021-09-01 | Stop reason: HOSPADM

## 2021-07-16 RX ADMIN — HYDROMORPHONE HYDROCHLORIDE 1 MG: 1 INJECTION, SOLUTION INTRAMUSCULAR; INTRAVENOUS; SUBCUTANEOUS at 08:28

## 2021-07-16 RX ADMIN — HEPARIN SODIUM 5000 UNITS: 5000 INJECTION, SOLUTION INTRAVENOUS; SUBCUTANEOUS at 22:22

## 2021-07-16 RX ADMIN — NAFCILLIN SODIUM 2 G: 2 INJECTION, POWDER, LYOPHILIZED, FOR SOLUTION INTRAMUSCULAR; INTRAVENOUS at 00:38

## 2021-07-16 RX ADMIN — HYDROMORPHONE HYDROCHLORIDE 1 MG: 1 INJECTION, SOLUTION INTRAMUSCULAR; INTRAVENOUS; SUBCUTANEOUS at 13:25

## 2021-07-16 RX ADMIN — HYDROMORPHONE HYDROCHLORIDE 1 MG: 1 INJECTION, SOLUTION INTRAMUSCULAR; INTRAVENOUS; SUBCUTANEOUS at 07:09

## 2021-07-16 RX ADMIN — HYDROMORPHONE HYDROCHLORIDE 1 MG: 1 INJECTION, SOLUTION INTRAMUSCULAR; INTRAVENOUS; SUBCUTANEOUS at 17:59

## 2021-07-16 RX ADMIN — PANTOPRAZOLE SODIUM 40 MG: 40 TABLET, DELAYED RELEASE ORAL at 07:09

## 2021-07-16 RX ADMIN — HYDROMORPHONE HYDROCHLORIDE 1 MG: 1 INJECTION, SOLUTION INTRAMUSCULAR; INTRAVENOUS; SUBCUTANEOUS at 04:34

## 2021-07-16 RX ADMIN — HYDROMORPHONE HYDROCHLORIDE 1 MG: 1 INJECTION, SOLUTION INTRAMUSCULAR; INTRAVENOUS; SUBCUTANEOUS at 00:37

## 2021-07-16 RX ADMIN — Medication 1 TABLET: at 12:12

## 2021-07-16 RX ADMIN — PRAMIPEXOLE DIHYDROCHLORIDE 0.12 MG: 0.12 TABLET ORAL at 20:12

## 2021-07-16 RX ADMIN — HEPARIN SODIUM 5000 UNITS: 5000 INJECTION, SOLUTION INTRAVENOUS; SUBCUTANEOUS at 13:32

## 2021-07-16 RX ADMIN — HEPARIN SODIUM 5000 UNITS: 5000 INJECTION, SOLUTION INTRAVENOUS; SUBCUTANEOUS at 07:09

## 2021-07-16 RX ADMIN — NAFCILLIN SODIUM 2 G: 2 INJECTION, POWDER, LYOPHILIZED, FOR SOLUTION INTRAMUSCULAR; INTRAVENOUS at 16:27

## 2021-07-16 RX ADMIN — BICTEGRAVIR SODIUM, EMTRICITABINE, AND TENOFOVIR ALAFENAMIDE FUMARATE 1 TABLET: 50; 200; 25 TABLET ORAL at 08:19

## 2021-07-16 RX ADMIN — HYDROMORPHONE HYDROCHLORIDE 1 MG: 1 INJECTION, SOLUTION INTRAMUSCULAR; INTRAVENOUS; SUBCUTANEOUS at 22:23

## 2021-07-16 RX ADMIN — NAFCILLIN SODIUM 2 G: 2 INJECTION, POWDER, LYOPHILIZED, FOR SOLUTION INTRAMUSCULAR; INTRAVENOUS at 04:34

## 2021-07-16 RX ADMIN — QUETIAPINE FUMARATE 100 MG: 100 TABLET ORAL at 08:19

## 2021-07-16 RX ADMIN — HYDROMORPHONE HYDROCHLORIDE 1 MG: 1 INJECTION, SOLUTION INTRAMUSCULAR; INTRAVENOUS; SUBCUTANEOUS at 10:55

## 2021-07-16 RX ADMIN — NAFCILLIN SODIUM 2 G: 2 INJECTION, POWDER, LYOPHILIZED, FOR SOLUTION INTRAMUSCULAR; INTRAVENOUS at 12:12

## 2021-07-16 RX ADMIN — NAFCILLIN SODIUM 2 G: 2 INJECTION, POWDER, LYOPHILIZED, FOR SOLUTION INTRAMUSCULAR; INTRAVENOUS at 08:19

## 2021-07-16 RX ADMIN — HYDROMORPHONE HYDROCHLORIDE 1 MG: 1 INJECTION, SOLUTION INTRAMUSCULAR; INTRAVENOUS; SUBCUTANEOUS at 02:14

## 2021-07-16 ASSESSMENT — ACTIVITIES OF DAILY LIVING (ADL)
ADLS_ACUITY_SCORE: 24
ADLS_ACUITY_SCORE: 27
ADLS_ACUITY_SCORE: 25
DEPENDENT_IADLS:: INDEPENDENT
ADLS_ACUITY_SCORE: 25

## 2021-07-16 ASSESSMENT — MIFFLIN-ST. JEOR: SCORE: 1491.37

## 2021-07-16 NOTE — PROVIDER NOTIFICATION
MD Notification    Notified Person: MD    Notified Person Name: Diogo    Notification Date/Time: 7/16/21 1524    Notification Interaction: Do you want patient on tele and q4 neuros still? If so, need tele order. Thanks!    Purpose of Notification:    Orders Received:    Comments:

## 2021-07-16 NOTE — PLAN OF CARE
Summary:  Endocarditis MSSA  DATE & TIME: 7/16/21 (6411-7802)  Cognitive Concerns/ Orientation : A&Ox4, forgetful, sad & crying during shift  BEHAVIOR & AGGRESSION TOOL COLOR: green.  CIWA SCORE: NA   ABNL VS/O2: VSS, RA. LS clear. Denies chest pain or SOB  MOBILITY:Turn and repositioning, pt declined to get out of bed, up with 2 assist/lift  PAIN MANAGMENT: BLE aching pain/back pain, rating 10/10, managed with heat pack and IV dilaudid x3 this shift.   DIET: Level 7 (appetite fair)   BOWEL/BLADDER: incontinent of urine x2, no BM  ABNL LAB/BG:  & 100, Phosphorus 4.5, Mg 2.0  DRAIN/DEVICES: R arm PICC. On IV Abx Nafcillin given x2 this shift  TELEMETRY RHYTHM: Sinus Rhythm.  SKIN: bruises; discoloration black toes  TESTS/PROCEDURES: none  D/C DAY/GOALS/PLACE: Pending; once mental status stable and antibiotic plan in place.  ID is following.   OTHER IMPORTANT INFO: Generalized weakness, PT/OT/SLP following.

## 2021-07-16 NOTE — CONSULTS
Care Management Initial Consult    General Information  Assessment completed with: Patient,    Type of CM/SW Visit: Offer D/C Planning    Primary Care Provider verified and updated as needed: Yes   Readmission within the last 30 days: no previous admission in last 30 days      Reason for Consult: care coordination/care conference, discharge planning, financial concerns, insurance concerns  Advance Care Planning: Advance Care Planning Reviewed: no concerns identified          Communication Assessment  Patient's communication style: spoken language (English or Bilingual)    Hearing Difficulty or Deaf: no   Wear Glasses or Blind: no    Cognitive  Cognitive/Neuro/Behavioral: .WDL except, arousability  Level of Consciousness: alert  Arousal Level: opens eyes spontaneously  Orientation: oriented x 4  Mood/Behavior: sad  Best Language: 0 - No aphasia  Speech: clear    Living Environment:   People in home: alone     Current living Arrangements: homeless      Able to return to prior arrangements: no       Family/Social Support:  Care provided by: self  Provides care for: no one, unable/limited ability to care for self  Marital Status: Single  Grandparent(s)          Description of Support System: Uninvolved    Support Assessment: Lacks necessary supervision and assistance    Current Resources:   Patient receiving home care services: No     Community Resources:    Equipment currently used at home: none  Supplies currently used at home:      Employment/Financial:  Employment Status: disabled        Financial Concerns: insurance, none, unemployed   Referral to Financial Counselor: Yes  Finance Comments: information sent to [facility]    Lifestyle & Psychosocial Needs:  Social Determinants of Health     Tobacco Use: High Risk     Smoking Tobacco Use: Current Every Day Smoker     Smokeless Tobacco Use: Unknown   Alcohol Use:      Frequency of Alcohol Consumption:      Average Number of Drinks:      Frequency of Binge Drinking:     Financial Resource Strain:      Difficulty of Paying Living Expenses:    Food Insecurity:      Worried About Running Out of Food in the Last Year:      Ran Out of Food in the Last Year:    Transportation Needs:      Lack of Transportation (Medical):      Lack of Transportation (Non-Medical):    Physical Activity:      Days of Exercise per Week:      Minutes of Exercise per Session:    Stress:      Feeling of Stress :    Social Connections:      Frequency of Communication with Friends and Family:      Frequency of Social Gatherings with Friends and Family:      Attends Oriental orthodox Services:      Active Member of Clubs or Organizations:      Attends Club or Organization Meetings:      Marital Status:    Intimate Partner Violence:      Fear of Current or Ex-Partner:      Emotionally Abused:      Physically Abused:      Sexually Abused:    Depression:      PHQ-2 Score:    Housing Stability:      Unable to Pay for Housing in the Last Year:      Number of Places Lived in the Last Year:      Unstable Housing in the Last Year:        Functional Status:  Prior to admission patient needed assistance:   Dependent ADLs:: Independent  Dependent IADLs:: Independent       Mental Health Status:  Mental Health Status: Current Concern  Mental Health Management: Psychiatrist    Chemical Dependency Status:  Chemical Dependency Status: Current Concern             Values/Beliefs:  Spiritual, Cultural Beliefs, Oriental orthodox Practices, Values that affect care: no               Additional Information:  Met with patient to discuss plan of care and discharge plans. Patient presently lethargic but able to answer pertinent questions. Patient states she is homeless . Patient has 7 children in varying ages who lives with their grandmother.   Patient states her use of  street drugs has brought her to this situation.  Patient stating she was independent with mobility.. Able to manage her own medical appointments and treatments. Patient claims she has  not been through a [facility] program in the past. She is unsure of her disposition at this time. Patient would prefer living with her  Mother at discharge.Patient seems very unsteady and would need therapy recommendation for disposition.    Rosalina Wallace RN  Inpatient Care Coordinator  [facility]  # [phone]

## 2021-07-16 NOTE — PROGRESS NOTES
[facility]    Infectious Disease Progress Note    Date of Service : 07/16/2021     Assessment :  1. S.aureus MSSA native mitral valve endocarditis with septic shock in the setting of IVDA complicated by embolic L parietal lobe infarction and multiple peripheral emboli.   (TTE shows a large mobile 1.4 cm vegetation on the posterior mitral valve leaflet with extension to left atrium and small ASD per cardiology notes , no valvular abscess and no additional valvular involvement. Pericardial effusion is small). No seeding on CT abdomen but has peripheral emboli.  2. Untreated HIV diagnosed 2018, CD4 count maintained at >500, VL pending  3. Hepatitis C Ab+ but RNA -.   4. Severe sepsis requiring pressor support and multiorgan failure. Off pressors now  5. Multiple peripheral emboli. Right finger swelling and discoloration of left 3rd toe. US right finger without evidence of infection but sub-optimal study. Appears stable  6. FORD related to sepsis- resolved  7. Embolic stroke (small ASD with Left to right shunt)  8. Klebsiella pneumoniae UTI treated  9. Hypernatremia and hypokalemia  10. Severe anemia multifactorial. Has baseline anemia, exacerbated by chronic disease  11. Polysubstance abuse  12. Splenomegaly   13. LFT abnormality resolved    Recommendations:  1. Nafcillin  2. Biktarvy  3. Follow HIV genotype  4. Blood cxs - since 7/6  5.  Supportive care  6. UA not impressive, UC with E coli, ESBL, patient has no UTI symptoms, will hold on antibiotics   Follow clinical status and labs  7. Noted repeat eval by CV surgery   8. Anticipate a long IV antibiotics course.     Bibi Amador MD    Interval History   Awake   Oriented   No new complaint other than chronic back pain     Antimicrobial therapy  7/2 Nafcillin  7/8 Biktarvy  Prior  7/4-7/7 Cipro  7/1-7/2 Vancomycin, zosyn    Physical Exam   Temp: 98.5  F (36.9  C) Temp src: Oral BP: (!) 130/92 Pulse: 99   Resp: 18 SpO2: 97 % O2 Device: None  (Room air)    Vitals:    07/13/21 0200 07/14/21 0445 07/16/21 0615   Weight: 89 kg (196 lb 3.4 oz) 84.2 kg (185 lb 10 oz) 76.8 kg (169 lb 5 oz)     Vital Signs with Ranges  Temp:  [98  F (36.7  C)-99.6  F (37.6  C)] 98.5  F (36.9  C)  Pulse:  [] 99  Resp:  [16-18] 18  BP: (106-130)/(67-92) 130/92  SpO2:  [97 %-99 %] 97 %    GENERAL APPEARANCE: awake, alert x 1   EYES: conjunctivae and sclerae normal  NECK: no adenopathy  RESP: lungs clear to auscultation - no rales, rhonchi or wheezes  CV: regular rates and rhythm, murmur  ABDOMEN: soft, umbilical hernia  MS: extremities normal- no gross deformities noted  SKIN: right finger with swelling and redness, multiple osler's nodes and splinter hemorrhages, multiple embolic infarcts on toes, black discoloration of left toe    Other:    Medications       bictegravir-emtricitabine-tenofovir  1 tablet Oral Daily     heparin ANTICOAGULANT  5,000 Units Subcutaneous Q8H     multivitamin w/minerals  1 tablet Oral Daily     nafcillin  2 g Intravenous Q4H     pantoprazole  40 mg Oral QAM AC     perflutren diluted 1mL to 2mL with saline  5 mL Intravenous Once     QUEtiapine  100 mg Oral or Feeding Tube TID     sodium chloride (PF)  10 mL Intracatheter Q8H     sodium chloride (PF)  10 mL Intravenous Once       Data   All microbiology laboratory data reviewed.  Recent Labs   Lab Test 07/15/21  0009 07/14/21  0506 07/13/21  0732   WBC 7.5 6.7 8.6   HGB 8.9* 8.3* 9.2*   HCT 29.6* 27.1* 29.6*   MCV 80 79 79   * 838* 711*     Recent Labs   Lab Test 07/15/21  0009 07/14/21  0506 07/13/21  0732   CR 0.54 0.51* 0.47*     No lab results found.  Recent Labs   Lab Test 07/10/21  2243 07/09/21  0454 07/09/21  0415 07/08/21  1417 07/08/21  0500 07/07/21  0450 07/06/21  0500 07/05/21  1232 07/05/21  1104   CULT >100,000 colonies/mL  Escherichia coli ESBL  ESBL (extended beta lactamase) producing organisms require contact precautions.  *  Critical Value/Significant Value called to and  read back by  Luanne Bettencourt RN @ 2200 7/13/21 TM.   No growth No growth No growth No growth No growth  No growth No growth  No growth Cultured on the 2nd day of incubation:  Staphylococcus epidermidis  *  Critical Value/Significant Value, preliminary result only, called to and read back by  IVETT WARE RN 07/06/21 1258 EH.    Cultured on the 5th day of incubation:  Staphylococcus aureus  Susceptibility testing done on previous specimen  *  Critical Value/Significant Value called to and read back by  Cherelle Smith RN 1043 7/11/21 AM   Cultured on the 2nd day of incubation:  Staphylococcus aureus  Susceptibility testing done on previous specimen  *  Critical Value/Significant Value, preliminary result only, called to and read back by  Sin Jack RN at 0448 7/7/21 hg       Component      Latest Ref Rng & Units 7/6/2021   IFC Specimen       Blood   CD3 Mature T      49 - 84 % 76   CD4 [place] T      28 - 63 % 34   CD8 Suppressor T      10 - 40 % 37   CD4:CD8 Ratio      1.40 - 2.60 0.92 (L)   Absolute CD3      603 - 2,990 cells/uL 1,317   Absolute CD4      441 - 2,156 cells/uL 579   Absolute CD8      125 - 1,312 cells/uL 640     Component      Latest Ref Rng & Units 7/2/2021   HIV 1 Result      NR:Nonreactive Reactive (A)   HIV 2 Result      NR:Nonreactive Indeterminate (A)   HIV Interpretation       See Comment Below   HIV Antigen Antibody Combo      NR:Nonreactive     Reactive (A)   Hepatitis B Surface Antibody      <8.00 m[IU]/mL 0.58   Hep B Surface Agn      NR:Nonreactive Nonreactive   Hepatitis C Antibody      NR:Nonreactive Reactive (A)   Susceptibility                       Staphylococcus aureus (1) Staphylococcus aureus (3)       BOBBY BOBBY       CLINDAMYCIN <=0.25 ug/mL Sensitive <=0.25 ug/mL Sensitive       ERYTHROMYCIN <=0.25 ug/mL Sensitive <=0.25 ug/mL Sensitive       GENTAMICIN <=0.5 ug/mL Sensitive <=0.5 ug/mL Sensitive       OXACILLIN 0.5 ug/mL Sensitive 0.5 ug/mL Sensitive       TETRACYCLINE <=1  ug/mL Sensitive <=1 ug/mL Sensitive       Trimethoprim/Sulfa <=0.5/9.5 u... Sensitive <=0.5/9.5 u... Sensitive       VANCOMYCIN 1 ug/mL Sensitive 1 ug/mL Sensitive           7/5 JAE  Interpretation Summary     1. The left ventricle is normal in structure, function and size. The visual  ejection fraction is estimated at 60%.  2. The right ventricle is normal in structure, function and size.  3. Small atrial septal defect, secundum type 5mm in diameter. Left to right  shunt. Rim appears adequate in size for closure device.  4. Moderate to large mobile vegetation (14mm long, 6mm wide) of the P2 segment  of the mitral leaflet. No valve perforation. There is moderate (2+) mitral  regurgitation.  5. Small pericardial effusion     7/6 US extremity R hand  US EXTREMITY NON VASCULAR RIGHT 7/6/2021 11:39 AM      HISTORY: Right 4th finger concern for septic arthritis/osteomyelitis-  Please do US right hand 4th finger.      FINDINGS: Sonography was performed in the area of clinical concern  (fourth finger).                                                                      IMPRESSION: No fluid collection or significant joint effusion is  appreciated sonographically. Clinical follow-up recommended. If  clinical suspicion persists, MRI could be performed.     7/1TTE  Interpretation Summary     Left ventricular systolic function is normal.  The visual ejection fraction is 55-60%.  The right ventricle is mildly dilated.  The right ventricular systolic function is normal.  There is a mobile linear echodensity measuring 1.4 cm x 0.6 cm on the atrial  side of the posterior mitral valve leaflet.  Other valve structures appear normal without significant dysfunction or  obvious valvular vegetations.  The inferior pericardium appears moderately thickened and echobright which may  represent active pericarditis.  Large localized pericardial effusion (2.7 cm) posteriorly/laterally with  mobile free floating echodense material which could  represent fibrinous  stranding in setting of inflammatory pericarditis or infectious material if  infectious pericardial effusion.  Dilation of the inferior vena cava is present with abnormal respiratory  variation in diameter.  Small left pleural effusion     No prior for comparison.     7/1 CT head  CT SCAN OF THE HEAD WITHOUT CONTRAST   7/1/2021 1:47 PM      HISTORY: Delirium; altered mental status     TECHNIQUE:  Axial images of the head and coronal reformations without  IV contrast material.  Radiation dose for this scan was reduced using  automated exposure control, adjustment of the mA and/or kV according  to patient size, or iterative reconstruction technique.     COMPARISON: None.     FINDINGS: There is a focal 2 cm area of hypodensity involving gray and  white matter in the medial left parietal lobe consistent with acute to  subacute ischemic infarct. There is a minimal incidental arachnoid  cyst in the anterior portion of left middle cranial fossa. Ventricles  and subarachnoid spaces are otherwise within normal limits. There is  no evidence for intracranial hemorrhage, significant mass effect, or  skull fracture. Exam is mildly limited by motion artifact. Visualized  paranasal sinuses and mastoid air cells are clear.                                                                      IMPRESSION: Focal hypodensity in the medial left parietal lobe  consistent with acute to subacute ischemic infarct.     7/1 Ct abdomen/pelvis                                                           IMPRESSION:   1.  Patchy hypoenhancement in the mid and upper poles of the right  kidney posteriorly, suspicious for pyelonephritis.  2.  Moderate-sized fat-containing paraumbilical hernia.  3.  Mild splenomegaly.  4.  There are possible small gallstones within a contracted  gallbladder.    Attestation:  Total time on the floor involved in the patient's care: 35 minutes. Total time spent in counseling/care coordination: >50%

## 2021-07-16 NOTE — PLAN OF CARE
DATE & TIME: 7/15/21, 9560 -5085   Cognitive Concerns/ Orientation : A&O x 2, disoriented to time and situation   BEHAVIOR & AGGRESSION TOOL COLOR: Green  CIWA SCORE: NA   ABNL VS/O2: VSS on room air  MOBILITY: Not ou of bed this shift. Turns independently in bed  PAIN MANAGMENT: Prn IV Dilaudid given for pain to lower extremities as well as generalized pain  DIET: Level 6  BOWEL/BLADDER: Incontinent of urine. No BM  ABNL LAB/BG:   DRAIN/DEVICES: Right arm PICC for antibiotic  TELEMETRY RHYTHM: NSR  SKIN: Bruises and scabs. Black discoloration to bilateral great toes and left middle toes   TESTS/PROCEDURES: NA  D/C DATE: Pending  OTHER IMPORTANT INFO: Contact precautions maintained. ID following. Remains weak and lethargic

## 2021-07-16 NOTE — PLAN OF CARE
Summary: Endocarditis MSSA  DATE & TIME: 7/16/21 3833-4801  Cognitive Concerns/ Orientation : A&Ox4, forgetful, sad & crying during shift  BEHAVIOR & AGGRESSION TOOL COLOR: Green  ABNL VS/O2: VSS on RA ex tachycardic (114)  MOBILITY: T&R self, pt declined to get out of bed, up with 2 assist/lift  PAIN MANAGMENT: C/O restless legs/ BLE pain 10/10. PRN IV Dilaudid x1 got order for PRN Mirapex (restless legs)  DIET: Level 7 (appetite fair)   BOWEL/BLADDER: Incontinent, 1 large/loose BM  ABNL LAB/BG: N/A  DRAIN/DEVICES: R arm PICC TKO @ 15mL/h with int abx, purewick in place  SKIN: Bruises; discoloration black toes, blister on left finger  TESTS/PROCEDURES:  ECHO (7/15)  D/C DAY/GOALS/PLACE: Pending; once mental status stable and antibiotic plan in place.  OTHER IMPORTANT INFO: Patient often seeking out Dilaudid. Offered many other recommendations for subjective symptoms, declined. Generalized weakness, PT/OT/SLP/ID following.

## 2021-07-16 NOTE — PROVIDER NOTIFICATION
MD Notification    Notified Person: MD    Notified Person Name: Katalina    Notification Date/Time: 7/16/21 3372    Notification Interaction: Patient requesting something for restless legs. CC requesting order for PT/OT. Thanks!    Purpose of Notification:    Orders Received:    Comments:

## 2021-07-16 NOTE — CONSULTS
7/16/2021    CD consult acknowledged and closing.   Pt not appropriate for DAKOTAH eval/interview.   Pt is lethargic, confused cognitively. Medically pt has multiple concerns that would need to be addressed before DAKOTAH treatment could be discussed. If on IV antibiotics, consult should not be ordered until she has 5 days left of antibiotics.  Patients must be able to actively and appropriately participate in the evaluation process including: maintaining cognitive focus & recall over hour-long interview, providing responses that are coherent and accurate, and managing emotional upset.  CD consult can be reordered when appropriate.     BIN Chavez  [email or web address]  [phone]

## 2021-07-16 NOTE — PROGRESS NOTES
"[facility]  Hospitalist Progress Note        Darwin Darden MD   07/16/2021        Interval History:        - re-evaluated by CT surgery and suggested continued antibiotic therapy at this time; per CT surgery \"Were she to become a surgical candidate, we would recommend waiting at least 4 weeks from her stroke and she would need repeat MRI imaging to rule out mycotic aneurysms or brain abscesses\"; will have follow up with CT surgery as outpatient  - chemical dependency did not feel she is appropriate for CD assessment at this time and recommended consultation towards the end of her antibiotic course         Assessment and Plan:        Manas Hernandez is a 41 year old female with h/o untreated HIV, hepatitis C who was admitted on 7/1/2021 for MSSA mitral valve endocarditis resulting in septic shock complicated by embolic left parietal lobe infarctions and multiple peripheral emboli.    She presented initially with encephalopathy, and shock.  She was intubated in ED on 7/1/2021 subsequently extubated 7/11/2021.  Blood cultures turned positive on hospital day 1 with MSSA.  Urine culture was positive for Klebsiella.  She was treated initially with ceftriaxone and vancomycin, and has since been narrowed to nafcillin per ID. AMS has complicated stay as she has pulled multiple lines causing her to loose IV access.    Care transferred to Hospitalist on 7/13/21     MSSA mitral valve endocarditis  Septic shock, resolved  Probable myopericarditis  Small pericardial effusion  - Likely from IVDU. Multiple peripheral sites of spread including L parietal lobe  - JAE 7/5 noted with small ASD, mod to large mobile vegetation (14mm long, 6mm wide) of the P2 segment of the mitral leaflet. No valve perforation; mod MR  - blood cultures on admission with MSSA; blood cultures have been negative since 7/6  - infectious disease following; antibiotic has been narrowed down to Nafcillin  - low grade fever trending " "down, afebrile since 7/13; no leukocytosis  - abx was briefly interrupted as she had been pulling out her access sites; midline placed on 7/13/21  - Cardiology signed off 7/8/21; limited ECHO repeated 7/15 to reassess pericardial fluid 7/15 per cardiology and noted:  organized effusion posteriorly, measuring 1.2-1.5cm, no evidence of tamponade; small mobile vegetation noted attached to the mitral leaflet, mod to severe MR; compared to 7/8/21, effusion size is probably stable, Degree of MR is worse, Vegetation is smaller  - re-evaluated by CT surgery and suggested continued antibiotic therapy at this time; per CT surgery \"Were she to become a surgical candidate, we would recommend waiting at least 4 weeks from her stroke and she would need repeat MRI imaging to rule out mycotic aneurysms or brain abscesses\"; will have follow up with CT surgery as outpatient    IVDU  Polysubstance use disorder  - chem dep consulted 7/15 but chemical dependency did not feel she is appropriate for CD assessment at this time and recommended consultation towards the end of her antibiotic course     HIV, untreated  - CD4 count greater than 500, viral load 3695  - Started Biktarvy 7/8; ID following     Toxic/septic/metabolic encephalopathy, acute, improving  - Likely encephalopathy due to sepsis, drug use, CVA, prolonged ICU stay; had been pulling lines due to AMS, now mentation more stable with no agitations  - mentation improving, remorseful of her drug use  - Delirium protocol  - on quetiapine 3 times daily; will change to BID prn for agitation     Ischemic CVA, 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  Completed course of treatment with ceftriaxone  - repeat urine cultures 7/10 with ESBL E coli  - given unimpressive UA and no UTI symptoms , ID suggest holding off on abx for ESBL UTI     Acute hypoxemic respiratory failure, resolved  Intubated 7/1/2021.  Extubated 7/11/2021.  Likely related to septic shock from MSSA mitral valve endocarditis.; Respiratory status stable     FORD, resolved  Hypokalemia  - FORD due to sepsis with Cr on 1.29 on presentation--- improved  - K -- corrected per supple protocol     Chronic anemia  Baseline hemoglobin approximately 7.  Likely related to chronic disease, untreated HIV.  -Monitor hemoglobin; stable around 7-8     Likely severe malnutrition  -Nutrition followed , but due to uncertain recent weights unable to make complete diagnosis  -Encourage p.o., with supplements     Prior hepatitis C infection  Antibody positive but RNA negative  -Noted     Orders Placed This Encounter      Combination Diet Easy to Chew (level 7); Thin Liquids (level 0); No Straws     DVT Prophylaxis: Heparin SQ    Code Status: Full Code        Disposition Plan     Expected discharge: likely >2-3 days to acute rehab pending clinical stability, antibiotic plan established per ID; SW following for disposition    Page Me (7 am to 6 pm)                   Physical Exam:      Blood pressure 106/67, pulse 105, temperature 98.3  F (36.8  C), temperature source Oral, resp. rate 18, height 1.727 m (5' 7.99\"), weight 76.8 kg (169 lb 5 oz), SpO2 98 %.  Vitals:    07/13/21 0200 07/14/21 0445 07/16/21 0615   Weight: 89 kg (196 lb 3.4 oz) 84.2 kg (185 lb 10 oz) 76.8 kg (169 lb 5 oz)     Vital Signs with Ranges  Temp:  [98  F (36.7  C)-99.6  F (37.6  C)] 98.3  F (36.8  C)  Pulse:  [] 105  Resp:  [16-18] 18  BP: (106-129)/(67-92) 106/67  SpO2:  [98 %-99 %] 98 " %  I/O's Last 24 hours  I/O last 3 completed shifts:  In: 1260 [P.O.:860; I.V.:400]  Out: -     Constitutional: Alert, awake and oriented X 3, seems slightly confused with dates but knows month and year; resting comfortably in no apparent distress but tearful/remorseful   HEENT: Pupils equal and reactive to light and accomodation, neck supple    Oral cavity: Moist mucosa   Cardiovascular: Normal s1 s2, regular rate and rhythm, no murmur   Lungs: B/l clear to auscultation, no wheezes or crepitations   Abdomen: Soft, nt, nd, no guarding, rigidity or rebound; BS +   LE : No edema   Musculoskeletal: Power 5/5 in all extremities   Neuro: No focal neurological deficits noted   Psychiatry: normal mood and affect  Skin: right finger 4th digit tip nolvia node, multiple splinter hemorrhages of BLE toes, and left hand. Black discoloration of toes noted                Medications:          bictegravir-emtricitabine-tenofovir  1 tablet Oral Daily     heparin ANTICOAGULANT  5,000 Units Subcutaneous Q8H     multivitamin w/minerals  1 tablet Oral Daily     nafcillin  2 g Intravenous Q4H     pantoprazole  40 mg Oral QAM AC     perflutren diluted 1mL to 2mL with saline  5 mL Intravenous Once     QUEtiapine  100 mg Oral or Feeding Tube TID     sodium chloride (PF)  10 mL Intracatheter Q8H     sodium chloride (PF)  10 mL Intravenous Once     PRN Meds: sodium chloride 0.9%, acetaminophen **OR** [DISCONTINUED] acetaminophen, albuterol, glucose **OR** dextrose **OR** glucagon, HYDROmorphone, lidocaine 4%, lidocaine (buffered or not buffered), loperamide, naloxone **OR** naloxone **OR** naloxone **OR** naloxone, sodium chloride (PF), sodium chloride (PF)         Data:      All new lab and imaging data was reviewed.   Recent Labs   Lab Test 07/15/21  0009 07/14/21  0506 07/13/21  0732 07/01/21  1839 07/01/21  1239   WBC 7.5 6.7 8.6   < > 21.5*   HGB 8.9* 8.3* 9.2*   < > 6.8*   MCV 80 79 79   < > 67*   * 838* 711*   < > 153   INR  --    --   --   --  1.23*    < > = values in this interval not displayed.      Recent Labs   Lab Test 07/16/21  0206 07/15/21  2113 07/15/21  1705 07/15/21  0009 07/14/21  1502 07/14/21  0506 07/14/21  0506 07/13/21  0732   NA  --   --   --  141  --   --  146* 137   POTASSIUM  --   --   --  4.0  4.0 3.4  --  2.7* 3.9   CHLORIDE  --   --   --  114*  --   --  113* 109   CO2  --   --   --  23  --   --  27 21   BUN  --   --   --  8  --   --  6* 9   CR  --   --   --  0.54  --   --  0.51* 0.47*   ANIONGAP  --   --   --  4  --   --  6 7   DEREK  --   --   --  7.8*  --   --  7.6* 7.6*   * 90 127* 125*  --    < > 101* 88    < > = values in this interval not displayed.     Recent Labs   Lab Test 07/04/21  0537 07/04/21  0220 07/03/21  2210   TROPI 0.431* 0.439* 0.407*

## 2021-07-17 PROCEDURE — 250N000013 HC RX MED GY IP 250 OP 250 PS 637: Performed by: STUDENT IN AN ORGANIZED HEALTH CARE EDUCATION/TRAINING PROGRAM

## 2021-07-17 PROCEDURE — 250N000011 HC RX IP 250 OP 636: Performed by: HOSPITALIST

## 2021-07-17 PROCEDURE — 250N000011 HC RX IP 250 OP 636: Performed by: STUDENT IN AN ORGANIZED HEALTH CARE EDUCATION/TRAINING PROGRAM

## 2021-07-17 PROCEDURE — 99232 SBSQ HOSP IP/OBS MODERATE 35: CPT | Performed by: HOSPITALIST

## 2021-07-17 PROCEDURE — 250N000009 HC RX 250: Performed by: STUDENT IN AN ORGANIZED HEALTH CARE EDUCATION/TRAINING PROGRAM

## 2021-07-17 PROCEDURE — 999N000190 HC STATISTIC VAT ROUNDS

## 2021-07-17 PROCEDURE — 120N000001 HC R&B MED SURG/OB

## 2021-07-17 RX ORDER — HYDROMORPHONE HYDROCHLORIDE 1 MG/ML
0.5 INJECTION, SOLUTION INTRAMUSCULAR; INTRAVENOUS; SUBCUTANEOUS
Status: DISCONTINUED | OUTPATIENT
Start: 2021-07-17 | End: 2021-07-18

## 2021-07-17 RX ADMIN — HEPARIN SODIUM 5000 UNITS: 5000 INJECTION, SOLUTION INTRAVENOUS; SUBCUTANEOUS at 14:19

## 2021-07-17 RX ADMIN — PANTOPRAZOLE SODIUM 40 MG: 40 TABLET, DELAYED RELEASE ORAL at 06:49

## 2021-07-17 RX ADMIN — NAFCILLIN SODIUM 2 G: 2 INJECTION, POWDER, LYOPHILIZED, FOR SOLUTION INTRAMUSCULAR; INTRAVENOUS at 16:31

## 2021-07-17 RX ADMIN — NAFCILLIN SODIUM 2 G: 2 INJECTION, POWDER, LYOPHILIZED, FOR SOLUTION INTRAMUSCULAR; INTRAVENOUS at 19:57

## 2021-07-17 RX ADMIN — ACETAMINOPHEN 650 MG: 325 TABLET, FILM COATED ORAL at 20:59

## 2021-07-17 RX ADMIN — NAFCILLIN SODIUM 2 G: 2 INJECTION, POWDER, LYOPHILIZED, FOR SOLUTION INTRAMUSCULAR; INTRAVENOUS at 12:21

## 2021-07-17 RX ADMIN — Medication 1 TABLET: at 12:22

## 2021-07-17 RX ADMIN — HYDROMORPHONE HYDROCHLORIDE 0.5 MG: 1 INJECTION, SOLUTION INTRAMUSCULAR; INTRAVENOUS; SUBCUTANEOUS at 08:49

## 2021-07-17 RX ADMIN — HYDROMORPHONE HYDROCHLORIDE 1 MG: 1 INJECTION, SOLUTION INTRAMUSCULAR; INTRAVENOUS; SUBCUTANEOUS at 06:23

## 2021-07-17 RX ADMIN — BICTEGRAVIR SODIUM, EMTRICITABINE, AND TENOFOVIR ALAFENAMIDE FUMARATE 1 TABLET: 50; 200; 25 TABLET ORAL at 08:48

## 2021-07-17 RX ADMIN — NAFCILLIN SODIUM 2 G: 2 INJECTION, POWDER, LYOPHILIZED, FOR SOLUTION INTRAMUSCULAR; INTRAVENOUS at 08:49

## 2021-07-17 RX ADMIN — NAFCILLIN SODIUM 2 G: 2 INJECTION, POWDER, LYOPHILIZED, FOR SOLUTION INTRAMUSCULAR; INTRAVENOUS at 04:13

## 2021-07-17 RX ADMIN — HYDROMORPHONE HYDROCHLORIDE 1 MG: 1 INJECTION, SOLUTION INTRAMUSCULAR; INTRAVENOUS; SUBCUTANEOUS at 04:10

## 2021-07-17 RX ADMIN — HYDROMORPHONE HYDROCHLORIDE 0.5 MG: 1 INJECTION, SOLUTION INTRAMUSCULAR; INTRAVENOUS; SUBCUTANEOUS at 19:51

## 2021-07-17 RX ADMIN — NAFCILLIN SODIUM 2 G: 2 INJECTION, POWDER, LYOPHILIZED, FOR SOLUTION INTRAMUSCULAR; INTRAVENOUS at 00:12

## 2021-07-17 RX ADMIN — ACETAMINOPHEN 650 MG: 325 TABLET, FILM COATED ORAL at 00:14

## 2021-07-17 RX ADMIN — HYDROMORPHONE HYDROCHLORIDE 0.5 MG: 1 INJECTION, SOLUTION INTRAMUSCULAR; INTRAVENOUS; SUBCUTANEOUS at 12:19

## 2021-07-17 RX ADMIN — HYDROMORPHONE HYDROCHLORIDE 0.5 MG: 1 INJECTION, SOLUTION INTRAMUSCULAR; INTRAVENOUS; SUBCUTANEOUS at 16:31

## 2021-07-17 RX ADMIN — HEPARIN SODIUM 5000 UNITS: 5000 INJECTION, SOLUTION INTRAVENOUS; SUBCUTANEOUS at 06:23

## 2021-07-17 RX ADMIN — PANTOPRAZOLE SODIUM 40 MG: 40 TABLET, DELAYED RELEASE ORAL at 06:23

## 2021-07-17 RX ADMIN — HEPARIN SODIUM 5000 UNITS: 5000 INJECTION, SOLUTION INTRAVENOUS; SUBCUTANEOUS at 21:00

## 2021-07-17 ASSESSMENT — ACTIVITIES OF DAILY LIVING (ADL)
ADLS_ACUITY_SCORE: 20

## 2021-07-17 NOTE — PROVIDER NOTIFICATION
MD Notification    Notified Person: IV/PICC Nurse    Notified Person Name: Preethi    Notification Date/Time: 7/17/21 1432    Notification Interaction: Can I get help with an IV for antibiotics until new PICC placed? Thanks!    Purpose of Notification:    Orders Received: Unable to assist.    Comments:

## 2021-07-17 NOTE — PLAN OF CARE
SLP: Attempted to see patient but she was sleeping and then with nursing cares.       Attempted PM session and pt was sleeping.

## 2021-07-17 NOTE — PLAN OF CARE
Summary: Endocarditis MSSA  DATE & TIME: 7/17/21 9561-9542  Cognitive Concerns/ Orientation : A&Ox4, tearful  BEHAVIOR & AGGRESSION TOOL COLOR: Green  ABNL VS/O2: VSS on RA  MOBILITY: T&R self, pt declined to get out of bed, up with 2 assist/lift  PAIN MANAGMENT: C/O restless legs/ BLE pain 9/10. PRN IV 0.5 Dilaudid x3   DIET: Level 7, poor oral intake  BOWEL/BLADDER: Incontinent. Purewick in place. Large/soft BM x2  ABNL LAB/BG: N/A  DRAIN/DEVICES: L PIV SL with int abx  SKIN: Bruises; discoloration black toes, blister on left finger  TESTS/PROCEDURES:  Echo (7/15)  D/C DAY/GOALS/PLACE: Pending; once mental status stable and antibiotic plan in place.  OTHER IMPORTANT INFO: Generalized weakness, PT/OT/SLP/ID following. Frequently seeking out Dilaudid. Has PRN Mirapex for restless legs.

## 2021-07-17 NOTE — PROGRESS NOTES
[facility]  Hospitalist Progress Note        Darwin Darden MD   07/17/2021        Interval History:        - pulled out PICC again on 7/16 stating it was bothering her; IV replaced  - using IV dilaudid regularly         Assessment and Plan:        Manas Hernandez is a 41 year old female with h/o untreated HIV, hepatitis C who was admitted on 7/1/2021 for MSSA mitral valve endocarditis resulting in septic shock complicated by embolic left parietal lobe infarctions and multiple peripheral emboli.    She presented initially with encephalopathy, and shock.  She was intubated in ED on 7/1/2021 subsequently extubated 7/11/2021.  Blood cultures turned positive on hospital day 1 with MSSA.  Urine culture was positive for Klebsiella.  She was treated initially with ceftriaxone and vancomycin, and has since been narrowed to nafcillin per ID. AMS has complicated stay as she has pulled multiple lines causing her to loose IV access.    Care transferred to Hospitalist on 7/13/21     MSSA mitral valve endocarditis  Septic shock, resolved  Probable myopericarditis  Small pericardial effusion  - Likely from IVDU. Multiple peripheral sites of spread including L parietal lobe  - JAE 7/5 noted with small ASD, mod to large mobile vegetation (14mm long, 6mm wide) of the P2 segment of the mitral leaflet. No valve perforation; mod MR  - blood cultures on admission with MSSA; blood cultures have been negative since 7/6  - infectious disease following; antibiotic has been narrowed down to Nafcillin  - low grade fever trending down, afebrile since 7/13; no leukocytosis  - abx briefly interrupted as she had been pulling out her access sites; midline placed on 7/13/21-- pulled out again on 7/16-- ordered to replace PICC for 7/18  - Cardiology signed off 7/8/21; limited ECHO repeated 7/15 to reassess pericardial fluid 7/15 per cardiology and noted:  organized effusion posteriorly, measuring 1.2-1.5cm, no evidence of  "tamponade; small mobile vegetation noted attached to the mitral leaflet, mod to severe MR; compared to 7/8/21, effusion size is probably stable, Degree of MR is worse, Vegetation is smaller  - re-evaluated by CT surgery and suggested continued antibiotic therapy at this time; per CT surgery \"Were she to become a surgical candidate, we would recommend waiting at least 4 weeks from her stroke and she would need repeat MRI imaging to rule out mycotic aneurysms or brain abscesses\"; will have follow up with CT surgery as outpatient    IVDU  Polysubstance use disorder  - chem dep consulted 7/15 but chemical dependency did not feel she is appropriate for CD assessment at this time and recommended consultation towards the end of her antibiotic course  - has been regularly using IV dilaudid; will decrease frequency to q 3 hrs prn     HIV, untreated  - CD4 count greater than 500, viral load 3695  - Started Biktarvy 7/8; ID following     Toxic/septic/metabolic encephalopathy, acute, improving  - Likely encephalopathy due to sepsis, drug use, CVA, prolonged ICU stay; had been pulling lines due to AMS, now mentation more stable with no agitations  - mentation improving, remorseful of her drug use but at times seems indifferent to situation and has been pulling lines at times  - Delirium protocol  - was started on quetiapine 3 times daily in ICU; on 7/16 changed to BID prn for agitation but has been calm and cooperative since transfer out of ICU     Ischemic CVA, embolic stroke due to infective endocarditis  - CT head on admission notable for new acute to subacute PCA territory ischemic stroke  - MRI 7/3 noted with progressive, multifocal, acute infarcts without MR evidence for hemorrhage or midline shift  - Patient does have small left-to-right shunt on JAE, but most likely from embolic spread from endocarditis  - was evaluated by neurology, to continue antibiotics; per neuro to avoid anticoagulation (except DVT prophylaxis " "dose)     Pyelonephritis  - Urine cultures positive for Klebsiella.  Completed course of treatment with ceftriaxone  - repeat urine cultures 7/10 with ESBL E coli  - given unimpressive UA and no UTI symptoms , ID suggest holding off on abx for ESBL UTI     Acute hypoxemic respiratory failure, resolved  Intubated 7/1/2021.  Extubated 7/11/2021.  Likely related to septic shock from MSSA mitral valve endocarditis.; Respiratory status stable     FORD, resolved  Hypokalemia  - FORD due to sepsis with Cr on 1.29 on presentation--- improved  - K -- corrected per supple protocol  - monitor BMP periodically     Chronic anemia  Baseline hemoglobin approximately 7.  Likely related to chronic disease, untreated HIV.  -Monitor hemoglobin; stable around 7-8     Likely severe malnutrition  -Nutrition followed , but due to uncertain recent weights unable to make complete diagnosis  -Encourage p.o., with supplements     Prior hepatitis C infection  Antibody positive but RNA negative  -Noted     Orders Placed This Encounter      Combination Diet Easy to Chew (level 7); Thin Liquids (level 0); No Straws     DVT Prophylaxis: Heparin SQ    Code Status: Full Code        Disposition Plan     Expected discharge: likely 2-3 days to acute rehab pending clinical stability, antibiotic plan established per ID; SW following for disposition    Page Me (7 am to 6 pm)                   Physical Exam:      Blood pressure 125/84, pulse 87, temperature 98.2  F (36.8  C), temperature source Oral, resp. rate 20, height 1.727 m (5' 7.99\"), weight 76.8 kg (169 lb 5 oz), SpO2 99 %.  Vitals:    07/13/21 0200 07/14/21 0445 07/16/21 0615   Weight: 89 kg (196 lb 3.4 oz) 84.2 kg (185 lb 10 oz) 76.8 kg (169 lb 5 oz)     Vital Signs with Ranges  Temp:  [98.2  F (36.8  C)-98.5  F (36.9  C)] 98.2  F (36.8  C)  Pulse:  [] 87  Resp:  [18-20] 20  BP: (119-131)/(57-92) 125/84  SpO2:  [97 %-99 %] 99 %  I/O's Last 24 hours  I/O last 3 completed shifts:  In: 340 " [P.O.:340]  Out: 1900 [Urine:1900]    Constitutional: Alert, awake and oriented , seems slightly confused with dates but knows month and year; resting comfortably in no apparent distress   HEENT: Pupils equal and reactive to light and accomodation, neck supple    Oral cavity: Moist mucosa   Cardiovascular: Normal s1 s2, regular rate and rhythm, no murmur   Lungs: B/l clear to auscultation, no wheezes or crepitations   Abdomen: Soft, nt, nd, no guarding, rigidity or rebound; BS +   LE : No edema   Musculoskeletal: Power 5/5 in all extremities   Neuro: No focal neurological deficits noted   Psychiatry: normal mood and affect  Skin: right finger 4th digit tip nolvia node, multiple splinter hemorrhages of BLE toes, and left hand. Black discoloration of toes noted                Medications:          bictegravir-emtricitabine-tenofovir  1 tablet Oral Daily     heparin ANTICOAGULANT  5,000 Units Subcutaneous Q8H     multivitamin w/minerals  1 tablet Oral Daily     nafcillin  2 g Intravenous Q4H     pantoprazole  40 mg Oral QAM AC     perflutren diluted 1mL to 2mL with saline  5 mL Intravenous Once     sodium chloride (PF)  10 mL Intracatheter Q8H     sodium chloride (PF)  10 mL Intravenous Once     PRN Meds: sodium chloride 0.9%, acetaminophen **OR** [DISCONTINUED] acetaminophen, albuterol, glucose **OR** dextrose **OR** glucagon, HYDROmorphone, lidocaine 4%, lidocaine (buffered or not buffered), loperamide, naloxone **OR** naloxone **OR** naloxone **OR** naloxone, pramipexole, QUEtiapine, sodium chloride (PF), sodium chloride (PF)         Data:      All new lab and imaging data was reviewed.   Recent Labs   Lab Test 07/15/21  0009 07/14/21  0506 07/13/21  0732 07/01/21  1839 07/01/21  1239   WBC 7.5 6.7 8.6   < > 21.5*   HGB 8.9* 8.3* 9.2*   < > 6.8*   MCV 80 79 79   < > 67*   * 838* 711*   < > 153   INR  --   --   --   --  1.23*    < > = values in this interval not displayed.      Recent Labs   Lab Test  07/16/21  1158 07/16/21  0828 07/16/21  0206 07/15/21  0009 07/14/21  1502 07/14/21  0506 07/14/21  0506 07/13/21  0732   NA  --   --   --  141  --   --  146* 137   POTASSIUM  --   --   --  4.0  4.0 3.4  --  2.7* 3.9   CHLORIDE  --   --   --  114*  --   --  113* 109   CO2  --   --   --  23  --   --  27 21   BUN  --   --   --  8  --   --  6* 9   CR  --   --   --  0.54  --   --  0.51* 0.47*   ANIONGAP  --   --   --  4  --   --  6 7   DEREK  --   --   --  7.8*  --   --  7.6* 7.6*   * 100* 110* 125*  --    < > 101* 88    < > = values in this interval not displayed.     Recent Labs   Lab Test 07/04/21  0537 07/04/21  0220 07/03/21  2210   TROPI 0.431* 0.439* 0.407*

## 2021-07-17 NOTE — PLAN OF CARE
Summary: Endocarditis MSSA  DATE & TIME: 7/16/21 1900-0730  Cognitive Concerns/ Orientation : A&Ox4, sad during shift  BEHAVIOR & AGGRESSION TOOL COLOR: Green  ABNL VS/O2: VSS on RA except tachycardic (103)  MOBILITY: T&R self, pt declined to get out of bed, up with 2 assist/lift  PAIN MANAGMENT: C/O restless legs/ BLE pain 10/10. PRN IV Dilaudid 2X and Mirapex given 1X for RLS  DIET: Level 7   BOWEL/BLADDER: Incontinent.  Has Purewick in place with adequate output  ABNL LAB/BG: N/A  DRAIN/DEVICES: PICC pulled out.  New IV started.  SKIN: Bruises; discoloration black toes, blister on left finger  TESTS/PROCEDURES:  ECHO (7/15)  D/C DAY/GOALS/PLACE: Pending; once mental status stable and antibiotic plan in place.  OTHER IMPORTANT INFO: Generalized weakness, PT/OT/SLP/ID following.

## 2021-07-18 ENCOUNTER — APPOINTMENT (OUTPATIENT)
Dept: PHYSICAL THERAPY | Facility: CLINIC | Age: 41
End: 2021-07-18
Payer: MEDICAID

## 2021-07-18 ENCOUNTER — APPOINTMENT (OUTPATIENT)
Dept: SPEECH THERAPY | Facility: CLINIC | Age: 41
End: 2021-07-18
Payer: MEDICAID

## 2021-07-18 PROCEDURE — 120N000001 HC R&B MED SURG/OB

## 2021-07-18 PROCEDURE — 99232 SBSQ HOSP IP/OBS MODERATE 35: CPT | Performed by: HOSPITALIST

## 2021-07-18 PROCEDURE — 250N000013 HC RX MED GY IP 250 OP 250 PS 637: Performed by: STUDENT IN AN ORGANIZED HEALTH CARE EDUCATION/TRAINING PROGRAM

## 2021-07-18 PROCEDURE — 250N000011 HC RX IP 250 OP 636: Performed by: STUDENT IN AN ORGANIZED HEALTH CARE EDUCATION/TRAINING PROGRAM

## 2021-07-18 PROCEDURE — 250N000011 HC RX IP 250 OP 636: Performed by: HOSPITALIST

## 2021-07-18 PROCEDURE — 97530 THERAPEUTIC ACTIVITIES: CPT | Mod: GP | Performed by: PHYSICAL THERAPIST

## 2021-07-18 PROCEDURE — 250N000013 HC RX MED GY IP 250 OP 250 PS 637: Performed by: HOSPITALIST

## 2021-07-18 PROCEDURE — 250N000009 HC RX 250: Performed by: STUDENT IN AN ORGANIZED HEALTH CARE EDUCATION/TRAINING PROGRAM

## 2021-07-18 PROCEDURE — 92526 ORAL FUNCTION THERAPY: CPT | Mod: GN | Performed by: SPEECH-LANGUAGE PATHOLOGIST

## 2021-07-18 RX ORDER — OXYCODONE AND ACETAMINOPHEN 5; 325 MG/1; MG/1
1 TABLET ORAL EVERY 4 HOURS PRN
Status: DISCONTINUED | OUTPATIENT
Start: 2021-07-18 | End: 2021-07-21

## 2021-07-18 RX ORDER — HYDROMORPHONE HYDROCHLORIDE 1 MG/ML
0.5 INJECTION, SOLUTION INTRAMUSCULAR; INTRAVENOUS; SUBCUTANEOUS EVERY 6 HOURS PRN
Status: DISCONTINUED | OUTPATIENT
Start: 2021-07-18 | End: 2021-07-19

## 2021-07-18 RX ADMIN — HYDROMORPHONE HYDROCHLORIDE 0.5 MG: 1 INJECTION, SOLUTION INTRAMUSCULAR; INTRAVENOUS; SUBCUTANEOUS at 17:01

## 2021-07-18 RX ADMIN — ACETAMINOPHEN 650 MG: 325 TABLET, FILM COATED ORAL at 09:05

## 2021-07-18 RX ADMIN — HYDROMORPHONE HYDROCHLORIDE 0.5 MG: 1 INJECTION, SOLUTION INTRAMUSCULAR; INTRAVENOUS; SUBCUTANEOUS at 23:26

## 2021-07-18 RX ADMIN — PANTOPRAZOLE SODIUM 40 MG: 40 TABLET, DELAYED RELEASE ORAL at 06:37

## 2021-07-18 RX ADMIN — HEPARIN SODIUM 5000 UNITS: 5000 INJECTION, SOLUTION INTRAVENOUS; SUBCUTANEOUS at 13:51

## 2021-07-18 RX ADMIN — HEPARIN SODIUM 5000 UNITS: 5000 INJECTION, SOLUTION INTRAVENOUS; SUBCUTANEOUS at 06:37

## 2021-07-18 RX ADMIN — NAFCILLIN SODIUM 2 G: 2 INJECTION, POWDER, LYOPHILIZED, FOR SOLUTION INTRAMUSCULAR; INTRAVENOUS at 12:46

## 2021-07-18 RX ADMIN — HYDROMORPHONE HYDROCHLORIDE 0.5 MG: 1 INJECTION, SOLUTION INTRAMUSCULAR; INTRAVENOUS; SUBCUTANEOUS at 11:01

## 2021-07-18 RX ADMIN — NAFCILLIN SODIUM 2 G: 2 INJECTION, POWDER, LYOPHILIZED, FOR SOLUTION INTRAMUSCULAR; INTRAVENOUS at 17:02

## 2021-07-18 RX ADMIN — Medication 1 TABLET: at 12:46

## 2021-07-18 RX ADMIN — HYDROMORPHONE HYDROCHLORIDE 0.5 MG: 1 INJECTION, SOLUTION INTRAMUSCULAR; INTRAVENOUS; SUBCUTANEOUS at 00:00

## 2021-07-18 RX ADMIN — NAFCILLIN SODIUM 2 G: 2 INJECTION, POWDER, LYOPHILIZED, FOR SOLUTION INTRAMUSCULAR; INTRAVENOUS at 04:55

## 2021-07-18 RX ADMIN — OXYCODONE HYDROCHLORIDE AND ACETAMINOPHEN 1 TABLET: 5; 325 TABLET ORAL at 20:17

## 2021-07-18 RX ADMIN — HYDROMORPHONE HYDROCHLORIDE 0.5 MG: 1 INJECTION, SOLUTION INTRAMUSCULAR; INTRAVENOUS; SUBCUTANEOUS at 06:35

## 2021-07-18 RX ADMIN — BICTEGRAVIR SODIUM, EMTRICITABINE, AND TENOFOVIR ALAFENAMIDE FUMARATE 1 TABLET: 50; 200; 25 TABLET ORAL at 09:05

## 2021-07-18 RX ADMIN — NAFCILLIN SODIUM 2 G: 2 INJECTION, POWDER, LYOPHILIZED, FOR SOLUTION INTRAMUSCULAR; INTRAVENOUS at 09:06

## 2021-07-18 RX ADMIN — PRAMIPEXOLE DIHYDROCHLORIDE 0.12 MG: 0.12 TABLET ORAL at 09:05

## 2021-07-18 RX ADMIN — HEPARIN SODIUM 5000 UNITS: 5000 INJECTION, SOLUTION INTRAVENOUS; SUBCUTANEOUS at 22:01

## 2021-07-18 RX ADMIN — NAFCILLIN SODIUM 2 G: 2 INJECTION, POWDER, LYOPHILIZED, FOR SOLUTION INTRAMUSCULAR; INTRAVENOUS at 20:18

## 2021-07-18 RX ADMIN — OXYCODONE HYDROCHLORIDE AND ACETAMINOPHEN 1 TABLET: 5; 325 TABLET ORAL at 13:50

## 2021-07-18 RX ADMIN — HYDROMORPHONE HYDROCHLORIDE 0.5 MG: 1 INJECTION, SOLUTION INTRAMUSCULAR; INTRAVENOUS; SUBCUTANEOUS at 03:21

## 2021-07-18 RX ADMIN — NAFCILLIN SODIUM 2 G: 2 INJECTION, POWDER, LYOPHILIZED, FOR SOLUTION INTRAMUSCULAR; INTRAVENOUS at 00:08

## 2021-07-18 ASSESSMENT — ACTIVITIES OF DAILY LIVING (ADL)
ADLS_ACUITY_SCORE: 21
ADLS_ACUITY_SCORE: 21
ADLS_ACUITY_SCORE: 18
ADLS_ACUITY_SCORE: 21
ADLS_ACUITY_SCORE: 21
ADLS_ACUITY_SCORE: 20

## 2021-07-18 NOTE — PROGRESS NOTES
Patient has pulled out two lines that have been inserted for long term IV Abx need.  Aware that patient does require long term access when leaving hospital.  At this point, would like to refrain from re- insertion of Picc, in order to protect what access she does still have (difficulty with last 2 insertions). Víctor RN aware.  Sticky note left for ID MDs. Floor RN will keep VAT in loop with IV access.

## 2021-07-18 NOTE — PROVIDER NOTIFICATION
MD Notification    Notified Person: MD    Notified Person Name: Katalina    Notification Date/Time: 7/18/21 0730    Notification Interaction: Patient without PICC, can you change AM lab draw orders to Lab collect? Thanks!    Purpose of Notification: Orders modified to lab collect.    Orders Received:    Comments:

## 2021-07-18 NOTE — PLAN OF CARE
Summary: Endocarditis MSSA  DATE & TIME: 7/18/21 5235-2666  Cognitive Concerns/ Orientation : A&Ox4, tearful   BEHAVIOR & AGGRESSION TOOL COLOR: Green  ABNL VS/O2: VSS on RA  MOBILITY: +1 GB to pivot to chair. Up in chair x1.  PAIN MANAGMENT: C/O lower extremity pain 9/10, PRN Tylenol x1, PRN Oxy x1, PRN IV Dilaudid given x3  DIET: Regular diet, improving oral intake  BOWEL/BLADDER: Incontinent by choice. Purewick in place. 1 large/lose BM  ABNL LAB/BG: N/A  DRAIN/DEVICES: L PIV SL with int abx  SKIN: Bruises; discoloration black toes, blister on left finger  TESTS/PROCEDURES: Echo (7/15)  D/C DAY/GOALS/PLACE: Pending; once mental status stable and antibiotic plan in place.  OTHER IMPORTANT INFO: Generalized weakness, PT/OT/SLP/ID following. Frequently seeking out pain medications. Has PRN Mirapex for restless legs. Patient's daughter visited today- patient states she wants to be sober and be there for her kids and soon to be grandkids. Wondering about temporary [place] options near [place] until she can possibly go [place].

## 2021-07-18 NOTE — PLAN OF CARE
Summary: Endocarditis MSSA  DATE & TIME: 7/17/21 1900-2300  Cognitive Concerns/ Orientation : A&Ox4, tearful  BEHAVIOR & AGGRESSION TOOL COLOR: Green  ABNL VS/O2: VSS on RA  MOBILITY: T&R self, pt declined to get out of bed, up with 2 assist/lift  PAIN MANAGMENT: C/O restless legs/ BLE pain 9/10. PRN IV 0.5 Dilaudid x1 Tylenol x1  DIET: Level 7, poor oral intake  BOWEL/BLADDER: Incontinent. Purewick in place. Large/soft BM x2  ABNL LAB/BG: N/A  DRAIN/DEVICES: L PIV SL with int abx  SKIN: Bruises; discoloration black toes, blister on left finger  TESTS/PROCEDURES:  Echo (7/15)  D/C DAY/GOALS/PLACE: Pending; once mental status stable and antibiotic plan in place.  OTHER IMPORTANT INFO: Generalized weakness, PT/OT/SLP/ID following. Frequently seeking out Dilaudid. Has PRN Mirapex for restless legs.

## 2021-07-18 NOTE — PLAN OF CARE
Summary: Endocarditis MSSA  DATE & TIME: 7/18/21   Cognitive Concerns/ Orientation :Alert and Oriented x4   BEHAVIOR & AGGRESSION TOOL COLOR: Green  ABNL VS/O2: VSS on RA  MOBILITY: T&R self, pt declined to get out of bed, up with 2 assist/lift  PAIN MANAGMENT: Complained of generalized pain 9/10, IV dilaudid given x2.    DIET: Level 7, poor oral intake  BOWEL/BLADDER: Incontinent. Purewick in place.   ABNL LAB/BG: N/A  DRAIN/DEVICES: L PIV SL with int abx  SKIN: Bruises; discoloration black toes, blister on left finger  TESTS/PROCEDURES:  Echo (7/15)  D/C DAY/GOALS/PLACE: Pending; once mental status stable and antibiotic plan in place.  OTHER IMPORTANT INFO: Generalized weakness, PT/OT/SLP/ID following. Frequently seeking out Dilaudid. Has PRN Mirapex for restless legs.

## 2021-07-18 NOTE — PROGRESS NOTES
[facility]  Hospitalist Progress Note        Darwin Darden MD   07/18/2021        Interval History:        - no acute issues overnight  - PICC re-ordered for 7/18 (after she pulled out 2 previous ones), however vascular access team suggesting holding off on re-insertion until right before patient discharges to facility due to difficult access         Assessment and Plan:        Manas Hernandez is a 41 year old female with h/o untreated HIV, hepatitis C who was admitted on 7/1/2021 for MSSA mitral valve endocarditis resulting in septic shock complicated by embolic left parietal lobe infarctions and multiple peripheral emboli.    She presented initially with encephalopathy, and shock.  She was intubated in ED on 7/1/2021 subsequently extubated 7/11/2021.  Blood cultures turned positive on hospital day 1 with MSSA.  Urine culture was positive for Klebsiella.  She was treated initially with ceftriaxone and vancomycin, and has since been narrowed to nafcillin per ID. AMS has complicated stay as she has pulled multiple lines causing her to loose IV access.    Care transferred to Hospitalist on 7/13/21     MSSA mitral valve endocarditis  Septic shock, resolved  Probable myopericarditis  Small pericardial effusion  - Likely from IVDU. Multiple peripheral sites of spread including L parietal lobe  - JAE 7/5 noted with small ASD, mod to large mobile vegetation (14mm long, 6mm wide) of the P2 segment of the mitral leaflet. No valve perforation; mod MR  - blood cultures on admission with MSSA; blood cultures have been negative since 7/6  - infectious disease following; antibiotic has been narrowed down to Nafcillin  - afebrile since 7/13; no leukocytosis  - abx briefly interrupted as she had been pulling out her access sites; midline placed on 7/13/21-- pulled out again on 7/16-- PICC re-ordered for 7/18 (after she pulled out 2 previous ones), however vascular access team suggesting holding off on  "re-insertion until right before patient discharges to facility due to difficult access  - Cardiology signed off 7/8/21; limited ECHO repeated 7/15 to reassess pericardial fluid 7/15 per cardiology and noted:  organized effusion posteriorly, measuring 1.2-1.5cm, no evidence of tamponade; small mobile vegetation noted attached to the mitral leaflet, mod to severe MR; compared to 7/8/21, effusion size is probably stable, Degree of MR is worse, Vegetation is smaller  - re-evaluated by CT surgery 7/15 and suggested continued antibiotic therapy at this time; per CT surgery \"Were she to become a surgical candidate, we would recommend waiting at least 4 weeks from her stroke and she would need repeat MRI imaging to rule out mycotic aneurysms or brain abscesses\"; will have follow up with CT surgery as outpatient    IVDU  Polysubstance use disorder  - chem dep consulted 7/15 but chemical dependency did not feel she is appropriate for CD assessment at this time and recommended consultation towards the end of her antibiotic course  - has been regularly using IV dilaudid; will start percocet q 4 hrs prn and decrease dilaudid IV frequency to q 6 hrs to encourage PO prior to transition to TCU     HIV, untreated  - CD4 count greater than 500, viral load 3695  - Started Biktarvy 7/8; ID following     Toxic/septic/metabolic encephalopathy, acute, improving  - Likely encephalopathy due to sepsis, drug use, CVA, prolonged ICU stay; had been pulling lines due to AMS, now mentation more stable with no agitations  - mentation improving, remorseful of her drug use but at times seems indifferent to situation and has been pulling lines at times  - Delirium protocol  - was started on quetiapine 3 times daily in ICU; on 7/16 changed to BID prn for agitation but has been calm and cooperative since transfer out of ICU     Ischemic CVA, 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  Completed course of treatment with ceftriaxone  - repeat urine cultures 7/10 with ESBL E coli  - given unimpressive UA and no UTI symptoms , ID suggest holding off on abx for ESBL UTI     Acute hypoxemic respiratory failure, resolved  Intubated 7/1/2021.  Extubated 7/11/2021.  Likely related to septic shock from MSSA mitral valve endocarditis.; Respiratory status stable     FORD, resolved  Hypokalemia  - FORD due to sepsis with Cr on 1.29 on presentation--- improved  - K -- corrected per supple protocol  - monitor BMP periodically     Chronic anemia  Baseline hemoglobin approximately 7.  Likely related to chronic disease, untreated HIV.  -Monitor hemoglobin; stable around 7-8     Likely severe malnutrition  -Nutrition followed , but due to uncertain recent weights unable to make complete diagnosis  -Encourage p.o., with supplements     Prior hepatitis C infection  Antibody positive but RNA negative  -Noted     Orders Placed This Encounter      Combination Diet Regular Diet Adult; No Straws     DVT Prophylaxis: Heparin SQ    Code Status: Full Code        Disposition Plan     Expected discharge: likely 1-2 days to acute rehab pending clinical stability, antibiotic plan established per ID; SW following for disposition    Page Me (7 am to 6 pm)                   Physical Exam:      Blood pressure 117/79, pulse 84, temperature 98.3  F (36.8  C), temperature source Oral, resp. rate 18, height 1.727 m (5' 7.99\"), weight 76.8 kg (169 lb 5 oz), SpO2 100 %.  Vitals:    07/13/21 0200 07/14/21 0445 " 07/16/21 0615   Weight: 89 kg (196 lb 3.4 oz) 84.2 kg (185 lb 10 oz) 76.8 kg (169 lb 5 oz)     Vital Signs with Ranges  Temp:  [98.3  F (36.8  C)-99.4  F (37.4  C)] 98.3  F (36.8  C)  Pulse:  [] 84  Resp:  [18-20] 18  BP: (102-122)/(59-84) 117/79  SpO2:  [98 %-100 %] 100 %  I/O's Last 24 hours  I/O last 3 completed shifts:  In: -   Out: 500 [Urine:500]    Constitutional: Alert, awake and oriented , seems slightly confused with dates but knows month and year; resting comfortably in no apparent distress   HEENT: Pupils equal and reactive to light and accomodation, neck supple    Oral cavity: Moist mucosa   Cardiovascular: Normal s1 s2, regular rate and rhythm, no murmur   Lungs: B/l clear to auscultation, no wheezes or crepitations   Abdomen: Soft, nt, nd, no guarding, rigidity or rebound; BS +   LE : No edema   Musculoskeletal: Power 5/5 in all extremities   Neuro: No focal neurological deficits noted   Psychiatry: normal mood and affect  Skin: right finger 4th digit tip nolvia node, multiple splinter hemorrhages of BLE toes, and left hand. Black discoloration of toes noted                Medications:          bictegravir-emtricitabine-tenofovir  1 tablet Oral Daily     heparin ANTICOAGULANT  5,000 Units Subcutaneous Q8H     multivitamin w/minerals  1 tablet Oral Daily     nafcillin  2 g Intravenous Q4H     pantoprazole  40 mg Oral QAM AC     sodium chloride (PF)  10 mL Intracatheter Q8H     PRN Meds: sodium chloride 0.9%, acetaminophen **OR** [DISCONTINUED] acetaminophen, albuterol, glucose **OR** dextrose **OR** glucagon, HYDROmorphone, lidocaine 4%, lidocaine (buffered or not buffered), loperamide, naloxone **OR** naloxone **OR** naloxone **OR** naloxone, pramipexole, QUEtiapine, sodium chloride (PF), sodium chloride (PF)         Data:      All new lab and imaging data was reviewed.   Recent Labs   Lab Test 07/15/21  0009 07/14/21  0506 07/13/21  0732 07/01/21  1839 07/01/21  1239   WBC 7.5 6.7 8.6   < >  21.5*   HGB 8.9* 8.3* 9.2*   < > 6.8*   MCV 80 79 79   < > 67*   * 838* 711*   < > 153   INR  --   --   --   --  1.23*    < > = values in this interval not displayed.      Recent Labs   Lab Test 07/16/21  1158 07/16/21  0828 07/16/21  0206 07/15/21  0009 07/14/21  1502 07/14/21  0506 07/14/21  0506 07/13/21  0732   NA  --   --   --  141  --   --  146* 137   POTASSIUM  --   --   --  4.0  4.0 3.4  --  2.7* 3.9   CHLORIDE  --   --   --  114*  --   --  113* 109   CO2  --   --   --  23  --   --  27 21   BUN  --   --   --  8  --   --  6* 9   CR  --   --   --  0.54  --   --  0.51* 0.47*   ANIONGAP  --   --   --  4  --   --  6 7   DEREK  --   --   --  7.8*  --   --  7.6* 7.6*   * 100* 110* 125*  --    < > 101* 88    < > = values in this interval not displayed.     Recent Labs   Lab Test 07/04/21  0537 07/04/21  0220 07/03/21  2210   TROPI 0.431* 0.439* 0.407*

## 2021-07-19 ENCOUNTER — APPOINTMENT (OUTPATIENT)
Dept: PHYSICAL THERAPY | Facility: CLINIC | Age: 41
End: 2021-07-19
Payer: MEDICAID

## 2021-07-19 ENCOUNTER — ANESTHESIA EVENT (OUTPATIENT)
Dept: MEDSURG UNIT | Facility: CLINIC | Age: 41
End: 2021-07-19
Payer: MEDICAID

## 2021-07-19 ENCOUNTER — APPOINTMENT (OUTPATIENT)
Dept: SPEECH THERAPY | Facility: CLINIC | Age: 41
End: 2021-07-19
Payer: MEDICAID

## 2021-07-19 ENCOUNTER — APPOINTMENT (OUTPATIENT)
Dept: OCCUPATIONAL THERAPY | Facility: CLINIC | Age: 41
End: 2021-07-19
Payer: MEDICAID

## 2021-07-19 ENCOUNTER — ANESTHESIA (OUTPATIENT)
Dept: MEDSURG UNIT | Facility: CLINIC | Age: 41
End: 2021-07-19
Payer: MEDICAID

## 2021-07-19 LAB
ANION GAP SERPL CALCULATED.3IONS-SCNC: 4 MMOL/L (ref 3–14)
BASOPHILS # BLD AUTO: 0.2 10E3/UL (ref 0–0.2)
BASOPHILS NFR BLD AUTO: 2 %
BUN SERPL-MCNC: 17 MG/DL (ref 7–30)
CALCIUM SERPL-MCNC: 8.7 MG/DL (ref 8.5–10.1)
CHLORIDE BLD-SCNC: 110 MMOL/L (ref 94–109)
CO2 SERPL-SCNC: 26 MMOL/L (ref 20–32)
CREAT SERPL-MCNC: 0.53 MG/DL (ref 0.52–1.04)
EOSINOPHIL # BLD AUTO: 0.2 10E3/UL (ref 0–0.7)
EOSINOPHIL NFR BLD AUTO: 2 %
ERYTHROCYTE [DISTWIDTH] IN BLOOD BY AUTOMATED COUNT: 27.7 % (ref 10–15)
GFR SERPL CREATININE-BSD FRML MDRD: >90 ML/MIN/1.73M2
GLUCOSE BLD-MCNC: 125 MG/DL (ref 70–99)
HCT VFR BLD AUTO: 31.9 % (ref 35–47)
HGB BLD-MCNC: 9.8 G/DL (ref 11.7–15.7)
IMM GRANULOCYTES # BLD: 0.3 10E3/UL
IMM GRANULOCYTES NFR BLD: 4 %
LYMPHOCYTES # BLD AUTO: 2.4 10E3/UL (ref 0.8–5.3)
LYMPHOCYTES NFR BLD AUTO: 28 %
MCH RBC QN AUTO: 25.1 PG (ref 26.5–33)
MCHC RBC AUTO-ENTMCNC: 30.7 G/DL (ref 31.5–36.5)
MCV RBC AUTO: 82 FL (ref 78–100)
MONOCYTES # BLD AUTO: 0.5 10E3/UL (ref 0–1.3)
MONOCYTES NFR BLD AUTO: 6 %
NEUTROPHILS # BLD AUTO: 5.1 10E3/UL (ref 1.6–8.3)
NEUTROPHILS NFR BLD AUTO: 58 %
NRBC # BLD AUTO: 0 10E3/UL
NRBC BLD AUTO-RTO: 0 /100
PLATELET # BLD AUTO: 757 10E3/UL (ref 150–450)
POTASSIUM BLD-SCNC: 4.1 MMOL/L (ref 3.4–5.3)
RBC # BLD AUTO: 3.91 10E6/UL (ref 3.8–5.2)
SARS-COV-2 RNA RESP QL NAA+PROBE: NEGATIVE
SODIUM SERPL-SCNC: 140 MMOL/L (ref 133–144)
WBC # BLD AUTO: 8.7 10E3/UL (ref 4–11)

## 2021-07-19 PROCEDURE — 250N000013 HC RX MED GY IP 250 OP 250 PS 637: Performed by: STUDENT IN AN ORGANIZED HEALTH CARE EDUCATION/TRAINING PROGRAM

## 2021-07-19 PROCEDURE — 250N000013 HC RX MED GY IP 250 OP 250 PS 637: Performed by: HOSPITALIST

## 2021-07-19 PROCEDURE — 370N000003 HC ANESTHESIA WARD SERVICE

## 2021-07-19 PROCEDURE — 250N000009 HC RX 250: Performed by: STUDENT IN AN ORGANIZED HEALTH CARE EDUCATION/TRAINING PROGRAM

## 2021-07-19 PROCEDURE — 85025 COMPLETE CBC W/AUTO DIFF WBC: CPT | Performed by: HOSPITALIST

## 2021-07-19 PROCEDURE — 82374 ASSAY BLOOD CARBON DIOXIDE: CPT | Performed by: STUDENT IN AN ORGANIZED HEALTH CARE EDUCATION/TRAINING PROGRAM

## 2021-07-19 PROCEDURE — 99232 SBSQ HOSP IP/OBS MODERATE 35: CPT | Performed by: HOSPITALIST

## 2021-07-19 PROCEDURE — 250N000011 HC RX IP 250 OP 636: Performed by: HOSPITALIST

## 2021-07-19 PROCEDURE — 82310 ASSAY OF CALCIUM: CPT | Performed by: STUDENT IN AN ORGANIZED HEALTH CARE EDUCATION/TRAINING PROGRAM

## 2021-07-19 PROCEDURE — 120N000001 HC R&B MED SURG/OB

## 2021-07-19 PROCEDURE — 92526 ORAL FUNCTION THERAPY: CPT | Mod: GN | Performed by: SPEECH-LANGUAGE PATHOLOGIST

## 2021-07-19 PROCEDURE — 97110 THERAPEUTIC EXERCISES: CPT | Mod: GP | Performed by: PHYSICAL THERAPIST

## 2021-07-19 PROCEDURE — 87635 SARS-COV-2 COVID-19 AMP PRB: CPT | Performed by: HOSPITALIST

## 2021-07-19 PROCEDURE — 97535 SELF CARE MNGMENT TRAINING: CPT | Mod: GO | Performed by: OCCUPATIONAL THERAPIST

## 2021-07-19 PROCEDURE — 92507 TX SP LANG VOICE COMM INDIV: CPT | Mod: GN | Performed by: SPEECH-LANGUAGE PATHOLOGIST

## 2021-07-19 PROCEDURE — 250N000011 HC RX IP 250 OP 636: Performed by: STUDENT IN AN ORGANIZED HEALTH CARE EDUCATION/TRAINING PROGRAM

## 2021-07-19 PROCEDURE — 36415 COLL VENOUS BLD VENIPUNCTURE: CPT | Performed by: STUDENT IN AN ORGANIZED HEALTH CARE EDUCATION/TRAINING PROGRAM

## 2021-07-19 RX ORDER — HYDROMORPHONE HYDROCHLORIDE 1 MG/ML
0.3 INJECTION, SOLUTION INTRAMUSCULAR; INTRAVENOUS; SUBCUTANEOUS 3 TIMES DAILY PRN
Status: DISPENSED | OUTPATIENT
Start: 2021-07-19 | End: 2021-07-19

## 2021-07-19 RX ADMIN — HYDROMORPHONE HYDROCHLORIDE 0.3 MG: 1 INJECTION, SOLUTION INTRAMUSCULAR; INTRAVENOUS; SUBCUTANEOUS at 15:01

## 2021-07-19 RX ADMIN — OXYCODONE HYDROCHLORIDE AND ACETAMINOPHEN 1 TABLET: 5; 325 TABLET ORAL at 00:33

## 2021-07-19 RX ADMIN — OXYCODONE HYDROCHLORIDE AND ACETAMINOPHEN 1 TABLET: 5; 325 TABLET ORAL at 18:41

## 2021-07-19 RX ADMIN — NAFCILLIN SODIUM 2 G: 2 INJECTION, POWDER, LYOPHILIZED, FOR SOLUTION INTRAMUSCULAR; INTRAVENOUS at 04:34

## 2021-07-19 RX ADMIN — OXYCODONE HYDROCHLORIDE AND ACETAMINOPHEN 1 TABLET: 5; 325 TABLET ORAL at 06:33

## 2021-07-19 RX ADMIN — ACETAMINOPHEN 650 MG: 325 TABLET, FILM COATED ORAL at 09:23

## 2021-07-19 RX ADMIN — PANTOPRAZOLE SODIUM 40 MG: 40 TABLET, DELAYED RELEASE ORAL at 06:33

## 2021-07-19 RX ADMIN — HEPARIN SODIUM 5000 UNITS: 5000 INJECTION, SOLUTION INTRAVENOUS; SUBCUTANEOUS at 06:33

## 2021-07-19 RX ADMIN — NAFCILLIN SODIUM 2 G: 2 INJECTION, POWDER, LYOPHILIZED, FOR SOLUTION INTRAMUSCULAR; INTRAVENOUS at 00:34

## 2021-07-19 RX ADMIN — HEPARIN SODIUM 5000 UNITS: 5000 INJECTION, SOLUTION INTRAVENOUS; SUBCUTANEOUS at 21:26

## 2021-07-19 RX ADMIN — NAFCILLIN SODIUM 2 G: 2 INJECTION, POWDER, LYOPHILIZED, FOR SOLUTION INTRAMUSCULAR; INTRAVENOUS at 13:16

## 2021-07-19 RX ADMIN — NAFCILLIN SODIUM 2 G: 2 INJECTION, POWDER, LYOPHILIZED, FOR SOLUTION INTRAMUSCULAR; INTRAVENOUS at 21:26

## 2021-07-19 RX ADMIN — Medication 1 TABLET: at 13:13

## 2021-07-19 RX ADMIN — NAFCILLIN SODIUM 2 G: 2 INJECTION, POWDER, LYOPHILIZED, FOR SOLUTION INTRAMUSCULAR; INTRAVENOUS at 17:01

## 2021-07-19 RX ADMIN — BICTEGRAVIR SODIUM, EMTRICITABINE, AND TENOFOVIR ALAFENAMIDE FUMARATE 1 TABLET: 50; 200; 25 TABLET ORAL at 09:23

## 2021-07-19 RX ADMIN — OXYCODONE HYDROCHLORIDE AND ACETAMINOPHEN 1 TABLET: 5; 325 TABLET ORAL at 13:13

## 2021-07-19 RX ADMIN — NAFCILLIN SODIUM 2 G: 2 INJECTION, POWDER, LYOPHILIZED, FOR SOLUTION INTRAMUSCULAR; INTRAVENOUS at 07:45

## 2021-07-19 RX ADMIN — HEPARIN SODIUM 5000 UNITS: 5000 INJECTION, SOLUTION INTRAVENOUS; SUBCUTANEOUS at 13:16

## 2021-07-19 RX ADMIN — HYDROMORPHONE HYDROCHLORIDE 0.5 MG: 1 INJECTION, SOLUTION INTRAMUSCULAR; INTRAVENOUS; SUBCUTANEOUS at 04:34

## 2021-07-19 RX ADMIN — HYDROMORPHONE HYDROCHLORIDE 0.3 MG: 1 INJECTION, SOLUTION INTRAMUSCULAR; INTRAVENOUS; SUBCUTANEOUS at 20:13

## 2021-07-19 RX ADMIN — OXYCODONE HYDROCHLORIDE AND ACETAMINOPHEN 1 TABLET: 5; 325 TABLET ORAL at 23:55

## 2021-07-19 RX ADMIN — QUETIAPINE FUMARATE 25 MG: 25 TABLET ORAL at 17:01

## 2021-07-19 ASSESSMENT — ACTIVITIES OF DAILY LIVING (ADL)
ADLS_ACUITY_SCORE: 19
ADLS_ACUITY_SCORE: 23
ADLS_ACUITY_SCORE: 23
ADLS_ACUITY_SCORE: 19
ADLS_ACUITY_SCORE: 23
ADLS_ACUITY_SCORE: 24

## 2021-07-19 NOTE — PLAN OF CARE
Summary: Endocarditis MSSA  DATE & TIME: 7/18/21 3834-6718  Cognitive Concerns/ Orientation : A&Ox4, tearful   BEHAVIOR & AGGRESSION TOOL COLOR: Green  ABNL VS/O2: VSS on RA  MOBILITY: +2 GB to pivot to chair. Up in chair x1.  PAIN MANAGMENT: C/O lower extremity pain 9/10, PRN Tylenol x1, PRN Oxy x2, PRN IV Dilaudid given x2  DIET: Regular diet, improving oral intake  BOWEL/BLADDER: Incontinent by choice. Purewick in place.  ABNL LAB/BG: Plat[place]: 757, HgB: 9.8,  DRAIN/DEVICES: L PIV SL with int abx  SKIN: Bruises; discoloration black toes, blister on left finger  TESTS/PROCEDURES: Echo (7/15)  D/C DAY/GOALS/PLACE: Pending; once mental status stable and antibiotic plan in place.  OTHER IMPORTANT INFO: Generalized weakness, PT/OT/SLP/ID following. Frequently seeking out pain medications. Has PRN Mirapex for restless legs.

## 2021-07-19 NOTE — PROGRESS NOTES
CLINICAL NUTRITION SERVICES - REASSESSMENT NOTE    Recommendations Ordered by Registered Dietitian (RD):   Regular diet  Change Ensure supplement to [place] instant breakfast    Malnutrition: (7/19)   % Weight Loss:  Weight loss does not meet criteria for malnutrition - pt states wt is still elevated from baseline   % Intake:  <75% for > 7 days (non-severe malnutrition) - on average  Subcutaneous Fat Loss:  Orbital region mild depletion and Upper arm region mild depletion  Muscle Loss:  Clavicle bone region mild depletion  Fluid Retention:  Moderate    Malnutrition Diagnosis: Non-Severe malnutrition  In Context of:  Acute illness or injury     EVALUATION OF PROGRESS TOWARD GOALS   Diet: Regular diet (no straws) - 7/18  Ensure Enlive w/ meals - 7/13    Nutrition Support: Off since 7/11    Intake/Tolerance:   - Oral intakes appear to be improving, more consistently %, sometimes trend 25-50%   - Has been ordering adequate meals TID most days since 7/13.  - Pt states appetite is okay. Has been drinking the Ensure, but does not like it. OK with switching to [place] to see if that tastes better.     - Current wt is 76.8 kg (169 lb 5oz). Down about 24# since admission.   - Patient states this is still quite a bit elevated from baseline, but was unable to verbalize what she typically weighs.     - Asking for pain meds during visit.     ASSESSED NUTRITION NEEDS:  Dosing Weight:  69.6 kg (adjusted for overwt)  Energy Needs:  5819-2712 kcals (25-30 kcal/kg)  Justification: overweight    Protein Needs:   grams protein (1.2-1.5 g pro/Kg)  Justification: hypercatabolism with acute illness and preservation of lean body mass    Previous Goals:   Pt will consume > 75% meals and > 50% supplements in the next 3-5 days  Evaluation: Met    Previous Nutrition Diagnosis:   Inadequate oral intake related to lethargy, weakness, and decreased appetite as evidenced by oral intake poor  Evaluation: Improving    MALNUTRITION  %  Weight Loss:  Weight loss does not meet criteria for malnutrition - pt states wt is still elevated from baseline   % Intake:  <75% for > 7 days (non-severe malnutrition) - on average  Subcutaneous Fat Loss:  Orbital region mild depletion and Upper arm region mild depletion  Muscle Loss:  Clavicle bone region mild depletion  Fluid Retention:  Moderate    Malnutrition Diagnosis: Non-Severe malnutrition  In Context of:  Acute illness or injury    CURRENT NUTRITION DIAGNOSIS  Predicted inadequate nutrient (energy/protein) intake related to ongoing pain, lethargy, reduced appetite     INTERVENTIONS  Recommendations / Nutrition Prescription  Regular diet  Change Ensure supplement to [place] instant breakfast     Implementation  Medical Food Supplement: as above. Ordered in epic and HT    Goals  Intake of at least 75% meals + 2-3 supplements.       MONITORING AND EVALUATION:  Progress towards goals will be monitored and evaluated per protocol and Practice Guidelines    Nubia Marks RD, LD  [facility], 68, 57, MH   Pager: [phone]  Weekend Pager: [phone]

## 2021-07-19 NOTE — PROVIDER NOTIFICATION
MD Notification    Notified Person: IVV/PICC Nurse    Notified Person Name: Preethi    Notification Date/Time: 7/19/21 1042    Notification Interaction: Need PIV for q4h antibiotics. Please help! Thanks!    Orders Received: PICC Nurse attempted, unable. Will consult Anesthesia.     Comments:

## 2021-07-19 NOTE — PROVIDER NOTIFICATION
Notified Person Name: Anesthesia CRNA Pager    Notification Date/Time: 7/19/21 1203    Notification Interaction: Unable to get IV for q4h antibiotics. IV Nurse unable to as well. Are you able to help?     Orders Received: CRNA came and got an IV in.    Comments:

## 2021-07-19 NOTE — PROGRESS NOTES
[facility]  Hospitalist Progress Note    When I evaluated patient: 07/19/2021          Interval History:        - Patient has ongoing pain issues but no acute/new pain, no other acute issues. Reports pain at the PIV site- no swelling or redness noted   - loose BM 1-2 per day ongoing, denied abd pain or nausea. No fever         Assessment and Plan:        Manas Hernandez is a 41 year old female with h/o untreated HIV, hepatitis C who was admitted on 7/1/2021 for MSSA mitral valve endocarditis resulting in septic shock complicated by embolic left parietal lobe infarctions and multiple peripheral emboli.    She presented initially with encephalopathy, and shock.  She was intubated in ED on 7/1/2021 subsequently extubated 7/11/2021.  Blood cultures turned positive on hospital day 1 with MSSA.  Urine culture was positive for Klebsiella.  She was treated initially with ceftriaxone and vancomycin, and has since been narrowed to nafcillin per ID. AMS has complicated stay as she has pulled multiple lines causing her to loose IV access.    Care transferred to Hospitalist on 7/13/21     MSSA mitral valve endocarditis  Septic shock, resolved  Probable myopericarditis  Small pericardial effusion  - Likely from IVDU. Multiple peripheral sites   - JAE 7/5 noted with small ASD, mod to large mobile vegetation (14mm long, 6mm wide) of the P2 segment of the mitral leaflet. No valve perforation; mod MR  - blood cultures on admission with MSSA; blood cultures have been negative since 7/6  - ID following; antibiotic has been narrowed down to Nafcillin  - afebrile since 7/13; no leukocytosis  - abx briefly interrupted as she had been pulling out her access sites; midline placed on 7/13/21-- pulled out again on 7/16-- PICC has been re-ordered (after she pulled out 2 previous ones), however vascular access team suggesting holding off on re-insertion until right before patient discharges to facility due to difficult  "access   - Cardiology signed off 7/8/21; limited ECHO repeated 7/15 to reassess pericardial fluid 7/15 per cardiology and noted:  organized effusion posteriorly, measuring 1.2-1.5cm, no evidence of tamponade; small mobile vegetation noted attached to the mitral leaflet, mod to severe MR; compared to 7/8/21, effusion size is probably stable, Degree of MR is worse, Vegetation is smaller  - re-evaluated by CT surgery 7/15 and suggested continued antibiotic therapy at this time; per CT surgery \"Were she to become a surgical candidate, we would recommend waiting at least 4 weeks from her stroke and she would need repeat MRI imaging to rule out mycotic aneurysms or brain abscesses\"; will have follow up with CT surgery as outpatient    IVDU  Polysubstance use disorder  - chem dep consulted 7/15 but chemical dependency did not feel she is appropriate for CD assessment at this time and recommended consultation towards the end of her antibiotic course  - has been regularly using IV dilaudid; percocet q 4 hrs prn started and weaning off  IV dilaudid IV, PO prior to transition to TCU     HIV, untreated  - CD4 count greater than 500, viral load 3695  - Started Biktarvy 7/8; ID following     Toxic/septic/metabolic encephalopathy, acute, improving  - Likely encephalopathy due to sepsis, drug use, CVA, prolonged ICU stay; had been pulling lines due to AMS, now mentation more stable with no agitations  - mentation improving, remorseful of her drug use but at times seems indifferent to situation and has been pulling lines at times  - Delirium protocol  - was started on quetiapine 3 times daily in ICU; on 7/16 changed to BID prn for agitation but has been calm and cooperative since transfer out of ICU     Ischemic CVA, embolic stroke due to infective endocarditis  - CT head on admission notable for new acute to subacute PCA territory ischemic stroke  - MRI 7/3 noted with progressive, multifocal, acute infarcts without MR evidence for " "hemorrhage or midline shift  - Patient does have small left-to-right shunt on JAE, but most likely from embolic spread from endocarditis  - was evaluated by neurology, to continue antibiotics; per neuro to avoid anticoagulation (except DVT prophylaxis dose)     Pyelonephritis  - Urine cultures positive for Klebsiella.  Completed course of treatment with ceftriaxone  - repeat urine cultures 7/10 with ESBL E coli  - given unimpressive UA and no UTI symptoms , ID suggest holding off on abx for ESBL UTI     Acute hypoxemic respiratory failure, resolved  Intubated 7/1/2021.  Extubated 7/11/2021.  Likely related to septic shock from MSSA mitral valve endocarditis.; Respiratory status stable     FORD, resolved  Hypokalemia  - FORD due to sepsis with Cr on 1.29 on presentation--- improved  - K -- corrected per supple protocol  - monitor BMP periodically     Chronic anemia  Baseline hemoglobin approximately 7.  Likely related to chronic disease, untreated HIV.  -Monitor hemoglobin; stable around 7-8     Likely severe malnutrition  -Nutrition followed , but due to uncertain recent weights unable to make complete diagnosis  -Encourage p.o., with supplements     Prior hepatitis C infection  Antibody positive but RNA negative  -Noted     Orders Placed This Encounter      Combination Diet Regular Diet Adult; No Straws     DVT Prophylaxis: Heparin SQ    Code Status: Full Code        Disposition Plan     Expected discharge: likely 1-2 days to acute rehab pending- no longer on IV pain med, PICC placed and final recs from ID ; SW following for disposition    Kodi Gonzalez MD  Hospitalist                   Physical Exam:      Blood pressure 105/70, pulse 88, temperature 99  F (37.2  C), temperature source Oral, resp. rate 18, height 1.727 m (5' 7.99\"), weight 76.8 kg (169 lb 5 oz), SpO2 97 %.  Vitals:    07/13/21 0200 07/14/21 0445 07/16/21 0615   Weight: 89 kg (196 lb 3.4 oz) 84.2 kg (185 lb 10 oz) 76.8 kg (169 lb 5 oz)     Vital " Signs with Ranges  Temp:  [98.3  F (36.8  C)-99  F (37.2  C)] 99  F (37.2  C)  Pulse:  [88-98] 88  Resp:  [18] 18  BP: (105-121)/(64-70) 105/70  SpO2:  [97 %-100 %] 97 %  I/O's Last 24 hours  I/O last 3 completed shifts:  In: 490 [P.O.:490]  Out: 1050 [Urine:1050]    Constitutional: Alert, awake and oriented , seems slightly confused with dates but knows month and year; resting comfortably in no apparent distress   HEENT: Pupils equal and reactive to light and accomodation, neck supple    Oral cavity: Moist mucosa   Cardiovascular: Normal s1 s2, regular rate and rhythm, no murmur   Lungs: B/l clear to auscultation, no wheezes or crepitations   Abdomen: Soft, nt, nd, no guarding, rigidity or rebound; BS +, umbilical hernia reducible (+)   LE : No edema   Musculoskeletal: Power 5/5 in all extremities   Psychiatry:  Anxious   Skin: right finger 4th digit tip nolvia node, multiple splinter hemorrhages of BLE toes, and left hand. Black discoloration of toes noted   Neuro: No focal neurological deficits noted                       Medications:          bictegravir-emtricitabine-tenofovir  1 tablet Oral Daily     heparin ANTICOAGULANT  5,000 Units Subcutaneous Q8H     multivitamin w/minerals  1 tablet Oral Daily     nafcillin  2 g Intravenous Q4H     pantoprazole  40 mg Oral QAM AC     sodium chloride (PF)  10 mL Intracatheter Q8H     PRN Meds: sodium chloride 0.9%, acetaminophen **OR** [DISCONTINUED] acetaminophen, albuterol, glucose **OR** dextrose **OR** glucagon, HYDROmorphone, lidocaine 4%, lidocaine (buffered or not buffered), loperamide, naloxone **OR** naloxone **OR** naloxone **OR** naloxone, oxyCODONE-acetaminophen, pramipexole, QUEtiapine, sodium chloride (PF), sodium chloride (PF)         Data:      All new lab and imaging data was reviewed.   Recent Labs   Lab Test 07/15/21  0009 07/14/21  0506 07/13/21  0732 07/01/21  1839 07/01/21  1239   WBC 7.5 6.7 8.6   < > 21.5*   HGB 8.9* 8.3* 9.2*   < > 6.8*   MCV 80  79 79   < > 67*   * 838* 711*   < > 153   INR  --   --   --   --  1.23*    < > = values in this interval not displayed.      Recent Labs   Lab Test 07/16/21  1158 07/16/21  0828 07/16/21  0206 07/15/21  0009 07/14/21  1502 07/14/21  0506 07/14/21  0506 07/13/21  0732   NA  --   --   --  141  --   --  146* 137   POTASSIUM  --   --   --  4.0  4.0 3.4  --  2.7* 3.9   CHLORIDE  --   --   --  114*  --   --  113* 109   CO2  --   --   --  23  --   --  27 21   BUN  --   --   --  8  --   --  6* 9   CR  --   --   --  0.54  --   --  0.51* 0.47*   ANIONGAP  --   --   --  4  --   --  6 7   DEREK  --   --   --  7.8*  --   --  7.6* 7.6*   * 100* 110* 125*  --    < > 101* 88    < > = values in this interval not displayed.     Recent Labs   Lab Test 07/04/21  0537 07/04/21  0220 07/03/21  2210   TROPI 0.431* 0.439* 0.407*

## 2021-07-19 NOTE — PROGRESS NOTES
[facility]    Infectious Disease Progress Note    Date of Service : 07/19/2021     Assessment :  1. S.aureus MSSA native mitral valve endocarditis with septic shock in the setting of IVDA complicated by embolic L parietal lobe infarction and multiple peripheral emboli.   (TTE shows a large mobile 1.4 cm vegetation on the posterior mitral valve leaflet with extension to left atrium and small ASD per cardiology notes , no valvular abscess and no additional valvular involvement. Pericardial effusion is small). No seeding on CT abdomen but has peripheral emboli.  2. Untreated HIV diagnosed 2018, CD4 count maintained at >500, VL 3,695cpies/ml. Genotype pending  3. Hepatitis C Ab+ but RNA -.   4. Severe sepsis requiring pressor support and multiorgan failure. Off pressors now  5. Multiple peripheral emboli. Right finger swelling and discoloration of left 3rd toe. US right finger without evidence of infection but sub-optimal study. Appears stable  6. FORD related to sepsis- resolved  7. Embolic stroke (small ASD with Left to right shunt)  8. Klebsiella pneumoniae UTI treated. ESBL E.coli urinary colonization  9. Hypernatremia and hypokalemia  10. Severe anemia multifactorial. Has baseline anemia, exacerbated by chronic disease  11. Polysubstance abuse  12. Splenomegaly   13. LFT abnormality resolved  14. Organized pericardial effusion     Recommendations:  1. Nafcillin  2. Biktarvy  3. Follow HIV genotype  4. Anticipate prolonged IV antibiotics course fr 6 weeks until 8/18/2021 . Placement of PICC and TCU placement will be challenging given substance abuse history  5. Consider psych and chemical dependency consult  CV surgery reccs noted.    Marisol Matos MD    Interval History   Patient was seen and examined, chart reviewed.  Per nursing staff, has been pulling out IV access , does not want to get out of bed and has been asking for pain meds , also expressed.    Feels unwell and depressed.       Antimicrobial therapy  7/2 Nafcillin  7/8 Biktarvy  Prior  7/4-7/7 Cipro  7/1-7/2 Vancomycin, zosyn    Physical Exam   Temp: 98.7  F (37.1  C) Temp src: Oral BP: 114/79 Pulse: 83   Resp: 18 SpO2: 97 % O2 Device: None (Room air)    Vitals:    07/13/21 0200 07/14/21 0445 07/16/21 0615   Weight: 89 kg (196 lb 3.4 oz) 84.2 kg (185 lb 10 oz) 76.8 kg (169 lb 5 oz)     Vital Signs with Ranges  Temp:  [98.3  F (36.8  C)-99  F (37.2  C)] 98.7  F (37.1  C)  Pulse:  [83-98] 83  Resp:  [18] 18  BP: (105-121)/(64-79) 114/79  SpO2:  [97 %-100 %] 97 %       GENERAL APPEARANCE: awake, alert x 1   EYES: conjunctivae and sclerae normal  NECK: no adenopathy  RESP: lungs clear to auscultation - no rales, rhonchi or wheezes  CV: regular rates and rhythm, murmur  ABDOMEN: soft, umbilical hernia  MS: extremities normal- no gross deformities noted  SKIN: right finger with swelling and redness, multiple osler's nodes and splinter hemorrhages, multiple embolic infarcts on toes, black discoloration of left toe    Other:    Medications       bictegravir-emtricitabine-tenofovir  1 tablet Oral Daily     heparin ANTICOAGULANT  5,000 Units Subcutaneous Q8H     multivitamin w/minerals  1 tablet Oral Daily     nafcillin  2 g Intravenous Q4H     pantoprazole  40 mg Oral QAM AC     sodium chloride (PF)  10 mL Intracatheter Q8H       Data   All microbiology laboratory data reviewed.  Recent Labs   Lab Test 07/15/21  0009 07/14/21  0506 07/13/21  0732   WBC 7.5 6.7 8.6   HGB 8.9* 8.3* 9.2*   HCT 29.6* 27.1* 29.6*   MCV 80 79 79   * 838* 711*     Recent Labs   Lab Test 07/15/21  0009 07/14/21  0506 07/13/21  0732   CR 0.54 0.51* 0.47*     No lab results found.  Recent Labs   Lab Test 07/10/21  2243 07/09/21  0454 07/09/21  0415 07/08/21  1417 07/08/21  0500 07/07/21  0450 07/06/21  0500 07/05/21  1232 07/05/21  1104   CULT >100,000 colonies/mL  Escherichia coli ESBL  ESBL (extended beta lactamase) producing organisms require contact  precautions.  *  Critical Value/Significant Value called to and read back by  Luanne Bettencourt RN @ 2200 7/13/21 TM.   No growth No growth No growth No growth No growth  No growth No growth  No growth Cultured on the 2nd day of incubation:  Staphylococcus epidermidis  *  Critical Value/Significant Value, preliminary result only, called to and read back by  IVETT WARE RN 07/06/21 1258 EH.    Cultured on the 5th day of incubation:  Staphylococcus aureus  Susceptibility testing done on previous specimen  *  Critical Value/Significant Value called to and read back by  Cherelle Smith RN 1043 7/11/21 AM   Cultured on the 2nd day of incubation:  Staphylococcus aureus  Susceptibility testing done on previous specimen  *  Critical Value/Significant Value, preliminary result only, called to and read back by  Sin Jack RN at 0448 7/7/21 hg       Component      Latest Ref Rng & Units 7/6/2021   HIV-1 RNA Quant Result      HIVND:HIV-1 RNA Not Detected Copies/mL 3,695 (A)   HIV RNA QT Log      <1.3 Log:copies/mL 3.6 (H)       Latest Ref Rng & Units 7/6/2021   IFC Specimen       Blood   CD3 Mature T      49 - 84 % 76   CD4 Union T      28 - 63 % 34   CD8 Suppressor T      10 - 40 % 37   CD4:CD8 Ratio      1.40 - 2.60 0.92 (L)   Absolute CD3      603 - 2,990 cells/uL 1,317   Absolute CD4      441 - 2,156 cells/uL 579   Absolute CD8      125 - 1,312 cells/uL 640      Component      Latest Ref Rng & Units 7/2/2021   HIV 1 Result      NR:Nonreactive Reactive (A)   HIV 2 Result      NR:Nonreactive Indeterminate (A)   HIV Interpretation       See Comment Below   HIV Antigen Antibody Combo      NR:Nonreactive     Reactive (A)   Hepatitis B Surface Antibody      <8.00 m[IU]/mL 0.58   Hep B Surface Agn      NR:Nonreactive Nonreactive   Hepatitis C Antibody      NR:Nonreactive Reactive (A)       Susceptibility                       Staphylococcus aureus (1) Staphylococcus aureus (3)       BOBBY BOBBY       CLINDAMYCIN <=0.25 ug/mL  Sensitive <=0.25 ug/mL Sensitive       ERYTHROMYCIN <=0.25 ug/mL Sensitive <=0.25 ug/mL Sensitive       GENTAMICIN <=0.5 ug/mL Sensitive <=0.5 ug/mL Sensitive       OXACILLIN 0.5 ug/mL Sensitive 0.5 ug/mL Sensitive       TETRACYCLINE <=1 ug/mL Sensitive <=1 ug/mL Sensitive       Trimethoprim/Sulfa <=0.5/9.5 u... Sensitive <=0.5/9.5 u... Sensitive       VANCOMYCIN 1 ug/mL Sensitive 1 ug/mL Sensitive           7/5 JAE  Interpretation Summary     1. The left ventricle is normal in structure, function and size. The visual  ejection fraction is estimated at 60%.  2. The right ventricle is normal in structure, function and size.  3. Small atrial septal defect, secundum type 5mm in diameter. Left to right  shunt. Rim appears adequate in size for closure device.  4. Moderate to large mobile vegetation (14mm long, 6mm wide) of the P2 segment  of the mitral leaflet. No valve perforation. There is moderate (2+) mitral  regurgitation.  5. Small pericardial effusion     7/6 US extremity R hand  US EXTREMITY NON VASCULAR RIGHT 7/6/2021 11:39 AM      HISTORY: Right 4th finger concern for septic arthritis/osteomyelitis-  Please do US right hand 4th finger.      FINDINGS: Sonography was performed in the area of clinical concern  (fourth finger).                                                                      IMPRESSION: No fluid collection or significant joint effusion is  appreciated sonographically. Clinical follow-up recommended. If  clinical suspicion persists, MRI could be performed.     7/1TTE  Interpretation Summary     Left ventricular systolic function is normal.  The visual ejection fraction is 55-60%.  The right ventricle is mildly dilated.  The right ventricular systolic function is normal.  There is a mobile linear echodensity measuring 1.4 cm x 0.6 cm on the atrial  side of the posterior mitral valve leaflet.  Other valve structures appear normal without significant dysfunction or  obvious valvular  vegetations.  The inferior pericardium appears moderately thickened and echobright which may  represent active pericarditis.  Large localized pericardial effusion (2.7 cm) posteriorly/laterally with  mobile free floating echodense material which could represent fibrinous  stranding in setting of inflammatory pericarditis or infectious material if  infectious pericardial effusion.  Dilation of the inferior vena cava is present with abnormal respiratory  variation in diameter.  Small left pleural effusion     No prior for comparison.     7/1 CT head  CT SCAN OF THE HEAD WITHOUT CONTRAST   7/1/2021 1:47 PM      HISTORY: Delirium; altered mental status     TECHNIQUE:  Axial images of the head and coronal reformations without  IV contrast material.  Radiation dose for this scan was reduced using  automated exposure control, adjustment of the mA and/or kV according  to patient size, or iterative reconstruction technique.     COMPARISON: None.     FINDINGS: There is a focal 2 cm area of hypodensity involving gray and  white matter in the medial left parietal lobe consistent with acute to  subacute ischemic infarct. There is a minimal incidental arachnoid  cyst in the anterior portion of left middle cranial fossa. Ventricles  and subarachnoid spaces are otherwise within normal limits. There is  no evidence for intracranial hemorrhage, significant mass effect, or  skull fracture. Exam is mildly limited by motion artifact. Visualized  paranasal sinuses and mastoid air cells are clear.                                                                      IMPRESSION: Focal hypodensity in the medial left parietal lobe  consistent with acute to subacute ischemic infarct.     7/1 Ct abdomen/pelvis                                                           IMPRESSION:   1.  Patchy hypoenhancement in the mid and upper poles of the right  kidney posteriorly, suspicious for pyelonephritis.  2.  Moderate-sized fat-containing  paraumbilical hernia.  3.  Mild splenomegaly.  4.  There are possible small gallstones within a contracted  gallbladder.

## 2021-07-19 NOTE — PLAN OF CARE
DATE & TIME: 7/19/21 5450-1372    Cognitive Concerns/ Orientation : Pt A/Ox4   BEHAVIOR & AGGRESSION TOOL COLOR: Green   ABNL VS/O2: VSS on RA  MOBILITY: Assist x1-2 with gait belt and walker, fall risk  PAIN MANAGMENT: Complaints of back and lower extremity pain managed with PRN Percocet and Dilaudid  DIET: Regular  BOWEL/BLADDER: Incontinent of bowel and bladder. Purewick in place.  DRAIN/DEVICES: IV SL  SKIN: Scattered bruising. Black toes on bilateral feet.  D/C DATE: Discharge pending placement and antibiotic plan  OTHER IMPORTANT INFO: Frequently calling for pain medications. Pt restless in bed overnight. Spent most of the evening in the chair and then moved back into the chair early this morning.

## 2021-07-19 NOTE — PLAN OF CARE
Summary: Endocarditis MSSA  DATE & TIME: 7/18/21 6154-4496  Cognitive Concerns/ Orientation : A&Ox4, calm/cooperative    BEHAVIOR & AGGRESSION TOOL COLOR: Green  ABNL VS/O2: VSS on RA  MOBILITY: +2 GB to pivot to chair. Up in chair x2.  PAIN MANAGMENT: C/O back pain 10/10, PRN percocetX1   DIET: Regular diet,poor oral intake  BOWEL/BLADDER: Incontinent by choice.   ABNL LAB/BG: Platlet: 757, HgB: 9.8,  DRAIN/DEVICES: L PIV SL with int abx q4h  SKIN: Bruises; discoloration black toes, blister on left finger  TESTS/PROCEDURES: Echo (7/15)  D/C DAY/GOALS/PLACE: Pending; once mental status stable and antibiotic plan in place.  OTHER IMPORTANT INFO: Generalized weakness, PT/OT/SLP/ID following. Frequently seeking out pain medications. Has PRN Mirapex for restless legs. Prn Seroquel given

## 2021-07-20 ENCOUNTER — APPOINTMENT (OUTPATIENT)
Dept: OCCUPATIONAL THERAPY | Facility: CLINIC | Age: 41
End: 2021-07-20
Payer: MEDICAID

## 2021-07-20 ENCOUNTER — APPOINTMENT (OUTPATIENT)
Dept: SPEECH THERAPY | Facility: CLINIC | Age: 41
End: 2021-07-20
Payer: MEDICAID

## 2021-07-20 ENCOUNTER — APPOINTMENT (OUTPATIENT)
Dept: PHYSICAL THERAPY | Facility: CLINIC | Age: 41
End: 2021-07-20
Payer: MEDICAID

## 2021-07-20 PROCEDURE — 120N000001 HC R&B MED SURG/OB

## 2021-07-20 PROCEDURE — 92507 TX SP LANG VOICE COMM INDIV: CPT | Mod: GN | Performed by: SPEECH-LANGUAGE PATHOLOGIST

## 2021-07-20 PROCEDURE — 250N000013 HC RX MED GY IP 250 OP 250 PS 637: Performed by: STUDENT IN AN ORGANIZED HEALTH CARE EDUCATION/TRAINING PROGRAM

## 2021-07-20 PROCEDURE — 250N000009 HC RX 250: Performed by: STUDENT IN AN ORGANIZED HEALTH CARE EDUCATION/TRAINING PROGRAM

## 2021-07-20 PROCEDURE — 92526 ORAL FUNCTION THERAPY: CPT | Mod: GN | Performed by: SPEECH-LANGUAGE PATHOLOGIST

## 2021-07-20 PROCEDURE — 97116 GAIT TRAINING THERAPY: CPT | Mod: GP | Performed by: PHYSICAL THERAPIST

## 2021-07-20 PROCEDURE — 250N000013 HC RX MED GY IP 250 OP 250 PS 637: Performed by: HOSPITALIST

## 2021-07-20 PROCEDURE — 97530 THERAPEUTIC ACTIVITIES: CPT | Mod: GP | Performed by: PHYSICAL THERAPIST

## 2021-07-20 PROCEDURE — 97110 THERAPEUTIC EXERCISES: CPT | Mod: GP | Performed by: PHYSICAL THERAPIST

## 2021-07-20 PROCEDURE — 250N000011 HC RX IP 250 OP 636: Performed by: STUDENT IN AN ORGANIZED HEALTH CARE EDUCATION/TRAINING PROGRAM

## 2021-07-20 PROCEDURE — 99233 SBSQ HOSP IP/OBS HIGH 50: CPT | Performed by: HOSPITALIST

## 2021-07-20 PROCEDURE — 97535 SELF CARE MNGMENT TRAINING: CPT | Mod: GO | Performed by: OCCUPATIONAL THERAPIST

## 2021-07-20 RX ADMIN — NAFCILLIN SODIUM 2 G: 2 INJECTION, POWDER, LYOPHILIZED, FOR SOLUTION INTRAMUSCULAR; INTRAVENOUS at 04:23

## 2021-07-20 RX ADMIN — OXYCODONE HYDROCHLORIDE AND ACETAMINOPHEN 1 TABLET: 5; 325 TABLET ORAL at 21:11

## 2021-07-20 RX ADMIN — OXYCODONE HYDROCHLORIDE AND ACETAMINOPHEN 1 TABLET: 5; 325 TABLET ORAL at 08:01

## 2021-07-20 RX ADMIN — NAFCILLIN SODIUM 2 G: 2 INJECTION, POWDER, LYOPHILIZED, FOR SOLUTION INTRAMUSCULAR; INTRAVENOUS at 16:40

## 2021-07-20 RX ADMIN — QUETIAPINE FUMARATE 25 MG: 25 TABLET ORAL at 04:51

## 2021-07-20 RX ADMIN — BICTEGRAVIR SODIUM, EMTRICITABINE, AND TENOFOVIR ALAFENAMIDE FUMARATE 1 TABLET: 50; 200; 25 TABLET ORAL at 08:00

## 2021-07-20 RX ADMIN — NAFCILLIN SODIUM 2 G: 2 INJECTION, POWDER, LYOPHILIZED, FOR SOLUTION INTRAMUSCULAR; INTRAVENOUS at 00:44

## 2021-07-20 RX ADMIN — HEPARIN SODIUM 5000 UNITS: 5000 INJECTION, SOLUTION INTRAVENOUS; SUBCUTANEOUS at 06:02

## 2021-07-20 RX ADMIN — HEPARIN SODIUM 5000 UNITS: 5000 INJECTION, SOLUTION INTRAVENOUS; SUBCUTANEOUS at 21:11

## 2021-07-20 RX ADMIN — NAFCILLIN SODIUM 2 G: 2 INJECTION, POWDER, LYOPHILIZED, FOR SOLUTION INTRAMUSCULAR; INTRAVENOUS at 21:11

## 2021-07-20 RX ADMIN — PANTOPRAZOLE SODIUM 40 MG: 40 TABLET, DELAYED RELEASE ORAL at 06:03

## 2021-07-20 RX ADMIN — HEPARIN SODIUM 5000 UNITS: 5000 INJECTION, SOLUTION INTRAVENOUS; SUBCUTANEOUS at 13:36

## 2021-07-20 RX ADMIN — ACETAMINOPHEN 650 MG: 325 TABLET, FILM COATED ORAL at 17:47

## 2021-07-20 RX ADMIN — QUETIAPINE FUMARATE 25 MG: 25 TABLET ORAL at 21:11

## 2021-07-20 RX ADMIN — OXYCODONE HYDROCHLORIDE AND ACETAMINOPHEN 1 TABLET: 5; 325 TABLET ORAL at 04:25

## 2021-07-20 RX ADMIN — NAFCILLIN SODIUM 2 G: 2 INJECTION, POWDER, LYOPHILIZED, FOR SOLUTION INTRAMUSCULAR; INTRAVENOUS at 09:51

## 2021-07-20 RX ADMIN — OXYCODONE HYDROCHLORIDE AND ACETAMINOPHEN 1 TABLET: 5; 325 TABLET ORAL at 12:20

## 2021-07-20 RX ADMIN — Medication 1 TABLET: at 12:20

## 2021-07-20 RX ADMIN — OXYCODONE HYDROCHLORIDE AND ACETAMINOPHEN 1 TABLET: 5; 325 TABLET ORAL at 16:40

## 2021-07-20 RX ADMIN — PRAMIPEXOLE DIHYDROCHLORIDE 0.12 MG: 0.12 TABLET ORAL at 02:14

## 2021-07-20 RX ADMIN — NAFCILLIN SODIUM 2 G: 2 INJECTION, POWDER, LYOPHILIZED, FOR SOLUTION INTRAMUSCULAR; INTRAVENOUS at 13:37

## 2021-07-20 ASSESSMENT — ACTIVITIES OF DAILY LIVING (ADL)
ADLS_ACUITY_SCORE: 24
ADLS_ACUITY_SCORE: 23

## 2021-07-20 NOTE — PROGRESS NOTES
[facility]  Hospitalist Progress Note    When I evaluated patient: 07/20/2021          Interval History:        - Noted issues with IV line overnight. New IV in place. Working well. Patient has ongoing pain issues but no acute/new pain, no other acute issues. Patient thinks she is going through withdrawal as her legs are restless. No nausea, increased salivation, dyspnea, diarrhea.  Remains afebrile.          Assessment and Plan:        Manas Hernandez is a 41 year old female with h/o untreated HIV, hepatitis C who was admitted on 7/1/2021 for MSSA mitral valve endocarditis resulting in septic shock complicated by embolic left parietal lobe infarctions and multiple peripheral emboli.    She presented initially with encephalopathy, and shock.  She was intubated in ED on 7/1/2021 subsequently extubated 7/11/2021.  Blood cultures turned positive on hospital day 1 with MSSA.  Urine culture was positive for Klebsiella.  She was treated initially with ceftriaxone and vancomycin, and has since been narrowed to nafcillin per ID. AMS has complicated stay as she has pulled multiple lines causing her to loose IV access.    Care transferred to Hospitalist on 7/13/21     MSSA mitral valve endocarditis  Septic shock, resolved  Probable myopericarditis  Small pericardial effusion  - Likely from IVDU. Multiple peripheral sites   - JAE 7/5 noted with small ASD, mod to large mobile vegetation (14mm long, 6mm wide) of the P2 segment of the mitral leaflet. No valve perforation; mod MR  - blood cultures on admission with MSSA; blood cultures have been negative since 7/6  - ID following; antibiotic has been narrowed down to Nafcillin  - afebrile since 7/13; no leukocytosis  - abx briefly interrupted as she had been pulling out her access sites; midline placed on 7/13/21-- pulled out again on 7/16-- PICC has been re-ordered (after she pulled out 2 previous ones), however vascular access team suggesting holding off on  "re-insertion until right before patient discharges to facility due to difficult access   - Cardiology signed off 7/8/21; limited ECHO repeated 7/15 to reassess pericardial fluid 7/15 per cardiology and noted:  organized effusion posteriorly, measuring 1.2-1.5cm, no evidence of tamponade; small mobile vegetation noted attached to the mitral leaflet, mod to severe MR; compared to 7/8/21, effusion size is probably stable, Degree of MR is worse, Vegetation is smaller  - re-evaluated by CT surgery 7/15 and suggested continued antibiotic therapy at this time; per CT surgery \"Were she to become a surgical candidate, we would recommend waiting at least 4 weeks from her stroke and she would need repeat MRI imaging to rule out mycotic aneurysms or brain abscesses\"; will have follow up with CT surgery as outpatient    IVDU  Polysubstance use disorder  - chem dep consulted 7/15 but chemical dependency did not feel she is appropriate for CD assessment at this time and recommended consultation towards the end of her antibiotic course. Re consulting per SW as planning discharge now  - Was using IV dilaudid earlier this stay; percocet q 4 hrs prn started and weaned off  IV dilaudid IV.   - 3 weeks since last use and still feels she is going through withdrawal, and reports craving for drug use. Psychiatry consult.     HIV, untreated  - CD4 count greater than 500, viral load 3695  - Started Biktarvy 7/8; ID following     Toxic/septic/metabolic encephalopathy, acute, improving  - Likely encephalopathy due to sepsis, drug use, CVA, prolonged ICU stay; had been pulling lines due to AMS, now mentation more stable with no agitations  - mentation improving, remorseful of her drug use but at times seems indifferent to situation and has been pulling lines at times  - Delirium protocol  - was started on quetiapine 3 times daily in ICU; on 7/16 changed to BID prn for agitation but has been calm and cooperative since transfer out of " ICU     Ischemic CVA, embolic stroke due to infective endocarditis  - CT head on admission notable for new acute to subacute PCA territory ischemic stroke  - MRI 7/3 noted with progressive, multifocal, acute infarcts without MR evidence for hemorrhage or midline shift  - Patient does have small left-to-right shunt on JAE, but most likely from embolic spread from endocarditis  - was evaluated by neurology, to continue antibiotics; per neuro to avoid anticoagulation (except DVT prophylaxis dose)     Pyelonephritis  - Urine cultures positive for Klebsiella.  Completed course of treatment with ceftriaxone  - repeat urine cultures 7/10 with ESBL E coli  - given unimpressive UA and no UTI symptoms , ID suggest holding off on abx for ESBL UTI     Acute hypoxemic respiratory failure, resolved  Intubated 7/1/2021.  Extubated 7/11/2021.  Likely related to septic shock from MSSA mitral valve endocarditis.; Respiratory status stable     FORD, resolved  Hypokalemia  - FORD due to sepsis with Cr on 1.29 on presentation--- improved  - K -- corrected per supple protocol  - monitor BMP periodically     Chronic anemia  Baseline hemoglobin approximately 7.  Likely related to chronic disease, untreated HIV.  -Monitor hemoglobin; stable around 7-8     Likely severe malnutrition  -Nutrition followed , but due to uncertain recent weights unable to make complete diagnosis  -Encourage p.o., with supplements     Prior hepatitis C infection  Antibody positive but RNA negative  -Noted     Orders Placed This Encounter      Combination Diet Regular Diet Adult; No Straws     DVT Prophylaxis: Heparin SQ    Code Status: Full Code        Disposition Plan     Expected discharge: likely 1-2 days to acute rehab pending- acceptance by care facility, PICC placed; SW following for disposition    Kodi Gonzalez MD  Hospitalist                   Physical Exam:      Blood pressure 115/86, pulse 104, temperature 97.8  F (36.6  C), temperature source Oral, resp.  "rate 18, height 1.727 m (5' 7.99\"), weight 76.8 kg (169 lb 5 oz), SpO2 99 %.  Vitals:    07/13/21 0200 07/14/21 0445 07/16/21 0615   Weight: 89 kg (196 lb 3.4 oz) 84.2 kg (185 lb 10 oz) 76.8 kg (169 lb 5 oz)     Vital Signs with Ranges  Temp:  [97.6  F (36.4  C)-98.2  F (36.8  C)] 97.8  F (36.6  C)  Pulse:  [] 104  Resp:  [16-18] 18  BP: (105-130)/(63-91) 115/86  SpO2:  [97 %-99 %] 99 %  I/O's Last 24 hours  I/O last 3 completed shifts:  In: -   Out: 300 [Urine:300]    Constitutional: Alert, awake and oriented , seems slightly confused with dates but knows month and year; resting comfortably in no apparent distress   HEENT: Pupils equal and reactive to light and accomodation, neck supple    Oral cavity: Moist mucosa   Cardiovascular: Normal s1 s2, regular rate and rhythm, no murmur   Lungs: B/l clear to auscultation, no wheezes or crepitations   Abdomen: Soft, nt, nd, no guarding, rigidity or rebound; BS +, umbilical hernia reducible (+)   LE : No edema   Musculoskeletal: Power 5/5 in all extremities   Psychiatry:  Anxious   Skin: right finger 4th digit tip nolvia node, multiple splinter hemorrhages of BLE toes, and left hand. Black discoloration of toes noted   Neuro: No focal neurological deficits noted             Medications:          bictegravir-emtricitabine-tenofovir  1 tablet Oral Daily     heparin ANTICOAGULANT  5,000 Units Subcutaneous Q8H     multivitamin w/minerals  1 tablet Oral Daily     nafcillin  2 g Intravenous Q4H     pantoprazole  40 mg Oral QAM AC     sodium chloride (PF)  10 mL Intracatheter Q8H     PRN Meds: sodium chloride 0.9%, acetaminophen **OR** [DISCONTINUED] acetaminophen, albuterol, glucose **OR** dextrose **OR** glucagon, lidocaine 4%, lidocaine (buffered or not buffered), loperamide, naloxone **OR** naloxone **OR** naloxone **OR** naloxone, oxyCODONE-acetaminophen, pramipexole, QUEtiapine, sodium chloride (PF), sodium chloride (PF)         Data:      All new lab and imaging data " was reviewed.   Recent Labs   Lab Test 07/19/21  1104 07/15/21  0009 07/14/21  0506 07/01/21  1839 07/01/21  1239   WBC 8.7 7.5 6.7   < > 21.5*   HGB 9.8* 8.9* 8.3*   < > 6.8*   MCV 82 80 79   < > 67*   * 859* 838*   < > 153   INR  --   --   --   --  1.23*    < > = values in this interval not displayed.      Recent Labs   Lab Test 07/19/21  1104 07/16/21  1158 07/16/21  0828 07/15/21  0009 07/14/21  1502 07/14/21  0506 07/14/21  0506     --   --  141  --   --  146*   POTASSIUM 4.1  --   --  4.0  4.0 3.4  --  2.7*   CHLORIDE 110*  --   --  114*  --   --  113*   CO2 26  --   --  23  --   --  27   BUN 17  --   --  8  --   --  6*   CR 0.53  --   --  0.54  --   --  0.51*   ANIONGAP 4  --   --  4  --   --  6   DEREK 8.7  --   --  7.8*  --   --  7.6*   * 100* 100* 125*  --    < > 101*    < > = values in this interval not displayed.     Recent Labs   Lab Test 07/04/21  0537 07/04/21  0220 07/03/21  2210   TROPI 0.431* 0.439* 0.407*

## 2021-07-20 NOTE — PLAN OF CARE
Pt a/o C/o back pain states nothing helps her. States she shots up and wants to get home to shot up again. Was given the po pain med hot packs and able to get up to comoude with minimal asist to have bm. Walked to the door with PMarinaT.

## 2021-07-20 NOTE — PROGRESS NOTES
[facility]    Infectious Disease Progress Note    Date of Service : 07/20/2021     Assessment :  1. S.aureus MSSA native mitral valve endocarditis with septic shock in the setting of IVDA complicated by embolic L parietal lobe infarction and multiple peripheral emboli.   (TTE shows a large mobile 1.4 cm vegetation on the posterior mitral valve leaflet with extension to left atrium and small ASD per cardiology notes , no valvular abscess and no additional valvular involvement. Pericardial effusion is small). No seeding on CT abdomen but has peripheral emboli.  2. Untreated HIV diagnosed 2018, CD4 count maintained at >500, VL 3,695cpies/ml. Genotype pending  3. Hepatitis C Ab+ but RNA -.   4. Severe sepsis requiring pressor support and multiorgan failure. Off pressors now  5. Multiple peripheral emboli. Right finger swelling and discoloration of left 3rd toe. US right finger without evidence of infection but sub-optimal study. Appears stable  6. FORD related to sepsis- resolved  7. Embolic stroke (small ASD with Left to right shunt)  8. Klebsiella pneumoniae UTI treated. ESBL E.coli urinary colonization  9. Hypernatremia and hypokalemia  10. Severe anemia multifactorial. Has baseline anemia, exacerbated by chronic disease  11. Polysubstance abuse  12. Splenomegaly   13. LFT abnormality resolved  14. Organized pericardial effusion      Recommendations:  1. Nafcillin  2. Biktarvy  3. Follow HIV genotype 7/7 still pending  4. Anticipate prolonged IV antibiotics course for 6 weeks until 8/18/2021 . Placement of PICC and TCU placement will be challenging given substance abuse history      Marisol Matos MD    Interval History   Resting, does not make eye contact. Sleepy this morning. No new complaints this morning. Remained afebrile, tolerating antibiotics without side effects.    Antimicrobial therapy  7/2 Nafcillin  7/8 Biktarvy  Prior  7/4-7/7 Cipro  7/1-7/2 Vancomycin, zosyn    Physical Exam    Temp: 97.6  F (36.4  C) Temp src: Oral BP: (!) 130/90 Pulse: 80   Resp: 16 SpO2: 99 % O2 Device: None (Room air)    Vitals:    07/13/21 0200 07/14/21 0445 07/16/21 0615   Weight: 89 kg (196 lb 3.4 oz) 84.2 kg (185 lb 10 oz) 76.8 kg (169 lb 5 oz)     Vital Signs with Ranges  Temp:  [97.6  F (36.4  C)-98.7  F (37.1  C)] 97.6  F (36.4  C)  Pulse:  [] 80  Resp:  [16-18] 16  BP: (105-130)/(63-91) 130/90  SpO2:  [97 %-99 %] 99 %    GENERAL APPEARANCE: awake, alert x 1   EYES: conjunctivae and sclerae normal  NECK: no adenopathy  RESP: lungs clear to auscultation - no rales, rhonchi or wheezes  CV: regular rates and rhythm, murmur  ABDOMEN: soft, umbilical hernia  MS: extremities normal- no gross deformities noted  SKIN: right finger with swelling and redness, multiple osler's nodes and splinter hemorrhages, multiple embolic infarcts on toes, black discoloration of left toe    Other:    Medications       bictegravir-emtricitabine-tenofovir  1 tablet Oral Daily     heparin ANTICOAGULANT  5,000 Units Subcutaneous Q8H     multivitamin w/minerals  1 tablet Oral Daily     nafcillin  2 g Intravenous Q4H     pantoprazole  40 mg Oral QAM AC     sodium chloride (PF)  10 mL Intracatheter Q8H       Data   All microbiology laboratory data reviewed.  Recent Labs   Lab Test 07/19/21  1104 07/15/21  0009 07/14/21  0506   WBC 8.7 7.5 6.7   HGB 9.8* 8.9* 8.3*   HCT 31.9* 29.6* 27.1*   MCV 82 80 79   * 859* 838*     Recent Labs   Lab Test 07/19/21  1104 07/15/21  0009 07/14/21  0506   CR 0.53 0.54 0.51*     No lab results found.  Recent Labs   Lab Test 07/10/21  2243 07/09/21  0454 07/09/21  0415 07/08/21  1417 07/08/21  0500 07/07/21  0450 07/06/21  0500 07/05/21  1232 07/05/21  1104   CULT >100,000 colonies/mL  Escherichia coli ESBL  ESBL (extended beta lactamase) producing organisms require contact precautions.  *  Critical Value/Significant Value called to and read back by  Luanne Bettencourt RN @ 2200 7/13/21 TM.   No  growth No growth No growth No growth No growth  No growth No growth  No growth Cultured on the 2nd day of incubation:  Staphylococcus epidermidis  *  Critical Value/Significant Value, preliminary result only, called to and read back by  IVETT WARE RN 07/06/21 1258 EH.    Cultured on the 5th day of incubation:  Staphylococcus aureus  Susceptibility testing done on previous specimen  *  Critical Value/Significant Value called to and read back by  Cherelle Smith RN 1043 7/11/21 AM   Cultured on the 2nd day of incubation:  Staphylococcus aureus  Susceptibility testing done on previous specimen  *  Critical Value/Significant Value, preliminary result only, called to and read back by  Sin Jack RN at 0448 7/7/21 hg       Component      Latest Ref Rng & Units 7/6/2021   HIV-1 RNA Quant Result      HIVND:HIV-1 RNA Not Detected Copies/mL 3,695 (A)   HIV RNA QT Log      <1.3 Log:copies/mL 3.6 (H)       7/5 JAE  Interpretation Summary     1. The left ventricle is normal in structure, function and size. The visual  ejection fraction is estimated at 60%.  2. The right ventricle is normal in structure, function and size.  3. Small atrial septal defect, secundum type 5mm in diameter. Left to right  shunt. Rim appears adequate in size for closure device.  4. Moderate to large mobile vegetation (14mm long, 6mm wide) of the P2 segment  of the mitral leaflet. No valve perforation. There is moderate (2+) mitral  regurgitation.  5. Small pericardial effusion     7/6 US extremity R hand  US EXTREMITY NON VASCULAR RIGHT 7/6/2021 11:39 AM      HISTORY: Right 4th finger concern for septic arthritis/osteomyelitis-  Please do US right hand 4th finger.      FINDINGS: Sonography was performed in the area of clinical concern  (fourth finger).                                                                      IMPRESSION: No fluid collection or significant joint effusion is  appreciated sonographically. Clinical follow-up recommended.  If  clinical suspicion persists, MRI could be performed.     7/1TTE  Interpretation Summary     Left ventricular systolic function is normal.  The visual ejection fraction is 55-60%.  The right ventricle is mildly dilated.  The right ventricular systolic function is normal.  There is a mobile linear echodensity measuring 1.4 cm x 0.6 cm on the atrial  side of the posterior mitral valve leaflet.  Other valve structures appear normal without significant dysfunction or  obvious valvular vegetations.  The inferior pericardium appears moderately thickened and echobright which may  represent active pericarditis.  Large localized pericardial effusion (2.7 cm) posteriorly/laterally with  mobile free floating echodense material which could represent fibrinous  stranding in setting of inflammatory pericarditis or infectious material if  infectious pericardial effusion.  Dilation of the inferior vena cava is present with abnormal respiratory  variation in diameter.  Small left pleural effusion     No prior for comparison.     7/1 CT head  CT SCAN OF THE HEAD WITHOUT CONTRAST   7/1/2021 1:47 PM      HISTORY: Delirium; altered mental status     TECHNIQUE:  Axial images of the head and coronal reformations without  IV contrast material.  Radiation dose for this scan was reduced using  automated exposure control, adjustment of the mA and/or kV according  to patient size, or iterative reconstruction technique.     COMPARISON: None.     FINDINGS: There is a focal 2 cm area of hypodensity involving gray and  white matter in the medial left parietal lobe consistent with acute to  subacute ischemic infarct. There is a minimal incidental arachnoid  cyst in the anterior portion of left middle cranial fossa. Ventricles  and subarachnoid spaces are otherwise within normal limits. There is  no evidence for intracranial hemorrhage, significant mass effect, or  skull fracture. Exam is mildly limited by motion artifact. Visualized  paranasal  sinuses and mastoid air cells are clear.                                                                      IMPRESSION: Focal hypodensity in the medial left parietal lobe  consistent with acute to subacute ischemic infarct.     7/1 Ct abdomen/pelvis                                                           IMPRESSION:   1.  Patchy hypoenhancement in the mid and upper poles of the right  kidney posteriorly, suspicious for pyelonephritis.  2.  Moderate-sized fat-containing paraumbilical hernia.  3.  Mild splenomegaly.  4.  There are possible small gallstones within a contracted  gallbladder.

## 2021-07-20 NOTE — PLAN OF CARE
Summary: Endocarditis MSSA  DATE & TIME: 7/18/21 1900-0730  Cognitive Concerns/ Orientation : A&Ox4, calm/cooperative    BEHAVIOR & AGGRESSION TOOL COLOR: Green  ABNL VS/O2: VSS on RA  MOBILITY: +2 GB to pivot to chair. Up in chair x2.  PAIN MANAGMENT: C/O back pain 10/10, PRN percocet X2 and prn IV dilaudid x1   DIET: Regular diet,poor oral intake  BOWEL/BLADDER: Incontinent by choice. Did get up to commode x2 overnight.   ABNL LAB/BG: Platlet: 757, HgB: 9.8,  DRAIN/DEVICES: L PIV SL with int abx q4h  SKIN: Bruises; discoloration black toes, blister on left finger  TESTS/PROCEDURES: Echo (7/15)  D/C DAY/GOALS/PLACE: Pending; once mental status stable and antibiotic plan in place.  OTHER IMPORTANT INFO: Generalized weakness, PT/OT/SLP/ID following. Frequently seeking out pain medications. Has PRN Mirapex for restless legs, given x1.

## 2021-07-21 ENCOUNTER — APPOINTMENT (OUTPATIENT)
Dept: OCCUPATIONAL THERAPY | Facility: CLINIC | Age: 41
End: 2021-07-21
Payer: MEDICAID

## 2021-07-21 ENCOUNTER — APPOINTMENT (OUTPATIENT)
Dept: SPEECH THERAPY | Facility: CLINIC | Age: 41
End: 2021-07-21
Payer: MEDICAID

## 2021-07-21 LAB — GLUCOSE BLDC GLUCOMTR-MCNC: 140 MG/DL (ref 70–99)

## 2021-07-21 PROCEDURE — 250N000013 HC RX MED GY IP 250 OP 250 PS 637: Performed by: HOSPITALIST

## 2021-07-21 PROCEDURE — 250N000013 HC RX MED GY IP 250 OP 250 PS 637: Performed by: INTERNAL MEDICINE

## 2021-07-21 PROCEDURE — 97530 THERAPEUTIC ACTIVITIES: CPT | Mod: GO | Performed by: OCCUPATIONAL THERAPIST

## 2021-07-21 PROCEDURE — 250N000013 HC RX MED GY IP 250 OP 250 PS 637: Performed by: STUDENT IN AN ORGANIZED HEALTH CARE EDUCATION/TRAINING PROGRAM

## 2021-07-21 PROCEDURE — 99232 SBSQ HOSP IP/OBS MODERATE 35: CPT | Performed by: HOSPITALIST

## 2021-07-21 PROCEDURE — 250N000011 HC RX IP 250 OP 636: Performed by: STUDENT IN AN ORGANIZED HEALTH CARE EDUCATION/TRAINING PROGRAM

## 2021-07-21 PROCEDURE — 99207 PR CONSULT E&M CHANGED TO INITIAL LEVEL: CPT | Performed by: PSYCHIATRY & NEUROLOGY

## 2021-07-21 PROCEDURE — 99223 1ST HOSP IP/OBS HIGH 75: CPT | Performed by: PSYCHIATRY & NEUROLOGY

## 2021-07-21 PROCEDURE — 999N000128 HC STATISTIC PERIPHERAL IV START W/O US GUIDANCE

## 2021-07-21 PROCEDURE — 250N000013 HC RX MED GY IP 250 OP 250 PS 637: Performed by: PSYCHIATRY & NEUROLOGY

## 2021-07-21 PROCEDURE — 250N000009 HC RX 250: Performed by: STUDENT IN AN ORGANIZED HEALTH CARE EDUCATION/TRAINING PROGRAM

## 2021-07-21 PROCEDURE — 92507 TX SP LANG VOICE COMM INDIV: CPT | Mod: GN

## 2021-07-21 PROCEDURE — 97535 SELF CARE MNGMENT TRAINING: CPT | Mod: GO | Performed by: OCCUPATIONAL THERAPIST

## 2021-07-21 PROCEDURE — 120N000001 HC R&B MED SURG/OB

## 2021-07-21 RX ORDER — OXYCODONE AND ACETAMINOPHEN 5; 325 MG/1; MG/1
1 TABLET ORAL EVERY 8 HOURS PRN
Status: DISCONTINUED | OUTPATIENT
Start: 2021-07-21 | End: 2021-07-22

## 2021-07-21 RX ORDER — METHADONE HYDROCHLORIDE 10 MG/ML
30 CONCENTRATE ORAL EVERY MORNING
Status: COMPLETED | OUTPATIENT
Start: 2021-07-21 | End: 2021-07-21

## 2021-07-21 RX ORDER — METHADONE HYDROCHLORIDE 10 MG/ML
20 CONCENTRATE ORAL EVERY MORNING
Status: COMPLETED | OUTPATIENT
Start: 2021-07-22 | End: 2021-07-22

## 2021-07-21 RX ORDER — METHADONE HYDROCHLORIDE 10 MG/ML
10 CONCENTRATE ORAL EVERY MORNING
Status: COMPLETED | OUTPATIENT
Start: 2021-07-23 | End: 2021-07-23

## 2021-07-21 RX ADMIN — NAFCILLIN SODIUM 2 G: 2 INJECTION, POWDER, LYOPHILIZED, FOR SOLUTION INTRAMUSCULAR; INTRAVENOUS at 17:04

## 2021-07-21 RX ADMIN — HEPARIN SODIUM 5000 UNITS: 5000 INJECTION, SOLUTION INTRAVENOUS; SUBCUTANEOUS at 22:04

## 2021-07-21 RX ADMIN — ACETAMINOPHEN 650 MG: 325 TABLET, FILM COATED ORAL at 05:42

## 2021-07-21 RX ADMIN — BICTEGRAVIR SODIUM, EMTRICITABINE, AND TENOFOVIR ALAFENAMIDE FUMARATE 1 TABLET: 50; 200; 25 TABLET ORAL at 08:48

## 2021-07-21 RX ADMIN — METHADONE HYDROCHLORIDE 30 MG: 10 CONCENTRATE ORAL at 09:41

## 2021-07-21 RX ADMIN — ACETAMINOPHEN 650 MG: 325 TABLET, FILM COATED ORAL at 15:47

## 2021-07-21 RX ADMIN — HEPARIN SODIUM 5000 UNITS: 5000 INJECTION, SOLUTION INTRAVENOUS; SUBCUTANEOUS at 05:42

## 2021-07-21 RX ADMIN — OXYCODONE HYDROCHLORIDE AND ACETAMINOPHEN 1 TABLET: 5; 325 TABLET ORAL at 22:02

## 2021-07-21 RX ADMIN — HEPARIN SODIUM 5000 UNITS: 5000 INJECTION, SOLUTION INTRAVENOUS; SUBCUTANEOUS at 13:38

## 2021-07-21 RX ADMIN — PRAMIPEXOLE DIHYDROCHLORIDE 0.12 MG: 0.12 TABLET ORAL at 01:36

## 2021-07-21 RX ADMIN — NAFCILLIN SODIUM 2 G: 2 INJECTION, POWDER, LYOPHILIZED, FOR SOLUTION INTRAMUSCULAR; INTRAVENOUS at 22:04

## 2021-07-21 RX ADMIN — PANTOPRAZOLE SODIUM 40 MG: 40 TABLET, DELAYED RELEASE ORAL at 06:44

## 2021-07-21 RX ADMIN — NAFCILLIN SODIUM 2 G: 2 INJECTION, POWDER, LYOPHILIZED, FOR SOLUTION INTRAMUSCULAR; INTRAVENOUS at 12:35

## 2021-07-21 RX ADMIN — NAFCILLIN SODIUM 2 G: 2 INJECTION, POWDER, LYOPHILIZED, FOR SOLUTION INTRAMUSCULAR; INTRAVENOUS at 05:41

## 2021-07-21 RX ADMIN — NAFCILLIN SODIUM 2 G: 2 INJECTION, POWDER, LYOPHILIZED, FOR SOLUTION INTRAMUSCULAR; INTRAVENOUS at 01:21

## 2021-07-21 RX ADMIN — Medication 1 TABLET: at 12:35

## 2021-07-21 RX ADMIN — OXYCODONE HYDROCHLORIDE AND ACETAMINOPHEN 1 TABLET: 5; 325 TABLET ORAL at 01:36

## 2021-07-21 RX ADMIN — NAFCILLIN SODIUM 2 G: 2 INJECTION, POWDER, LYOPHILIZED, FOR SOLUTION INTRAMUSCULAR; INTRAVENOUS at 08:48

## 2021-07-21 RX ADMIN — ACETAMINOPHEN 650 MG: 325 TABLET, FILM COATED ORAL at 19:48

## 2021-07-21 ASSESSMENT — ACTIVITIES OF DAILY LIVING (ADL)
ADLS_ACUITY_SCORE: 23
ADLS_ACUITY_SCORE: 24
ADLS_ACUITY_SCORE: 23
ADLS_ACUITY_SCORE: 24

## 2021-07-21 NOTE — PLAN OF CARE
Alert and oriented x4. VSS. Pain rated 10/10 helped with methadone this morning and was also given tylenol this afternoon. Voiding well. Up with one. IV was burning, new IV placed. Contact precautions for ESBL. Plan to continue to monitor tonight, and to continue methadone taper tomorrow.

## 2021-07-21 NOTE — PLAN OF CARE
Cognitive Concerns/ Orientation : A&Ox4, cooperative, flat, frustrated at time with hospitalization and changes to pain medications.    BEHAVIOR & AGGRESSION TOOL COLOR: Green  ABNL VS/O2: VSS on RA  MOBILITY: 1PA, GB to pivot to chair. Encouraged activity in room.   PAIN MANAGMENT: C/O back pain 10/10, PRN percocet Q4, and Tylenol.  DIET: Regular diet, fair oral intake.  BOWEL/BLADDER: Incontinent with urgency. Encouraged scheduled bathroom   ABNL LAB/BG: No new labs   DRAIN/DEVICES: New PIV in LUE for intermittent abx. R wrist PIV tender, but left in place at this time d/t freq IV removals.   SKIN: Bruises; discoloration black toes, blister on left finger. Scattered scars.   TESTS/PROCEDURES: NA   D/C DAY/GOALS/PLACE: Pending; once mental status stable and antibiotic plan in place. Chem/dep consulted.   OTHER IMPORTANT INFO: Generalized weakness, PT/OT/SLP/ID following. Frequently seeking out pain medications.

## 2021-07-21 NOTE — PROGRESS NOTES
[facility]  Hospitalist Progress Note    When I evaluated patient: 07/21/2021          Interval History:        -No acute issues, patient continues to report that she feels like she is going through withdrawal but denies nausea vomiting.  -Was evaluated by psychiatrist this morning, discussed with Dr. Anthony, Suboxone was offered but patient reports it did not help in the past and so does not want to take it.  A quick taper of methadone was initiated by psychiatrist and other narcotics discontinued  -Remains afebrile.         Assessment and Plan:        Manas Hernandez is a 41 year old female with h/o untreated HIV, hepatitis C who was admitted on 7/1/2021 for MSSA mitral valve endocarditis resulting in septic shock complicated by embolic left parietal lobe infarctions and multiple peripheral emboli.    She presented initially with encephalopathy, and shock.  She was intubated in ED on 7/1/2021 subsequently extubated 7/11/2021.  Blood cultures turned positive on hospital day 1 with MSSA.  Urine culture was positive for Klebsiella.  She was treated initially with ceftriaxone and vancomycin, and has since been narrowed to nafcillin per ID. AMS has complicated stay as she has pulled multiple lines causing her to loose IV access.    Care transferred to Hospitalist on 7/13/21     MSSA mitral valve endocarditis  Septic shock, resolved  Probable myopericarditis  Small pericardial effusion  - Likely from IVDU. Multiple peripheral sites   - JAE 7/5 noted with small ASD, mod to large mobile vegetation (14mm long, 6mm wide) of the P2 segment of the mitral leaflet. No valve perforation; mod MR  - blood cultures on admission with MSSA; blood cultures have been negative since 7/6.  Afebrile since 7/13.  Leukocytosis resolved  - ID following; antibiotic has been narrowed down to Nafcillin, 6 weeks of antibiotic, completion date 8/18/2021.     - Abx briefly interrupted as she had been pulling out her access  "sites; midline placed on 7/13/21-- pulled out again on 7/16-- PICC was re-ordered (after she pulled out 2 previous ones), however vascular access team has suggested holding off on re-insertion until right before patient discharges to facility due to difficult access.  Patient is not delirious anymore and per , likely will have to complete her antibiotic course here in hospital as no accepting TCU so far and unlikely to find one.  In that case will consider reinserting PICC line if loses IV again.   - Cardiology signed off 7/8/21; limited ECHO repeated 7/15 to reassess pericardial fluid 7/15 per cardiology and noted:  organized effusion posteriorly, measuring 1.2-1.5cm, no evidence of tamponade; small mobile vegetation noted attached to the mitral leaflet, mod to severe MR; compared to 7/8/21, effusion size is probably stable, Degree of MR is worse, Vegetation is smaller  - re-evaluated by CT surgery 7/15 and suggested continued antibiotic therapy at this time; per CT surgery \"Were she to become a surgical candidate, we would recommend waiting at least 4 weeks from her stroke and she would need repeat MRI imaging to rule out mycotic aneurysms or brain abscesses\"; will have follow up with CT surgery as outpatient    IVDU  Polysubstance use disorder  - Chem dep consulted 7/15 but chemical dependency did not feel she is appropriate for CD assessment at this time and recommended consultation towards the end of her antibiotic course.  Reconsult CD once she is close to finishing her IV antibiotic.  - Was using IV dilaudid as well as Percocet p.o. earlier this stay.  IV antibiotic was gradually tapered.  Was on p.o. Percocet.  Spite being on narcotic, and even after 3 weeks since last use, patient reports that she is going through withdrawal, and reports craving for drug use. Psychiatry consulted, discussed with Dr. Anthony.  Suboxone was offered but she declined.  Rapidly tapering methadone has been " ordered.  No narcotic.    HIV, untreated  - CD4 count greater than 500, viral load 3695  - Started Biktarvy 7/8; ID following     Toxic/septic/metabolic encephalopathy, acute, improving  - Likely encephalopathy due to sepsis, drug use, CVA, prolonged ICU stay; had been pulling lines due to AMS, now mentation more stable with no agitations  - mentation improving, remorseful of her drug use but at times seems indifferent to situation and has been pulling lines at times  - Delirium protocol  - was started on quetiapine 3 times daily in ICU; on 7/16 changed to BID prn for agitation but has been calm and cooperative since transfer out of ICU     Ischemic CVA, embolic stroke due to infective endocarditis  - CT head on admission notable for new acute to subacute PCA territory ischemic stroke  - MRI 7/3 noted with progressive, multifocal, acute infarcts without MR evidence for hemorrhage or midline shift  - Patient does have small left-to-right shunt on JAE, but most likely from embolic spread from endocarditis  - was evaluated by neurology, to continue antibiotics; per neuro to avoid anticoagulation (except DVT prophylaxis dose)     Pyelonephritis  - Urine cultures positive for Klebsiella.  Completed course of treatment with ceftriaxone  - repeat urine cultures 7/10 with ESBL E coli  - given unimpressive UA and no UTI symptoms , ID suggest holding off on abx for ESBL UTI     Acute hypoxemic respiratory failure, resolved  Intubated 7/1/2021.  Extubated 7/11/2021.  Likely related to septic shock from MSSA mitral valve endocarditis.; Respiratory status stable     FORD, resolved  Hypokalemia  - FORD due to sepsis with Cr on 1.29 on presentation--- improved  - K -- corrected per supple protocol  - monitor BMP periodically     Chronic anemia  Baseline hemoglobin approximately 7.  Likely related to chronic disease, untreated HIV.  -Monitor hemoglobin; stable around 7-8     Likely severe malnutrition  -Nutrition followed , but due  "to uncertain recent weights unable to make complete diagnosis  -Encourage p.o., with supplements     Prior hepatitis C infection  Antibody positive but RNA negative  -Noted     Orders Placed This Encounter      Combination Diet Regular Diet Adult; No Straws     DVT Prophylaxis: Heparin SQ    Code Status: Full Code        Disposition Plan     Expected discharge: Has been declined by TCU's.  Discussed with , most likely patient will complete her antibiotic course through 8/18 here in hospital.    Kodi Gonzalez MD  Hospitalist                   Physical Exam:      Blood pressure 119/82, pulse 80, temperature 98  F (36.7  C), temperature source Oral, resp. rate 18, height 1.727 m (5' 7.99\"), weight 76.8 kg (169 lb 5 oz), SpO2 98 %.  Vitals:    07/13/21 0200 07/14/21 0445 07/16/21 0615   Weight: 89 kg (196 lb 3.4 oz) 84.2 kg (185 lb 10 oz) 76.8 kg (169 lb 5 oz)     Vital Signs with Ranges  Temp:  [98  F (36.7  C)] 98  F (36.7  C)  Pulse:  [] 80  Resp:  [18] 18  BP: (119-125)/(80-84) 119/82  SpO2:  [98 %-99 %] 98 %  I/O's Last 24 hours  No intake/output data recorded.    Constitutional: AAOx3, resting comfortably    HEENT: PERRLA   Oral cavity: Moist mucosa   Cardiovascular: s1s2 regular, no tachycardia/murmur   Lungs: B/l clear to auscultation, no wheezes or crepitations   Abdomen: Soft, nt, nd, no guarding, rigidity or rebound; BS +, umbilical hernia reducible (+)   LE : No edema   Musculoskeletal: Power 5/5 in all extremities   Psychiatry:  Anxious   Skin: right finger 4th digit tip nolvia node, multiple splinter hemorrhages of BLE toes, and left hand. Black discoloration of toes noted   Neuro: No focal neurological deficits noted             Medications:          bictegravir-emtricitabine-tenofovir  1 tablet Oral Daily     heparin ANTICOAGULANT  5,000 Units Subcutaneous Q8H     [START ON 7/23/2021] methadone  10 mg Oral QAM     [START ON 7/22/2021] methadone  20 mg Oral QAM     multivitamin " w/minerals  1 tablet Oral Daily     nafcillin  2 g Intravenous Q4H     pantoprazole  40 mg Oral QAM AC     PRN Meds: sodium chloride 0.9%, acetaminophen **OR** [DISCONTINUED] acetaminophen, albuterol, glucose **OR** dextrose **OR** glucagon, lidocaine 4%, lidocaine (buffered or not buffered), loperamide, naloxone **OR** naloxone **OR** naloxone **OR** naloxone, QUEtiapine, sodium chloride (PF), sodium chloride (PF)         Data:      All new lab and imaging data was reviewed.   Recent Labs   Lab Test 07/19/21  1104 07/15/21  0009 07/14/21  0506 07/01/21  1839 07/01/21  1239   WBC 8.7 7.5 6.7   < > 21.5*   HGB 9.8* 8.9* 8.3*   < > 6.8*   MCV 82 80 79   < > 67*   * 859* 838*   < > 153   INR  --   --   --   --  1.23*    < > = values in this interval not displayed.      Recent Labs   Lab Test 07/19/21  1104 07/16/21  1158 07/16/21  0828 07/15/21  0009 07/14/21  1502 07/14/21  0506 07/14/21  0506     --   --  141  --   --  146*   POTASSIUM 4.1  --   --  4.0  4.0 3.4  --  2.7*   CHLORIDE 110*  --   --  114*  --   --  113*   CO2 26  --   --  23  --   --  27   BUN 17  --   --  8  --   --  6*   CR 0.53  --   --  0.54  --   --  0.51*   ANIONGAP 4  --   --  4  --   --  6   DEREK 8.7  --   --  7.8*  --   --  7.6*   * 100* 100* 125*  --    < > 101*    < > = values in this interval not displayed.     Recent Labs   Lab Test 07/04/21  0537 07/04/21  0220 07/03/21  2210   TROPI 0.431* 0.439* 0.407*

## 2021-07-21 NOTE — PLAN OF CARE
Summary: Endocarditis MSSA  DATE & TIME: 7/21/21 9216-5611  Cognitive Concerns/ Orientation : A&Ox4, cooperative, flat, frustrated at times with hospitalization and changes to pain medications.    BEHAVIOR & AGGRESSION TOOL COLOR: Green  ABNL VS/O2: VSS on RA  MOBILITY: Ax1-2, GB pivot to chair. No OOB this shift.   PAIN MANAGMENT: C/O back pain 10/10, PRN percocet Q4 x1, Tylenol x1 and heat packs.  DIET: Regular diet  BOWEL/BLADDER: Incontinent at times with urgency, voided on bed pan, no BM this shift   ABNL LAB/BG: No new labs   DRAIN/DEVICES: PIV in LUE for intermittent abx.   SKIN: Bruises; black toes, blister on left finger. Scattered scars.   TESTS/PROCEDURES: NA   D/C DAY/GOALS/PLACE: Pending; once mental status stable and antibiotic plan in place. Chem/dep consulted.   OTHER IMPORTANT INFO: Generalized weakness, PT/OT/SLP/ID following. Frequently seeking out pain medications.

## 2021-07-21 NOTE — CONSULTS
"REASON FOR CONSULTATION:  Drug abuse including heroin, endocarditis, stroke and septic emboli having craving for drugs.    REQUESTING PHYSICIAN:  Dr. Cleaning.    IDENTIFYING DATA:  The patient is a 41-year-old woman admitted with IV heroin use, she has been in the hospital for several weeks and has had complex medical concerns including MSSA, mitral valve endocarditis, followed by septic shock and embolic left parietal lobe infarctions with peripheral emboli.  She is on station 66, currently getting some p.r.n. oxycodone and complaining of withdrawal symptoms, stating \"I just don't feel good.\" She is getting IV antibiotics.    CHIEF COMPLAINT:  \"I don't want to take Suboxone.\"    HISTORY OF PRESENT ILLNESS:  David is a 41-year-old woman presenting with the above history.  She came into the hospital with a history of active IV drug use including heroin.  She has been in treatment in the past, she has several children and has had child protection involvement.  She is currently convalescing on station 66.  She has had delirium during her stay and currently there is some discussion about whether she might return to live with one of her grandparents.  She says she is not interested in treatment.  She did accept using some methadone to do a rapid taper.  She has a history of being on methadone maintenance in the past, but does not currently have that service in place.  She presents dysphoric, but not suicidal.  She was making poor eye contact during the visit, mostly focused on feeling like she was having cravings for opiate use.  She has been homeless, and when I talked with her about Suboxone, she told me that she is afraid of it, will not take it, she is convinced it will make her withdrawal worse.  She believes she has untreated depression, not currently taking antidepressants, not reporting any thoughts of wanting to hurt self or others.    On further questioning, she does not endorse a convincing history of " hypomania, luther, psychosis, generalized anxiety, panic, OCD, and eating disorder history.  From what I can tell, she has not been hospitalized for psychiatric reasons.  It does look like she has had some contacts through [facility] in the [facility] and she was in the Emergency Room with an unintentional overdose a few months ago.    PAST PSYCHIATRIC HISTORY:  As above.    PAST CHEMICAL DEPENDENCY HISTORY:  Notable for severe opiate use disorder with resultant complications.    PAST MEDICAL HISTORY:  Sequela from IV drug use including endocarditis, septic emboli and untreated HIV.    PRIOR TO ADMISSION MEDICATIONS:  None.    FAMILY HISTORY:  Unknown, though she has a grandmother who lives in [facility].    SOCIAL HISTORY:  The patient is undomiciled ,currently single, has many children and says that they have been taken away from her by Child Protection.  She grew up in a family of 6.  Did not graduate high school.  Not currently employed and says she lives on general assistance.  She is not currently reporting legal involvement other than the past child protection involvement.    REVIEW OF SYSTEMS:  A 10-point review of systems is notable for some lethargy on neurologic exam, otherwise, negative.    PHYSICAL EXAMINATION:    VITAL SIGNS:  Blood pressure 124/80, temperature 98, pulse 88, respiratory rate 18, oxygen saturation 98%.  MENTAL STATUS EXAMINATION:  The patient is a fatigued appearing dark complected woman lying in bed.  She makes poor eye contact.  She is judged to be a fair historian.  Speech is dysphoric and tone soft.  Use of language is appropriate.  Motor exam is calm.  Affect is flat.  Mood dysphoric.  Thought process logical, coherent and goal directed.  No loosening of associations, flight of ideas or formal thought disorder.  Thought content negative for hallucinations, delusions, paranoia, suicidal or homicidal ideation.  Insight and judgment chronically poor with respect to  drug use.   COGNITIVE EXAM:  The patient is fatigued.  She is oriented x3, but concentration appears diminished.  She is somewhat distracted.  Recent and remote memory grossly intact.  General fund of knowledge average.    IMPRESSION:  The patient is a 41-year-old woman presenting with complications related to IV drug use.  She clearly had some delirium, which appears to be clearing.  We can utilize methadone and do a rapid taper.  She does not want to take Suboxone and we are not in a position to be able to start her on maintenance methadone here in the hospital.  She seems mostly resistant to discussions about going into treatment.  I think her prognosis is very guarded, her motivation for change very limited.    DIAGNOSES:     1.  Opiate use disorder, severe.  2.  Multiple complications related to IV drug use including endocarditis, septic emboli.  3.  Untreated HIV.    PLAN:     1.  Do a rapid methadone taper 30 mg for a day, 20 mg for a day, then 10 mg for a day, then discontinue.  2.  Discontinue all opiates.  3.  Continue medical management as you are.  We should offer chemical dependency assessment, obviously we would want to try to connect her with a methadone maintenance program and give her those resources after discharge so she can pursue a relapse prevention strategy.  She is refusing Suboxone, which I strongly advised she consider, but there is no way to force this.  4.  Commitment would be a consideration, but I am not certain that this is going to accomplish much, given the chronicity of this presentation trying to get her into a methadone maintenance program after discharge is probably the most appropriate course of action.    Willis Anthony MD        D: 2021   T: 2021   MT: MAGO    Name:     JOAQUÍN SAAVEDRA  MRN:      [id number]        Account:      [id number]   :      1980     Document: [id number]

## 2021-07-21 NOTE — PROGRESS NOTES
Care Management Follow Up    Length of Stay (days): 20    Expected Discharge Date: 07/21/2021     Concerns to be Addressed: adjustment to diagnosis/illness, substance/tobacco abuse/use (mirtha need therapy initiated to determine disposition)     Patient plan of care discussed at interdisciplinary rounds: Yes    Anticipated Discharge Disposition:       Anticipated Discharge Services:    Anticipated Discharge DME:      Patient/family educated on Medicare website which has current facility and service quality ratings:    Education Provided on the Discharge Plan:    Patient/Family in Agreement with the Plan:      Referrals Placed by CM/SW:    Private pay costs discussed: Not applicable    Additional Information:    Per chart review and discussion with CC pt has been declined at [facility] and next discharge plan is [facility].  Per CC, pt will need MA darin filled out prior to admission and a discharge plan following TCU.  Pt will need 6 weeks of IV abx at discharge.  Alirio sent referral.  Hans Kevin from [facility]U they are at capacity in their TCU wing currently.  Alirio updated CC and CC to discuss referral with  TCU liaisons.     Dee Troy, GORANSW

## 2021-07-22 ENCOUNTER — APPOINTMENT (OUTPATIENT)
Dept: OCCUPATIONAL THERAPY | Facility: CLINIC | Age: 41
End: 2021-07-22
Payer: MEDICAID

## 2021-07-22 ENCOUNTER — APPOINTMENT (OUTPATIENT)
Dept: PHYSICAL THERAPY | Facility: CLINIC | Age: 41
End: 2021-07-22
Payer: MEDICAID

## 2021-07-22 ENCOUNTER — APPOINTMENT (OUTPATIENT)
Dept: SPEECH THERAPY | Facility: CLINIC | Age: 41
End: 2021-07-22
Payer: MEDICAID

## 2021-07-22 LAB — PLATELET # BLD AUTO: 604 10E3/UL (ref 150–450)

## 2021-07-22 PROCEDURE — 250N000013 HC RX MED GY IP 250 OP 250 PS 637: Performed by: STUDENT IN AN ORGANIZED HEALTH CARE EDUCATION/TRAINING PROGRAM

## 2021-07-22 PROCEDURE — 99232 SBSQ HOSP IP/OBS MODERATE 35: CPT | Performed by: INTERNAL MEDICINE

## 2021-07-22 PROCEDURE — 250N000013 HC RX MED GY IP 250 OP 250 PS 637: Performed by: PSYCHIATRY & NEUROLOGY

## 2021-07-22 PROCEDURE — 120N000001 HC R&B MED SURG/OB

## 2021-07-22 PROCEDURE — 85049 AUTOMATED PLATELET COUNT: CPT | Performed by: INTERNAL MEDICINE

## 2021-07-22 PROCEDURE — 250N000009 HC RX 250: Performed by: STUDENT IN AN ORGANIZED HEALTH CARE EDUCATION/TRAINING PROGRAM

## 2021-07-22 PROCEDURE — 92507 TX SP LANG VOICE COMM INDIV: CPT | Mod: GN

## 2021-07-22 PROCEDURE — 97535 SELF CARE MNGMENT TRAINING: CPT | Mod: GO | Performed by: OCCUPATIONAL THERAPIST

## 2021-07-22 PROCEDURE — 250N000013 HC RX MED GY IP 250 OP 250 PS 637: Performed by: INTERNAL MEDICINE

## 2021-07-22 PROCEDURE — 250N000011 HC RX IP 250 OP 636: Performed by: STUDENT IN AN ORGANIZED HEALTH CARE EDUCATION/TRAINING PROGRAM

## 2021-07-22 PROCEDURE — 97530 THERAPEUTIC ACTIVITIES: CPT | Mod: GP

## 2021-07-22 PROCEDURE — 36415 COLL VENOUS BLD VENIPUNCTURE: CPT | Performed by: INTERNAL MEDICINE

## 2021-07-22 RX ADMIN — ACETAMINOPHEN 650 MG: 325 TABLET, FILM COATED ORAL at 09:17

## 2021-07-22 RX ADMIN — HEPARIN SODIUM 5000 UNITS: 5000 INJECTION, SOLUTION INTRAVENOUS; SUBCUTANEOUS at 05:44

## 2021-07-22 RX ADMIN — PANTOPRAZOLE SODIUM 40 MG: 40 TABLET, DELAYED RELEASE ORAL at 06:54

## 2021-07-22 RX ADMIN — NAFCILLIN SODIUM 2 G: 2 INJECTION, POWDER, LYOPHILIZED, FOR SOLUTION INTRAMUSCULAR; INTRAVENOUS at 17:18

## 2021-07-22 RX ADMIN — METHADONE HYDROCHLORIDE 20 MG: 10 CONCENTRATE ORAL at 09:18

## 2021-07-22 RX ADMIN — ACETAMINOPHEN 650 MG: 325 TABLET, FILM COATED ORAL at 03:29

## 2021-07-22 RX ADMIN — ACETAMINOPHEN 650 MG: 325 TABLET, FILM COATED ORAL at 20:19

## 2021-07-22 RX ADMIN — Medication 1 TABLET: at 13:27

## 2021-07-22 RX ADMIN — OXYCODONE HYDROCHLORIDE AND ACETAMINOPHEN 1 TABLET: 5; 325 TABLET ORAL at 06:54

## 2021-07-22 RX ADMIN — NAFCILLIN SODIUM 2 G: 2 INJECTION, POWDER, LYOPHILIZED, FOR SOLUTION INTRAMUSCULAR; INTRAVENOUS at 02:40

## 2021-07-22 RX ADMIN — BICTEGRAVIR SODIUM, EMTRICITABINE, AND TENOFOVIR ALAFENAMIDE FUMARATE 1 TABLET: 50; 200; 25 TABLET ORAL at 09:18

## 2021-07-22 RX ADMIN — NAFCILLIN SODIUM 2 G: 2 INJECTION, POWDER, LYOPHILIZED, FOR SOLUTION INTRAMUSCULAR; INTRAVENOUS at 09:18

## 2021-07-22 RX ADMIN — HEPARIN SODIUM 5000 UNITS: 5000 INJECTION, SOLUTION INTRAVENOUS; SUBCUTANEOUS at 21:54

## 2021-07-22 RX ADMIN — ACETAMINOPHEN 650 MG: 325 TABLET, FILM COATED ORAL at 13:27

## 2021-07-22 RX ADMIN — NAFCILLIN SODIUM 2 G: 2 INJECTION, POWDER, LYOPHILIZED, FOR SOLUTION INTRAMUSCULAR; INTRAVENOUS at 13:28

## 2021-07-22 RX ADMIN — NAFCILLIN SODIUM 2 G: 2 INJECTION, POWDER, LYOPHILIZED, FOR SOLUTION INTRAMUSCULAR; INTRAVENOUS at 21:54

## 2021-07-22 RX ADMIN — HEPARIN SODIUM 5000 UNITS: 5000 INJECTION, SOLUTION INTRAVENOUS; SUBCUTANEOUS at 13:27

## 2021-07-22 RX ADMIN — NAFCILLIN SODIUM 2 G: 2 INJECTION, POWDER, LYOPHILIZED, FOR SOLUTION INTRAMUSCULAR; INTRAVENOUS at 05:44

## 2021-07-22 ASSESSMENT — ACTIVITIES OF DAILY LIVING (ADL)
ADLS_ACUITY_SCORE: 22
ADLS_ACUITY_SCORE: 24
ADLS_ACUITY_SCORE: 22
ADLS_ACUITY_SCORE: 24
ADLS_ACUITY_SCORE: 23
ADLS_ACUITY_SCORE: 22

## 2021-07-22 NOTE — PROGRESS NOTES
"[facility]    Hospitalist Progress Note      Assessment & Plan   Manas Hernandez is a 41 year old female with h/o untreated HIV, hepatitis C who was admitted on 7/1/2021 for MSSA mitral valve endocarditis resulting in septic shock complicated by embolic left parietal lobe infarctions and multiple peripheral emboli.    She presented initially with encephalopathy, and shock.  She was intubated in ED on 7/1/2021 subsequently extubated 7/11/2021.  Blood cultures turned positive on hospital day 1 with MSSA.  Urine culture was positive for Klebsiella.  She was treated initially with ceftriaxone and vancomycin, and has since been narrowed to nafcillin per ID.     MSSA mitral valve endocarditis - suspect due to IV drug use  Septic shock, resolved  Probable myopericarditis  Small pericardial effusion  *JAE 7/5 noted with small ASD, mod to large mobile vegetation (14mm long, 6mm wide) of the P2 segment of the mitral leaflet. No valve perforation; mod MR  *blood cultures on admission with MSSA; blood cultures have been negative since 7/6.  Afebrile since 7/13.  Leukocytosis resolved  *ID following; antibiotic has been narrowed down to Nafcillin, 6 weeks of antibiotic, completion date 8/18/2021.  *limited ECHO repeated 7/15 to reassess pericardial fluid 7/15 per cardiology and noted:  organized effusion posteriorly, measuring 1.2-1.5cm, no evidence of tamponade; small mobile vegetation noted attached to the mitral leaflet, mod to severe MR; compared to 7/8/21, effusion size is probably stable, Degree of MR is worse, Vegetation is smaller.  Cardiology signed off 7/15.   *re-evaluated by CT surgery 7/15 and suggested continued antibiotic therapy at this time; per CT surgery \"Were she to become a surgical candidate, we would recommend waiting at least 4 weeks from her stroke and she would need repeat MRI imaging to rule out mycotic aneurysms or brain abscesses\"; will have follow up with CT surgery as " outpatient  *Abx briefly interrupted as she had been pulling out her access sites; midline placed on 7/13/21-- pulled out again on 7/16-- PICC was re-ordered (after she pulled out 2 previous ones), however vascular access team has suggested holding off on re-insertion until right before patient discharges to facility due to difficult access.  Patient is not delirious anymore and per , likely will have to complete her antibiotic course here in hospital as no accepting TCU so far and unlikely to find one.  In that case will consider reinserting PICC line if loses IV again.  -for now continue IV nafcillin in hospital with plans to complete entire course here through 8/18  -will need follow up with cardiothoracic surgery as an outpatient  -appreciate ongoing ID support       IV drug use  Polysubstance use disorder  *Chem dep consulted 7/15 but chemical dependency did not feel she is appropriate for CD assessment at this time and recommended consultation towards the end of her antibiotic course.  Reconsult CD once she is close to finishing her IV antibiotic.  *Was using IV dilaudid as well as Percocet p.o. earlier this stay pretty extensively.  Attempts made to wean off IV narcotics and taper down po narcotics though patient felt she was going through withdrawal. Psych consulted and recommended suboxone though patient declined. Psych recommended rapid taper of methadone instead.   -methadone taper of 30 mg on 7/21, 20 mg 7/22 and then 10 mg 7/23, then stop  -no further narcotics   -could consider suboxone if she reconsiders  -psych also mentioned outpatient methadone maintenances as a possibility     HIV, untreated  - CD4 count greater than 500, viral load 3695  - Started Biktarvy 7/8; ID following     Toxic/septic/metabolic encephalopathy, acute, improving  Likely encephalopathy due to sepsis, drug use, CVA, prolonged ICU stay; had been pulling lines due to alterned mentation thought this all now appears  resolved.   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  Completed course of treatment with ceftriaxone.  repeat urine cultures 7/10 with ESBL E coli but given unimpressive UA and no UTI symptoms ID suggest holding off on abx for ESBL UTI  -monitor for any symptoms     Acute hypoxemic respiratory failure, resolved  Intubated 7/1/2021.  Extubated 7/11/2021.  Likely related to septic shock from MSSA mitral valve endocarditis     FORD, resolved suspected due to sepsis  Hypokalemia  - on potassium replacement protocol  -will check BMP periodically     Chronic anemia  Baseline hemoglobin approximately 7.  Likely related to chronic disease, untreated HIV.  -Monitor hemoglobin periodically; stable around 9     Likely severe malnutrition  Nutrition followed , but due to uncertain recent weights unable to make complete diagnosis  -Encourage p.o., with supplements     Prior hepatitis C infection  Antibody positive but RNA negative  -Noted    DVT Prophylaxis: heparin  Code Status: Full Code    Disposition: Expected discharge once IV abx are complete after 8/18 as having a difficult time finding placement in TCU.     Juno Holman      Interval History   Sitting in chair, appears withdrawn.  Doesn't make eye contact during conversation.  Reports feeling better.  No new issues.      -Data reviewed today: I reviewed all new labs and imaging results over the last 24 hours. I personally reviewed no images  or EKG's today.    Physical Exam   Temp: 98.3  F (36.8  C) Temp src: Oral BP: 119/73 Pulse: 101   Resp: 16 SpO2: 98 % O2 Device: None (Room air)    Vitals:    07/13/21 0200 07/14/21 0445 07/16/21 0615   Weight: 89 kg (196 lb 3.4 oz) 84.2 kg (185 lb 10 oz) 76.8 kg (169 lb 5 oz)     Vital Signs with Ranges  Temp:  [98  F (36.7  C)-98.3  F (36.8  C)] 98.3  F (36.8  C)  Pulse:  [] 101  Resp:  [16] 16  BP: (108-119)/(66-73) 119/73  SpO2:  [98 %] 98 %  I/O last 3 completed shifts:  In: 240 [P.O.:240]  Out: -     Constitutional: awake, alert, cooperative    Respiratory: Clear to auscultation bilaterally, no crackles or wheezing noted.  Good air exchange.     Cardiovascular: Regular rate and rhythm.  No murmur, rub or gallop noted.     GI: Positive bowel sounds, soft, non-distended, non-tender.  No masses or organomegaly noted.     Musculoskeletal: Full range of motion.  Tone is normal.  No acute abnormalities.     Neurologic: Awake, alert and oriented to name, place and time.  Cranial nerves II-XII are grossly intact.      Lymphatic: No edema noted    Skin: no new rash    Medications       bictegravir-emtricitabine-tenofovir  1 tablet Oral Daily     heparin ANTICOAGULANT  5,000 Units Subcutaneous Q8H     [START ON 7/23/2021] methadone  10 mg Oral QAM     multivitamin w/minerals  1 tablet Oral Daily     nafcillin  2 g Intravenous Q4H     pantoprazole  40 mg Oral QAM AC       Data   Recent Labs   Lab 07/22/21  0915 07/21/21  1659 07/19/21  1104 07/16/21  1158   WBC  --   --  8.7  --    HGB  --   --  9.8*  --    MCV  --   --  82  --    *  --  757*  --    NA  --   --  140  --    POTASSIUM  --   --  4.1  --    CHLORIDE  --   --  110*  --    CO2  --   --  26  --    BUN  --   --  17  --    CR  --   --  0.53  --    ANIONGAP  --   --  4  --    DEREK  --   --  8.7  --    GLC  --  140* 125* 100*       No results found for this or any previous visit (from the past 24 hour(s)).

## 2021-07-22 NOTE — PLAN OF CARE
Cognitive Concerns/ Orientation : A&Ox4, cooperative, flat, frustrated at times with hospitalization and changes to pain medications.    BEHAVIOR & AGGRESSION TOOL COLOR: Green  ABNL VS/O2: VSS on RA  MOBILITY: Ax2, Lift due to c/o severe back pain and not  able to get up per pt's report  PAIN MANAGMENT: C/O back pain 10/10, Tylenol x1 with very minimal effect. PRN Percocet x2; helpful  DIET: Regular diet  BOWEL/BLADDER: Incontinent at times. No BM this shift   ABNL LAB/BG:  DRAIN/DEVICES: PIV in rt wrist for intermittent abx.   SKIN: Bruises; black toes, blister on left finger. Scattered scars.   TESTS/PROCEDURES: NA   D/C DAY/GOALS/PLACE: Pending; once mental status stable and antibiotic plan in place. Chem/dep consulted.   OTHER IMPORTANT INFO: Generalized weakness, PT/OT/SLP/ID following. Frequently seeking out pain medications.

## 2021-07-22 NOTE — PROVIDER NOTIFICATION
"MD Notification    Notified Person: MD    Notified Person Name: Dr. Sen    Notification Date/Time: 7/21/21 7:56pm    Notification Interaction: [employer]    Purpose of Notification: C/o lower back pain 10/10; She states, \"it's unbearable and I am not able to walk\". I encouraged her to take PRN Tylenol which she states that, \"it doesn't work\"    Orders Received:    Comments: PRN percocet tonight until 7/22 am. Then hold; primary hospitalist to review in am      "

## 2021-07-22 NOTE — PLAN OF CARE
Summary: Endocarditis MSSA  DATE & TIME: 7/22/2021 9940-2921  Cognitive Concerns/ Orientation : A&Ox4, cooperative, flat affect. Cooperative with care. Neuro intact.    BEHAVIOR & AGGRESSION TOOL COLOR: Green  ABNL VS/O2: VSS on RA  MOBILITY: Ax1 with GB/WK. Up in chair. PAIN MANAGMENT: C/O back pain 10/10, Tylenol x1 with very minimal effect. Opiate discontinued per psych. On methadone taper daily.    DIET: Regular diet  BOWEL/BLADDER: Incontinent at times. No BM this shift   ABNL LAB/BG:  DRAIN/DEVICES: PIV in rt wrist for intermittent abx.   SKIN: Bruises; black toes, blister on left finger. Scattered scars.   TESTS/PROCEDURES: NA   D/C DAY/GOALS/PLACE: pending, will need abx until 8/18/2021 Chem/dep consulted.   OTHER IMPORTANT INFO: Generalized. May need to stay until abx is finished. No TCU accepting yet. weakness, PT/OT/SLP/ID following.

## 2021-07-23 ENCOUNTER — APPOINTMENT (OUTPATIENT)
Dept: PHYSICAL THERAPY | Facility: CLINIC | Age: 41
End: 2021-07-23
Payer: MEDICAID

## 2021-07-23 ENCOUNTER — APPOINTMENT (OUTPATIENT)
Dept: OCCUPATIONAL THERAPY | Facility: CLINIC | Age: 41
End: 2021-07-23
Payer: MEDICAID

## 2021-07-23 PROCEDURE — 250N000013 HC RX MED GY IP 250 OP 250 PS 637: Performed by: PSYCHIATRY & NEUROLOGY

## 2021-07-23 PROCEDURE — 250N000013 HC RX MED GY IP 250 OP 250 PS 637: Performed by: INTERNAL MEDICINE

## 2021-07-23 PROCEDURE — 97530 THERAPEUTIC ACTIVITIES: CPT | Mod: GP

## 2021-07-23 PROCEDURE — 250N000013 HC RX MED GY IP 250 OP 250 PS 637: Performed by: STUDENT IN AN ORGANIZED HEALTH CARE EDUCATION/TRAINING PROGRAM

## 2021-07-23 PROCEDURE — 250N000011 HC RX IP 250 OP 636: Performed by: STUDENT IN AN ORGANIZED HEALTH CARE EDUCATION/TRAINING PROGRAM

## 2021-07-23 PROCEDURE — 99232 SBSQ HOSP IP/OBS MODERATE 35: CPT | Performed by: INTERNAL MEDICINE

## 2021-07-23 PROCEDURE — 97530 THERAPEUTIC ACTIVITIES: CPT | Mod: GO | Performed by: OCCUPATIONAL THERAPIST

## 2021-07-23 PROCEDURE — 97535 SELF CARE MNGMENT TRAINING: CPT | Mod: GO | Performed by: OCCUPATIONAL THERAPIST

## 2021-07-23 PROCEDURE — 120N000001 HC R&B MED SURG/OB

## 2021-07-23 PROCEDURE — 250N000009 HC RX 250: Performed by: STUDENT IN AN ORGANIZED HEALTH CARE EDUCATION/TRAINING PROGRAM

## 2021-07-23 RX ORDER — LIDOCAINE 4 G/G
2-3 PATCH TOPICAL
Status: DISCONTINUED | OUTPATIENT
Start: 2021-07-23 | End: 2021-08-05

## 2021-07-23 RX ORDER — ROPINIROLE 0.25 MG/1
0.25 TABLET, FILM COATED ORAL
Status: DISCONTINUED | OUTPATIENT
Start: 2021-07-23 | End: 2021-07-24

## 2021-07-23 RX ADMIN — ACETAMINOPHEN 650 MG: 325 TABLET, FILM COATED ORAL at 08:46

## 2021-07-23 RX ADMIN — NAFCILLIN SODIUM 2 G: 2 INJECTION, POWDER, LYOPHILIZED, FOR SOLUTION INTRAMUSCULAR; INTRAVENOUS at 01:41

## 2021-07-23 RX ADMIN — HEPARIN SODIUM 5000 UNITS: 5000 INJECTION, SOLUTION INTRAVENOUS; SUBCUTANEOUS at 05:51

## 2021-07-23 RX ADMIN — NAFCILLIN SODIUM 2 G: 2 INJECTION, POWDER, LYOPHILIZED, FOR SOLUTION INTRAMUSCULAR; INTRAVENOUS at 11:16

## 2021-07-23 RX ADMIN — HEPARIN SODIUM 5000 UNITS: 5000 INJECTION, SOLUTION INTRAVENOUS; SUBCUTANEOUS at 22:55

## 2021-07-23 RX ADMIN — LIDOCAINE 2 PATCH: 560 PATCH PERCUTANEOUS; TOPICAL; TRANSDERMAL at 10:49

## 2021-07-23 RX ADMIN — NAFCILLIN SODIUM 2 G: 2 INJECTION, POWDER, LYOPHILIZED, FOR SOLUTION INTRAMUSCULAR; INTRAVENOUS at 05:52

## 2021-07-23 RX ADMIN — PANTOPRAZOLE SODIUM 40 MG: 40 TABLET, DELAYED RELEASE ORAL at 08:46

## 2021-07-23 RX ADMIN — ACETAMINOPHEN 650 MG: 325 TABLET, FILM COATED ORAL at 19:57

## 2021-07-23 RX ADMIN — BICTEGRAVIR SODIUM, EMTRICITABINE, AND TENOFOVIR ALAFENAMIDE FUMARATE 1 TABLET: 50; 200; 25 TABLET ORAL at 08:46

## 2021-07-23 RX ADMIN — NAFCILLIN SODIUM 2 G: 2 INJECTION, POWDER, LYOPHILIZED, FOR SOLUTION INTRAMUSCULAR; INTRAVENOUS at 15:50

## 2021-07-23 RX ADMIN — ACETAMINOPHEN 650 MG: 325 TABLET, FILM COATED ORAL at 15:50

## 2021-07-23 RX ADMIN — NAFCILLIN SODIUM 2 G: 2 INJECTION, POWDER, LYOPHILIZED, FOR SOLUTION INTRAMUSCULAR; INTRAVENOUS at 22:56

## 2021-07-23 RX ADMIN — ROPINIROLE HYDROCHLORIDE 0.25 MG: 0.25 TABLET, FILM COATED ORAL at 22:54

## 2021-07-23 RX ADMIN — METHADONE HYDROCHLORIDE 10 MG: 10 CONCENTRATE ORAL at 08:45

## 2021-07-23 RX ADMIN — HEPARIN SODIUM 5000 UNITS: 5000 INJECTION, SOLUTION INTRAVENOUS; SUBCUTANEOUS at 15:51

## 2021-07-23 RX ADMIN — ACETAMINOPHEN 650 MG: 325 TABLET, FILM COATED ORAL at 03:08

## 2021-07-23 RX ADMIN — NAFCILLIN SODIUM 2 G: 2 INJECTION, POWDER, LYOPHILIZED, FOR SOLUTION INTRAMUSCULAR; INTRAVENOUS at 19:05

## 2021-07-23 ASSESSMENT — ACTIVITIES OF DAILY LIVING (ADL)
ADLS_ACUITY_SCORE: 22

## 2021-07-23 NOTE — PROGRESS NOTES
CLINICAL NUTRITION SERVICES - REASSESSMENT NOTE    Malnutrition:   (7/19)   % Weight Loss:  Weight loss does not meet criteria for malnutrition - pt states wt is still elevated from baseline   % Intake:  <75% for > 7 days (non-severe malnutrition) - on average  Subcutaneous Fat Loss:  Orbital region mild depletion and Upper arm region mild depletion  Muscle Loss:  Clavicle bone region mild depletion  Fluid Retention:  Moderate     Malnutrition Diagnosis: Non-Severe malnutrition  In Context of:  Acute illness or injury     EVALUATION OF PROGRESS TOWARD GOALS   Diet: Regular diet  [place] instant breakfast   Intake/Tolerance:   - Tolerating % consistently. Feels her appetite is improving and is ordering protein-dense foods.   - Dislikes carnation instant breakfast supplement.   - Last BM 7/22    ASSESSED NUTRITION NEEDS:  Dosing Weight:  69.6 kg (adjusted for overwt)  Energy Needs:  9852-0564 kcals (25-30 kcal/kg)  Justification: overweight    Protein Needs:   grams protein (1.2-1.5 g pro/Kg)  Justification: hypercatabolism with acute illness and preservation of lean body mass    NEW FINDINGS:   - Discharge after abx completed     Previous Goals:   Intake of at least 75% meals + 2-3 supplements.   Evaluation: Met for meals, not supplements     Previous Nutrition Diagnosis:   Predicted inadequate nutrient (energy/protein) intake related to ongoing pain, lethargy, reduced appetite   Evaluation: Completed    CURRENT NUTRITION DIAGNOSIS  No nutrition diagnosis identified at this time    INTERVENTIONS  Recommendations / Nutrition Prescription  Regular diet  Stop supplements  Encouraged protein     Implementation  Medical Food Supplement - discontinued in epic    Goals  Intake of >75% meals TID.       MONITORING AND EVALUATION:  Progress towards goals will be monitored and evaluated per protocol and Practice Guidelines    Nubia Marks, RD, LD

## 2021-07-23 NOTE — PROGRESS NOTES
"[facility]    Hospitalist Progress Note      Assessment & Plan   Manas Hernandez is a 41 year old female with h/o untreated HIV, hepatitis C who was admitted on 7/1/2021 for MSSA mitral valve endocarditis resulting in septic shock complicated by embolic left parietal lobe infarctions and multiple peripheral emboli.    She presented initially with encephalopathy, and shock.  She was intubated in ED on 7/1/2021 subsequently extubated 7/11/2021.  Blood cultures turned positive on hospital day 1 with MSSA.  Urine culture was positive for Klebsiella.  She was treated initially with ceftriaxone and vancomycin, and has since been narrowed to nafcillin per ID.     MSSA mitral valve endocarditis - suspect due to IV drug use  Septic shock, resolved  Probable myopericarditis  Small pericardial effusion  *JAE 7/5 noted with small ASD, mod to large mobile vegetation (14mm long, 6mm wide) of the P2 segment of the mitral leaflet. No valve perforation; mod MR  *blood cultures on admission with MSSA; blood cultures have been negative since 7/6.  Afebrile since 7/13.  Leukocytosis resolved  *ID following; antibiotic has been narrowed down to Nafcillin, 6 weeks of antibiotic, completion date 8/18/2021.  *limited ECHO repeated 7/15 to reassess pericardial fluid 7/15 per cardiology and noted:  organized effusion posteriorly, measuring 1.2-1.5cm, no evidence of tamponade; small mobile vegetation noted attached to the mitral leaflet, mod to severe MR; compared to 7/8/21, effusion size is probably stable, Degree of MR is worse, Vegetation is smaller.  Cardiology signed off 7/15.   *re-evaluated by CT surgery 7/15 and suggested continued antibiotic therapy at this time; per CT surgery \"Were she to become a surgical candidate, we would recommend waiting at least 4 weeks from her stroke and she would need repeat MRI imaging to rule out mycotic aneurysms or brain abscesses\"; will have follow up with CT surgery as " outpatient  *Abx briefly interrupted as she had been pulling out her access sites; midline placed on 7/13/21-- pulled out again on 7/16-- PICC was re-ordered (after she pulled out 2 previous ones), however vascular access team has suggested holding off on re-insertion until right before patient discharges to facility due to difficult access.  Patient is not delirious anymore and per , likely will have to complete her antibiotic course here in hospital as no accepting TCU so far and unlikely to find one.  In that case will consider reinserting PICC line if loses IV again.  -for now continue IV nafcillin in hospital with plans to complete entire course here through 8/18  -will need follow up with cardiothoracic surgery as an outpatient  -appreciate ongoing ID support       IV drug use  Polysubstance use disorder  *Chem dep consulted 7/15 but chemical dependency did not feel she is appropriate for CD assessment at this time and recommended consultation towards the end of her antibiotic course.  Reconsult CD once she is close to finishing her IV antibiotic.  *Was using IV dilaudid as well as Percocet p.o. earlier this stay pretty extensively.  Attempts made to wean off IV narcotics and taper down po narcotics though patient felt she was going through withdrawal. Psych consulted and recommended suboxone though patient declined. Psych recommended rapid taper of methadone instead.   -methadone taper of 30 mg on 7/21, 20 mg 7/22 and then 10 mg 7/23, then stop  -no further narcotics   -could consider suboxone if she reconsiders  -psych also mentioned outpatient methadone maintenances as a possibility     HIV, untreated PTA  - CD4 count greater than 500, viral load 3695  - Started Biktarvy 7/8; ID following    Chronic back pain: pt notes it present ever since she was pregnant.   -avoiding narcotics per psych  -tylenol  -will add lidoderm patch 7/23  -could also consider prn toradol for a few days to help get  through the quick methadone taper     Toxic/septic/metabolic encephalopathy, acute, improving  Likely encephalopathy due to sepsis, drug use, CVA, prolonged ICU stay; had been pulling lines due to alterned mentation thought this all now appears resolved.   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  Completed course of treatment with ceftriaxone.  repeat urine cultures 7/10 with ESBL E coli but given unimpressive UA and no UTI symptoms ID suggest holding off on abx for ESBL UTI  -monitor for any symptoms     Acute hypoxemic respiratory failure, resolved  Intubated 7/1/2021.  Extubated 7/11/2021.  Likely related to septic shock from MSSA mitral valve endocarditis     FORD, resolved suspected due to sepsis  Hypokalemia  - on potassium replacement protocol  -will check BMP periodically     Chronic anemia  Baseline hemoglobin approximately 7.  Likely related to chronic disease, untreated HIV.  -Monitor hemoglobin periodically; stable around 9     Likely severe malnutrition  Nutrition followed , but due to uncertain recent weights unable to make complete diagnosis  -Encourage p.o., with supplements     Prior hepatitis C infection  Antibody positive but RNA negative  -Noted    DVT Prophylaxis: heparin  Code Status: Full Code    Disposition: Expected discharge once IV abx are complete after 8/18 as having a difficult time finding placement in TCU.     Juno Holman      Interval History   Sitting  in chair, tearful and noting back pain.  Mid lower back no trauma.  This is more chronic and present ever since she was pregnant.  Tylenol doesn't work well for her.  No chest pain, shortness of breath or other complaints at this point.      -Data reviewed today: I reviewed all new labs and imaging results over the last 24 hours. I personally reviewed no images or EKG's today.    Physical Exam   Temp: 98.7  F (37.1  C) Temp src: Oral BP: 117/79 Pulse: 94   Resp: 18 SpO2: 99 % O2 Device: None (Room air)    Vitals:    07/13/21 0200 07/14/21 0445 07/16/21 0615   Weight: 89 kg (196 lb 3.4 oz) 84.2 kg (185 lb 10 oz) 76.8 kg (169 lb 5 oz)     Vital Signs with Ranges  Temp:  [98  F (36.7  C)-98.7  F (37.1  C)] 98.7  F (37.1  C)  Pulse:  [93-96] 94  Resp:  [16-18] 18  BP: (112-117)/(55-79) 117/79  SpO2:  [98 %-99 %] 99 %  I/O last 3 completed shifts:  In: 1020 [P.O.:720; I.V.:300]  Out: -     Constitutional: sitting chair, answers questions but not very engaged    Respiratory: Clear to auscultation bilaterally, no crackles or wheezing noted.  Good air exchange.     Cardiovascular: Regular rate and rhythm.  No murmur, rub or gallop noted.     GI: Positive bowel sounds, soft, non-distended, non-tender.  No masses or organomegaly noted.     Musculoskeletal: Full range of motion.  Tone is normal.  No acute abnormalities.  Does not seem tender to palpation in mid lower back or anywhere on the midline.      Neurologic: Awake, alert and oriented to name, place and time.  Cranial nerves II-XII are grossly intact.      Lymphatic: No edema noted    Skin: no new rash    Medications       bictegravir-emtricitabine-tenofovir  1 tablet Oral Daily     heparin ANTICOAGULANT  5,000 Units Subcutaneous Q8H     multivitamin w/minerals  1 tablet Oral Daily     nafcillin  2 g Intravenous Q4H     pantoprazole  40 mg Oral QAM AC       Data   Recent Labs   Lab 07/22/21  0915 07/21/21  1659 07/19/21  1104 07/16/21  1158   WBC  --   --  8.7  --     HGB  --   --  9.8*  --    MCV  --   --  82  --    *  --  757*  --    NA  --   --  140  --    POTASSIUM  --   --  4.1  --    CHLORIDE  --   --  110*  --    CO2  --   --  26  --    BUN  --   --  17  --    CR  --   --  0.53  --    ANIONGAP  --   --  4  --    DEREK  --   --  8.7  --    GLC  --  140* 125* 100*       No results found for this or any previous visit (from the past 24 hour(s)).

## 2021-07-23 NOTE — PLAN OF CARE
Cognitive Concerns/ Orientation : A&Ox4, cooperative, flat affect.    BEHAVIOR & AGGRESSION TOOL COLOR: Green  ABNL VS/O2: VSS on RA  MOBILITY: Ax1 with GB/WK.   PAIN MANAGMENT: C/O back pain 9-10/10, Tylenol x2 with very minimal effect per pt's report.  DIET: Regular diet  BOWEL/BLADDER: Urine incontinence. No BM this shift   ABNL LAB/BG:  DRAIN/DEVICES: PIV  for intermittent abx.   SKIN: Bruises; black toes, blister on left finger absorbed, scabbing noted. Scattered scars.   TESTS/PROCEDURES: NA   D/C DAY/GOALS/PLACE: pending, will need abx until 8/18/2021 Chem/dep consulted.   OTHER IMPORTANT INFO: Generalized weakness, PT/OT/SLP/ID following.

## 2021-07-23 NOTE — PROGRESS NOTES
[facility]    Infectious Disease Progress Note    Date of Service : 07/23/2021     Assessment :  1. S.aureus MSSA native mitral valve endocarditis with septic shock in the setting of IVDA complicated by embolic L parietal lobe infarction and multiple peripheral emboli.   (TTE shows a large mobile 1.4 cm vegetation on the posterior mitral valve leaflet with extension to left atrium and small ASD per cardiology notes , no valvular abscess and no additional valvular involvement. Pericardial effusion is small). No seeding on CT abdomen but has peripheral emboli.  2. Untreated HIV diagnosed 2018, CD4 count maintained at >500, VL 3,695cpies/ml. Genotype pending  3. Hepatitis C Ab+ but RNA -.   4. Severe sepsis requiring pressor support and multiorgan failure. Off pressors now  5. Multiple peripheral emboli. Right finger swelling and discoloration of left 3rd toe. US right finger without evidence of infection but sub-optimal study. Appears stable  6. FORD related to sepsis- resolved  7. Embolic stroke (small ASD with Left to right shunt)  8. Klebsiella pneumoniae UTI treated. ESBL E.coli urinary colonization  9. Hypernatremia and hypokalemia  10. Severe anemia multifactorial. Has baseline anemia, exacerbated by chronic disease  11. Polysubstance abuse  12. Splenomegaly   13. LFT abnormality resolved  14. Organized pericardial effusion      Recommendations:  1. Nafcillin  2. Biktarvy  3. Follow HIV genotype 7/7 still pending  4. Anticipate prolonged IV antibiotics course for 6 weeks until 8/18/2021 . Placement of PICC and TCU placement will be challenging given substance abuse history  5. Will need HIV follow up after discharge    Marisol Matos MD    Interval History   Sitting out in chair, complaining of chronic back pain being uncontrolled off narcotics, upset and tearful    Antimicrobial therapy  7/2 Nafcillin  7/8 Biktarvy  Prior  7/4-7/7 Cipro  7/1-7/2 Vancomycin, zosyn    Physical Exam   Temp:  98.3  F (36.8  C) Temp src: Oral BP: 110/72 Pulse: 98   Resp: 18 SpO2: 100 % O2 Device: None (Room air)    Vitals:    07/13/21 0200 07/14/21 0445 07/16/21 0615   Weight: 89 kg (196 lb 3.4 oz) 84.2 kg (185 lb 10 oz) 76.8 kg (169 lb 5 oz)     Vital Signs with Ranges  Temp:  [98.2  F (36.8  C)-98.7  F (37.1  C)] 98.3  F (36.8  C)  Pulse:  [94-98] 98  Resp:  [18] 18  BP: (110-117)/(55-79) 110/72  SpO2:  [99 %-100 %] 100 %    GENERAL APPEARANCE: awake, alert   EYES: conjunctivae and sclerae normal  NECK: no adenopathy  RESP: lungs clear to auscultation - no rales, rhonchi or wheezes  CV: regular rates and rhythm, murmur  ABDOMEN: soft, umbilical hernia  MS: extremities normal- no gross deformities noted  SKIN: right finger with swelling and redness, multiple osler's nodes and splinter hemorrhages, multiple embolic infarcts on toes, black discoloration of left toe    Other:    Medications       bictegravir-emtricitabine-tenofovir  1 tablet Oral Daily     heparin ANTICOAGULANT  5,000 Units Subcutaneous Q8H     lidocaine  2-3 patch Transdermal Q24H     lidocaine   Transdermal Q8H     multivitamin w/minerals  1 tablet Oral Daily     nafcillin  2 g Intravenous Q4H     pantoprazole  40 mg Oral QAM AC       Data   All microbiology laboratory data reviewed.  Recent Labs   Lab Test 07/22/21  0915 07/19/21  1104 07/15/21  0009 07/14/21  0506   WBC  --  8.7 7.5 6.7   HGB  --  9.8* 8.9* 8.3*   HCT  --  31.9* 29.6* 27.1*   MCV  --  82 80 79   * 757* 859* 838*     Recent Labs   Lab Test 07/19/21  1104 07/15/21  0009 07/14/21  0506   CR 0.53 0.54 0.51*     Component      Latest Ref Rng & Units 7/6/2021   HIV-1 RNA Quant Result      HIVND:HIV-1 RNA Not Detected Copies/mL 3,695 (A)   HIV RNA QT Log      <1.3 Log:copies/mL 3.6 (     Recent Labs   Lab Test 07/10/21  2243 07/09/21  0454 07/09/21  0415 07/08/21  1417 07/08/21  0500 07/07/21  0450 07/06/21  0500 07/05/21  1232 07/05/21  1104   CULT >100,000 colonies/mL  Escherichia  coli ESBL  ESBL (extended beta lactamase) producing organisms require contact precautions.  *  Critical Value/Significant Value called to and read back by  Luanne Bettencourt RN @ 2200 7/13/21 TM.   No growth No growth No growth No growth No growth  No growth No growth  No growth Cultured on the 2nd day of incubation:  Staphylococcus epidermidis  *  Critical Value/Significant Value, preliminary result only, called to and read back by  IVETT WARE RN 07/06/21 1258 EH.    Cultured on the 5th day of incubation:  Staphylococcus aureus  Susceptibility testing done on previous specimen  *  Critical Value/Significant Value called to and read back by  Cherelle Smith RN 1043 7/11/21 AM   Cultured on the 2nd day of incubation:  Staphylococcus aureus  Susceptibility testing done on previous specimen  *  Critical Value/Significant Value, preliminary result only, called to and read back by  Sin Jack RN at 9558 7/7/21 hg

## 2021-07-24 LAB
FERRITIN SERPL-MCNC: 57 NG/ML (ref 12–150)
GLUCOSE BLDC GLUCOMTR-MCNC: 110 MG/DL (ref 70–99)
IRON SATN MFR SERPL: 17 % (ref 15–46)
IRON SERPL-MCNC: 42 UG/DL (ref 35–180)
TIBC SERPL-MCNC: 244 UG/DL (ref 240–430)

## 2021-07-24 PROCEDURE — 120N000001 HC R&B MED SURG/OB

## 2021-07-24 PROCEDURE — 250N000013 HC RX MED GY IP 250 OP 250 PS 637: Performed by: STUDENT IN AN ORGANIZED HEALTH CARE EDUCATION/TRAINING PROGRAM

## 2021-07-24 PROCEDURE — 250N000009 HC RX 250: Performed by: STUDENT IN AN ORGANIZED HEALTH CARE EDUCATION/TRAINING PROGRAM

## 2021-07-24 PROCEDURE — 82728 ASSAY OF FERRITIN: CPT | Performed by: INTERNAL MEDICINE

## 2021-07-24 PROCEDURE — 99232 SBSQ HOSP IP/OBS MODERATE 35: CPT | Performed by: INTERNAL MEDICINE

## 2021-07-24 PROCEDURE — 250N000013 HC RX MED GY IP 250 OP 250 PS 637: Performed by: INTERNAL MEDICINE

## 2021-07-24 PROCEDURE — 250N000011 HC RX IP 250 OP 636: Performed by: STUDENT IN AN ORGANIZED HEALTH CARE EDUCATION/TRAINING PROGRAM

## 2021-07-24 PROCEDURE — 36415 COLL VENOUS BLD VENIPUNCTURE: CPT | Performed by: INTERNAL MEDICINE

## 2021-07-24 PROCEDURE — 83550 IRON BINDING TEST: CPT | Performed by: INTERNAL MEDICINE

## 2021-07-24 RX ORDER — ROPINIROLE 0.25 MG/1
0.25 TABLET, FILM COATED ORAL 3 TIMES DAILY PRN
Status: DISCONTINUED | OUTPATIENT
Start: 2021-07-24 | End: 2021-08-06

## 2021-07-24 RX ORDER — IBUPROFEN 600 MG/1
600 TABLET, FILM COATED ORAL EVERY 6 HOURS PRN
Status: DISCONTINUED | OUTPATIENT
Start: 2021-07-24 | End: 2021-07-25

## 2021-07-24 RX ADMIN — NAFCILLIN SODIUM 2 G: 2 INJECTION, POWDER, LYOPHILIZED, FOR SOLUTION INTRAMUSCULAR; INTRAVENOUS at 19:55

## 2021-07-24 RX ADMIN — PANTOPRAZOLE SODIUM 40 MG: 40 TABLET, DELAYED RELEASE ORAL at 07:14

## 2021-07-24 RX ADMIN — NAFCILLIN SODIUM 2 G: 2 INJECTION, POWDER, LYOPHILIZED, FOR SOLUTION INTRAMUSCULAR; INTRAVENOUS at 16:32

## 2021-07-24 RX ADMIN — ACETAMINOPHEN 650 MG: 325 TABLET, FILM COATED ORAL at 12:12

## 2021-07-24 RX ADMIN — HEPARIN SODIUM 5000 UNITS: 5000 INJECTION, SOLUTION INTRAVENOUS; SUBCUTANEOUS at 16:32

## 2021-07-24 RX ADMIN — BICTEGRAVIR SODIUM, EMTRICITABINE, AND TENOFOVIR ALAFENAMIDE FUMARATE 1 TABLET: 50; 200; 25 TABLET ORAL at 08:17

## 2021-07-24 RX ADMIN — HEPARIN SODIUM 5000 UNITS: 5000 INJECTION, SOLUTION INTRAVENOUS; SUBCUTANEOUS at 23:48

## 2021-07-24 RX ADMIN — ACETAMINOPHEN 650 MG: 325 TABLET, FILM COATED ORAL at 00:09

## 2021-07-24 RX ADMIN — NAFCILLIN SODIUM 2 G: 2 INJECTION, POWDER, LYOPHILIZED, FOR SOLUTION INTRAMUSCULAR; INTRAVENOUS at 23:44

## 2021-07-24 RX ADMIN — NAFCILLIN SODIUM 2 G: 2 INJECTION, POWDER, LYOPHILIZED, FOR SOLUTION INTRAMUSCULAR; INTRAVENOUS at 02:38

## 2021-07-24 RX ADMIN — LIDOCAINE 3 PATCH: 560 PATCH PERCUTANEOUS; TOPICAL; TRANSDERMAL at 12:12

## 2021-07-24 RX ADMIN — NAFCILLIN SODIUM 2 G: 2 INJECTION, POWDER, LYOPHILIZED, FOR SOLUTION INTRAMUSCULAR; INTRAVENOUS at 12:01

## 2021-07-24 RX ADMIN — IBUPROFEN 600 MG: 600 TABLET ORAL at 23:46

## 2021-07-24 RX ADMIN — LIDOCAINE 2 PATCH: 560 PATCH PERCUTANEOUS; TOPICAL; TRANSDERMAL at 07:11

## 2021-07-24 RX ADMIN — ROPINIROLE HYDROCHLORIDE 0.25 MG: 0.25 TABLET, FILM COATED ORAL at 12:12

## 2021-07-24 RX ADMIN — ACETAMINOPHEN 650 MG: 325 TABLET, FILM COATED ORAL at 08:17

## 2021-07-24 RX ADMIN — HEPARIN SODIUM 5000 UNITS: 5000 INJECTION, SOLUTION INTRAVENOUS; SUBCUTANEOUS at 07:13

## 2021-07-24 RX ADMIN — ACETAMINOPHEN 650 MG: 325 TABLET, FILM COATED ORAL at 03:45

## 2021-07-24 RX ADMIN — IBUPROFEN 600 MG: 600 TABLET ORAL at 17:12

## 2021-07-24 RX ADMIN — NAFCILLIN SODIUM 2 G: 2 INJECTION, POWDER, LYOPHILIZED, FOR SOLUTION INTRAMUSCULAR; INTRAVENOUS at 07:10

## 2021-07-24 RX ADMIN — ACETAMINOPHEN 650 MG: 325 TABLET, FILM COATED ORAL at 19:54

## 2021-07-24 ASSESSMENT — ACTIVITIES OF DAILY LIVING (ADL)
ADLS_ACUITY_SCORE: 22

## 2021-07-24 NOTE — PLAN OF CARE
Summary: Endocarditis MSSA  DATE & TIME: 7/23/2021 7am-730 pm  Cognitive Concerns/ Orientation : A&Ox4, cooperative, frustrated,  anxious at times.    BEHAVIOR & AGGRESSION TOOL COLOR: Green  ABNL VS/O2: VSS on RA  MOBILITY: Ax1 with GB/WK.   PAIN MANAGMENT: C/O back pain 9-10/10, Tylenol x2 with very minimal effect, lidocaine patch in place lower back  DIET: Regular diet, tolerating  BOWEL/BLADDER: Urine incontinence. No BM this shift   ABNL LAB/BG:  DRAIN/DEVICES: PIV  for intermittent abx. Pt removed one herself  SKIN: Bruises; black toes, blister on left finger absorbed, scabbing noted, large blister side of L heel Scattered scars.   TESTS/PROCEDURES: NA   D/C DAY/GOALS/PLACE: pending, will need abx until 8/18/2021 Chem/dep consulted.   OTHER IMPORTANT INFO: Generalized weakness, PT/OT/SLP/ID following.

## 2021-07-24 NOTE — PLAN OF CARE
Summary: Endocarditis MSSA  DATE & TIME: 7/24/2021 [id number]  Cognitive Concerns/ Orientation : A&Ox4, cooperative, sad,  anxious at times.    BEHAVIOR & AGGRESSION TOOL COLOR: Green  ABNL VS/O2: VSS on RA, HR tachy at times  MOBILITY: Ax1 with GB/WK.   PAIN MANAGMENT: Complains of lower back pain, Lidocaine patch and PRN Tylenol as needed   DIET: Regular diet, tolerating  BOWEL/BLADDER: Urine incontinence at times. No BM this shift   ABNL LAB/BG: N/A   DRAIN/DEVICES: PIV for intermittent abx.   SKIN: Bruises; black toes, blister on left finger absorbed, scabbing noted, large blister side of L heel, Scattered scars.   TESTS/PROCEDURES: NA   D/C DAY/GOALS/PLACE: pending, will need abx until 8/18/2021 Chem/dep consulted.   OTHER IMPORTANT INFO: Generalized weakness, PT/OT/SLP/ID following.

## 2021-07-24 NOTE — PROVIDER NOTIFICATION
Patient is complaining of restless legs and requesting something to help her legs. Call out to hospitalist service.

## 2021-07-24 NOTE — PROGRESS NOTES
"[facility]    Hospitalist Progress Note      Assessment & Plan   Manas Hernandez is a 41 year old female with h/o untreated HIV, hepatitis C who was admitted on 7/1/2021 for MSSA mitral valve endocarditis resulting in septic shock complicated by embolic left parietal lobe infarctions and multiple peripheral emboli.    She presented initially with encephalopathy, and shock.  She was intubated in ED on 7/1/2021 subsequently extubated 7/11/2021.  Blood cultures turned positive on hospital day 1 with MSSA.  Urine culture was positive for Klebsiella.  She was treated initially with ceftriaxone and vancomycin, and has since been narrowed to nafcillin per ID.     MSSA mitral valve endocarditis - suspect due to IV drug use  Septic shock, resolved  Probable myopericarditis  Small pericardial effusion  *JAE 7/5 noted with small ASD, mod to large mobile vegetation (14mm long, 6mm wide) of the P2 segment of the mitral leaflet. No valve perforation; mod MR  *blood cultures on admission with MSSA; blood cultures have been negative since 7/6.  Afebrile since 7/13.  Leukocytosis resolved  *ID following; antibiotic has been narrowed down to Nafcillin, 6 weeks of antibiotic, completion date 8/18/2021.  *limited ECHO repeated 7/15 to reassess pericardial fluid 7/15 per cardiology and noted:  organized effusion posteriorly, measuring 1.2-1.5cm, no evidence of tamponade; small mobile vegetation noted attached to the mitral leaflet, mod to severe MR; compared to 7/8/21, effusion size is probably stable, Degree of MR is worse, Vegetation is smaller.  Cardiology signed off 7/15.   *re-evaluated by CT surgery 7/15 and suggested continued antibiotic therapy at this time; per CT surgery \"Were she to become a surgical candidate, we would recommend waiting at least 4 weeks from her stroke and she would need repeat MRI imaging to rule out mycotic aneurysms or brain abscesses\"; will have follow up with CT surgery as " outpatient  *Abx briefly interrupted as she had been pulling out her access sites; midline placed on 7/13/21-- pulled out again on 7/16-- PICC was re-ordered (after she pulled out 2 previous ones), however vascular access team has suggested holding off on re-insertion until right before patient discharges to facility due to difficult access.  Patient is not delirious anymore and per , likely will have to complete her antibiotic course here in hospital as no accepting TCU so far and unlikely to find one.  In that case will consider reinserting PICC line if loses IV again.  -for now continue IV nafcillin in hospital with plans to complete entire course here through 8/18  -will need follow up with cardiothoracic surgery as an outpatient  -appreciate ongoing ID support       IV drug use  Polysubstance use disorder  *Chem dep consulted 7/15 but chemical dependency did not feel she is appropriate for CD assessment at this time and recommended consultation towards the end of her antibiotic course.  Reconsult CD once she is close to finishing her IV antibiotic.  *Was using IV dilaudid as well as Percocet p.o. earlier this stay pretty extensively.  Attempts made to wean off IV narcotics and taper down po narcotics though patient felt she was going through withdrawal. Psych consulted and recommended suboxone though patient declined. Psych recommended rapid taper of methadone instead.   *completed methadone taper of 30 mg on 7/21, 20 mg 7/22 and then 10 mg 7/23, then stop  -no further narcotics   -could consider suboxone if she reconsiders  -psych also mentioned outpatient methadone maintenances as a possibility     HIV, untreated PTA  - CD4 count greater than 500, viral load 3695  - Started Biktarvy 7/8; ID following    Chronic back pain: pt notes it present ever since she was pregnant.   -avoiding narcotics per psych  -tylenol  -added lidoderm patch 7/23  -could also consider prn toradol for a few days to help  get through the quick methadone taper     Toxic/septic/metabolic encephalopathy, acute, improving  Likely encephalopathy due to sepsis, drug use, CVA, prolonged ICU stay; had been pulling lines due to alterned mentation thought this all now appears resolved.   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  Completed course of treatment with ceftriaxone.  repeat urine cultures 7/10 with ESBL E coli but given unimpressive UA and no UTI symptoms ID suggest holding off on abx for ESBL UTI  -monitor for any symptoms     Acute hypoxemic respiratory failure, resolved  Intubated 7/1/2021.  Extubated 7/11/2021.  Likely related to septic shock from MSSA mitral valve endocarditis     FORD, resolved suspected due to sepsis  Hypokalemia  - on potassium replacement protocol  -will check BMP periodically     Chronic anemia  Baseline hemoglobin approximately 7.  Likely related to chronic disease, untreated HIV.  -Monitor hemoglobin periodically; stable around 9     Likely severe malnutrition  Nutrition followed , but due to uncertain recent weights unable to make complete diagnosis  -Encourage p.o., with supplements     Prior hepatitis C infection  Antibody positive but RNA negative  -Noted    Restless legs: unclear if this is true restless leg syndrome or due to being uncomfortable  -will try mirapex prn    DVT Prophylaxis: heparin  Code Status: Full Code    Disposition: Expected discharge once IV abx are  complete after 8/18 as having a difficult time finding placement in TCU.     Juno Comer Marradha      Interval History   Lying in bed, still has some back pain.  Not currently wearing lidoderm patches.  Also noting restless legs.  mirapex was prescribed at bedtime last night. No other new concerns today.     -Data reviewed today: I reviewed all new labs and imaging results over the last 24 hours. I personally reviewed no images or EKG's today.    Physical Exam   Temp: 98  F (36.7  C) Temp src: Oral BP: 117/80 Pulse: 90   Resp: 18 SpO2: 99 % O2 Device: None (Room air)    Vitals:    07/13/21 0200 07/14/21 0445 07/16/21 0615   Weight: 89 kg (196 lb 3.4 oz) 84.2 kg (185 lb 10 oz) 76.8 kg (169 lb 5 oz)     Vital Signs with Ranges  Temp:  [97.8  F (36.6  C)-98.6  F (37  C)] 98  F (36.7  C)  Pulse:  [] 90  Resp:  [18] 18  BP: ()/(57-85) 117/80  SpO2:  [98 %-100 %] 99 %  I/O last 3 completed shifts:  In: 580 [P.O.:280; I.V.:300]  Out: -     Constitutional: sitting chair, answers questions but not very engaged    Respiratory: Clear to auscultation bilaterally, no crackles or wheezing noted.  Good air exchange.     Cardiovascular: Regular rate and rhythm.  No murmur, rub or gallop noted.     GI: Positive bowel sounds, soft, non-distended, non-tender.  No masses or organomegaly noted.     Musculoskeletal: Full range of motion.  Tone is normal.  No acute abnormalities.  Does not seem tender to palpation in mid lower back or anywhere on the midline.      Neurologic: Awake, alert and oriented to name, place and time.  Cranial nerves II-XII are grossly intact.      Lymphatic: No edema noted    Skin: no new rash    Medications       bictegravir-emtricitabine-tenofovir  1 tablet Oral Daily     heparin ANTICOAGULANT  5,000 Units Subcutaneous Q8H     lidocaine  2-3 patch Transdermal Q24H     lidocaine   Transdermal Q8H     multivitamin w/minerals  1 tablet Oral Daily     nafcillin  2 g Intravenous Q4H     pantoprazole  40  mg Oral QAM AC       Data   Recent Labs   Lab 07/24/21  0237 07/22/21  0915 07/21/21  1659 07/19/21  1104   WBC  --   --   --  8.7   HGB  --   --   --  9.8*   MCV  --   --   --  82   PLT  --  604*  --  757*   NA  --   --   --  140   POTASSIUM  --   --   --  4.1   CHLORIDE  --   --   --  110*   CO2  --   --   --  26   BUN  --   --   --  17   CR  --   --   --  0.53   ANIONGAP  --   --   --  4   DEREK  --   --   --  8.7   *  --  140* 125*       No results found for this or any previous visit (from the past 24 hour(s)).

## 2021-07-25 LAB
GLUCOSE BLDC GLUCOMTR-MCNC: 111 MG/DL (ref 70–99)
GLUCOSE BLDC GLUCOMTR-MCNC: 122 MG/DL (ref 70–99)
PLATELET # BLD AUTO: 447 10E3/UL (ref 150–450)

## 2021-07-25 PROCEDURE — 36415 COLL VENOUS BLD VENIPUNCTURE: CPT | Performed by: INTERNAL MEDICINE

## 2021-07-25 PROCEDURE — 250N000013 HC RX MED GY IP 250 OP 250 PS 637: Performed by: INTERNAL MEDICINE

## 2021-07-25 PROCEDURE — 250N000013 HC RX MED GY IP 250 OP 250 PS 637: Performed by: STUDENT IN AN ORGANIZED HEALTH CARE EDUCATION/TRAINING PROGRAM

## 2021-07-25 PROCEDURE — 250N000009 HC RX 250: Performed by: STUDENT IN AN ORGANIZED HEALTH CARE EDUCATION/TRAINING PROGRAM

## 2021-07-25 PROCEDURE — 120N000001 HC R&B MED SURG/OB

## 2021-07-25 PROCEDURE — 250N000011 HC RX IP 250 OP 636: Performed by: INTERNAL MEDICINE

## 2021-07-25 PROCEDURE — 250N000013 HC RX MED GY IP 250 OP 250 PS 637: Performed by: HOSPITALIST

## 2021-07-25 PROCEDURE — 99233 SBSQ HOSP IP/OBS HIGH 50: CPT | Performed by: INTERNAL MEDICINE

## 2021-07-25 PROCEDURE — 250N000011 HC RX IP 250 OP 636: Performed by: STUDENT IN AN ORGANIZED HEALTH CARE EDUCATION/TRAINING PROGRAM

## 2021-07-25 PROCEDURE — 999N000128 HC STATISTIC PERIPHERAL IV START W/O US GUIDANCE

## 2021-07-25 PROCEDURE — 85049 AUTOMATED PLATELET COUNT: CPT | Performed by: INTERNAL MEDICINE

## 2021-07-25 RX ORDER — GABAPENTIN 300 MG/1
300 CAPSULE ORAL 2 TIMES DAILY
Status: DISCONTINUED | OUTPATIENT
Start: 2021-07-25 | End: 2021-07-27

## 2021-07-25 RX ORDER — KETOROLAC TROMETHAMINE 15 MG/ML
30 INJECTION, SOLUTION INTRAMUSCULAR; INTRAVENOUS EVERY 6 HOURS PRN
Status: DISPENSED | OUTPATIENT
Start: 2021-07-25 | End: 2021-07-30

## 2021-07-25 RX ORDER — GABAPENTIN 100 MG/1
100 CAPSULE ORAL 3 TIMES DAILY
Status: DISCONTINUED | OUTPATIENT
Start: 2021-07-25 | End: 2021-07-25

## 2021-07-25 RX ADMIN — NAFCILLIN SODIUM 2 G: 2 INJECTION, POWDER, LYOPHILIZED, FOR SOLUTION INTRAMUSCULAR; INTRAVENOUS at 04:28

## 2021-07-25 RX ADMIN — ACETAMINOPHEN 650 MG: 325 TABLET, FILM COATED ORAL at 23:47

## 2021-07-25 RX ADMIN — IBUPROFEN 600 MG: 600 TABLET ORAL at 06:26

## 2021-07-25 RX ADMIN — ACETAMINOPHEN 650 MG: 325 TABLET, FILM COATED ORAL at 08:47

## 2021-07-25 RX ADMIN — ACETAMINOPHEN 650 MG: 325 TABLET, FILM COATED ORAL at 00:50

## 2021-07-25 RX ADMIN — QUETIAPINE FUMARATE 25 MG: 25 TABLET ORAL at 01:39

## 2021-07-25 RX ADMIN — NAFCILLIN SODIUM 2 G: 2 INJECTION, POWDER, LYOPHILIZED, FOR SOLUTION INTRAMUSCULAR; INTRAVENOUS at 08:48

## 2021-07-25 RX ADMIN — HEPARIN SODIUM 5000 UNITS: 5000 INJECTION, SOLUTION INTRAVENOUS; SUBCUTANEOUS at 15:53

## 2021-07-25 RX ADMIN — PANTOPRAZOLE SODIUM 40 MG: 40 TABLET, DELAYED RELEASE ORAL at 06:26

## 2021-07-25 RX ADMIN — GABAPENTIN 300 MG: 300 CAPSULE ORAL at 20:41

## 2021-07-25 RX ADMIN — HEPARIN SODIUM 5000 UNITS: 5000 INJECTION, SOLUTION INTRAVENOUS; SUBCUTANEOUS at 06:28

## 2021-07-25 RX ADMIN — ACETAMINOPHEN 650 MG: 325 TABLET, FILM COATED ORAL at 15:53

## 2021-07-25 RX ADMIN — ROPINIROLE HYDROCHLORIDE 0.25 MG: 0.25 TABLET, FILM COATED ORAL at 15:53

## 2021-07-25 RX ADMIN — ACETAMINOPHEN 650 MG: 325 TABLET, FILM COATED ORAL at 04:29

## 2021-07-25 RX ADMIN — QUETIAPINE FUMARATE 25 MG: 25 TABLET ORAL at 15:53

## 2021-07-25 RX ADMIN — GABAPENTIN 100 MG: 100 CAPSULE ORAL at 15:53

## 2021-07-25 RX ADMIN — KETOROLAC TROMETHAMINE 30 MG: 15 INJECTION, SOLUTION INTRAMUSCULAR; INTRAVENOUS at 20:42

## 2021-07-25 RX ADMIN — KETOROLAC TROMETHAMINE 30 MG: 15 INJECTION, SOLUTION INTRAMUSCULAR; INTRAVENOUS at 11:23

## 2021-07-25 RX ADMIN — ROPINIROLE HYDROCHLORIDE 0.25 MG: 0.25 TABLET, FILM COATED ORAL at 01:39

## 2021-07-25 RX ADMIN — NAFCILLIN SODIUM 2 G: 2 INJECTION, POWDER, LYOPHILIZED, FOR SOLUTION INTRAMUSCULAR; INTRAVENOUS at 22:44

## 2021-07-25 RX ADMIN — BICTEGRAVIR SODIUM, EMTRICITABINE, AND TENOFOVIR ALAFENAMIDE FUMARATE 1 TABLET: 50; 200; 25 TABLET ORAL at 08:46

## 2021-07-25 RX ADMIN — NAFCILLIN SODIUM 2 G: 2 INJECTION, POWDER, LYOPHILIZED, FOR SOLUTION INTRAMUSCULAR; INTRAVENOUS at 13:00

## 2021-07-25 RX ADMIN — ACETAMINOPHEN 650 MG: 325 TABLET, FILM COATED ORAL at 19:44

## 2021-07-25 RX ADMIN — GABAPENTIN 100 MG: 100 CAPSULE ORAL at 11:23

## 2021-07-25 RX ADMIN — HEPARIN SODIUM 5000 UNITS: 5000 INJECTION, SOLUTION INTRAVENOUS; SUBCUTANEOUS at 22:46

## 2021-07-25 RX ADMIN — NAFCILLIN SODIUM 2 G: 2 INJECTION, POWDER, LYOPHILIZED, FOR SOLUTION INTRAMUSCULAR; INTRAVENOUS at 18:13

## 2021-07-25 RX ADMIN — ROPINIROLE HYDROCHLORIDE 0.25 MG: 0.25 TABLET, FILM COATED ORAL at 08:56

## 2021-07-25 ASSESSMENT — ACTIVITIES OF DAILY LIVING (ADL)
ADLS_ACUITY_SCORE: 22

## 2021-07-25 NOTE — PROGRESS NOTES
"[facility]    Hospitalist Progress Note      Assessment & Plan   Manas Hernandez is a 41 year old female with h/o untreated HIV, hepatitis C who was admitted on 7/1/2021 for MSSA mitral valve endocarditis resulting in septic shock complicated by embolic left parietal lobe infarctions and multiple peripheral emboli.    She presented initially with encephalopathy, and shock.  She was intubated in ED on 7/1/2021 subsequently extubated 7/11/2021.  Blood cultures turned positive on hospital day 1 with MSSA.  Urine culture was positive for Klebsiella.  She was treated initially with ceftriaxone and vancomycin, and has since been narrowed to nafcillin per ID.     MSSA mitral valve endocarditis - suspect due to IV drug use  Septic shock, resolved  Probable myopericarditis  Small pericardial effusion  *JAE 7/5 noted with small ASD, mod to large mobile vegetation (14mm long, 6mm wide) of the P2 segment of the mitral leaflet. No valve perforation; mod MR  *blood cultures on admission with MSSA; blood cultures have been negative since 7/6.  Afebrile since 7/13.  Leukocytosis resolved  *ID following; antibiotic has been narrowed down to Nafcillin, 6 weeks of antibiotic, completion date 8/18/2021.  *limited ECHO repeated 7/15 to reassess pericardial fluid 7/15 per cardiology and noted:  organized effusion posteriorly, measuring 1.2-1.5cm, no evidence of tamponade; small mobile vegetation noted attached to the mitral leaflet, mod to severe MR; compared to 7/8/21, effusion size is probably stable, Degree of MR is worse, Vegetation is smaller.  Cardiology signed off 7/15.   *re-evaluated by CT surgery 7/15 and suggested continued antibiotic therapy at this time; per CT surgery \"Were she to become a surgical candidate, we would recommend waiting at least 4 weeks from her stroke and she would need repeat MRI imaging to rule out mycotic aneurysms or brain abscesses\"; will have follow up with CT surgery as " outpatient  *Abx briefly interrupted as she had been pulling out her access sites; midline placed on 7/13/21-- pulled out again on 7/16-- PICC was re-ordered (after she pulled out 2 previous ones), however vascular access team has suggested holding off on re-insertion until right before patient discharges to facility due to difficult access.  Patient is not delirious anymore and per , likely will have to complete her antibiotic course here in hospital as no accepting TCU so far and unlikely to find one.  In that case will consider reinserting PICC line if loses IV again.  -for now continue IV nafcillin in hospital with plans to complete entire course here through 8/18  -will need follow up with cardiothoracic surgery as an outpatient  -appreciate ongoing ID support       IV drug use  Polysubstance use disorder  *Chem dep consulted 7/15 but chemical dependency did not feel she is appropriate for CD assessment at this time and recommended consultation towards the end of her antibiotic course.  Reconsult CD once she is close to finishing her IV antibiotic.  *Was using IV dilaudid as well as Percocet p.o. earlier this stay pretty extensively.  Attempts made to wean off IV narcotics and taper down po narcotics though patient felt she was going through withdrawal. Psych consulted and recommended suboxone though patient declined. Psych recommended rapid taper of methadone instead.   *completed methadone taper of 30 mg on 7/21, 20 mg 7/22 and then 10 mg 7/23, then stop  -no further narcotics   -she is considering suboxone - psych consult ordered.   -psych also mentioned outpatient methadone maintenances as a possibility     HIV, untreated PTA  - CD4 count greater than 500, viral load 3695  - Started Biktarvy 7/8; ID following    Chronic back pain: pt notes it present ever since she was pregnant.   -avoiding narcotics per psych  -tylenol  -added lidoderm patch 7/23  -toradol prn  -will start gabapentin 100 mg  TID and titrate as tolerated     Toxic/septic/metabolic encephalopathy, acute, improving  Likely encephalopathy due to sepsis, drug use, CVA, prolonged ICU stay; had been pulling lines due to alterned mentation thought this all now appears resolved.   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  Completed course of treatment with ceftriaxone.  repeat urine cultures 7/10 with ESBL E coli but given unimpressive UA and no UTI symptoms ID suggest holding off on abx for ESBL UTI  -monitor for any symptoms     Acute hypoxemic respiratory failure, resolved  Intubated 7/1/2021.  Extubated 7/11/2021.  Likely related to septic shock from MSSA mitral valve endocarditis     FORD, resolved suspected due to sepsis  Hypokalemia  - on potassium replacement protocol  -will check BMP periodically     Chronic anemia  Baseline hemoglobin approximately 7.  Likely related to chronic disease, untreated HIV.  -Monitor hemoglobin periodically; stable around 9     Likely severe malnutrition  Nutrition followed , but due to uncertain recent weights unable to make complete diagnosis  -Encourage p.o., with supplements     Prior hepatitis C infection  Antibody positive but RNA negative  -Noted    Restless legs: unclear if this is true restless leg syndrome or due to being uncomfortable  -continue mirapex prn    Suspected major depression: pt is not suicidal but also doesn't seem to have much of a will to live based upon  discussion 7/25.  She needs assessment by psych.  I wonder if her underlying chronic pain which is difficult to treat as well as being in the hospital is contributing.   -psych consult ordered    DVT Prophylaxis: heparin  Code Status: Full Code    Disposition: Expected discharge once IV abx are complete after 8/18 as having a difficult time finding placement in TCU.   -at this point patient is willing to stay to try better pain control and see psych tomorrow about suboxone.  At this point I think she is depressed and while not actively suicidal I do not think she is in the right state of mind to truly understand the consequence of leaving the hospital without appropriate treatment for her endocarditis and depression.  If she insists on leaving I do feel she is holdable right now and would place a 72 hour hold to allow for assessment by psych.      Total time spent is 45 minutes with greater than 50% spent at the bedside discussing her pain, depression and thought process behind treatment and while leaving the hospital AMA would result in significant bodily harm and death.     Addendum:  Patient threatened to leave AMA again this afternoon due to back pain and general frustration with being in the hospital.  Discussed risk of leaving including death but also things like stroke. Noted that patient would be worse off outside the hospital than here as we are trying to adjust medications to help with her pain and she would not have that outside.  She cries often during our visit and while she states she understands the consequences she cannot verbalize to me the risks of leaving and states the only reason she wants to leave is due to back pain.  She doesn't seem to truly understand the dire consequence of leaving the hospital and not having access to antibiotics. At this point she agrees to stay in the hospital at least until tomorrow so we can continue to titrate pain medication and see psych.  I think she is depressed  "and may still have some impairment from her stroke and substance abuse thus if she tries to leave the hospital AM I feel she is currently holdable and a 72 hour hold should be placed.      Juno Holman      Interval History   Lying in bed, appears uncomfortable.  Main issue is back pain which isn't getting better with lidocaine, tylenol or ibuprofen.  Upset about being here and wants to leave.  Main issue that has her frustrated is the back pain which she has had for many years.  She has been on gabapentin in the past which has helped.  She thinks she stopped taking it due to starting drugs again and missing clinic appointments.  We had a leon discussion about what would happen if she left [place] as she needs to have IV antibiotics to treat endocarditis.  We discussed that if she left and stop treatment it would eventually lead to death.  She responded with \"does it matter\"?  She asked why I want to keep her here and treat her and why do I care to help her.  We discussed that she has lived the sober life in the past and it was so hard she started using drugs again.  She notes she is depressed, sad that her kids were taken away from her and she doesn't have a relationship with them and it is too late now. She can't really reiterate to me why it is important that she stays to get treatment and the consequences of her leaving other than her saying \"does it matter?\".   Her main concern about suboxone was that she tried it while she was in [facility] and it sent her into immediate withdrawal and she is concerned about that happening again.  We discussed that she has not been using drugs, has been tapered off narcotics/methadone so at this point the effect on her should be different and may have some benefit to her back pain.  She is now willing to discuss with psych again.      -Data reviewed today: I reviewed all new labs and imaging results over the last 24 hours. I personally reviewed no images or EKG's " today.    Physical Exam   Temp: 98.2  F (36.8  C) Temp src: Oral BP: 119/68 Pulse: 93   Resp: 18 SpO2: 98 % O2 Device: None (Room air)    Vitals:    07/13/21 0200 07/14/21 0445 07/16/21 0615   Weight: 89 kg (196 lb 3.4 oz) 84.2 kg (185 lb 10 oz) 76.8 kg (169 lb 5 oz)     Vital Signs with Ranges  Temp:  [98  F (36.7  C)-98.2  F (36.8  C)] 98.2  F (36.8  C)  Pulse:  [] 93  Resp:  [18] 18  BP: (112-128)/(68-82) 119/68  SpO2:  [98 %-99 %] 98 %  I/O last 3 completed shifts:  In: 1600 [P.O.:1400; I.V.:200]  Out: -     Constitutional: sitting chair, answers questions but not very engaged    Respiratory: Clear to auscultation bilaterally, no crackles or wheezing noted.  Good air exchange.     Cardiovascular: Regular rate and rhythm.  No murmur, rub or gallop noted.     GI: Positive bowel sounds, soft, non-distended, non-tender.  No masses or organomegaly noted.     Musculoskeletal: moving all extremities     Neurologic: Awake, alert and oriented to name, place and time.  Cranial nerves II-XII are grossly intact.      Lymphatic: No edema noted    Skin: no new rash    Psych: withdrawn, depressed affect, seems depressed    Medications       bictegravir-emtricitabine-tenofovir  1 tablet Oral Daily     gabapentin  100 mg Oral TID     heparin ANTICOAGULANT  5,000 Units Subcutaneous Q8H     lidocaine  2-3 patch Transdermal Q24H     lidocaine   Transdermal Q8H     multivitamin w/minerals  1 tablet Oral Daily     nafcillin  2 g Intravenous Q4H     pantoprazole  40 mg Oral QAM AC       Data   Recent Labs   Lab 07/25/21  0733 07/25/21  0226 07/24/21  0237 07/22/21  0915 07/19/21  1104   WBC  --   --   --   --  8.7   HGB  --   --   --   --  9.8*   MCV  --   --   --   --  82   PLT  --   --   --  604* 757*   NA  --   --   --   --  140   POTASSIUM  --   --   --   --  4.1   CHLORIDE  --   --   --   --  110*   CO2  --   --   --   --  26   BUN  --   --   --   --  17   CR  --   --   --   --  0.53   ANIONGAP  --   --   --   --  4   DEREK   --   --   --   --  8.7   * 111* 110*  --  125*       No results found for this or any previous visit (from the past 24 hour(s)).

## 2021-07-25 NOTE — PROVIDER NOTIFICATION
MD Notification    Notified Person: MD    Notified Person Name:River    Notification Date/Time:7/25 1007    Notification Interaction: web page    Purpose of Notification: pt wants to leave AMA    Orders Received:    Comments:

## 2021-07-25 NOTE — PLAN OF CARE
Cognitive Concerns/ Orientation : A&Ox4, cooperative, sad, anxious at times.    BEHAVIOR & AGGRESSION TOOL COLOR: Green  ABNL VS/O2: VSS on RA  MOBILITY: Ax1 with GB/WK.   PAIN MANAGMENT: Complains of lower back pain, Lidocaine patch and PRN Tylenol and ibuprofen  DIET: Regular diet, tolerating  BOWEL/BLADDER: Urine incontinence at times. No BM this shift   ABNL LAB/BG: N/A   DRAIN/DEVICES: PIV for intermittent abx.   SKIN: Bruises; black toes, blister on left finger absorbed, scabbing noted, large blister side of L heel, Scattered scars.   TESTS/PROCEDURES: NA   D/C DAY/GOALS/PLACE: pending, will need abx until 8/18/2021 Chem/dep consulted.   OTHER IMPORTANT INFO: Generalized weakness, PT/OT/SLP/ID following.  Contact isolation for ESBL.

## 2021-07-26 LAB
ANION GAP SERPL CALCULATED.3IONS-SCNC: 1 MMOL/L (ref 3–14)
BUN SERPL-MCNC: 16 MG/DL (ref 7–30)
CALCIUM SERPL-MCNC: 8.4 MG/DL (ref 8.5–10.1)
CHLORIDE BLD-SCNC: 114 MMOL/L (ref 94–109)
CO2 SERPL-SCNC: 25 MMOL/L (ref 20–32)
CREAT SERPL-MCNC: 0.49 MG/DL (ref 0.52–1.04)
CRP SERPL-MCNC: 14.4 MG/L (ref 0–8)
GFR SERPL CREATININE-BSD FRML MDRD: >90 ML/MIN/1.73M2
GLUCOSE BLD-MCNC: 73 MG/DL (ref 70–99)
GLUCOSE BLDC GLUCOMTR-MCNC: 129 MG/DL (ref 70–99)
POTASSIUM BLD-SCNC: 4.6 MMOL/L (ref 3.4–5.3)
SODIUM SERPL-SCNC: 140 MMOL/L (ref 133–144)

## 2021-07-26 PROCEDURE — 36415 COLL VENOUS BLD VENIPUNCTURE: CPT | Performed by: STUDENT IN AN ORGANIZED HEALTH CARE EDUCATION/TRAINING PROGRAM

## 2021-07-26 PROCEDURE — 99232 SBSQ HOSP IP/OBS MODERATE 35: CPT | Performed by: INTERNAL MEDICINE

## 2021-07-26 PROCEDURE — 250N000013 HC RX MED GY IP 250 OP 250 PS 637: Performed by: STUDENT IN AN ORGANIZED HEALTH CARE EDUCATION/TRAINING PROGRAM

## 2021-07-26 PROCEDURE — 120N000001 HC R&B MED SURG/OB

## 2021-07-26 PROCEDURE — 250N000011 HC RX IP 250 OP 636: Performed by: INTERNAL MEDICINE

## 2021-07-26 PROCEDURE — 250N000011 HC RX IP 250 OP 636: Performed by: STUDENT IN AN ORGANIZED HEALTH CARE EDUCATION/TRAINING PROGRAM

## 2021-07-26 PROCEDURE — 99233 SBSQ HOSP IP/OBS HIGH 50: CPT | Performed by: PSYCHIATRY & NEUROLOGY

## 2021-07-26 PROCEDURE — 80048 BASIC METABOLIC PNL TOTAL CA: CPT | Performed by: STUDENT IN AN ORGANIZED HEALTH CARE EDUCATION/TRAINING PROGRAM

## 2021-07-26 PROCEDURE — 86140 C-REACTIVE PROTEIN: CPT | Performed by: SPECIALIST

## 2021-07-26 PROCEDURE — 999N000040 HC STATISTIC CONSULT NO CHARGE VASC ACCESS

## 2021-07-26 PROCEDURE — 250N000013 HC RX MED GY IP 250 OP 250 PS 637: Performed by: HOSPITALIST

## 2021-07-26 PROCEDURE — 999N000128 HC STATISTIC PERIPHERAL IV START W/O US GUIDANCE

## 2021-07-26 PROCEDURE — 250N000013 HC RX MED GY IP 250 OP 250 PS 637: Performed by: INTERNAL MEDICINE

## 2021-07-26 PROCEDURE — 250N000009 HC RX 250: Performed by: STUDENT IN AN ORGANIZED HEALTH CARE EDUCATION/TRAINING PROGRAM

## 2021-07-26 RX ORDER — LIDOCAINE 40 MG/G
CREAM TOPICAL
Status: ACTIVE | OUTPATIENT
Start: 2021-07-26 | End: 2021-07-29

## 2021-07-26 RX ADMIN — GABAPENTIN 300 MG: 300 CAPSULE ORAL at 19:34

## 2021-07-26 RX ADMIN — HEPARIN SODIUM 5000 UNITS: 5000 INJECTION, SOLUTION INTRAVENOUS; SUBCUTANEOUS at 06:46

## 2021-07-26 RX ADMIN — BICTEGRAVIR SODIUM, EMTRICITABINE, AND TENOFOVIR ALAFENAMIDE FUMARATE 1 TABLET: 50; 200; 25 TABLET ORAL at 09:22

## 2021-07-26 RX ADMIN — ACETAMINOPHEN 650 MG: 325 TABLET, FILM COATED ORAL at 07:26

## 2021-07-26 RX ADMIN — LIDOCAINE 2 PATCH: 560 PATCH PERCUTANEOUS; TOPICAL; TRANSDERMAL at 09:23

## 2021-07-26 RX ADMIN — KETOROLAC TROMETHAMINE 30 MG: 15 INJECTION, SOLUTION INTRAMUSCULAR; INTRAVENOUS at 14:22

## 2021-07-26 RX ADMIN — QUETIAPINE FUMARATE 25 MG: 25 TABLET ORAL at 15:53

## 2021-07-26 RX ADMIN — GABAPENTIN 300 MG: 300 CAPSULE ORAL at 07:26

## 2021-07-26 RX ADMIN — NAFCILLIN SODIUM 2 G: 2 INJECTION, POWDER, LYOPHILIZED, FOR SOLUTION INTRAMUSCULAR; INTRAVENOUS at 02:40

## 2021-07-26 RX ADMIN — ROPINIROLE HYDROCHLORIDE 0.25 MG: 0.25 TABLET, FILM COATED ORAL at 15:53

## 2021-07-26 RX ADMIN — LIDOCAINE 2 PATCH: 560 PATCH PERCUTANEOUS; TOPICAL; TRANSDERMAL at 14:30

## 2021-07-26 RX ADMIN — PANTOPRAZOLE SODIUM 40 MG: 40 TABLET, DELAYED RELEASE ORAL at 06:46

## 2021-07-26 RX ADMIN — KETOROLAC TROMETHAMINE 30 MG: 15 INJECTION, SOLUTION INTRAMUSCULAR; INTRAVENOUS at 06:42

## 2021-07-26 RX ADMIN — Medication 1 TABLET: at 17:55

## 2021-07-26 RX ADMIN — NAFCILLIN SODIUM 2 G: 2 INJECTION, POWDER, LYOPHILIZED, FOR SOLUTION INTRAMUSCULAR; INTRAVENOUS at 05:23

## 2021-07-26 RX ADMIN — NAFCILLIN SODIUM 2 G: 2 INJECTION, POWDER, LYOPHILIZED, FOR SOLUTION INTRAMUSCULAR; INTRAVENOUS at 17:54

## 2021-07-26 RX ADMIN — HEPARIN SODIUM 5000 UNITS: 5000 INJECTION, SOLUTION INTRAVENOUS; SUBCUTANEOUS at 14:28

## 2021-07-26 ASSESSMENT — ACTIVITIES OF DAILY LIVING (ADL)
ADLS_ACUITY_SCORE: 22
ADLS_ACUITY_SCORE: 23
ADLS_ACUITY_SCORE: 23
ADLS_ACUITY_SCORE: 22
ADLS_ACUITY_SCORE: 22
ADLS_ACUITY_SCORE: 23

## 2021-07-26 NOTE — PLAN OF CARE
DATE & TIME: 7/26/2021 days     Cognitive Concerns/ Orientation : alert and oriented x 4    BEHAVIOR & AGGRESSION TOOL COLOR:  green   CIWA SCORE:  na    ABNL VS/O2:  BP elevated   MOBILITY: up with assistance of 1 walker and gaitbelt   PAIN MANAGMENT: medicated once with tylenol and once with toradol   DIET: regular   BOWEL/BLADDER:  continent   ABNL LAB/BG:  creat 0.49 calcium 8.4  DRAIN/DEVICES: SL   TELEMETRY RHYTHM:    SKIN: bruised and black toes   TESTS/PROCEDURES: needs picc placement   D/C DATE:  8/18/2021 needs to stay for IV antibiotics   Discharge Barriers:  needs to have IV antibiotic course complete   OTHER IMPORTANT INFO:  pt appropriate this shift calls to get up and was up in the chair and took a shower. BP elevated, ate well for meals. Awaiting a PICC line placement IV site is too painful for IV infusing antibiotics. IV nurse aware.

## 2021-07-26 NOTE — PLAN OF CARE
Cognitive Concerns/ Orientation : A&Ox4, cooperative, sad, anxious at times.    BEHAVIOR & AGGRESSION TOOL COLOR: Green  ABNL VS/O2: VSS on RA  MOBILITY: Assist of one with walker/gait belt, ambulating to bathroom   PAIN MANAGMENT: Complains of lower back pain, Lidocaine patch and PRN Tylenol, just started toradol and gabapentin.   DIET: Regular diet, tolerating  BOWEL/BLADDER: Ambulating to bathroom, incontinent of urine at times.  BM x 1 today.   ABNL LAB/BG: Blood glucose 129   DRAIN/DEVICES: PIV for intermittent abx.   SKIN: Bruises; black toes, blister on left finger absorbed, scabbing noted, large blister side of L heel, Scattered scars.   TESTS/PROCEDURES: NA   D/C DAY/GOALS/PLACE: pending, will need abx until 8/18/2021 Chem/dep consulted.   OTHER IMPORTANT INFO: Generalized weakness, PT/OT/SLP/ID following.  Contact isolation for ESBL.    Seroquel given for anxiety

## 2021-07-26 NOTE — PROGRESS NOTES
[facility]    Infectious Disease Progress Note    Date of Service : 07/26/2021     Assessment :  1. S.aureus MSSA native mitral valve endocarditis with septic shock in the setting of IVDA complicated by embolic L parietal lobe infarction and multiple peripheral emboli.   (TTE shows a large mobile 1.4 cm vegetation on the posterior mitral valve leaflet with extension to left atrium and small ASD per cardiology notes , no valvular abscess and no additional valvular involvement. Pericardial effusion is small). No seeding on CT abdomen but has peripheral emboli.  2. Untreated HIV diagnosed 2018, CD4 count maintained at >500, VL 3,695cpies/ml. Genotype pending  3. Hepatitis C Ab+ but RNA -.   4. Severe sepsis requiring pressor support and multiorgan failure. Off pressors now  5. Multiple peripheral emboli. Right finger swelling and discoloration of left 3rd toe. US right finger without evidence of infection but sub-optimal study. Appears stable  6. FORD related to sepsis- resolved  7. Embolic stroke (small ASD with Left to right shunt)  8. Klebsiella pneumoniae UTI treated. ESBL E.coli urinary colonization  9. Severe anemia multifactorial. Has baseline anemia, exacerbated by chronic disease  10. Polysubstance abuse  11. Splenomegaly   12. Organized pericardial effusion      Recommendations:  1. Nafcillin  2. Biktarvy  3. Follow HIV genotype 7/7 still pending  4. Treat with IV antibiotics for 6 weeks until 8/18/2021 . PICC and TCU placement will be challenging given substance abuse history  5. Will need HIV follow up after discharge    She is stable at this time and ID will follow once a week while in the hospital.      Marisol Matos MD    Interval History   Doing well, sitting out in chair. Chronic back pain, no new complaints.    Antimicrobial therapy  7/2 Nafcillin  7/8 Biktarvy  Prior  7/4-7/7 Cipro  7/1-7/2 Vancomycin, zosyn    Physical Exam   Temp: 98.2  F (36.8  C) Temp src: Oral BP: 114/74  Pulse: 99   Resp: 18 SpO2: 100 % O2 Device: None (Room air)    Vitals:    07/13/21 0200 07/14/21 0445 07/16/21 0615   Weight: 89 kg (196 lb 3.4 oz) 84.2 kg (185 lb 10 oz) 76.8 kg (169 lb 5 oz)     Vital Signs with Ranges  Temp:  [97.7  F (36.5  C)-98.4  F (36.9  C)] 98.2  F (36.8  C)  Pulse:  [] 99  Resp:  [16-18] 18  BP: (114-130)/(74-93) 114/74  SpO2:  [99 %-100 %] 100 %    GENERAL APPEARANCE: awake, alert   EYES: conjunctivae and sclerae normal  NECK: no adenopathy  RESP: lungs clear to auscultation - no rales, rhonchi or wheezes  CV: regular rates and rhythm, murmur  ABDOMEN: soft, umbilical hernia  MS: extremities normal- no gross deformities noted  SKIN: right finger with swelling and redness, multiple osler's nodes and splinter hemorrhages, multiple embolic infarcts on toes, black discoloration of left toe    Other:    Medications       bictegravir-emtricitabine-tenofovir  1 tablet Oral Daily     gabapentin  300 mg Oral BID     heparin ANTICOAGULANT  5,000 Units Subcutaneous Q8H     lidocaine  2-3 patch Transdermal Q24H     lidocaine   Transdermal Q8H     multivitamin w/minerals  1 tablet Oral Daily     nafcillin  2 g Intravenous Q4H     pantoprazole  40 mg Oral QAM AC       Data   All microbiology laboratory data reviewed.  Recent Labs   Lab Test 07/25/21  1017 07/22/21  0915 07/19/21  1104 07/15/21  0009 07/14/21  0506   WBC  --   --  8.7 7.5 6.7   HGB  --   --  9.8* 8.9* 8.3*   HCT  --   --  31.9* 29.6* 27.1*   MCV  --   --  82 80 79    604* 757* 859* 838*     Recent Labs   Lab Test 07/26/21  1020 07/19/21  1104 07/15/21  0009   CR 0.49* 0.53 0.54     No lab results found.  Recent Labs   Lab Test 07/10/21  2243 07/09/21  0454 07/09/21  0415 07/08/21  1417 07/08/21  0500 07/07/21  0450 07/06/21  0500 07/05/21  1232 07/05/21  1104   CULT >100,000 colonies/mL  Escherichia coli ESBL  ESBL (extended beta lactamase) producing organisms require contact precautions.  *  Critical Value/Significant  Value called to and read back by  Luanne Bettencourt RN @ 2200 7/13/21 TM.   No growth No growth No growth No growth No growth  No growth No growth  No growth Cultured on the 2nd day of incubation:  Staphylococcus epidermidis  *  Critical Value/Significant Value, preliminary result only, called to and read back by  IVETT WARE RN 07/06/21 1258 EH.    Cultured on the 5th day of incubation:  Staphylococcus aureus  Susceptibility testing done on previous specimen  *  Critical Value/Significant Value called to and read back by  Cherelle Smith RN 1043 7/11/21 AM   Cultured on the 2nd day of incubation:  Staphylococcus aureus  Susceptibility testing done on previous specimen  *  Critical Value/Significant Value, preliminary result only, called to and read back by  Sin Jack RN at 0448 7/7/21 hg

## 2021-07-26 NOTE — PLAN OF CARE
Summary: Endocarditis MSSA  DATE & TIME: 7/25/2021 [id number]  Cognitive Concerns/ Orientation : A&Ox4, cooperative, sad, anxious at times.    BEHAVIOR & AGGRESSION TOOL COLOR: Green  ABNL VS/O2: VSS on RA, slight tachycardia at times 100-110  MOBILITY: Ax1 with GB/WK.   PAIN MANAGMENT: Complains of lower back pain, Lidocaine patch and PRN Tylenol, just started toradol and gabapentin.  Declines second dose of toradol offered.   DIET: Regular diet, tolerating  BOWEL/BLADDER: Urine incontinence at times. +BM this shift  ABNL LAB/BG: N/A   DRAIN/DEVICES: PIV for intermittent abx.   SKIN: Bruises; black toes, blister on left finger absorbed, scabbing noted, large blister side of L heel, Scattered scars.   TESTS/PROCEDURES: NA   D/C DAY/GOALS/PLACE: pending, will need abx until 8/18/2021 Chem/dep consulted.   OTHER IMPORTANT INFO: Generalized weakness, PT/OT/SLP/ID following.  Contact isolation for ESBL.    Seroquel given for anxiety  Stated she was going to leave A multiple times, per MD, is holdable if she attempts to leave unit
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
## 2021-07-26 NOTE — PROGRESS NOTES
"[facility]    Hospitalist Progress Note      Assessment & Plan   Manas Hernandez is a 41 year old female with h/o untreated HIV, hepatitis C who was admitted on 7/1/2021 for MSSA mitral valve endocarditis resulting in septic shock complicated by embolic left parietal lobe infarctions and multiple peripheral emboli.    She presented initially with encephalopathy, and shock.  She was intubated in ED on 7/1/2021 subsequently extubated 7/11/2021.  Blood cultures turned positive on hospital day 1 with MSSA.  Urine culture was positive for Klebsiella.  She was treated initially with ceftriaxone and vancomycin, and has since been narrowed to nafcillin per ID.     MSSA mitral valve endocarditis - suspect due to IV drug use  Septic shock, resolved  Probable myopericarditis  Small pericardial effusion  *JAE 7/5 noted with small ASD, mod to large mobile vegetation (14mm long, 6mm wide) of the P2 segment of the mitral leaflet. No valve perforation; mod MR  *blood cultures on admission with MSSA; blood cultures have been negative since 7/6.  Afebrile since 7/13.  Leukocytosis resolved  *ID following; antibiotic has been narrowed down to Nafcillin, 6 weeks of antibiotic, completion date 8/18/2021.  *limited ECHO repeated 7/15 to reassess pericardial fluid 7/15 per cardiology and noted:  organized effusion posteriorly, measuring 1.2-1.5cm, no evidence of tamponade; small mobile vegetation noted attached to the mitral leaflet, mod to severe MR; compared to 7/8/21, effusion size is probably stable, Degree of MR is worse, Vegetation is smaller.  Cardiology signed off 7/15.   *re-evaluated by CT surgery 7/15 and suggested continued antibiotic therapy at this time; per CT surgery \"Were she to become a surgical candidate, we would recommend waiting at least 4 weeks from her stroke and she would need repeat MRI imaging to rule out mycotic aneurysms or brain abscesses\"; will have follow up with CT surgery as " outpatient  *Abx briefly interrupted as she had been pulling out her access sites; midline placed on 7/13/21-- pulled out again on 7/16-- PICC was re-ordered (after she pulled out 2 previous ones), however vascular access team has suggested holding off on re-insertion until right before patient discharges to facility due to difficult access.  Patient is not delirious anymore and per , likely will have to complete her antibiotic course here in hospital as no accepting TCU so far and unlikely to find one.  PICC ordered on 7/26 as she agrees not to pull it out and having a difficult time with access.   -for now continue IV nafcillin in hospital with plans to complete entire course here through 8/18  -will need follow up with cardiothoracic surgery as an outpatient  -appreciate ongoing ID support       IV drug use  Polysubstance use disorder  *Chem dep consulted 7/15 but chemical dependency did not feel she is appropriate for CD assessment at this time and recommended consultation towards the end of her antibiotic course.  Reconsult CD once she is close to finishing her IV antibiotic.  *Was using IV dilaudid as well as Percocet p.o. earlier this stay pretty extensively.  Attempts made to wean off IV narcotics and taper down po narcotics though patient felt she was going through withdrawal. Psych consulted and recommended suboxone though patient declined. Psych recommended rapid taper of methadone instead.   *completed methadone taper of 30 mg on 7/21, 20 mg 7/22 and then 10 mg 7/23, then stop  -no further narcotics   -she is agreeing to suboxone, looking for appropriate pre scriber - discussed with psych on 7/26  -psych also mentioned outpatient methadone maintenances as a possibility     HIV, untreated PTA  - CD4 count greater than 500, viral load 3695  - Started Biktarvy 7/8; ID following    Chronic back pain: pt notes it present ever since she was pregnant.   -avoiding narcotics per  psych  -tylenol  -added lidoderm patch 7/23  -toradol prn  -gabapentin 300 mg BID today, will increase to 300 mg TID tomorrow     Toxic/septic/metabolic encephalopathy, acute, improving  Likely encephalopathy due to sepsis, drug use, CVA, prolonged ICU stay; had been pulling lines due to alterned mentation thought this all now appears resolved.   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  Completed course of treatment with ceftriaxone.  repeat urine cultures 7/10 with ESBL E coli but given unimpressive UA and no UTI symptoms ID suggest holding off on abx for ESBL UTI  -monitor for any symptoms     Acute hypoxemic respiratory failure, resolved  Intubated 7/1/2021.  Extubated 7/11/2021.  Likely related to septic shock from MSSA mitral valve endocarditis     FORD, resolved suspected due to sepsis  Hypokalemia  - on potassium replacement protocol  - will check BMP periodically     Chronic anemia  Baseline hemoglobin approximately 7.  Likely related to chronic disease, untreated HIV.  -Monitor hemoglobin periodically; stable around 9     Likely severe malnutrition  Nutrition followed , but due to uncertain recent weights unable to make complete diagnosis  -Encourage p.o., with supplements     Prior hepatitis C infection  Antibody positive but RNA negative  -Noted    Restless legs: unclear if this is true restless leg syndrome or due to being uncomfortable  -continue mirapex prn    Suspected major  depression: pt is not suicidal but also doesn't seem to have much of a will to live based upon discussion 7/25.  She needs assessment by psych.  I wonder if her underlying chronic pain which is difficult to treat as well as being in the hospital is contributing.   -psych consult ordered    DVT Prophylaxis: heparin  Code Status: Full Code    Disposition: Expected discharge once IV abx are complete after 8/18 as having a difficult time finding placement in TCU.   -patient has threatened to leave AMA a couple times, most recently on 7/25 but able to be talked into staying.  At this point I do think that due to likely depression, lack of insight from her drug addiction, possible cognitive impairment due to stroke, lack of plan for outpatient IV abx and outpatient treatment of her addiction she is at high risk of harm to self and if she tries to leave AMA would place a 72 hour hold.       Junojennifer Holman      Interval History   Sitting up on the side of bed, notes that she feels somewhat better today and was actually able to sleep last night and a bit this morning.  Feels that the gabapentin helped the most and tolerating it well.  No chest pain or shortness of breath.  Going to take a shower soon and walking around the unit today.     -Data reviewed today: I reviewed all new labs and imaging results over the last 24 hours. I personally reviewed no images or EKG's today.    Physical Exam   Temp: 97.7  F (36.5  C) Temp src: Oral BP: (!) 130/93 Pulse: 94   Resp: 16 SpO2: 99 % O2 Device: None (Room air)    Vitals:    07/13/21 0200 07/14/21 0445 07/16/21 0615   Weight: 89 kg (196 lb 3.4 oz) 84.2 kg (185 lb 10 oz) 76.8 kg (169 lb 5 oz)     Vital Signs with Ranges  Temp:  [97.7  F (36.5  C)-98.4  F (36.9  C)] 97.7  F (36.5  C)  Pulse:  [] 94  Resp:  [16-18] 16  BP: (124-130)/(84-93) 130/93  SpO2:  [99 %-100 %] 99 %  I/O last 3 completed shifts:  In: 650 [P.O.:250; I.V.:400]  Out: -     Constitutional: sitting chair,  answers questions but not very engaged    Respiratory: Clear to auscultation bilaterally, no crackles or wheezing noted.  Good air exchange.     Cardiovascular: Regular rate and rhythm.  No murmur, rub or gallop noted.     GI: Positive bowel sounds, soft, non-distended, non-tender.  No masses or organomegaly noted.     Musculoskeletal: moving all extremities     Neurologic: Awake, alert and oriented to name, place and time.  Cranial nerves II-XII are grossly intact.      Lymphatic: No edema noted    Skin: no new rash    Psych: withdrawn, depressed affect, seems depressed    Medications       bictegravir-emtricitabine-tenofovir  1 tablet Oral Daily     gabapentin  300 mg Oral BID     heparin ANTICOAGULANT  5,000 Units Subcutaneous Q8H     lidocaine  2-3 patch Transdermal Q24H     lidocaine   Transdermal Q8H     multivitamin w/minerals  1 tablet Oral Daily     nafcillin  2 g Intravenous Q4H     pantoprazole  40 mg Oral QAM AC       Data   Recent Labs   Lab 07/26/21  0245 07/25/21  1017 07/25/21  0733 07/25/21  0226 07/22/21  0915   PLT  --  447  --   --  604*   *  --  122* 111*  --        No results found for this or any previous visit (from the past 24 hour(s)).

## 2021-07-27 LAB — GLUCOSE BLDC GLUCOMTR-MCNC: 155 MG/DL (ref 70–99)

## 2021-07-27 PROCEDURE — 36569 INSJ PICC 5 YR+ W/O IMAGING: CPT

## 2021-07-27 PROCEDURE — 272N000433 HC KIT CATH IV 18 OR 20G CM, POWERGLIDE W MAX BARRIER

## 2021-07-27 PROCEDURE — 250N000013 HC RX MED GY IP 250 OP 250 PS 637: Performed by: INTERNAL MEDICINE

## 2021-07-27 PROCEDURE — 250N000013 HC RX MED GY IP 250 OP 250 PS 637: Performed by: HOSPITALIST

## 2021-07-27 PROCEDURE — 250N000013 HC RX MED GY IP 250 OP 250 PS 637: Performed by: STUDENT IN AN ORGANIZED HEALTH CARE EDUCATION/TRAINING PROGRAM

## 2021-07-27 PROCEDURE — HZ2ZZZZ DETOXIFICATION SERVICES FOR SUBSTANCE ABUSE TREATMENT: ICD-10-PCS | Performed by: INTERNAL MEDICINE

## 2021-07-27 PROCEDURE — 250N000011 HC RX IP 250 OP 636: Performed by: INTERNAL MEDICINE

## 2021-07-27 PROCEDURE — 99223 1ST HOSP IP/OBS HIGH 75: CPT | Mod: 95 | Performed by: INTERNAL MEDICINE

## 2021-07-27 PROCEDURE — 250N000009 HC RX 250: Performed by: STUDENT IN AN ORGANIZED HEALTH CARE EDUCATION/TRAINING PROGRAM

## 2021-07-27 PROCEDURE — 250N000011 HC RX IP 250 OP 636: Performed by: STUDENT IN AN ORGANIZED HEALTH CARE EDUCATION/TRAINING PROGRAM

## 2021-07-27 PROCEDURE — 999N000040 HC STATISTIC CONSULT NO CHARGE VASC ACCESS

## 2021-07-27 PROCEDURE — 99232 SBSQ HOSP IP/OBS MODERATE 35: CPT | Performed by: INTERNAL MEDICINE

## 2021-07-27 PROCEDURE — 120N000001 HC R&B MED SURG/OB

## 2021-07-27 RX ORDER — GABAPENTIN 300 MG/1
300 CAPSULE ORAL 3 TIMES DAILY
Status: DISCONTINUED | OUTPATIENT
Start: 2021-07-27 | End: 2021-07-28

## 2021-07-27 RX ORDER — LIDOCAINE 40 MG/G
CREAM TOPICAL
Status: DISCONTINUED | OUTPATIENT
Start: 2021-07-27 | End: 2021-08-07

## 2021-07-27 RX ORDER — BUPRENORPHINE AND NALOXONE 2; .5 MG/1; MG/1
1 FILM, SOLUBLE BUCCAL; SUBLINGUAL DAILY
Status: DISCONTINUED | OUTPATIENT
Start: 2021-07-27 | End: 2021-07-28

## 2021-07-27 RX ADMIN — NAFCILLIN SODIUM 2 G: 2 INJECTION, POWDER, LYOPHILIZED, FOR SOLUTION INTRAMUSCULAR; INTRAVENOUS at 22:26

## 2021-07-27 RX ADMIN — GABAPENTIN 300 MG: 300 CAPSULE ORAL at 15:50

## 2021-07-27 RX ADMIN — QUETIAPINE FUMARATE 25 MG: 25 TABLET ORAL at 18:51

## 2021-07-27 RX ADMIN — ACETAMINOPHEN 650 MG: 325 TABLET, FILM COATED ORAL at 04:04

## 2021-07-27 RX ADMIN — NAFCILLIN SODIUM 2 G: 2 INJECTION, POWDER, LYOPHILIZED, FOR SOLUTION INTRAMUSCULAR; INTRAVENOUS at 00:03

## 2021-07-27 RX ADMIN — ROPINIROLE HYDROCHLORIDE 0.25 MG: 0.25 TABLET, FILM COATED ORAL at 00:03

## 2021-07-27 RX ADMIN — LIDOCAINE 2 PATCH: 560 PATCH PERCUTANEOUS; TOPICAL; TRANSDERMAL at 08:12

## 2021-07-27 RX ADMIN — KETOROLAC TROMETHAMINE 30 MG: 15 INJECTION, SOLUTION INTRAMUSCULAR; INTRAVENOUS at 22:34

## 2021-07-27 RX ADMIN — ACETAMINOPHEN 650 MG: 325 TABLET, FILM COATED ORAL at 18:50

## 2021-07-27 RX ADMIN — HEPARIN SODIUM 5000 UNITS: 5000 INJECTION, SOLUTION INTRAVENOUS; SUBCUTANEOUS at 22:32

## 2021-07-27 RX ADMIN — HEPARIN SODIUM 5000 UNITS: 5000 INJECTION, SOLUTION INTRAVENOUS; SUBCUTANEOUS at 14:22

## 2021-07-27 RX ADMIN — NAFCILLIN SODIUM 2 G: 2 INJECTION, POWDER, LYOPHILIZED, FOR SOLUTION INTRAMUSCULAR; INTRAVENOUS at 03:53

## 2021-07-27 RX ADMIN — NAFCILLIN SODIUM 2 G: 2 INJECTION, POWDER, LYOPHILIZED, FOR SOLUTION INTRAMUSCULAR; INTRAVENOUS at 18:50

## 2021-07-27 RX ADMIN — ROPINIROLE HYDROCHLORIDE 0.25 MG: 0.25 TABLET, FILM COATED ORAL at 18:51

## 2021-07-27 RX ADMIN — PANTOPRAZOLE SODIUM 40 MG: 40 TABLET, DELAYED RELEASE ORAL at 08:11

## 2021-07-27 RX ADMIN — ACETAMINOPHEN 650 MG: 325 TABLET, FILM COATED ORAL at 10:52

## 2021-07-27 RX ADMIN — GABAPENTIN 300 MG: 300 CAPSULE ORAL at 22:26

## 2021-07-27 RX ADMIN — GABAPENTIN 300 MG: 300 CAPSULE ORAL at 08:52

## 2021-07-27 RX ADMIN — KETOROLAC TROMETHAMINE 30 MG: 15 INJECTION, SOLUTION INTRAMUSCULAR; INTRAVENOUS at 07:24

## 2021-07-27 RX ADMIN — NAFCILLIN SODIUM 2 G: 2 INJECTION, POWDER, LYOPHILIZED, FOR SOLUTION INTRAMUSCULAR; INTRAVENOUS at 14:16

## 2021-07-27 RX ADMIN — QUETIAPINE FUMARATE 25 MG: 25 TABLET ORAL at 00:04

## 2021-07-27 RX ADMIN — BICTEGRAVIR SODIUM, EMTRICITABINE, AND TENOFOVIR ALAFENAMIDE FUMARATE 1 TABLET: 50; 200; 25 TABLET ORAL at 08:52

## 2021-07-27 RX ADMIN — KETOROLAC TROMETHAMINE 30 MG: 15 INJECTION, SOLUTION INTRAMUSCULAR; INTRAVENOUS at 00:03

## 2021-07-27 RX ADMIN — BUPRENORPHINE HYDROCHLORIDE, NALOXONE HYDROCHLORIDE 1 FILM: 2; .5 FILM, SOLUBLE BUCCAL; SUBLINGUAL at 14:14

## 2021-07-27 RX ADMIN — Medication 1 TABLET: at 18:51

## 2021-07-27 RX ADMIN — HEPARIN SODIUM 5000 UNITS: 5000 INJECTION, SOLUTION INTRAVENOUS; SUBCUTANEOUS at 06:25

## 2021-07-27 RX ADMIN — NAFCILLIN SODIUM 2 G: 2 INJECTION, POWDER, LYOPHILIZED, FOR SOLUTION INTRAMUSCULAR; INTRAVENOUS at 08:13

## 2021-07-27 ASSESSMENT — ACTIVITIES OF DAILY LIVING (ADL)
ADLS_ACUITY_SCORE: 23

## 2021-07-27 NOTE — PROGRESS NOTES
"[facility]    Hospitalist Progress Note      Assessment & Plan   Manas Hernandez is a 41 year old female with h/o untreated HIV, hepatitis C who was admitted on 7/1/2021 for MSSA mitral valve endocarditis resulting in septic shock complicated by embolic left parietal lobe infarctions and multiple peripheral emboli.    She presented initially with encephalopathy, and shock.  She was intubated in ED on 7/1/2021 subsequently extubated 7/11/2021.  Blood cultures turned positive on hospital day 1 with MSSA.  Urine culture was positive for Klebsiella.  She was treated initially with ceftriaxone and vancomycin, and has since been narrowed to nafcillin per ID.     MSSA mitral valve endocarditis - suspect due to IV drug use  Septic shock, resolved  Probable myopericarditis  Small pericardial effusion  *JAE 7/5 noted with small ASD, mod to large mobile vegetation (14mm long, 6mm wide) of the P2 segment of the mitral leaflet. No valve perforation; mod MR  *blood cultures on admission with MSSA; blood cultures have been negative since 7/6.  Afebrile since 7/13.  Leukocytosis resolved  *ID following; antibiotic has been narrowed down to Nafcillin, 6 weeks of antibiotic, completion date 8/18/2021.  *limited ECHO repeated 7/15 to reassess pericardial fluid 7/15 per cardiology and noted:  organized effusion posteriorly, measuring 1.2-1.5cm, no evidence of tamponade; small mobile vegetation noted attached to the mitral leaflet, mod to severe MR; compared to 7/8/21, effusion size is probably stable, Degree of MR is worse, Vegetation is smaller.  Cardiology signed off 7/15.   *re-evaluated by CT surgery 7/15 and suggested continued antibiotic therapy at this time; per CT surgery \"Were she to become a surgical candidate, we would recommend waiting at least 4 weeks from her stroke and she would need repeat MRI imaging to rule out mycotic aneurysms or brain abscesses\"; will have follow up with CT surgery as " outpatient  *Abx briefly interrupted as she had been pulling out her access sites; midline placed on 7/13/21-- pulled out again on 7/16-- PICC was re-ordered (after she pulled out 2 previous ones), however vascular access team has suggested holding off on re-insertion until right before patient discharges to facility due to difficult access.  Patient is not delirious anymore and per , likely will have to complete her antibiotic course here in hospital as no accepting TCU so far and unlikely to find one.  PICC ordered on 7/26 as she agrees not to pull it out and having a difficult time with access.   -for now continue IV nafcillin in hospital with plans to complete entire course here through 8/18  -will need follow up with cardiothoracic surgery as an outpatient  -appreciate ongoing ID support       IV drug use  Polysubstance use disorder  *Chem dep consulted 7/15 but chemical dependency did not feel she is appropriate for CD assessment at this time and recommended consultation towards the end of her antibiotic course.  Reconsult CD once she is close to finishing her IV antibiotic.  *Was using IV dilaudid as well as Percocet p.o. earlier this stay pretty extensively.  Attempts made to wean off IV narcotics and taper down po narcotics though patient felt she was going through withdrawal. Psych consulted and recommended suboxone though patient declined. Psych recommended rapid taper of methadone instead.   *completed methadone taper of 30 mg on 7/21, 20 mg 7/22 and then 10 mg 7/23, then stop  -no further narcotics   -starting suboxone on 7/27 - appreciate psych/addiction medicine assistance  -psych also mentioned outpatient methadone maintenances as a possibility     HIV, untreated PTA  - CD4 count greater than 500, viral load 3695  - Started Biktarvy 7/8; ID following    Chronic back pain: pt notes it present ever since she was pregnant.   -avoiding narcotics per psych  -tylenol  -added lidoderm patch  7/23  -toradol prn  -increase gabapentin to 300 mg TID today     Toxic/septic/metabolic encephalopathy, acute, improving  Likely encephalopathy due to sepsis, drug use, CVA, prolonged ICU stay; had been pulling lines due to alterned mentation thought this all now appears resolved.   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  Completed course of treatment with ceftriaxone.  repeat urine cultures 7/10 with ESBL E coli but given unimpressive UA and no UTI symptoms ID suggest holding off on abx for ESBL UTI  -monitor for any symptoms     Acute hypoxemic respiratory failure, resolved  Intubated 7/1/2021.  Extubated 7/11/2021.  Likely related to septic shock from MSSA mitral valve endocarditis     FORD, resolved suspected due to sepsis  Hypokalemia  - on potassium replacement protocol  - will check BMP periodically     Chronic anemia  Baseline hemoglobin approximately 7.  Likely related to chronic disease, untreated HIV.  -Monitor hemoglobin periodically; stable around 9     Likely severe malnutrition  Nutrition followed , but due to uncertain recent weights unable to make complete diagnosis  -Encourage p.o., with supplements     Prior hepatitis C infection  Antibody positive but RNA negative  -Noted    Restless legs: unclear if this is true restless leg syndrome or due to being uncomfortable  -continue mirapex prn    Suspected major depression: pt is not suicidal but also doesn't seem to have much  of a will to live based upon discussion 7/25.  I wonder if her underlying chronic pain which is difficult to treat as well as being in the hospital is contributing. Was seen by psych and declined any medication treatment on 7/26.   -monitor and consider starting treatment if she changes her mind    DVT Prophylaxis: heparin  Code Status: Full Code    Disposition: Expected discharge once IV abx are complete after 8/18 as having a difficult time finding placement in TCU.   -patient has threatened to leave AMA a couple times, most recently on 7/25 but able to be talked into staying.  At this point I do think that due to likely depression, lack of insight from her drug addiction, possible cognitive impairment due to stroke, lack of plan for outpatient IV abx and outpatient treatment of her addiction she is at high risk of harm to self and if she tries to leave AMA would place a 72 hour hold. Please see discussion in hospitalist note from 7/25 and psych note 7/26.       Juno Holman      Interval History   Wanted to leave AMA yesterday evening but was able to talk to evening doc and agreed to stay.  Feels uncomfortable now and feels like she has some opiate withdrawal though it's been a few days since her quick methadone taper.  Eating ok.  No chest pain or shortness of breath.  Discussed with addiction medicine who is starting suboxone and will consider titration if she tolerates it.     -Data reviewed today: I reviewed all new labs and imaging results over the last 24 hours. I personally reviewed no images or EKG's today.    Physical Exam   Temp: 97.7  F (36.5  C) Temp src: Oral BP: (!) 130/94 Pulse: 80   Resp: 16 SpO2: 99 % O2 Device: None (Room air)    Vitals:    07/13/21 0200 07/14/21 0445 07/16/21 0615   Weight: 89 kg (196 lb 3.4 oz) 84.2 kg (185 lb 10 oz) 76.8 kg (169 lb 5 oz)     Vital Signs with Ranges  Temp:  [97.7  F (36.5  C)-98.5  F (36.9  C)] 97.7  F (36.5  C)  Pulse:  [80-99] 80  Resp:  [16-18]  16  BP: (114-130)/(74-94) 130/94  SpO2:  [99 %-100 %] 99 %  I/O last 3 completed shifts:  In: 1170 [P.O.:1170]  Out: -     Constitutional: sitting chair, answers questions but not very engaged    Respiratory: Clear to auscultation bilaterally, no crackles or wheezing noted.  Good air exchange.     Cardiovascular: Regular rate and rhythm.  No murmur, rub or gallop noted.     GI: Positive bowel sounds, soft, non-distended, non-tender.  No masses or organomegaly noted.     Musculoskeletal: moving all extremities     Neurologic: Awake, alert and oriented to name, place and time.  Cranial nerves II-XII are grossly intact.      Lymphatic: No edema noted    Skin: no new rash    Psych: withdrawn, depressed affect, seems depressed    Medications       bictegravir-emtricitabine-tenofovir  1 tablet Oral Daily     gabapentin  300 mg Oral BID     heparin ANTICOAGULANT  5,000 Units Subcutaneous Q8H     lidocaine  2-3 patch Transdermal Q24H     lidocaine   Transdermal Q8H     multivitamin w/minerals  1 tablet Oral Daily     nafcillin  2 g Intravenous Q4H     pantoprazole  40 mg Oral QAM AC     sodium chloride (PF)  10 mL Intracatheter Q8H       Data   Recent Labs   Lab 07/27/21  0144 07/26/21  1020 07/26/21  0245 07/25/21  1017 07/22/21  0915   PLT  --   --   --  447 604*   NA  --  140  --   --   --    POTASSIUM  --  4.6  --   --   --    CHLORIDE  --  114*  --   --   --    CO2  --  25  --   --   --    BUN  --  16  --   --   --    CR  --  0.49*  --   --   --    ANIONGAP  --  1*  --   --   --    DEREK  --  8.4*  --   --   --    * 73 129*  --   --        No results found for this or any previous visit (from the past 24 hour(s)).

## 2021-07-27 NOTE — PROCEDURES
[facility]    Single Lumen Midline Placement    Date/Time: 7/27/2021 1:25 PM  Performed by: Pat Khan RN  Authorized by: Juno Holman DO   Indications: vascular access    UNIVERSAL PROTOCOL   Site Marked: Yes  Prior Images Obtained and Reviewed:  Yes  Required items: Required blood products, implants, devices and special equipment available    Patient identity confirmed:  Verbally with patient and arm band  NA - No sedation, light sedation, or local anesthesia  Confirmation Checklist:  Patient's identity using two indicators, relevant allergies, procedure was appropriate and matched the consent or emergent situation and correct equipment/implants were available  Time out: Immediately prior to the procedure a time out was called    Universal Protocol: the Joint Commission Universal Protocol was followed    Preparation: Patient was prepped and draped in usual sterile fashion    ESBL (mL):  0        SEDATION    Patient Sedated: No        Preparation: skin prepped with ChloraPrep  Skin prep agent: skin prep agent completely dried prior to procedure  Sterile barriers: maximum sterile barriers were used: cap, mask, sterile gown, sterile gloves, and large sterile sheet  Hand hygiene: hand hygiene performed prior to central venous catheter insertion  Type of line used: Midline  Lumen type: non-valved  Catheter size: 20G,8cm.  Brand: Tekora  Lot number: [id number]  Placement method: MST and ultrasound  Number of attempts: 1  Successful placement: yes  Orientation: right  Location: basilic vein  Arm circumference: adults 10 cm  Visible catheter length: 0  Internal length: 8 cm  Total catheter length: 8  Dressing and securement: occlusive dressing applied, securement device, site cleaned and sterile dressing applied (gaurdiva disc applied)  Post procedure assessment: blood return through all ports  PROCEDURE Describe Procedure: Successful placement of single lumen midline RUE basilic vein.  Good blood return and line flushes easily. Midline is ready for immediate use.Bedside RN notified.

## 2021-07-27 NOTE — PROGRESS NOTES
"Hospitalist Cross-cover:    RN paged to let me know Manas was saying she wanted to leave AMA.  I reviewed concerns with her in her room.  She is mad about being in the hospital, about not being able to smoke, and about her back pain.  She says \"if they won't let me out to smoke, then I'll just smoke right here in my room\".    I encouraged her to stay tonight and to review all of her concerns with her primary team in the morning.  She eventually agreed to stay.    GUCCI Sen MD, [facility] Hospitalist  "

## 2021-07-27 NOTE — PLAN OF CARE
DATE & TIME: 7/27/2021 days     Cognitive Concerns/ Orientation : alert and oriented x 4   BEHAVIOR & AGGRESSION TOOL COLOR: green   CIWA SCORE:  7 scored per MD order started Suboxone    ABNL VS/O2: VSS  MOBILITY:  up with 1 and gait belt and walker   PAIN MANAGMENT:  tylenol and toradol given for pain with little relief   DIET:  regular ate well   BOWEL/BLADDER:  continent   ABNL LAB/BG:    DRAIN/DEVICES: Midline   TELEMETRY RHYTHM: na   SKIN: bruised and black toes   TESTS/PROCEDURES:  midline placed   D/C DATE:  8/18/2021 after done with IV antibiotics   Discharge Barriers:  needs to complete IV antibiotics  OTHER IMPORTANT INFO:  pt complained of a lot of back pain today, took several hours for midline to be placed, was up in the chair today and did well. Medicated with tylenol and toradol for pain. Started on Suboxone, today.

## 2021-07-27 NOTE — PROGRESS NOTES
MD Notification    Notified Person: MD    Notified Person Name: Beck/hospitalist on call    Notification Date/Time: 7/26/21 1958    Notification Interaction: page    Purpose of Notification: patient wants to leave AMA. MD note says that pt is hold able if she wants to leave. can you come talk to the patient?    Orders Received: MD talked to patient. Agreeable to stay/not trying to leave at this point.    Comments:
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
## 2021-07-27 NOTE — PLAN OF CARE
"DATE & TIME: 7/26- 7/27 9713-3526  Cognitive Concerns/ Orientation : A&Ox4, very agitated/angry at beginning of shift, calm and cooperative at end of shift   BEHAVIOR & AGGRESSION TOOL COLOR: Green  ABNL VS/O2: VSS on RA  MOBILITY: Assist of one with walker/gait belt, ambulating to bathroom   PAIN MANAGMENT: Complains of lower back pain, Lidocaine patch, gabapentin, Toradol and tylenol available.   DIET: Regular diet, tolerating  BOWEL/BLADDER: Ambulating to bathroom, incontinent of urine at times.  No BM this shift  ABNL LAB/BG: Creat 0.49, CRP Inflammation 14.4  DRAIN/DEVICES: New PIV for intermittent abx. IV nurse suggested a midline tomorrow because pt is not appropriate for PICC- MD to address.  SKIN: Bruises; black toes, blister on left finger absorbed, scabbing noted, large blister side of L heel, Scattered scars.   TESTS/PROCEDURES: NA   D/C DAY/GOALS/PLACE: pending, will need abx until 8/18/2021 Chem/dep consulted.   OTHER IMPORTANT INFO: Generalized weakness, PT/OT/SLP/ID following.  Contact isolation for ESBL. Seroquel given x2 for anxiety.     Pt stated that she was going to leave AMA- hospitalist talked to patient. She agreed to stay for now and was offered nicotine gum- pt refused. After agreeing to stay she asked for PRN seroquel, requip and IV toradol. Pt became angry with nurse because she diluted the IV toradol and said \"It won't work now\". Then she refused everything for the rest of the shift including antibiotics.    During overnight shift pt became more calm and cooperative, apologized to nurse for her behavior earlier and agreed to take medications. q4h abx times rescheduled.    "

## 2021-07-27 NOTE — PLAN OF CARE
Cognitive Concerns/ Orientation : A&Ox4,    BEHAVIOR & AGGRESSION TOOL COLOR: Green  ABNL VS/O2: VSS on RA  MOBILITY: Assist of one with walker/gait belt  PAIN MANAGMENT: Complains of lower back pain, PRN tylenol given.   DIET: Regular diet, tolerating  BOWEL/BLADDER: Ambulating to bathroom, incontinent of urine at times.  ABNL LAB/BG: Creat 0.49, CRP Inflammation 14.4  DRAIN/DEVICES: PIV .  SKIN: Bruises; black toes, blister on left finger and  large blister side of L heel scabbed, Scattered scars.   TESTS/PROCEDURES: NA   D/C DAY/GOALS/PLACE: pending, will need abx until 8/18/2021.  OTHER IMPORTANT INFO: Per Psychiatry note:, recommend 72 hr Hold if patient demands to leave before being medically/psychiatrically cleared PT/OT/SLP/ID following.  Contact isolation for ESBL.

## 2021-07-28 ENCOUNTER — APPOINTMENT (OUTPATIENT)
Dept: SPEECH THERAPY | Facility: CLINIC | Age: 41
End: 2021-07-28
Payer: MEDICAID

## 2021-07-28 ENCOUNTER — APPOINTMENT (OUTPATIENT)
Dept: OCCUPATIONAL THERAPY | Facility: CLINIC | Age: 41
End: 2021-07-28
Payer: MEDICAID

## 2021-07-28 ENCOUNTER — APPOINTMENT (OUTPATIENT)
Dept: PHYSICAL THERAPY | Facility: CLINIC | Age: 41
End: 2021-07-28
Payer: MEDICAID

## 2021-07-28 LAB
GLUCOSE BLDC GLUCOMTR-MCNC: 111 MG/DL (ref 70–99)
PLATELET # BLD AUTO: 372 10E3/UL (ref 150–450)

## 2021-07-28 PROCEDURE — 250N000009 HC RX 250: Performed by: STUDENT IN AN ORGANIZED HEALTH CARE EDUCATION/TRAINING PROGRAM

## 2021-07-28 PROCEDURE — 92507 TX SP LANG VOICE COMM INDIV: CPT | Mod: GN | Performed by: SPEECH-LANGUAGE PATHOLOGIST

## 2021-07-28 PROCEDURE — 999N000190 HC STATISTIC VAT ROUNDS

## 2021-07-28 PROCEDURE — 99232 SBSQ HOSP IP/OBS MODERATE 35: CPT | Mod: 95 | Performed by: INTERNAL MEDICINE

## 2021-07-28 PROCEDURE — 250N000013 HC RX MED GY IP 250 OP 250 PS 637: Performed by: STUDENT IN AN ORGANIZED HEALTH CARE EDUCATION/TRAINING PROGRAM

## 2021-07-28 PROCEDURE — 250N000013 HC RX MED GY IP 250 OP 250 PS 637: Performed by: HOSPITALIST

## 2021-07-28 PROCEDURE — 250N000013 HC RX MED GY IP 250 OP 250 PS 637: Performed by: INTERNAL MEDICINE

## 2021-07-28 PROCEDURE — 97110 THERAPEUTIC EXERCISES: CPT | Mod: GP | Performed by: PHYSICAL THERAPIST

## 2021-07-28 PROCEDURE — 85049 AUTOMATED PLATELET COUNT: CPT | Performed by: INTERNAL MEDICINE

## 2021-07-28 PROCEDURE — 97535 SELF CARE MNGMENT TRAINING: CPT | Mod: GO

## 2021-07-28 PROCEDURE — 97116 GAIT TRAINING THERAPY: CPT | Mod: GP | Performed by: PHYSICAL THERAPIST

## 2021-07-28 PROCEDURE — 250N000011 HC RX IP 250 OP 636: Performed by: STUDENT IN AN ORGANIZED HEALTH CARE EDUCATION/TRAINING PROGRAM

## 2021-07-28 PROCEDURE — 250N000011 HC RX IP 250 OP 636: Performed by: INTERNAL MEDICINE

## 2021-07-28 PROCEDURE — 99232 SBSQ HOSP IP/OBS MODERATE 35: CPT | Performed by: INTERNAL MEDICINE

## 2021-07-28 PROCEDURE — 120N000001 HC R&B MED SURG/OB

## 2021-07-28 PROCEDURE — 97530 THERAPEUTIC ACTIVITIES: CPT | Mod: GP | Performed by: PHYSICAL THERAPIST

## 2021-07-28 RX ORDER — BUPRENORPHINE AND NALOXONE 8; 2 MG/1; MG/1
1 FILM, SOLUBLE BUCCAL; SUBLINGUAL DAILY
Status: DISCONTINUED | OUTPATIENT
Start: 2021-07-29 | End: 2021-07-31

## 2021-07-28 RX ORDER — BUPRENORPHINE AND NALOXONE 4; 1 MG/1; MG/1
1 FILM, SOLUBLE BUCCAL; SUBLINGUAL ONCE
Status: COMPLETED | OUTPATIENT
Start: 2021-07-28 | End: 2021-07-28

## 2021-07-28 RX ADMIN — GABAPENTIN 400 MG: 300 CAPSULE ORAL at 15:57

## 2021-07-28 RX ADMIN — KETOROLAC TROMETHAMINE 30 MG: 15 INJECTION, SOLUTION INTRAMUSCULAR; INTRAVENOUS at 12:14

## 2021-07-28 RX ADMIN — BUPRENORPHINE HYDROCHLORIDE, NALOXONE HYDROCHLORIDE 1 FILM: 2; .5 FILM, SOLUBLE BUCCAL; SUBLINGUAL at 08:18

## 2021-07-28 RX ADMIN — ACETAMINOPHEN 650 MG: 325 TABLET, FILM COATED ORAL at 03:37

## 2021-07-28 RX ADMIN — NAFCILLIN SODIUM 2 G: 2 INJECTION, POWDER, LYOPHILIZED, FOR SOLUTION INTRAMUSCULAR; INTRAVENOUS at 02:06

## 2021-07-28 RX ADMIN — KETOROLAC TROMETHAMINE 30 MG: 15 INJECTION, SOLUTION INTRAMUSCULAR; INTRAVENOUS at 06:16

## 2021-07-28 RX ADMIN — KETOROLAC TROMETHAMINE 30 MG: 15 INJECTION, SOLUTION INTRAMUSCULAR; INTRAVENOUS at 21:23

## 2021-07-28 RX ADMIN — HEPARIN SODIUM 5000 UNITS: 5000 INJECTION, SOLUTION INTRAVENOUS; SUBCUTANEOUS at 22:54

## 2021-07-28 RX ADMIN — GABAPENTIN 400 MG: 300 CAPSULE ORAL at 21:20

## 2021-07-28 RX ADMIN — PANTOPRAZOLE SODIUM 40 MG: 40 TABLET, DELAYED RELEASE ORAL at 08:18

## 2021-07-28 RX ADMIN — NAFCILLIN SODIUM 2 G: 2 INJECTION, POWDER, LYOPHILIZED, FOR SOLUTION INTRAMUSCULAR; INTRAVENOUS at 17:43

## 2021-07-28 RX ADMIN — HEPARIN SODIUM 5000 UNITS: 5000 INJECTION, SOLUTION INTRAVENOUS; SUBCUTANEOUS at 14:08

## 2021-07-28 RX ADMIN — BICTEGRAVIR SODIUM, EMTRICITABINE, AND TENOFOVIR ALAFENAMIDE FUMARATE 1 TABLET: 50; 200; 25 TABLET ORAL at 08:18

## 2021-07-28 RX ADMIN — ACETAMINOPHEN 650 MG: 325 TABLET, FILM COATED ORAL at 08:18

## 2021-07-28 RX ADMIN — NAFCILLIN SODIUM 2 G: 2 INJECTION, POWDER, LYOPHILIZED, FOR SOLUTION INTRAMUSCULAR; INTRAVENOUS at 14:08

## 2021-07-28 RX ADMIN — NAFCILLIN SODIUM 2 G: 2 INJECTION, POWDER, LYOPHILIZED, FOR SOLUTION INTRAMUSCULAR; INTRAVENOUS at 21:21

## 2021-07-28 RX ADMIN — NAFCILLIN SODIUM 2 G: 2 INJECTION, POWDER, LYOPHILIZED, FOR SOLUTION INTRAMUSCULAR; INTRAVENOUS at 09:50

## 2021-07-28 RX ADMIN — NAFCILLIN SODIUM 2 G: 2 INJECTION, POWDER, LYOPHILIZED, FOR SOLUTION INTRAMUSCULAR; INTRAVENOUS at 06:25

## 2021-07-28 RX ADMIN — BUPRENORPHINE HYDROCHLORIDE, NALOXONE HYDROCHLORIDE 1 FILM: 4; 1 FILM, SOLUBLE BUCCAL; SUBLINGUAL at 17:56

## 2021-07-28 RX ADMIN — QUETIAPINE FUMARATE 25 MG: 25 TABLET ORAL at 22:57

## 2021-07-28 RX ADMIN — LIDOCAINE 2 PATCH: 560 PATCH PERCUTANEOUS; TOPICAL; TRANSDERMAL at 08:19

## 2021-07-28 RX ADMIN — GABAPENTIN 300 MG: 300 CAPSULE ORAL at 08:18

## 2021-07-28 RX ADMIN — Medication 1 TABLET: at 17:43

## 2021-07-28 RX ADMIN — HEPARIN SODIUM 5000 UNITS: 5000 INJECTION, SOLUTION INTRAVENOUS; SUBCUTANEOUS at 06:22

## 2021-07-28 ASSESSMENT — ACTIVITIES OF DAILY LIVING (ADL)
ADLS_ACUITY_SCORE: 23
ADLS_ACUITY_SCORE: 23
ADLS_ACUITY_SCORE: 22
ADLS_ACUITY_SCORE: 23
ADLS_ACUITY_SCORE: 22
ADLS_ACUITY_SCORE: 22
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
## 2021-07-28 NOTE — PROGRESS NOTES
"[facility]    Hospitalist Progress Note      Assessment & Plan   Manas Hernandez is a 41 year old female with h/o untreated HIV, hepatitis C who was admitted on 7/1/2021 for MSSA mitral valve endocarditis resulting in septic shock complicated by embolic left parietal lobe infarctions and multiple peripheral emboli.    She presented initially with encephalopathy, and shock.  She was intubated in ED on 7/1/2021 subsequently extubated 7/11/2021.  Blood cultures turned positive on hospital day 1 with MSSA.  Urine culture was positive for Klebsiella.  She was treated initially with ceftriaxone and vancomycin, and has since been narrowed to nafcillin per ID.     MSSA mitral valve endocarditis - suspect due to IV drug use  Septic shock, resolved  Probable myopericarditis  Small pericardial effusion  *JAE 7/5 noted with small ASD, mod to large mobile vegetation (14mm long, 6mm wide) of the P2 segment of the mitral leaflet. No valve perforation; mod MR  *blood cultures on admission with MSSA; blood cultures have been negative since 7/6.  Afebrile since 7/13.  Leukocytosis resolved  *ID following; antibiotic has been narrowed down to Nafcillin, 6 weeks of antibiotic, completion date 8/18/2021.  *limited ECHO repeated 7/15 to reassess pericardial fluid 7/15 per cardiology and noted:  organized effusion posteriorly, measuring 1.2-1.5cm, no evidence of tamponade; small mobile vegetation noted attached to the mitral leaflet, mod to severe MR; compared to 7/8/21, effusion size is probably stable, Degree of MR is worse, Vegetation is smaller.  Cardiology signed off 7/15.   *re-evaluated by CT surgery 7/15 and suggested continued antibiotic therapy at this time; per CT surgery \"Were she to become a surgical candidate, we would recommend waiting at least 4 weeks from her stroke and she would need repeat MRI imaging to rule out mycotic aneurysms or brain abscesses\"; will have follow up with CT surgery as " outpatient  *Abx briefly interrupted as she had been pulling out her access sites; midline placed on 7/13/21-- pulled out again on 7/16-- PICC was re-ordered (after she pulled out 2 previous ones), however vascular access team has suggested holding off on re-insertion until right before patient discharges to facility due to difficult access.  Patient is not delirious anymore and per , likely will have to complete her antibiotic course here in hospital as no accepting TCU so far and unlikely to find one.  PICC ordered on 7/26 as she agrees not to pull it out and having a difficult time with access.   -for now continue IV nafcillin in hospital with plans to complete entire course here through 8/18  -will need follow up with cardiothoracic surgery as an outpatient  -appreciate ongoing ID support       IV drug use  Polysubstance use disorder  *Chem dep consulted 7/15 but chemical dependency did not feel she is appropriate for CD assessment at this time and recommended consultation towards the end of her antibiotic course.  Reconsult CD once she is close to finishing her IV antibiotic.  *Was using IV dilaudid as well as Percocet p.o. earlier this stay pretty extensively.  Attempts made to wean off IV narcotics and taper down po narcotics though patient felt she was going through withdrawal. Psych consulted and recommended suboxone though patient declined. Psych recommended rapid taper of methadone instead.   *completed methadone taper of 30 mg on 7/21, 20 mg 7/22 and then 10 mg 7/23, then stop  -no further narcotics   -starting suboxone on 7/27 - appreciate psych/addiction medicine assistance who will help with titration  -psych also mentioned outpatient methadone maintenances as a possibility  -Reconsult CD once she is close to finishing her IV antibiotic.     HIV, untreated PTA  - CD4 count greater than 500, viral load 3695  - Started Biktarvy 7/8; ID following    Chronic back pain: pt notes it present  ever since she was pregnant.   -avoiding narcotics per psych  -tylenol  -added lidoderm patch 7/23  -toradol prn  -increase gabapentin to 400 mg TID today  --pt notes previous outpatient dose was 600mg TID so would like to titrate at least that far if she tolerates     Toxic/septic/metabolic encephalopathy, acute, improving  Likely encephalopathy due to sepsis, drug use, CVA, prolonged ICU stay; had been pulling lines due to alterned mentation thought this all now appears resolved.   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  Completed course of treatment with ceftriaxone.  repeat urine cultures 7/10 with ESBL E coli but given unimpressive UA and no UTI symptoms ID suggest holding off on abx for ESBL UTI  -monitor for any symptoms     Acute hypoxemic respiratory failure, resolved  Intubated 7/1/2021.  Extubated 7/11/2021.  Likely related to septic shock from MSSA mitral valve endocarditis     FORD, resolved suspected due to sepsis  Hypokalemia  - on potassium replacement protocol  - will check BMP periodically     Chronic anemia  Baseline hemoglobin approximately 7.  Likely related to chronic disease, untreated HIV.  -Monitor hemoglobin periodically; stable around 9     Likely severe malnutrition  Nutrition followed , but due to uncertain recent weights unable to make complete diagnosis  -Encourage p.o., with supplements     Prior hepatitis C infection  Antibody positive but RNA  negative  -Noted    Restless legs: unclear if this is true restless leg syndrome or due to being uncomfortable  -continue mirapex prn    Suspected major depression: pt is not suicidal but also doesn't seem to have much of a will to live based upon discussion 7/25.  I wonder if her underlying chronic pain which is difficult to treat as well as being in the hospital is contributing. Was seen by psych and declined any medication treatment on 7/26.   -monitor and consider starting treatment if she changes her mind    DVT Prophylaxis: heparin  Code Status: Full Code    Disposition: Expected discharge once IV abx are complete after 8/18 as having a difficult time finding placement in TCU.   -patient has threatened to leave AMA a couple times, most recently on 7/25 but able to be talked into staying.  At this point I do think that due to likely depression, lack of insight from her drug addiction, possible cognitive impairment due to stroke, lack of plan for outpatient IV abx and outpatient treatment of her addiction she is at high risk of harm to self and if she tries to leave AMA would place a 72 hour hold. Please see discussion in hospitalist note from 7/25 and psych note 7/26.       Juno Holman      Interval History   About the same today.  Frustrated with being in the hospital and wants to leave to help take care of her kids and be there for them.  Discussed again the need for treatment to ensure she is there long term for them.  She states she doesn't care about long term, only cares about right this moment.  Lack of insight into disease and risks is apparent in conversation today as we discussed this further.  Pain is about the same but she did tolerate subxone well yesterday.  Is ok going up in dose if that is recommended by addiction medicine.  Also tolerated increase dose of neurontin well.  Thinks her previous dose as an outpatient when she last took it was 600 mg TID and that seemed to help.     -Data  reviewed today: I reviewed all new labs and imaging results over the last 24 hours. I personally reviewed no images or EKG's today.    Physical Exam   Temp: 98  F (36.7  C) Temp src: Oral BP: (!) 133/97 Pulse: 88   Resp: 18 SpO2: 100 % O2 Device: None (Room air)    Vitals:    07/13/21 0200 07/14/21 0445 07/16/21 0615   Weight: 89 kg (196 lb 3.4 oz) 84.2 kg (185 lb 10 oz) 76.8 kg (169 lb 5 oz)     Vital Signs with Ranges  Temp:  [97.7  F (36.5  C)-98.6  F (37  C)] 98  F (36.7  C)  Pulse:  [] 88  Resp:  [18] 18  BP: (114-137)/() 133/97  SpO2:  [97 %-100 %] 100 %  I/O last 3 completed shifts:  In: 500 [P.O.:500]  Out: -     Constitutional: sitting in bed, tearful at times    Respiratory: Clear to auscultation bilaterally, no crackles or wheezing noted.  Good air exchange.     Cardiovascular: Regular rate and rhythm.  No murmur, rub or gallop noted.     GI: Positive bowel sounds, soft, non-distended, non-tender.  No masses or organomegaly noted.     Musculoskeletal: moving all extremities     Neurologic: Awake, alert and oriented to name, place and time.  Cranial nerves II-XII are grossly intact.      Lymphatic: No edema noted    Skin: no new rash    Psych: somewhat depressed and distant at times    Medications       bictegravir-emtricitabine-tenofovir  1 tablet Oral Daily     buprenorphine HCl-naloxone HCl  1 Film Sublingual Daily     gabapentin  300 mg Oral TID     heparin ANTICOAGULANT  5,000 Units Subcutaneous Q8H     lidocaine  2-3 patch Transdermal Q24H     lidocaine   Transdermal Q8H     multivitamin w/minerals  1 tablet Oral Daily     nafcillin  2 g Intravenous Q4H     pantoprazole  40 mg Oral QAM AC     sodium chloride (PF)  10 mL Intracatheter Q8H       Data   Recent Labs   Lab 07/28/21  0611 07/28/21  0204 07/27/21  0144 07/26/21  1020 07/25/21  1017 07/22/21  0915     --   --   --  447 604*   NA  --   --   --  140  --   --    POTASSIUM  --   --   --  4.6  --   --    CHLORIDE  --   --   --   114*  --   --    CO2  --   --   --  25  --   --    BUN  --   --   --  16  --   --    CR  --   --   --  0.49*  --   --    ANIONGAP  --   --   --  1*  --   --    DEREK  --   --   --  8.4*  --   --    GLC  --  111* 155* 73  --   --        No results found for this or any previous visit (from the past 24 hour(s)).

## 2021-07-28 NOTE — PROGRESS NOTES
Care Management Follow Up    Length of Stay (days): 27    Expected Discharge Date: 08/18/2021     Concerns to be Addressed: adjustment to diagnosis/illness, substance/tobacco abuse/use (mirtha need therapy initiated to determine disposition)     Patient plan of care discussed at interdisciplinary rounds: Yes    Anticipated Discharge Disposition:       Anticipated Discharge Services:    Anticipated Discharge DME:      Patient/family educated on Medicare website which has current facility and service quality ratings:    Education Provided on the Discharge Plan:    Patient/Family in Agreement with the Plan:      Referrals Placed by CM/SW:    Private pay costs discussed: Not applicable    Additional Information:    Pt requires IV abx through 8/18/21.  Pt has skilled PT/OT needs with recommendation to TCU.  Pt has been declined at  TCU.  Sw sent referral to [facility].    Update:  [facility] declined.      Dee Troy, GORANSW

## 2021-07-28 NOTE — PLAN OF CARE
Summary: Endocarditis MSSA  DATE & TIME:7/28/21  7-3pm  Cognitive Concerns/ Orientation : A&Ox4,    BEHAVIOR & AGGRESSION TOOL COLOR: Green  ABNL VS/O2: VSS on RA  MOBILITY: Assist of one with walker/gait belt- denies dizziness when OOB   PAIN MANAGMENT: Complains of lower back pain, PRN tylenol/tordal given. Suboxone started yesterday.   DIET: Regular diet, tolerating ate well overnight  BOWEL/BLADDER: Ambulating to bathroom- continent  ABNL LAB/BG: NA  DRAIN/DEVICES: Midline SL with int abx  SKIN: Bruises; black toes, blister on left finger and  large blister side of L heel scabbed, Scattered scars.   TESTS/PROCEDURES: NA   D/C DAY/GOALS/PLACE: pending, will need abx until 8/18/2021 Chem/dep consulted.   OTHER IMPORTANT INFO: Generalized weakness, PT/OT/SLP/ID following. Contact isolation for ESBL.

## 2021-07-28 NOTE — PLAN OF CARE
"DATE & TIME: 7/27/2021 [id number]  Cognitive Concerns/ Orientation : A&Ox4,    BEHAVIOR & AGGRESSION TOOL COLOR: Green  ABNL VS/O2: VSS on RA- slight HTN  MOBILITY: Assist of one with walker/gait belt- states she is unsteady when standing, but she feels steady after she \"gets going\"  PAIN MANAGMENT: Complains of lower back pain, PRN tylenol and toradol given. Suboxone started today.   DIET: Regular diet, tolerating  BOWEL/BLADDER: Ambulating to bathroom- continent  ABNL LAB/BG:   DRAIN/DEVICES: Midline SL  SKIN: Bruises; black toes, blister on left finger and  large blister side of L heel scabbed, Scattered scars.   TESTS/PROCEDURES: NA   D/C DAY/GOALS/PLACE: pending, will need abx until 8/18/2021 Chem/dep consulted.   OTHER IMPORTANT INFO: Generalized weakness, PT/OT/SLP/ID following. Contact isolation for ESBL. Requip given for restless legs, and seroquel given x1.    "

## 2021-07-29 ENCOUNTER — APPOINTMENT (OUTPATIENT)
Dept: PHYSICAL THERAPY | Facility: CLINIC | Age: 41
End: 2021-07-29
Payer: MEDICAID

## 2021-07-29 ENCOUNTER — APPOINTMENT (OUTPATIENT)
Dept: OCCUPATIONAL THERAPY | Facility: CLINIC | Age: 41
End: 2021-07-29
Payer: MEDICAID

## 2021-07-29 LAB — SARS-COV-2 RNA RESP QL NAA+PROBE: NEGATIVE

## 2021-07-29 PROCEDURE — 250N000013 HC RX MED GY IP 250 OP 250 PS 637: Performed by: HOSPITALIST

## 2021-07-29 PROCEDURE — 250N000011 HC RX IP 250 OP 636: Performed by: STUDENT IN AN ORGANIZED HEALTH CARE EDUCATION/TRAINING PROGRAM

## 2021-07-29 PROCEDURE — 250N000011 HC RX IP 250 OP 636: Performed by: INTERNAL MEDICINE

## 2021-07-29 PROCEDURE — 99232 SBSQ HOSP IP/OBS MODERATE 35: CPT | Performed by: HOSPITALIST

## 2021-07-29 PROCEDURE — 250N000013 HC RX MED GY IP 250 OP 250 PS 637: Performed by: STUDENT IN AN ORGANIZED HEALTH CARE EDUCATION/TRAINING PROGRAM

## 2021-07-29 PROCEDURE — 97535 SELF CARE MNGMENT TRAINING: CPT | Mod: GO | Performed by: OCCUPATIONAL THERAPIST

## 2021-07-29 PROCEDURE — 999N000190 HC STATISTIC VAT ROUNDS

## 2021-07-29 PROCEDURE — 97530 THERAPEUTIC ACTIVITIES: CPT | Mod: GP | Performed by: PHYSICAL THERAPIST

## 2021-07-29 PROCEDURE — 250N000013 HC RX MED GY IP 250 OP 250 PS 637: Performed by: INTERNAL MEDICINE

## 2021-07-29 PROCEDURE — 87635 SARS-COV-2 COVID-19 AMP PRB: CPT | Performed by: HOSPITALIST

## 2021-07-29 PROCEDURE — G0463 HOSPITAL OUTPT CLINIC VISIT: HCPCS

## 2021-07-29 PROCEDURE — 97116 GAIT TRAINING THERAPY: CPT | Mod: GP | Performed by: PHYSICAL THERAPIST

## 2021-07-29 PROCEDURE — 120N000001 HC R&B MED SURG/OB

## 2021-07-29 PROCEDURE — 250N000009 HC RX 250: Performed by: STUDENT IN AN ORGANIZED HEALTH CARE EDUCATION/TRAINING PROGRAM

## 2021-07-29 RX ORDER — GABAPENTIN 300 MG/1
600 CAPSULE ORAL 3 TIMES DAILY
Status: DISCONTINUED | OUTPATIENT
Start: 2021-07-29 | End: 2021-09-01 | Stop reason: HOSPADM

## 2021-07-29 RX ADMIN — GABAPENTIN 600 MG: 300 CAPSULE ORAL at 16:25

## 2021-07-29 RX ADMIN — NAFCILLIN SODIUM 2 G: 2 INJECTION, POWDER, LYOPHILIZED, FOR SOLUTION INTRAMUSCULAR; INTRAVENOUS at 10:13

## 2021-07-29 RX ADMIN — NAFCILLIN SODIUM 2 G: 2 INJECTION, POWDER, LYOPHILIZED, FOR SOLUTION INTRAMUSCULAR; INTRAVENOUS at 02:18

## 2021-07-29 RX ADMIN — BUPRENORPHINE AND NALOXONE 1 FILM: 8; 2 FILM, SOLUBLE BUCCAL; SUBLINGUAL at 08:04

## 2021-07-29 RX ADMIN — HEPARIN SODIUM 5000 UNITS: 5000 INJECTION, SOLUTION INTRAVENOUS; SUBCUTANEOUS at 06:30

## 2021-07-29 RX ADMIN — PANTOPRAZOLE SODIUM 40 MG: 40 TABLET, DELAYED RELEASE ORAL at 06:30

## 2021-07-29 RX ADMIN — KETOROLAC TROMETHAMINE 30 MG: 15 INJECTION, SOLUTION INTRAMUSCULAR; INTRAVENOUS at 16:25

## 2021-07-29 RX ADMIN — KETOROLAC TROMETHAMINE 30 MG: 15 INJECTION, SOLUTION INTRAMUSCULAR; INTRAVENOUS at 08:17

## 2021-07-29 RX ADMIN — KETOROLAC TROMETHAMINE 30 MG: 15 INJECTION, SOLUTION INTRAMUSCULAR; INTRAVENOUS at 22:08

## 2021-07-29 RX ADMIN — GABAPENTIN 400 MG: 300 CAPSULE ORAL at 08:04

## 2021-07-29 RX ADMIN — NAFCILLIN SODIUM 2 G: 2 INJECTION, POWDER, LYOPHILIZED, FOR SOLUTION INTRAMUSCULAR; INTRAVENOUS at 13:30

## 2021-07-29 RX ADMIN — BICTEGRAVIR SODIUM, EMTRICITABINE, AND TENOFOVIR ALAFENAMIDE FUMARATE 1 TABLET: 50; 200; 25 TABLET ORAL at 08:04

## 2021-07-29 RX ADMIN — NAFCILLIN SODIUM 2 G: 2 INJECTION, POWDER, LYOPHILIZED, FOR SOLUTION INTRAMUSCULAR; INTRAVENOUS at 06:30

## 2021-07-29 RX ADMIN — NAFCILLIN SODIUM 2 G: 2 INJECTION, POWDER, LYOPHILIZED, FOR SOLUTION INTRAMUSCULAR; INTRAVENOUS at 19:27

## 2021-07-29 RX ADMIN — GABAPENTIN 600 MG: 300 CAPSULE ORAL at 22:06

## 2021-07-29 RX ADMIN — NAFCILLIN SODIUM 2 G: 2 INJECTION, POWDER, LYOPHILIZED, FOR SOLUTION INTRAMUSCULAR; INTRAVENOUS at 22:09

## 2021-07-29 RX ADMIN — LIDOCAINE 2 PATCH: 560 PATCH PERCUTANEOUS; TOPICAL; TRANSDERMAL at 08:04

## 2021-07-29 RX ADMIN — Medication 1 TABLET: at 19:28

## 2021-07-29 RX ADMIN — ACETAMINOPHEN 650 MG: 325 TABLET, FILM COATED ORAL at 20:07

## 2021-07-29 ASSESSMENT — ACTIVITIES OF DAILY LIVING (ADL)
ADLS_ACUITY_SCORE: 21
ADLS_ACUITY_SCORE: 22
ADLS_ACUITY_SCORE: 20
ADLS_ACUITY_SCORE: 22
ADLS_ACUITY_SCORE: 21
ADLS_ACUITY_SCORE: 22

## 2021-07-29 NOTE — PROGRESS NOTES
[facility]  WOC Nurse Inpatient Wound Assessment     Reason for consultation: Evaluate and treat:  Lt great toes, Lt 3rd toe and Lt lateral  Heel                                                                           Rt great toe    Assessment  Wounds:  Lt great toes, Lt 3rd toe and Lt lateral  Heel  Rt great toe  All wounds r/t to ischemic emboli. No local s/s infection  Lt 3rd toe needs monitoring for ongoing necrosis secondary to ongoing ecchymosis on toe      Treatment Plan  Wound care: change dressing to feet on odd days and prn   1. Swab all areas with Betadine. Let dry  2. Cover bilateral great toes/and Lt 3rd toe with polymen  3. Cover Rt lateral heel with Mepilex border  4. Socks @ all times     Orders IN Epic  Recommended provider order: NA  WOC Nurse follow-up plan: Weekly and prn   Nursing to notify the Provider(s) and re-consult the WOC Nurse if wound(s) deteriorates or new skin concern.    Patient History  According to provider note(s):    Manas Hernandez is a 41 year old female with h/o untreated HIV, hepatitis C who was admitted on 7/1/2021 for MSSA mitral valve endocarditis resulting in septic shock complicated by embolic left parietal lobe infarctions and multiple peripheral emboli.    She presented initially with encephalopathy, and shock.  She was intubated in ED on 7/1/2021 subsequently extubated 7/11/2021.  Blood cultures turned positive on hospital day 1 with MSSA.  Urine culture was positive for Klebsiella.  She was treated initially with ceftriaxone and vancomycin, and has since been narrowed to nafcillin per ID.     MSSA mitral valve endocarditis - suspect due to IV drug use  Septic shock, resolved  Probable myopericarditis  Small pericardial effusion  *JAE 7/5 noted with small ASD, mod to large mobile vegetation (14mm long, 6mm wide) of the P2 segment of the mitral leaflet. No valve perforation; mod MR  *blood cultures on admission with MSSA; blood cultures  "have been negative since 7/6.  Afebrile since 7/13.  Leukocytosis resolved  *ID following; antibiotic has been narrowed down to Nafcillin, 6 weeks of antibiotic, completion date 8/18/2021.  *limited ECHO repeated 7/15 to reassess pericardial fluid 7/15 per cardiology and noted:  organized effusion posteriorly, measuring 1.2-1.5cm, no evidence of tamponade; small mobile vegetation noted attached to the mitral leaflet, mod to severe MR; compared to 7/8/21, effusion size is probably stable, Degree of MR is worse, Vegetation is smaller.  Cardiology signed off 7/15.   *re-evaluated by CT surgery 7/15 and suggested continued antibiotic therapy at this time; per CT surgery \"Were she to become a surgical candidate, we would recommend waiting at least 4 weeks from her stroke and she would need repeat MRI imaging to rule out mycotic aneurysms or brain abscesses\"; will have follow up with CT surgery as outpatient  *Abx briefly interrupted as she had been pulling out her access sites; midline placed on 7/13/21-- pulled out again on 7/16-- PICC was re-ordered (after she pulled out 2 previous ones), however vascular access team has suggested holding off on re-insertion until right before patient discharges to facility due to difficult access.  Patient is not delirious anymore and per , likely will have to complete her antibiotic course here in hospital as no accepting TCU so far and unlikely to find one.  PICC ordered on 7/26 as she agrees not to pull it out and having a difficult time with access.      IV drug use  Polysubstance use disorder  *Chem dep consulted 7/15 but chemical dependency did not feel she is appropriate for CD assessment at this time and recommended consultation towards the end of her antibiotic course.  Reconsult CD once she is close to finishing her IV antibiotic.  *Was using IV dilaudid as well as Percocet p.o. earlier this stay pretty extensively.  Attempts made to wean off IV narcotics and " taper down po narcotics though patient felt she was going through withdrawal. Psych consulted and recommended suboxone though patient declined. Psych recommended rapid taper of methadone instead.   *completed methadone taper of 30 mg on 7/21, 20 mg 7/22 and then 10 mg 7/23, then stop  -no further narcotics   -starting suboxone on 7/27 - appreciate psych/addiction medicine assistance who will help with titration  -psych also mentioned outpatient methadone maintenances as a possibility  -Reconsult CD once she is close to finishing her IV antibiotic.     HIV, untreated PTA  - CD4 count greater than 500, viral load 3695  - Started Biktarvy 7/8; ID following     Chronic back pain: pt notes it present ever since she was pregnant.   -avoiding narcotics per psych  -tylenol  -added lidoderm patch 7/23  -toradol prn  -increase gabapentin to 400 mg TID today  --pt notes previous outpatient dose was 600mg TID so would like to titrate at least that far if she tolerates     Toxic/septic/metabolic encephalopathy, acute, improving  Likely encephalopathy due to sepsis, drug use, CVA, prolonged ICU stay; had been pulling lines due to alterned mentation thought this all now appears resolved.   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  Completed course of treatment with ceftriaxone.  repeat urine cultures 7/10 with ESBL E coli but given unimpressive UA and no UTI symptoms ID suggest holding off on abx for ESBL  UTI  -monitor for any symptoms     Acute hypoxemic respiratory failure, resolved  Intubated 7/1/2021.  Extubated 7/11/2021.  Likely related to septic shock from MSSA mitral valve endocarditis     FORD, resolved suspected due to sepsis  Hypokalemia  - on potassium replacement protocol  - will check BMP periodically     Chronic anemia  Baseline hemoglobin approximately 7.  Likely related to chronic disease, untreated HIV.  -Monitor hemoglobin periodically; stable around 9     Likely severe malnutrition    Objective Data  Containment of urine/stool: Incontinence protocol    Active Diet Order:  Orders Placed This Encounter      Combination Diet Regular Diet Adult; No Straws    Output:   I/O last 3 completed shifts:  In: 830 [P.O.:830]  Out: -     Risk Assessment:   Sensory Perception: 4-->no impairment  Moisture: 4-->rarely moist  Activity: 3-->walks occasionally  Mobility: 3-->slightly limited  Nutrition: 3-->adequate  Friction and Shear: 3-->no apparent problem  Jeffy Score: 20                          Labs: Recent Labs   Lab 07/26/21  1020   CRP 14.4*       Physical Exam  Skin inspection: Bilateral feet   Wound History: all wounds r/t to ischemic emboli  #1-#2: Wound Location:  Lt great toe and Lt 3rd toe  Date of last photo:  7-29-21      #1: Lt great toe  Size :  1.0 x 1.0 x  Skin intact with non-blanchable ecchymosis  Tunneling: NA  Undermining: NA  Palpation of the wound bed: firm  Periwound skin: intact, no erythema  Temperature: warm   Drainage: none  Odor: none  Pain: denies    #2: Lt 3rd  toe  Size :  1.5 x 1.0 x 100% x unable to determine with 100% dried eschar base  Tunneling: unable to determine  Undermining: unable to determine  Palpation of the wound bed:  Periwound skin:  Intact with ecchymosis   Temperature: warm   Drainage: none  Odor: none  Pain: tenderness     #3:  Wound Location: Rt great tip of  toe  [facility] photo: 7-29-21    Size :  1.0 x 1.0  x  Unable to determine depth with 100% dried wound    Tunneling: unable to determine  Undermining: unable to determine  Palpation of the wound bed: firm   Periwound skin: intact, slight ecchymosis  Temperature: warm   Drainage: none  Odor: none  Pain: tenderness     #4:  Wound Location: Rt lateral heel   WO photo: 7-20-21        Size :  5.0cm  x 2.5cm  x skin intact   Tunneling: NA  Undermining: NA  Palpation of the wound bed: slight softness  Periwound skin: intact, no erythema   Temperature:  Warm   Drainage:  none  Odor: none  Pain: Denies     Interventions  Current support surface: atmos air  Current off-loading measures:pillows and repositions self  Visual inspection of wound(s) completed  Wound Care: per tx plan  Supplies: In pt room   Education provided:Discussed with patient   Discussed plan of care with Nursing     Renate Theodore RN, CWOCN

## 2021-07-29 NOTE — PLAN OF CARE
Summary: Endocarditis MSSA  DATE & TIME:7/29/21 7666-8994  Cognitive Concerns/ Orientation : A&Ox4   BEHAVIOR & AGGRESSION TOOL COLOR: Green  ABNL VS/O2: VSS on RA  MOBILITY: Assist of one with walker/gait belt- denies dizziness when OOB   PAIN MANAGMENT: No complaints of pain this shift. PRN Toradol/ Tylenol available for lower back pain.  On Suboxone.  DIET: Regular diet, tolerating  BOWEL/BLADDER: Ambulates to bathroom- continent  ABNL LAB/BG: NA  DRAIN/DEVICES: Midline SL with int abx  SKIN: Bruises; black toes, blister on left finger and  large blister side of L heel scabbed, Scattered scars.   TESTS/PROCEDURES: NA   D/C DAY/GOALS/PLACE: pending, will need abx until 8/18/2021 Chem/dep consulted.   OTHER IMPORTANT INFO: Generalized weakness and back pain, PT/OT/SLP/ID following. Contact isolation for ESBL.

## 2021-07-29 NOTE — PLAN OF CARE
Summary: Endocarditis MSSA  DATE & TIME:7/28/21  3-11pm shift  Cognitive Concerns/ Orientation : A&Ox4,    BEHAVIOR & AGGRESSION TOOL COLOR: Green  ABNL VS/O2: VSS on RA  MOBILITY: Assist of one with walker/gait belt- denies dizziness when OOB   PAIN MANAGMENT: Complains of lower back pain, PRN Toradol given. Suboxone started yesterday.   DIET: Regular diet, tolerating  BOWEL/BLADDER: Ambulates to bathroom- continent  ABNL LAB/BG: NA  DRAIN/DEVICES: Midline SL with int abx  SKIN: Bruises; black toes, blister on left finger and  large blister side of L heel scabbed, Scattered scars.   TESTS/PROCEDURES: NA   D/C DAY/GOALS/PLACE: pending, will need abx until 8/18/2021 Chem/dep consulted.   OTHER IMPORTANT INFO: Generalized weakness and back pain, PT/OT/SLP/ID following. Contact isolation for ESBL.

## 2021-07-29 NOTE — PLAN OF CARE
Summary: Endocarditis MSSA  DATE & TIME:7/29/21 0224-3840  Cognitive Concerns/ Orientation : A&Ox4   BEHAVIOR & AGGRESSION TOOL COLOR: Green  ABNL VS/O2: VSS on RA  MOBILITY: Assist 1, GB, walker  PAIN MANAGMENT: Chronic low back pain, decline intervention. On Suboxone.  DIET: Regular diet, tolerating. No straws.  BOWEL/BLADDER: Ambulates to bathroom  ABNL LAB/BG: see results  DRAIN/DEVICES: Midline SL between abx  SKIN: Bruises, black toes-R great toe, L third toe, blister on left finger and large blister side of L heel scabbed, scattered scars. Pt endorsed to picking at her toes, dressings placed to prevent picking, WOC consulted today by MD.   TESTS/PROCEDURES: NA   D/C DAY/GOALS/PLACE: pending, will need abx until 8/18/2021 CD consulted, ID   OTHER IMPORTANT INFO: Generalized weakness, PT/OT/SLP/ID/SW following. Contact iso for ESBL. Repeat COVID swab sent today.

## 2021-07-29 NOTE — PROGRESS NOTES
"[facility]    Hospitalist Progress Note      Assessment & Plan   Manas Hernandez is a 41 year old female with h/o untreated HIV, hepatitis C who was admitted on 7/1/2021 for MSSA mitral valve endocarditis resulting in septic shock complicated by embolic left parietal lobe infarctions and multiple peripheral emboli.    She presented initially with encephalopathy, and shock.  She was intubated in ED on 7/1/2021 subsequently extubated 7/11/2021.  Blood cultures turned positive on hospital day 1 with MSSA.  Urine culture was positive for Klebsiella.  She was treated initially with ceftriaxone and vancomycin, and has since been narrowed to nafcillin per ID.     MSSA mitral valve endocarditis - suspect due to IV drug use  Septic shock, resolved  Probable myopericarditis  Small pericardial effusion  *JAE 7/5 noted with small ASD, mod to large mobile vegetation (14mm long, 6mm wide) of the P2 segment of the mitral leaflet. No valve perforation; mod MR  *blood cultures on admission with MSSA; blood cultures have been negative since 7/6.  Afebrile since 7/13.  Leukocytosis resolved  *ID following; antibiotic has been narrowed down to Nafcillin, 6 weeks of antibiotic, completion date 8/18/2021.  *limited ECHO repeated 7/15 to reassess pericardial fluid 7/15 per cardiology and noted:  organized effusion posteriorly, measuring 1.2-1.5cm, no evidence of tamponade; small mobile vegetation noted attached to the mitral leaflet, mod to severe MR; compared to 7/8/21, effusion size is probably stable, Degree of MR is worse, Vegetation is smaller.  Cardiology signed off 7/15.   *re-evaluated by CT surgery 7/15 and suggested continued antibiotic therapy at this time; per CT surgery \"Were she to become a surgical candidate, we would recommend waiting at least 4 weeks from her stroke and she would need repeat MRI imaging to rule out mycotic aneurysms or brain abscesses\"; will have follow up with CT surgery as " outpatient  *Abx briefly interrupted as she had been pulling out her access sites; midline placed on 7/13/21-- pulled out again on 7/16-- PICC was re-ordered (after she pulled out 2 previous ones), however vascular access team has suggested holding off on re-insertion until right before patient discharges to facility due to difficult access.  Patient is not delirious anymore and per , likely will have to complete her antibiotic course here in hospital as no accepting TCU so far and unlikely to find one.  PICC ordered on 7/26 as she agrees not to pull it out and having a difficult time with access.   -for now continue IV nafcillin in hospital with plans to complete entire course here through 8/18  -will need follow up with cardiothoracic surgery as an outpatient  -appreciate ongoing ID support    IV drug use  Polysubstance use disorder  *Chem dep consulted 7/15 but chemical dependency did not feel she is appropriate for CD assessment at this time and recommended consultation towards the end of her antibiotic course.  Reconsult CD once she is close to finishing her IV antibiotic.  *Was using IV dilaudid as well as Percocet p.o. earlier this stay pretty extensively.  Attempts made to wean off IV narcotics and taper down po narcotics though patient felt she was going through withdrawal. Psych consulted and recommended suboxone though patient declined. Psych recommended rapid taper of methadone instead.   *completed methadone taper of 30 mg on 7/21, 20 mg 7/22 and then 10 mg 7/23, then stop  -no further narcotics   -starting suboxone on 7/27 - appreciate psych/addiction medicine assistance who will help with titration  -psych also mentioned outpatient methadone maintenances as a possibility  -Reconsult CD once she is close to finishing her IV antibiotic.     HIV, untreated PTA  - CD4 count greater than 500, viral load 3695  - Started Biktarvy 7/8; ID following    Chronic back pain: pt notes it present ever  since she was pregnant.   -avoiding narcotics per psych  -tylenol  -added lidoderm patch 7/23  -toradol prn  -increase gabapentin to 400 mg TID today  --pt notes previous outpatient dose was 600mg TID so would like to titrate at least that far if she tolerates     Toxic/septic/metabolic encephalopathy, acute, improving  Likely encephalopathy due to sepsis, drug use, CVA, prolonged ICU stay; had been pulling lines due to alterned mentation thought this all now appears resolved.   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  Completed course of treatment with ceftriaxone.  repeat urine cultures 7/10 with ESBL E coli but given unimpressive UA and no UTI symptoms ID suggest holding off on abx for ESBL UTI  -monitor for any symptoms     Acute hypoxemic respiratory failure, resolved  Intubated 7/1/2021.  Extubated 7/11/2021.  Likely related to septic shock from MSSA mitral valve endocarditis     FORD, resolved suspected due to sepsis  Hypokalemia  - on potassium replacement protocol  - will check BMP periodically     Chronic anemia  Baseline hemoglobin approximately 7.  Likely related to chronic disease, untreated HIV.  -Monitor hemoglobin periodically; stable around 9     Likely severe malnutrition  Nutrition followed , but due to uncertain recent weights unable to make complete diagnosis  -Encourage p.o., with supplements     Prior hepatitis C infection  Antibody positive but RNA  negative  -Noted    Restless legs: unclear if this is true restless leg syndrome or due to being uncomfortable  -continue mirapex prn    Suspected major depression: pt is not suicidal but also doesn't seem to have much of a will to live based upon discussion 7/25.  I wonder if her underlying chronic pain which is difficult to treat as well as being in the hospital is contributing. Was seen by psych and declined any medication treatment on 7/26.   -monitor and consider starting treatment if she changes her mind    DVT Prophylaxis: heparin  Code Status: Full Code    Disposition: Expected discharge once IV abx are complete after 8/18 as having a difficult time finding placement in TCU.   -patient has threatened to leave AMA a couple times, most recently on 7/25 but able to be talked into staying.  At this point I do think that due to likely depression, lack of insight from her drug addiction, possible cognitive impairment due to stroke, lack of plan for outpatient IV abx and outpatient treatment of her addiction she is at high risk of harm to self and if she tries to leave AMA would place a 72 hour hold. Please see discussion in hospitalist note from 7/25 and psych note 7/26.       Deepti Rivera MD  2:34 PM      Interval History   RN noted necrotic toes - L 3rd toe and R big toe. Wound consult placed. Tolerating diet. Not very forthcoming with me today.    -Data reviewed today: I reviewed all new labs and imaging results over the last 24 hours. I personally reviewed no images or EKG's today.    Physical Exam   Temp: 98.5  F (36.9  C) Temp src: Oral BP: 121/83 Pulse: 100   Resp: 16 SpO2: 98 % O2 Device: None (Room air)    Vitals:    07/13/21 0200 07/14/21 0445 07/16/21 0615   Weight: 89 kg (196 lb 3.4 oz) 84.2 kg (185 lb 10 oz) 76.8 kg (169 lb 5 oz)     Vital Signs with Ranges  Temp:  [98  F (36.7  C)-98.5  F (36.9  C)] 98.5  F (36.9  C)  Pulse:  [] 100  Resp:  [16-18] 16  BP: (116-121)/(77-83) 121/83  SpO2:   [97 %-100 %] 98 %  I/O last 3 completed shifts:  In: 1150 [P.O.:1150]  Out: -     Constitutional: sitting in bed, tearful at times    Respiratory: Clear to auscultation bilaterally, no crackles or wheezing noted.  Good air exchange.     Cardiovascular: Regular rate and rhythm.  No murmur, rub or gallop noted.     GI: Positive bowel sounds, soft, non-distended, non-tender.  No masses or organomegaly noted.     Musculoskeletal: moving all extremities     Neurologic: Awake, alert and oriented to name, place and time.  Cranial nerves II-XII are grossly intact.      Lymphatic: No edema noted    Skin: no new rash    Psych: somewhat depressed and distant at times    Medications       bictegravir-emtricitabine-tenofovir  1 tablet Oral Daily     buprenorphine HCl-naloxone HCl  1 Film Sublingual Daily     gabapentin  400 mg Oral TID     lidocaine  2-3 patch Transdermal Q24H     lidocaine   Transdermal Q8H     multivitamin w/minerals  1 tablet Oral Daily     nafcillin  2 g Intravenous Q4H     pantoprazole  40 mg Oral QAM AC     sodium chloride (PF)  10 mL Intracatheter Q8H       Data   Recent Labs   Lab 07/28/21  0611 07/28/21  0204 07/27/21  0144 07/26/21  1020 07/25/21  1017     --   --   --  447   NA  --   --   --  140  --    POTASSIUM  --   --   --  4.6  --    CHLORIDE  --   --   --  114*  --    CO2  --   --   --  25  --    BUN  --   --   --  16  --    CR  --   --   --  0.49*  --    ANIONGAP  --   --   --  1*  --    DEREK  --   --   --  8.4*  --    GLC  --  111* 155* 73  --        No results found for this or any previous visit (from the past 24 hour(s)).

## 2021-07-30 ENCOUNTER — APPOINTMENT (OUTPATIENT)
Dept: ULTRASOUND IMAGING | Facility: CLINIC | Age: 41
End: 2021-07-30
Attending: NURSE PRACTITIONER
Payer: MEDICAID

## 2021-07-30 ENCOUNTER — APPOINTMENT (OUTPATIENT)
Dept: OCCUPATIONAL THERAPY | Facility: CLINIC | Age: 41
End: 2021-07-30
Payer: MEDICAID

## 2021-07-30 ENCOUNTER — APPOINTMENT (OUTPATIENT)
Dept: CT IMAGING | Facility: CLINIC | Age: 41
End: 2021-07-30
Attending: NURSE PRACTITIONER
Payer: MEDICAID

## 2021-07-30 LAB
ALBUMIN SERPL-MCNC: 2.1 G/DL (ref 3.4–5)
ALP SERPL-CCNC: 117 U/L (ref 40–150)
ALT SERPL W P-5'-P-CCNC: 19 U/L (ref 0–50)
ANION GAP SERPL CALCULATED.3IONS-SCNC: 2 MMOL/L (ref 3–14)
AST SERPL W P-5'-P-CCNC: 17 U/L (ref 0–45)
BASOPHILS # BLD AUTO: 0.1 10E3/UL (ref 0–0.2)
BASOPHILS NFR BLD AUTO: 2 %
BILIRUB SERPL-MCNC: 0.6 MG/DL (ref 0.2–1.3)
BUN SERPL-MCNC: 17 MG/DL (ref 7–30)
CALCIUM SERPL-MCNC: 8.1 MG/DL (ref 8.5–10.1)
CHLORIDE BLD-SCNC: 107 MMOL/L (ref 94–109)
CO2 SERPL-SCNC: 28 MMOL/L (ref 20–32)
CREAT SERPL-MCNC: 0.63 MG/DL (ref 0.52–1.04)
D DIMER PPP FEU-MCNC: 2.86 UG/ML FEU (ref 0–0.5)
EOSINOPHIL # BLD AUTO: 0.2 10E3/UL (ref 0–0.7)
EOSINOPHIL NFR BLD AUTO: 3 %
ERYTHROCYTE [DISTWIDTH] IN BLOOD BY AUTOMATED COUNT: 28.4 % (ref 10–15)
GFR SERPL CREATININE-BSD FRML MDRD: >90 ML/MIN/1.73M2
GLUCOSE BLD-MCNC: 85 MG/DL (ref 70–99)
GLUCOSE BLDC GLUCOMTR-MCNC: 76 MG/DL (ref 70–99)
HCT VFR BLD AUTO: 30.5 % (ref 35–47)
HGB BLD-MCNC: 9.1 G/DL (ref 11.7–15.7)
IMM GRANULOCYTES # BLD: 0.2 10E3/UL
IMM GRANULOCYTES NFR BLD: 2 %
LYMPHOCYTES # BLD AUTO: 1.7 10E3/UL (ref 0.8–5.3)
LYMPHOCYTES NFR BLD AUTO: 20 %
MCH RBC QN AUTO: 25.7 PG (ref 26.5–33)
MCHC RBC AUTO-ENTMCNC: 29.8 G/DL (ref 31.5–36.5)
MCV RBC AUTO: 86 FL (ref 78–100)
MONOCYTES # BLD AUTO: 0.3 10E3/UL (ref 0–1.3)
MONOCYTES NFR BLD AUTO: 4 %
NEUTROPHILS # BLD AUTO: 5.9 10E3/UL (ref 1.6–8.3)
NEUTROPHILS NFR BLD AUTO: 69 %
NRBC # BLD AUTO: 0 10E3/UL
NRBC BLD AUTO-RTO: 0 /100
PLATELET # BLD AUTO: 370 10E3/UL (ref 150–450)
POTASSIUM BLD-SCNC: 4 MMOL/L (ref 3.4–5.3)
PROT SERPL-MCNC: 8.4 G/DL (ref 6.8–8.8)
RBC # BLD AUTO: 3.54 10E6/UL (ref 3.8–5.2)
SODIUM SERPL-SCNC: 137 MMOL/L (ref 133–144)
TROPONIN I SERPL-MCNC: <0.015 UG/L (ref 0–0.04)
WBC # BLD AUTO: 8.4 10E3/UL (ref 4–11)

## 2021-07-30 PROCEDURE — 99291 CRITICAL CARE FIRST HOUR: CPT | Performed by: NURSE PRACTITIONER

## 2021-07-30 PROCEDURE — 36415 COLL VENOUS BLD VENIPUNCTURE: CPT | Performed by: HOSPITALIST

## 2021-07-30 PROCEDURE — 250N000009 HC RX 250: Performed by: INTERNAL MEDICINE

## 2021-07-30 PROCEDURE — 80053 COMPREHEN METABOLIC PANEL: CPT | Performed by: NURSE PRACTITIONER

## 2021-07-30 PROCEDURE — 250N000011 HC RX IP 250 OP 636: Performed by: INTERNAL MEDICINE

## 2021-07-30 PROCEDURE — 250N000011 HC RX IP 250 OP 636: Performed by: NURSE PRACTITIONER

## 2021-07-30 PROCEDURE — 99233 SBSQ HOSP IP/OBS HIGH 50: CPT | Performed by: HOSPITALIST

## 2021-07-30 PROCEDURE — 999N000190 HC STATISTIC VAT ROUNDS

## 2021-07-30 PROCEDURE — 250N000009 HC RX 250: Performed by: STUDENT IN AN ORGANIZED HEALTH CARE EDUCATION/TRAINING PROGRAM

## 2021-07-30 PROCEDURE — 250N000013 HC RX MED GY IP 250 OP 250 PS 637: Performed by: INTERNAL MEDICINE

## 2021-07-30 PROCEDURE — 250N000013 HC RX MED GY IP 250 OP 250 PS 637: Performed by: STUDENT IN AN ORGANIZED HEALTH CARE EDUCATION/TRAINING PROGRAM

## 2021-07-30 PROCEDURE — 93005 ELECTROCARDIOGRAM TRACING: CPT

## 2021-07-30 PROCEDURE — 85025 COMPLETE CBC W/AUTO DIFF WBC: CPT | Performed by: HOSPITALIST

## 2021-07-30 PROCEDURE — 120N000001 HC R&B MED SURG/OB

## 2021-07-30 PROCEDURE — 36415 COLL VENOUS BLD VENIPUNCTURE: CPT | Performed by: NURSE PRACTITIONER

## 2021-07-30 PROCEDURE — 85379 FIBRIN DEGRADATION QUANT: CPT | Performed by: NURSE PRACTITIONER

## 2021-07-30 PROCEDURE — 97535 SELF CARE MNGMENT TRAINING: CPT | Mod: GO

## 2021-07-30 PROCEDURE — 93970 EXTREMITY STUDY: CPT

## 2021-07-30 PROCEDURE — 71275 CT ANGIOGRAPHY CHEST: CPT

## 2021-07-30 PROCEDURE — 250N000013 HC RX MED GY IP 250 OP 250 PS 637: Performed by: HOSPITALIST

## 2021-07-30 PROCEDURE — 97530 THERAPEUTIC ACTIVITIES: CPT | Mod: GO

## 2021-07-30 PROCEDURE — 84484 ASSAY OF TROPONIN QUANT: CPT | Performed by: NURSE PRACTITIONER

## 2021-07-30 RX ORDER — KETOROLAC TROMETHAMINE 15 MG/ML
30 INJECTION, SOLUTION INTRAMUSCULAR; INTRAVENOUS ONCE
Status: COMPLETED | OUTPATIENT
Start: 2021-07-30 | End: 2021-07-30

## 2021-07-30 RX ORDER — IOPAMIDOL 755 MG/ML
85 INJECTION, SOLUTION INTRAVASCULAR ONCE
Status: COMPLETED | OUTPATIENT
Start: 2021-07-30 | End: 2021-07-30

## 2021-07-30 RX ADMIN — SODIUM CHLORIDE 90 ML: 9 INJECTION, SOLUTION INTRAVENOUS at 18:00

## 2021-07-30 RX ADMIN — NAFCILLIN SODIUM 2 G: 2 INJECTION, POWDER, LYOPHILIZED, FOR SOLUTION INTRAMUSCULAR; INTRAVENOUS at 09:57

## 2021-07-30 RX ADMIN — NAFCILLIN SODIUM 2 G: 2 INJECTION, POWDER, LYOPHILIZED, FOR SOLUTION INTRAMUSCULAR; INTRAVENOUS at 20:43

## 2021-07-30 RX ADMIN — NAFCILLIN SODIUM 2 G: 2 INJECTION, POWDER, LYOPHILIZED, FOR SOLUTION INTRAMUSCULAR; INTRAVENOUS at 02:14

## 2021-07-30 RX ADMIN — IOPAMIDOL 85 ML: 755 INJECTION, SOLUTION INTRAVENOUS at 17:59

## 2021-07-30 RX ADMIN — BICTEGRAVIR SODIUM, EMTRICITABINE, AND TENOFOVIR ALAFENAMIDE FUMARATE 1 TABLET: 50; 200; 25 TABLET ORAL at 08:59

## 2021-07-30 RX ADMIN — ACETAMINOPHEN 650 MG: 325 TABLET, FILM COATED ORAL at 17:14

## 2021-07-30 RX ADMIN — ACETAMINOPHEN 650 MG: 325 TABLET, FILM COATED ORAL at 06:52

## 2021-07-30 RX ADMIN — KETOROLAC TROMETHAMINE 30 MG: 15 INJECTION, SOLUTION INTRAMUSCULAR; INTRAVENOUS at 17:36

## 2021-07-30 RX ADMIN — KETOROLAC TROMETHAMINE 30 MG: 15 INJECTION, SOLUTION INTRAMUSCULAR; INTRAVENOUS at 05:47

## 2021-07-30 RX ADMIN — LIDOCAINE 2 PATCH: 560 PATCH PERCUTANEOUS; TOPICAL; TRANSDERMAL at 09:00

## 2021-07-30 RX ADMIN — NAFCILLIN SODIUM 2 G: 2 INJECTION, POWDER, LYOPHILIZED, FOR SOLUTION INTRAMUSCULAR; INTRAVENOUS at 05:48

## 2021-07-30 RX ADMIN — GABAPENTIN 600 MG: 300 CAPSULE ORAL at 21:53

## 2021-07-30 RX ADMIN — ACETAMINOPHEN 650 MG: 325 TABLET, FILM COATED ORAL at 02:13

## 2021-07-30 RX ADMIN — BUPRENORPHINE AND NALOXONE 1 FILM: 8; 2 FILM, SOLUBLE BUCCAL; SUBLINGUAL at 09:00

## 2021-07-30 RX ADMIN — PANTOPRAZOLE SODIUM 40 MG: 40 TABLET, DELAYED RELEASE ORAL at 06:52

## 2021-07-30 RX ADMIN — GABAPENTIN 600 MG: 300 CAPSULE ORAL at 16:20

## 2021-07-30 RX ADMIN — GABAPENTIN 600 MG: 300 CAPSULE ORAL at 08:59

## 2021-07-30 RX ADMIN — QUETIAPINE FUMARATE 25 MG: 25 TABLET ORAL at 16:22

## 2021-07-30 RX ADMIN — Medication 1 TABLET: at 20:44

## 2021-07-30 RX ADMIN — NAFCILLIN SODIUM 2 G: 2 INJECTION, POWDER, LYOPHILIZED, FOR SOLUTION INTRAMUSCULAR; INTRAVENOUS at 14:18

## 2021-07-30 RX ADMIN — ACETAMINOPHEN 650 MG: 325 TABLET, FILM COATED ORAL at 11:13

## 2021-07-30 ASSESSMENT — ACTIVITIES OF DAILY LIVING (ADL)
ADLS_ACUITY_SCORE: 16
ADLS_ACUITY_SCORE: 21
ADLS_ACUITY_SCORE: 16
ADLS_ACUITY_SCORE: 21

## 2021-07-30 NOTE — PLAN OF CARE
Summary: Endocarditis MSSA  DATE & TIME: 7/30/21 8233-2092  Cognitive Concerns/ Orientation : A&Ox4   BEHAVIOR & AGGRESSION TOOL COLOR: Green  ABNL VS/O2: VSS on RA  MOBILITY: Assist of one with walker/gait belt- denies dizziness when OOB hx fall in hospital  PAIN MANAGMENT: Complains of lower back pain, Tylenol PRN. And lido patches  On Suboxone.  DIET: Regular diet, tolerating  BOWEL/BLADDER: Ambulates to bathroom- urge incontinence  ABNL LAB/BG: NA  DRAIN/DEVICES: Midline right arm, intermittent antibiotics.   SKIN: Bruises; black toes, blister on left finger and large blister side of R heel scabbed, Scattered scars. WOC saw patient today and placed orders, toes covered because patient reports picking at escar  TESTS/PROCEDURES: NA   D/C DAY/GOALS/PLACE: pending, will need abx until 8/18/2021 Chem/dep consulted but patient refusing.  OTHER IMPORTANT INFO: Generalized weakness and back pain, PT/OT/SLP/ID following. Showered.  Contact isolation for ESBL.

## 2021-07-30 NOTE — PLAN OF CARE
Cognitive Concerns/ Orientation : A&Ox4   BEHAVIOR & AGGRESSION TOOL COLOR: Green  ABNL VS/O2: VSS on RA  MOBILITY: Assist of one with walker/gait belt- denies dizziness when OOB hx fall in hospital  PAIN MANAGMENT: Complains of lower back pain, Tylenol and Toradol PRN.  On Suboxone.  DIET: Regular diet, tolerating  BOWEL/BLADDER: Ambulates to bathroom- urge incontinence, incontinent x 1   ABNL LAB/BG: NA  DRAIN/DEVICES: Midline right arm, intermittent antibiotics.   SKIN: Bruises; black toes, blister on left finger and large blister side of L heel scabbed, Scattered scars. WOC saw patient today and placed orders, toes covered because patient reports picking at escar  TESTS/PROCEDURES: NA   D/C DAY/GOALS/PLACE: pending, will need abx until 8/18/2021 Chem/dep consulted but patient refusing.  OTHER IMPORTANT INFO: Generalized weakness and back pain, PT/OT/SLP/ID following. Contact isolation for ESBL.

## 2021-07-30 NOTE — PROGRESS NOTES
CLINICAL NUTRITION SERVICES - REASSESSMENT NOTE      Recommendations Ordered by Registered Dietitian (RD):  Updated weight as able    Future/Additional Recommendations:   Continue regular diet   Encourage meals TID + protein intake with wounds   Continue MVI+M for wounds    Malnutrition:   (7/19)   % Weight Loss:  Weight loss does not meet criteria for malnutrition - pt states wt is still elevated from baseline   % Intake:  <75% for > 7 days (non-severe malnutrition) - on average  Subcutaneous Fat Loss:  Orbital region mild depletion and Upper arm region mild depletion  Muscle Loss:  Clavicle bone region mild depletion  Fluid Retention:  Moderate     Malnutrition Diagnosis: Non-Severe malnutrition  In Context of:  Acute illness or injury       EVALUATION OF PROGRESS TOWARD GOALS   Diet:  Regular     Intake/Tolerance:  100% of meals 3-5x/day. Good appetite and intake.     - No updated weight since 7/16. Difficult to assess current wt status - do not suspect any recent loss given adequate PO intakes over the past week.   - Labs and meds reviewed. Getting thera-vit-m daily.   - BM x 1 yesterday, last BM prior was noted on 7/26.       ASSESSED NUTRITION NEEDS:  Dosing Weight:  69.6 kg (adjusted)  Energy Needs:  7317-5149 kcals (25-30 kcal/kg)  Justification: maintenance of current wt    Protein Needs:   grams protein (1.2-1.5 g pro/Kg)  Justification: hypercatabolism with acute illness and preservation of lean body mass      NEW FINDINGS:   Per WOCN 7/30:  Wounds:  Lt great toes, Lt 3rd toe and Lt lateral  Heel  Rt great toe  All wounds r/t to ischemic emboli. No local s/s infection  Lt 3rd toe needs monitoring for ongoing necrosis secondary to ongoing ecchymosis on toe    Previous Goals:   Intake of >75% meals TID.   Evaluation: Met    Previous Nutrition Diagnosis:   No nutrition diagnosis identified at this time  Evaluation: No change      CURRENT NUTRITION DIAGNOSIS  No nutrition diagnosis identified at this  time     INTERVENTIONS  Recommendations / Nutrition Prescription  Continue regular diet   Encourage meals TID + protein intake with wounds   Continue MVI+M for wounds     Implementation  No new nutrition interventions at this time     Goals  Intake of > 75% meals TID       MONITORING AND EVALUATION:  Progress towards goals will be monitored and evaluated per protocol and Practice Guidelines      Winnie Germain RD, LD  Unit RD Pager: [phone]

## 2021-07-31 ENCOUNTER — APPOINTMENT (OUTPATIENT)
Dept: SPEECH THERAPY | Facility: CLINIC | Age: 41
End: 2021-07-31
Payer: MEDICAID

## 2021-07-31 PROCEDURE — 120N000001 HC R&B MED SURG/OB

## 2021-07-31 PROCEDURE — 250N000013 HC RX MED GY IP 250 OP 250 PS 637: Performed by: INTERNAL MEDICINE

## 2021-07-31 PROCEDURE — 99233 SBSQ HOSP IP/OBS HIGH 50: CPT | Performed by: HOSPITALIST

## 2021-07-31 PROCEDURE — 250N000013 HC RX MED GY IP 250 OP 250 PS 637: Performed by: STUDENT IN AN ORGANIZED HEALTH CARE EDUCATION/TRAINING PROGRAM

## 2021-07-31 PROCEDURE — 250N000009 HC RX 250: Performed by: STUDENT IN AN ORGANIZED HEALTH CARE EDUCATION/TRAINING PROGRAM

## 2021-07-31 PROCEDURE — 999N000040 HC STATISTIC CONSULT NO CHARGE VASC ACCESS

## 2021-07-31 PROCEDURE — 250N000013 HC RX MED GY IP 250 OP 250 PS 637: Performed by: HOSPITALIST

## 2021-07-31 PROCEDURE — 92507 TX SP LANG VOICE COMM INDIV: CPT | Mod: GN | Performed by: SPEECH-LANGUAGE PATHOLOGIST

## 2021-07-31 PROCEDURE — 999N000190 HC STATISTIC VAT ROUNDS

## 2021-07-31 PROCEDURE — 99207 PR NO CHARGE LOS: CPT | Performed by: NURSE PRACTITIONER

## 2021-07-31 RX ORDER — BUPRENORPHINE AND NALOXONE 8; 2 MG/1; MG/1
1 FILM, SOLUBLE BUCCAL; SUBLINGUAL 2 TIMES DAILY
Status: DISCONTINUED | OUTPATIENT
Start: 2021-07-31 | End: 2021-07-31

## 2021-07-31 RX ORDER — BUPRENORPHINE AND NALOXONE 8; 2 MG/1; MG/1
1 FILM, SOLUBLE BUCCAL; SUBLINGUAL DAILY
Status: DISCONTINUED | OUTPATIENT
Start: 2021-08-01 | End: 2021-08-02

## 2021-07-31 RX ORDER — BUPRENORPHINE AND NALOXONE 4; 1 MG/1; MG/1
1 FILM, SOLUBLE BUCCAL; SUBLINGUAL DAILY
Status: DISCONTINUED | OUTPATIENT
Start: 2021-07-31 | End: 2021-08-02

## 2021-07-31 RX ADMIN — NAFCILLIN SODIUM 2 G: 2 INJECTION, POWDER, LYOPHILIZED, FOR SOLUTION INTRAMUSCULAR; INTRAVENOUS at 17:22

## 2021-07-31 RX ADMIN — ACETAMINOPHEN 650 MG: 325 TABLET, FILM COATED ORAL at 17:22

## 2021-07-31 RX ADMIN — GABAPENTIN 600 MG: 300 CAPSULE ORAL at 17:22

## 2021-07-31 RX ADMIN — LIDOCAINE 2 PATCH: 560 PATCH PERCUTANEOUS; TOPICAL; TRANSDERMAL at 08:59

## 2021-07-31 RX ADMIN — NAFCILLIN SODIUM 2 G: 2 INJECTION, POWDER, LYOPHILIZED, FOR SOLUTION INTRAMUSCULAR; INTRAVENOUS at 05:01

## 2021-07-31 RX ADMIN — NAFCILLIN SODIUM 2 G: 2 INJECTION, POWDER, LYOPHILIZED, FOR SOLUTION INTRAMUSCULAR; INTRAVENOUS at 21:57

## 2021-07-31 RX ADMIN — ACETAMINOPHEN 650 MG: 325 TABLET, FILM COATED ORAL at 01:06

## 2021-07-31 RX ADMIN — Medication 1 TABLET: at 17:22

## 2021-07-31 RX ADMIN — NAFCILLIN SODIUM 2 G: 2 INJECTION, POWDER, LYOPHILIZED, FOR SOLUTION INTRAMUSCULAR; INTRAVENOUS at 01:04

## 2021-07-31 RX ADMIN — ACETAMINOPHEN 650 MG: 325 TABLET, FILM COATED ORAL at 05:03

## 2021-07-31 RX ADMIN — NAFCILLIN SODIUM 2 G: 2 INJECTION, POWDER, LYOPHILIZED, FOR SOLUTION INTRAMUSCULAR; INTRAVENOUS at 13:17

## 2021-07-31 RX ADMIN — GABAPENTIN 600 MG: 300 CAPSULE ORAL at 08:59

## 2021-07-31 RX ADMIN — NAFCILLIN SODIUM 2 G: 2 INJECTION, POWDER, LYOPHILIZED, FOR SOLUTION INTRAMUSCULAR; INTRAVENOUS at 08:58

## 2021-07-31 RX ADMIN — BUPRENORPHINE HYDROCHLORIDE, NALOXONE HYDROCHLORIDE 1 FILM: 4; 1 FILM, SOLUBLE BUCCAL; SUBLINGUAL at 18:03

## 2021-07-31 RX ADMIN — BUPRENORPHINE AND NALOXONE 1 FILM: 8; 2 FILM, SOLUBLE BUCCAL; SUBLINGUAL at 08:58

## 2021-07-31 RX ADMIN — ACETAMINOPHEN 650 MG: 325 TABLET, FILM COATED ORAL at 21:56

## 2021-07-31 RX ADMIN — ACETAMINOPHEN 650 MG: 325 TABLET, FILM COATED ORAL at 08:58

## 2021-07-31 RX ADMIN — BICTEGRAVIR SODIUM, EMTRICITABINE, AND TENOFOVIR ALAFENAMIDE FUMARATE 1 TABLET: 50; 200; 25 TABLET ORAL at 08:59

## 2021-07-31 RX ADMIN — PANTOPRAZOLE SODIUM 40 MG: 40 TABLET, DELAYED RELEASE ORAL at 05:33

## 2021-07-31 RX ADMIN — QUETIAPINE FUMARATE 25 MG: 25 TABLET ORAL at 20:11

## 2021-07-31 RX ADMIN — GABAPENTIN 600 MG: 300 CAPSULE ORAL at 21:56

## 2021-07-31 RX ADMIN — ACETAMINOPHEN 650 MG: 325 TABLET, FILM COATED ORAL at 13:22

## 2021-07-31 ASSESSMENT — ACTIVITIES OF DAILY LIVING (ADL)
ADLS_ACUITY_SCORE: 23
ADLS_ACUITY_SCORE: 18
ADLS_ACUITY_SCORE: 21
ADLS_ACUITY_SCORE: 23
ADLS_ACUITY_SCORE: 18
ADLS_ACUITY_SCORE: 18

## 2021-07-31 NOTE — PLAN OF CARE
Summary: Endocarditis MSSA  DATE & TIME: 7/30/21 3-11p  Cognitive Concerns/ Orientation : A&Ox4   BEHAVIOR & AGGRESSION TOOL COLOR: Green, tearful, cooperative  ABNL VS/O2: VSS on RA  MOBILITY: Assist of 1/walker/gait belt  PAIN MANAGMENT: Complains of lower back pain, Tylenol PRN, Toradol once given, Suboxone daily  DIET: Regular diet, tolerating well  BOWEL/BLADDER: Ambulates to bathroom- urge incontinence, incontinent at times  ABNL LAB/BG: NA  DRAIN/DEVICES: Midline RFA, intermittent antibiotics.   SKIN: Bruises; black toes, blister on left finger and large blister side of R heel scabbed, Scattered scars. WOC saw patient today and placed orders, toes covered because patient reports picking at escar  TESTS/PROCEDURES: US BLE to be done tonight   D/C DAY/GOALS/PLACE: pending, will need abx until 8/18/2021   OTHER IMPORTANT INFO: RRT called at 1650-pt c/o CP, some SOB with exertion, VSS, afibrile, Sats 100% RA, CT chest/abd done, BMP, trop-negative, D-dimer-2.86. Pt had no further c/o chest pain. PT/OT/SLP/ID following.  Contact isolation for ESBL.

## 2021-07-31 NOTE — PLAN OF CARE
Cognitive Concerns/ Orientation : Alert and oriented x 4   BEHAVIOR & AGGRESSION TOOL COLOR: Green  ABNL VS/O2: VSS on RA  MOBILITY: Assist of 1/walker/gait belt  PAIN MANAGMENT: Complains of lower back and leg pain, Tylenol PRN, Suboxone daily  DIET: Regular diet, tolerating well  BOWEL/BLADDER: Ambulates to bathroom- urge incontinence, incontinent at times  ABNL LAB/BG: D-dimer 2.86 Hemoglobin 9.1  DRAIN/DEVICES: Midline RFA, intermittent antibiotics.   SKIN: Bruises; black toes, blister on left finger and large blister side of R heel scabbed, Scattered scars. WOC saw patient Friday and placed orders, toes covered because patient reports picking at escar  TESTS/PROCEDURES: US BLE completed at midnight   D/C DAY/GOALS/PLACE: pending, will need abx until 8/18/2021   OTHER IMPORTANT INFO: RRT called at 1650 on Friday-pt c/o CP, some SOB with exertion, VSS, afibrile, Sats 100% RA, CT chest/abd done, BMP, trop-negative. Pt had no further c/o chest pain. PT/OT/SLP/ID following.  Contact isolation for ESBL.

## 2021-07-31 NOTE — PLAN OF CARE
Speech Language Therapy Discharge Summary    Reason for therapy discharge:    All goals and outcomes met, no further needs identified.    Progress towards therapy goal(s). See goals on Care Plan in [facility] electronic health record for goal details.  Goals met    Therapy recommendation(s):    No further therapy is recommended.  Pt alert in recliner. Reviewed initial SLP evaluation and CVA. Pt reported that speech/language/cognitive function have returned to her baseline. Pt reported cognitive deficits from TBI several years ago. Reviewed memory strategies. Pt reported that her partner helps her remember any important appts etc. Pt denied need for any further SLP services. OT addressing cognition/safety awareness. Will complete IP SLP orders.

## 2021-07-31 NOTE — PROGRESS NOTES
"[facility]    Hospitalist Progress Note      Assessment & Plan   Manas Hernandez is a 41 year old female with h/o untreated HIV, hepatitis C who was admitted on 7/1/2021 for MSSA mitral valve endocarditis resulting in septic shock complicated by embolic left parietal lobe infarctions and multiple peripheral emboli.    She presented initially with encephalopathy, and shock.  She was intubated in ED on 7/1/2021 subsequently extubated 7/11/2021.  Blood cultures turned positive on hospital day 1 with MSSA.  Urine culture was positive for Klebsiella.  She was treated initially with ceftriaxone and vancomycin, and has since been narrowed to nafcillin per ID.     MSSA mitral valve endocarditis - suspect due to IV drug use  Septic shock, resolved  Probable myopericarditis  Small pericardial effusion  *JAE 7/5 noted with small ASD, mod to large mobile vegetation (14mm long, 6mm wide) of the P2 segment of the mitral leaflet. No valve perforation; mod MR  *blood cultures on admission with MSSA; blood cultures have been negative since 7/6.  Afebrile since 7/13.  Leukocytosis resolved  *ID following; antibiotic has been narrowed down to Nafcillin, 6 weeks of antibiotic, completion date 8/18/2021.  *limited ECHO repeated 7/15 to reassess pericardial fluid 7/15 per cardiology and noted:  organized effusion posteriorly, measuring 1.2-1.5cm, no evidence of tamponade; small mobile vegetation noted attached to the mitral leaflet, mod to severe MR; compared to 7/8/21, effusion size is probably stable, Degree of MR is worse, Vegetation is smaller.  Cardiology signed off 7/15.   *re-evaluated by CT surgery 7/15 and suggested continued antibiotic therapy at this time; per CT surgery \"Were she to become a surgical candidate, we would recommend waiting at least 4 weeks from her stroke and she would need repeat MRI imaging to rule out mycotic aneurysms or brain abscesses\"; will have follow up with CT surgery as " outpatient  *Abx briefly interrupted as she had been pulling out her access sites; midline placed on 7/13/21-- pulled out again on 7/16-- PICC was re-ordered (after she pulled out 2 previous ones), however vascular access team has suggested holding off on re-insertion until right before patient discharges to facility due to difficult access.  Patient is not delirious anymore and per , likely will have to complete her antibiotic course here in hospital as no accepting TCU so far and unlikely to find one.  PICC ordered on 7/26 as she agrees not to pull it out and having a difficult time with access.   -for now continue IV nafcillin in hospital with plans to complete entire course here through 8/18  -will need follow up with cardiothoracic surgery as an outpatient  -appreciate ongoing ID support    IV drug use  Polysubstance use disorder  *Chem dep consulted 7/15 but chemical dependency did not feel she is appropriate for CD assessment at this time and recommended consultation towards the end of her antibiotic course.  Reconsult CD once she is close to finishing her IV antibiotic.  *Was using IV dilaudid as well as Percocet p.o. earlier this stay pretty extensively.  Attempts made to wean off IV narcotics and taper down po narcotics though patient felt she was going through withdrawal. Psych consulted and recommended suboxone though patient declined. Psych recommended rapid taper of methadone instead.   *completed methadone taper of 30 mg on 7/21, 20 mg 7/22 and then 10 mg 7/23, then stop  -no further narcotics   -started suboxone on 7/27 - appreciate psych/addiction medicine assistance who will help with titration   - patient notes today that she has withdrawal type symptoms that start at around 4pm each day. Will add a second dose for now and plan to consult psychiatry on Monday to follow up.  -psych also mentioned outpatient methadone maintenances as a possibility  -Reconsult CD once she is close to  finishing her IV antibiotic.     HIV, untreated PTA  - CD4 count greater than 500, viral load 3695  - Started Biktarvy 7/8; ID following    Chronic back pain: pt notes it present ever since she was pregnant.   -avoiding narcotics per psych  -tylenol  -added lidoderm patch 7/23  -toradol prn  -increase gabapentin to 600 mg TID     Toxic/septic/metabolic encephalopathy, acute, improving  Likely encephalopathy due to sepsis, drug use, CVA, prolonged ICU stay; had been pulling lines due to alterned mentation thought this all now appears resolved.   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  Completed course of treatment with ceftriaxone.  repeat urine cultures 7/10 with ESBL E coli but given unimpressive UA and no UTI symptoms ID suggest holding off on abx for ESBL UTI  -monitor for any symptoms     Acute hypoxemic respiratory failure, resolved  Intubated 7/1/2021.  Extubated 7/11/2021.  Likely related to septic shock from MSSA mitral valve endocarditis     FORD, resolved suspected due to sepsis  Hypokalemia  - on potassium replacement protocol  - will check BMP periodically     Chronic anemia  Baseline hemoglobin approximately 7.  Likely related to chronic disease, untreated HIV.  -Monitor hemoglobin periodically; stable around 9     Non severe malnutrition  Nutrition following. Appreciate assistance  -Encourage p.o., with supplements     Prior hepatitis C infection  Antibody positive but RNA  negative  -Noted    Restless legs: unclear if this is true restless leg syndrome or due to being uncomfortable  -continue mirapex prn    Bilateral toe wounds: 2/2 thrombi. wound care following. Appreciate support.    Suspected major depression: pt is not suicidal but also doesn't seem to have much of a will to live based upon discussion 7/25.  I wonder if her underlying chronic pain which is difficult to treat as well as being in the hospital is contributing. Was seen by psych and declined any medication treatment on 7/26.   -monitor and consider starting treatment if she changes her mind    DVT Prophylaxis: heparin  Code Status: Full Code    Disposition: Expected discharge once IV abx are complete after 8/18 as having a difficult time finding placement in TCU.   -patient has threatened to leave AMA a couple times, most recently on 7/25 but able to be talked into staying.  At this point I do think that due to likely depression, lack of insight from her drug addiction, possible cognitive impairment due to stroke, lack of plan for outpatient IV abx and outpatient treatment of her addiction she is at high risk of harm to self and if she tries to leave AMA would place a 72 hour hold. Please see discussion in hospitalist note from 7/25 and psych note 7/26.     Time Spent on this Encounter   I spent >35 minutes on the unit/floor managing the care of this patient. Over 50% of my time was spent on the following:   - Counseling the patient and/or family regarding: diagnostic results, prognosis, risks and benefits of treatment options, recommended follow-up and medical compliance  - Coordination of care with the: nurse and patient. RRT team in regard to incident yesterday evening.      Deepti Rivera MD  2:34 PM      Interval History   Patient appeared more comfortable and less anxious today. She was coloring in some books. Very cooperative with conversation and exam. Mood less labile. I spoke with RRT NP yesterday evening  and followed up notes and studies this morning. Discussed all the findings with patient today. Her CP resolved and no SOB. She does mention that she has pain and other withdrawal type symptoms that reliably start around 4pm each day. She feels like the suboxone helps but then wears off at that time. She is understanding that the docs who initially prescribed the suboxone may not be here on the weekend. She's not sure whether the symptoms she had yesterday were related to withdrawal but the timing was similar.    -Data reviewed today: I reviewed all new labs and imaging results over the last 24 hours. I personally reviewed no images or EKG's today.    Physical Exam   Temp: 98.2  F (36.8  C) Temp src: Oral BP: 121/67 Pulse: 65   Resp: 18 SpO2: 97 % O2 Device: None (Room air)    Vitals:    07/13/21 0200 07/14/21 0445 07/16/21 0615   Weight: 89 kg (196 lb 3.4 oz) 84.2 kg (185 lb 10 oz) 76.8 kg (169 lb 5 oz)     Vital Signs with Ranges  Temp:  [98.2  F (36.8  C)-98.8  F (37.1  C)] 98.2  F (36.8  C)  Pulse:  [] 65  Resp:  [16-18] 18  BP: (115-132)/(67-88) 121/67  SpO2:  [97 %-100 %] 97 %  I/O last 3 completed shifts:  In: 980 [P.O.:780; I.V.:200]  Out: -     Constitutional: sitting in chair, appears comfortable    Respiratory: Clear to auscultation bilaterally, no crackles or wheezing noted.  Good air exchange.     Cardiovascular: Regular rate and rhythm.  No murmur, rub or gallop noted.     GI: Positive bowel sounds, soft, non-distended, non-tender.  No masses or organomegaly noted.     Musculoskeletal: moving all extremities     Neurologic: Awake, alert and oriented to name, place and time.  Cranial nerves II-XII are grossly intact.      Lymphatic: No edema noted    Skin: no new rash    Psych: somewhat depressed and distant at times    Medications       bictegravir-emtricitabine-tenofovir  1 tablet Oral Daily     buprenorphine HCl-naloxone HCl  1 Film Sublingual Daily     gabapentin  600 mg Oral TID     lidocaine   2-3 patch Transdermal Q24H     lidocaine   Transdermal Q8H     multivitamin w/minerals  1 tablet Oral Daily     nafcillin  2 g Intravenous Q4H     pantoprazole  40 mg Oral QAM AC     sodium chloride (PF)  10 mL Intracatheter Q8H       Data   Recent Labs   Lab 07/30/21  1856 07/30/21  1736 07/28/21  0611 07/28/21  0204 07/26/21  1020 07/25/21  1017   WBC 8.4  --   --   --   --   --    HGB 9.1*  --   --   --   --   --    MCV 86  --   --   --   --   --      --  372  --   --  447     --   --   --  140  --    POTASSIUM 4.0  --   --   --  4.6  --    CHLORIDE 107  --   --   --  114*  --    CO2 28  --   --   --  25  --    BUN 17  --   --   --  16  --    CR 0.63  --   --   --  0.49*  --    ANIONGAP 2*  --   --   --  1*  --    DEREK 8.1*  --   --   --  8.4*  --    GLC 85 76  --  111* 73  --    ALBUMIN 2.1*  --   --   --   --   --    PROTTOTAL 8.4  --   --   --   --   --    BILITOTAL 0.6  --   --   --   --   --    ALKPHOS 117  --   --   --   --   --    ALT 19  --   --   --   --   --    AST 17  --   --   --   --   --    TROPONIN <0.015  --   --   --   --   --        Recent Results (from the past 24 hour(s))   CT Chest (PE) Abdomen Pelvis w Contrast    Narrative    CT CHEST PE ABDOMEN AND PELVIS WITH CONTRAST 7/30/2021 7:31 PM    CLINICAL HISTORY: Respiratory failure. Septic shock. Infective  endocarditis. Back pain.    TECHNIQUE: CT angiogram chest and routine CT abdomen pelvis with IV  contrast. Arterial phase through the chest and venous phase through  the abdomen and pelvis. 2D and 3D MIP reconstructions were preformed  by the CT technologist. Dose reduction techniques were used.   CONTRAST: 85mL Isovue-370  COMPARISON: None.    FINDINGS:  ANGIOGRAM CHEST: There is suboptimal opacification of the pulmonary  arteries, however, the central pulmonary arteries are moderately well  opacified, and no pulmonary emboli are identified. Thoracic aorta is  negative for dissection. There is cardiac enlargement.    LUNGS AND  PLEURA: No pleural effusions. There is mild atelectasis at  both lung bases posteriorly, greater on the left.    MEDIASTINUM/AXILLAE: No enlarged lymph nodes are identified in the  chest. Small amount of pericardial fluid is noted.    CORONARY ARTERY CALCIFICATION: None.    HEPATOBILIARY: The gallbladder appears contracted. No hepatic masses  are seen.    PANCREAS: Normal.    SPLEEN: Spleen size upper limits of normal. The spleen has decreased  slightly in size since the previous exam. The spleen measures  approximately 13.7 cm in length on today's exam. No focal splenic  lesions are seen.    ADRENAL GLANDS: Normal.    KIDNEYS/BLADDER: Patchy hypoenhancement in the mid and upper poles of  the right kidney are again noted. The left kidney is unremarkable. No  hydronephrosis.    BOWEL: No bowel obstruction. Scattered colonic diverticulosis. No  convincing evidence for colitis or diverticulitis. Unremarkable  appendix.    PELVIC ORGANS: Unremarkable.    LYMPH NODES: No enlarged lymph nodes are identified in the abdomen or  pelvis.    VASCULATURE: Unremarkable.    ADDITIONAL FINDINGS: Moderate-sized fat-containing paraumbilical  hernia, unchanged.    MUSCULOSKELETAL: Degenerative changes in the lower lumbar spine and  sacroiliac joints.      Impression    IMPRESSION:  1.  There is suboptimal opacification of the pulmonary arteries,  however the central pulmonary arteries are moderately well opacified,  and no large central pulmonary emboli are seen.  2.  Patchy hypoenhancement in the mid and upper poles of the right  kidney are again noted, and could again represent pyelonephritis,  although areas of evolving renal infarction could also possibly have  this appearance.  3.  Moderate-sized fat-containing paraumbilical hernia.    JEANETTE AMAYA MD         SYSTEM ID:  [id number]   US Lower Extremity Venous Duplex Bilateral    Narrative    EXAM: US LOWER EXTREMITY VENOUS DUPLEX BILATERAL  LOCATION: [facility]  DATE/TIME: 7/30/2021 11:38 PM    INDICATION: positive d-dimer, unable to complete CT chest for PE (due to IV access)  COMPARISON: None.  TECHNIQUE: Venous Duplex ultrasound of bilateral lower extremities with and without compression, augmentation and duplex. Color flow and spectral Doppler with waveform analysis performed.    FINDINGS: Exam includes the common femoral, femoral, popliteal veins as well as segmentally visualized deep calf veins and greater saphenous vein.     RIGHT: No deep vein thrombosis. No superficial thrombophlebitis. No popliteal cyst.    LEFT: No deep vein thrombosis. No superficial thrombophlebitis. No popliteal cyst.      Impression    IMPRESSION:  1.  No deep venous thrombosis in the bilateral lower extremities.

## 2021-07-31 NOTE — PROGRESS NOTES
BRIEF HOUSE OFFICER NOTE:    I was asked by my colleague, Rachelle Arndt to follow up on imaging and lab studies from RRT earlier in the evening. Unfortunately, there was an issue with the IV contrast administration via the midline and an additional IV was unable to be obtained thus the contrast load was not optimal to assess for PE. The radiologist does note central pulmonary arteries are moderately wellopacified, and no pulmonary emboli are identified.   Due to the inability to properly complete the study a D-dimer was ordered which came back elevated at 2.86. Bilateral lower extremity ultrasounds ordered to assess for DVT which were negative.   CT abdomen and pelvis does indicate patchy hypoenhancement in the mid and upper poles of the right kidney which was present on CT imaging completed on 7/1/2021.   No acute concerning lab abnormalities.  No further testing/intervention at this time.    Elyse Olivas, STEFANIE CNP  Text Page

## 2021-07-31 NOTE — PLAN OF CARE
Summary: Endocarditis MSSA  DATE & TIME: 7/31/2021   Cognitive Concerns/ Orientation : Alert and oriented x 4   BEHAVIOR & AGGRESSION TOOL COLOR: Green  ABNL VS/O2: VSS on RA  MOBILITY: Assist of SBA/walker/gait belt  PAIN MANAGMENT: Complains of lower back and left knee pain, Tylenol PRN, Suboxone daily  DIET: Regular diet, tolerating well  BOWEL/BLADDER: Ambulates to bathroom- urgency at times but continent.  BM today.  ABNL LAB/BG: D-dimer 2.86 Hemoglobin 9.1  DRAIN/DEVICES: Midline RFA, intermittent antibiotics.   SKIN: Bruises; black toes with dressings due to pt picking at eschar, and blister side of R heel, Scattered scars.  TESTS/PROCEDURES: US BLE completed at midnight -negative  D/C DAY/GOALS/PLACE: pending, will need abx until 8/18/2021   OTHER IMPORTANT INFO: RRT chest pain yesterday-no complaints today.  Ambulated in [place] x3.  Added afternoon Suboxone dose today, will be BID.  Contact isolation for ESBL.

## 2021-08-01 ENCOUNTER — APPOINTMENT (OUTPATIENT)
Dept: GENERAL RADIOLOGY | Facility: CLINIC | Age: 41
End: 2021-08-01
Attending: HOSPITALIST
Payer: MEDICAID

## 2021-08-01 LAB — GLUCOSE BLDC GLUCOMTR-MCNC: 145 MG/DL (ref 70–99)

## 2021-08-01 PROCEDURE — 120N000001 HC R&B MED SURG/OB

## 2021-08-01 PROCEDURE — 250N000009 HC RX 250: Performed by: STUDENT IN AN ORGANIZED HEALTH CARE EDUCATION/TRAINING PROGRAM

## 2021-08-01 PROCEDURE — 250N000013 HC RX MED GY IP 250 OP 250 PS 637: Performed by: STUDENT IN AN ORGANIZED HEALTH CARE EDUCATION/TRAINING PROGRAM

## 2021-08-01 PROCEDURE — 250N000013 HC RX MED GY IP 250 OP 250 PS 637: Performed by: HOSPITALIST

## 2021-08-01 PROCEDURE — 250N000013 HC RX MED GY IP 250 OP 250 PS 637: Performed by: INTERNAL MEDICINE

## 2021-08-01 PROCEDURE — 999N000190 HC STATISTIC VAT ROUNDS

## 2021-08-01 PROCEDURE — 73560 X-RAY EXAM OF KNEE 1 OR 2: CPT | Mod: LT

## 2021-08-01 PROCEDURE — 99233 SBSQ HOSP IP/OBS HIGH 50: CPT | Performed by: HOSPITALIST

## 2021-08-01 RX ORDER — SERTRALINE HYDROCHLORIDE 25 MG/1
25 TABLET, FILM COATED ORAL EVERY EVENING
Status: DISCONTINUED | OUTPATIENT
Start: 2021-08-01 | End: 2021-08-03

## 2021-08-01 RX ADMIN — GABAPENTIN 600 MG: 300 CAPSULE ORAL at 08:56

## 2021-08-01 RX ADMIN — NAFCILLIN SODIUM 2 G: 2 INJECTION, POWDER, LYOPHILIZED, FOR SOLUTION INTRAMUSCULAR; INTRAVENOUS at 10:04

## 2021-08-01 RX ADMIN — NAFCILLIN SODIUM 2 G: 2 INJECTION, POWDER, LYOPHILIZED, FOR SOLUTION INTRAMUSCULAR; INTRAVENOUS at 21:37

## 2021-08-01 RX ADMIN — BUPRENORPHINE HYDROCHLORIDE, NALOXONE HYDROCHLORIDE 1 FILM: 4; 1 FILM, SOLUBLE BUCCAL; SUBLINGUAL at 16:07

## 2021-08-01 RX ADMIN — ACETAMINOPHEN 650 MG: 325 TABLET, FILM COATED ORAL at 05:51

## 2021-08-01 RX ADMIN — NAFCILLIN SODIUM 2 G: 2 INJECTION, POWDER, LYOPHILIZED, FOR SOLUTION INTRAMUSCULAR; INTRAVENOUS at 14:19

## 2021-08-01 RX ADMIN — ACETAMINOPHEN 650 MG: 325 TABLET, FILM COATED ORAL at 18:26

## 2021-08-01 RX ADMIN — ACETAMINOPHEN 650 MG: 325 TABLET, FILM COATED ORAL at 14:18

## 2021-08-01 RX ADMIN — QUETIAPINE FUMARATE 25 MG: 25 TABLET ORAL at 21:37

## 2021-08-01 RX ADMIN — GABAPENTIN 600 MG: 300 CAPSULE ORAL at 16:07

## 2021-08-01 RX ADMIN — ACETAMINOPHEN 650 MG: 325 TABLET, FILM COATED ORAL at 10:09

## 2021-08-01 RX ADMIN — Medication 1 TABLET: at 18:26

## 2021-08-01 RX ADMIN — LIDOCAINE 2 PATCH: 560 PATCH PERCUTANEOUS; TOPICAL; TRANSDERMAL at 08:56

## 2021-08-01 RX ADMIN — PANTOPRAZOLE SODIUM 40 MG: 40 TABLET, DELAYED RELEASE ORAL at 06:38

## 2021-08-01 RX ADMIN — BICTEGRAVIR SODIUM, EMTRICITABINE, AND TENOFOVIR ALAFENAMIDE FUMARATE 1 TABLET: 50; 200; 25 TABLET ORAL at 08:56

## 2021-08-01 RX ADMIN — NAFCILLIN SODIUM 2 G: 2 INJECTION, POWDER, LYOPHILIZED, FOR SOLUTION INTRAMUSCULAR; INTRAVENOUS at 05:49

## 2021-08-01 RX ADMIN — NAFCILLIN SODIUM 2 G: 2 INJECTION, POWDER, LYOPHILIZED, FOR SOLUTION INTRAMUSCULAR; INTRAVENOUS at 01:51

## 2021-08-01 RX ADMIN — GABAPENTIN 600 MG: 300 CAPSULE ORAL at 21:37

## 2021-08-01 RX ADMIN — SERTRALINE HYDROCHLORIDE 25 MG: 25 TABLET ORAL at 21:37

## 2021-08-01 RX ADMIN — NAFCILLIN SODIUM 2 G: 2 INJECTION, POWDER, LYOPHILIZED, FOR SOLUTION INTRAMUSCULAR; INTRAVENOUS at 18:26

## 2021-08-01 RX ADMIN — BUPRENORPHINE AND NALOXONE 1 FILM: 8; 2 FILM, SOLUBLE BUCCAL; SUBLINGUAL at 08:56

## 2021-08-01 ASSESSMENT — ACTIVITIES OF DAILY LIVING (ADL)
ADLS_ACUITY_SCORE: 18

## 2021-08-01 NOTE — PLAN OF CARE
Summary: Endocarditis MSSA  DATE & TIME: 8/1/21   Cognitive Concerns/ Orientation :A/Ox4  BEHAVIOR & AGGRESSION TOOL COLOR: Green, calm/cooperative tonight  ABNL VS/O2: VSS on RA  MOBILITY: Assist of SBA/walker/gait belt  PAIN MANAGMENT: Complains left knee pain-Xray done, Tylenol x 3 given, Suboxone bid as scheduled  DIET: Regular diet, tolerating well  BOWEL/BLADDER: Ambulates to bathroom.  ABNL LAB/BG: D-dimer 2.86 Hemoglobin 9.1  DRAIN/DEVICES: Midline RFA, intermittent antibiotics. Midline sluggish but working  SKIN: Bruises; black toes with dressings due to pt picking at eschar, and blister side of R heel, Scattered scars.  TESTS/PROCEDURES: none  D/C DAY/GOALS/PLACE: pending, will need abx until 8/18/2021   OTHER IMPORTANT INFO: Tearful at times, cooperative.  Showered.  Generalized pain. IV Abx as scheduled, Ortho consult placed for left knee.  Contact isolation for ESBL.

## 2021-08-01 NOTE — PROGRESS NOTES
"[facility]    Hospitalist Progress Note      Assessment & Plan   Manas Hernandez is a 41 year old female with h/o untreated HIV, hepatitis C who was admitted on 7/1/2021 for MSSA mitral valve endocarditis resulting in septic shock complicated by embolic left parietal lobe infarctions and multiple peripheral emboli.    She presented initially with encephalopathy, and shock.  She was intubated in ED on 7/1/2021 subsequently extubated 7/11/2021.  Blood cultures turned positive on hospital day 1 with MSSA.  Urine culture was positive for Klebsiella.  She was treated initially with ceftriaxone and vancomycin, and has since been narrowed to nafcillin per ID.     MSSA mitral valve endocarditis  Septic shock, resolved  Probable myopericarditis  Small pericardial effusion  *JAE 7/5 noted with small ASD, mod to large mobile vegetation (14mm long, 6mm wide) of the P2 segment of the mitral leaflet. No valve perforation; mod MR  *blood cultures on admission with MSSA; blood cultures have been negative since 7/6.  Afebrile since 7/13.  Leukocytosis resolved  *ID following; antibiotic has been narrowed down to Nafcillin, 6 weeks of antibiotic, completion date 8/18/2021.  *limited ECHO repeated 7/15 to reassess pericardial fluid 7/15 per cardiology and noted:  organized effusion posteriorly, measuring 1.2-1.5cm, no evidence of tamponade; small mobile vegetation noted attached to the mitral leaflet, mod to severe MR; compared to 7/8/21, effusion size is probably stable, Degree of MR is worse, Vegetation is smaller.  Cardiology signed off 7/15.   *re-evaluated by CT surgery 7/15 and suggested continued antibiotic therapy at this time; per CT surgery \"Were she to become a surgical candidate, we would recommend waiting at least 4 weeks from her stroke and she would need repeat MRI imaging to rule out mycotic aneurysms or brain abscesses\"; will have follow up with CT surgery as outpatient  *Abx briefly " interrupted as she had been pulling out her access sites; midline placed on 7/13/21-- pulled out again on 7/16-- PICC was re-ordered (after she pulled out 2 previous ones), however vascular access team has suggested holding off on re-insertion until right before patient discharges to facility due to difficult access.  Patient is not delirious anymore and per , likely will have to complete her antibiotic course here in hospital as no accepting TCU so far and unlikely to find one.  PICC ordered on 7/26 as she agrees not to pull it out and having a difficult time with access.   -for now continue IV nafcillin in hospital with plans to complete entire course here through 8/18  -will need follow up with cardiothoracic surgery as an outpatient  -appreciate ongoing ID support    Left knee pain: pt reports history of pain in that knee with activity but pain now is more acute. ROM okay. There is some warmth and swelling there.   - In light of the MSSA bacteremia, will get XR and call ortho if XR abnormal.    IV drug use  Polysubstance use disorder  *Chem dep consulted 7/15 but chemical dependency did not feel she is appropriate for CD assessment at this time and recommended consultation towards the end of her antibiotic course.  Reconsult CD once she is close to finishing her IV antibiotic.  *Was using IV dilaudid as well as Percocet p.o. earlier this stay pretty extensively.  Attempts made to wean off IV narcotics and taper down po narcotics though patient felt she was going through withdrawal. Psych consulted and recommended suboxone though patient declined. Psych recommended rapid taper of methadone instead.   *completed methadone taper of 30 mg on 7/21, 20 mg 7/22 and then 10 mg 7/23, then stop  -no further narcotics   -started suboxone on 7/27 - appreciate psych/addiction medicine assistance who will help with titration   - patient notes today that she has withdrawal type symptoms that start at around 4pm  each day. Will add a second dose (at half dose) for now and plan to consult psychiatry on Monday to follow up.  -psych also mentioned outpatient methadone maintenances as a possibility  -Reconsult CD once she is close to finishing her IV antibiotic.     HIV, untreated PTA  - CD4 count greater than 500, viral load 3695  - Started Biktarvy 7/8; ID following    Chronic back pain: pt notes it present ever since she was pregnant.   -avoiding narcotics per psych  -tylenol  -added lidoderm patch 7/23  -toradol prn  -increase gabapentin to 600 mg TID     Toxic/septic/metabolic encephalopathy, acute, improving  Likely encephalopathy due to sepsis, drug use, CVA, prolonged ICU stay; had been pulling lines due to alterned mentation thought this all now appears resolved.   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  Completed course of treatment with ceftriaxone.  repeat urine cultures 7/10 with ESBL E coli but given unimpressive UA and no UTI symptoms ID suggest holding off on abx for ESBL UTI  -monitor for any symptoms     Acute hypoxemic respiratory failure, resolved  Intubated 7/1/2021.  Extubated 7/11/2021.  Likely related to septic shock from MSSA mitral valve endocarditis     FORD, resolved suspected due to sepsis  Hypokalemia  - on potassium replacement protocol  - will check BMP periodically     Chronic anemia  Baseline hemoglobin approximately 7.  Likely related to chronic disease, untreated  HIV.  -Monitor hemoglobin periodically; stable around 9     Non severe malnutrition  Nutrition following. Appreciate assistance  -Encourage p.o., with supplements     Prior hepatitis C infection  Antibody positive but RNA negative  -Noted    Restless legs: unclear if this is true restless leg syndrome or due to being uncomfortable  -continue mirapex prn    Bilateral toe wounds: 2/2 thrombi. wound care following. Appreciate support.    Major depressive disorder: pt is not suicidal but also doesn't seem to have much of a will to live based upon discussion 7/25.  I wonder if her underlying chronic pain which is difficult to treat as well as being in the hospital is contributing. Was seen by psych and declined any medication treatment on 7/26.   -patient was on zoloft for years starting at age 13. She is willing to try zoloft again. Will start tonight at 25mg po and titrate up to 50mg within a week if no issues.    DVT Prophylaxis: heparin  Code Status: Full Code    Disposition: Expected discharge once IV abx are complete after 8/18 as having a difficult time finding placement in TCU.   -patient has threatened to leave AMA a couple times, most recently on 7/25 but able to be talked into staying.  At this point I do think that due to likely depression, lack of insight from her drug addiction, possible cognitive impairment due to stroke, lack of plan for outpatient IV abx and outpatient treatment of her addiction she is at high risk of harm to self and if she tries to leave AMA would place a 72 hour hold. Please see discussion in hospitalist note from 7/25 and psych note 7/26.     Time Spent on this Encounter   I spent >35 minutes on the unit/floor managing the care of this patient. Over 50% of my time was spent on the following:   - Counseling the patient and/or family regarding: diagnostic results, prognosis, risks and benefits of treatment options, recommended follow-up and medical compliance  - Coordination of care  "with the: nurse and patient.      Deepti Rivera MD  2:34 PM      Interval History   Patient seems down today. Tearful. We have been talking each day and I did address depression more today and patient talked about previous history of anti depressant use. Feels it was helpful for her. She started zoloft at age 13 or 14 and took it for a number of years. She is comfortable with me starting that here. We talked about her L knee pain which she mentioned to RN yesterday when they were ambulating. It is more acute in last few days. No major change in ROM. Otherwise she just \"hurts all over\" today. She feels the second dose of suboxone was helpful yesterday and she slept better. She was more withdrawn again today but cooperative and pleasant.    -Data reviewed today: I reviewed all new labs and imaging results over the last 24 hours. I personally reviewed no images or EKG's today.    Physical Exam   Temp: 98.7  F (37.1  C) Temp src: Oral BP: 121/78 Pulse: 90   Resp: 16 SpO2: 97 % O2 Device: None (Room air)    Vitals:    07/13/21 0200 07/14/21 0445 07/16/21 0615   Weight: 89 kg (196 lb 3.4 oz) 84.2 kg (185 lb 10 oz) 76.8 kg (169 lb 5 oz)     Vital Signs with Ranges  Temp:  [98.2  F (36.8  C)-98.7  F (37.1  C)] 98.7  F (37.1  C)  Pulse:  [90-94] 90  Resp:  [16] 16  BP: (119-121)/(78-82) 121/78  SpO2:  [97 %-100 %] 97 %  I/O last 3 completed shifts:  In: 500 [P.O.:500]  Out: -     Constitutional: sitting in chair, appears comfortable    Respiratory: Clear to auscultation bilaterally, no crackles or wheezing noted.  Good air exchange.     Cardiovascular: Regular rate and rhythm.  No murmur, rub or gallop noted.     GI: Positive bowel sounds, soft, non-distended, non-tender.  No masses or organomegaly noted.     Musculoskeletal: moving all extremities     Neurologic: Awake, alert and oriented to name, place and time.  Cranial nerves II-XII are grossly intact.      Lymphatic: No edema noted    Skin: no new rash    Psych: " somewhat depressed and distant at times    Medications       bictegravir-emtricitabine-tenofovir  1 tablet Oral Daily     buprenorphine HCl-naloxone HCl  1 Film Sublingual Daily     buprenorphine HCl-naloxone HCl  1 Film Sublingual Daily     gabapentin  600 mg Oral TID     lidocaine  2-3 patch Transdermal Q24H     lidocaine   Transdermal Q8H     multivitamin w/minerals  1 tablet Oral Daily     nafcillin  2 g Intravenous Q4H     pantoprazole  40 mg Oral QAM AC     sertraline  25 mg Oral QPM     sodium chloride (PF)  10 mL Intracatheter Q8H       Data   Recent Labs   Lab 08/01/21  1145 07/30/21  1856 07/30/21  1736 07/28/21  0611 07/26/21  1020   WBC  --  8.4  --   --   --    HGB  --  9.1*  --   --   --    MCV  --  86  --   --   --    PLT  --  370  --  372  --    NA  --  137  --   --  140   POTASSIUM  --  4.0  --   --  4.6   CHLORIDE  --  107  --   --  114*   CO2  --  28  --   --  25   BUN  --  17  --   --  16   CR  --  0.63  --   --  0.49*   ANIONGAP  --  2*  --   --  1*   DEREK  --  8.1*  --   --  8.4*   * 85 76  --  73   ALBUMIN  --  2.1*  --   --   --    PROTTOTAL  --  8.4  --   --   --    BILITOTAL  --  0.6  --   --   --    ALKPHOS  --  117  --   --   --    ALT  --  19  --   --   --    AST  --  17  --   --   --    TROPONIN  --  <0.015  --   --   --        No results found for this or any previous visit (from the past 24 hour(s)).

## 2021-08-01 NOTE — PLAN OF CARE
Summary: Endocarditis MSSA  DATE & TIME: 7/31/2021 7p-7a  Cognitive Concerns/ Orientation :A/Ox4  BEHAVIOR & AGGRESSION TOOL COLOR: Green, calm/cooperative tonight  ABNL VS/O2: VSS on RA  MOBILITY: Assist of SBA/walker/gait belt  PAIN MANAGMENT: Complains left knee pain-Tylenol x 2 given, Suboxone bid as scheduled  DIET: Regular diet, tolerating well  BOWEL/BLADDER: Ambulates to bathroom- urgency at times but continent. No BM today.  ABNL LAB/BG: D-dimer 2.86 Hemoglobin 9.1  DRAIN/DEVICES: Midline RFA, intermittent antibiotics.   SKIN: Bruises; black toes with dressings due to pt picking at eschar, and blister side of R heel, Scattered scars.  TESTS/PROCEDURES: none  D/C DAY/GOALS/PLACE: pending, will need abx until 8/18/2021   OTHER IMPORTANT INFO: Pt slept throughout the shift, no c/o CP/SOB, up to BR x 2, continent this shift, IV Abx as scheduled, Contact isolation for ESBL.

## 2021-08-02 PROCEDURE — 250N000013 HC RX MED GY IP 250 OP 250 PS 637: Performed by: STUDENT IN AN ORGANIZED HEALTH CARE EDUCATION/TRAINING PROGRAM

## 2021-08-02 PROCEDURE — 120N000001 HC R&B MED SURG/OB

## 2021-08-02 PROCEDURE — 99232 SBSQ HOSP IP/OBS MODERATE 35: CPT | Mod: 95 | Performed by: INTERNAL MEDICINE

## 2021-08-02 PROCEDURE — 99233 SBSQ HOSP IP/OBS HIGH 50: CPT | Performed by: HOSPITALIST

## 2021-08-02 PROCEDURE — 272N000450 HC KIT 4FR POWER PICC SINGLE LUMEN

## 2021-08-02 PROCEDURE — L1832 KO ADJ JNT POS R SUP PRE CST: HCPCS

## 2021-08-02 PROCEDURE — 250N000009 HC RX 250: Performed by: STUDENT IN AN ORGANIZED HEALTH CARE EDUCATION/TRAINING PROGRAM

## 2021-08-02 PROCEDURE — 250N000013 HC RX MED GY IP 250 OP 250 PS 637: Performed by: INTERNAL MEDICINE

## 2021-08-02 PROCEDURE — 999N000190 HC STATISTIC VAT ROUNDS

## 2021-08-02 PROCEDURE — 36569 INSJ PICC 5 YR+ W/O IMAGING: CPT

## 2021-08-02 PROCEDURE — 250N000009 HC RX 250: Performed by: HOSPITALIST

## 2021-08-02 PROCEDURE — 999N000040 HC STATISTIC CONSULT NO CHARGE VASC ACCESS

## 2021-08-02 PROCEDURE — 250N000013 HC RX MED GY IP 250 OP 250 PS 637: Performed by: HOSPITALIST

## 2021-08-02 RX ORDER — BUPRENORPHINE AND NALOXONE 8; 2 MG/1; MG/1
1 FILM, SOLUBLE BUCCAL; SUBLINGUAL 2 TIMES DAILY
Status: DISCONTINUED | OUTPATIENT
Start: 2021-08-02 | End: 2021-08-12

## 2021-08-02 RX ADMIN — LIDOCAINE 2 PATCH: 560 PATCH PERCUTANEOUS; TOPICAL; TRANSDERMAL at 09:50

## 2021-08-02 RX ADMIN — BICTEGRAVIR SODIUM, EMTRICITABINE, AND TENOFOVIR ALAFENAMIDE FUMARATE 1 TABLET: 50; 200; 25 TABLET ORAL at 09:49

## 2021-08-02 RX ADMIN — ACETAMINOPHEN 650 MG: 325 TABLET, FILM COATED ORAL at 22:53

## 2021-08-02 RX ADMIN — ACETAMINOPHEN 650 MG: 325 TABLET, FILM COATED ORAL at 09:49

## 2021-08-02 RX ADMIN — BUPRENORPHINE AND NALOXONE 1 FILM: 8; 2 FILM, SOLUBLE BUCCAL; SUBLINGUAL at 09:49

## 2021-08-02 RX ADMIN — Medication 1 TABLET: at 18:19

## 2021-08-02 RX ADMIN — SERTRALINE HYDROCHLORIDE 25 MG: 25 TABLET ORAL at 21:07

## 2021-08-02 RX ADMIN — GABAPENTIN 600 MG: 300 CAPSULE ORAL at 09:49

## 2021-08-02 RX ADMIN — LIDOCAINE HYDROCHLORIDE ANHYDROUS 2 ML: 10 INJECTION, SOLUTION INFILTRATION at 13:32

## 2021-08-02 RX ADMIN — ACETAMINOPHEN 650 MG: 325 TABLET, FILM COATED ORAL at 01:30

## 2021-08-02 RX ADMIN — NAFCILLIN SODIUM 2 G: 2 INJECTION, POWDER, LYOPHILIZED, FOR SOLUTION INTRAMUSCULAR; INTRAVENOUS at 09:53

## 2021-08-02 RX ADMIN — PANTOPRAZOLE SODIUM 40 MG: 40 TABLET, DELAYED RELEASE ORAL at 09:49

## 2021-08-02 RX ADMIN — BUPRENORPHINE HYDROCHLORIDE, NALOXONE HYDROCHLORIDE 1 FILM: 4; 1 FILM, SOLUBLE BUCCAL; SUBLINGUAL at 16:28

## 2021-08-02 RX ADMIN — GABAPENTIN 600 MG: 300 CAPSULE ORAL at 22:53

## 2021-08-02 RX ADMIN — NAFCILLIN SODIUM 2 G: 2 INJECTION, POWDER, LYOPHILIZED, FOR SOLUTION INTRAMUSCULAR; INTRAVENOUS at 21:13

## 2021-08-02 RX ADMIN — GABAPENTIN 600 MG: 300 CAPSULE ORAL at 16:29

## 2021-08-02 RX ADMIN — NAFCILLIN SODIUM 2 G: 2 INJECTION, POWDER, LYOPHILIZED, FOR SOLUTION INTRAMUSCULAR; INTRAVENOUS at 16:29

## 2021-08-02 RX ADMIN — ACETAMINOPHEN 650 MG: 325 TABLET, FILM COATED ORAL at 16:41

## 2021-08-02 RX ADMIN — NAFCILLIN SODIUM 2 G: 2 INJECTION, POWDER, LYOPHILIZED, FOR SOLUTION INTRAMUSCULAR; INTRAVENOUS at 05:12

## 2021-08-02 RX ADMIN — NAFCILLIN SODIUM 2 G: 2 INJECTION, POWDER, LYOPHILIZED, FOR SOLUTION INTRAMUSCULAR; INTRAVENOUS at 01:30

## 2021-08-02 ASSESSMENT — ACTIVITIES OF DAILY LIVING (ADL)
ADLS_ACUITY_SCORE: 18

## 2021-08-02 NOTE — PLAN OF CARE
Summary: Endocarditis MSSA  DATE & TIME: 8/1/21 7p-7a  Cognitive Concerns/ Orientation :A/Ox4  BEHAVIOR & AGGRESSION TOOL COLOR: Green, calm/cooperative tonight  ABNL VS/O2: VSS on RA  MOBILITY: Assist of SBA/walker/gait belt  PAIN MANAGMENT: Complains left knee pain-Xray done, Tylenol x 1 given, Suboxone bid as scheduled  DIET: Regular diet, tolerating well  BOWEL/BLADDER: Ambulates to bathroom.  ABNL LAB/BG: na  DRAIN/DEVICES: Midline RFA, intermittent antibiotics. Midline sluggish but working  SKIN: Bruises; black toes with dressings due to pt picking at eschar, and blister side of R heel, Scattered scars.  TESTS/PROCEDURES: none  D/C DAY/GOALS/PLACE: pending, will need abx until 8/18/2021   OTHER IMPORTANT INFO: Tearful at times, cooperative, c/o  generalized pain-medicated with Tylenol. IV Abx as scheduled, Ortho consult placed for left knee, Contact isolation for ESBL.
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
## 2021-08-02 NOTE — PROCEDURES
[facility]    Single Lumen PICC Placement    Date/Time: 8/2/2021 1:26 PM  Performed by: Loree Estevez RN  Authorized by: Deepti Rivera MD   Indications: vascular access    UNIVERSAL PROTOCOL   Site Marked: Yes  Prior Images Obtained and Reviewed:  Yes  Required items: Required blood products, implants, devices and special equipment available    Patient identity confirmed:  Verbally with patient, arm band and hospital-assigned identification number  NA - No sedation, light sedation, or local anesthesia  Confirmation Checklist:  Patient's identity using two indicators, relevant allergies, procedure was appropriate and matched the consent or emergent situation and correct equipment/implants were available  Time out: Immediately prior to the procedure a time out was called    Universal Protocol: the Joint Commission Universal Protocol was followed    Preparation: Patient was prepped and draped in usual sterile fashion           ANESTHESIA    Anesthesia: Local infiltration      SEDATION    Patient Sedated: No        Skin prep agent: skin prep agent completely dried prior to procedure  Sterile barriers: maximum sterile barriers were used: cap, mask, sterile gown, sterile gloves, and large sterile sheet  Hand hygiene: hand hygiene performed prior to central venous catheter insertion  Catheter type: single lumen  Lumen type: valved  Catheter size: 4 Fr  Brand: eTobb  Lot number: [id number]  Placement method: MST  Number of attempts: 1  Successful placement: yes  Orientation: left  Location: cephalic vein  Arm circumference: adults 10 cm  Extremity circumference: 29  Visible catheter length: 4  Internal length: 40 cm  Total catheter length: 44  Dressing and securement: blood cleaned with CHG, blood removed, chlorhexidine disc applied, occlusive dressing applied and securement device  PROCEDURE   Patient Tolerance:  Patient tolerated the procedure well with no immediate complications and patient  tolerated the procedure well with no immediate complications  Describe Procedure: PICCV Placed ANDRIA without difficuilty.

## 2021-08-02 NOTE — PLAN OF CARE
[id number]: Patient alert and oriented, VSS on room air. Complains of generalized pain, tylenol given x1. Up assist of 1, GB, walker. Knee brace ordered by Ortho today. PICC line placed today. Plan to continue IV abx plan, discharge pending.

## 2021-08-02 NOTE — PROGRESS NOTES
Subjective: We received an order on [employer] for a hinged knee brace on 632-01    Objective/G:  Fit TSCOPE     Assessment:  I fit the TSCOPE to the Pt. L leg and trimmed the straps to the correct length.  I adjusted the brace to the correct height.  The brace was set to allow full ROM.  The brace fits the Pt. L leg adequate. I explained to the patient that the brace will secure her L knee.  I gave the patient the instruction manual.    Plan:  I have donned the brace and left it on the Pt leg.  Pt. was informed if any questions comments or concerns they are to contact us.    David BAHENA

## 2021-08-02 NOTE — PROVIDER NOTIFICATION
Notified Person: Flying Waggoner    Notified Person Name: Dakota    Notification Date/Time: 8/2/21 1810    Notification Interaction: Patient PICC is dripping blood onto floor. PICC Nurse busy in ICU. Can you come redress/check? Thanks!    Orders Received:    Comments:

## 2021-08-02 NOTE — PROGRESS NOTES
"[facility]    Hospitalist Progress Note      Assessment & Plan   Manas Hernandez is a 41 year old female with h/o untreated HIV, hepatitis C who was admitted on 7/1/2021 for MSSA mitral valve endocarditis resulting in septic shock complicated by embolic left parietal lobe infarctions and multiple peripheral emboli.    She presented initially with encephalopathy, and shock.  She was intubated in ED on 7/1/2021 subsequently extubated 7/11/2021.  Blood cultures turned positive on hospital day 1 with MSSA.  Urine culture was positive for Klebsiella.  She was treated initially with ceftriaxone and vancomycin, and has since been narrowed to nafcillin per ID.     MSSA mitral valve endocarditis  Septic shock, resolved  Probable myopericarditis  Small pericardial effusion  *JAE 7/5 noted with small ASD, mod to large mobile vegetation (14mm long, 6mm wide) of the P2 segment of the mitral leaflet. No valve perforation; mod MR  *blood cultures on admission with MSSA; blood cultures have been negative since 7/6.  Afebrile since 7/13.  Leukocytosis resolved  *ID following; antibiotic has been narrowed down to Nafcillin, 6 weeks of antibiotic, completion date 8/18/2021.  *limited ECHO repeated 7/15 to reassess pericardial fluid 7/15 per cardiology and noted:  organized effusion posteriorly, measuring 1.2-1.5cm, no evidence of tamponade; small mobile vegetation noted attached to the mitral leaflet, mod to severe MR; compared to 7/8/21, effusion size is probably stable, Degree of MR is worse, Vegetation is smaller.  Cardiology signed off 7/15.   *re-evaluated by CT surgery 7/15 and suggested continued antibiotic therapy at this time; per CT surgery \"Were she to become a surgical candidate, we would recommend waiting at least 4 weeks from her stroke and she would need repeat MRI imaging to rule out mycotic aneurysms or brain abscesses\"; will have follow up with CT surgery as outpatient  *Abx briefly " interrupted as she had been pulling out her access sites; midline placed on 7/13/21-- pulled out again on 7/16-- PICC was re-ordered (after she pulled out 2 previous ones), however vascular access team has suggested holding off on re-insertion until right before patient discharges to facility due to difficult access.  Patient is not delirious anymore and per , likely will have to complete her antibiotic course here in hospital as no accepting TCU so far and unlikely to find one.  PICC ordered on 7/26 as she agrees not to pull it out and having a difficult time with access.   -for now continue IV nafcillin in hospital with plans to complete entire course here through 8/18  -will need follow up with cardiothoracic surgery as an outpatient  -appreciate ongoing ID support  -PICC placed today due to ongoing issues with midline    Left knee pain: pt reports history of pain in that knee with activity but pain now is more acute. ROM okay. There is some warmth and swelling there.   - In light of the MSSA bacteremia, got XR and called ortho for effusion  - Per ortho no indication for intervention, though they will order a brace. Appreciate assistance    IV drug use  Polysubstance use disorder  *Chem dep consulted 7/15 but chemical dependency did not feel she is appropriate for CD assessment at this time and recommended consultation towards the end of her antibiotic course.  Reconsult CD once she is close to finishing her IV antibiotic.  *Was using IV dilaudid as well as Percocet p.o. earlier this stay pretty extensively.  Attempts made to wean off IV narcotics and taper down po narcotics though patient felt she was going through withdrawal. Psych consulted and recommended suboxone though patient declined. Psych recommended rapid taper of methadone instead.   *completed methadone taper of 30 mg on 7/21, 20 mg 7/22 and then 10 mg 7/23, then stop  -no further narcotics   -started suboxone on 7/27 - appreciate  psych/addiction medicine assistance who will help with titration   - suboxone was adjusted again today. Pt is happy with plan.  -psych also mentioned outpatient methadone maintenances as a possibility  -Reconsult CD once she is close to finishing her IV antibiotic.     HIV, untreated PTA  - CD4 count greater than 500, viral load 3695  - Started Biktarvy 7/8; ID following    Chronic back pain: pt notes it present ever since she was pregnant.   -avoiding narcotics per psych  -tylenol  -added lidoderm patch 7/23  -toradol prn  -increase gabapentin to 600 mg TID     Toxic/septic/metabolic encephalopathy, acute, improving  Likely encephalopathy due to sepsis, drug use, CVA, prolonged ICU stay; had been pulling lines due to alterned mentation thought this all now appears resolved.   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  Completed course of treatment with ceftriaxone.  repeat urine cultures 7/10 with ESBL E coli but given unimpressive UA and no UTI symptoms ID suggest holding off on abx for ESBL UTI  -monitor for any symptoms     Acute hypoxemic respiratory failure, resolved  Intubated 7/1/2021.  Extubated 7/11/2021.  Likely related to septic shock from MSSA mitral valve endocarditis     FORD, resolved suspected due to sepsis  Hypokalemia  - on potassium replacement protocol  - will check BMP periodically     Chronic anemia  Baseline hemoglobin approximately 7.  Likely related to chronic disease,  untreated HIV.  -Monitor hemoglobin periodically; stable around 9     Non severe malnutrition  Nutrition following. Appreciate assistance  -Encourage p.o., with supplements     Prior hepatitis C infection  Antibody positive but RNA negative  -Noted    Restless legs: unclear if this is true restless leg syndrome or due to being uncomfortable  -continue mirapex prn    Necrotic wounds to Lt great toes, Lt 3rd toe and Lt lateral Heel, Rt great toe: 2/2 thrombi. wound care following. Appreciate support.  - monitor closely. No evidence of active local infection. If things change, consider podiatry consult.     Major depressive disorder: pt is not suicidal but also doesn't seem to have much of a will to live based upon discussion 7/25.  I wonder if her underlying chronic pain which is difficult to treat as well as being in the hospital is contributing. Was seen by psych and declined any medication treatment on 7/26.   -patient was on zoloft for years starting at age 13. She is willing to try zoloft again. Started 8/1 at 25mg po and titrate up to 50mg within a week if no issues.    DVT Prophylaxis: heparin  Code Status: Full Code    Disposition: Expected discharge once IV abx are complete after 8/18 as having a difficult time finding placement in TCU.   -patient has threatened to leave AMA a couple times, most recently on 7/25 but able to be talked into staying.  At this point I do think that due to likely depression, lack of insight from her drug addiction, possible cognitive impairment due to stroke, lack of plan for outpatient IV abx and outpatient treatment of her addiction she is at high risk of harm to self and if she tries to leave AMA would place a 72 hour hold. Please see discussion in hospitalist note from 7/25 and psych note 7/26.     Time Spent on this Encounter   I spent >35 minutes on the unit/floor managing the care of this patient. Over 50% of my time was spent on the following:   - Counseling the patient  and/or family regarding: diagnostic results, prognosis, risks and benefits of treatment options, recommended follow-up and medical compliance  - Coordination of care with the: nurse and patient. Review of consultant notes      Deepti Rivera MD  08/02/21  4:01 PM      Interval History   Patient pleasant and cooperative today. She was able to meet with ortho and addiction med. She is on board with continuing the zoloft. No acute complaints and has really opened up more over the course of my time working with her.    -Data reviewed today: I reviewed all new labs and imaging results over the last 24 hours. I personally reviewed no images or EKG's today.    Physical Exam   Temp: 99.2  F (37.3  C) Temp src: Oral BP: 112/67 Pulse: 98   Resp: 18 SpO2: 96 % O2 Device: None (Room air)    Vitals:    07/13/21 0200 07/14/21 0445 07/16/21 0615   Weight: 89 kg (196 lb 3.4 oz) 84.2 kg (185 lb 10 oz) 76.8 kg (169 lb 5 oz)     Vital Signs with Ranges  Temp:  [98.1  F (36.7  C)-99.2  F (37.3  C)] 99.2  F (37.3  C)  Pulse:  [] 98  Resp:  [18-19] 18  BP: (112-123)/(67-81) 112/67  SpO2:  [96 %-100 %] 96 %  I/O last 3 completed shifts:  In: 120 [P.O.:120]  Out: -     Constitutional: sitting in chair, appears comfortable    Respiratory: Clear to auscultation bilaterally, no crackles or wheezing noted.  Good air exchange.     Cardiovascular: Regular rate and rhythm.  No murmur, rub or gallop noted.     GI: Positive bowel sounds, soft, non-distended, non-tender.  No masses or organomegaly noted.     Musculoskeletal: moving all extremities     Neurologic: Awake, alert and oriented to name, place and time.  Cranial nerves II-XII are grossly intact.      Lymphatic: No edema noted    Skin: no new rash    Psych: somewhat depressed and distant at times    Medications       bictegravir-emtricitabine-tenofovir  1 tablet Oral Daily     buprenorphine HCl-naloxone HCl  1 Film Sublingual Daily     buprenorphine HCl-naloxone HCl  1 Film  Sublingual Daily     gabapentin  600 mg Oral TID     lidocaine  2-3 patch Transdermal Q24H     lidocaine   Transdermal Q8H     multivitamin w/minerals  1 tablet Oral Daily     nafcillin  2 g Intravenous Q4H     pantoprazole  40 mg Oral QAM AC     sertraline  25 mg Oral QPM     sodium chloride (PF)  10 mL Intracatheter Q8H       Data   Recent Labs   Lab 08/01/21  1145 07/30/21  1856 07/30/21  1736 07/28/21  0611 07/26/21  1020   WBC  --  8.4  --   --   --    HGB  --  9.1*  --   --   --    MCV  --  86  --   --   --    PLT  --  370  --  372  --    NA  --  137  --   --  140   POTASSIUM  --  4.0  --   --  4.6   CHLORIDE  --  107  --   --  114*   CO2  --  28  --   --  25   BUN  --  17  --   --  16   CR  --  0.63  --   --  0.49*   ANIONGAP  --  2*  --   --  1*   DEREK  --  8.1*  --   --  8.4*   * 85 76  --  73   ALBUMIN  --  2.1*  --   --   --    PROTTOTAL  --  8.4  --   --   --    BILITOTAL  --  0.6  --   --   --    ALKPHOS  --  117  --   --   --    ALT  --  19  --   --   --    AST  --  17  --   --   --    TROPONIN  --  <0.015  --   --   --        Recent Results (from the past 24 hour(s))   XR Knee Left 1/2 Views    Narrative    EXAM: XR KNEE LEFT 1/2 VIEWS  LOCATION: [facility]  DATE/TIME: 8/1/2021 1:46 PM    INDICATION: Knee pain and swelling, bacteremia.  COMPARISON: None.      Impression    IMPRESSION:     1.  Moderate knee joint effusion, nonspecific for etiology. If there is clinical concern for infection, would recommend arthrocentesis.    2.  Moderate-severe tricompartmental degenerative arthritis, greatest in the lateral and patellofemoral compartments. No fracture. Normal joint alignment.

## 2021-08-02 NOTE — PROVIDER NOTIFICATION
Text page to Dr. Rivera    Pt's midline infiltrated, IV team wondering if you want PICC since they keep going bad? Or do you want another midline. Thank you!

## 2021-08-02 NOTE — PROVIDER NOTIFICATION
Notified Person: PICC    Notified Person Name: PICC IV nurse    Notification Date/Time: 8/2/21 1806    Notification Interaction: Patient's PICC is dripping blood. Please come check. Thanks!    Orders Received: Instructed to call flying erazo as louis is in ICU attending

## 2021-08-03 ENCOUNTER — APPOINTMENT (OUTPATIENT)
Dept: OCCUPATIONAL THERAPY | Facility: CLINIC | Age: 41
End: 2021-08-03
Payer: MEDICAID

## 2021-08-03 PROBLEM — Z59.00 HOMELESS: Status: ACTIVE | Noted: 2021-08-03

## 2021-08-03 PROBLEM — G93.41 SEPTIC ENCEPHALOPATHY: Status: ACTIVE | Noted: 2021-08-03

## 2021-08-03 PROBLEM — B20 HUMAN IMMUNODEFICIENCY VIRUS (HIV) DISEASE (H): Status: ACTIVE | Noted: 2021-08-03

## 2021-08-03 PROBLEM — R65.21 SEPTIC SHOCK (H): Status: ACTIVE | Noted: 2021-08-03

## 2021-08-03 PROBLEM — F19.10 POLYSUBSTANCE ABUSE (H): Status: ACTIVE | Noted: 2021-08-03

## 2021-08-03 PROBLEM — A41.9 SEPTIC SHOCK (H): Status: ACTIVE | Noted: 2021-08-03

## 2021-08-03 PROBLEM — I63.9 STROKE, EMBOLIC (H): Status: ACTIVE | Noted: 2021-08-03

## 2021-08-03 LAB
ATRIAL RATE - MUSE: 100 BPM
DIASTOLIC BLOOD PRESSURE - MUSE: NORMAL MMHG
INTERPRETATION ECG - MUSE: NORMAL
P AXIS - MUSE: 28 DEGREES
PR INTERVAL - MUSE: 202 MS
QRS DURATION - MUSE: 88 MS
QT - MUSE: 340 MS
QTC - MUSE: 438 MS
R AXIS - MUSE: -14 DEGREES
SYSTOLIC BLOOD PRESSURE - MUSE: NORMAL MMHG
T AXIS - MUSE: -4 DEGREES
VENTRICULAR RATE- MUSE: 100 BPM

## 2021-08-03 PROCEDURE — 99232 SBSQ HOSP IP/OBS MODERATE 35: CPT | Performed by: PSYCHIATRY & NEUROLOGY

## 2021-08-03 PROCEDURE — 250N000013 HC RX MED GY IP 250 OP 250 PS 637: Performed by: PSYCHIATRY & NEUROLOGY

## 2021-08-03 PROCEDURE — 250N000013 HC RX MED GY IP 250 OP 250 PS 637: Performed by: INTERNAL MEDICINE

## 2021-08-03 PROCEDURE — 99232 SBSQ HOSP IP/OBS MODERATE 35: CPT | Performed by: INTERNAL MEDICINE

## 2021-08-03 PROCEDURE — 250N000013 HC RX MED GY IP 250 OP 250 PS 637: Performed by: STUDENT IN AN ORGANIZED HEALTH CARE EDUCATION/TRAINING PROGRAM

## 2021-08-03 PROCEDURE — 120N000001 HC R&B MED SURG/OB

## 2021-08-03 PROCEDURE — 250N000013 HC RX MED GY IP 250 OP 250 PS 637: Performed by: HOSPITALIST

## 2021-08-03 PROCEDURE — 97535 SELF CARE MNGMENT TRAINING: CPT | Mod: GO

## 2021-08-03 PROCEDURE — 250N000009 HC RX 250: Performed by: STUDENT IN AN ORGANIZED HEALTH CARE EDUCATION/TRAINING PROGRAM

## 2021-08-03 RX ORDER — HYDROXYZINE HYDROCHLORIDE 25 MG/1
25 TABLET, FILM COATED ORAL EVERY 6 HOURS PRN
Status: DISCONTINUED | OUTPATIENT
Start: 2021-08-03 | End: 2021-09-01 | Stop reason: HOSPADM

## 2021-08-03 RX ORDER — SERTRALINE HYDROCHLORIDE 25 MG/1
25 TABLET, FILM COATED ORAL DAILY
Status: DISCONTINUED | OUTPATIENT
Start: 2021-08-04 | End: 2021-08-03

## 2021-08-03 RX ORDER — SERTRALINE HYDROCHLORIDE 25 MG/1
25 TABLET, FILM COATED ORAL DAILY
Status: DISCONTINUED | OUTPATIENT
Start: 2021-08-03 | End: 2021-08-05

## 2021-08-03 RX ORDER — ACETAMINOPHEN 325 MG/1
650 TABLET ORAL EVERY 6 HOURS PRN
Status: DISCONTINUED | OUTPATIENT
Start: 2021-08-03 | End: 2021-08-07

## 2021-08-03 RX ADMIN — NAFCILLIN SODIUM 2 G: 2 INJECTION, POWDER, LYOPHILIZED, FOR SOLUTION INTRAMUSCULAR; INTRAVENOUS at 04:12

## 2021-08-03 RX ADMIN — BUPRENORPHINE AND NALOXONE 1 FILM: 8; 2 FILM, SOLUBLE BUCCAL; SUBLINGUAL at 08:17

## 2021-08-03 RX ADMIN — PANTOPRAZOLE SODIUM 40 MG: 40 TABLET, DELAYED RELEASE ORAL at 06:21

## 2021-08-03 RX ADMIN — BUPRENORPHINE AND NALOXONE 1 FILM: 8; 2 FILM, SOLUBLE BUCCAL; SUBLINGUAL at 16:25

## 2021-08-03 RX ADMIN — NAFCILLIN SODIUM 2 G: 2 INJECTION, POWDER, LYOPHILIZED, FOR SOLUTION INTRAMUSCULAR; INTRAVENOUS at 08:16

## 2021-08-03 RX ADMIN — NAFCILLIN SODIUM 2 G: 2 INJECTION, POWDER, LYOPHILIZED, FOR SOLUTION INTRAMUSCULAR; INTRAVENOUS at 20:51

## 2021-08-03 RX ADMIN — NAFCILLIN SODIUM 2 G: 2 INJECTION, POWDER, LYOPHILIZED, FOR SOLUTION INTRAMUSCULAR; INTRAVENOUS at 00:42

## 2021-08-03 RX ADMIN — ACETAMINOPHEN 650 MG: 325 TABLET, FILM COATED ORAL at 11:51

## 2021-08-03 RX ADMIN — GABAPENTIN 600 MG: 300 CAPSULE ORAL at 22:08

## 2021-08-03 RX ADMIN — ACETAMINOPHEN 650 MG: 325 TABLET, FILM COATED ORAL at 04:12

## 2021-08-03 RX ADMIN — BICTEGRAVIR SODIUM, EMTRICITABINE, AND TENOFOVIR ALAFENAMIDE FUMARATE 1 TABLET: 50; 200; 25 TABLET ORAL at 08:17

## 2021-08-03 RX ADMIN — ACETAMINOPHEN 650 MG: 325 TABLET, FILM COATED ORAL at 08:17

## 2021-08-03 RX ADMIN — GABAPENTIN 600 MG: 300 CAPSULE ORAL at 08:17

## 2021-08-03 RX ADMIN — ACETAMINOPHEN 650 MG: 325 TABLET, FILM COATED ORAL at 21:01

## 2021-08-03 RX ADMIN — NAFCILLIN SODIUM 2 G: 2 INJECTION, POWDER, LYOPHILIZED, FOR SOLUTION INTRAMUSCULAR; INTRAVENOUS at 11:51

## 2021-08-03 RX ADMIN — LIDOCAINE 2 PATCH: 560 PATCH PERCUTANEOUS; TOPICAL; TRANSDERMAL at 08:17

## 2021-08-03 RX ADMIN — NAFCILLIN SODIUM 2 G: 2 INJECTION, POWDER, LYOPHILIZED, FOR SOLUTION INTRAMUSCULAR; INTRAVENOUS at 16:25

## 2021-08-03 RX ADMIN — Medication 1 TABLET: at 18:02

## 2021-08-03 RX ADMIN — SERTRALINE HYDROCHLORIDE 25 MG: 25 TABLET ORAL at 21:01

## 2021-08-03 RX ADMIN — GABAPENTIN 600 MG: 300 CAPSULE ORAL at 16:25

## 2021-08-03 ASSESSMENT — ACTIVITIES OF DAILY LIVING (ADL)
ADLS_ACUITY_SCORE: 18
ADLS_ACUITY_SCORE: 16
ADLS_ACUITY_SCORE: 18

## 2021-08-03 NOTE — PLAN OF CARE
Summary: Endocarditis MSSA  DATE & TIME: 8/2/21 1665-3468  Cognitive Concerns/ Orientation : A&Ox4, calm  BEHAVIOR & AGGRESSION TOOL COLOR: Green  ABNL VS/O2: VSS on RA  MOBILITY: SBA with walker, ambulated in halls x1  PAIN MANAGMENT: C/O generalized pain PRN Tylenol x1 (ortho brace for left knee)  DIET: Regular diet, tolerating well  BOWEL/BLADDER: Incontinent at times, ambulates to bathroom  ABNL LAB/BG: N/A  DRAIN/DEVICES: R PICC patent with int abx  SKIN: Bruises; black toes with dressings due to pt picking at eschar, and blister side of R heel, Scattered scars.  TESTS/PROCEDURES: N/A  D/C DAY/GOALS/PLACE: Pending, will need abx until 8/18/2021   OTHER IMPORTANT INFO:

## 2021-08-03 NOTE — CONSULTS
[facility] Psychiatric Consult Progress Note    Interval History:   Pt seen, chart reviewed, case discussed with nursing staff and treating clinicians.  Patient has been started on Suboxone and is tolerating well. This morning, reports she is feeling better. Mood and anxiety improving. Sleeping better. Still feeling tired and not overall that well, but confirms she feels she is improving. She denies side effects with sertraline or the suboxone. She feels depression still is bothersome so hopeful about increasing her antidepressant dose. She denies SI. Denies psychosis symptoms. Appetite improving.     Dr. Noriega from addiction medicine saw the patient yesterday via telehealth and noted improvement, and suggested continuing the Suboxone as presently dosed another 5 days to allow her to equilibrate to the dose.      Review of systems:   10 point Review of Systems completed by Dr. Kapadia, and is  is negative other than noted in the HPI     Medications:       bictegravir-emtricitabine-tenofovir  1 tablet Oral Daily     buprenorphine HCl-naloxone HCl  1 Film Sublingual BID     gabapentin  600 mg Oral TID     lidocaine  2-3 patch Transdermal Q24H     lidocaine   Transdermal Q8H     multivitamin w/minerals  1 tablet Oral Daily     nafcillin  2 g Intravenous Q4H     pantoprazole  40 mg Oral QAM AC     sertraline  25 mg Oral QPM     sodium chloride (PF)  10 mL Intracatheter Q8H     sodium chloride (PF)  10-40 mL Intracatheter Q7 Days     acetaminophen, albuterol, glucose **OR** dextrose **OR** glucagon, hydrOXYzine, lidocaine 4%, lidocaine (buffered or not buffered), lidocaine (buffered or not buffered), loperamide, naloxone **OR** naloxone **OR** naloxone **OR** naloxone, QUEtiapine, rOPINIRole, sodium chloride (PF), sodium chloride (PF), sodium chloride (PF), sodium chloride (PF), sodium chloride (PF)    Mental Status Examination:     Appearance:  awake, alert, much improved since last week in terms of  looking more relaxed, at ease  Eye Contact:  good  Speech:  clear, coherent , less mumbling  Language:Normal  Psychomotor Behavior:  no evidence of tardive dyskinesia, dystonia, or tics  Mood:  better  Affect:  intensity is blunted  Thought Process:  logical, linear and goal oriented no loose associations  Thought Content:  no evidence of suicidal ideation or homicidal ideation  Oriented to:  time, person, and place  Attention Span and Concentration:  intact  Recent and Remote Memory:  intact  Fund of Knowledge: appropriate  Muscle Strength and Tone: normal  Gait and Station: Normal  Insight:  good  Judgment:  fair        Labs/Vitals:   No results found for this or any previous visit (from the past 24 hour(s)).  B/P: 110/71, T: 98.3, P: 82, R: 16    Impression:   Patient with severe opioid use disorder, untreated HIV, being treated for endocarditis with IV antibiotics. Addiction psychiatrist started her on Suboxone and she appears much better overall this week compared to last. Still feeling quite depressed but recently started sertraline 25 mg daily. She is willing to continue this and would like to advance the dose of the antidepressant when able.       DIagnoses:   1.  Opiate use disorder, severe.  2. Unspecified depressive disorder  3  Multiple complications related to IV drug use including endocarditis, septic emboli.  4.  Untreated HIV.         Plan:   1. Switch Sertraline to AM dosing and will increase to 50 mg after 7 days at the 25 mg daily dosing  2. Continue Suboxone per addiction psychiatry recommendations to continue present dosing total of 5 days to allow her to equilibrate to the dose. Dr. Anthony will be available tomorrow (Wednesday) if needed in house addiction psychiatry if further recommendations are needed this week or next Monday. I can also see her Friday and consult with Dr. Anthony if needed.   3. Reconsult psychiatry as needed      Attestation:  Patient has been seen and evaluated by  me,  Franck Kapadia MD

## 2021-08-03 NOTE — PLAN OF CARE
Summary: Endocarditis MSSA  DATE & TIME: 8/2/21 9995-0197  Cognitive Concerns/ Orientation : A&Ox4, calm  BEHAVIOR & AGGRESSION TOOL COLOR: Green  ABNL VS/O2: VSS on RA  MOBILITY: SBA with walker, ambulated in room x2  PAIN MANAGMENT: C/O generalized pain PRN Tylenol x1 (ortho brace for left knee)  DIET: Regular diet, tolerating well  BOWEL/BLADDER: continent at times, ambulates to bathroom  ABNL LAB/BG: N/A  DRAIN/DEVICES: R PICC patent with int abx  SKIN: Bruises; black toes with dressings due to pt picking at eschar, and blister side of R heel, Scattered scars.  TESTS/PROCEDURES: N/A  D/C DAY/GOALS/PLACE: Pending, will need abx until 8/18/2021   OTHER IMPORTANT INFO:

## 2021-08-03 NOTE — PROGRESS NOTES
Addiction Medicine Follow Up    Tele-Visit Details    Type of service:  Video Visit    Time Service Began (time 1st connected with pt):1330    Time Service Ended (time completely finished with pt): 1338    Originating Location (pt. Location): Patient Rm, [facility]    Distant Location (provider location): [facility]    Reason for Televisit: COVID 19    Mode of Communication:  Video Conference via [employer]    Physician has received verbal consent for a video visit from the patient? Yes        Principal Problem:    Endocarditis due to methicillin susceptible Staphylococcus aureus (MSSA)  Active Problems:    Pyelonephritis, acute    Altered mental status, unspecified altered mental status type      Subjective  Chief complaint: continued opiate withdrawal    HPI: Patient was on 8 mg daily but still having opiate withdrawal late in day.  Hospitalist increased Suboxone to 8 mg in am and 4 mg pm on 7/31.   Patient reports this has helped but still having restlessness and sweats during the night.      Patient with MSSA endocarditis.  On Nafcillin through 8/18/21.  Has mod. MR regurgitation, as well as embolic CVA and peripheral emboli - due to PFO.        .  Medication List Choices:   Current Facility-Administered Medications   Medication     acetaminophen (TYLENOL) tablet 650 mg     albuterol (PROVENTIL) neb solution 2.5 mg     bictegravir-emtricitabine-tenofovir (BIKTARVY) -25 MG per tablet 1 tablet     buprenorphine HCl-naloxone HCl (SUBOXONE) 8-2 MG per film 1 Film     glucose gel 15-30 g    Or     dextrose 50 % injection 25-50 mL    Or     glucagon injection 1 mg     gabapentin (NEURONTIN) capsule 600 mg     Lidocaine (LIDOCARE) 4 % Patch 2-3 patch     lidocaine (LMX4) cream     lidocaine 1 % 0.1-1 mL     lidocaine 1 % 0.1-5 mL     lidocaine patch in PLACE     loperamide (IMODIUM) liquid 2 mg     multivitamin w/minerals (THERA-VIT-M) tablet 1 tablet     nafcillin IV 2  "g vial to attach to  ml bag     naloxone (NARCAN) injection 0.2 mg    Or     naloxone (NARCAN) injection 0.4 mg    Or     naloxone (NARCAN) injection 0.2 mg    Or     naloxone (NARCAN) injection 0.4 mg     pantoprazole (PROTONIX) EC tablet 40 mg     QUEtiapine (SEROquel) tablet 25 mg     rOPINIRole (REQUIP) tablet 0.25 mg     sertraline (ZOLOFT) tablet 25 mg     sodium chloride (PF) 0.9% PF flush 10 mL     sodium chloride (PF) 0.9% PF flush 10-20 mL     sodium chloride (PF) 0.9% PF flush 10-20 mL     sodium chloride (PF) 0.9% PF flush 10-20 mL     sodium chloride (PF) 0.9% PF flush 10-20 mL     sodium chloride (PF) 0.9% PF flush 10-40 mL     sodium chloride (PF) 0.9% PF flush 10-40 mL       Objective    /74 (BP Location: Left arm)   Pulse 96   Temp 99.1  F (37.3  C) (Oral)   Resp 17   Ht 1.727 m (5' 7.99\")   Wt 76.8 kg (169 lb 5 oz)   SpO2 100%   BMI 25.75 kg/m      Physical Exam per hospitalist:     Neuro:  Alert, oriented x 4   Skin:  No diaphoresis   Psych:     Cooperative, more pleasant     Mood: depressed               Affect:  Congruent, less tearful               Thought content:  Endorses sadness, hopelessness.  Denies suicidal ideation or plan               Thought processes:  Linear                Speech:  Normal                Motor:  Normal                Insight/judgement:  fair/  fair    Results  No new lab    Assessment and Plan:  1.  Opioid use disorder, severe  - tolerating suboxone without oversedation.   Withdrawal symptoms are much improved.  Patient is less irritable and restless as well.   However, still having symptoms at night, so will increase to 8 mg bid.   Would hold at this dose for at least 5 days to allow equilibration,        Again discussed CD treatment and patient is declining.       2.  Methamphetamine use disorder, severe - reports this is not her first drug of choice.    3.  Likely has major depression  - patient was started on zoloft two days ago.  Patient " would likely benefit from therapy around grief and trauma.      Awa Noriega MD  Addiction Medicine Service  [facility]   Page me (click here for Milagros Noriega)

## 2021-08-03 NOTE — PLAN OF CARE
ummary: Endocarditis MSSA  DATE & TIME: 8/3/21 07-19  Cognitive Concerns/ Orientation : A&Ox4, calm  BEHAVIOR & AGGRESSION TOOL COLOR: Green  ABNL VS/O2: VSS on RA  MOBILITY: SBA with walker, ambulated in room x2  PAIN MANAGMENT: C/O generalized pain, low back. 9-10/10. PRN Tylenol x2 (ortho brace for left knee)  DIET: Regular diet, tolerating well  BOWEL/BLADDER: continent at times, ambulates to bathroom  ABNL LAB/BG: N/A  DRAIN/DEVICES: R PICC patent with int abx  SKIN: Bruises; black toes with dressings due to pt picking at eschar, and blister side of R heel, Scattered scars.  TESTS/PROCEDURES

## 2021-08-03 NOTE — PROGRESS NOTES
Orthopedic Surgery     Patient feels the brace improves her pain when ambulating.  Continues to have some discomfort with weight bearing, denies pain at rest.  Denies any increasing pain    Exam:  Musculoskeletal: On physical exam of the left knee  Skin: intact, no erythema or ecchymosis at the knee  Swelling: mild knee joint effusion present  Alignment: valgus deformity of the left knee vs right  Tenderness to palpation lateral and superior TTP  ROM: Able to range the knee from 0-80 degrees.  Palpable and audible crepitation with passive ROM.  Significant laxity with varus stress of the knee.  Stable valgus and anterior stress.  Stable right knee.  Advanced PF crepitation.      Dorsi/Plantar flexion:  5/5  Calves:  Soft and non-tender  Distal pulses are intact and equal bilaterally  Sensation to light touch intact and equal bilaterally.       Assessment:  Left knee effusion, significant osteoarthritis within the lateral and patellofemoral compartment with lateral instability    Plan:    Patient's knee not appearing septic.    Apply hinged knee brace for all transfers and worn prn to aide in stabilize the lateral instability within the knee.  Brace can be off in bed.    Ice knee prn  Able to WBAT  Will continue to monitor for signs of septic joint.

## 2021-08-03 NOTE — PROGRESS NOTES
"[facility]    Hospitalist Progress Note      Assessment & Plan   41 year old female with h/o untreated HIV, hepatitis C who was admitted on 7/1/2021 for MSSA mitral valve endocarditis resulting in septic shock complicated by embolic left parietal lobe infarctions and multiple peripheral emboli, and initially with encephalopathy, and shock.  She was intubated in ED on 7/1/2021 subsequently extubated 7/11/2021.  Blood cultures turned positive on hospital day 1 with MSSA.  Urine culture was positive for Klebsiella.  She was treated initially with ceftriaxone and vancomycin, and has since been narrowed to nafcillin per ID.     MSSA mitral valve endocarditis  Septic shock, resolved  Probable myopericarditis  Small pericardial effusion  *JAE 7/5 noted with small ASD, mod to large mobile vegetation (14mm long, 6mm wide) of the P2 segment of the mitral leaflet. No valve perforation; mod MR  *blood cultures on admission with MSSA; blood cultures have been negative since 7/6.   *ID following; antibiotic has been narrowed down to Nafcillin, 6 weeks of antibiotic, completion date 8/18/2021.  *limited ECHO repeated 7/15 to reassess pericardial fluid 7/15 per cardiology and noted:  organized effusion posteriorly, measuring 1.2-1.5cm, no evidence of tamponade; small mobile vegetation noted attached to the mitral leaflet, mod to severe MR; compared to 7/8/21, effusion size is probably stable, Degree of MR is worse, Vegetation is smaller.  Cardiology signed off 7/15.   *re-evaluated by CT surgery 7/15 and suggested continued antibiotic therapy at this time; per CT surgery \"Were she to become a surgical candidate, we would recommend waiting at least 4 weeks from her stroke and she would need repeat MRI imaging to rule out mycotic aneurysms or brain abscesses\"; will have follow up with CT surgery as outpatient  *Abx briefly interrupted as she had been pulling out her access sites; midline placed on 7/13/21-- " pulled out again on 7/16-- PICC placed on 8/2/21.   -will need follow up with cardiothoracic surgery as an outpatient    Left knee pain: pt reports history of pain in that knee with activity but pain now is more acute. ROM okay. There is some warmth and swelling there.   - In light of the MSSA bacteremia, got XR and called ortho for effusion  - Per ortho no indication for intervention, though they will order a brace. Appreciate assistance    IV drug use  Polysubstance use disorder  *Chem dep consulted 7/15 but chemical dependency did not feel she is appropriate for CD assessment at this time and recommended consultation towards the end of her antibiotic course.  Reconsult CD once she is close to finishing her IV antibiotic.  *Was using IV dilaudid as well as Percocet p.o. earlier this stay pretty extensively.  Attempts made to wean off IV narcotics and taper down po narcotics though patient felt she was going through withdrawal. Psych consulted and recommended suboxone though patient declined. Psych recommended rapid taper of methadone instead.   *completed methadone taper of 30 mg on 7/21, 20 mg 7/22 and then 10 mg 7/23, then stop  -no further narcotics   -started suboxone on 7/27 - appreciate psych/addiction medicine assistance who will help with titration   - suboxone was adjusted again today. Pt is happy with plan.  -psych also mentioned outpatient methadone maintenances as a possibility  -Reconsult CD once she is close to finishing her IV antibiotic.     HIV, untreated PTA  - CD4 count greater than 500, viral load 3695  - Started Biktarvy 7/8; ID following    Chronic back pain: pt notes it present ever since she was pregnant.   -avoiding narcotics per psych  -tylenol  -added lidoderm patch 7/23  -toradol prn  -increase gabapentin to 600 mg TID     Toxic/septic/metabolic encephalopathy, acute, improving  Likely encephalopathy due to sepsis, drug use, CVA, prolonged ICU stay; had been pulling lines due to  alterned mentation thought this all now appears resolved.   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  Completed course of treatment with ceftriaxone.  repeat urine cultures 7/10 with ESBL E coli but given unimpressive UA and no UTI symptoms ID suggest holding off on abx for ESBL UTI  -monitor for any symptoms     Acute hypoxemic respiratory failure, resolved  Intubated 7/1/2021.  Extubated 7/11/2021.  Likely related to septic shock from MSSA mitral valve endocarditis     FORD, resolved suspected due to sepsis  Hypokalemia  - on potassium replacement protocol  - will check BMP periodically     Chronic anemia  Baseline hemoglobin approximately 7.  Likely related to chronic disease, untreated HIV.  -Monitor hemoglobin periodically; stable around 9     Non severe malnutrition  -- Supplements      Prior hepatitis C infection  Antibody positive but RNA negative  -Noted    Restless legs: unclear if this is true restless leg syndrome or due to being uncomfortable  -continue mirapex prn    Necrotic wounds to Lt great toes, Lt 3rd toe and Lt lateral Heel, Rt great toe: 2/2 thrombi.   No evidence of active local infection. If things change, consider podiatry consult.     Major depressive disorder: pt is not suicidal but also doesn't seem to have much of a will to live based upon discussion 7/25.  I wonder if her underlying chronic pain which is difficult to treat as well as being in the hospital is  "contributing. Was seen by psych and declined any medication treatment on 7/26.   -patient was on zoloft for years starting at age 13. She is willing to try zoloft again. Started 8/1 at 25mg po and titrate up to 50mg within a week if no issues.    DVT Prophylaxis: heparin  Code Status: Full Code    Disposition: Expected discharge once IV abx are complete after 8/18 as having a difficult time finding placement in TCU.  She is homeless at baseline.   -patient has threatened to leave AMA a couple times, most recently on 7/25 but able to be talked into staying.  At this point I do think that due to likely depression, lack of insight from her drug addiction, possible cognitive impairment due to stroke, lack of plan for outpatient IV abx and outpatient treatment of her addiction she is at high risk of harm to self and if she tries to leave AMA would place a 72 hour hold. Please see discussion in hospitalist note from 7/25 and psych note 7/26.       Raimundo Calixto MD  Pager: [phone]  Cell Phone:  [phone]       Interval History   No new complaints.  Is asking for pain meds -- \"I hurt all over and the pain is near a 10\".  She appears very comfortable.     Physical Exam   Temp: 98.3  F (36.8  C) Temp src: Oral BP: 110/71 Pulse: 82   Resp: 16 SpO2: 96 % O2 Device: None (Room air)    Vitals:    07/13/21 0200 07/14/21 0445 07/16/21 0615   Weight: 89 kg (196 lb 3.4 oz) 84.2 kg (185 lb 10 oz) 76.8 kg (169 lb 5 oz)     Vital Signs with Ranges  Temp:  [98.3  F (36.8  C)-99.1  F (37.3  C)] 98.3  F (36.8  C)  Pulse:  [79-96] 82  Resp:  [16-18] 16  BP: (110-122)/(71-74) 110/71  SpO2:  [96 %-100 %] 96 %  I/O last 3 completed shifts:  In: 100 [I.V.:100]  Out: -     Constitutional: sitting in bed, appears comfortable    Respiratory: Clear     Cardiovascular: Regular rate and rhythm.  No murmur.    GI: Positive bowel sounds, soft, non-distended, non-tender.     Neurologic: Alert, Ox3, no focal deficits, she does speak " somewhat childish.     Medications       bictegravir-emtricitabine-tenofovir  1 tablet Oral Daily     buprenorphine HCl-naloxone HCl  1 Film Sublingual BID     gabapentin  600 mg Oral TID     lidocaine  2-3 patch Transdermal Q24H     lidocaine   Transdermal Q8H     multivitamin w/minerals  1 tablet Oral Daily     nafcillin  2 g Intravenous Q4H     pantoprazole  40 mg Oral QAM AC     sertraline  25 mg Oral QPM     sodium chloride (PF)  10 mL Intracatheter Q8H     sodium chloride (PF)  10-40 mL Intracatheter Q7 Days       Data   Recent Labs   Lab 08/01/21  1145 07/30/21  1856 07/30/21  1736 07/28/21  0611   WBC  --  8.4  --   --    HGB  --  9.1*  --   --    MCV  --  86  --   --    PLT  --  370  --  372   NA  --  137  --   --    POTASSIUM  --  4.0  --   --    CHLORIDE  --  107  --   --    CO2  --  28  --   --    BUN  --  17  --   --    CR  --  0.63  --   --    ANIONGAP  --  2*  --   --    DEREK  --  8.1*  --   --    * 85 76  --    ALBUMIN  --  2.1*  --   --    PROTTOTAL  --  8.4  --   --    BILITOTAL  --  0.6  --   --    ALKPHOS  --  117  --   --    ALT  --  19  --   --    AST  --  17  --   --    TROPONIN  --  <0.015  --   --        No results found for this or any previous visit (from the past 24 hour(s)).

## 2021-08-04 ENCOUNTER — APPOINTMENT (OUTPATIENT)
Dept: OCCUPATIONAL THERAPY | Facility: CLINIC | Age: 41
End: 2021-08-04
Payer: MEDICAID

## 2021-08-04 ENCOUNTER — APPOINTMENT (OUTPATIENT)
Dept: PHYSICAL THERAPY | Facility: CLINIC | Age: 41
End: 2021-08-04
Payer: MEDICAID

## 2021-08-04 ENCOUNTER — APPOINTMENT (OUTPATIENT)
Dept: GENERAL RADIOLOGY | Facility: CLINIC | Age: 41
End: 2021-08-04
Attending: INTERNAL MEDICINE
Payer: MEDICAID

## 2021-08-04 LAB
ANION GAP SERPL CALCULATED.3IONS-SCNC: 2 MMOL/L (ref 3–14)
BUN SERPL-MCNC: 11 MG/DL (ref 7–30)
CALCIUM SERPL-MCNC: 8.1 MG/DL (ref 8.5–10.1)
CHLORIDE BLD-SCNC: 104 MMOL/L (ref 94–109)
CO2 SERPL-SCNC: 27 MMOL/L (ref 20–32)
CREAT SERPL-MCNC: 0.64 MG/DL (ref 0.52–1.04)
CRP SERPL-MCNC: 126 MG/L (ref 0–8)
ERYTHROCYTE [DISTWIDTH] IN BLOOD BY AUTOMATED COUNT: ABNORMAL %
GFR SERPL CREATININE-BSD FRML MDRD: >90 ML/MIN/1.73M2
GLUCOSE BLD-MCNC: 119 MG/DL (ref 70–99)
HCT VFR BLD AUTO: 27.4 % (ref 35–47)
HGB BLD-MCNC: 8.4 G/DL (ref 11.7–15.7)
LAB SCANNED RESULT: NORMAL
MCH RBC QN AUTO: 26.1 PG (ref 26.5–33)
MCHC RBC AUTO-ENTMCNC: 30.7 G/DL (ref 31.5–36.5)
MCV RBC AUTO: 85 FL (ref 78–100)
PLATELET # BLD AUTO: 320 10E3/UL (ref 150–450)
POTASSIUM BLD-SCNC: 4 MMOL/L (ref 3.4–5.3)
RBC # BLD AUTO: 3.22 10E6/UL (ref 3.8–5.2)
SODIUM SERPL-SCNC: 133 MMOL/L (ref 133–144)
WBC # BLD AUTO: 8.7 10E3/UL (ref 4–11)

## 2021-08-04 PROCEDURE — 250N000013 HC RX MED GY IP 250 OP 250 PS 637: Performed by: INTERNAL MEDICINE

## 2021-08-04 PROCEDURE — 85014 HEMATOCRIT: CPT | Performed by: INTERNAL MEDICINE

## 2021-08-04 PROCEDURE — 250N000013 HC RX MED GY IP 250 OP 250 PS 637: Performed by: STUDENT IN AN ORGANIZED HEALTH CARE EDUCATION/TRAINING PROGRAM

## 2021-08-04 PROCEDURE — 120N000001 HC R&B MED SURG/OB

## 2021-08-04 PROCEDURE — 99232 SBSQ HOSP IP/OBS MODERATE 35: CPT | Mod: 95 | Performed by: INTERNAL MEDICINE

## 2021-08-04 PROCEDURE — 250N000009 HC RX 250: Performed by: STUDENT IN AN ORGANIZED HEALTH CARE EDUCATION/TRAINING PROGRAM

## 2021-08-04 PROCEDURE — 250N000013 HC RX MED GY IP 250 OP 250 PS 637: Performed by: PSYCHIATRY & NEUROLOGY

## 2021-08-04 PROCEDURE — 86140 C-REACTIVE PROTEIN: CPT | Performed by: INTERNAL MEDICINE

## 2021-08-04 PROCEDURE — 250N000013 HC RX MED GY IP 250 OP 250 PS 637: Performed by: HOSPITALIST

## 2021-08-04 PROCEDURE — G0463 HOSPITAL OUTPT CLINIC VISIT: HCPCS

## 2021-08-04 PROCEDURE — 82310 ASSAY OF CALCIUM: CPT | Performed by: INTERNAL MEDICINE

## 2021-08-04 PROCEDURE — 71045 X-RAY EXAM CHEST 1 VIEW: CPT

## 2021-08-04 PROCEDURE — 97530 THERAPEUTIC ACTIVITIES: CPT | Mod: GO

## 2021-08-04 PROCEDURE — 99232 SBSQ HOSP IP/OBS MODERATE 35: CPT | Performed by: INTERNAL MEDICINE

## 2021-08-04 PROCEDURE — 97116 GAIT TRAINING THERAPY: CPT | Mod: GP

## 2021-08-04 RX ADMIN — Medication 1 TABLET: at 18:54

## 2021-08-04 RX ADMIN — GABAPENTIN 600 MG: 300 CAPSULE ORAL at 08:18

## 2021-08-04 RX ADMIN — HYDROXYZINE HYDROCHLORIDE 25 MG: 25 TABLET, FILM COATED ORAL at 08:32

## 2021-08-04 RX ADMIN — SERTRALINE HYDROCHLORIDE 25 MG: 25 TABLET ORAL at 20:41

## 2021-08-04 RX ADMIN — LIDOCAINE 2 PATCH: 560 PATCH PERCUTANEOUS; TOPICAL; TRANSDERMAL at 08:17

## 2021-08-04 RX ADMIN — NAFCILLIN SODIUM 2 G: 2 INJECTION, POWDER, LYOPHILIZED, FOR SOLUTION INTRAMUSCULAR; INTRAVENOUS at 16:04

## 2021-08-04 RX ADMIN — BICTEGRAVIR SODIUM, EMTRICITABINE, AND TENOFOVIR ALAFENAMIDE FUMARATE 1 TABLET: 50; 200; 25 TABLET ORAL at 08:18

## 2021-08-04 RX ADMIN — BUPRENORPHINE AND NALOXONE 1 FILM: 8; 2 FILM, SOLUBLE BUCCAL; SUBLINGUAL at 08:18

## 2021-08-04 RX ADMIN — NAFCILLIN SODIUM 2 G: 2 INJECTION, POWDER, LYOPHILIZED, FOR SOLUTION INTRAMUSCULAR; INTRAVENOUS at 08:17

## 2021-08-04 RX ADMIN — NAFCILLIN SODIUM 2 G: 2 INJECTION, POWDER, LYOPHILIZED, FOR SOLUTION INTRAMUSCULAR; INTRAVENOUS at 20:15

## 2021-08-04 RX ADMIN — NAFCILLIN SODIUM 2 G: 2 INJECTION, POWDER, LYOPHILIZED, FOR SOLUTION INTRAMUSCULAR; INTRAVENOUS at 04:43

## 2021-08-04 RX ADMIN — QUETIAPINE FUMARATE 25 MG: 25 TABLET ORAL at 18:54

## 2021-08-04 RX ADMIN — PANTOPRAZOLE SODIUM 40 MG: 40 TABLET, DELAYED RELEASE ORAL at 05:56

## 2021-08-04 RX ADMIN — BUPRENORPHINE AND NALOXONE 1 FILM: 8; 2 FILM, SOLUBLE BUCCAL; SUBLINGUAL at 16:04

## 2021-08-04 RX ADMIN — HYDROXYZINE HYDROCHLORIDE 25 MG: 25 TABLET, FILM COATED ORAL at 20:50

## 2021-08-04 RX ADMIN — ACETAMINOPHEN 650 MG: 325 TABLET, FILM COATED ORAL at 08:32

## 2021-08-04 RX ADMIN — NAFCILLIN SODIUM 2 G: 2 INJECTION, POWDER, LYOPHILIZED, FOR SOLUTION INTRAMUSCULAR; INTRAVENOUS at 00:36

## 2021-08-04 RX ADMIN — NAFCILLIN SODIUM 2 G: 2 INJECTION, POWDER, LYOPHILIZED, FOR SOLUTION INTRAMUSCULAR; INTRAVENOUS at 12:08

## 2021-08-04 RX ADMIN — GABAPENTIN 600 MG: 300 CAPSULE ORAL at 20:41

## 2021-08-04 RX ADMIN — GABAPENTIN 600 MG: 300 CAPSULE ORAL at 16:04

## 2021-08-04 ASSESSMENT — ACTIVITIES OF DAILY LIVING (ADL)
ADLS_ACUITY_SCORE: 16

## 2021-08-04 NOTE — PROVIDER NOTIFICATION
MD Notification    Notified Person: MD    Notified Person Name:Edward    Notification Date/Time:8/4 [id number]    Notification Interaction:text    Purpose of Notification:temp 101.7    Orders Received:pending notification  Comments:

## 2021-08-04 NOTE — PROGRESS NOTES
[facility]  WO Nurse Inpatient Wound Assessment     Reason for consultation: Evaluate and treat:  Lt great toes, Lt 3rd toe and Lt lateral  Heel                                                                           Rt great toe    Assessment  Wounds:  Lt great toes, Lt 3rd toe and Lt lateral  Heel  Rt great toe  All wounds r/t to ischemic emboli. No local s/s infection  Lt 3rd toe needs monitoring for ongoing necrosis secondary to ongoing ecchymosis on toe      Treatment Plan  Wound care: change dressing to feet on odd days and prn   1. Swab all areas with Betadine. Let dry  2. Cover bilateral great toes/and Lt 3rd toe with polymen  3. Cover Rt lateral heel with Mepilex border  4. Socks @ all times     Orders IN Epic  Recommended provider order: recommend XR for 3rd toe and podiatry consult pending results  WOC Nurse follow-up plan: Weekly and prn   Nursing to notify the Provider(s) and re-consult the WOC Nurse if wound(s) deteriorates or new skin concern.    Patient History  According to provider note(s):    Manas Hernandez is a 41 year old female with h/o untreated HIV, hepatitis C who was admitted on 7/1/2021 for MSSA mitral valve endocarditis resulting in septic shock complicated by embolic left parietal lobe infarctions and multiple peripheral emboli.    She presented initially with encephalopathy, and shock.  She was intubated in ED on 7/1/2021 subsequently extubated 7/11/2021.  Blood cultures turned positive on hospital day 1 with MSSA.  Urine culture was positive for Klebsiella.  She was treated initially with ceftriaxone and vancomycin, and has since been narrowed to nafcillin per ID.     MSSA mitral valve endocarditis - suspect due to IV drug use  Septic shock, resolved  Probable myopericarditis  Small pericardial effusion  *JAE 7/5 noted with small ASD, mod to large mobile vegetation (14mm long, 6mm wide) of the P2 segment of the mitral leaflet. No valve perforation; mod  "MR  *blood cultures on admission with MSSA; blood cultures have been negative since 7/6.  Afebrile since 7/13.  Leukocytosis resolved  *ID following; antibiotic has been narrowed down to Nafcillin, 6 weeks of antibiotic, completion date 8/18/2021.  *limited ECHO repeated 7/15 to reassess pericardial fluid 7/15 per cardiology and noted:  organized effusion posteriorly, measuring 1.2-1.5cm, no evidence of tamponade; small mobile vegetation noted attached to the mitral leaflet, mod to severe MR; compared to 7/8/21, effusion size is probably stable, Degree of MR is worse, Vegetation is smaller.  Cardiology signed off 7/15.   *re-evaluated by CT surgery 7/15 and suggested continued antibiotic therapy at this time; per CT surgery \"Were she to become a surgical candidate, we would recommend waiting at least 4 weeks from her stroke and she would need repeat MRI imaging to rule out mycotic aneurysms or brain abscesses\"; will have follow up with CT surgery as outpatient  *Abx briefly interrupted as she had been pulling out her access sites; midline placed on 7/13/21-- pulled out again on 7/16-- PICC was re-ordered (after she pulled out 2 previous ones), however vascular access team has suggested holding off on re-insertion until right before patient discharges to facility due to difficult access.  Patient is not delirious anymore and per , likely will have to complete her antibiotic course here in hospital as no accepting TCU so far and unlikely to find one.  PICC ordered on 7/26 as she agrees not to pull it out and having a difficult time with access.      IV drug use  Polysubstance use disorder  *Chem dep consulted 7/15 but chemical dependency did not feel she is appropriate for CD assessment at this time and recommended consultation towards the end of her antibiotic course.  Reconsult CD once she is close to finishing her IV antibiotic.  *Was using IV dilaudid as well as Percocet p.o. earlier this stay pretty " extensively.  Attempts made to wean off IV narcotics and taper down po narcotics though patient felt she was going through withdrawal. Psych consulted and recommended suboxone though patient declined. Psych recommended rapid taper of methadone instead.   *completed methadone taper of 30 mg on 7/21, 20 mg 7/22 and then 10 mg 7/23, then stop  -no further narcotics   -starting suboxone on 7/27 - appreciate psych/addiction medicine assistance who will help with titration  -psych also mentioned outpatient methadone maintenances as a possibility  -Reconsult CD once she is close to finishing her IV antibiotic.     HIV, untreated PTA  - CD4 count greater than 500, viral load 3695  - Started Biktarvy 7/8; ID following     Chronic back pain: pt notes it present ever since she was pregnant.   -avoiding narcotics per psych  -tylenol  -added lidoderm patch 7/23  -toradol prn  -increase gabapentin to 400 mg TID today  --pt notes previous outpatient dose was 600mg TID so would like to titrate at least that far if she tolerates     Toxic/septic/metabolic encephalopathy, acute, improving  Likely encephalopathy due to sepsis, drug use, CVA, prolonged ICU stay; had been pulling lines due to alterned mentation thought this all now appears resolved.   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  Completed course of treatment with ceftriaxone.  repeat urine cultures 7/10 with ESBL E coli but given unimpressive UA and  no UTI symptoms ID suggest holding off on abx for ESBL UTI  -monitor for any symptoms     Acute hypoxemic respiratory failure, resolved  Intubated 7/1/2021.  Extubated 7/11/2021.  Likely related to septic shock from MSSA mitral valve endocarditis     FORD, resolved suspected due to sepsis  Hypokalemia  - on potassium replacement protocol  - will check BMP periodically     Chronic anemia  Baseline hemoglobin approximately 7.  Likely related to chronic disease, untreated HIV.  -Monitor hemoglobin periodically; stable around 9     Likely severe malnutrition    Objective Data  Containment of urine/stool: Incontinence protocol    Active Diet Order:  Orders Placed This Encounter      Combination Diet Regular Diet Adult; No Straws    Output:   I/O last 3 completed shifts:  In: 780 [P.O.:480; I.V.:300]  Out: -     Risk Assessment:   Sensory Perception: 4-->no impairment  Moisture: 4-->rarely moist  Activity: 3-->walks occasionally  Mobility: 3-->slightly limited  Nutrition: 3-->adequate  Friction and Shear: 3-->no apparent problem  Jeffy Score: 20                          Labs:   Recent Labs   Lab 08/04/21  0614 08/04/21  0613 07/30/21  1856   ALBUMIN  --   --  2.1*   HGB 8.4*  --  9.1*   WBC 8.7  --  8.4   CRP  --  126.0*  --        Physical Exam  Skin inspection: Bilateral feet   Wound History: all wounds r/t to ischemic emboli  #1-#2: Wound Location:  Lt great toe and Lt 3rd toe  Date of last photo:8/4/21 7-29-21      #1: Lt great toe  Size :  1.0 x 1.0 x  Skin intact with non-blanchable ecchymosis, appears to be improved from previous visit  Tunneling: NA  Undermining: NA  Palpation of the wound bed: firm  Periwound skin: intact, no erythema  Temperature: warm   Drainage: none  Odor: none  Pain: denies    #2: Lt 3rd  toe  Size :  2 x 1.5 x 100% x unable to determine with 100% dried eschar base, stable  Tunneling: unable to determine  Undermining: unable to determine  Palpation of the wound bed: Boggy  Periwound  skin:  Intact with ecchymosis   Temperature: warm   Drainage: none  Odor: none  Pain: tenderness     #3:  Wound Location: Rt great tip of  toe  WO photo:8/4/21 7-29-21    Size : 2.1 x 1.0  x  Unable to determine depth with 100% dried wound   Tunneling: unable to determine  Undermining: unable to determine  Palpation of the wound bed: firm   Periwound skin: intact, slight ecchymosis, discoloring from betadine  Temperature: warm   Drainage: none  Odor: none  Pain: denies     #4:  Wound Location: Rt lateral heel   WO photo:8/4/21 7-29-21        Wound bed: Intact skin with dark brown discoloration underneath.   Size :  5.0cm  x 2.5cm  x skin intact    Tunneling: NA  Undermining: NA  Palpation of the wound bed: slight softness  Periwound skin: intact, no erythema   Temperature:  Warm   Drainage:  none  Odor: none  Pain: Denies     Interventions  Current support surface: atmos air  Current off-loading measures:pillows and repositions self  Visual inspection of wound(s) completed  Wound Care: per tx plan  Supplies: In pt room   Education provided:Discussed with patient   Discussed plan of care with Nursing     Erika Loera RN, CWOCN

## 2021-08-04 NOTE — PLAN OF CARE
Summary: Endocarditis MSSA  DATE & TIME: 8/4/2021 07-19  Cognitive Concerns/ Orientation : A&Ox4, calm,teary eyed at times  BEHAVIOR & AGGRESSION TOOL COLOR: Green  ABNL VS/O2: VSS on RA Temp 101.7 this am, 98.8,99.4  MOBILITY: SBA with walker, ambulated in [place] x 1 this am  PAIN MANAGMENT: C/O generalized pain, low back and chest 9-10/10. PRN Tylenol x1 with hydroxyzine(ortho brace for left knee)  DIET: Regular diet, tolerating well  BOWEL/BLADDER: continent , ambulates to bathroom  ABNL LAB/BG: N/A  DRAIN/DEVICES: R PICC patent with intermittent abx  SKIN: Bruises; black toes with dressings due to pt picking at eschar, and blister side of R heel, Scattered scars. WOC checked wounds today.  TESTS/PROCEDURES: N/A  D/C DAY/GOALS/PLACE: Pending, will need abx until 8/18/2021 . Possible transfer to [facility] 8/5 for continued care/AB  OTHER IMPORTANT INFO: Contact isolation for ESBL

## 2021-08-04 NOTE — PROGRESS NOTES
Addiction Medicine Follow Up    Tele-Visit Details    Type of service:  Video Visit    Time Service Began (time 1st connected with pt): 1330    Time Service Ended (time completely finished with pt): 1351    Originating Location (pt. Location): Patient Rm, [facility]    Distant Location (provider location): [facility]    Reason for Televisit: COVID 19    Mode of Communication:  Video Conference via [employer]    Physician has received verbal consent for a video visit from the patient? 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   However, still some craings and muscle aching.   Having pain in R knee, but this is chronic.       ROS:  Feels tired.    Denies cough.   Has some anterior chest pain - worse lying flat.    Psych:  Feeling less tearful.  Does now admit to depression    .  Medication List Choices:   Current Facility-Administered Medications   Medication     [START ON 8/10/2021] acetaminophen (TYLENOL) tablet 650 mg     acetaminophen (TYLENOL) tablet 975 mg     albuterol (PROVENTIL) neb solution 2.5 mg     bictegravir-emtricitabine-tenofovir (BIKTARVY) -25 MG per tablet 1 tablet     bisacodyl (DULCOLAX) Suppository 10 mg     buprenorphine HCl-naloxone HCl (SUBOXONE) 8-2 MG per film 1 Film     glucose gel 15-30 g    Or     dextrose 50 % injection 25-50 mL    Or     glucagon injection 1 mg  "    gabapentin (NEURONTIN) capsule 600 mg     hydrOXYzine (ATARAX) tablet 25 mg     lidocaine (LMX4) cream     lidocaine 1 % 0.1-1 mL     magnesium hydroxide (MILK OF MAGNESIA) suspension 30 mL     multivitamin w/minerals (THERA-VIT-M) tablet 1 tablet     nafcillin IV 2 g vial to attach to  ml bag     naloxone (NARCAN) injection 0.2 mg    Or     naloxone (NARCAN) injection 0.4 mg    Or     naloxone (NARCAN) injection 0.2 mg    Or     naloxone (NARCAN) injection 0.4 mg     ondansetron (ZOFRAN-ODT) ODT tab 4 mg    Or     ondansetron (ZOFRAN) injection 4 mg     oxyCODONE (ROXICODONE) tablet 10 mg    Or     oxyCODONE (ROXICODONE) tablet 15 mg     pantoprazole (PROTONIX) EC tablet 40 mg     polyethylene glycol (MIRALAX) Packet 17 g     prochlorperazine (COMPAZINE) injection 10 mg    Or     prochlorperazine (COMPAZINE) tablet 10 mg     QUEtiapine (SEROquel) tablet 25 mg     senna-docusate (SENOKOT-S/PERICOLACE) 8.6-50 MG per tablet 1 tablet     sertraline (ZOLOFT) tablet 50 mg     sodium chloride (PF) 0.9% PF flush 10 mL     sodium chloride (PF) 0.9% PF flush 10-20 mL     sodium chloride (PF) 0.9% PF flush 10-20 mL     sodium chloride (PF) 0.9% PF flush 10-40 mL     sodium chloride (PF) 0.9% PF flush 10-40 mL     sodium chloride (PF) 0.9% PF flush 3 mL     sodium chloride (PF) 0.9% PF flush 3 mL       Objective    /76 (BP Location: Left arm)   Pulse 106   Temp (!) 101.7  F (38.7  C)   Resp 18   Ht 1.727 m (5' 7.99\")   Wt 76.8 kg (169 lb 5 oz)   SpO2 98%   BMI 25.75 kg/m      Note-  Temp up to 101.7 this am.       Physical Exam per hospitalist:  Lungs clear.  Heart without murmur or rub reported.  Abd - WNL.      Neuro: Alert, oriented x 4. No tremor   Skin:  No diaphoresis   Psych:     Cooperative     Mood:  depressed               Affect:  Congruent               Thought content:  denies SI, HI or hallucinations               Thought processes:  Linear                Speech:  Normal                " Motor:  Normal                Insight/judgement:  fair/  fair  Results    Assessment and Plan:  1.   MSSA Endocarditis Mitral valve -  On IV antibiotics until 8/18.  Complicated by emboli to brain - cerebral infarct and to toes.   Now appears to have ischemia and osteomyelitis in L 3rd toe.   Podiatry  recommmended partial amputation.   This could be cause of recurrent fever.       2.    Opioid Use Disorder, severe - On suboxone, which she is tolerating well without oversedation or constipation.    still with some low grade withdrawal.  Will increase to 8 mg bid with plan to stay on that dose to allow equibration.   Patient is agreeable.           She will need a Suboxone provider after discharge, but at this time patient is unsure where she will be going after discharge.   Possibly her daughter's house in \[facility]\"", or mother's house in [facility].           Again discussed recommendation of residential CD treatment - most like an MICD program.    There are some culturally specific programs here in [facility].    Patient would not commit to this.   SW to explore options.       3.   Major Depression -  Agree with zoloft - would increase dose to 50 mg q d.   Overall showing some improvement in cooperation and mood.         Awa Noriega MD  Addiction Medicine Service  [facility]   Page me (click here for Milagros Noriega)

## 2021-08-04 NOTE — PLAN OF CARE
Cognitive Concerns/ Orientation : A&Ox4, calm  BEHAVIOR & AGGRESSION TOOL COLOR: Green  ABNL VS/O2: VSS on RA  MOBILITY: SBA with walker, ambulated in [place] x 1 this evening  PAIN MANAGMENT: C/O generalized pain, low back. 9-10/10. PRN Tylenol x1 (ortho brace for left knee)  DIET: Regular diet, tolerating well  BOWEL/BLADDER: continent at times, ambulates to bathroom  ABNL LAB/BG: N/A  DRAIN/DEVICES: R PICC patent with intermittent abx  SKIN: Bruises; black toes with dressings due to pt picking at eschar, and blister side of R heel, Scattered scars.  TESTS/PROCEDURES: N/A  D/C DAY/GOALS/PLACE: Pending, will need abx until 8/18/2021   OTHER IMPORTANT INFO: Contact isolation for ESBL

## 2021-08-05 ENCOUNTER — APPOINTMENT (OUTPATIENT)
Dept: GENERAL RADIOLOGY | Facility: CLINIC | Age: 41
End: 2021-08-05
Attending: INTERNAL MEDICINE
Payer: MEDICAID

## 2021-08-05 LAB — SARS-COV-2 RNA RESP QL NAA+PROBE: NEGATIVE

## 2021-08-05 PROCEDURE — 250N000009 HC RX 250: Performed by: STUDENT IN AN ORGANIZED HEALTH CARE EDUCATION/TRAINING PROGRAM

## 2021-08-05 PROCEDURE — 73660 X-RAY EXAM OF TOE(S): CPT | Mod: LT

## 2021-08-05 PROCEDURE — 250N000013 HC RX MED GY IP 250 OP 250 PS 637: Performed by: HOSPITALIST

## 2021-08-05 PROCEDURE — 87635 SARS-COV-2 COVID-19 AMP PRB: CPT | Performed by: INTERNAL MEDICINE

## 2021-08-05 PROCEDURE — 250N000013 HC RX MED GY IP 250 OP 250 PS 637: Performed by: STUDENT IN AN ORGANIZED HEALTH CARE EDUCATION/TRAINING PROGRAM

## 2021-08-05 PROCEDURE — 250N000013 HC RX MED GY IP 250 OP 250 PS 637: Performed by: INTERNAL MEDICINE

## 2021-08-05 PROCEDURE — 120N000001 HC R&B MED SURG/OB

## 2021-08-05 PROCEDURE — 99232 SBSQ HOSP IP/OBS MODERATE 35: CPT | Performed by: INTERNAL MEDICINE

## 2021-08-05 PROCEDURE — 999N000190 HC STATISTIC VAT ROUNDS

## 2021-08-05 PROCEDURE — 93005 ELECTROCARDIOGRAM TRACING: CPT

## 2021-08-05 PROCEDURE — 99254 IP/OBS CNSLTJ NEW/EST MOD 60: CPT | Performed by: PODIATRIST

## 2021-08-05 RX ADMIN — HYDROXYZINE HYDROCHLORIDE 25 MG: 25 TABLET, FILM COATED ORAL at 21:55

## 2021-08-05 RX ADMIN — GABAPENTIN 600 MG: 300 CAPSULE ORAL at 08:06

## 2021-08-05 RX ADMIN — NAFCILLIN SODIUM 2 G: 2 INJECTION, POWDER, LYOPHILIZED, FOR SOLUTION INTRAMUSCULAR; INTRAVENOUS at 00:12

## 2021-08-05 RX ADMIN — GABAPENTIN 600 MG: 300 CAPSULE ORAL at 21:55

## 2021-08-05 RX ADMIN — BUPRENORPHINE AND NALOXONE 1 FILM: 8; 2 FILM, SOLUBLE BUCCAL; SUBLINGUAL at 08:06

## 2021-08-05 RX ADMIN — ACETAMINOPHEN 650 MG: 325 TABLET, FILM COATED ORAL at 00:20

## 2021-08-05 RX ADMIN — NAFCILLIN SODIUM 2 G: 2 INJECTION, POWDER, LYOPHILIZED, FOR SOLUTION INTRAMUSCULAR; INTRAVENOUS at 15:39

## 2021-08-05 RX ADMIN — SERTRALINE HYDROCHLORIDE 50 MG: 50 TABLET ORAL at 12:06

## 2021-08-05 RX ADMIN — PANTOPRAZOLE SODIUM 40 MG: 40 TABLET, DELAYED RELEASE ORAL at 06:48

## 2021-08-05 RX ADMIN — ACETAMINOPHEN 650 MG: 325 TABLET, FILM COATED ORAL at 21:55

## 2021-08-05 RX ADMIN — HYDROXYZINE HYDROCHLORIDE 25 MG: 25 TABLET, FILM COATED ORAL at 15:39

## 2021-08-05 RX ADMIN — ACETAMINOPHEN 650 MG: 325 TABLET, FILM COATED ORAL at 06:52

## 2021-08-05 RX ADMIN — BICTEGRAVIR SODIUM, EMTRICITABINE, AND TENOFOVIR ALAFENAMIDE FUMARATE 1 TABLET: 50; 200; 25 TABLET ORAL at 08:06

## 2021-08-05 RX ADMIN — Medication 1 TABLET: at 17:59

## 2021-08-05 RX ADMIN — NAFCILLIN SODIUM 2 G: 2 INJECTION, POWDER, LYOPHILIZED, FOR SOLUTION INTRAMUSCULAR; INTRAVENOUS at 20:01

## 2021-08-05 RX ADMIN — NAFCILLIN SODIUM 2 G: 2 INJECTION, POWDER, LYOPHILIZED, FOR SOLUTION INTRAMUSCULAR; INTRAVENOUS at 08:06

## 2021-08-05 RX ADMIN — BUPRENORPHINE AND NALOXONE 1 FILM: 8; 2 FILM, SOLUBLE BUCCAL; SUBLINGUAL at 15:39

## 2021-08-05 RX ADMIN — NAFCILLIN SODIUM 2 G: 2 INJECTION, POWDER, LYOPHILIZED, FOR SOLUTION INTRAMUSCULAR; INTRAVENOUS at 04:10

## 2021-08-05 RX ADMIN — GABAPENTIN 600 MG: 300 CAPSULE ORAL at 15:39

## 2021-08-05 RX ADMIN — NAFCILLIN SODIUM 2 G: 2 INJECTION, POWDER, LYOPHILIZED, FOR SOLUTION INTRAMUSCULAR; INTRAVENOUS at 12:06

## 2021-08-05 RX ADMIN — ACETAMINOPHEN 650 MG: 325 TABLET, FILM COATED ORAL at 15:39

## 2021-08-05 ASSESSMENT — ACTIVITIES OF DAILY LIVING (ADL)
ADLS_ACUITY_SCORE: 16
ADLS_ACUITY_SCORE: 18
ADLS_ACUITY_SCORE: 18
ADLS_ACUITY_SCORE: 16

## 2021-08-05 NOTE — PROGRESS NOTES
MD Notification    Notified Person: MD    Notified Person Name: Abraham    Notification Date/Time: 8/4/21 2104    Notification Interaction: amcom page    Purpose of Notification: FYI, pt c/o of 10/10 chest pain with difficulty breathing. T101.7, /61, O2 sats 95 RA. Gave hydroxyzine, pt was restless, but is now lying quietly.    Orders Received: EKG, CXR    Comments:

## 2021-08-05 NOTE — PROGRESS NOTES
"[facility]    Hospitalist Progress Note      Assessment & Plan   41 year old female with h/o untreated HIV, hepatitis C who was admitted on 7/1/2021 for MSSA mitral valve endocarditis resulting in septic shock complicated by embolic left parietal lobe infarctions and multiple peripheral emboli, and initially with encephalopathy, and shock.  She was intubated in ED on 7/1/2021 subsequently extubated 7/11/2021.  Blood cultures turned positive on hospital day 1 with MSSA.  Urine culture was positive for Klebsiella.  She was treated initially with ceftriaxone and vancomycin, and has since been narrowed to nafcillin per ID.     MSSA mitral valve endocarditis  Septic shock, resolved  Probable myopericarditis  Small pericardial effusion  *JAE 7/5 noted with small ASD, mod to large mobile vegetation (14mm long, 6mm wide) of the P2 segment of the mitral leaflet. No valve perforation; mod MR  *blood cultures on admission with MSSA; blood cultures have been negative since 7/6.   *ID following; antibiotic has been narrowed down to Nafcillin, 6 weeks of antibiotic, completion date 8/18/2021.  *limited ECHO repeated 7/15 to reassess pericardial fluid 7/15 per cardiology and noted:  organized effusion posteriorly, measuring 1.2-1.5cm, no evidence of tamponade; small mobile vegetation noted attached to the mitral leaflet, mod to severe MR; compared to 7/8/21, effusion size is probably stable, Degree of MR is worse, Vegetation is smaller.  Cardiology signed off 7/15.   *re-evaluated by CT surgery 7/15 and suggested continued antibiotic therapy at this time; per CT surgery \"Were she to become a surgical candidate, we would recommend waiting at least 4 weeks from her stroke and she would need repeat MRI imaging to rule out mycotic aneurysms or brain abscesses\"; will have follow up with CT surgery as outpatient  *Abx briefly interrupted as she had been pulling out her access sites; midline placed on 7/13/21-- " pulled out again on 7/16-- PICC placed on 8/2/21.   -will need follow up with cardiothoracic surgery as an outpatient    Left knee pain: pt reports history of pain in that knee with activity but pain now is more acute. ROM okay. There is some warmth and swelling there.   - In light of the MSSA bacteremia, got XR and called ortho for effusion  - Per ortho no indication for intervention, though they will order a brace. Appreciate assistance    IV drug use  Polysubstance use disorder  *Chem dep consulted 7/15 but chemical dependency did not feel she is appropriate for CD assessment at this time and recommended consultation towards the end of her antibiotic course.  Reconsult CD once she is close to finishing her IV antibiotic.  *Was using IV dilaudid as well as Percocet p.o. earlier this stay pretty extensively.  Attempts made to wean off IV narcotics and taper down po narcotics though patient felt she was going through withdrawal. Psych consulted and recommended suboxone though patient declined. Psych recommended rapid taper of methadone instead.   *completed methadone taper of 30 mg on 7/21, 20 mg 7/22 and then 10 mg 7/23, then stop  -no further narcotics   -started suboxone on 7/27 - appreciate psych/addiction medicine assistance who will help with titration   - suboxone was adjusted again today. Pt is happy with plan.  -psych also mentioned outpatient methadone maintenances as a possibility  -Reconsult CD once she is close to finishing her IV antibiotic.     HIV, untreated PTA  - CD4 count greater than 500, viral load 3695  - Started Biktarvy 7/8; ID following    Chronic back pain: pt notes it present ever since she was pregnant.   -avoiding narcotics per psych  -tylenol  -added lidoderm patch 7/23  -toradol prn  -increase gabapentin to 600 mg TID     Toxic/septic/metabolic encephalopathy, acute, improving  Likely encephalopathy due to sepsis, drug use, CVA, prolonged ICU stay; had been pulling lines due to  alterned mentation thought this all now appears resolved.   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  Completed course of treatment with ceftriaxone.  repeat urine cultures 7/10 with ESBL E coli but given unimpressive UA and no UTI symptoms ID suggest holding off on abx for ESBL UTI  -monitor for any symptoms     Acute hypoxemic respiratory failure, resolved  Intubated 7/1/2021.  Extubated 7/11/2021.  Likely related to septic shock from MSSA mitral valve endocarditis     FORD, resolved suspected due to sepsis  Hypokalemia  - on potassium replacement protocol  - will check BMP periodically     Chronic anemia  Baseline hemoglobin approximately 7.  Likely related to chronic disease, untreated HIV.  -Monitor hemoglobin periodically; stable around 9     Non severe malnutrition  -- Supplements      Prior hepatitis C infection  Antibody positive but RNA negative  -Noted    Restless legs: unclear if this is true restless leg syndrome or due to being uncomfortable  -continue mirapex prn    Necrotic wounds to Lt great toes, Lt 3rd toe and Lt lateral Heel, Rt great toe: 2/2 thrombi.   No evidence of active local infection. If things change, consider podiatry consult.     Major depressive disorder: pt is not suicidal but also doesn't seem to have much of a will to live based upon discussion 7/25.  I wonder if her underlying chronic pain which is difficult to treat as well as being in the hospital is  contributing. Was seen by psych and declined any medication treatment on 7/26.   -patient was on zoloft for years starting at age 13. She is willing to try zoloft again. Started 8/1 at 25mg po and titrate up to 50mg within a week if no issues.    DVT Prophylaxis: heparin  Code Status: Full Code    Disposition: Expected discharge once IV abx are complete after 8/18 as having a difficult time finding placement in TCU.  She is homeless at baseline.   -patient has threatened to leave AMA a couple times, most recently on 7/25 but able to be talked into staying.  At this point I do think that due to likely depression, lack of insight from her drug addiction, possible cognitive impairment due to stroke, lack of plan for outpatient IV abx and outpatient treatment of her addiction she is at high risk of harm to self and if she tries to leave AMA would place a 72 hour hold. Please see discussion in hospitalist note from 7/25 and psych note 7/26.       Raimundo Calixto MD  Pager: [phone]  Cell Phone:  [phone]       Interval History   Pleuritic chest pain today -- crying, but eventually settled down and felt OK after deep breathing.     Physical Exam   Temp: 99.4  F (37.4  C) Temp src: Oral BP: 110/62 Pulse: 96   Resp: 18 SpO2: 97 % O2 Device: None (Room air)    Vitals:    07/13/21 0200 07/14/21 0445 07/16/21 0615   Weight: 89 kg (196 lb 3.4 oz) 84.2 kg (185 lb 10 oz) 76.8 kg (169 lb 5 oz)     Vital Signs with Ranges  Temp:  [98.8  F (37.1  C)-101.7  F (38.7  C)] 99.4  F (37.4  C)  Pulse:  [] 96  Resp:  [16-18] 18  BP: (110-125)/(62-76) 110/62  SpO2:  [94 %-98 %] 97 %  I/O last 3 completed shifts:  In: 780 [P.O.:480; I.V.:300]  Out: -     Constitutional: sitting in bed, appears comfortable    Respiratory: Clear     Cardiovascular: Regular rate and rhythm.  No murmur.    GI: Positive bowel sounds, soft, non-distended, non-tender.     Neurologic: Alert, Ox3, no focal deficits, she does speak somewhat  childish.     Medications       bictegravir-emtricitabine-tenofovir  1 tablet Oral Daily     buprenorphine HCl-naloxone HCl  1 Film Sublingual BID     gabapentin  600 mg Oral TID     lidocaine  2-3 patch Transdermal Q24H     lidocaine   Transdermal Q8H     multivitamin w/minerals  1 tablet Oral Daily     nafcillin  2 g Intravenous Q4H     pantoprazole  40 mg Oral QAM AC     sertraline  25 mg Oral Daily     [START ON 8/7/2021] sertraline  50 mg Oral Daily     sodium chloride (PF)  10 mL Intracatheter Q8H     sodium chloride (PF)  10-40 mL Intracatheter Q7 Days       Data   Recent Labs   Lab 08/04/21  0614 08/04/21  0613 08/01/21  1145 07/30/21  1856   WBC 8.7  --   --  8.4   HGB 8.4*  --   --  9.1*   MCV 85  --   --  86     --   --  370   NA  --  133  --  137   POTASSIUM  --  4.0  --  4.0   CHLORIDE  --  104  --  107   CO2  --  27  --  28   BUN  --  11  --  17   CR  --  0.64  --  0.63   ANIONGAP  --  2*  --  2*   DEREK  --  8.1*  --  8.1*   GLC  --  119* 145* 85   ALBUMIN  --   --   --  2.1*   PROTTOTAL  --   --   --  8.4   BILITOTAL  --   --   --  0.6   ALKPHOS  --   --   --  117   ALT  --   --   --  19   AST  --   --   --  17   TROPONIN  --   --   --  <0.015       No results found for this or any previous visit (from the past 24 hour(s)).

## 2021-08-05 NOTE — PLAN OF CARE
ATE & TIME: 8/5/2021 07-19  Cognitive Concerns/ Orientation : A&Ox4, calm  BEHAVIOR & AGGRESSION TOOL COLOR: Green  ABNL VS/O2: VSS on RA.  MOBILITY: SBA with walker, ambulated in [place] x 2  PAIN MANAGMENT:States chest pain is better today, no requests for pain meds (ortho brace for left knee)  DIET: Regular diet, tolerating well  BOWEL/BLADDER: continent , ambulates to bathroom  ABNL LAB/BG: N/A  DRAIN/DEVICES: R PICC patent with intermittent abx; good blood return  SKIN: Bruises; black toes with dressings due to pt picking at eschar, and blister side of R heel, Scattered scars. WOC checked wounds today.  TESTS/PROCEDURES: CXR after chest pains showed unremarkable. Xray 3rd toe left foot ? Osteo, podiatry consult placed. Covid swab neg today.  D/C DAY/GOALS/PLACE: Pending, will need abx until 8/18/2021 . Possible transfer to [facility] for continued care/AB  OTHER IMPORTANT INFO: Contact isolation for ESBL

## 2021-08-05 NOTE — PROGRESS NOTES
Orthopedic Surgery     Patient feels the brace improves her pain when ambulating.  Continues to have some discomfort with weight bearing, denies pain at rest.  Continues to deny any increasing pain in her knee.     Exam:       Musculoskeletal: On physical exam of the left knee       Skin: intact, no erythema or ecchymosis at the knee       Swelling: mild knee joint effusion present       Alignment: valgus deformity of the left knee vs right       Tenderness to palpation lateral and superior TTP       ROM: Able to range the knee from 0-80 degrees.  Palpable and audible crepitation with passive ROM.  Significant laxity with varus stress of the knee.  Stable valgus and anterior stress.  Stable right knee.  Advanced PF crepitation.         Calves:  Soft and non-tender       Distal pulses are intact and equal bilaterally       Sensation to light touch intact and equal bilaterally.         Assessment:       Left knee effusion, significant osteoarthritis within the lateral and patellofemoral compartment with lateral instability     Plan:    Patient's knee not appearing septic.    Apply hinged knee brace for all transfers and worn prn to aide in stabilize the lateral instability within the knee.  Brace can be off in bed.    Ice knee prn  Able to WBAT  Will continue to monitor for signs of septic joint.

## 2021-08-05 NOTE — PROVIDER NOTIFICATION
Brief update:    Paged regarding complaint of chest pain and shortness of breath at approximately 8:15 PM.  Patient was restless, received hydroxyzine, and is now calm, pain improved.  Febrile with some mild tachycardia associated with this.    Has endocarditis.  Multiple recent imaging studies.  Possible renal infarcts on recent CT imaging which might result in abdominal pleurisy or discomfort, though patient was pointing at central chest discomfort per nursing.    Get EKG, ASAP chest x-ray.  Please page when these are available for review.  Reassuring that patient is now improved over the last hour after only receiving hydroxyzine and current medical management.  Per nursing, patient reported that she has been having chest pain for the past 4 days.    Herberth Abraham MD  9:19 PM

## 2021-08-05 NOTE — PROVIDER NOTIFICATION
WBP sent to Dr. Leon at 0898:   Can you please order a L 3rd toe XR for patient? Concerned for deeper involvement.    Erika Loera RN CWOCN

## 2021-08-05 NOTE — CONSULTS
PATIENT HISTORY:  Manas Hernandez is a 41 year old female who was admitted for MSSA mitral valve endocarditis resulting in septic shock complicated by embolic left parietal lobe infarctions and multiple peripheral emboli..      I was asked to see Manas Hernandez  by  for left 3rd toe ulcer/osteomyelitis.    Patient was seen at bedside. Not great historian or very talkative. Notes pain to legs but not necessarily the feet.      Review of Systems:  Patient denies fever, chills, rash, wound, stiffness, limping, numbness, weakness, heart burn, blood in stool, chest pain with activity, calf pain when walking, shortness of breath with activity, chronic cough, easy bleeding/bruising, swelling of ankles, excessive thirst, fatigue, depression, anxiety.     PAST MEDICAL HISTORY:   Past Medical History:   Diagnosis Date     Heroin abuse (H)      Methamphetamine abuse (H)      Polysubstance abuse (H)         PAST SURGICAL HISTORY:   Past Surgical History:   Procedure Laterality Date     DILATION AND CURETTAGE  2005        MEDICATIONS:   Current Facility-Administered Medications:      acetaminophen (TYLENOL) tablet 650 mg, 650 mg, Oral, Q6H PRN, Raimundo Leon MD, 650 mg at 08/05/21 0652     albuterol (PROVENTIL) neb solution 2.5 mg, 2.5 mg, Nebulization, Q6H PRN, Rome Greene MD     bictegravir-emtricitabine-tenofovir (BIKTARVY) -25 MG per tablet 1 tablet, 1 tablet, Oral, Daily, Rome Greene MD, 1 tablet at 08/05/21 0806     buprenorphine HCl-naloxone HCl (SUBOXONE) 8-2 MG per film 1 Film, 1 Film, Sublingual, BID, Awa Noriega MD, 1 Film at 08/05/21 0806     glucose gel 15-30 g, 15-30 g, Oral, Q15 Min PRN **OR** dextrose 50 % injection 25-50 mL, 25-50 mL, Intravenous, Q15 Min PRN **OR** glucagon injection 1 mg, 1 mg, Subcutaneous, Q15 Min PRN, Rome Greene MD     gabapentin (NEURONTIN) capsule 600 mg, 600 mg, Oral, TID, Deepti Rivera MD, 600 mg at 08/05/21  0806     hydrOXYzine (ATARAX) tablet 25 mg, 25 mg, Oral, Q6H PRN, Raimundo Leon MD, 25 mg at 08/04/21 2050     lidocaine (LMX4) cream, , Topical, Q1H PRN, Juno Holman DO     lidocaine 1 % 0.1-1 mL, 0.1-1 mL, Other, Q1H PRN, Juno Holman DO     loperamide (IMODIUM) liquid 2 mg, 2 mg, Oral or Feeding Tube, 4x Daily PRN, Rome Greene MD, 2 mg at 07/09/21 1111     multivitamin w/minerals (THERA-VIT-M) tablet 1 tablet, 1 tablet, Oral, Daily, Rome Greene MD, 1 tablet at 08/04/21 1854     nafcillin IV 2 g vial to attach to  ml bag, 2 g, Intravenous, Q4H, Rome Greene MD, Last Rate: 200 mL/hr at 08/04/21 0443, 2 g at 08/05/21 1206     naloxone (NARCAN) injection 0.2 mg, 0.2 mg, Intravenous, Q2 Min PRN **OR** naloxone (NARCAN) injection 0.4 mg, 0.4 mg, Intravenous, Q2 Min PRN **OR** naloxone (NARCAN) injection 0.2 mg, 0.2 mg, Intramuscular, Q2 Min PRN **OR** naloxone (NARCAN) injection 0.4 mg, 0.4 mg, Intramuscular, Q2 Min PRN, Rome Greene MD     pantoprazole (PROTONIX) EC tablet 40 mg, 40 mg, Oral, QAM AC, Rome Greene MD, 40 mg at 08/05/21 0648     QUEtiapine (SEROquel) tablet 25 mg, 25 mg, Oral, BID PRN, Darwin Darden MD, 25 mg at 08/04/21 1854     rOPINIRole (REQUIP) tablet 0.25 mg, 0.25 mg, Oral, TID PRN, Juno Holman DO, 0.25 mg at 07/27/21 1851     sodium chloride (PF) 0.9% PF flush 10 mL, 10 mL, Intracatheter, Q8H, Juno Holman DO, 10 mL at 08/05/21 1210     sodium chloride (PF) 0.9% PF flush 10-20 mL, 10-20 mL, Intracatheter, q1 min prn, Deepti iRvera MD, 10 mL at 08/05/21 0806     sodium chloride (PF) 0.9% PF flush 10-20 mL, 10-20 mL, Intracatheter, q1 min prn, Juno Holman DO, 10 mL at 08/01/21 1419     sodium chloride (PF) 0.9% PF flush 10-40 mL, 10-40 mL, Intracatheter, Q7 Days, Deepti Rivera MD     sodium chloride (PF) 0.9% PF flush 10-40 mL, 10-40 mL, Intracatheter, Q1H PRN, Deepti Rivera MD, 20 mL at  "08/02/21 1333     ALLERGIES:  No Known Allergies     SOCIAL HISTORY:   Social History     Socioeconomic History     Marital status: Single     Spouse name: Not on file     Number of children: Not on file     Years of education: Not on file     Highest education level: Not on file   Occupational History     Not on file   Tobacco Use     Smoking status: Current Every Day Smoker     Types: Cigarettes     Tobacco comment: 1-2   Substance and Sexual Activity     Alcohol use: No     Drug use: Yes     Types: Amphetamines, Methamphetamines, Opiates     Sexual activity: Yes     Partners: Male     Comment: pregnant   Other Topics Concern     Not on file   Social History Narrative     Not on file     Social Determinants of Health     Financial Resource Strain:      Difficulty of Paying Living Expenses:    Food Insecurity:      Worried About Running Out of Food in the Last Year:      Ran Out of Food in the Last Year:    Transportation Needs:      Lack of Transportation (Medical):      Lack of Transportation (Non-Medical):    Physical Activity:      Days of Exercise per Week:      Minutes of Exercise per Session:    Stress:      Feeling of Stress :    Social Connections:      Frequency of Communication with Friends and Family:      Frequency of Social Gatherings with Friends and Family:      Attends Evangelical Services:      Active Member of Clubs or Organizations:      Attends Club or Organization Meetings:      Marital Status:    Intimate Partner Violence:      Fear of Current or Ex-Partner:      Emotionally Abused:      Physically Abused:      Sexually Abused:         FAMILY HISTORY:   Family History   Problem Relation Age of Onset     Alcoholism Mother      Alcoholism Father      Family History Negative No family hx of         EXAM:Vitals: BP 98/62 (BP Location: Left arm)   Pulse 74   Temp 97.4  F (36.3  C) (Axillary)   Resp 18   Ht 1.727 m (5' 7.99\")   Wt 76.8 kg (169 lb 5 oz)   SpO2 98%   BMI 25.75 kg/m    BMI= Body " mass index is 25.75 kg/m .    LABS:    WBC   Date Value Ref Range Status   07/10/2021 8.6 4.0 - 11.0 10e9/L Final     WBC Count   Date Value Ref Range Status   08/04/2021 8.7 4.0 - 11.0 10e3/uL Final     HA1C: 5.9    INR: 1.23  C-reactive protein: 126    General appearance: Patient flat affect.      Psychiatric: Affect is pleasant.       Respiratory: Breathing is regular & unlabored while sitting.      HEENT: Hearing is intact to spoken word.  Speech is clear.  No gross evidence of visual impairment that would impact ambulation.       Dermatologic:   Left 3rd toe ischemic tissue to distal 1/3d of toe. Currently stable. No redness, purulent drainage or signs of acute infection. Other ischemic areas toe distal great toes bilaterally. These areas appear stable and to be healing.      Vascular: DP & PT pulses are intact & regular bilaterally.  No significant edema or varicosities noted.  CFT and skin temperature is normal to both lower extremities.       Neurologic: Lower extremity sensation is intact to light touch.  No evidence of weakness or contracture in the lower extremities.  No evidence of neuropathy.       Musculoskeletal: Patient is ambulatory without assistive device or brace.  No gross ankle deformity noted.  No foot or ankle joint effusion is noted.    IMAGING: left foot xray - I personally reviewed images - There is lytic destructive change dorsal aspect tuft distal phalanx third toe suspicious for osteomyelitis. There is soft tissue swelling in this region as well from soft tissue infection.     ASSESSMENT: 41 yr old female with polysubstance abuse history, sepsis, ischemic left 3rd toe with osteomyelitis.      PLAN:  Reviewed patient's chart in [facility].  -reviewed and discussed xrays with patient. Concern for bone infection in distal phalanx.  -explained that the toe to the left 3rd toe is dead and not coming back. Also discussed that this can be an area for infection and continued sepsis.   -given the xray  changes and ischemic changes to the distal left 3rd toe, I recommend partial toe amputation to get rid of bone infection and ischemic tissue.     -Patient wants to think about it.   -Will follow up with patient tomorrow.       Makenzie Mcneil DPM, Podiatry/Foot and Ankle Surgery    3:36 PM

## 2021-08-06 ENCOUNTER — APPOINTMENT (OUTPATIENT)
Dept: OCCUPATIONAL THERAPY | Facility: CLINIC | Age: 41
End: 2021-08-06
Payer: MEDICAID

## 2021-08-06 PROBLEM — M86.9 OSTEOMYELITIS OF THIRD TOE OF LEFT FOOT (H): Status: ACTIVE | Noted: 2021-07-01

## 2021-08-06 PROBLEM — L97.524: Status: ACTIVE | Noted: 2021-07-01

## 2021-08-06 LAB
ATRIAL RATE - MUSE: 73 BPM
DIASTOLIC BLOOD PRESSURE - MUSE: NORMAL MMHG
INTERPRETATION ECG - MUSE: NORMAL
P AXIS - MUSE: 24 DEGREES
PR INTERVAL - MUSE: 170 MS
QRS DURATION - MUSE: 92 MS
QT - MUSE: 410 MS
QTC - MUSE: 451 MS
R AXIS - MUSE: 20 DEGREES
SYSTOLIC BLOOD PRESSURE - MUSE: NORMAL MMHG
T AXIS - MUSE: -7 DEGREES
VENTRICULAR RATE- MUSE: 73 BPM

## 2021-08-06 PROCEDURE — 250N000013 HC RX MED GY IP 250 OP 250 PS 637: Performed by: HOSPITALIST

## 2021-08-06 PROCEDURE — 250N000013 HC RX MED GY IP 250 OP 250 PS 637: Performed by: INTERNAL MEDICINE

## 2021-08-06 PROCEDURE — 97535 SELF CARE MNGMENT TRAINING: CPT | Mod: GO | Performed by: OCCUPATIONAL THERAPIST

## 2021-08-06 PROCEDURE — 250N000009 HC RX 250: Performed by: STUDENT IN AN ORGANIZED HEALTH CARE EDUCATION/TRAINING PROGRAM

## 2021-08-06 PROCEDURE — 99231 SBSQ HOSP IP/OBS SF/LOW 25: CPT | Mod: 57 | Performed by: PODIATRIST

## 2021-08-06 PROCEDURE — 250N000013 HC RX MED GY IP 250 OP 250 PS 637: Performed by: STUDENT IN AN ORGANIZED HEALTH CARE EDUCATION/TRAINING PROGRAM

## 2021-08-06 PROCEDURE — 120N000001 HC R&B MED SURG/OB

## 2021-08-06 PROCEDURE — 99232 SBSQ HOSP IP/OBS MODERATE 35: CPT | Performed by: INTERNAL MEDICINE

## 2021-08-06 RX ORDER — CEFAZOLIN SODIUM 2 G/100ML
2 INJECTION, SOLUTION INTRAVENOUS SEE ADMIN INSTRUCTIONS
Status: CANCELLED | OUTPATIENT
Start: 2021-08-06

## 2021-08-06 RX ORDER — CEFAZOLIN SODIUM 2 G/100ML
2 INJECTION, SOLUTION INTRAVENOUS
Status: CANCELLED | OUTPATIENT
Start: 2021-08-06

## 2021-08-06 RX ADMIN — ACETAMINOPHEN 650 MG: 325 TABLET, FILM COATED ORAL at 16:52

## 2021-08-06 RX ADMIN — HYDROXYZINE HYDROCHLORIDE 25 MG: 25 TABLET, FILM COATED ORAL at 16:52

## 2021-08-06 RX ADMIN — ACETAMINOPHEN 650 MG: 325 TABLET, FILM COATED ORAL at 06:31

## 2021-08-06 RX ADMIN — GABAPENTIN 600 MG: 300 CAPSULE ORAL at 17:00

## 2021-08-06 RX ADMIN — NAFCILLIN SODIUM 2 G: 2 INJECTION, POWDER, LYOPHILIZED, FOR SOLUTION INTRAMUSCULAR; INTRAVENOUS at 00:20

## 2021-08-06 RX ADMIN — NAFCILLIN SODIUM 2 G: 2 INJECTION, POWDER, LYOPHILIZED, FOR SOLUTION INTRAMUSCULAR; INTRAVENOUS at 11:59

## 2021-08-06 RX ADMIN — NAFCILLIN SODIUM 2 G: 2 INJECTION, POWDER, LYOPHILIZED, FOR SOLUTION INTRAMUSCULAR; INTRAVENOUS at 16:51

## 2021-08-06 RX ADMIN — NAFCILLIN SODIUM 2 G: 2 INJECTION, POWDER, LYOPHILIZED, FOR SOLUTION INTRAMUSCULAR; INTRAVENOUS at 04:17

## 2021-08-06 RX ADMIN — GABAPENTIN 600 MG: 300 CAPSULE ORAL at 08:18

## 2021-08-06 RX ADMIN — HYDROXYZINE HYDROCHLORIDE 25 MG: 25 TABLET, FILM COATED ORAL at 06:31

## 2021-08-06 RX ADMIN — PANTOPRAZOLE SODIUM 40 MG: 40 TABLET, DELAYED RELEASE ORAL at 06:25

## 2021-08-06 RX ADMIN — NAFCILLIN SODIUM 2 G: 2 INJECTION, POWDER, LYOPHILIZED, FOR SOLUTION INTRAMUSCULAR; INTRAVENOUS at 23:23

## 2021-08-06 RX ADMIN — GABAPENTIN 600 MG: 300 CAPSULE ORAL at 23:23

## 2021-08-06 RX ADMIN — NAFCILLIN SODIUM 2 G: 2 INJECTION, POWDER, LYOPHILIZED, FOR SOLUTION INTRAMUSCULAR; INTRAVENOUS at 19:52

## 2021-08-06 RX ADMIN — NAFCILLIN SODIUM 2 G: 2 INJECTION, POWDER, LYOPHILIZED, FOR SOLUTION INTRAMUSCULAR; INTRAVENOUS at 08:18

## 2021-08-06 RX ADMIN — BUPRENORPHINE AND NALOXONE 1 FILM: 8; 2 FILM, SOLUBLE BUCCAL; SUBLINGUAL at 08:18

## 2021-08-06 RX ADMIN — Medication 1 TABLET: at 17:00

## 2021-08-06 RX ADMIN — BUPRENORPHINE AND NALOXONE 1 FILM: 8; 2 FILM, SOLUBLE BUCCAL; SUBLINGUAL at 17:05

## 2021-08-06 RX ADMIN — BICTEGRAVIR SODIUM, EMTRICITABINE, AND TENOFOVIR ALAFENAMIDE FUMARATE 1 TABLET: 50; 200; 25 TABLET ORAL at 08:18

## 2021-08-06 ASSESSMENT — ACTIVITIES OF DAILY LIVING (ADL)
ADLS_ACUITY_SCORE: 18

## 2021-08-06 NOTE — PROGRESS NOTES
CLINICAL NUTRITION SERVICES - REASSESSMENT NOTE    Malnutrition: (7/19)   % Weight Loss:  Weight loss does not meet criteria for malnutrition - pt states wt is still elevated from baseline   % Intake:  <75% for > 7 days (non-severe malnutrition) - on average  Subcutaneous Fat Loss:  Orbital region mild depletion and Upper arm region mild depletion  Muscle Loss:  Clavicle bone region mild depletion  Fluid Retention:  Moderate     Malnutrition Diagnosis: Non-Severe malnutrition  In Context of:  Acute illness or injury     EVALUATION OF PROGRESS TOWARD GOALS   Diet: Regular diet + no straws   Intake/Tolerance:   - Tolerating % of her meals. Orders 2-3 large meals each day.   - Good appetite recorded  - BM x1 on 8/5. Previous BM x1 on 7/31  - Weight has not been updated since 7/16    ASSESSED NUTRITION NEEDS:  Dosing Weight:  69.6 kg (adjusted for overwt)  Energy Needs:  4167-2253 kcals (25-30 kcal/kg)  Justification: overweight    Protein Needs:   grams protein (1.2-1.5 g pro/Kg)  Justification: hypercatabolism with acute illness and preservation of lean body mass    NEW FINDINGS:   - Pt may have osteomyelitis to right 3rd toe. ?surgery     Last WOCN assessement --> 8/4  Wounds:  Lt great toes, Lt 3rd toe and Lt lateral  Heel  Rt great toe  All wounds r/t to ischemic emboli. No local s/s infection  Lt 3rd toe needs monitoring for ongoing necrosis secondary to ongoing ecchymosis on toe    Previous Goals:   Intake of > 75% meals TID   Evaluation: Met on average (100% BID vs % TID)    Previous Nutrition Diagnosis:   No nutrition diagnosis identified at this time   Evaluation: No change    CURRENT NUTRITION DIAGNOSIS  No nutrition diagnosis identified at this time     INTERVENTIONS  Recommendations / Nutrition Prescription  Continue regular diet  Encourage meals TID + protein intake with wounds   Continue MVI+M for wounds     Implementation  None new    Goals  Intake of >75% protein-dense meals BID-TID.        MONITORING AND EVALUATION:  Progress towards goals will be monitored and evaluated per protocol and Practice Guidelines    Nubia Marks RD, LD  [facility] [place], [address], [place]   Pager: [phone]  Weekend Pager: [phone]

## 2021-08-06 NOTE — PROGRESS NOTES
"Podiatry / Foot and Ankle Surgery Progress Note    August 6, 2021    Subject: Patient was seen at bedside.  Notes she is scared about procedure for toe.     Objective:  Vitals: BP 93/59 (BP Location: Left arm)   Pulse 90   Temp 98.1  F (36.7  C) (Oral)   Resp 18   Ht 1.727 m (5' 7.99\")   Wt 76.8 kg (169 lb 5 oz)   SpO2 96%   BMI 25.75 kg/m    BMI= Body mass index is 25.75 kg/m .     WBC   Date Value Ref Range Status   07/10/2021 8.6 4.0 - 11.0 10e9/L Final     WBC Count   Date Value Ref Range Status   08/04/2021 8.7 4.0 - 11.0 10e3/uL Final     HA1C: 5.9     INR: 1.23  C-reactive protein: 126     General appearance: Patient flat affect.      Psychiatric: Affect is pleasant.       Respiratory: Breathing is regular & unlabored while sitting.      HEENT: Hearing is intact to spoken word.  Speech is clear.  No gross evidence of visual impairment that would impact ambulation.       Dermatologic:   Left 3rd toe ischemic tissue to distal 1/3d of toe. Currently stable. No redness, purulent drainage or signs of acute infection. Other ischemic areas toe distal great toes bilaterally. These areas appear stable and to be healing.      Vascular: DP & PT pulses are intact & regular bilaterally.  No significant edema or varicosities noted.  CFT and skin temperature is normal to both lower extremities.       Neurologic: Lower extremity sensation is intact to light touch.  No evidence of weakness or contracture in the lower extremities.  No evidence of neuropathy.       Musculoskeletal: Patient is ambulatory without assistive device or brace.  No gross ankle deformity noted.  No foot or ankle joint effusion is noted.     IMAGING: left foot xray - I personally reviewed images - There is lytic destructive change dorsal aspect tuft distal phalanx third toe suspicious for osteomyelitis. There is soft tissue swelling in this region as well from soft tissue infection.     ASSESSMENT: 41 yr old female with polysubstance abuse " history, sepsis, ischemic left 3rd toe with osteomyelitis.      PLAN:  Reviewed patient's chart in [facility].  -reviewed and discussed xrays with patient. Concern for bone infection in distal phalanx.  -explained that the toe to the left 3rd toe is dead and not coming back. Also discussed that this can be an area for infection and continued sepsis.   -given the xray changes and ischemic changes to the distal left 3rd toe, I recommend partial toe amputation to get rid of bone infection and ischemic tissue.      -Discussed that she will be minimal weight bearing post op in boot. Discussed stitches stay in for 3-6 weeks depending on healing.  -She is willing to proceed with surgery at 7:30am tomorrow.  -NPO after midnight. Orders placed.     Makenzie Mcneil DPM, Podiatry/Foot and Ankle Surgery  12:18 PM

## 2021-08-06 NOTE — PROGRESS NOTES
"[facility]    Hospitalist Progress Note      Assessment & Plan   41 year old female with h/o untreated HIV, hepatitis C who was admitted on 7/1/2021 for MSSA mitral valve endocarditis resulting in septic shock complicated by embolic left parietal lobe infarctions and multiple peripheral emboli, and initially with encephalopathy, and shock.  She was intubated in ED on 7/1/2021 subsequently extubated 7/11/2021.  Blood cultures turned positive on hospital day 1 with MSSA.  Urine culture was positive for Klebsiella.  She was treated initially with ceftriaxone and vancomycin, and has since been narrowed to nafcillin per ID.     MSSA mitral valve endocarditis  Septic shock, resolved  Probable myopericarditis  Small pericardial effusion  *JAE 7/5 noted with small ASD, mod to large mobile vegetation (14mm long, 6mm wide) of the P2 segment of the mitral leaflet. No valve perforation; mod MR  *blood cultures on admission with MSSA; blood cultures have been negative since 7/6.   *ID following; antibiotic has been narrowed down to Nafcillin, 6 weeks of antibiotic, completion date 8/18/2021.  *limited ECHO repeated 7/15 to reassess pericardial fluid 7/15 per cardiology and noted:  organized effusion posteriorly, measuring 1.2-1.5cm, no evidence of tamponade; small mobile vegetation noted attached to the mitral leaflet, mod to severe MR; compared to 7/8/21, effusion size is probably stable, Degree of MR is worse, Vegetation is smaller.  Cardiology signed off 7/15.   *re-evaluated by CT surgery 7/15 and suggested continued antibiotic therapy at this time; per CT surgery \"Were she to become a surgical candidate, we would recommend waiting at least 4 weeks from her stroke and she would need repeat MRI imaging to rule out mycotic aneurysms or brain abscesses\"; will have follow up with CT surgery as outpatient  *Abx briefly interrupted as she had been pulling out her access sites; midline placed on 7/13/21-- " pulled out again on 7/16-- PICC placed on 8/2/21.   -will need follow up with cardiothoracic surgery as an outpatient    Possible Osteomyelitis right 3rd toe   -- Podiatry consult in AM    Left knee pain: pt reports history of pain in that knee with activity but pain now is more acute. ROM okay. There is some warmth and swelling there.   - In light of the MSSA bacteremia, got XR and called ortho for effusion  - Per ortho no indication for intervention, though they will order a brace. Appreciate assistance    IV drug use  Polysubstance use disorder  *Chem dep consulted 7/15 but chemical dependency did not feel she is appropriate for CD assessment at this time and recommended consultation towards the end of her antibiotic course.  Reconsult CD once she is close to finishing her IV antibiotic.  *Was using IV dilaudid as well as Percocet p.o. earlier this stay pretty extensively.  Attempts made to wean off IV narcotics and taper down po narcotics though patient felt she was going through withdrawal. Psych consulted and recommended suboxone though patient declined. Psych recommended rapid taper of methadone instead.   *completed methadone taper of 30 mg on 7/21, 20 mg 7/22 and then 10 mg 7/23, then stop  -no further narcotics   -started suboxone on 7/27 - appreciate psych/addiction medicine assistance who will help with titration   - suboxone was adjusted again today. Pt is happy with plan.  -psych also mentioned outpatient methadone maintenances as a possibility  -Reconsult CD once she is close to finishing her IV antibiotic.     HIV, untreated PTA  - CD4 count greater than 500, viral load 3695  - Started Biktarvy 7/8; ID following    Chronic back pain: pt notes it present ever since she was pregnant.   -avoiding narcotics per psych  -tylenol  -added lidoderm patch 7/23  -toradol prn  -increase gabapentin to 600 mg TID     Toxic/septic/metabolic encephalopathy, acute, improving  Likely encephalopathy due to sepsis, drug  use, CVA, prolonged ICU stay; had been pulling lines due to alterned mentation thought this all now appears resolved.   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  Completed course of treatment with ceftriaxone.  repeat urine cultures 7/10 with ESBL E coli but given unimpressive UA and no UTI symptoms ID suggest holding off on abx for ESBL UTI  -monitor for any symptoms     Acute hypoxemic respiratory failure, resolved  Intubated 7/1/2021.  Extubated 7/11/2021.  Likely related to septic shock from MSSA mitral valve endocarditis     FORD, resolved suspected due to sepsis  Hypokalemia  - on potassium replacement protocol  - will check BMP periodically     Chronic anemia  Baseline hemoglobin approximately 7.  Likely related to chronic disease, untreated HIV.  -Monitor hemoglobin periodically; stable around 9     Non severe malnutrition  -- Supplements      Prior hepatitis C infection  Antibody positive but RNA negative  -Noted    Restless legs: unclear if this is true restless leg syndrome or due to being uncomfortable  -continue mirapex prn    Necrotic wounds to Lt great toes, Lt 3rd toe and Lt lateral Heel, Rt great toe: 2/2 thrombi.   No evidence of active local infection. If things change, consider podiatry consult.     Major depressive disorder: pt is not suicidal but also doesn't seem to have much of a will to live based upon discussion 7/25.  I wonder if her underlying chronic pain which is  difficult to treat as well as being in the hospital is contributing. Was seen by psych and declined any medication treatment on 7/26.   -patient was on zoloft for years starting at age 13. She is willing to try zoloft again. Started 8/1 at 25mg po and titrate up to 50mg within a week if no issues.    DVT Prophylaxis: heparin  Code Status: Full Code    Disposition: Expected discharge once IV abx are complete after 8/18 as having a difficult time finding placement in TCU.  She is homeless at baseline.         Raimundo Calixto MD  Pager: [phone]  Cell Phone:  [phone]       Interval History   No chest pain today, overall feels better, but still fever and increased CRP.     Physical Exam   Temp: 98.8  F (37.1  C) Temp src: Oral BP: 117/64 Pulse: 99   Resp: 18 SpO2: 99 % O2 Device: None (Room air)    Vitals:    07/13/21 0200 07/14/21 0445 07/16/21 0615   Weight: 89 kg (196 lb 3.4 oz) 84.2 kg (185 lb 10 oz) 76.8 kg (169 lb 5 oz)     Vital Signs with Ranges  Temp:  [97.4  F (36.3  C)-99.9  F (37.7  C)] 98.8  F (37.1  C)  Pulse:  [74-99] 99  Resp:  [16-20] 18  BP: ()/(58-66) 117/64  SpO2:  [95 %-99 %] 99 %  No intake/output data recorded.    Constitutional: sitting in bed, appears comfortable  Respiratory: Clear   Cardiovascular: Regular rate and rhythm.  No murmur.  GI: Positive bowel sounds, soft, non-distended, non-tender.   Extr:  Right third toe swollen and spongy  Neurologic: Alert, Ox3, no focal deficits, she does speak somewhat childish.     Medications       bictegravir-emtricitabine-tenofovir  1 tablet Oral Daily     buprenorphine HCl-naloxone HCl  1 Film Sublingual BID     gabapentin  600 mg Oral TID     multivitamin w/minerals  1 tablet Oral Daily     nafcillin  2 g Intravenous Q4H     pantoprazole  40 mg Oral QAM AC     sodium chloride (PF)  10 mL Intracatheter Q8H     sodium chloride (PF)  10-40 mL Intracatheter Q7 Days       Data   Recent Labs   Lab 08/04/21  0614 08/04/21  0613  08/01/21  1145 07/30/21  1856   WBC 8.7  --   --  8.4   HGB 8.4*  --   --  9.1*   MCV 85  --   --  86     --   --  370   NA  --  133  --  137   POTASSIUM  --  4.0  --  4.0   CHLORIDE  --  104  --  107   CO2  --  27  --  28   BUN  --  11  --  17   CR  --  0.64  --  0.63   ANIONGAP  --  2*  --  2*   DEREK  --  8.1*  --  8.1*   GLC  --  119* 145* 85   ALBUMIN  --   --   --  2.1*   PROTTOTAL  --   --   --  8.4   BILITOTAL  --   --   --  0.6   ALKPHOS  --   --   --  117   ALT  --   --   --  19   AST  --   --   --  17   TROPONIN  --   --   --  <0.015       Recent Results (from the past 24 hour(s))   XR Toe Left G/E 2 Views    Narrative    TOE LEFT TWO OR MORE VIEWS August 5, 2021 11:16 AM     HISTORY: Left third toe pain. Possible septic emboli.    COMPARISON: None.      Impression    IMPRESSION: There is lytic destructive change dorsal aspect tuft  distal phalanx third toe suspicious for osteomyelitis. There is soft  tissue swelling in this region as well from soft tissue infection.    JT EUCEDA MD         SYSTEM ID:  [id number]

## 2021-08-06 NOTE — PLAN OF CARE
Summary: Endocarditis MSSA  DATE & TIME: 8/6/2021 5097-0353  Cognitive Concerns/ Orientation : A&Ox4, calm  BEHAVIOR & AGGRESSION TOOL COLOR: Green  ABNL VS/O2: VSS on RA.  MOBILITY: SBA with walker, up in chair, ambulating in halls with knee brace.   PAIN MANAGMENT: Requesting tylenol and hydroxyzine for 8/10 pain in chest, back, legs (ortho brace for left knee)  DIET: Regular diet, tolerating well. NPO at midnight for partial toe amp tomorrow 8/7, patient aware.   BOWEL/BLADDER: continent, ambulates to bathroom  ABNL LAB/BG: N/A  DRAIN/DEVICES: R PICC patent with intermittent abx; good blood return.  SKIN: Bruises; black toes with dressings due to pt picking at eschar, and blister side of R heel, Scattered scars. WOC orders in place.  TESTS/PROCEDURES: Xray 3rd toe left foot; Osteo, podiatry following, partial toe amp tomorrow on schedule for 0730 8/7.  D/C DAY/GOALS/PLACE: Pending, will need abx until 8/18/2021. Possible transfer to [facility] for continued care/AB  OTHER IMPORTANT INFO: Contact isolation for ESBL.

## 2021-08-06 NOTE — PLAN OF CARE
Summary: Endocarditis MSSA  DATE & TIME: 8/5/2021 7822-9752  Cognitive Concerns/ Orientation : A&Ox4, calm  BEHAVIOR & AGGRESSION TOOL COLOR: Green  ABNL VS/O2: VSS on RA.  MOBILITY: SBA with walker, ambulated in [place] x 2 during day  PAIN MANAGMENT:States chest pain is better today, requesting tylenol x2 and hydroxyzine x2 for 8-10/10 pain in chest, back, legs (ortho brace for left knee)  DIET: Regular diet, tolerating well  BOWEL/BLADDER: continent , ambulates to bathroom  ABNL LAB/BG: N/A  DRAIN/DEVICES: R PICC patent with intermittent abx; good blood return  SKIN: Bruises; black toes with dressings due to pt picking at eschar, and blister side of R heel, Scattered scars. WOC checked wounds today.  TESTS/PROCEDURES: CXR after chest pains showed unremarkable. Xray 3rd toe left foot ? Osteo, podiatry consult placed, ?partial toe  amp on 8/7. Covid swab neg today.  D/C DAY/GOALS/PLACE: Pending, will need abx until 8/18/2021 . Possible transfer to [facility] for continued care/AB  OTHER IMPORTANT INFO: Contact isolation for ESBL

## 2021-08-07 ENCOUNTER — APPOINTMENT (OUTPATIENT)
Dept: GENERAL RADIOLOGY | Facility: CLINIC | Age: 41
End: 2021-08-07
Attending: PODIATRIST
Payer: MEDICAID

## 2021-08-07 ENCOUNTER — ANESTHESIA (OUTPATIENT)
Dept: SURGERY | Facility: CLINIC | Age: 41
End: 2021-08-07
Payer: MEDICAID

## 2021-08-07 ENCOUNTER — ANESTHESIA EVENT (OUTPATIENT)
Dept: SURGERY | Facility: CLINIC | Age: 41
End: 2021-08-07
Payer: MEDICAID

## 2021-08-07 PROCEDURE — 88307 TISSUE EXAM BY PATHOLOGIST: CPT | Mod: TC | Performed by: PODIATRIST

## 2021-08-07 PROCEDURE — 0Y6U0Z2 DETACHMENT AT LEFT 3RD TOE, MID, OPEN APPROACH: ICD-10-PCS | Performed by: PODIATRIST

## 2021-08-07 PROCEDURE — 250N000009 HC RX 250: Performed by: PODIATRIST

## 2021-08-07 PROCEDURE — 999N000063 XR FOOT PORT LEFT 3 VIEWS: Mod: LT

## 2021-08-07 PROCEDURE — 250N000009 HC RX 250: Performed by: STUDENT IN AN ORGANIZED HEALTH CARE EDUCATION/TRAINING PROGRAM

## 2021-08-07 PROCEDURE — 120N000001 HC R&B MED SURG/OB

## 2021-08-07 PROCEDURE — 250N000013 HC RX MED GY IP 250 OP 250 PS 637: Performed by: PODIATRIST

## 2021-08-07 PROCEDURE — 88311 DECALCIFY TISSUE: CPT | Mod: 26 | Performed by: PATHOLOGY

## 2021-08-07 PROCEDURE — 360N000082 HC SURGERY LEVEL 2 W/ FLUORO, PER MIN: Performed by: PODIATRIST

## 2021-08-07 PROCEDURE — 999N000141 HC STATISTIC PRE-PROCEDURE NURSING ASSESSMENT: Performed by: PODIATRIST

## 2021-08-07 PROCEDURE — 28825 PARTIAL AMPUTATION OF TOE: CPT | Mod: T2 | Performed by: PODIATRIST

## 2021-08-07 PROCEDURE — 272N000001 HC OR GENERAL SUPPLY STERILE: Performed by: PODIATRIST

## 2021-08-07 PROCEDURE — 370N000017 HC ANESTHESIA TECHNICAL FEE, PER MIN: Performed by: PODIATRIST

## 2021-08-07 PROCEDURE — 258N000003 HC RX IP 258 OP 636: Performed by: ANESTHESIOLOGY

## 2021-08-07 PROCEDURE — 710N000009 HC RECOVERY PHASE 1, LEVEL 1, PER MIN: Performed by: PODIATRIST

## 2021-08-07 PROCEDURE — 250N000013 HC RX MED GY IP 250 OP 250 PS 637: Performed by: INTERNAL MEDICINE

## 2021-08-07 PROCEDURE — 99232 SBSQ HOSP IP/OBS MODERATE 35: CPT | Performed by: HOSPITALIST

## 2021-08-07 PROCEDURE — 88307 TISSUE EXAM BY PATHOLOGIST: CPT | Mod: 26 | Performed by: PATHOLOGY

## 2021-08-07 PROCEDURE — 250N000011 HC RX IP 250 OP 636: Performed by: NURSE ANESTHETIST, CERTIFIED REGISTERED

## 2021-08-07 RX ORDER — ONDANSETRON 4 MG/1
4 TABLET, ORALLY DISINTEGRATING ORAL EVERY 6 HOURS PRN
Status: DISCONTINUED | OUTPATIENT
Start: 2021-08-07 | End: 2021-08-15

## 2021-08-07 RX ORDER — ACETAMINOPHEN 325 MG/1
650 TABLET ORAL EVERY 4 HOURS PRN
Status: DISCONTINUED | OUTPATIENT
Start: 2021-08-10 | End: 2021-08-15

## 2021-08-07 RX ORDER — PROCHLORPERAZINE MALEATE 10 MG
10 TABLET ORAL EVERY 6 HOURS PRN
Status: DISCONTINUED | OUTPATIENT
Start: 2021-08-07 | End: 2021-08-15

## 2021-08-07 RX ORDER — KETOROLAC TROMETHAMINE 30 MG/ML
INJECTION, SOLUTION INTRAMUSCULAR; INTRAVENOUS PRN
Status: DISCONTINUED | OUTPATIENT
Start: 2021-08-07 | End: 2021-08-07

## 2021-08-07 RX ORDER — AMOXICILLIN 250 MG
1 CAPSULE ORAL 2 TIMES DAILY
Status: DISCONTINUED | OUTPATIENT
Start: 2021-08-07 | End: 2021-08-14

## 2021-08-07 RX ORDER — ONDANSETRON 2 MG/ML
INJECTION INTRAMUSCULAR; INTRAVENOUS PRN
Status: DISCONTINUED | OUTPATIENT
Start: 2021-08-07 | End: 2021-08-07

## 2021-08-07 RX ORDER — MAGNESIUM HYDROXIDE 1200 MG/15ML
LIQUID ORAL PRN
Status: DISCONTINUED | OUTPATIENT
Start: 2021-08-07 | End: 2021-08-07 | Stop reason: HOSPADM

## 2021-08-07 RX ORDER — OXYCODONE HYDROCHLORIDE 5 MG/1
15 TABLET ORAL EVERY 4 HOURS PRN
Status: DISCONTINUED | OUTPATIENT
Start: 2021-08-07 | End: 2021-08-12

## 2021-08-07 RX ORDER — BUPIVACAINE HYDROCHLORIDE 5 MG/ML
INJECTION, SOLUTION EPIDURAL; INTRACAUDAL PRN
Status: DISCONTINUED | OUTPATIENT
Start: 2021-08-07 | End: 2021-08-07 | Stop reason: HOSPADM

## 2021-08-07 RX ORDER — POLYETHYLENE GLYCOL 3350 17 G/17G
17 POWDER, FOR SOLUTION ORAL DAILY
Status: DISCONTINUED | OUTPATIENT
Start: 2021-08-08 | End: 2021-08-14

## 2021-08-07 RX ORDER — OXYCODONE HYDROCHLORIDE 5 MG/1
10 TABLET ORAL EVERY 4 HOURS PRN
Status: DISCONTINUED | OUTPATIENT
Start: 2021-08-07 | End: 2021-08-12

## 2021-08-07 RX ORDER — ONDANSETRON 2 MG/ML
4 INJECTION INTRAMUSCULAR; INTRAVENOUS EVERY 6 HOURS PRN
Status: DISCONTINUED | OUTPATIENT
Start: 2021-08-07 | End: 2021-08-15

## 2021-08-07 RX ORDER — BISACODYL 10 MG
10 SUPPOSITORY, RECTAL RECTAL DAILY PRN
Status: DISCONTINUED | OUTPATIENT
Start: 2021-08-07 | End: 2021-08-15

## 2021-08-07 RX ORDER — SODIUM CHLORIDE, SODIUM LACTATE, POTASSIUM CHLORIDE, CALCIUM CHLORIDE 600; 310; 30; 20 MG/100ML; MG/100ML; MG/100ML; MG/100ML
INJECTION, SOLUTION INTRAVENOUS CONTINUOUS
Status: DISCONTINUED | OUTPATIENT
Start: 2021-08-07 | End: 2021-08-07 | Stop reason: HOSPADM

## 2021-08-07 RX ORDER — PROPOFOL 10 MG/ML
INJECTION, EMULSION INTRAVENOUS CONTINUOUS PRN
Status: DISCONTINUED | OUTPATIENT
Start: 2021-08-07 | End: 2021-08-07

## 2021-08-07 RX ORDER — LIDOCAINE 40 MG/G
CREAM TOPICAL
Status: DISCONTINUED | OUTPATIENT
Start: 2021-08-07 | End: 2021-08-15

## 2021-08-07 RX ORDER — ACETAMINOPHEN 325 MG/1
975 TABLET ORAL
Status: COMPLETED | OUTPATIENT
Start: 2021-08-07 | End: 2021-08-10

## 2021-08-07 RX ORDER — PROPOFOL 10 MG/ML
INJECTION, EMULSION INTRAVENOUS PRN
Status: DISCONTINUED | OUTPATIENT
Start: 2021-08-07 | End: 2021-08-07

## 2021-08-07 RX ADMIN — GABAPENTIN 600 MG: 300 CAPSULE ORAL at 17:01

## 2021-08-07 RX ADMIN — MIDAZOLAM 1 MG: 1 INJECTION INTRAMUSCULAR; INTRAVENOUS at 07:23

## 2021-08-07 RX ADMIN — PROPOFOL 30 MG: 10 INJECTION, EMULSION INTRAVENOUS at 07:32

## 2021-08-07 RX ADMIN — PROPOFOL 40 MG: 10 INJECTION, EMULSION INTRAVENOUS at 07:25

## 2021-08-07 RX ADMIN — NAFCILLIN SODIUM 2 G: 2 INJECTION, POWDER, LYOPHILIZED, FOR SOLUTION INTRAMUSCULAR; INTRAVENOUS at 11:17

## 2021-08-07 RX ADMIN — Medication 1 TABLET: at 17:01

## 2021-08-07 RX ADMIN — NAFCILLIN SODIUM 2 G: 2 INJECTION, POWDER, LYOPHILIZED, FOR SOLUTION INTRAMUSCULAR; INTRAVENOUS at 04:42

## 2021-08-07 RX ADMIN — KETOROLAC TROMETHAMINE 30 MG: 30 INJECTION, SOLUTION INTRAMUSCULAR at 07:52

## 2021-08-07 RX ADMIN — ACETAMINOPHEN 975 MG: 325 TABLET, FILM COATED ORAL at 17:01

## 2021-08-07 RX ADMIN — GABAPENTIN 600 MG: 300 CAPSULE ORAL at 21:00

## 2021-08-07 RX ADMIN — NAFCILLIN SODIUM 2 G: 2 INJECTION, POWDER, LYOPHILIZED, FOR SOLUTION INTRAMUSCULAR; INTRAVENOUS at 20:17

## 2021-08-07 RX ADMIN — ONDANSETRON 4 MG: 2 INJECTION INTRAMUSCULAR; INTRAVENOUS at 07:23

## 2021-08-07 RX ADMIN — MIDAZOLAM 1 MG: 1 INJECTION INTRAMUSCULAR; INTRAVENOUS at 07:27

## 2021-08-07 RX ADMIN — PROPOFOL 50 MCG/KG/MIN: 10 INJECTION, EMULSION INTRAVENOUS at 07:25

## 2021-08-07 RX ADMIN — BUPRENORPHINE AND NALOXONE 1 FILM: 8; 2 FILM, SOLUBLE BUCCAL; SUBLINGUAL at 17:01

## 2021-08-07 RX ADMIN — PROPOFOL 30 MG: 10 INJECTION, EMULSION INTRAVENOUS at 07:33

## 2021-08-07 RX ADMIN — SODIUM CHLORIDE, POTASSIUM CHLORIDE, SODIUM LACTATE AND CALCIUM CHLORIDE: 600; 310; 30; 20 INJECTION, SOLUTION INTRAVENOUS at 07:16

## 2021-08-07 RX ADMIN — NAFCILLIN SODIUM 2 G: 2 INJECTION, POWDER, LYOPHILIZED, FOR SOLUTION INTRAMUSCULAR; INTRAVENOUS at 07:52

## 2021-08-07 RX ADMIN — NAFCILLIN SODIUM 2 G: 2 INJECTION, POWDER, LYOPHILIZED, FOR SOLUTION INTRAMUSCULAR; INTRAVENOUS at 17:01

## 2021-08-07 RX ADMIN — HYDROXYZINE HYDROCHLORIDE 25 MG: 25 TABLET, FILM COATED ORAL at 20:21

## 2021-08-07 RX ADMIN — ACETAMINOPHEN 650 MG: 325 TABLET, FILM COATED ORAL at 04:42

## 2021-08-07 RX ADMIN — ACETAMINOPHEN 975 MG: 325 TABLET, FILM COATED ORAL at 10:23

## 2021-08-07 RX ADMIN — HYDROXYZINE HYDROCHLORIDE 25 MG: 25 TABLET, FILM COATED ORAL at 04:44

## 2021-08-07 ASSESSMENT — ENCOUNTER SYMPTOMS
DYSRHYTHMIAS: 0
SEIZURES: 0

## 2021-08-07 ASSESSMENT — ACTIVITIES OF DAILY LIVING (ADL)
ADLS_ACUITY_SCORE: 18
ADLS_ACUITY_SCORE: 18
ADLS_ACUITY_SCORE: 16

## 2021-08-07 ASSESSMENT — LIFESTYLE VARIABLES: TOBACCO_USE: 1

## 2021-08-07 NOTE — PROGRESS NOTES
"[facility]    Hospitalist Progress Note      Assessment & Plan   41 year old female with h/o untreated HIV, hepatitis C who was admitted on 7/1/2021 for MSSA mitral valve endocarditis resulting in septic shock complicated by embolic left parietal lobe infarctions and multiple peripheral emboli, and initially with encephalopathy, and shock.  She was intubated in ED on 7/1/2021 subsequently extubated 7/11/2021.  Blood cultures turned positive on hospital day 1 with MSSA.  Urine culture was positive for Klebsiella.  She was treated initially with ceftriaxone and vancomycin, and has since been narrowed to nafcillin per ID.     MSSA mitral valve endocarditis  Septic shock, resolved  Probable myopericarditis  Small pericardial effusion  *JAE 7/5 noted with small ASD, mod to large mobile vegetation (14mm long, 6mm wide) of the P2 segment of the mitral leaflet. No valve perforation; mod MR  *blood cultures on admission with MSSA; blood cultures have been negative since 7/6.   *ID following; antibiotic has been narrowed down to Nafcillin, 6 weeks of antibiotic, completion date 8/18/2021.  *limited ECHO repeated 7/15 to reassess pericardial fluid 7/15 per cardiology and noted:  organized effusion posteriorly, measuring 1.2-1.5cm, no evidence of tamponade; small mobile vegetation noted attached to the mitral leaflet, mod to severe MR; compared to 7/8/21, effusion size is probably stable, Degree of MR is worse, Vegetation is smaller.  Cardiology signed off 7/15.   *re-evaluated by CT surgery 7/15 and suggested continued antibiotic therapy at this time; per CT surgery \"Were she to become a surgical candidate, we would recommend waiting at least 4 weeks from her stroke and she would need repeat MRI imaging to rule out mycotic aneurysms or brain abscesses\"; will have follow up with CT surgery as outpatient  *Abx briefly interrupted as she had been pulling out her access sites; midline placed on 7/13/21-- " pulled out again on 7/16-- PICC placed on 8/2/21.   -will need follow up with cardiothoracic surgery as an outpatient    Possible Osteomyelitis left 3rd toe   -- Podiatry consulted, to OR this morning for amputation.    Left knee pain: pt reports history of pain in that knee with activity but pain now is more acute. ROM okay. There is some warmth and swelling there.   - In light of the MSSA bacteremia, got XR and called ortho for effusion  - Per ortho no indication for intervention, though they will order a brace. Appreciate assistance    IV drug use  Polysubstance use disorder  *Chem dep consulted 7/15 but chemical dependency did not feel she is appropriate for CD assessment at this time and recommended consultation towards the end of her antibiotic course.  Reconsult CD once she is close to finishing her IV antibiotic.  *Was using IV dilaudid as well as Percocet p.o. earlier this stay pretty extensively.  Attempts made to wean off IV narcotics and taper down po narcotics though patient felt she was going through withdrawal. Psych consulted and recommended suboxone though patient declined. Psych recommended rapid taper of methadone instead.   *completed methadone taper of 30 mg on 7/21, 20 mg 7/22 and then 10 mg 7/23, then stop  -no further narcotics   -started suboxone on 7/27 - appreciate psych/addiction medicine assistance who will help with titration   - suboxone was adjusted again today. Pt is happy with plan.  -psych also mentioned outpatient methadone maintenances as a possibility  -Reconsult CD once she is close to finishing her IV antibiotic.     HIV, untreated PTA  - CD4 count greater than 500, viral load 3695  - Started Biktarvy 7/8; ID following    Chronic back pain: pt notes it present ever since she was pregnant.   -avoiding narcotics per psych  -tylenol  -added lidoderm patch 7/23  -toradol prn  -increase gabapentin to 600 mg TID     Toxic/septic/metabolic encephalopathy, acute, improving  Likely  encephalopathy due to sepsis, drug use, CVA, prolonged ICU stay; had been pulling lines due to alterned mentation thought this all now appears resolved.   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  Completed course of treatment with ceftriaxone.  repeat urine cultures 7/10 with ESBL E coli but given unimpressive UA and no UTI symptoms ID suggest holding off on abx for ESBL UTI  -monitor for any symptoms     Acute hypoxemic respiratory failure, resolved  Intubated 7/1/2021.  Extubated 7/11/2021.  Likely related to septic shock from MSSA mitral valve endocarditis     FORD, resolved suspected due to sepsis  Hypokalemia  - on potassium replacement protocol  - will check BMP periodically     Chronic anemia  Baseline hemoglobin approximately 7.  Likely related to chronic disease, untreated HIV.  -Monitor hemoglobin periodically; stable around 9     Non severe malnutrition  -- Supplements      Prior hepatitis C infection  Antibody positive but RNA negative  -Noted    Restless legs: unclear if this is true restless leg syndrome or due to being uncomfortable  -continue mirapex prn    Necrotic wounds to Lt great toes, Lt 3rd toe and Lt lateral Heel, Rt great toe: 2/2 thrombi.   No evidence of active local infection. If things change, consider podiatry consult.     Major depressive disorder: pt is not suicidal but also doesn't seem to have much of a will to live based upon discussion 7/25.  I wonder if  her underlying chronic pain which is difficult to treat as well as being in the hospital is contributing. Was seen by psych and declined any medication treatment on 7/26.   -patient was on zoloft for years starting at age 13. She is willing to try zoloft again. Started 8/1 at 25mg po and will titrate up to 50mg today    DVT Prophylaxis: SCDs  Code Status: Full Code    Disposition: Expected discharge once IV abx are complete after 8/18 as having a difficult time finding placement in TCU.  She is homeless at baseline.         Deepti Rivera MD  Hospitalist      Interval History   Patient sleepy after return from procedure. No acute complaints.    Physical Exam   Temp: 97.8  F (36.6  C) Temp src: Oral BP: 100/58 Pulse: 61   Resp: 18 SpO2: 97 % O2 Device: None (Room air) Oxygen Delivery: 2 LPM  Vitals:    07/13/21 0200 07/14/21 0445 07/16/21 0615   Weight: 89 kg (196 lb 3.4 oz) 84.2 kg (185 lb 10 oz) 76.8 kg (169 lb 5 oz)     Vital Signs with Ranges  Temp:  [97.4  F (36.3  C)-99.1  F (37.3  C)] 97.8  F (36.6  C)  Pulse:  [61-90] 61  Resp:  [10-18] 18  BP: ()/(43-63) 100/58  SpO2:  [95 %-100 %] 97 %  No intake/output data recorded.    Constitutional: lying in bed, appears comfortable  Respiratory: Clear   Cardiovascular: Regular rate and rhythm.  No murmur.  GI: Positive bowel sounds, soft, non-distended, non-tender.   Extr:  Right foot in dressing  Neurologic: Alert, Ox3, no focal deficits    Medications       acetaminophen  975 mg Oral Q8H     bictegravir-emtricitabine-tenofovir  1 tablet Oral Daily     buprenorphine HCl-naloxone HCl  1 Film Sublingual BID     gabapentin  600 mg Oral TID     multivitamin w/minerals  1 tablet Oral Daily     nafcillin  2 g Intravenous Q4H     pantoprazole  40 mg Oral QAM AC     [START ON 8/8/2021] polyethylene glycol  17 g Oral Daily     senna-docusate  1 tablet Oral BID     sodium chloride (PF)  10 mL Intracatheter Q8H     sodium chloride (PF)  10-40 mL Intracatheter Q7 Days      sodium chloride (PF)  3 mL Intracatheter Q8H       Data   Recent Labs   Lab 08/04/21  0614 08/04/21  0613 08/01/21  1145   WBC 8.7  --   --    HGB 8.4*  --   --    MCV 85  --   --      --   --    NA  --  133  --    POTASSIUM  --  4.0  --    CHLORIDE  --  104  --    CO2  --  27  --    BUN  --  11  --    CR  --  0.64  --    ANIONGAP  --  2*  --    DEREK  --  8.1*  --    GLC  --  119* 145*       Recent Results (from the past 24 hour(s))   X-ray lt Foot 3 vw port: In PACU    Narrative    XR FOOT PORT LEFT 3 VIEWS 8/7/2021 8:40 AM    HISTORY: post op state, left foot pain.    COMPARISON: None.    FINDINGS:   Partial amputation of the second toe at the level of the PIP joint. No  fracture. No osteomyelitis. No degenerative changes.    BREE GALLEGOS MD         SYSTEM ID:  [id number]

## 2021-08-07 NOTE — BRIEF OP NOTE
[facility]    Brief Operative Note    Pre-operative diagnosis: Septic shock (H) [A41.9, R65.21]  Osteomyelitis of third toe of left foot (H) [M86.9]  Ischemic ulcer of toe of left foot with necrosis of bone (H) [L97.524]  Post-operative diagnosis Same as pre-operative diagnosis    Procedure: Procedure(s):  partial left 3rd toe amputation.  Surgeon: Surgeon(s) and Role:     * Makenzie Mcneil DPM, Podiatry/Foot and Ankle Surgery - Primary  Anesthesia: Combined MAC with Local   Estimated blood loss: Less than 10 ml  Drains: None  Specimens:   ID Type Source Tests Collected by Time Destination   1 : ASSESS FOR BONE INFECTION Tissue Toe, Left SURGICAL PATHOLOGY EXAM Makenzie Mcneil DPM, Podiatry/Foot and Ankle Surgery 8/7/2021  6:59 AM      Findings:   None.  Complications: None.  Implants: * No implants in log *

## 2021-08-07 NOTE — ANESTHESIA PREPROCEDURE EVALUATION
Anesthesia Pre-Procedure Evaluation    Patient: Manas Hernandez   MRN: [id number] : 1980        Preoperative Diagnosis: Septic shock (H) [A41.9, R65.21]  Osteomyelitis of third toe of left foot (H) [M86.9]  Ischemic ulcer of toe of left foot with necrosis of bone (H) [L97.524]   Procedure : Procedure(s):  partial left 3rd toe amputation.     Past Medical History:   Diagnosis Date     Heroin abuse (H)      Methamphetamine abuse (H)      Polysubstance abuse (H)       Past Surgical History:   Procedure Laterality Date     DILATION AND CURETTAGE        No Known Allergies   Social History     Tobacco Use     Smoking status: Current Every Day Smoker     Types: Cigarettes     Tobacco comment: 1-2   Substance Use Topics     Alcohol use: No      Wt Readings from Last 1 Encounters:   21 76.8 kg (169 lb 5 oz)        ECG: Sinus rhythm   Low voltage QRS   RSR' or QR pattern in V1   T wave abnormality    ECHO 7-15-21: Interpretation Summary     There apperas to be organized effusion posteriorly, measuring 1.2-1.5cm.  No evidence of tamponade.  The mitral valve inflow Doppler reveals no significant respiratory variation.  Abnormal septal motion is noted with mild flattening intermittently.Hepatic  vein doppler was suboptimal.  Small mobile vegetation noted attached to the mitral leaflet.  There is moderate to mod-severe (2-3+) mitral regurgitation. Pulmonary vein  doppler does not reveal systolic reversal.  Compared to 21, effusion size is probably stable. Degree of MR is worse,  Vegetation is smaller. The study was technically difficult.  ______________________________________________________________________________  Left Ventricle  The left ventricle is normal in size. Left ventricular systolic function is  normal. The visual ejection fraction is 55-60%. No regional wall motion  abnormalities noted.     Right Ventricle  The right ventricle is normal size. The right ventricular systolic function  is  normal.     Mitral Valve  The mitral valve leaflets are mildly thickened. There is moderate to mod-  severe (2-3+) mitral regurgitation. Small mobile vegetation notes attached to  the mitrl leaflet.     Tricuspid Valve  There is mild (1+) tricuspid regurgitation. The right ventricular systolic  pressure is approximated at 22.4 mmHg plus the right atrial pressure. IVC  diameter <2.1 cm collapsing >50% with sniff suggests a normal RA pressure of 3  mmHg.     Aortic Valve  The aortic valve is trileaflet. There is trace aortic regurgitation. No aortic  stenosis is present.     Pericardium  The mitral valve inflow Doppler reveals no significant respiratory variation.  A loculated pericardial effusion is noted. Small right pleural effusion. Small  left pleural effusion. (organized      Anesthesia Evaluation   Pt has had prior anesthetic. Type: General.    No history of anesthetic complications       ROS/MED HX  ENT/Pulmonary:     (+) tobacco use,  (-) asthma and sleep apnea   Neurologic:     (+) CVA, with deficits, - left sided weakness.  (-) no seizures and migraines   Cardiovascular:     (+) -----valvular problems/murmurs type: MR  (-) hypertension, CAD, TURNER, arrhythmias and dyslipidemia   METS/Exercise Tolerance: >4 METS Comment: Endocarditis     Hematologic:     (+) anemia,  (-) history of blood clots   Musculoskeletal:    (-) arthritis   GI/Hepatic:    (-) GERD and liver disease   Renal/Genitourinary:    (-) renal disease (acute pyelonephritis) and nephrolithiasis   Endo:    (-) Type I DM, Type II DM, thyroid disease and obesity   Psychiatric/Substance Use: Comment: Polysubstance abuse      (+) alcohol abuse  (-) psychiatric history   Infectious Disease:     (+) Recent Fever, HIV,     Malignancy:       Other:      (+) , H/O Chronic Pain,        Physical Exam    Airway        Mallampati: III   TM distance: > 3 FB   Neck ROM: full   Mouth opening: > 3 cm    Respiratory Devices and Support     Nasal Canula       Dental       (+) missing      Cardiovascular          Rhythm and rate: normal     Pulmonary   pulmonary exam normal        breath sounds clear to auscultation           OUTSIDE LABS:  CBC:   Lab Results   Component Value Date    WBC 8.7 08/04/2021    WBC 8.4 07/30/2021    HGB 8.4 (L) 08/04/2021    HGB 9.1 (L) 07/30/2021    HCT 27.4 (L) 08/04/2021    HCT 30.5 (L) 07/30/2021     08/04/2021     07/30/2021     BMP:   Lab Results   Component Value Date     08/04/2021     07/30/2021    POTASSIUM 4.0 08/04/2021    POTASSIUM 4.0 07/30/2021    CHLORIDE 104 08/04/2021    CHLORIDE 107 07/30/2021    CO2 27 08/04/2021    CO2 28 07/30/2021    BUN 11 08/04/2021    BUN 17 07/30/2021    CR 0.64 08/04/2021    CR 0.63 07/30/2021     (H) 08/04/2021     (H) 08/01/2021     COAGS:   Lab Results   Component Value Date    INR 1.23 (H) 07/01/2021     POC:   Lab Results   Component Value Date     (H) 07/11/2021    HCGS Negative 07/01/2021     HEPATIC:   Lab Results   Component Value Date    ALBUMIN 2.1 (L) 07/30/2021    PROTTOTAL 8.4 07/30/2021    ALT 19 07/30/2021    AST 17 07/30/2021    ALKPHOS 117 07/30/2021    BILITOTAL 0.6 07/30/2021     OTHER:   Lab Results   Component Value Date    PH 7.49 (H) 07/09/2021    LACT 1.5 07/02/2021    A1C 5.9 (H) 07/03/2021    DEREK 8.1 (L) 08/04/2021    PHOS 4.5 07/16/2021    MAG 2.0 07/16/2021    LIPASE 23 (L) 07/01/2021    TSH 0.58 07/01/2021    .0 (H) 08/04/2021       Anesthesia Plan    ASA Status:  3   NPO Status:  NPO Appropriate    Anesthesia Type: MAC.     - Reason for MAC: straight local not clinically adequate              Consents    Anesthesia Plan(s) and associated risks, benefits, and realistic alternatives discussed. Questions answered and patient/representative(s) expressed understanding.     - Discussed with:  Patient         Postoperative Care    Pain management: Multi-modal analgesia.   PONV prophylaxis: Ondansetron (or other 5HT-3)      Comments:                Almita Shah MD

## 2021-08-07 NOTE — OP NOTE
SURGEON:  Makenize Mcneil DPM    Procedure Date: 08/07/2021    PREOPERATIVE DIAGNOSES:    1.  Ischemic left third toe.  2.  Left foot pain.  3.  Osteomyelitis, left third toe.    POSTOPERATIVE DIAGNOSES:    1.  Ischemic left third toe.  2.  Left foot pain.  3.  Osteomyelitis, left third toe.    PROCEDURE:  Partial left third toe amputation.    ANESTHESIA:  MAC with local.    HEMOSTASIS:  None.    COMPLICATIONS:  None.    ESTIMATED BLOOD LOSS:  2 mL.    SPECIMENS:  Distal left third toe for pathology.    MATERIALS:  None.    INDICATIONS FOR PROCEDURE:  Ms. Hernandez is a 41-year-old female that presented to the hospital with septic shock.  She was found to have ischemic areas on her great toes and left third toe.  The great toes appears stable, but x-rays show underlying bone infection in the distal phalanx of the left third toe.  It was discussed with the patient to go in and remove this to try to prevent worsening recurrent infection.  We will monitor the great toes for now.  Risks, benefits, and complications were discussed with the patient.  No guarantees were made.  She wished to proceed with surgery.    DESCRIPTION OF PROCEDURE:  The patient was brought to the operating room and placed on the operating table in supine position.  Anesthesia was administered and local was injected.  The foot was prepped and draped using sterile technique.  Attention was directed to the distal aspect of the left third toe.  A fishmouth incision was made around the distal one-third of the left third toe full thickness down to bone with a #15 blade.  The tissue was sharply dissected off of the middle phalanx and this was disarticulated at the proximal interphalangeal joint.  The toe was sent for pathology.  The wound was flushed with copious amounts of normal saline and the skin was reapproximated with 4-0 Prolene.  The patient's foot was placed in dry sterile dressing.  The patient tolerated the procedure and anesthesia well and was  transferred to recovery with vital signs stable and vascular status intact.  The patient will be minimal weightbearing in a postop shoe.    Makenzie Mcneil DPM        D: 2021   T: 2021   MT: [email or web address]    Name:     JOAQUÍN SAAVEDRA  MRN:      [id number]        Account:        [id number]   :      1980           Procedure Date: 2021     Document: [id number]

## 2021-08-07 NOTE — PLAN OF CARE
Summary: Endocarditis MSSA  DATE & TIME: 8/7/2021 4620-5756  Cognitive Concerns/ Orientation : A&O x4, calm & cooperative. Flat affect  BEHAVIOR & AGGRESSION TOOL COLOR: Green  ABNL VS/O2: VSS on RA,   CAPNO post-op  MOBILITY: SBA GB/W, minimal weight bearing on L heel, needs post op boot.    PAIN MANAGMENT: Pt reports 9/10 generalized pain, scheduled Tylenol given  DIET: Regular diet.   BOWEL/BLADDER: Continent, up to BR, adequately voiding after procedure   ABNL LAB/BG: N/A  DRAIN/DEVICES: R PICC SL with q4h abx  SKIN: Scattered bruising and scabbing, R heel blister, black toes   TESTS/PROCEDURES: L 3rd toe partial amputation today 8/7, dressing CDI  D/C DAY/GOALS/PLACE: Pending, will need abx until 8/18/2021. Possible transfer to [facility] for continued care  OTHER IMPORTANT INFO: Contact isolation for ESBL. Minimal weight bearing post op on L foot. Needs shoe, consult placed.

## 2021-08-07 NOTE — PROGRESS NOTES
"[facility]    Hospitalist Progress Note      Assessment & Plan   41 year old female with h/o untreated HIV, hepatitis C who was admitted on 7/1/2021 for MSSA mitral valve endocarditis resulting in septic shock complicated by embolic left parietal lobe infarctions and multiple peripheral emboli, and initially with encephalopathy, and shock.  She was intubated in ED on 7/1/2021 subsequently extubated 7/11/2021.  Blood cultures turned positive on hospital day 1 with MSSA.  Urine culture was positive for Klebsiella.  She was treated initially with ceftriaxone and vancomycin, and has since been narrowed to nafcillin per ID.     MSSA mitral valve endocarditis  Septic shock, resolved  Probable myopericarditis  Small pericardial effusion  *JAE 7/5 noted with small ASD, mod to large mobile vegetation (14mm long, 6mm wide) of the P2 segment of the mitral leaflet. No valve perforation; mod MR  *blood cultures on admission with MSSA; blood cultures have been negative since 7/6.   *ID following; antibiotic has been narrowed down to Nafcillin, 6 weeks of antibiotic, completion date 8/18/2021.  *limited ECHO repeated 7/15 to reassess pericardial fluid 7/15 per cardiology and noted:  organized effusion posteriorly, measuring 1.2-1.5cm, no evidence of tamponade; small mobile vegetation noted attached to the mitral leaflet, mod to severe MR; compared to 7/8/21, effusion size is probably stable, Degree of MR is worse, Vegetation is smaller.  Cardiology signed off 7/15.   *re-evaluated by CT surgery 7/15 and suggested continued antibiotic therapy at this time; per CT surgery \"Were she to become a surgical candidate, we would recommend waiting at least 4 weeks from her stroke and she would need repeat MRI imaging to rule out mycotic aneurysms or brain abscesses\"; will have follow up with CT surgery as outpatient  *Abx briefly interrupted as she had been pulling out her access sites; midline placed on 7/13/21-- " pulled out again on 7/16-- PICC placed on 8/2/21.   -will need follow up with cardiothoracic surgery as an outpatient    Possible Osteomyelitis left 3rd toe   -- Podiatry consulted, surgery in AM     Left knee pain: pt reports history of pain in that knee with activity but pain now is more acute. ROM okay. There is some warmth and swelling there.   - In light of the MSSA bacteremia, got XR and called ortho for effusion  - Per ortho no indication for intervention, though they will order a brace. Appreciate assistance    IV drug use  Polysubstance use disorder  *Chem dep consulted 7/15 but chemical dependency did not feel she is appropriate for CD assessment at this time and recommended consultation towards the end of her antibiotic course.  Reconsult CD once she is close to finishing her IV antibiotic.  *Was using IV dilaudid as well as Percocet p.o. earlier this stay pretty extensively.  Attempts made to wean off IV narcotics and taper down po narcotics though patient felt she was going through withdrawal. Psych consulted and recommended suboxone though patient declined. Psych recommended rapid taper of methadone instead.   *completed methadone taper of 30 mg on 7/21, 20 mg 7/22 and then 10 mg 7/23, then stop  -no further narcotics   -started suboxone on 7/27 - appreciate psych/addiction medicine assistance who will help with titration   - suboxone was adjusted again today. Pt is happy with plan.  -psych also mentioned outpatient methadone maintenances as a possibility  -Reconsult CD once she is close to finishing her IV antibiotic.     HIV, untreated PTA  - CD4 count greater than 500, viral load 3695  - Started Biktarvy 7/8; ID following    Chronic back pain: pt notes it present ever since she was pregnant.   -avoiding narcotics per psych  -tylenol  -added lidoderm patch 7/23  -toradol prn  -increase gabapentin to 600 mg TID     Toxic/septic/metabolic encephalopathy, acute, improving  Likely encephalopathy due to  sepsis, drug use, CVA, prolonged ICU stay; had been pulling lines due to alterned mentation thought this all now appears resolved.   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  Completed course of treatment with ceftriaxone.  repeat urine cultures 7/10 with ESBL E coli but given unimpressive UA and no UTI symptoms ID suggest holding off on abx for ESBL UTI  -monitor for any symptoms     Acute hypoxemic respiratory failure, resolved  Intubated 7/1/2021.  Extubated 7/11/2021.  Likely related to septic shock from MSSA mitral valve endocarditis     FORD, resolved suspected due to sepsis  Hypokalemia  - on potassium replacement protocol  - will check BMP periodically     Chronic anemia  Baseline hemoglobin approximately 7.  Likely related to chronic disease, untreated HIV.  -Monitor hemoglobin periodically; stable around 9     Non severe malnutrition  -- Supplements      Prior hepatitis C infection  Antibody positive but RNA negative  -Noted    Restless legs: unclear if this is true restless leg syndrome or due to being uncomfortable  -continue mirapex prn    Necrotic wounds to Lt great toes, Lt 3rd toe and Lt lateral Heel, Rt great toe: 2/2 thrombi.   No evidence of active local infection. If things change, consider podiatry consult.     Major depressive disorder: pt is not suicidal but also doesn't seem to have much of a will to live based upon discussion 7/25.  I wonder if her underlying chronic  pain which is difficult to treat as well as being in the hospital is contributing. Was seen by psych and declined any medication treatment on 7/26.   -patient was on zoloft for years starting at age 13. She is willing to try zoloft again. Started 8/1 at 25mg po and titrate up to 50mg within a week if no issues.    DVT Prophylaxis: heparin  Code Status: Full Code    Disposition: Expected discharge once IV abx are complete after 8/18 as having a difficult time finding placement in TCU.  She is homeless at baseline.         Raimundo Calixto MD  Pager: [phone]  Cell Phone:  [phone]       Interval History   No chest pain today, overall feels better, but still fever and increased CRP.     Physical Exam   Temp: 98.1  F (36.7  C) Temp src: Oral BP: 113/63 Pulse: 84   Resp: 18 SpO2: 97 % O2 Device: None (Room air)    Vitals:    07/13/21 0200 07/14/21 0445 07/16/21 0615   Weight: 89 kg (196 lb 3.4 oz) 84.2 kg (185 lb 10 oz) 76.8 kg (169 lb 5 oz)     Vital Signs with Ranges  Temp:  [98.1  F (36.7  C)-98.2  F (36.8  C)] 98.1  F (36.7  C)  Pulse:  [84-90] 84  Resp:  [16-18] 18  BP: ()/(59-63) 113/63  SpO2:  [96 %-98 %] 97 %  No intake/output data recorded.    Constitutional: sitting in bed, appears comfortable  Respiratory: Clear   Cardiovascular: Regular rate and rhythm.  No murmur.  GI: Positive bowel sounds, soft, non-distended, non-tender.   Extr:  Right third toe swollen and spongy  Neurologic: Alert, Ox3, no focal deficits, she does speak somewhat childish.     Medications       bictegravir-emtricitabine-tenofovir  1 tablet Oral Daily     buprenorphine HCl-naloxone HCl  1 Film Sublingual BID     gabapentin  600 mg Oral TID     multivitamin w/minerals  1 tablet Oral Daily     nafcillin  2 g Intravenous Q4H     pantoprazole  40 mg Oral QAM AC     sodium chloride (PF)  10 mL Intracatheter Q8H     sodium chloride (PF)  10-40 mL Intracatheter Q7 Days       Data   Recent Labs   Lab 08/04/21  0614  08/04/21  0613 08/01/21  1145   WBC 8.7  --   --    HGB 8.4*  --   --    MCV 85  --   --      --   --    NA  --  133  --    POTASSIUM  --  4.0  --    CHLORIDE  --  104  --    CO2  --  27  --    BUN  --  11  --    CR  --  0.64  --    ANIONGAP  --  2*  --    DEREK  --  8.1*  --    GLC  --  119* 145*       No results found for this or any previous visit (from the past 24 hour(s)).

## 2021-08-07 NOTE — ANESTHESIA POSTPROCEDURE EVALUATION
Patient: Manas Hernandez    Procedure(s):  partial left 3rd toe amputation.    Diagnosis:Septic shock (H) [A41.9, R65.21]  Osteomyelitis of third toe of left foot (H) [M86.9]  Ischemic ulcer of toe of left foot with necrosis of bone (H) [L97.524]  Diagnosis Additional Information: No value filed.    Anesthesia Type:  MAC    Note:  Disposition: Admission   Postop Pain Control: Uneventful            Sign Out: Well controlled pain   PONV: No   Neuro/Psych: Uneventful            Sign Out: Acceptable/Baseline neuro status   Airway/Respiratory: Uneventful            Sign Out: Acceptable/Baseline resp. status   CV/Hemodynamics: Uneventful            Sign Out: Acceptable CV status; No obvious hypovolemia; No obvious fluid overload   Other NRE: NONE   DID A NON-ROUTINE EVENT OCCUR? No           Last vitals:  Vitals Value Taken Time   BP 92/53 08/07/21 0900   Temp 36.3  C (97.4  F) 08/07/21 0810   Pulse 69 08/07/21 0903   Resp 12 08/07/21 0903   SpO2 99 % 08/07/21 0903   Vitals shown include unvalidated device data.    Electronically Signed By: Almita Shah MD  August 7, 2021  1:13 PM

## 2021-08-07 NOTE — ANESTHESIA CARE TRANSFER NOTE
Patient: Manas Hernandez    Procedure(s):  partial left 3rd toe amputation.    Diagnosis: Septic shock (H) [A41.9, R65.21]  Osteomyelitis of third toe of left foot (H) [M86.9]  Ischemic ulcer of toe of left foot with necrosis of bone (H) [L97.524]  Diagnosis Additional Information: No value filed.    Anesthesia Type:   MAC     Note:    Oropharynx: oropharynx clear of all foreign objects  Level of Consciousness: drowsy  Oxygen Supplementation: face mask  Level of Supplemental Oxygen (L/min / FiO2): 6  Independent Airway: airway patency satisfactory and stable  Dentition: dentition unchanged  Vital Signs Stable: post-procedure vital signs reviewed and stable  Report to RN Given: handoff report given  Patient transferred to: PACU  Comments: At end of procedure, spontaneous respirations, patient alert to voice, able to follow commands. Oxygen via facemask at 6 liters per minute to PACU. Oxygen tubing connected to wall O2 in PACU, SpO2, NiBP, and EKG monitors and alarms on and functioning, report on patient's clinical status given to PACU RN, RN questions answered.  Handoff Report: Identifed the Patient, Identified the Reponsible Provider, Reviewed the pertinent medical history, Discussed the surgical course, Reviewed Intra-OP anesthesia mangement and issues during anesthesia, Set expectations for post-procedure period and Allowed opportunity for questions and acknowledgement of understanding      Vitals:  Vitals Value Taken Time   BP     Temp     Pulse     Resp     SpO2         Electronically Signed By: STEFANIE Grace CRNA  August 7, 2021  8:07 AM

## 2021-08-07 NOTE — PLAN OF CARE
DATE & TIME: 8/6/2021 1900-0730  Cognitive Concerns/ Orientation : A&O x4, calm & cooperative. Flat affect  BEHAVIOR & AGGRESSION TOOL COLOR: Green  ABNL VS/O2: VSS on RA  MOBILITY: SBA GB/W  PAIN MANAGMENT: Pt reports 9/10 generalized pain, PRN Tylenol given  DIET: Regular diet. NPO at midnight  BOWEL/BLADDER: Continent, up to BR   ABNL LAB/BG: N/A  DRAIN/DEVICES: R PICC SL with q4h abx  SKIN: Scattered bruising and scabbing, R heel blister, black toes   TESTS/PROCEDURES: L 3rd toe partial amputation at 0730 on 8/7 (picked up by preop RN at 0630)  D/C DAY/GOALS/PLACE: Pending, will need abx until 8/18/2021. Possible transfer to [facility] for continued care  OTHER IMPORTANT INFO: Contact isolation for ESBL.

## 2021-08-08 ENCOUNTER — APPOINTMENT (OUTPATIENT)
Dept: PHYSICAL THERAPY | Facility: CLINIC | Age: 41
End: 2021-08-08
Attending: PODIATRIST
Payer: MEDICAID

## 2021-08-08 LAB — GLUCOSE BLDC GLUCOMTR-MCNC: 105 MG/DL (ref 70–99)

## 2021-08-08 PROCEDURE — 250N000013 HC RX MED GY IP 250 OP 250 PS 637: Performed by: PODIATRIST

## 2021-08-08 PROCEDURE — 250N000009 HC RX 250: Performed by: PODIATRIST

## 2021-08-08 PROCEDURE — 99232 SBSQ HOSP IP/OBS MODERATE 35: CPT | Performed by: PODIATRIST

## 2021-08-08 PROCEDURE — 99233 SBSQ HOSP IP/OBS HIGH 50: CPT | Performed by: HOSPITALIST

## 2021-08-08 PROCEDURE — 120N000001 HC R&B MED SURG/OB

## 2021-08-08 PROCEDURE — L3260 AMBULATORY SURGICAL BOOT EAC: HCPCS

## 2021-08-08 PROCEDURE — 97110 THERAPEUTIC EXERCISES: CPT | Mod: GP | Performed by: PHYSICAL THERAPIST

## 2021-08-08 PROCEDURE — 250N000013 HC RX MED GY IP 250 OP 250 PS 637: Performed by: HOSPITALIST

## 2021-08-08 RX ADMIN — NAFCILLIN SODIUM 2 G: 2 INJECTION, POWDER, LYOPHILIZED, FOR SOLUTION INTRAMUSCULAR; INTRAVENOUS at 08:26

## 2021-08-08 RX ADMIN — GABAPENTIN 600 MG: 300 CAPSULE ORAL at 08:26

## 2021-08-08 RX ADMIN — GABAPENTIN 600 MG: 300 CAPSULE ORAL at 15:16

## 2021-08-08 RX ADMIN — Medication 1 TABLET: at 15:16

## 2021-08-08 RX ADMIN — NAFCILLIN SODIUM 2 G: 2 INJECTION, POWDER, LYOPHILIZED, FOR SOLUTION INTRAMUSCULAR; INTRAVENOUS at 20:12

## 2021-08-08 RX ADMIN — NAFCILLIN SODIUM 2 G: 2 INJECTION, POWDER, LYOPHILIZED, FOR SOLUTION INTRAMUSCULAR; INTRAVENOUS at 04:23

## 2021-08-08 RX ADMIN — BUPRENORPHINE AND NALOXONE 1 FILM: 8; 2 FILM, SOLUBLE BUCCAL; SUBLINGUAL at 15:16

## 2021-08-08 RX ADMIN — PANTOPRAZOLE SODIUM 40 MG: 40 TABLET, DELAYED RELEASE ORAL at 08:26

## 2021-08-08 RX ADMIN — NAFCILLIN SODIUM 2 G: 2 INJECTION, POWDER, LYOPHILIZED, FOR SOLUTION INTRAMUSCULAR; INTRAVENOUS at 11:32

## 2021-08-08 RX ADMIN — SENNOSIDES AND DOCUSATE SODIUM 1 TABLET: 8.6; 5 TABLET ORAL at 08:26

## 2021-08-08 RX ADMIN — BUPRENORPHINE AND NALOXONE 1 FILM: 8; 2 FILM, SOLUBLE BUCCAL; SUBLINGUAL at 08:25

## 2021-08-08 RX ADMIN — ACETAMINOPHEN 975 MG: 325 TABLET, FILM COATED ORAL at 15:16

## 2021-08-08 RX ADMIN — HYDROXYZINE HYDROCHLORIDE 25 MG: 25 TABLET, FILM COATED ORAL at 21:29

## 2021-08-08 RX ADMIN — SERTRALINE HYDROCHLORIDE 50 MG: 50 TABLET ORAL at 14:42

## 2021-08-08 RX ADMIN — HYDROXYZINE HYDROCHLORIDE 25 MG: 25 TABLET, FILM COATED ORAL at 14:42

## 2021-08-08 RX ADMIN — BICTEGRAVIR SODIUM, EMTRICITABINE, AND TENOFOVIR ALAFENAMIDE FUMARATE 1 TABLET: 50; 200; 25 TABLET ORAL at 08:26

## 2021-08-08 RX ADMIN — ACETAMINOPHEN 975 MG: 325 TABLET, FILM COATED ORAL at 00:34

## 2021-08-08 RX ADMIN — OXYCODONE HYDROCHLORIDE 15 MG: 5 TABLET ORAL at 00:54

## 2021-08-08 RX ADMIN — GABAPENTIN 600 MG: 300 CAPSULE ORAL at 21:29

## 2021-08-08 RX ADMIN — NAFCILLIN SODIUM 2 G: 2 INJECTION, POWDER, LYOPHILIZED, FOR SOLUTION INTRAMUSCULAR; INTRAVENOUS at 15:16

## 2021-08-08 RX ADMIN — ACETAMINOPHEN 975 MG: 325 TABLET, FILM COATED ORAL at 08:25

## 2021-08-08 RX ADMIN — NAFCILLIN SODIUM 2 G: 2 INJECTION, POWDER, LYOPHILIZED, FOR SOLUTION INTRAMUSCULAR; INTRAVENOUS at 00:35

## 2021-08-08 ASSESSMENT — ACTIVITIES OF DAILY LIVING (ADL)
ADLS_ACUITY_SCORE: 16

## 2021-08-08 NOTE — PROGRESS NOTES
Ortho  Left knee pain  Pain improved in left knee  States she is wearing brace (not at present)  Underwent partial left third toe amputation yesterday  NAD, A&O  Left knee - no effusion.  Valgus.  No TTP along joint lines.  No erythema. ROM 0-110    Plan:  Left knee OA  HKB  WBAT  Continues to not appear septic  Will continue to follow    Chaparrita Madrid PA-C  8:47 AM

## 2021-08-08 NOTE — PROGRESS NOTES
[facility]    Infectious Disease Progress Note    Date of Service : 08/08/2021     Assessment :  1. S.aureus MSSA native mitral valve endocarditis with septic shock in the setting of IVDA complicated by embolic L parietal lobe infarction and multiple peripheral emboli.   (TTE shows a large mobile 1.4 cm vegetation on the posterior mitral valve leaflet with extension to left atrium and small ASD per cardiology notes , no valvular abscess and no additional valvular involvement. Pericardial effusion is small). No seeding on CT abdomen but has peripheral emboli.  2. Untreated HIV diagnosed 2018, CD4 count maintained at >500, VL 3,695cpies/ml. Genotype pending  3. Hepatitis C Ab+ but RNA -.   4. 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 Multiple peripheral emboli. Right finger and discoloration of left 3rd toe and multiple peripheral emboli  6. FORD related to sepsis- resolved  7. Embolic stroke (small ASD with Left to right shunt)  8. Klebsiella pneumoniae UTI treated. ESBL E.coli urinary colonization  9. Severe anemia multifactorial. Has baseline anemia, exacerbated by chronic disease  10. Polysubstance abuse  11. Splenomegaly   12. Organized pericardial effusion      Recommendations:  1. Nafcillin  2. Biktarvy  3. Treat with IV antibiotics for 6 weeks until 8/18/2021 .   4. Will need HIV follow up after discharge  ID will follow weekly until discharge    Marisol Matos MD    Interval History   S/p left 3rd toe amputation yesterday, sitting out in chair having breakfast. Pain controlled, tolerating antibiotics without side effects.    Antimicrobial therapy  7/2 Nafcillin  7/8 Biktarvy  Prior  7/4-7/7 Cipro  7/1-7/2 Vancomycin, zosyn    Physical Exam   Temp: 97.8  F (36.6  C) Temp src: Oral BP: 109/68 Pulse: 70   Resp: 16 SpO2: 98 % O2 Device: None (Room air)    Vitals:    07/13/21 0200 07/14/21 0445 07/16/21 0615   Weight: 89 kg (196 lb 3.4 oz) 84.2 kg (185 lb 10 oz) 76.8 kg (169 lb 5 oz)      Vital Signs with Ranges  Temp:  [97.3  F (36.3  C)-97.8  F (36.6  C)] 97.8  F (36.6  C)  Pulse:  [61-73] 70  Resp:  [16-18] 16  BP: ()/(56-68) 109/68  SpO2:  [95 %-99 %] 98 %    GENERAL APPEARANCE: awake, alert   EYES: conjunctivae and sclerae normal  NECK: no adenopathy  RESP: lungs clear to auscultation - no rales, rhonchi or wheezes  CV: regular rates and rhythm, murmur  ABDOMEN: soft, umbilical hernia  MS: Left 3rd toe amputation  SKIN: peripheral stigmata of endocarditis with embolic phenomenon    Other:    Medications       acetaminophen  975 mg Oral Q8H     bictegravir-emtricitabine-tenofovir  1 tablet Oral Daily     buprenorphine HCl-naloxone HCl  1 Film Sublingual BID     gabapentin  600 mg Oral TID     multivitamin w/minerals  1 tablet Oral Daily     nafcillin  2 g Intravenous Q4H     pantoprazole  40 mg Oral QAM AC     polyethylene glycol  17 g Oral Daily     senna-docusate  1 tablet Oral BID     sodium chloride (PF)  10 mL Intracatheter Q8H     sodium chloride (PF)  10-40 mL Intracatheter Q7 Days     sodium chloride (PF)  3 mL Intracatheter Q8H       Data   All microbiology laboratory data reviewed.  Recent Labs   Lab Test 08/04/21  0614 07/30/21  1856 07/28/21  0611 07/19/21  1104   WBC 8.7 8.4  --  8.7   HGB 8.4* 9.1*  --  9.8*   HCT 27.4* 30.5*  --  31.9*   MCV 85 86  --  82    370 372 757*     Recent Labs   Lab Test 08/04/21  0613 07/30/21  1856 07/26/21  1020   CR 0.64 0.63 0.49*     No lab results found.  Recent Labs   Lab Test 07/10/21  2243 07/09/21  0454 07/09/21  0415 07/08/21  1417 07/08/21  0500 07/07/21  0450 07/06/21  0500 07/05/21  1232 07/05/21  1104   CULT >100,000 colonies/mL  Escherichia coli ESBL  ESBL (extended beta lactamase) producing organisms require contact precautions.  *  Critical Value/Significant Value called to and read back by  Luanne Bettencourt, RN @ 8797 7/13/21 TM.   No growth No growth No growth No growth No growth  No growth No growth  No growth  Cultured on the 2nd day of incubation:  Staphylococcus epidermidis  *  Critical Value/Significant Value, preliminary result only, called to and read back by  IVETT WARE RN 07/06/21 1258 EH.    Cultured on the 5th day of incubation:  Staphylococcus aureus  Susceptibility testing done on previous specimen  *  Critical Value/Significant Value called to and read back by  Cherelle Smith RN 1043 7/11/21 AM   Cultured on the 2nd day of incubation:  Staphylococcus aureus  Susceptibility testing done on previous specimen  *  Critical Value/Significant Value, preliminary result only, called to and read back by  iSn Jack RN at 0448 7/7/21 hg

## 2021-08-08 NOTE — PLAN OF CARE
Summary: Endocarditis MSSA  DATE & TIME: 8/7/2021 0193-1924  Cognitive Concerns/ Orientation : A&O x4, calm & cooperative. Flat affect  BEHAVIOR & AGGRESSION TOOL COLOR: Green  ABNL VS/O2: VSS on RA,   CAPNO post-op  MOBILITY: SBA GB/W, minimal weight bearing on L heel, needs post op boot.    PAIN MANAGMENT: Pt reports 8/10 generalized pain, scheduled Tylenol given  DIET: Regular diet.   BOWEL/BLADDER: Continent, up to BR, adequately voiding after procedure   ABNL LAB/BG: N/A  DRAIN/DEVICES: R PICC SL with q4h abx  SKIN: Scattered bruising and scabbing, R heel blister, black toes   TESTS/PROCEDURES: L 3rd toe partial amputation today 8/7, dressing CDI  D/C DAY/GOALS/PLACE: Pending, will need abx until 8/18/2021. Possible transfer to [facility] for continued care  OTHER IMPORTANT INFO: Contact isolation for ESBL. Minimal weight bearing post op on L foot. Needs shoe, consult placed.

## 2021-08-08 NOTE — PLAN OF CARE
HIV and hep C neg. Okay to give results at f/up.  Given his monogamous relationship, he should be counseled that he is low risk for HIV and hep C and syphilis and annual testing might be more appropriate then 2-3 times a year Summary: Endocarditis MSSA  DATE & TIME: 8/8/21 5592-1547  Cognitive Concerns/ Orientation : A&O x4, calm & cooperative. Flat affect  BEHAVIOR & AGGRESSION TOOL COLOR: Green  ABNL VS/O2: VSS on RA  MOBILITY: SBA GB/W, minimal weight bearing on L heel, needs post op boot when OOB.    PAIN MANAGMENT: Scheduled Tylenol, PRN atarax available  DIET: Regular diet.   BOWEL/BLADDER: Continent, up to BR.   ABNL LAB/BG: Post op day 1   DRAIN/DEVICES: R PICC SL with q4h abx  SKIN: Scattered bruising and scabbing, R heel blister, black toes. L foot dressing to be changed by podiatry 24 hours post-op, CDI  TESTS/PROCEDURES: L 3rd toe partial amputation 8/8/21  D/C DAY/GOALS/PLACE: Pending, will need abx until 8/18/2021.   OTHER IMPORTANT INFO: Contact isolation for ESBL. Minimal weight bearing post op on L foot with shoe.

## 2021-08-08 NOTE — PROGRESS NOTES
"Podiatry / Foot and Ankle Surgery Progress Note    August 8, 2021    Subject: Patient was seen at bedside.  Notes she didn't sleep much last night cause the left foot was sore.     Objective:  Vitals: /68   Pulse 70   Temp 97.8  F (36.6  C) (Oral)   Resp 16   Ht 1.727 m (5' 7.99\")   Wt 76.8 kg (169 lb 5 oz)   SpO2 98%   Breastfeeding Unknown   BMI 25.75 kg/m    BMI= Body mass index is 25.75 kg/m .     WBC   Date Value Ref Range Status   07/10/2021 8.6 4.0 - 11.0 10e9/L Final     WBC Count   Date Value Ref Range Status   08/04/2021 8.7 4.0 - 11.0 10e3/uL Final     General:  Patient is alert and orientated.  NAD.    Vascular:  DP and PT pulses are palpable.  No varicosities noted  CFT's < 3secs.  Skin temp is normal.    Neuro:  Light and gross touch sensation intact to digits, dorsum, and plantar aspects of the feet.    Derm:  Left foot:  Dressing c/d/i. Sutures intact. No redness, dehiscence or signs of infection noted. Small stable ischemic ulcer to great toe measures roughly 0.8cm x 0;8cm x 0.1cm.  Stable. No signs of acute infection noted.     Right foot:  Right heel blister is healed. Dry stable ischemic ulcer to distal right great toe. Measures 1.2cm x 1.2cm x 0.1cm. no redness or acute signs of infection noted.     Musculoskeletal:  Left 3rd toe now partially amputated.       Imaging: left foot post op xrays:  - Partial amputation of the second toe at the level of the PIP joint. No fracture. No osteomyelitis. No degenerative changes.    Assessment: 41 yr old female s/p partial left 3rd toe amputation due to osteomyelitis and ischemia.  Stable ischemic ulcer right foot, fat layer exposed.     Plan:    -POD#1  -Dressing was changed at bedside.   - Keep foot and dressing dry.   -minimal weight bearing on left foot in post op shoe.   - all bone infection removed from left foot. No further surgery planned.   - continue daily iodine dressing changes to right foot ulcer.  Currently stable and no surgery " planned for them at this time.   -Antibiotics per ID. Appreciate recs.   -Okay for patient to be discharged from podiatry standpoint. recs placed.   -Patient to follow up with podiatry in 1-2 weeks from discharge from the hospital.  -Podiatry will sign off. PLease call with questions or concerns.       Makenzie Mcneil DPM, Podiatry/Foot and Ankle Surgery  8:41 AM

## 2021-08-08 NOTE — PROGRESS NOTES
"[facility]    Hospitalist Progress Note      Assessment & Plan   41 year old female with h/o untreated HIV, hepatitis C who was admitted on 7/1/2021 for MSSA mitral valve endocarditis resulting in septic shock complicated by embolic left parietal lobe infarctions and multiple peripheral emboli, and initially with encephalopathy, and shock.  She was intubated in ED on 7/1/2021 subsequently extubated 7/11/2021.  Blood cultures turned positive on hospital day 1 with MSSA.  Urine culture was positive for Klebsiella.  She was treated initially with ceftriaxone and vancomycin, and has since been narrowed to nafcillin per ID.     MSSA mitral valve endocarditis  Septic shock, resolved  Probable myopericarditis  Small pericardial effusion  *JAE 7/5 noted with small ASD, mod to large mobile vegetation (14mm long, 6mm wide) of the P2 segment of the mitral leaflet. No valve perforation; mod MR  *blood cultures on admission with MSSA; blood cultures have been negative since 7/6.   *ID following; antibiotic has been narrowed down to Nafcillin, 6 weeks of antibiotic, completion date 8/18/2021.  *limited ECHO repeated 7/15 to reassess pericardial fluid 7/15 per cardiology and noted:  organized effusion posteriorly, measuring 1.2-1.5cm, no evidence of tamponade; small mobile vegetation noted attached to the mitral leaflet, mod to severe MR; compared to 7/8/21, effusion size is probably stable, Degree of MR is worse, Vegetation is smaller.  Cardiology signed off 7/15.   *re-evaluated by CT surgery 7/15 and suggested continued antibiotic therapy at this time; per CT surgery \"Were she to become a surgical candidate, we would recommend waiting at least 4 weeks from her stroke and she would need repeat MRI imaging to rule out mycotic aneurysms or brain abscesses\"; will have follow up with CT surgery as outpatient  *Abx briefly interrupted as she had been pulling out her access sites; midline placed on 7/13/21-- " pulled out again on 7/16-- PICC placed on 8/2/21.   -will need follow up with cardiothoracic surgery as an outpatient    Osteomyelitis left 3rd toe s/p amputation 8/7  -- Podiatry took to OR 8/7 for amputation. Orthotist following up.    Left knee pain: pt reports history of pain in that knee with activity but pain now is more acute. ROM okay. There is some warmth and swelling there.   - In light of the MSSA bacteremia, got XR and called ortho for effusion  - Per ortho no indication for intervention, though they will order a brace. Appreciate assistance    IV drug use  Polysubstance use disorder  *Chem dep consulted 7/15 but chemical dependency did not feel she is appropriate for CD assessment at this time and recommended consultation towards the end of her antibiotic course.  Reconsult CD once she is close to finishing her IV antibiotic.  *Was using IV dilaudid as well as Percocet p.o. earlier this stay pretty extensively.  Attempts made to wean off IV narcotics and taper down po narcotics though patient felt she was going through withdrawal. Psych consulted and recommended suboxone though patient declined. Psych recommended rapid taper of methadone instead.   *completed methadone taper of 30 mg on 7/21, 20 mg 7/22 and then 10 mg 7/23, then stop  -no further narcotics   -started suboxone on 7/27 - appreciate psych/addiction medicine assistance who will help with titration   - suboxone was adjusted again today. Pt is happy with plan.  -psych also mentioned outpatient methadone maintenances as a possibility  -Reconsult CD once she is close to finishing her IV antibiotic.     HIV, untreated PTA  - CD4 count greater than 500, viral load 3695  - Started Biktarvy 7/8; ID following    Chronic back pain: pt notes it present ever since she was pregnant.   -avoiding narcotics per psych  -tylenol  -added lidoderm patch 7/23  -toradol prn  -increase gabapentin to 600 mg TID     Toxic/septic/metabolic encephalopathy, acute,  improving  Likely encephalopathy due to sepsis, drug use, CVA, prolonged ICU stay; had been pulling lines due to alterned mentation thought this all now appears resolved.   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  Completed course of treatment with ceftriaxone.  repeat urine cultures 7/10 with ESBL E coli but given unimpressive UA and no UTI symptoms ID suggest holding off on abx for ESBL UTI  -monitor for any symptoms     Acute hypoxemic respiratory failure, resolved  Intubated 7/1/2021.  Extubated 7/11/2021.  Likely related to septic shock from MSSA mitral valve endocarditis     FORD, resolved suspected due to sepsis  Hypokalemia  - on potassium replacement protocol  - will check BMP periodically     Chronic anemia  Baseline hemoglobin approximately 7.  Likely related to chronic disease, untreated HIV.  -Monitor hemoglobin periodically; stable around 9     Non severe malnutrition  -- Supplements      Prior hepatitis C infection  Antibody positive but RNA negative  -Noted    Restless legs: unclear if this is true restless leg syndrome or due to being uncomfortable  -continue mirapex prn    Necrotic wounds to Lt great toes, Lt 3rd toe and Lt lateral Heel, Rt great toe: 2/2 thrombi.   No evidence of active local infection. If things change, consider podiatry consult.     Major depressive disorder: pt is not suicidal but also doesn't seem to have much of a will to live based upon discussion  7/25.  I wonder if her underlying chronic pain which is difficult to treat as well as being in the hospital is contributing. Was seen by psych and declined any medication treatment on 7/26.  patient was on zoloft for years starting at age 13. She is willing to try zoloft again.   -Started zoloft 8/1 at 25mg po and titrating up to 50mg    DVT Prophylaxis: SCDs  Code Status: Full Code    Disposition: Expected discharge once IV abx are complete after 8/18 as having a difficult time finding placement in TCU.  She is homeless at baseline.     Deepti Rivera MD  Hospitalist      Interval History   Patient tearful today about toe. She had just seen it when podiatry changed dressing and was just sad. Otherwise pleasant and cooperative as usual. A bit more withdrawn than she was last week.    Physical Exam   Temp: 97.8  F (36.6  C) Temp src: Oral BP: 109/68 Pulse: 70   Resp: 16 SpO2: 98 % O2 Device: None (Room air)    Vitals:    07/13/21 0200 07/14/21 0445 07/16/21 0615   Weight: 89 kg (196 lb 3.4 oz) 84.2 kg (185 lb 10 oz) 76.8 kg (169 lb 5 oz)     Vital Signs with Ranges  Temp:  [97.3  F (36.3  C)-98.1  F (36.7  C)] 97.8  F (36.6  C)  Pulse:  [61-73] 70  Resp:  [12-18] 16  BP: ()/(55-68) 109/68  SpO2:  [95 %-99 %] 98 %  I/O last 3 completed shifts:  In: -   Out: 3 [Blood:3]    Constitutional: lying in bed, appears comfortable  Respiratory: Clear   Cardiovascular: Regular rate and rhythm.  No murmur.  GI: Positive bowel sounds, soft, non-distended, non-tender.   Extr:  Right foot in dressing  Neurologic: Alert, Ox3, no focal deficits    Medications       acetaminophen  975 mg Oral Q8H     bictegravir-emtricitabine-tenofovir  1 tablet Oral Daily     buprenorphine HCl-naloxone HCl  1 Film Sublingual BID     gabapentin  600 mg Oral TID     multivitamin w/minerals  1 tablet Oral Daily     nafcillin  2 g Intravenous Q4H     pantoprazole  40 mg Oral QAM AC     polyethylene glycol  17 g Oral Daily     senna-docusate  1  tablet Oral BID     sodium chloride (PF)  10 mL Intracatheter Q8H     sodium chloride (PF)  10-40 mL Intracatheter Q7 Days     sodium chloride (PF)  3 mL Intracatheter Q8H       Data   Recent Labs   Lab 08/08/21  0632 08/04/21  0614 08/04/21  0613 08/01/21  1145   WBC  --  8.7  --   --    HGB  --  8.4*  --   --    MCV  --  85  --   --    PLT  --  320  --   --    NA  --   --  133  --    POTASSIUM  --   --  4.0  --    CHLORIDE  --   --  104  --    CO2  --   --  27  --    BUN  --   --  11  --    CR  --   --  0.64  --    ANIONGAP  --   --  2*  --    DEREK  --   --  8.1*  --    *  --  119* 145*       No results found for this or any previous visit (from the past 24 hour(s)).

## 2021-08-08 NOTE — PROGRESS NOTES
S:  I received a message on the On Call phone for a post op shoe needed.    O:  I met with the patient in room 66- and she is sitting up in a chair alert.  I donned a size medium post op shoe on the L foot.  A:  The post op shoe fits the L foot adequate accommodating the L foot and bandages.    P:  The patient will wear the shoe when up out of bed.  G:  Provide accommodative shoe.  David BAHENA

## 2021-08-08 NOTE — PLAN OF CARE
Summary: Endocarditis MSSA  DATE & TIME: 8/7/21-8/8/21 9137-6867  Cognitive Concerns/ Orientation : A&O x4, calm & cooperative. Flat affect  BEHAVIOR & AGGRESSION TOOL COLOR: Green  ABNL VS/O2: VSS on RA  MOBILITY: SBA GB/W, minimal weight bearing on L heel, needs post op boot.    PAIN MANAGMENT: Scheduled Tylenol, PRN atarax available  DIET: Regular diet.   BOWEL/BLADDER: Continent, up to BR, adequately voiding after procedure   ABNL LAB/BG: N/A  DRAIN/DEVICES: R PICC SL with q4h abx  SKIN: Scattered bruising and scabbing, R heel blister, black toes. L foot dressing to be changed by podiatry 24 hours post-op, CDI  TESTS/PROCEDURES: L 3rd toe partial amputation 8/8/21  D/C DAY/GOALS/PLACE: Pending, will need abx until 8/18/2021. Possible transfer to [facility] for continued care  OTHER IMPORTANT INFO: Contact isolation for ESBL. Minimal weight bearing post op on L foot. Needs shoe, consult placed.

## 2021-08-09 LAB
ANION GAP SERPL CALCULATED.3IONS-SCNC: 5 MMOL/L (ref 3–14)
BASOPHILS # BLD MANUAL: 0.1 10E3/UL (ref 0–0.2)
BASOPHILS NFR BLD MANUAL: 3 %
BUN SERPL-MCNC: 10 MG/DL (ref 7–30)
CALCIUM SERPL-MCNC: 8 MG/DL (ref 8.5–10.1)
CHLORIDE BLD-SCNC: 109 MMOL/L (ref 94–109)
CO2 SERPL-SCNC: 27 MMOL/L (ref 20–32)
CREAT SERPL-MCNC: 0.59 MG/DL (ref 0.52–1.04)
EOSINOPHIL # BLD MANUAL: 0.5 10E3/UL (ref 0–0.7)
EOSINOPHIL NFR BLD MANUAL: 12 %
ERYTHROCYTE [DISTWIDTH] IN BLOOD BY AUTOMATED COUNT: 28 % (ref 10–15)
GFR SERPL CREATININE-BSD FRML MDRD: >90 ML/MIN/1.73M2
GLUCOSE BLD-MCNC: 95 MG/DL (ref 70–99)
HCT VFR BLD AUTO: 23.2 % (ref 35–47)
HGB BLD-MCNC: 7.4 G/DL (ref 11.7–15.7)
LYMPHOCYTES # BLD MANUAL: 2.2 10E3/UL (ref 0.8–5.3)
LYMPHOCYTES NFR BLD MANUAL: 53 %
MCH RBC QN AUTO: 26.7 PG (ref 26.5–33)
MCHC RBC AUTO-ENTMCNC: 31.9 G/DL (ref 31.5–36.5)
MCV RBC AUTO: 84 FL (ref 78–100)
MONOCYTES # BLD MANUAL: 0.2 10E3/UL (ref 0–1.3)
MONOCYTES NFR BLD MANUAL: 6 %
NEUTROPHILS # BLD MANUAL: 1.1 10E3/UL (ref 1.6–8.3)
NEUTROPHILS NFR BLD MANUAL: 26 %
PLAT MORPH BLD: ABNORMAL
PLATELET # BLD AUTO: 493 10E3/UL (ref 150–450)
POTASSIUM BLD-SCNC: 4.1 MMOL/L (ref 3.4–5.3)
RBC # BLD AUTO: 2.77 10E6/UL (ref 3.8–5.2)
RBC MORPH BLD: ABNORMAL
SMUDGE CELLS BLD QL SMEAR: PRESENT
SODIUM SERPL-SCNC: 141 MMOL/L (ref 133–144)
WBC # BLD AUTO: 4.1 10E3/UL (ref 4–11)

## 2021-08-09 PROCEDURE — 250N000013 HC RX MED GY IP 250 OP 250 PS 637: Performed by: PODIATRIST

## 2021-08-09 PROCEDURE — 80048 BASIC METABOLIC PNL TOTAL CA: CPT | Performed by: HOSPITALIST

## 2021-08-09 PROCEDURE — 250N000013 HC RX MED GY IP 250 OP 250 PS 637: Performed by: HOSPITALIST

## 2021-08-09 PROCEDURE — 250N000009 HC RX 250: Performed by: PODIATRIST

## 2021-08-09 PROCEDURE — 85014 HEMATOCRIT: CPT | Performed by: HOSPITALIST

## 2021-08-09 PROCEDURE — 99232 SBSQ HOSP IP/OBS MODERATE 35: CPT | Performed by: HOSPITALIST

## 2021-08-09 PROCEDURE — 120N000001 HC R&B MED SURG/OB

## 2021-08-09 RX ADMIN — NAFCILLIN SODIUM 2 G: 2 INJECTION, POWDER, LYOPHILIZED, FOR SOLUTION INTRAMUSCULAR; INTRAVENOUS at 16:23

## 2021-08-09 RX ADMIN — NAFCILLIN SODIUM 2 G: 2 INJECTION, POWDER, LYOPHILIZED, FOR SOLUTION INTRAMUSCULAR; INTRAVENOUS at 04:37

## 2021-08-09 RX ADMIN — ACETAMINOPHEN 975 MG: 325 TABLET, FILM COATED ORAL at 00:38

## 2021-08-09 RX ADMIN — GABAPENTIN 600 MG: 300 CAPSULE ORAL at 21:06

## 2021-08-09 RX ADMIN — NAFCILLIN SODIUM 2 G: 2 INJECTION, POWDER, LYOPHILIZED, FOR SOLUTION INTRAMUSCULAR; INTRAVENOUS at 21:06

## 2021-08-09 RX ADMIN — BUPRENORPHINE AND NALOXONE 1 FILM: 8; 2 FILM, SOLUBLE BUCCAL; SUBLINGUAL at 16:23

## 2021-08-09 RX ADMIN — GABAPENTIN 600 MG: 300 CAPSULE ORAL at 16:23

## 2021-08-09 RX ADMIN — SERTRALINE HYDROCHLORIDE 50 MG: 50 TABLET ORAL at 09:17

## 2021-08-09 RX ADMIN — NAFCILLIN SODIUM 2 G: 2 INJECTION, POWDER, LYOPHILIZED, FOR SOLUTION INTRAMUSCULAR; INTRAVENOUS at 12:59

## 2021-08-09 RX ADMIN — GABAPENTIN 600 MG: 300 CAPSULE ORAL at 09:16

## 2021-08-09 RX ADMIN — NAFCILLIN SODIUM 2 G: 2 INJECTION, POWDER, LYOPHILIZED, FOR SOLUTION INTRAMUSCULAR; INTRAVENOUS at 09:18

## 2021-08-09 RX ADMIN — Medication 1 TABLET: at 16:23

## 2021-08-09 RX ADMIN — ACETAMINOPHEN 975 MG: 325 TABLET, FILM COATED ORAL at 16:23

## 2021-08-09 RX ADMIN — PANTOPRAZOLE SODIUM 40 MG: 40 TABLET, DELAYED RELEASE ORAL at 09:17

## 2021-08-09 RX ADMIN — BICTEGRAVIR SODIUM, EMTRICITABINE, AND TENOFOVIR ALAFENAMIDE FUMARATE 1 TABLET: 50; 200; 25 TABLET ORAL at 09:17

## 2021-08-09 RX ADMIN — NAFCILLIN SODIUM 2 G: 2 INJECTION, POWDER, LYOPHILIZED, FOR SOLUTION INTRAMUSCULAR; INTRAVENOUS at 00:38

## 2021-08-09 RX ADMIN — BUPRENORPHINE AND NALOXONE 1 FILM: 8; 2 FILM, SOLUBLE BUCCAL; SUBLINGUAL at 09:15

## 2021-08-09 RX ADMIN — ACETAMINOPHEN 975 MG: 325 TABLET, FILM COATED ORAL at 09:16

## 2021-08-09 RX ADMIN — OXYCODONE HYDROCHLORIDE 15 MG: 5 TABLET ORAL at 04:48

## 2021-08-09 ASSESSMENT — ACTIVITIES OF DAILY LIVING (ADL)
ADLS_ACUITY_SCORE: 16

## 2021-08-09 NOTE — PLAN OF CARE
DATE & TIME: 8/8/21, 6620 - 0221    Cognitive Concerns/ Orientation : A&O x 4. Flat affect    BEHAVIOR & AGGRESSION TOOL COLOR: Green  CIWA SCORE: NA   ABNL VS/O2: VSS on room air  MOBILITY: SBA with GB and walker. Minimal weight bearing on left heel. Post - op boot when out of bed  PAIN MANAGMENT: Prn Oxycodone given along with scheduled Tylenol for pain  DIET: Regular  BOWEL/BLADDER: Incontinent of urine at times  ABNL LAB/BG: AM labs pending  DRAIN/DEVICES: Right PICC SL  SKIN: Scattered bruises and scabs. Blister to right heel. Dressing to left foot CDI  TESTS/PROCEDURES: Left 3rd toe partial amputation on 8/7/21  D/C DATE: Pending  Discharge Barriers: Will need antibiotic until 8/18/21  OTHER IMPORTANT INFO: Contact precautions maintained

## 2021-08-09 NOTE — PLAN OF CARE
DATE & TIME: 8/9/2021 4550-2642                     Cognitive Concerns/ Orientation : A&O x 4   BEHAVIOR & AGGRESSION TOOL COLOR: green  CIWA SCORE: NA    ABNL VS/O2: VSS on RA  MOBILITY: SBA with GB. Minimal weight bearing on left foot. Post-op boot when out out of bed.  PAIN MANAGMENT: Scheduled Tylenol and gabapentin. Pt did not request oxycodone this shift.  DIET: Regular, good appetite.   BOWEL/BLADDER:Inc of urine once, pt stated she was dreaming of going to bathroom, when she woke up, she had wet herself.   ABNL LAB/BG: NA  DRAIN/DEVICES: Right PICC flushes well with blood return noted.  TELEMETRY RHYTHM: NA  SKIN: Scattered bruises and scabs. Dressing on left foot, changed.  TESTS/PROCEDURES: Partial amputation on left third toe on 8/7/21                     D/C DATE: Pending  Discharge Barriers: Continue on antibiotics until 8/18/21  OTHER IMPORTANT INFO: Contact precautions maintained. ID following.

## 2021-08-09 NOTE — PROGRESS NOTES
"[facility]    Hospitalist Progress Note      Assessment & Plan   41 year old female with h/o untreated HIV, hepatitis C who was admitted on 7/1/2021 for MSSA mitral valve endocarditis resulting in septic shock complicated by embolic left parietal lobe infarctions and multiple peripheral emboli, and initially with encephalopathy, and shock.  She was intubated in ED on 7/1/2021 subsequently extubated 7/11/2021.  Blood cultures turned positive on hospital day 1 with MSSA.  Urine culture was positive for Klebsiella.  She was treated initially with ceftriaxone and vancomycin, and has since been narrowed to nafcillin per ID.     MSSA mitral valve endocarditis  Septic shock, resolved  Probable myopericarditis  Small pericardial effusion  *JAE 7/5 noted with small ASD, mod to large mobile vegetation (14mm long, 6mm wide) of the P2 segment of the mitral leaflet. No valve perforation; mod MR  *blood cultures on admission with MSSA; blood cultures have been negative since 7/6.   *ID following; antibiotic has been narrowed down to Nafcillin, 6 weeks of antibiotic, completion date 8/18/2021.  *limited ECHO repeated 7/15 to reassess pericardial fluid 7/15 per cardiology and noted:  organized effusion posteriorly, measuring 1.2-1.5cm, no evidence of tamponade; small mobile vegetation noted attached to the mitral leaflet, mod to severe MR; compared to 7/8/21, effusion size is probably stable, Degree of MR is worse, Vegetation is smaller.  Cardiology signed off 7/15.   *re-evaluated by CT surgery 7/15 and suggested continued antibiotic therapy at this time; per CT surgery \"Were she to become a surgical candidate, we would recommend waiting at least 4 weeks from her stroke and she would need repeat MRI imaging to rule out mycotic aneurysms or brain abscesses\"; will have follow up with CT surgery as outpatient  *Abx briefly interrupted as she had been pulling out her access sites; midline placed on 7/13/21-- " pulled out again on 7/16-- PICC placed on 8/2/21.   -will need follow up with cardiothoracic surgery as an outpatient    Osteomyelitis left 3rd toe s/p amputation 8/7  -- Podiatry took to OR 8/7 for amputation. Orthotist following up.    Left knee pain: pt reports history of pain in that knee with activity but pain now is more acute. ROM okay. There is some warmth and swelling there.   - In light of the MSSA bacteremia, got XR and called ortho for effusion  - Per ortho no indication for intervention, though they will order a brace. Appreciate assistance    IV drug use  Polysubstance use disorder  *Chem dep consulted 7/15 but chemical dependency did not feel she is appropriate for CD assessment at this time and recommended consultation towards the end of her antibiotic course.  Reconsult CD once she is close to finishing her IV antibiotic.  *Was using IV dilaudid as well as Percocet p.o. earlier this stay pretty extensively.  Attempts made to wean off IV narcotics and taper down po narcotics though patient felt she was going through withdrawal. Psych consulted and recommended suboxone though patient declined. Psych recommended rapid taper of methadone instead.   *completed methadone taper of 30 mg on 7/21, 20 mg 7/22 and then 10 mg 7/23, then stop  -no further narcotics   -started suboxone on 7/27 - appreciate psych/addiction medicine assistance who will help with titration   - suboxone was adjusted again today. Pt is happy with plan.  -psych also mentioned outpatient methadone maintenances as a possibility  -Reconsult CD once she is close to finishing her IV antibiotic.     HIV, untreated PTA  - CD4 count greater than 500, viral load 3695  - Started Biktarvy 7/8; ID following    Chronic back pain: pt notes it present ever since she was pregnant.   -avoiding narcotics per psych  -tylenol  -added lidoderm patch 7/23  -toradol prn  -increase gabapentin to 600 mg TID     Toxic/septic/metabolic encephalopathy, acute,  improving  Likely encephalopathy due to sepsis, drug use, CVA, prolonged ICU stay; had been pulling lines due to alterned mentation thought this all now appears resolved.   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  Completed course of treatment with ceftriaxone.  repeat urine cultures 7/10 with ESBL E coli but given unimpressive UA and no UTI symptoms ID suggest holding off on abx for ESBL UTI  -monitor for any symptoms     Acute hypoxemic respiratory failure, resolved  Intubated 7/1/2021.  Extubated 7/11/2021.  Likely related to septic shock from MSSA mitral valve endocarditis     FORD, resolved suspected due to sepsis  Hypokalemia  - on potassium replacement protocol  - will check BMP periodically     Chronic anemia  Baseline hemoglobin approximately 7.  Likely related to chronic disease, untreated HIV.  -Monitor hemoglobin periodically; stable around 9     Non severe malnutrition  -- Supplements      Prior hepatitis C infection  Antibody positive but RNA negative  -Noted    Restless legs: unclear if this is true restless leg syndrome or due to being uncomfortable  -continue mirapex prn    Necrotic wounds to Lt great toes, Lt 3rd toe and Lt lateral Heel, Rt great toe: 2/2 thrombi.   No evidence of active local infection. If things change, consider podiatry consult.     Major depressive disorder: pt is not suicidal but also doesn't seem to have much of a will to live based upon discussion  7/25.  I wonder if her underlying chronic pain which is difficult to treat as well as being in the hospital is contributing. Was seen by psych and declined any medication treatment on 7/26.  patient was on zoloft for years starting at age 13. She is willing to try zoloft again.   -Started zoloft 8/1 at 25mg po and titrating up to 50mg    DVT Prophylaxis: SCDs  Code Status: Full Code    Disposition: Expected discharge once IV abx are complete after 8/18 as having a difficult time finding placement in TCU.  She is homeless at baseline.     Deepti Rivera MD  Hospitalist      Interval History   Patient feeling better today. Says pain is better controlled also that mood is improved. She smiled and was less withdrawn today. Says she can't believe she's made it this long in the hospital. Requested a larger shirt and noted that her ace bandage on her foot is quite loose. I passed message to JYOTI and her RN to address these things.    Physical Exam   Temp: 98.2  F (36.8  C) Temp src: Oral BP: 112/68 Pulse: 73   Resp: 16 SpO2: 99 % O2 Device: None (Room air)    Vitals:    07/13/21 0200 07/14/21 0445 07/16/21 0615   Weight: 89 kg (196 lb 3.4 oz) 84.2 kg (185 lb 10 oz) 76.8 kg (169 lb 5 oz)     Vital Signs with Ranges  Temp:  [97.7  F (36.5  C)-98.2  F (36.8  C)] 98.2  F (36.8  C)  Pulse:  [62-86] 73  Resp:  [16-17] 16  BP: ()/(67-71) 112/68  SpO2:  [94 %-99 %] 99 %  No intake/output data recorded.    Constitutional: lying in bed, appears comfortable  Respiratory: Clear   Cardiovascular: Regular rate and rhythm.  No murmur.  GI: Positive bowel sounds, soft, non-distended, non-tender.   Extr:  Right foot in dressing  Neurologic: Alert, Ox3, no focal deficits    Medications       acetaminophen  975 mg Oral Q8H     bictegravir-emtricitabine-tenofovir  1 tablet Oral Daily     buprenorphine HCl-naloxone HCl  1 Film Sublingual BID     gabapentin  600 mg Oral TID     multivitamin w/minerals  1 tablet Oral Daily     nafcillin   2 g Intravenous Q4H     pantoprazole  40 mg Oral QAM AC     polyethylene glycol  17 g Oral Daily     senna-docusate  1 tablet Oral BID     sertraline  50 mg Oral Daily     sodium chloride (PF)  10 mL Intracatheter Q8H     sodium chloride (PF)  10-40 mL Intracatheter Q7 Days     sodium chloride (PF)  3 mL Intracatheter Q8H       Data   Recent Labs   Lab 08/09/21  0654 08/09/21  0653 08/08/21  0632 08/04/21  0614 08/04/21  0613   WBC 4.1  --   --  8.7  --    HGB 7.4*  --   --  8.4*  --    MCV 84  --   --  85  --    *  --   --  320  --    NA  --  141  --   --  133   POTASSIUM  --  4.1  --   --  4.0   CHLORIDE  --  109  --   --  104   CO2  --  27  --   --  27   BUN  --  10  --   --  11   CR  --  0.59  --   --  0.64   ANIONGAP  --  5  --   --  2*   DEREK  --  8.0*  --   --  8.1*   GLC  --  95 105*  --  119*       No results found for this or any previous visit (from the past 24 hour(s)).

## 2021-08-09 NOTE — PROGRESS NOTES
DATE & TIME: 8/9/2021 AM    Cognitive Concerns/ Orientation : A&O x 4   BEHAVIOR & AGGRESSION TOOL COLOR: green  CIWA SCORE: NA   ABNL VS/O2: VSS on RA  MOBILITY: SBA with GB. Minimal weight bearing on left foot. Post-op boot when out out of bed.  PAIN MANAGMENT: Scheduled Tylenol and gabapentin. Pt did not request oxycodone this shift.  DIET: Regular   BOWEL/BLADDER: Continent during entire shift.    ABNL LAB/BG: NA  DRAIN/DEVICES: Right PICC flushes well with blood return noted.  TELEMETRY RHYTHM: NA  SKIN: Scattered bruises and scabs. Dressing on left foot, changed.  TESTS/PROCEDURES: Partial amputation on left third toe on 8/7/21    D/C DATE: Pending  Discharge Barriers: Continue on antibiotics until 8/18/21   OTHER IMPORTANT INFO: Contact precautions maintained

## 2021-08-09 NOTE — PLAN OF CARE
DATE & TIME: 8/8/21 1900-2330  Cognitive Concerns/ Orientation : A&O x4, calm & cooperative. Flat affect  BEHAVIOR & AGGRESSION TOOL COLOR: Green  ABNL VS/O2: VSS on RA  MOBILITY: SBA GB/W. Minimal weight bearing on L heel & post-op boot when OOB  PAIN MANAGMENT: Scheduled Tylenol q8h, PRN atarax given x1  DIET: Regular   BOWEL/BLADDER: Continent, up to BR.   ABNL LAB/BG: Post op day 1   DRAIN/DEVICES: R PICC SL (dressing change due 8/9/21) with q4h Nafcillin   SKIN: Scattered bruising and scabbing, R heel blister, black toes, L foot dressing CDI (dressing changes start tomorrow 8/9/21)  TESTS/PROCEDURES: L 3rd toe partial amputation 8/7/21 (POD 1)  D/C DAY/GOALS/PLACE: Pending, will need abx until 8/18/2021.   OTHER IMPORTANT INFO: Contact isolation for ESBL.

## 2021-08-10 ENCOUNTER — APPOINTMENT (OUTPATIENT)
Dept: CT IMAGING | Facility: CLINIC | Age: 41
End: 2021-08-10
Attending: SURGERY
Payer: MEDICAID

## 2021-08-10 ENCOUNTER — APPOINTMENT (OUTPATIENT)
Dept: MRI IMAGING | Facility: CLINIC | Age: 41
End: 2021-08-10
Attending: HOSPITALIST
Payer: MEDICAID

## 2021-08-10 DIAGNOSIS — I05.9 ENDOCARDITIS OF MITRAL VALVE: Primary | ICD-10-CM

## 2021-08-10 LAB
ALBUMIN UR-MCNC: NEGATIVE MG/DL
APPEARANCE UR: CLEAR
BACTERIA #/AREA URNS HPF: ABNORMAL /HPF
BILIRUB UR QL STRIP: NEGATIVE
COLOR UR AUTO: ABNORMAL
GLUCOSE UR STRIP-MCNC: NEGATIVE MG/DL
HGB UR QL STRIP: ABNORMAL
KETONES UR STRIP-MCNC: NEGATIVE MG/DL
LEUKOCYTE ESTERASE UR QL STRIP: ABNORMAL
MUCOUS THREADS #/AREA URNS LPF: PRESENT /LPF
NITRATE UR QL: NEGATIVE
PH UR STRIP: 6 [PH] (ref 5–7)
RBC URINE: 14 /HPF
SP GR UR STRIP: 1.02 (ref 1–1.03)
SQUAMOUS EPITHELIAL: 2 /HPF
UROBILINOGEN UR STRIP-MCNC: NORMAL MG/DL
WBC URINE: 28 /HPF

## 2021-08-10 PROCEDURE — 250N000009 HC RX 250: Performed by: PODIATRIST

## 2021-08-10 PROCEDURE — A9585 GADOBUTROL INJECTION: HCPCS | Performed by: INTERNAL MEDICINE

## 2021-08-10 PROCEDURE — 999N000190 HC STATISTIC VAT ROUNDS

## 2021-08-10 PROCEDURE — 250N000013 HC RX MED GY IP 250 OP 250 PS 637: Performed by: PODIATRIST

## 2021-08-10 PROCEDURE — 99233 SBSQ HOSP IP/OBS HIGH 50: CPT | Performed by: HOSPITALIST

## 2021-08-10 PROCEDURE — 250N000013 HC RX MED GY IP 250 OP 250 PS 637: Performed by: HOSPITALIST

## 2021-08-10 PROCEDURE — 70486 CT MAXILLOFACIAL W/O DYE: CPT

## 2021-08-10 PROCEDURE — 70553 MRI BRAIN STEM W/O & W/DYE: CPT

## 2021-08-10 PROCEDURE — 81001 URINALYSIS AUTO W/SCOPE: CPT | Performed by: SURGERY

## 2021-08-10 PROCEDURE — 120N000001 HC R&B MED SURG/OB

## 2021-08-10 PROCEDURE — 87186 SC STD MICRODIL/AGAR DIL: CPT | Performed by: SURGERY

## 2021-08-10 PROCEDURE — 255N000002 HC RX 255 OP 636: Performed by: INTERNAL MEDICINE

## 2021-08-10 RX ORDER — GADOBUTROL 604.72 MG/ML
7 INJECTION INTRAVENOUS ONCE
Status: COMPLETED | OUTPATIENT
Start: 2021-08-10 | End: 2021-08-10

## 2021-08-10 RX ADMIN — PANTOPRAZOLE SODIUM 40 MG: 40 TABLET, DELAYED RELEASE ORAL at 09:02

## 2021-08-10 RX ADMIN — NAFCILLIN SODIUM 2 G: 2 INJECTION, POWDER, LYOPHILIZED, FOR SOLUTION INTRAMUSCULAR; INTRAVENOUS at 13:09

## 2021-08-10 RX ADMIN — NAFCILLIN SODIUM 2 G: 2 INJECTION, POWDER, LYOPHILIZED, FOR SOLUTION INTRAMUSCULAR; INTRAVENOUS at 09:02

## 2021-08-10 RX ADMIN — NAFCILLIN SODIUM 2 G: 2 INJECTION, POWDER, LYOPHILIZED, FOR SOLUTION INTRAMUSCULAR; INTRAVENOUS at 05:35

## 2021-08-10 RX ADMIN — NAFCILLIN SODIUM 2 G: 2 INJECTION, POWDER, LYOPHILIZED, FOR SOLUTION INTRAMUSCULAR; INTRAVENOUS at 20:12

## 2021-08-10 RX ADMIN — NAFCILLIN SODIUM 2 G: 2 INJECTION, POWDER, LYOPHILIZED, FOR SOLUTION INTRAMUSCULAR; INTRAVENOUS at 00:43

## 2021-08-10 RX ADMIN — BUPRENORPHINE AND NALOXONE 1 FILM: 8; 2 FILM, SOLUBLE BUCCAL; SUBLINGUAL at 09:02

## 2021-08-10 RX ADMIN — GABAPENTIN 600 MG: 300 CAPSULE ORAL at 15:47

## 2021-08-10 RX ADMIN — ACETAMINOPHEN 650 MG: 325 TABLET, FILM COATED ORAL at 15:47

## 2021-08-10 RX ADMIN — ACETAMINOPHEN 975 MG: 325 TABLET, FILM COATED ORAL at 00:43

## 2021-08-10 RX ADMIN — ACETAMINOPHEN 650 MG: 325 TABLET, FILM COATED ORAL at 09:09

## 2021-08-10 RX ADMIN — GABAPENTIN 600 MG: 300 CAPSULE ORAL at 09:02

## 2021-08-10 RX ADMIN — SERTRALINE HYDROCHLORIDE 50 MG: 50 TABLET ORAL at 09:02

## 2021-08-10 RX ADMIN — BUPRENORPHINE AND NALOXONE 1 FILM: 8; 2 FILM, SOLUBLE BUCCAL; SUBLINGUAL at 15:47

## 2021-08-10 RX ADMIN — OXYCODONE HYDROCHLORIDE 15 MG: 5 TABLET ORAL at 11:26

## 2021-08-10 RX ADMIN — NAFCILLIN SODIUM 2 G: 2 INJECTION, POWDER, LYOPHILIZED, FOR SOLUTION INTRAMUSCULAR; INTRAVENOUS at 17:16

## 2021-08-10 RX ADMIN — OXYCODONE HYDROCHLORIDE 15 MG: 5 TABLET ORAL at 17:17

## 2021-08-10 RX ADMIN — HYDROXYZINE HYDROCHLORIDE 25 MG: 25 TABLET, FILM COATED ORAL at 00:52

## 2021-08-10 RX ADMIN — GADOBUTROL 7 ML: 604.72 INJECTION INTRAVENOUS at 15:10

## 2021-08-10 RX ADMIN — GABAPENTIN 600 MG: 300 CAPSULE ORAL at 22:23

## 2021-08-10 RX ADMIN — BICTEGRAVIR SODIUM, EMTRICITABINE, AND TENOFOVIR ALAFENAMIDE FUMARATE 1 TABLET: 50; 200; 25 TABLET ORAL at 09:02

## 2021-08-10 ASSESSMENT — ACTIVITIES OF DAILY LIVING (ADL)
ADLS_ACUITY_SCORE: 17
ADLS_ACUITY_SCORE: 17
ADLS_ACUITY_SCORE: 16
ADLS_ACUITY_SCORE: 17
ADLS_ACUITY_SCORE: 16
ADLS_ACUITY_SCORE: 16

## 2021-08-10 NOTE — PLAN OF CARE
DATE & TIME: 8/10/2021 3005-0721                     Cognitive Concerns/ Orientation : A&O x 4   BEHAVIOR & AGGRESSION TOOL COLOR: green  CIWA SCORE: NA    ABNL VS/O2: VSS on RA  MOBILITY: SBA with GB. Minimal weight bearing on left foot. Post-op boot when out out of bed.  PAIN MANAGMENT: Scheduled Tylenol and gabapentin. Oxycodone once for 9/10 throbbing pain to left foot. Pain 3/10 when reassessed.   DIET: Regular, good appetite.   BOWEL/BLADDER:continent, to bathroom.    ABNL LAB/BG: NA  DRAIN/DEVICES: Right PICC flushes well with blood return noted.  TELEMETRY RHYTHM: NA  SKIN: Scattered bruises and scabs. Dressing on left foot, changed.  TESTS/PROCEDURES: Partial amputation on left third toe on 8/7/21                     D/C DATE: Pending  Discharge Barriers: Continue on antibiotics until 8/18/21  OTHER IMPORTANT INFO: Contact precautions maintained. ID, ortho following. Will have MRI of head today for stroke follow up. Cardiothoracic surgery seen, plan to have mitral valve replaced possibly on Monday--needs to be cleared by neuro and repeat TTE.

## 2021-08-10 NOTE — PLAN OF CARE
DATE & TIME: 8/9/21-8/10/21 4759-0589   Cognitive Concerns/ Orientation : A&O x 4   BEHAVIOR & AGGRESSION TOOL COLOR: green  CIWA SCORE: NA   ABNL VS/O2: VSS on RA  MOBILITY: SBA with GB. Minimal weight bearing on left foot. Post-op boot when out out of bed.  PAIN MANAGMENT: PRN atarax given x1, cold packs applied to foot throughout shift  DIET: Regular, good appetite  BOWEL/BLADDER: Up to the BR multiple times throughout the shift  ABNL LAB/BG: NA  DRAIN/DEVICES: Right PICC flushes well with blood return noted, q4h Nafcillin  TELEMETRY RHYTHM: NA  SKIN: Scattered bruises and scabs. Dressing on left foot, CDI.  TESTS/PROCEDURES: Partial amputation on left third toe on 8/7/21    D/C DATE: Pending  Discharge Barriers: Continue on antibiotics until 8/18/21   OTHER IMPORTANT INFO: Contact precautions maintained

## 2021-08-10 NOTE — PROGRESS NOTES
CV Surgery    Patient seen, recent studies reviewed. 40 yo F with MSSA endocarditis involving the mitral valve who initially presented with sepsis and acute CVA and was not a surgical candidate at that time. She is now recovering well, improved neuro status, ambulating with a walker (s/p partial L 3rd toe amp POD#3). Follow up TTE on 7/15 showed persistent vegetation on mitral valve and moderate/severe MR. IV Abx treatment ends on 8/18. Head MRI/MRA scheduled for tomorrow. If cleared by neuro, I think she is now a reasonable surgical candidate for MVR and would propose next week, potentially on Monday. She will need a repeat JAE this week and a coronary angiogram. She will also have to agree to chem dependency treatment postop and have plans in place where she can recover from heart surgery. Discussed our recommendation with the patient and she understands and agrees.    Rosemary Solares MD

## 2021-08-10 NOTE — PROGRESS NOTES
[facility]    Hospitalist Progress Note      Assessment & Plan   41 year old female with h/o untreated HIV, hepatitis C who was admitted on 7/1/2021 for MSSA mitral valve endocarditis resulting in septic shock complicated by embolic left parietal lobe infarctions and multiple peripheral emboli, and initially with encephalopathy, and shock.  She was intubated in ED on 7/1/2021 subsequently extubated 7/11/2021.  Blood cultures turned positive on hospital day 1 with MSSA.  Urine culture was positive for Klebsiella.  She was treated initially with ceftriaxone and vancomycin, and has since been narrowed to nafcillin per ID.     MSSA mitral valve endocarditis  Septic shock, resolved  Probable myopericarditis  Small pericardial effusion  *JAE 7/5 noted with small ASD, mod to large mobile vegetation (14mm long, 6mm wide) of the P2 segment of the mitral leaflet. No valve perforation; mod MR  *blood cultures on admission with MSSA; blood cultures have been negative since 7/6.   *ID following; antibiotic has been narrowed down to Nafcillin, 6 weeks of antibiotic, completion date 8/18/2021.  *limited ECHO repeated 7/15 to reassess pericardial fluid 7/15 per cardiology and noted:  organized effusion posteriorly, measuring 1.2-1.5cm, no evidence of tamponade; small mobile vegetation noted attached to the mitral leaflet, mod to severe MR; compared to 7/8/21, effusion size is probably stable, Degree of MR is worse, Vegetation is smaller.  Cardiology signed off 7/15.   *re-evaluated by CT surgery 7/15 and suggested continued antibiotic therapy at this time  *PICC in place  -- given pt's stability and cooperativity, I am consulting CT surgery today to do any appropriate work up while patient is in hospital and then arrange for follow up if that is that plan.  -- brain MRI should be ordered to rule out mycotic aneurysms or brain abscesses per CT surg    Osteomyelitis left 3rd toe s/p amputation 8/7  Necrotic  wounds to Lt great toes, Lt 3rd toe and Lt lateral Heel, Rt great toe: 2/2 thrombi.  -- Podiatry took to OR 8/7 for amputation. Orthotist following up.  -- oxycodone in the short term for pain. Some RNs have not been giving this. Talked to RN today. This is temporary post op.  -- continue wound cares for other digits    Normocytic anemia, likey 2/2 chronic disease  - Hgb 7.4 yesterday. Has been slowly trending down.  - CBC tomorrow with type and screen  - consider transfusion for Hgb < 7 (or close)  - iron and ferritin wnl  - check B12 and folate    Left knee pain 2/2 OA: pt reports history of pain in that knee with activity but pain now is more acute. ROM okay. There is some warmth and swelling there.   - In light of the MSSA bacteremia, got XR and called ortho for effusion  - Per ortho no indication for intervention, though they did order a brace. Appreciate assistance. There are following EOD.  - PT/OT    IV drug use  Polysubstance use disorder  *Chem dep consulted 7/15 but chemical dependency did not feel she is appropriate for CD assessment at this time and recommended consultation towards the end of her antibiotic course.  Reconsult CD once she is close to finishing her IV antibiotic.  *Was using IV dilaudid as well as Percocet p.o. earlier this stay pretty extensively.  Attempts made to wean off IV narcotics and taper down po narcotics though patient felt she was going through withdrawal. Psych consulted and recommended suboxone though patient declined. Psych recommended rapid taper of methadone instead.   *completed methadone taper of 30 mg on 7/21, 20 mg 7/22 and then 10 mg 7/23, then stop  -started suboxone on 7/27 - appreciate psych/addiction medicine assistance who will help with titration   - now on suboxone BID and doing very well. Reports no cravings and hopes to continue this regimen outpatient.  -Plan was to reconsult CD once she is close to finishing her IV antibiotic, but patient is not interested  in treatment. She has been through inpatient and outpatient programs and not found them helpful. Also I'm not sure she could continue suboxone in treatment and I think that is probably her best chance for sobriety.  -spoke to patient at length today. She has a daughter who just had twins and lives with her in-laws. Daughter and daughter's mother in law live in [place] (? - pt going to confirm) and have invited patient to live there to help with babies. She thinks this is a good idea as she'll be kept busy and she gets along well with this family. She is hoping to continue follow up with addiction medicine to continue suboxone. She feels that with suboxone and family support she will do well. Will need to have addiction med follow up with her to prescribe suboxone on discharge and help her set up outpatient follow up.    HIV, untreated PTA  - CD4 count greater than 500, viral load 3695  - Started Biktarvy 7/8; ID following  - Will need outpt follow up    Chronic back pain: pt notes it present ever since she was pregnant.   -tylenol  -added lidoderm patch 7/23  -increase gabapentin to 600 mg TID     Toxic/septic/metabolic encephalopathy, improved  Likely encephalopathy due to sepsis, drug use, CVA, prolonged ICU stay; had been pulling lines due to alterned mentation thought this all now appears resolved.      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  Completed course of treatment with ceftriaxone.  repeat urine cultures  7/10 with ESBL E coli but given unimpressive UA and no UTI symptoms ID suggest holding off on abx for ESBL UTI  -monitor for any symptoms     Acute hypoxemic respiratory failure, resolved  Intubated 7/1/2021.  Extubated 7/11/2021.  Likely related to septic shock from MSSA mitral valve endocarditis     FORD, resolved suspected due to sepsis  Hypokalemia  - on potassium replacement protocol  - will check BMP periodically     Chronic anemia  Baseline hemoglobin approximately 7.  Likely related to chronic disease, untreated HIV.  -Monitor hemoglobin periodically; stable around 9     Non severe malnutrition  -- Supplements      Prior hepatitis C infection  Antibody positive but RNA negative  -Noted    Restless legs: unclear if this is true restless leg syndrome or due to being uncomfortable  -continue mirapex prn    Major depressive disorder: pt is not suicidal but also doesn't seem to have much of a will to live based upon discussion 7/25.  I wonder if her underlying chronic pain which is difficult to treat as well as being in the hospital is contributing. Was seen by psych and declined any medication treatment on 7/26.  patient was on zoloft for years starting at age 13. She is willing to try zoloft again.   -Started zoloft 8/1 at 25mg po and titrating up to 50mg. Pt feels this is helping. Psychiatry followed up and agreed with this medication.    DVT Prophylaxis: SCDs  Code Status: Full Code    Disposition: Expected discharge once IV abx are complete after 8/18 as having a difficult time finding placement in TCU.  She is homeless at baseline but has a plan post discharge to live with daughter and daughter's in laws. This is in the [facility] and will allow for follow up cares.    Time Spent on this Encounter   I spent >35 minutes on the unit/floor managing the care of this patient. Over 50% of my time was spent on the following:   - Counseling the patient and/or family regarding: diagnostic results, prognosis,  risks and benefits of treatment options, recommended follow-up and medical compliance  - Coordination of care with the: care coordinator/, nurse and patient    Deepti Rivera MD  Hospitalist      Interval History   Patient slept poorly because RN last night would not give oxycodone. Clarified with nursing that patient can get oxycodone in this post-op period. Start weaning in the coming days. Talked to care coordinator Rosalina about her concerns with patient's post-discharge plans to live with her mother up [place]. I spoke with patient for > 30 min today and after speaking with Rosalina last week she thought about things. Realized that [place] Also she doesn't get along with her mom. Her mom has been pressuring her to go there because she is the last surviving daughter and mom is lonely; pt was tearful telling me this. Patient has been speaking with her daughter who just had twins and was invited to live with daughter's in laws. She feels like helping with the babies will help keep her busy. Wants to continue on suboxone and get healthy.    Physical Exam   Temp: 98.1  F (36.7  C) Temp src: Oral BP: 102/82 Pulse: 75   Resp: 16 SpO2: 95 % O2 Device: None (Room air)    Vitals:    07/13/21 0200 07/14/21 0445 07/16/21 0615   Weight: 89 kg (196 lb 3.4 oz) 84.2 kg (185 lb 10 oz) 76.8 kg (169 lb 5 oz)     Vital Signs with Ranges  Temp:  [98.1  F (36.7  C)-98.2  F (36.8  C)] 98.1  F (36.7  C)  Pulse:  [68-75] 75  Resp:  [16] 16  BP: (102-113)/(65-82) 102/82  SpO2:  [95 %-100 %] 95 %  I/O last 3 completed shifts:  In: 720 [P.O.:360; I.V.:360]  Out: -     Constitutional: sitting up in bed, appears comfortable  Respiratory: Clear   Cardiovascular: Regular rate and rhythm.  No murmur.  GI: Positive bowel sounds, soft, non-distended, non-tender. Hernia present  Extr:  Right foot in dressing  Neurologic: Alert, Ox3, no focal deficits    Medications        bictegravir-emtricitabine-tenofovir  1 tablet Oral Daily     buprenorphine HCl-naloxone HCl  1 Film Sublingual BID     gabapentin  600 mg Oral TID     multivitamin w/minerals  1 tablet Oral Daily     nafcillin  2 g Intravenous Q4H     pantoprazole  40 mg Oral QAM AC     polyethylene glycol  17 g Oral Daily     senna-docusate  1 tablet Oral BID     sertraline  50 mg Oral Daily     sodium chloride (PF)  10 mL Intracatheter Q8H     sodium chloride (PF)  10-40 mL Intracatheter Q7 Days     sodium chloride (PF)  3 mL Intracatheter Q8H       Data   Recent Labs   Lab 08/09/21  0654 08/09/21  0653 08/08/21  0632 08/04/21  0614 08/04/21  0613   WBC 4.1  --   --  8.7  --    HGB 7.4*  --   --  8.4*  --    MCV 84  --   --  85  --    *  --   --  320  --    NA  --  141  --   --  133   POTASSIUM  --  4.1  --   --  4.0   CHLORIDE  --  109  --   --  104   CO2  --  27  --   --  27   BUN  --  10  --   --  11   CR  --  0.59  --   --  0.64   ANIONGAP  --  5  --   --  2*   DEREK  --  8.0*  --   --  8.1*   GLC  --  95 105*  --  119*       No results found for this or any previous visit (from the past 24 hour(s)).

## 2021-08-10 NOTE — PROGRESS NOTES
Ortho progress note    Re: Left knee pain    Pain improved in left knee; has been managed on Tylenol and gabapentin over the last day  Underwent partial left third toe amputation 8/7  NAD, A&O  Left knee - no effusion.  Valgus.  No TTP along joint lines.  No erythema. ROM 0-110     Plan:  Left knee OA  HKB  WBAT  Continues to not appear to be a septic joint  Ortho will continue to check in every other day    Rebecca SADLER

## 2021-08-11 ENCOUNTER — APPOINTMENT (OUTPATIENT)
Dept: OCCUPATIONAL THERAPY | Facility: CLINIC | Age: 41
End: 2021-08-11
Payer: MEDICAID

## 2021-08-11 ENCOUNTER — ANESTHESIA (OUTPATIENT)
Dept: SURGERY | Facility: CLINIC | Age: 41
End: 2021-08-11
Payer: MEDICAID

## 2021-08-11 ENCOUNTER — ANESTHESIA EVENT (OUTPATIENT)
Dept: SURGERY | Facility: CLINIC | Age: 41
End: 2021-08-11
Payer: MEDICAID

## 2021-08-11 LAB
ABO/RH(D): NORMAL
ANION GAP SERPL CALCULATED.3IONS-SCNC: 2 MMOL/L (ref 3–14)
ANTIBODY SCREEN: NEGATIVE
BASOPHILS # BLD AUTO: 0.1 10E3/UL (ref 0–0.2)
BASOPHILS NFR BLD AUTO: 1 %
BUN SERPL-MCNC: 10 MG/DL (ref 7–30)
CALCIUM SERPL-MCNC: 8.3 MG/DL (ref 8.5–10.1)
CHLORIDE BLD-SCNC: 110 MMOL/L (ref 94–109)
CO2 SERPL-SCNC: 27 MMOL/L (ref 20–32)
CREAT SERPL-MCNC: 0.59 MG/DL (ref 0.52–1.04)
EOSINOPHIL # BLD AUTO: 0.4 10E3/UL (ref 0–0.7)
EOSINOPHIL NFR BLD AUTO: 8 %
ERYTHROCYTE [DISTWIDTH] IN BLOOD BY AUTOMATED COUNT: 27.7 % (ref 10–15)
FOLATE SERPL-MCNC: 17.3 NG/ML
GFR SERPL CREATININE-BSD FRML MDRD: >90 ML/MIN/1.73M2
GLUCOSE BLD-MCNC: 96 MG/DL (ref 70–99)
HCG UR QL: NEGATIVE
HCT VFR BLD AUTO: 26.1 % (ref 35–47)
HGB BLD-MCNC: 8.1 G/DL (ref 11.7–15.7)
IMM GRANULOCYTES # BLD: 0.1 10E3/UL
IMM GRANULOCYTES NFR BLD: 1 %
INR PPP: 1.05 (ref 0.85–1.15)
LYMPHOCYTES # BLD AUTO: 1.9 10E3/UL (ref 0.8–5.3)
LYMPHOCYTES NFR BLD AUTO: 40 %
MCH RBC QN AUTO: 26.4 PG (ref 26.5–33)
MCHC RBC AUTO-ENTMCNC: 31 G/DL (ref 31.5–36.5)
MCV RBC AUTO: 85 FL (ref 78–100)
MONOCYTES # BLD AUTO: 0.4 10E3/UL (ref 0–1.3)
MONOCYTES NFR BLD AUTO: 7 %
NEUTROPHILS # BLD AUTO: 2 10E3/UL (ref 1.6–8.3)
NEUTROPHILS NFR BLD AUTO: 43 %
NRBC # BLD AUTO: 0 10E3/UL
NRBC BLD AUTO-RTO: 0 /100
PATH REPORT.COMMENTS IMP SPEC: NORMAL
PATH REPORT.COMMENTS IMP SPEC: NORMAL
PATH REPORT.FINAL DX SPEC: NORMAL
PATH REPORT.GROSS SPEC: NORMAL
PATH REPORT.MICROSCOPIC SPEC OTHER STN: NORMAL
PATH REPORT.RELEVANT HX SPEC: NORMAL
PHOTO IMAGE: NORMAL
PLATELET # BLD AUTO: 564 10E3/UL (ref 150–450)
POTASSIUM BLD-SCNC: 3.9 MMOL/L (ref 3.4–5.3)
RBC # BLD AUTO: 3.07 10E6/UL (ref 3.8–5.2)
SODIUM SERPL-SCNC: 139 MMOL/L (ref 133–144)
VIT B12 SERPL-MCNC: 336 PG/ML (ref 193–986)
WBC # BLD AUTO: 4.7 10E3/UL (ref 4–11)

## 2021-08-11 PROCEDURE — 81025 URINE PREGNANCY TEST: CPT | Performed by: STUDENT IN AN ORGANIZED HEALTH CARE EDUCATION/TRAINING PROGRAM

## 2021-08-11 PROCEDURE — 80048 BASIC METABOLIC PNL TOTAL CA: CPT | Performed by: HOSPITALIST

## 2021-08-11 PROCEDURE — 82607 VITAMIN B-12: CPT | Performed by: HOSPITALIST

## 2021-08-11 PROCEDURE — 250N000013 HC RX MED GY IP 250 OP 250 PS 637: Performed by: PODIATRIST

## 2021-08-11 PROCEDURE — 82746 ASSAY OF FOLIC ACID SERUM: CPT | Performed by: HOSPITALIST

## 2021-08-11 PROCEDURE — 86880 COOMBS TEST DIRECT: CPT | Performed by: HOSPITALIST

## 2021-08-11 PROCEDURE — 99232 SBSQ HOSP IP/OBS MODERATE 35: CPT | Performed by: STUDENT IN AN ORGANIZED HEALTH CARE EDUCATION/TRAINING PROGRAM

## 2021-08-11 PROCEDURE — 120N000001 HC R&B MED SURG/OB

## 2021-08-11 PROCEDURE — 86901 BLOOD TYPING SEROLOGIC RH(D): CPT | Performed by: HOSPITALIST

## 2021-08-11 PROCEDURE — 97110 THERAPEUTIC EXERCISES: CPT | Mod: GO

## 2021-08-11 PROCEDURE — 85025 COMPLETE CBC W/AUTO DIFF WBC: CPT | Performed by: HOSPITALIST

## 2021-08-11 PROCEDURE — G0463 HOSPITAL OUTPT CLINIC VISIT: HCPCS

## 2021-08-11 PROCEDURE — 250N000009 HC RX 250: Performed by: PODIATRIST

## 2021-08-11 PROCEDURE — 250N000013 HC RX MED GY IP 250 OP 250 PS 637: Performed by: HOSPITALIST

## 2021-08-11 PROCEDURE — 86870 RBC ANTIBODY IDENTIFICATION: CPT | Performed by: HOSPITALIST

## 2021-08-11 PROCEDURE — 85610 PROTHROMBIN TIME: CPT | Performed by: HOSPITALIST

## 2021-08-11 RX ADMIN — HYDROXYZINE HYDROCHLORIDE 25 MG: 25 TABLET, FILM COATED ORAL at 09:19

## 2021-08-11 RX ADMIN — NAFCILLIN SODIUM 2 G: 2 INJECTION, POWDER, LYOPHILIZED, FOR SOLUTION INTRAMUSCULAR; INTRAVENOUS at 17:57

## 2021-08-11 RX ADMIN — NAFCILLIN SODIUM 2 G: 2 INJECTION, POWDER, LYOPHILIZED, FOR SOLUTION INTRAMUSCULAR; INTRAVENOUS at 09:14

## 2021-08-11 RX ADMIN — ACETAMINOPHEN 650 MG: 325 TABLET, FILM COATED ORAL at 09:19

## 2021-08-11 RX ADMIN — GABAPENTIN 600 MG: 300 CAPSULE ORAL at 16:31

## 2021-08-11 RX ADMIN — OXYCODONE HYDROCHLORIDE 15 MG: 5 TABLET ORAL at 06:10

## 2021-08-11 RX ADMIN — NAFCILLIN SODIUM 2 G: 2 INJECTION, POWDER, LYOPHILIZED, FOR SOLUTION INTRAMUSCULAR; INTRAVENOUS at 22:20

## 2021-08-11 RX ADMIN — SERTRALINE HYDROCHLORIDE 50 MG: 50 TABLET ORAL at 09:19

## 2021-08-11 RX ADMIN — OXYCODONE HYDROCHLORIDE 15 MG: 5 TABLET ORAL at 10:20

## 2021-08-11 RX ADMIN — GABAPENTIN 600 MG: 300 CAPSULE ORAL at 22:20

## 2021-08-11 RX ADMIN — NAFCILLIN SODIUM 2 G: 2 INJECTION, POWDER, LYOPHILIZED, FOR SOLUTION INTRAMUSCULAR; INTRAVENOUS at 01:06

## 2021-08-11 RX ADMIN — BUPRENORPHINE AND NALOXONE 1 FILM: 8; 2 FILM, SOLUBLE BUCCAL; SUBLINGUAL at 09:19

## 2021-08-11 RX ADMIN — NAFCILLIN SODIUM 2 G: 2 INJECTION, POWDER, LYOPHILIZED, FOR SOLUTION INTRAMUSCULAR; INTRAVENOUS at 13:50

## 2021-08-11 RX ADMIN — GABAPENTIN 600 MG: 300 CAPSULE ORAL at 09:19

## 2021-08-11 RX ADMIN — OXYCODONE HYDROCHLORIDE 15 MG: 5 TABLET ORAL at 22:20

## 2021-08-11 RX ADMIN — NAFCILLIN SODIUM 2 G: 2 INJECTION, POWDER, LYOPHILIZED, FOR SOLUTION INTRAMUSCULAR; INTRAVENOUS at 05:51

## 2021-08-11 RX ADMIN — OXYCODONE HYDROCHLORIDE 15 MG: 5 TABLET ORAL at 14:25

## 2021-08-11 RX ADMIN — PANTOPRAZOLE SODIUM 40 MG: 40 TABLET, DELAYED RELEASE ORAL at 09:19

## 2021-08-11 RX ADMIN — BICTEGRAVIR SODIUM, EMTRICITABINE, AND TENOFOVIR ALAFENAMIDE FUMARATE 1 TABLET: 50; 200; 25 TABLET ORAL at 09:19

## 2021-08-11 RX ADMIN — BUPRENORPHINE AND NALOXONE 1 FILM: 8; 2 FILM, SOLUBLE BUCCAL; SUBLINGUAL at 16:31

## 2021-08-11 ASSESSMENT — ACTIVITIES OF DAILY LIVING (ADL)
ADLS_ACUITY_SCORE: 21
ADLS_ACUITY_SCORE: 18
ADLS_ACUITY_SCORE: 18
ADLS_ACUITY_SCORE: 21
ADLS_ACUITY_SCORE: 21
ADLS_ACUITY_SCORE: 18

## 2021-08-11 NOTE — PROGRESS NOTES
[facility]  WO Nurse Inpatient Wound Assessment     Reason for consultation: Evaluate and treat:  Lt great toes, Lt 3rd toe and Lt lateral heel, rt great toe    Assessment  Left great toe: small stable eschar  Left 3rd toe: s/p partial amp by Podiatry 8/7/21, sutured incision remains clean, dry, intact  Left lateral heel: resolving area of boggy ecchymosis and prior blister, epidermis remains intact  Right great toe: eschar peeled off today 8/11, small clean wound underneath     All wounds r/t to ischemic emboli. No local s/s infection. Plan of care updated below.  Podiatry has signed off for left foot.       Treatment Plan  Right great toe: every other day and prn:  1.  Cleanse with MicroKlenz  2.  Swab periwound with skin prep, let dry  3.  Apply pea-size amount Iodosorb gel to wound  4.  Cover with gauze pad, secure with roll gauze and/or tape    Right lateral heel: keep clean and moisturized.  Ok to cover with foam dressing for padding/protection prn.  Ensure minimal pressure to area.     Left foot: Keep foot and dressing dry.  Change prn if loose/soiled, applying betadine to great toe, adaptic to 3rd toe incision, then gauze pad, Kerlix, light ACE wrap.    Orders updated  Recommended provider order: n/a  WO Nurse follow-up plan: Weekly and prn   Nursing to notify the Provider(s) and re-consult the WOC Nurse if wound(s) deteriorates or new skin concern.    Patient History  According to provider note(s):    Manas Hernandez is a 41 year old female with h/o untreated HIV, hepatitis C who was admitted on 7/1/2021 for MSSA mitral valve endocarditis resulting in septic shock complicated by embolic left parietal lobe infarctions and multiple peripheral emboli.      Objective Data  Containment of urine/stool: Incontinence protocol    Active Diet Order:  Orders Placed This Encounter      Advance Diet as Tolerated: Regular Diet Adult    Output:   I/O last 3 completed shifts:  In: 1320  [P.O.:960; I.V.:360]  Out: 150 [Urine:150]    Risk Assessment:   Sensory Perception: 4-->no impairment  Moisture: 4-->rarely moist  Activity: 3-->walks occasionally  Mobility: 3-->slightly limited  Nutrition: 3-->adequate  Friction and Shear: 3-->no apparent problem  Jeffy Score: 20                          Labs:   Recent Labs   Lab 08/11/21  0614   HGB 8.1*   INR 1.05   WBC 4.7       Physical Exam  Skin inspection: Bilateral feet   Wound History: all wounds r/t to ischemic emboli    #1-#2: Wound Location:  Lt great toe and Lt 3rd toe  Date of last photo: 8-11-21 7-29-21      #1: Lt great toe  Size: approx 1 x 1 x 0+cm, blackish firm developing eschar  Tunneling: NA  Undermining: NA  Palpation of the wound bed: firm  Periwound skin: intact, no erythema  Temperature: warm   Drainage: none  Odor: none  Pain: denies    #2: Lt 3rd  toe  S/p partial amp 8-7-21, incision intact, see pic above     #3:  Wound Location: Rt great tip of  toe  [facility] photo: 8-11-21 8-4-21      Size: approx 0.8 x 0.8 x 0.2+cm, pink-yellow-red moist tissue after outer firm eschar peeled off today 8/11  Tunneling: n/a  Undermining: n/a  Palpation of the wound bed: normal  Periwound skin: neighboring small wound 0.3 x 0.3 x 0.2+cm with residual eschar; plantar toe adherent scabbing   Temperature: normal  Drainage: small serosang   Odor: none  Pain: denies     #4:  Wound Location: Rt lateral heel   [facility] photo: 8-11-21 8/4/21      Wound bed: Intact skin with resolving purplish discoloration and bogginess  Size: approx 3 x 2cm x skin intact    Tunneling: NA  Undermining: NA  Palpation of the wound bed: slight bogginess  Periwound skin: intact, no erythema, skin peeling and dry  Temperature: normal  Drainage:  none  Odor: none  Pain: Denies     Interventions  Current support surface: atmos air  Current off-loading measures: pillows and repositions self; surgical shoe for left foot  Visual inspection of wound(s) completed  Wound Care: done  per plan of care  Supplies: In pt room   Education provided: Discussed with patient and nurse    Christine Joshua RN, CWOCN

## 2021-08-11 NOTE — PLAN OF CARE
DATE & TIME: 8/11/2021 am shift.        Cognitive Concerns/ Orientation : A&O x4, flat affect.  BEHAVIOR & AGGRESSION TOOL COLOR: green  CIWA SCORE: NA    ABNL VS/O2: VSS on RA  MOBILITY: SBA with GB. Minimal weight bearing on left foot. Post-op boot when out out of bed.  PAIN MANAGMENT: Scheduled Tylenol and gabapentin. PRN Oxy and hydroxyzine once, some relief in pain.   DIET: Regular, good appetite.   BOWEL/BLADDER: incontinent at times, up to bathroom.    ABNL LAB/BG: UC + for E-coli, sensitivity pending.   DRAIN/DEVICES: Right PICC SL, blood return noted.   TELEMETRY RHYTHM: NA  SKIN: Scattered bruises and scabs. Dressing on left foot changed   TESTS/PROCEDURES: Partial amputation on left third toe on 8/7/21. Potential MVR on Monday pending neuro, JAE, and angiogram.             D/C DATE: Pending  Discharge Barriers: Continue on antibiotics until 8/18/21  OTHER IMPORTANT INFO: Contact precautions maintained. ID, ortho, cardiothoracic surgery following. Will have JAE tomorrow at 1300, need to be NPO after midnight. Angiogram today around 1500.     1600: returned to unit without angiogram due to no updated pregnancy test. Will obtain urine sample for test. Possible angiogram tomorrow, no scheduled. Will keep Pt NPO after midnight. Hospitalist notified.

## 2021-08-11 NOTE — PROGRESS NOTES
[facility]    Hospitalist Progress Note    Date of Service: 08/11/2021    Assessment & Plan      41 year old female with h/o untreated HIV, hepatitis C who was admitted on 7/1/2021 for MSSA mitral valve endocarditis resulting in septic shock complicated by embolic left parietal lobe infarctions and multiple peripheral emboli, and initially with encephalopathy, and shock.  She was intubated in ED on 7/1/2021 subsequently extubated 7/11/2021.  Blood cultures turned positive on hospital day 1 with MSSA.  Urine culture was positive for Klebsiella.  She was treated initially with ceftriaxone and vancomycin, and has since been narrowed to nafcillin per ID.     MSSA mitral valve endocarditis  Septic shock, resolved  Probable myopericarditis  Small pericardial effusion  *JAE 7/5 noted with small ASD, mod to large mobile vegetation (14mm long, 6mm wide) of the P2 segment of the mitral leaflet. No valve perforation; mod MR  *blood cultures on admission with MSSA; blood cultures have been negative since 7/6.   *ID following; antibiotic has been narrowed down to Nafcillin, 6 weeks of antibiotic, completion date 8/18/2021.  *limited ECHO repeated 7/15 to reassess pericardial fluid 7/15 per cardiology and noted:  organized effusion posteriorly, measuring 1.2-1.5cm, no evidence of tamponade; small mobile vegetation noted attached to the mitral leaflet, mod to severe MR; compared to 7/8/21, effusion size is probably stable, Degree of MR is worse, Vegetation is smaller.  Cardiology signed off 7/15.   *re-evaluated by CT surgery 7/15 and suggested continued antibiotic therapy at this time  *PICC in place  -- given pt's stability and cooperativity, I am consulting CT surgery today to do any appropriate work up while patient is in hospital and then arrange for follow up if that is that plan.  -- brain MRI should be ordered to rule out mycotic aneurysms or brain abscesses per CT surg    Osteomyelitis left 3rd  toe s/p amputation 8/7  Necrotic wounds to Lt great toes, Lt 3rd toe and Lt lateral Heel, Rt great toe: 2/2 thrombi.  -- Podiatry took to OR 8/7 for amputation. Orthotist following up.  -- oxycodone in the short term for pain. Some RNs have not been giving this. Talked to RN today. This is temporary post op.  -- continue wound cares for other digits    Normocytic anemia, likey 2/2 chronic disease  - Hgb 7.4 yesterday -> 8.1 today  - CBC tomorrow  - consider transfusion for Hgb < 7 (or close)  - iron and ferritin wnl  - B12 and folate -> wnl    Left knee pain 2/2 OA: pt reports history of pain in that knee with activity but pain now is more acute. ROM okay. There is some warmth and swelling there.   - In light of the MSSA bacteremia, got XR and called ortho for effusion  - Per ortho no indication for intervention, though they did order a brace. Appreciate assistance. There are following EOD.  - PT/OT    IV drug use  Polysubstance use disorder  *Chem dep consulted 7/15 but chemical dependency did not feel she is appropriate for CD assessment at this time and recommended consultation towards the end of her antibiotic course.  Reconsult CD once she is close to finishing her IV antibiotic.  *Was using IV dilaudid as well as Percocet p.o. earlier this stay pretty extensively.  Attempts made to wean off IV narcotics and taper down po narcotics though patient felt she was going through withdrawal. Psych consulted and recommended suboxone though patient declined. Psych recommended rapid taper of methadone instead.   *completed methadone taper of 30 mg on 7/21, 20 mg 7/22 and then 10 mg 7/23, then stop  -started suboxone on 7/27 - appreciate psych/addiction medicine assistance who will help with titration   - now on suboxone BID and doing very well. Reports no cravings and hopes to continue this regimen outpatient.  -Plan was to reconsult CD once she is close to finishing her IV antibiotic, but patient is not interested in  treatment. She has been through inpatient and outpatient programs and not found them helpful. Also I'm not sure she could continue suboxone in treatment and I think that is probably her best chance for sobriety.  -FYI: She has a daughter who just had twins and lives with her in-laws. Daughter and daughter's mother in law live in [place] (? - pt going to confirm) and have invited patient to live there to help with babies. She thinks this is a good idea as she'll be kept busy and she gets along well with this family. She is hoping to continue follow up with addiction medicine to continue suboxone. She feels that with suboxone and family support she will do well. Will need to have addiction med follow up with her to prescribe suboxone on discharge and help her set up outpatient follow up.    HIV, untreated PTA  - CD4 count greater than 500, viral load 3695  - Started Biktarvy 7/8; ID following  - Will need outpt follow up    Chronic back pain: pt notes it present ever since she was pregnant.   -tylenol  -added lidoderm patch 7/23  -increase gabapentin to 600 mg TID     Toxic/septic/metabolic encephalopathy, improved  Likely encephalopathy due to sepsis, drug use, CVA, prolonged ICU stay; had been pulling lines due to alterned mentation thought this all now appears resolved.      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  Completed course of treatment with ceftriaxone.  repeat urine cultures 7/10 with ESBL E coli but given  unimpressive UA and no UTI symptoms ID suggest holding off on abx for ESBL UTI  -monitor for any symptoms     Acute hypoxemic respiratory failure, resolved  Intubated 7/1/2021.  Extubated 7/11/2021.  Likely related to septic shock from MSSA mitral valve endocarditis     FORD, resolved suspected due to sepsis  Hypokalemia  - on potassium replacement protocol  - will check BMP periodically     Chronic anemia  Baseline hemoglobin approximately 7.  Likely related to chronic disease, untreated HIV.  -Monitor hemoglobin periodically; stable around 9     Non severe malnutrition  -- Supplements      Prior hepatitis C infection  Antibody positive but RNA negative  -Noted    Restless legs: unclear if this is true restless leg syndrome or due to being uncomfortable  -continue mirapex prn    Major depressive disorder: pt is not suicidal but also doesn't seem to have much of a will to live based upon discussion 7/25.  I wonder if her underlying chronic pain which is difficult to treat as well as being in the hospital is contributing. Was seen by psych and declined any medication treatment on 7/26.  patient was on zoloft for years starting at age 13. She is willing to try zoloft again.   -Started zoloft 8/1 at 25mg po and titrating up to 50mg. Pt feels this is helping. Psychiatry followed up and agreed with this medication.    DVT Prophylaxis: SCDs  Code Status: Full Code    Disposition: Expected discharge once IV abx are complete after 8/18 as having a difficult time finding placement in TCU.  She is homeless at baseline but has a plan post discharge to live with daughter and daughter's in laws. This is in the [facility] and will allow for follow up cares.    Time Spent on this Encounter   - Counseling the patient and/or family regarding: diagnostic results, prognosis, risks and benefits of treatment options, recommended follow-up and medical compliance  - Coordination of care with the: care coordinator/, nurse  and patient    Eleazar Black MD  Hospitalist      Interval History      Patient slept poorly because RN last night would not give oxycodone. Clarified with nursing that patient can get oxycodone in this post-op period. Start weaning in the coming days.  No CP/SOB today  No fevers or chills  JAE pushed back to tomorrow.   Wants to continue on suboxone and get healthy.    Physical Exam   Temp: 98.4  F (36.9  C) Temp src: Oral BP: 116/71 Pulse: 74   Resp: 16 SpO2: 100 % O2 Device: None (Room air)    Vitals:    07/13/21 0200 07/14/21 0445 07/16/21 0615   Weight: 89 kg (196 lb 3.4 oz) 84.2 kg (185 lb 10 oz) 76.8 kg (169 lb 5 oz)     Vital Signs with Ranges  Temp:  [98  F (36.7  C)-98.4  F (36.9  C)] 98.4  F (36.9  C)  Pulse:  [70-74] 74  Resp:  [16] 16  BP: (109-123)/(70-84) 116/71  SpO2:  [99 %-100 %] 100 %  I/O last 3 completed shifts:  In: 840 [P.O.:480; I.V.:360]  Out: 150 [Urine:150]    Constitutional: sitting up in bed, appears comfortable  Respiratory: Clear   Cardiovascular: Regular rate and rhythm.  No murmur.  GI: Positive bowel sounds, soft, non-distended, non-tender. Hernia present  Extr:  Right foot in dressing  Neurologic: Alert, Ox3, no focal deficits    Medications       bictegravir-emtricitabine-tenofovir  1 tablet Oral Daily     buprenorphine HCl-naloxone HCl  1 Film Sublingual BID     gabapentin  600 mg Oral TID     nafcillin  2 g Intravenous Q4H     pantoprazole  40 mg Oral QAM AC     polyethylene glycol  17 g Oral Daily     senna-docusate  1 tablet Oral BID     sertraline  50 mg Oral Daily     sodium chloride (PF)  10 mL Intravenous Once     sodium chloride (PF)  10 mL Intracatheter Q8H     sodium chloride (PF)  10-40 mL Intracatheter Q7 Days     sodium chloride (PF)  3 mL Intracatheter Q8H       Data   Recent Labs   Lab 08/11/21  0614 08/09/21  0654 08/09/21  0653 08/08/21  0632   WBC 4.7 4.1  --   --    HGB 8.1* 7.4*  --   --    MCV 85 84  --   --    * 493*  --   --    INR 1.05  --   --   --       --  141  --    POTASSIUM 3.9  --  4.1  --    CHLORIDE 110*  --  109  --    CO2 27  --  27  --    BUN 10  --  10  --    CR 0.59  --  0.59  --    ANIONGAP 2*  --  5  --    DEREK 8.3*  --  8.0*  --    GLC 96  --  95 105*       No results found for this or any previous visit (from the past 24 hour(s)).

## 2021-08-11 NOTE — PLAN OF CARE
DATE & TIME: 8/10/2021 [id number]           Cognitive Concerns/ Orientation : A&O x4   BEHAVIOR & AGGRESSION TOOL COLOR: green  CIWA SCORE: NA    ABNL VS/O2: VSS on RA  MOBILITY: SBA with GB. Minimal weight bearing on left foot. Post-op boot when out out of bed.  PAIN MANAGMENT: Scheduled Tylenol and gabapentin. PRN Oxy given in AM for 10/10 L foot pain after ambulation to bathroom  DIET: Regular, good appetite.   BOWEL/BLADDER: Continent, up to bathroom.    ABNL LAB/BG: NA  DRAIN/DEVICES: Right PICC SL  TELEMETRY RHYTHM: NA  SKIN: Scattered bruises and scabs. Dressing on left foot, CDI  TESTS/PROCEDURES: Partial amputation on left third toe on 8/7/21. Potential MVR on Monday pending neuro and TTE                 D/C DATE: Pending  Discharge Barriers: Continue on antibiotics until 8/18/21  OTHER IMPORTANT INFO: Contact precautions maintained. ID, ortho, cardiothoracic surgery following.

## 2021-08-11 NOTE — PLAN OF CARE
Patient initially planned for JAE today, however on review of chart, recommend Anesthesiology assistance with sedation, and so JAE rescheduled for tomorrow.    Gagan Simmons MD, [facility]  Cardiology  Text Page   August 11, 2021

## 2021-08-11 NOTE — PLAN OF CARE
DATE & TIME: 8/10/2021 [id number]              Cognitive Concerns/ Orientation : A&O x 4   BEHAVIOR & AGGRESSION TOOL COLOR: green  CIWA SCORE: NA    ABNL VS/O2: VSS on RA  MOBILITY: SBA with GB. Minimal weight bearing on left foot. Post-op boot when out out of bed.  PAIN MANAGMENT: Scheduled Tylenol and gabapentin. Oxycodone PRN throbbing pain to left foot.  DIET: Regular, good appetite.   BOWEL/BLADDER:continent, to bathroom.    ABNL LAB/BG: NA  DRAIN/DEVICES: Right PICC flushes well with blood return noted.  TELEMETRY RHYTHM: NA  SKIN: Scattered bruises and scabs. Dressing on left foot, CDI  TESTS/PROCEDURES: Partial amputation on left third toe on 8/7/21                     D/C DATE: Pending  Discharge Barriers: Continue on antibiotics until 8/18/21  OTHER IMPORTANT INFO: Contact precautions maintained. ID, ortho following. Will have MRI of head today for stroke follow up. Cardiothoracic surgery seen, plan to have mitral valve replaced possibly on Monday--needs to be cleared by neuro and repeat TTE.

## 2021-08-12 ENCOUNTER — APPOINTMENT (OUTPATIENT)
Dept: CARDIOLOGY | Facility: CLINIC | Age: 41
End: 2021-08-12
Attending: SURGERY
Payer: MEDICAID

## 2021-08-12 LAB
BACTERIA UR CULT: ABNORMAL
LVEF ECHO: NORMAL

## 2021-08-12 PROCEDURE — 250N000013 HC RX MED GY IP 250 OP 250 PS 637: Performed by: PODIATRIST

## 2021-08-12 PROCEDURE — C1894 INTRO/SHEATH, NON-LASER: HCPCS | Performed by: INTERNAL MEDICINE

## 2021-08-12 PROCEDURE — 99152 MOD SED SAME PHYS/QHP 5/>YRS: CPT | Performed by: INTERNAL MEDICINE

## 2021-08-12 PROCEDURE — 250N000011 HC RX IP 250 OP 636: Performed by: NURSE ANESTHETIST, CERTIFIED REGISTERED

## 2021-08-12 PROCEDURE — 93325 DOPPLER ECHO COLOR FLOW MAPG: CPT | Mod: 26 | Performed by: INTERNAL MEDICINE

## 2021-08-12 PROCEDURE — 93312 ECHO TRANSESOPHAGEAL: CPT | Mod: 26 | Performed by: INTERNAL MEDICINE

## 2021-08-12 PROCEDURE — 99233 SBSQ HOSP IP/OBS HIGH 50: CPT | Performed by: STUDENT IN AN ORGANIZED HEALTH CARE EDUCATION/TRAINING PROGRAM

## 2021-08-12 PROCEDURE — 250N000009 HC RX 250: Performed by: NURSE ANESTHETIST, CERTIFIED REGISTERED

## 2021-08-12 PROCEDURE — 272N000001 HC OR GENERAL SUPPLY STERILE: Performed by: INTERNAL MEDICINE

## 2021-08-12 PROCEDURE — 250N000011 HC RX IP 250 OP 636: Performed by: INTERNAL MEDICINE

## 2021-08-12 PROCEDURE — 999N000183 HC STATISTIC TEE INCLUDES SEDATION

## 2021-08-12 PROCEDURE — 250N000009 HC RX 250: Performed by: INTERNAL MEDICINE

## 2021-08-12 PROCEDURE — 258N000003 HC RX IP 258 OP 636: Performed by: NURSE ANESTHETIST, CERTIFIED REGISTERED

## 2021-08-12 PROCEDURE — 250N000009 HC RX 250: Performed by: PODIATRIST

## 2021-08-12 PROCEDURE — 93454 CORONARY ARTERY ANGIO S&I: CPT | Mod: 26 | Performed by: INTERNAL MEDICINE

## 2021-08-12 PROCEDURE — B2111ZZ FLUOROSCOPY OF MULTIPLE CORONARY ARTERIES USING LOW OSMOLAR CONTRAST: ICD-10-PCS | Performed by: INTERNAL MEDICINE

## 2021-08-12 PROCEDURE — 93325 DOPPLER ECHO COLOR FLOW MAPG: CPT

## 2021-08-12 PROCEDURE — 370N000017 HC ANESTHESIA TECHNICAL FEE, PER MIN

## 2021-08-12 PROCEDURE — 999N000184 HC STATISTIC TELEMETRY

## 2021-08-12 PROCEDURE — 93320 DOPPLER ECHO COMPLETE: CPT | Mod: 26 | Performed by: INTERNAL MEDICINE

## 2021-08-12 PROCEDURE — 99152 MOD SED SAME PHYS/QHP 5/>YRS: CPT | Mod: 59 | Performed by: INTERNAL MEDICINE

## 2021-08-12 PROCEDURE — 250N000013 HC RX MED GY IP 250 OP 250 PS 637: Performed by: HOSPITALIST

## 2021-08-12 PROCEDURE — 120N000001 HC R&B MED SURG/OB

## 2021-08-12 PROCEDURE — 93454 CORONARY ARTERY ANGIO S&I: CPT | Performed by: INTERNAL MEDICINE

## 2021-08-12 PROCEDURE — 250N000013 HC RX MED GY IP 250 OP 250 PS 637: Performed by: STUDENT IN AN ORGANIZED HEALTH CARE EDUCATION/TRAINING PROGRAM

## 2021-08-12 PROCEDURE — 99207 PR SC NO CHARGE VISIT/PATIENT NOT SEEN: CPT | Performed by: INTERNAL MEDICINE

## 2021-08-12 PROCEDURE — 250N000025 HC SEVOFLURANE, PER MIN

## 2021-08-12 PROCEDURE — 258N000003 HC RX IP 258 OP 636: Performed by: INTERNAL MEDICINE

## 2021-08-12 RX ORDER — ATROPINE SULFATE 0.1 MG/ML
0.5 INJECTION INTRAVENOUS
Status: ACTIVE | OUTPATIENT
Start: 2021-08-12 | End: 2021-08-12

## 2021-08-12 RX ORDER — SODIUM CHLORIDE 9 MG/ML
75 INJECTION, SOLUTION INTRAVENOUS CONTINUOUS
Status: ACTIVE | OUTPATIENT
Start: 2021-08-12 | End: 2021-08-12

## 2021-08-12 RX ORDER — FLUMAZENIL 0.1 MG/ML
0.2 INJECTION, SOLUTION INTRAVENOUS
Status: ACTIVE | OUTPATIENT
Start: 2021-08-12 | End: 2021-08-12

## 2021-08-12 RX ORDER — LIDOCAINE HYDROCHLORIDE 20 MG/ML
INJECTION, SOLUTION INFILTRATION; PERINEURAL PRN
Status: DISCONTINUED | OUTPATIENT
Start: 2021-08-12 | End: 2021-08-12

## 2021-08-12 RX ORDER — GLYCOPYRROLATE 0.2 MG/ML
INJECTION, SOLUTION INTRAMUSCULAR; INTRAVENOUS PRN
Status: DISCONTINUED | OUTPATIENT
Start: 2021-08-12 | End: 2021-08-12

## 2021-08-12 RX ORDER — IOPAMIDOL 755 MG/ML
INJECTION, SOLUTION INTRAVASCULAR
Status: DISCONTINUED | OUTPATIENT
Start: 2021-08-12 | End: 2021-08-12 | Stop reason: HOSPADM

## 2021-08-12 RX ORDER — OXYCODONE HYDROCHLORIDE 5 MG/1
5 TABLET ORAL EVERY 4 HOURS PRN
Status: DISCONTINUED | OUTPATIENT
Start: 2021-08-12 | End: 2021-08-12

## 2021-08-12 RX ORDER — FENTANYL CITRATE 50 UG/ML
25 INJECTION, SOLUTION INTRAMUSCULAR; INTRAVENOUS
Status: DISCONTINUED | OUTPATIENT
Start: 2021-08-12 | End: 2021-08-16

## 2021-08-12 RX ORDER — NALOXONE HYDROCHLORIDE 0.4 MG/ML
0.4 INJECTION, SOLUTION INTRAMUSCULAR; INTRAVENOUS; SUBCUTANEOUS
Status: DISCONTINUED | OUTPATIENT
Start: 2021-08-12 | End: 2021-08-12

## 2021-08-12 RX ORDER — FENTANYL CITRATE 50 UG/ML
INJECTION, SOLUTION INTRAMUSCULAR; INTRAVENOUS
Status: DISCONTINUED | OUTPATIENT
Start: 2021-08-12 | End: 2021-08-12 | Stop reason: HOSPADM

## 2021-08-12 RX ORDER — NALOXONE HYDROCHLORIDE 0.4 MG/ML
0.2 INJECTION, SOLUTION INTRAMUSCULAR; INTRAVENOUS; SUBCUTANEOUS
Status: DISCONTINUED | OUTPATIENT
Start: 2021-08-12 | End: 2021-08-12

## 2021-08-12 RX ORDER — PROPOFOL 10 MG/ML
INJECTION, EMULSION INTRAVENOUS PRN
Status: DISCONTINUED | OUTPATIENT
Start: 2021-08-12 | End: 2021-08-12

## 2021-08-12 RX ORDER — PROPOFOL 10 MG/ML
INJECTION, EMULSION INTRAVENOUS CONTINUOUS PRN
Status: DISCONTINUED | OUTPATIENT
Start: 2021-08-12 | End: 2021-08-12

## 2021-08-12 RX ORDER — EPHEDRINE SULFATE 50 MG/ML
INJECTION, SOLUTION INTRAMUSCULAR; INTRAVENOUS; SUBCUTANEOUS PRN
Status: DISCONTINUED | OUTPATIENT
Start: 2021-08-12 | End: 2021-08-12

## 2021-08-12 RX ORDER — OXYCODONE HYDROCHLORIDE 5 MG/1
10 TABLET ORAL EVERY 4 HOURS PRN
Status: DISCONTINUED | OUTPATIENT
Start: 2021-08-12 | End: 2021-08-12

## 2021-08-12 RX ORDER — SODIUM CHLORIDE 9 MG/ML
INJECTION, SOLUTION INTRAVENOUS CONTINUOUS PRN
Status: DISCONTINUED | OUTPATIENT
Start: 2021-08-12 | End: 2021-08-12

## 2021-08-12 RX ORDER — BUPRENORPHINE AND NALOXONE 4; 1 MG/1; MG/1
1 FILM, SOLUBLE BUCCAL; SUBLINGUAL 4 TIMES DAILY
Status: DISPENSED | OUTPATIENT
Start: 2021-08-12 | End: 2021-08-26

## 2021-08-12 RX ORDER — ACETAMINOPHEN 325 MG/1
650 TABLET ORAL EVERY 4 HOURS PRN
Status: DISCONTINUED | OUTPATIENT
Start: 2021-08-12 | End: 2021-08-12

## 2021-08-12 RX ORDER — ONDANSETRON 2 MG/ML
INJECTION INTRAMUSCULAR; INTRAVENOUS PRN
Status: DISCONTINUED | OUTPATIENT
Start: 2021-08-12 | End: 2021-08-12

## 2021-08-12 RX ADMIN — SODIUM CHLORIDE: 9 INJECTION, SOLUTION INTRAVENOUS at 12:55

## 2021-08-12 RX ADMIN — PROPOFOL 30 MCG/KG/MIN: 10 INJECTION, EMULSION INTRAVENOUS at 13:00

## 2021-08-12 RX ADMIN — SERTRALINE HYDROCHLORIDE 50 MG: 50 TABLET ORAL at 09:18

## 2021-08-12 RX ADMIN — Medication 5 MG: at 13:16

## 2021-08-12 RX ADMIN — BUPRENORPHINE HYDROCHLORIDE, NALOXONE HYDROCHLORIDE 1 FILM: 4; 1 FILM, SOLUBLE BUCCAL; SUBLINGUAL at 21:44

## 2021-08-12 RX ADMIN — LIDOCAINE HYDROCHLORIDE 100 MG: 20 INJECTION, SOLUTION INFILTRATION; PERINEURAL at 13:00

## 2021-08-12 RX ADMIN — BICTEGRAVIR SODIUM, EMTRICITABINE, AND TENOFOVIR ALAFENAMIDE FUMARATE 1 TABLET: 50; 200; 25 TABLET ORAL at 09:17

## 2021-08-12 RX ADMIN — SENNOSIDES AND DOCUSATE SODIUM 1 TABLET: 8.6; 5 TABLET ORAL at 21:44

## 2021-08-12 RX ADMIN — NAFCILLIN SODIUM 2 G: 2 INJECTION, POWDER, LYOPHILIZED, FOR SOLUTION INTRAMUSCULAR; INTRAVENOUS at 21:45

## 2021-08-12 RX ADMIN — BUPRENORPHINE HYDROCHLORIDE, NALOXONE HYDROCHLORIDE 1 FILM: 4; 1 FILM, SOLUBLE BUCCAL; SUBLINGUAL at 15:31

## 2021-08-12 RX ADMIN — SUCCINYLCHOLINE CHLORIDE 100 MG: 20 INJECTION, SOLUTION INTRAMUSCULAR; INTRAVENOUS; PARENTERAL at 13:00

## 2021-08-12 RX ADMIN — BUPRENORPHINE AND NALOXONE 1 FILM: 8; 2 FILM, SOLUBLE BUCCAL; SUBLINGUAL at 09:18

## 2021-08-12 RX ADMIN — NAFCILLIN SODIUM 2 G: 2 INJECTION, POWDER, LYOPHILIZED, FOR SOLUTION INTRAMUSCULAR; INTRAVENOUS at 05:51

## 2021-08-12 RX ADMIN — PHENYLEPHRINE HYDROCHLORIDE 100 MCG: 10 INJECTION INTRAVENOUS at 13:18

## 2021-08-12 RX ADMIN — HYDROXYZINE HYDROCHLORIDE 25 MG: 25 TABLET, FILM COATED ORAL at 21:44

## 2021-08-12 RX ADMIN — SODIUM CHLORIDE 75 ML/HR: 9 INJECTION, SOLUTION INTRAVENOUS at 15:31

## 2021-08-12 RX ADMIN — GABAPENTIN 600 MG: 300 CAPSULE ORAL at 09:17

## 2021-08-12 RX ADMIN — NAFCILLIN SODIUM 2 G: 2 INJECTION, POWDER, LYOPHILIZED, FOR SOLUTION INTRAMUSCULAR; INTRAVENOUS at 02:24

## 2021-08-12 RX ADMIN — GABAPENTIN 600 MG: 300 CAPSULE ORAL at 15:31

## 2021-08-12 RX ADMIN — ONDANSETRON 4 MG: 2 INJECTION INTRAMUSCULAR; INTRAVENOUS at 13:18

## 2021-08-12 RX ADMIN — NAFCILLIN SODIUM 2 G: 2 INJECTION, POWDER, LYOPHILIZED, FOR SOLUTION INTRAMUSCULAR; INTRAVENOUS at 15:31

## 2021-08-12 RX ADMIN — OXYCODONE HYDROCHLORIDE 15 MG: 5 TABLET ORAL at 04:43

## 2021-08-12 RX ADMIN — NAFCILLIN SODIUM 2 G: 2 INJECTION, POWDER, LYOPHILIZED, FOR SOLUTION INTRAMUSCULAR; INTRAVENOUS at 18:41

## 2021-08-12 RX ADMIN — NAFCILLIN SODIUM 2 G: 2 INJECTION, POWDER, LYOPHILIZED, FOR SOLUTION INTRAMUSCULAR; INTRAVENOUS at 09:20

## 2021-08-12 RX ADMIN — QUETIAPINE FUMARATE 25 MG: 25 TABLET ORAL at 21:44

## 2021-08-12 RX ADMIN — PANTOPRAZOLE SODIUM 40 MG: 40 TABLET, DELAYED RELEASE ORAL at 09:18

## 2021-08-12 RX ADMIN — Medication 5 MG: at 13:12

## 2021-08-12 RX ADMIN — ACETAMINOPHEN 650 MG: 325 TABLET, FILM COATED ORAL at 21:44

## 2021-08-12 RX ADMIN — GLYCOPYRROLATE 0.2 MG: 0.2 INJECTION, SOLUTION INTRAMUSCULAR; INTRAVENOUS at 13:20

## 2021-08-12 RX ADMIN — GABAPENTIN 600 MG: 300 CAPSULE ORAL at 21:44

## 2021-08-12 RX ADMIN — OXYCODONE HYDROCHLORIDE 15 MG: 5 TABLET ORAL at 09:17

## 2021-08-12 RX ADMIN — PROPOFOL 200 MG: 10 INJECTION, EMULSION INTRAVENOUS at 13:00

## 2021-08-12 ASSESSMENT — ACTIVITIES OF DAILY LIVING (ADL)
ADLS_ACUITY_SCORE: 22

## 2021-08-12 ASSESSMENT — LIFESTYLE VARIABLES: TOBACCO_USE: 1

## 2021-08-12 NOTE — ANESTHESIA PREPROCEDURE EVALUATION
Anesthesia Pre-Procedure Evaluation    Patient: Manas Hernandez   MRN: [id number] : 1980        Preoperative Diagnosis: Cardiac disease [I51.9]   Procedure : Procedure(s):  ECHOCARDIOGRAM, TRANSESOPHAGEAL, INTRAOPERATIVE     Past Medical History:   Diagnosis Date     Heroin abuse (H)      Methamphetamine abuse (H)      Polysubstance abuse (H)       Past Surgical History:   Procedure Laterality Date     AMPUTATE TOE(S) Left 2021    Procedure: partial left 3rd toe amputation.;  Surgeon: Makenzie Mcneil DPM, Podiatry/Foot and Ankle Surgery;  Location: [facility] OR     DILATION AND CURETTAGE        No Known Allergies   Social History     Tobacco Use     Smoking status: Current Every Day Smoker     Types: Cigarettes     Tobacco comment: 1-2   Substance Use Topics     Alcohol use: No      Wt Readings from Last 1 Encounters:   21 76.8 kg (169 lb 5 oz)        Anesthesia Evaluation            ROS/MED HX  ENT/Pulmonary:     (+) tobacco use, Current use,     Neurologic:     (+) CVA, TIA,     Cardiovascular:     (+) -----valvular problems/murmurs type: MR endocarditis. Previous cardiac testing   Echo: Date: Results:  Ther apperas to be organized effusion posteriorly, measuring 1.2-1.5cm.  No evidence of tamponade.  The mitral valve inflow Doppler reveals no significant respiratory variation.  Abnormal septal motion is noted with mild flattening intermittently.Hepatic  vein doppler was suboptimal.  Small mobile vegetation noted attached to the mitral leaflet.  There is moderate to mod-severe (2-3+) mitral regurgitation. Pulmonary vein  doppler does not reveal systolic reversal.  Compared to 21, effusion size is probably stable. Degree of MR is worse,  Vegetation is smaller. The study was technically difficult.  Stress Test: Date: Results:    ECG Reviewed: Date: Results:    Cath: Date: Results:      METS/Exercise Tolerance:     Hematologic:       Musculoskeletal:       GI/Hepatic:       Renal/Genitourinary:        Endo:       Psychiatric/Substance Use: Comment: IVDA    (+) Recreational drug usage: Meth (polysubstance abuse).    Infectious Disease: Comment: Infective endocarditis      Malignancy:       Other:            Physical Exam    Airway        Mallampati: II   TM distance: > 3 FB   Neck ROM: full   Mouth opening: > 3 cm    Respiratory Devices and Support         Dental     Comment: Poor dentition        Cardiovascular           (+) murmur       Pulmonary   pulmonary exam normal                OUTSIDE LABS:  CBC:   Lab Results   Component Value Date    WBC 4.7 08/11/2021    WBC 4.1 08/09/2021    HGB 8.1 (L) 08/11/2021    HGB 7.4 (L) 08/09/2021    HCT 26.1 (L) 08/11/2021    HCT 23.2 (L) 08/09/2021     (H) 08/11/2021     (H) 08/09/2021     BMP:   Lab Results   Component Value Date     08/11/2021     08/09/2021    POTASSIUM 3.9 08/11/2021    POTASSIUM 4.1 08/09/2021    CHLORIDE 110 (H) 08/11/2021    CHLORIDE 109 08/09/2021    CO2 27 08/11/2021    CO2 27 08/09/2021    BUN 10 08/11/2021    BUN 10 08/09/2021    CR 0.59 08/11/2021    CR 0.59 08/09/2021    GLC 96 08/11/2021    GLC 95 08/09/2021     COAGS:   Lab Results   Component Value Date    INR 1.05 08/11/2021     POC:   Lab Results   Component Value Date     (H) 07/11/2021    HCG Negative 08/11/2021    HCGS Negative 07/01/2021     HEPATIC:   Lab Results   Component Value Date    ALBUMIN 2.1 (L) 07/30/2021    PROTTOTAL 8.4 07/30/2021    ALT 19 07/30/2021    AST 17 07/30/2021    ALKPHOS 117 07/30/2021    BILITOTAL 0.6 07/30/2021     OTHER:   Lab Results   Component Value Date    PH 7.49 (H) 07/09/2021    LACT 1.5 07/02/2021    A1C 5.9 (H) 07/03/2021    DEREK 8.3 (L) 08/11/2021    PHOS 4.5 07/16/2021    MAG 2.0 07/16/2021    LIPASE 23 (L) 07/01/2021    TSH 0.58 07/01/2021    .0 (H) 08/04/2021       Anesthesia Plan    ASA Status:  4   NPO Status:  NPO Appropriate    Anesthesia Type: General.     - Airway: ETT   Induction: Intravenous.    Maintenance: Balanced.        Consents    Anesthesia Plan(s) and associated risks, benefits, and realistic alternatives discussed. Questions answered and patient/representative(s) expressed understanding.     - Discussed with:  Patient      - Extended Intubation/Ventilatory Support Discussed: No.      - Patient is DNR/DNI Status: No    Use of blood products discussed: Yes.     - Discussed with: Patient.     - Consented: consented to blood products            Reason for refusal: other.     Postoperative Care    Pain management: IV analgesics, Oral pain medications, Multi-modal analgesia.   PONV prophylaxis: Ondansetron (or other 5HT-3), Dexamethasone or Solumedrol, Background Propofol Infusion     Comments:                Juno Velasco, DO

## 2021-08-12 NOTE — PROGRESS NOTES
CVTS    Chart reviewed, patient not seen  Discussed Brain MRI findings with Dr. Solares  OMFS consult note pending dictation-- will await recs    Given evidence of small volume hemorrhage will request Neurology to see and evaluate for appropriateness of cardiac surgery given that we will have to give large doses of heparin for cardiopulmonary bypass.--Consult placed.   May need to postpone surgery tentatively planned for Monday 8/16 to allow more time for coordination of care and pre surgical work up  Will continue to follow work up peripherally.   Call with Questions    Alayna Ochoa PA-C  CV surgery  Pager [phone]    Addendum:  JAE today with only moderate (2+) MR   No vegetations or paravalvular abscess seen.   Discussed with Dr. Solares-- plan to discuss with cards regarding need for surgical intervention

## 2021-08-12 NOTE — PROGRESS NOTES
[facility]    Hospitalist Progress Note    Date of Service: 08/12/2021    Assessment & Plan      41 year old female with h/o untreated HIV, hepatitis C who was admitted on 7/1/2021 for MSSA mitral valve endocarditis resulting in septic shock complicated by embolic left parietal lobe infarctions and multiple peripheral emboli, and initially with encephalopathy, and shock.  She was intubated in ED on 7/1/2021 subsequently extubated 7/11/2021.  Blood cultures turned positive on hospital day 1 with MSSA.  Urine culture was positive for Klebsiella.  She was treated initially with ceftriaxone and vancomycin, and has since been narrowed to nafcillin per ID.     MSSA mitral valve endocarditis  Septic shock, resolved  Probable myopericarditis  Small pericardial effusion  *JAE 7/5 noted with small ASD, mod to large mobile vegetation (14mm long, 6mm wide) of the P2 segment of the mitral leaflet. No valve perforation; mod MR  *blood cultures on admission with MSSA; blood cultures have been negative since 7/6.   *ID following; antibiotic has been narrowed down to Nafcillin, 6 weeks of antibiotic, completion date 8/18/2021.  *limited ECHO repeated 7/15 to reassess pericardial fluid 7/15 per cardiology and noted:  organized effusion posteriorly, measuring 1.2-1.5cm, no evidence of tamponade; small mobile vegetation noted attached to the mitral leaflet, mod to severe MR; compared to 7/8/21, effusion size is probably stable, Degree of MR is worse, Vegetation is smaller.  Cardiology signed off 7/15.   *re-evaluated by CT surgery 7/15 and suggested continued antibiotic therapy at this time  *PICC in place  -- given pt's stability and cooperativity, I am consulting CT surgery today to do any appropriate work up while patient is in hospital and then arrange for follow up if that is that plan.  -- brain MRI should be ordered to rule out mycotic aneurysms or brain abscesses per CT surg  -- JAE (no vegetations)  and Cor Angio (nomral coronaries) completed 08/12    Infected Tooth  Assessment: OMFS was consulted for multiple infected tooth  Plan:  - Medically cleared for removal tomorrow  - NPO at midnight    Osteomyelitis left 3rd toe s/p amputation 8/7  Necrotic wounds to Lt great toes, Lt 3rd toe and Lt lateral Heel, Rt great toe: 2/2 thrombi.  -- Podiatry took to OR 8/7 for amputation. Orthotist following up.  -- oxycodone in the short term for pain. Some RNs have not been giving this. Talked to RN today. This is temporary post op.  -- continue wound cares for other digits    Normocytic anemia, likey 2/2 chronic disease  - Hgb 7.4 yesterday -> 8.1 today  - CBC tomorrow  - consider transfusion for Hgb < 7 (or close)  - iron and ferritin wnl  - B12 and folate -> wnl    Left knee pain 2/2 OA: pt reports history of pain in that knee with activity but pain now is more acute. ROM okay. There is some warmth and swelling there.   - In light of the MSSA bacteremia, got XR and called ortho for effusion  - Per ortho no indication for intervention, though they did order a brace. Appreciate assistance. There are following EOD.  - PT/OT    IV drug use  Polysubstance use disorder  *Chem dep consulted 7/15 but chemical dependency did not feel she is appropriate for CD assessment at this time and recommended consultation towards the end of her antibiotic course.  Reconsult CD once she is close to finishing her IV antibiotic.  *Was using IV dilaudid as well as Percocet p.o. earlier this stay pretty extensively.  Attempts made to wean off IV narcotics and taper down po narcotics though patient felt she was going through withdrawal. Psych consulted and recommended suboxone though patient declined. Psych recommended rapid taper of methadone instead.   *completed methadone taper of 30 mg on 7/21, 20 mg 7/22 and then 10 mg 7/23, then stop  -started suboxone on 7/27 - appreciate psych/addiction medicine assistance who will help with  titration   - now on suboxone BID and doing very well. Reports no cravings and hopes to continue this regimen outpatient.  -Plan was to reconsult CD once she is close to finishing her IV antibiotic, but patient is not interested in treatment. She has been through inpatient and outpatient programs and not found them helpful. Also I'm not sure she could continue suboxone in treatment and I think that is probably her best chance for sobriety.  -FYI: She has a daughter who just had twins and lives with her in-laws. Daughter and daughter's mother in law live in [place] (? - pt going to confirm) and have invited patient to live there to help with babies. She thinks this is a good idea as she'll be kept busy and she gets along well with this family. She is hoping to continue follow up with addiction medicine to continue suboxone. She feels that with suboxone and family support she will do well. Will need to have addiction med follow up with her to prescribe suboxone on discharge and help her set up outpatient follow up.  -08/12 -> Pain service recommended   1.  Change Suboxone to  4 mg qid for better analgesic effect.     2.   discontinue oxycodone   3.   Use acetaminophen for additional analgesic effect if needed.- can give as much as 1000 mg tid   (don't exceed 3000 mg total per day).      HIV, untreated PTA  - CD4 count greater than 500, viral load 3695  - Started Biktarvy 7/8; ID following  - Will need outpt follow up    Chronic back pain: pt notes it present ever since she was pregnant.   -tylenol  -added lidoderm patch 7/23  -increase gabapentin to 600 mg TID     Toxic/septic/metabolic encephalopathy, improved  Likely encephalopathy due to sepsis, drug use, CVA, prolonged ICU stay; had been pulling lines due to alterned mentation thought this all now appears resolved.      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  Completed course of treatment with ceftriaxone.  repeat urine cultures 7/10 with ESBL E coli but given unimpressive UA and no UTI symptoms ID suggest holding off on abx for ESBL UTI  -monitor for any symptoms     Acute hypoxemic respiratory failure, resolved  Intubated 7/1/2021.  Extubated 7/11/2021.  Likely related to septic shock from MSSA mitral valve endocarditis     FORD, resolved suspected due to sepsis  Hypokalemia  - on potassium replacement protocol  - will check BMP periodically     Chronic anemia  Baseline hemoglobin approximately 7.  Likely related to chronic disease, untreated HIV.  -Monitor hemoglobin periodically; stable around 9     Non severe malnutrition  -- Supplements      Prior hepatitis C infection  Antibody positive but RNA negative  -Noted    Restless legs: unclear if this is true restless leg syndrome or due to being uncomfortable  -continue mirapex prn    Major depressive disorder: pt is not suicidal but also doesn't seem to have much of a will to live based upon discussion 7/25.  I wonder if her underlying chronic pain which is difficult to treat as well as being in the hospital is contributing. Was seen by psych and declined any medication treatment on 7/26.  patient was on zoloft for years starting at age 13. She is willing to try zoloft again.   -Started zoloft 8/1 at 25mg po and titrating up to 50mg. Pt feels this is helping. Psychiatry followed up and agreed with this medication.    DVT Prophylaxis: SCDs  Code Status: Full Code    Disposition: Expected discharge once IV abx are  complete after 8/18 as having a difficult time finding placement in TCU.  She is homeless at baseline but has a plan post discharge to live with daughter and daughter's in laws. This is in the [facility] and will allow for follow up cares.    Time Spent on this Encounter   - Counseling the patient and/or family regarding: diagnostic results, prognosis, risks and benefits of treatment options, recommended follow-up and medical compliance  - Coordination of care with the: care coordinator/, nurse and patient    Eleazar Black MD  Hospitalist    Patient, family, interdisciplinary team involved in care and agrees with plan.  Total time - Greater than 35 min. More than 50% of time spent in direct patient care, care coordination, patient/caregiver counseling, and formalizing plan of care.     Interval History      No CP/SOB today  No fevers or chills  JAE completed  Okay with increasing Suboxone and discontinuing OXycodone     Physical Exam   Temp: 97.5  F (36.4  C) Temp src: Axillary BP: 103/57 Pulse: 74   Resp: 16 SpO2: 99 % O2 Device: None (Room air)    Vitals:    07/13/21 0200 07/14/21 0445 07/16/21 0615   Weight: 89 kg (196 lb 3.4 oz) 84.2 kg (185 lb 10 oz) 76.8 kg (169 lb 5 oz)     Vital Signs with Ranges  Temp:  [97.5  F (36.4  C)-98.2  F (36.8  C)] 97.5  F (36.4  C)  Pulse:  [54-86] 74  Resp:  [16] 16  BP: ()/(46-78) 103/57  SpO2:  [97 %-100 %] 99 %  I/O last 3 completed shifts:  In: 1000 [P.O.:480; I.V.:520]  Out: -     Constitutional: sitting up in bed, appears comfortable  Respiratory: Clear   Cardiovascular: Regular rate and rhythm.  No murmur.  GI: Positive bowel sounds, soft, non-distended, non-tender. Hernia present  Extr:  Right foot in dressing  Neurologic: Alert, Ox3, no focal deficits    Medications     sodium chloride 75 mL/hr (08/12/21 1531)       bictegravir-emtricitabine-tenofovir  1 tablet Oral Daily     buprenorphine HCl-naloxone HCl  1 Film Sublingual 4x Daily     gabapentin   600 mg Oral TID     nafcillin  2 g Intravenous Q4H     pantoprazole  40 mg Oral QAM AC     polyethylene glycol  17 g Oral Daily     senna-docusate  1 tablet Oral BID     sertraline  50 mg Oral Daily     sodium chloride (PF)  10 mL Intravenous Once     sodium chloride (PF)  10 mL Intracatheter Q8H     sodium chloride (PF)  10-40 mL Intracatheter Q7 Days     sodium chloride (PF)  3 mL Intracatheter Q8H       Data   Recent Labs   Lab 21  0614 21  0654 21  0653 21  0632   WBC 4.7 4.1  --   --    HGB 8.1* 7.4*  --   --    MCV 85 84  --   --    * 493*  --   --    INR 1.05  --   --   --      --  141  --    POTASSIUM 3.9  --  4.1  --    CHLORIDE 110*  --  109  --    CO2 27  --  27  --    BUN 10  --  10  --    CR 0.59  --  0.59  --    ANIONGAP 2*  --  5  --    DEREK 8.3*  --  8.0*  --    GLC 96  --  95 105*       Recent Results (from the past 24 hour(s))   Transesophageal Echocardiogram   Result Value    LVEF  60-65%    [facility]    [id number]  [id number]  [id number]^JORGE^ISABELLE^DOUGLAS     [facility]  Echocardiography Laboratory  [address] [place], [place] [address]     Name: JOAQUÍN SAAVEDRA  MRN: [id number]  : 1980  Study Date: 2021 01:00 PM  Age: 41 yrs  Gender: Female  Patient Location: [facility]  Reason For Study: Mitral Valve Disorder  Ordering Physician: ISABELLE PATEL  Referring Physician: ISABELLE PATEL  Performed By: Serina Chacko RDCS     BSA: 1.9 m2  Height: 68 in  Weight: 169 lb  HR: 71  ______________________________________________________________________________  Procedure  Complete JAE Adult. JAE Probe #[id number] was also used during the procedure.  Anesthesia for sedation and endotracheal intubation.  ______________________________________________________________________________  Interpretation Summary     The visual ejection fraction is 60-65%.  The right ventricle is moderately dilated.  The right ventricular  systolic function is mild to moderately reduced.  The left atrial appendage was well visualized and free of thrombus.  The mitral valve leaflets are mildly thickened.  There is no vegetation seen on the mitral valve.  There is moderate (2+) mitral regurgitation.     Compared to previous echos the vegetation is now absent. No paravalvular  abscess noted.     There appears to moderate, at most moderately-severe mitral regurgitation with  no evidence of left or right pulmonary vein systolic reversal.     Moderate atrial septal defect, secundum type measures 7mm across with left to  right shunt.  ______________________________________________________________________________  JAE  Prior to the exam, the oral cavity was checked and no overcrowding was noted.  Consent to the procedure was obtained prior to sedation. The transesophageal  probe was passed without difficulty. There were no complications associated  with this procedure.     Left Ventricle  The left ventricle is normal in size. There is normal left ventricular wall  thickness. The visual ejection fraction is 60-65%. No regional wall motion  abnormalities noted.     Right Ventricle  The right ventricle is moderately dilated. The right ventricular systolic  function is mild to moderately reduced.     Atria  The left atrium is moderate to severely dilated. The right atrium is moderate  to severely dilated. Moderate atrial septal defect, secundum type. The left  atrial appendage was well visualized and free of thrombus.     Mitral Valve  The mitral valve leaflets are mildly thickened. There is no vegetation seen on  the mitral valve. There is moderate (2+) mitral regurgitation.     Tricuspid Valve  The tricuspid valve is normal in structure and function. There is mild (1+)  tricuspid regurgitation.     Aortic Valve  Normal tricuspid aortic valve. No aortic regurgitation is present.     Pulmonic Valve  The pulmonic valve is normal in structure and function. There is  trace  pulmonic valvular regurgitation.     Vessels  The aortic root is normal size. Normal size ascending aorta.     Pericardial/Pleural  Trivial pericardial effusion.  ______________________________________________________________________________  Report approved by: Rashi Blanco 08/12/2021 03:15 PM     ______________________________________________________________________________      Cardiac Catheterization    Narrative    1.  Angiographically normal coronary arteries.

## 2021-08-12 NOTE — PLAN OF CARE
DATE & TIME: 8/11/2021 [id number]      Cognitive Concerns/ Orientation : A&O x4, flat affect, calm & cooperative   BEHAVIOR & AGGRESSION TOOL COLOR: Green  CIWA SCORE: NA    ABNL VS/O2: VSS on RA  MOBILITY: SBA with GB/W. Minimal weight bearing on left foot. Post-op boot when out out of bed. BLE elevated.  PAIN MANAGMENT: PRN Oxy given for 9/10 L foot pain after ambulation with relief  DIET: Regular. NPO at midnight   BOWEL/BLADDER: Continent, up to BR   ABNL LAB/BG: Hgb 8.1  DRAIN/DEVICES: Right PICC SL with q4h Nafcillin   TELEMETRY RHYTHM: NA  SKIN: Scattered bruises and scabs. L foot dressing CDI, dressing change due 8/13  TESTS/PROCEDURES: Partial amputation on left third toe on 8/7/21. Potential MVR on Monday pending tests. Angiogram and TTE 8/12   D/C DATE: Pending  Discharge Barriers: Continue on antibiotics until 8/18/21  OTHER IMPORTANT INFO: Contact precautions maintained. ID, ortho, cardiothoracic surgery following.

## 2021-08-12 NOTE — ANESTHESIA PROCEDURE NOTES
Airway       Patient location: [facility] - Operating Room or Procedural Area.       Procedure Start/Stop Times: 8/12/2021 1:02 PM  Staff -        Anesthesiologist:  Juno Velasco DO       CRNA: Kati Gunter APRN CRNA       Performed By: CRNA  Consent for Airway        Urgency: elective  Indications and Patient Condition       Indications for airway management: srinath-procedural and airway protection       Induction type:intravenous       Mask difficulty assessment: 1 - vent by mask    Final Airway Details       Final airway type: endotracheal airway       Successful airway: ETT - single  Endotracheal Airway Details        ETT size (mm): 6.5       Cuffed: yes       Successful intubation technique: video laryngoscopy       VL Blade Size: Christianson 3       Grade View of Cords: 1       Adjucts: stylet       Position: Right       Measured from: gums/teeth       Secured at (cm): 21       Bite block used: None    Post intubation assessment        Placement verified by: capnometry, equal breath sounds and chest rise        Number of attempts at approach: 1       Number of other approaches attempted: 0       Secured with: pink tape       Ease of procedure: easy       Dentition: Intact and Unchanged

## 2021-08-12 NOTE — PROGRESS NOTES
[facility] Procedure Nursing Note    Patient Information  Name: Manas Hernandez  Age: 41 year old    Procedure: 1245- Patient arrived to [facility] for JAE with general anesthesia, pt is alert and oriented, procedure explained, all questions answered. Pt denies difficulty swallowing, no sleep apnea. No dentures or loose teeth. NPO since before midnight. Anesthesia team arrived, pt sedated and intubated by team, monitored by CRNA    JAE: Pt tolerated well with stable vital signs throughout. See JAE Flowsheet. Total sedation given per CRNA, see anesthesia flowsheet.     1433- Pt transferred to cath lab per cart and staff JANA Leiva. Bedside report given. Resp even & unlabored upon transfer. Pt  A/O. Pt had been up to void prior to transfer.  Procedure start time: 1245  Procedure complete time: 1433  Concerns/abnormal assessment: No  If abnormal assessment, provider notified: N/A  Plan/Other: to cath lab for planned procedure.    Amita Guallpa RN

## 2021-08-12 NOTE — PLAN OF CARE
DATE & TIME: 8/12/21 ([id number])     Cognitive Concerns/ Orientation : A&O x4, flat affect, calm & cooperative   BEHAVIOR & AGGRESSION TOOL COLOR: Green  CIWA SCORE: NA    ABNL VS/O2: VSS on RA  MOBILITY: SBA with GB/W. Minimal weight bearing on left foot. Post-op boot when out out of bed. BLE elevated.  PAIN MANAGMENT: PRN Oxy given this am x1 for foot pain, Oxy discontinued, Suboxone increased 4 times/day  DIET: Regular. NPO at midnight for oral surgery tooth removal tomorrow    BOWEL/BLADDER: Continent, up to BR   ABNL LAB/BG: Hgb 8.1  DRAIN/DEVICES: Right PICC SL with q4h Nafcillin   TELEMETRY RHYTHM: NSR  SKIN: Scattered bruises and scabs. L foot dressing CDI, dressing change due 8/13  TESTS/PROCEDURES: Partial amputation on left third toe on 8/7/21. Potential MVR on Monday pending tests. Angiogram and TTE 8/12   D/C DATE: Pending  Discharge Barriers: Continue on antibiotics until 8/18/21  OTHER IMPORTANT INFO: Contact precautions maintained. ID, ortho, cardiothoracic surgery, neurology following.

## 2021-08-12 NOTE — ANESTHESIA POSTPROCEDURE EVALUATION
Patient: Manas Hernandez    Procedure(s):  ECHOCARDIOGRAM, TRANSESOPHAGEAL, INTRAOPERATIVE    Diagnosis:Cardiac disease [I51.9]    Anesthesia Type:  General    Note:  Disposition: Inpatient   Postop Pain Control: Uneventful            Sign Out: Well controlled pain   PONV: No   Neuro/Psych: Uneventful            Sign Out: Acceptable/Baseline neuro status   Airway/Respiratory: Uneventful            Sign Out: Acceptable/Baseline resp. status   CV/Hemodynamics: Uneventful            Sign Out: Acceptable CV status; No obvious hypovolemia; No obvious fluid overload   Other NRE: NONE   DID A NON-ROUTINE EVENT OCCUR? No           Last vitals:  Vitals Value Taken Time   BP     Temp     Pulse     Resp     SpO2         Electronically Signed By: Juno Velasco DO  August 12, 2021  4:24 PM

## 2021-08-12 NOTE — PROGRESS NOTES
Addiction Medicine Followup      Patient is currently getting JAE, so not seen.      S/p partial amputation L 3rd toe due to ischemia associated with emboli.     MV endocarditis and MV regurgitation, which has worsened .    Small PFO which allowed emboli from vegatations to cause multiple small peripheral infacts, and infarcts in  left temporoccipital and left parietal lobes, as well as multiple other small infarcts on both sides of brain and in cerebellum.    PLEASE  NOTE:    Pt. is on Suboxone 8/2 mg bid for opioid use disorder.   She has been tolerating this without sedation and with improvement in withdrawal symptoms and cooperation.   Since 8/7 surgery, the patient has also been ordered oxycodone 10 - 15 mg q 4 hrs prn pain.   Note that Suboxone has a high affinity for the mu-opioid receptor, so the oxycodone is probably not that effective for pain.   However, Suboxone does have analgesic effect, especially when give tid or qid.      Therefore would recommend:     1.  Change Suboxone to  4 mg qid for better analgesic effect.     2.   discontinue oxycodone   3.   Use acetaminophen for additional analgesic effect if needed.- can give as much as 1000 mg tid   (don't exceed 3000 mg total per day).      If patient is going to have mitral valve replacement, next week, would recommend the close consultation with us to help manage pain and stay on Suboxone.       1.  We would reduce Suboxone to 12 mg total daily dose and continue   2    Use high affinity opiates, such as parenteral hydromorphone or fentanyl for post-op pain.    3.  Optimize non-opioid analgesics and adjuvants  4.   Remember that patient has high opioid tolerance, but will also need close monitoring of O2 sat and respiratory rate to avoid oversedation.    5.  As soon as possible, taper off other opiates and increase Suboxone back to 16 mg or higher daily.    ([place] protocol, Ross et al.  Pain Medicine 2018)    Discussed with hospitalist., who will  notify CT surgeon of my recommendations.      I can be reached on pager [phone].    Awa Noriega MD  Addiction Medicine Service  [facility]   Page me (click here for Milagros Noriega)

## 2021-08-12 NOTE — PRE-PROCEDURE
GENERAL PRE-PROCEDURE:     Written consent obtained?: Yes    Risks and benefits: Risks, benefits and alternatives were discussed    Consent given by:  Patient  Patient states understanding of procedure being performed: Yes    Patient's understanding of procedure matches consent: Yes    Procedure consent matches procedure scheduled: Yes    Expected level of sedation:  Moderate  Appropriately NPO:  Yes  ASA Class:  Class 4- Severe systemic disease, acute unstable problems  Mallampati  :  Grade 2- soft palate, base of uvula, tonsillar pillars, and portion of posterior pharyngeal wall visible  Lungs:  Lungs clear with good breath sounds bilaterally  Heart:  Normal heart sounds and rate  History & Physical reviewed:  History and physical reviewed and no updates needed  Statement of review:  I have reviewed the lab findings, diagnostic data, medications, and the plan for sedation

## 2021-08-12 NOTE — ANESTHESIA CARE TRANSFER NOTE
Patient: Manas Hernandez    Procedure(s):  ECHOCARDIOGRAM, TRANSESOPHAGEAL, INTRAOPERATIVE    Diagnosis: Cardiac disease [I51.9]  Diagnosis Additional Information: No value filed.    Anesthesia Type:   General     Note:    Oropharynx: oropharynx clear of all foreign objects and spontaneously breathing  Level of Consciousness: drowsy  Oxygen Supplementation: face mask  Level of Supplemental Oxygen (L/min / FiO2): 6  Independent Airway: airway patency satisfactory and stable  Dentition: dentition unchanged  Vital Signs Stable: post-procedure vital signs reviewed and stable  Report to RN Given: handoff report given  Patient transferred to: Telemetry/Step Down Unit (care Suites )  Comments: Neuromuscular blockade not used after succinylcholine for intubation, spontaneous return of TOF 4/4 with sustained tetany, spontaneous respirations, adequate tidal volumes, followed commands to voice, oropharynx suctioned with soft flexible catheter, extubated atraumatically, extubated with suction, airway patent after extubation.  Oxygen via facemask at 6 liters per minute to PACU. Oxygen tubing connected to wall O2 in PACU, SpO2, NiBP, and EKG monitors and alarms on and functioning, report on patient's clinical status given to PACU RN, RN questions answered.     Handoff Report: Identifed the Patient, Identified the Reponsible Provider, Reviewed the pertinent medical history, Discussed the surgical course, Reviewed Intra-OP anesthesia mangement and issues during anesthesia, Set expectations for post-procedure period and Allowed opportunity for questions and acknowledgement of understanding      Vitals:  Vitals Value Taken Time   /78 08/12/21 1334   Temp     Pulse 77 08/12/21 1334   Resp     SpO2 100 % 08/12/21 1334       Electronically Signed By: STEFANIE Trammell CRNA  August 12, 2021  1:37 PM

## 2021-08-13 ENCOUNTER — APPOINTMENT (OUTPATIENT)
Dept: OCCUPATIONAL THERAPY | Facility: CLINIC | Age: 41
End: 2021-08-13
Payer: MEDICAID

## 2021-08-13 ENCOUNTER — ANESTHESIA EVENT (OUTPATIENT)
Dept: SURGERY | Facility: CLINIC | Age: 41
End: 2021-08-13
Payer: MEDICAID

## 2021-08-13 ENCOUNTER — ANESTHESIA (OUTPATIENT)
Dept: SURGERY | Facility: CLINIC | Age: 41
End: 2021-08-13
Payer: MEDICAID

## 2021-08-13 LAB
ANION GAP SERPL CALCULATED.3IONS-SCNC: <1 MMOL/L (ref 3–14)
BUN SERPL-MCNC: 10 MG/DL (ref 7–30)
CALCIUM SERPL-MCNC: 8 MG/DL (ref 8.5–10.1)
CHLORIDE BLD-SCNC: 111 MMOL/L (ref 94–109)
CO2 SERPL-SCNC: 28 MMOL/L (ref 20–32)
CREAT SERPL-MCNC: 0.69 MG/DL (ref 0.52–1.04)
ERYTHROCYTE [DISTWIDTH] IN BLOOD BY AUTOMATED COUNT: 27.5 % (ref 10–15)
GFR SERPL CREATININE-BSD FRML MDRD: >90 ML/MIN/1.73M2
GLUCOSE BLD-MCNC: 95 MG/DL (ref 70–99)
HCT VFR BLD AUTO: 26 % (ref 35–47)
HGB BLD-MCNC: 8 G/DL (ref 11.7–15.7)
MCH RBC QN AUTO: 26.6 PG (ref 26.5–33)
MCHC RBC AUTO-ENTMCNC: 30.8 G/DL (ref 31.5–36.5)
MCV RBC AUTO: 86 FL (ref 78–100)
PLATELET # BLD AUTO: 548 10E3/UL (ref 150–450)
POTASSIUM BLD-SCNC: 3.8 MMOL/L (ref 3.4–5.3)
RBC # BLD AUTO: 3.01 10E6/UL (ref 3.8–5.2)
SARS-COV-2 RNA RESP QL NAA+PROBE: NEGATIVE
SODIUM SERPL-SCNC: 139 MMOL/L (ref 133–144)
WBC # BLD AUTO: 4.1 10E3/UL (ref 4–11)

## 2021-08-13 PROCEDURE — 258N000003 HC RX IP 258 OP 636: Performed by: ANESTHESIOLOGY

## 2021-08-13 PROCEDURE — 272N000001 HC OR GENERAL SUPPLY STERILE: Performed by: DENTIST

## 2021-08-13 PROCEDURE — 97530 THERAPEUTIC ACTIVITIES: CPT | Mod: GO | Performed by: OCCUPATIONAL THERAPIST

## 2021-08-13 PROCEDURE — 250N000013 HC RX MED GY IP 250 OP 250 PS 637: Performed by: DENTIST

## 2021-08-13 PROCEDURE — 250N000013 HC RX MED GY IP 250 OP 250 PS 637: Performed by: PODIATRIST

## 2021-08-13 PROCEDURE — 0CDWXZ1 EXTRACTION OF UPPER TOOTH, MULTIPLE, EXTERNAL APPROACH: ICD-10-PCS | Performed by: DENTIST

## 2021-08-13 PROCEDURE — 250N000013 HC RX MED GY IP 250 OP 250 PS 637: Performed by: STUDENT IN AN ORGANIZED HEALTH CARE EDUCATION/TRAINING PROGRAM

## 2021-08-13 PROCEDURE — 250N000009 HC RX 250: Performed by: ANESTHESIOLOGY

## 2021-08-13 PROCEDURE — 710N000009 HC RECOVERY PHASE 1, LEVEL 1, PER MIN: Performed by: DENTIST

## 2021-08-13 PROCEDURE — 250N000009 HC RX 250: Performed by: PODIATRIST

## 2021-08-13 PROCEDURE — 370N000017 HC ANESTHESIA TECHNICAL FEE, PER MIN: Performed by: DENTIST

## 2021-08-13 PROCEDURE — 250N000009 HC RX 250: Performed by: DENTIST

## 2021-08-13 PROCEDURE — 99233 SBSQ HOSP IP/OBS HIGH 50: CPT | Performed by: STUDENT IN AN ORGANIZED HEALTH CARE EDUCATION/TRAINING PROGRAM

## 2021-08-13 PROCEDURE — 120N000001 HC R&B MED SURG/OB

## 2021-08-13 PROCEDURE — 999N000190 HC STATISTIC VAT ROUNDS

## 2021-08-13 PROCEDURE — 97535 SELF CARE MNGMENT TRAINING: CPT | Mod: GO | Performed by: OCCUPATIONAL THERAPIST

## 2021-08-13 PROCEDURE — 87635 SARS-COV-2 COVID-19 AMP PRB: CPT | Performed by: STUDENT IN AN ORGANIZED HEALTH CARE EDUCATION/TRAINING PROGRAM

## 2021-08-13 PROCEDURE — 82310 ASSAY OF CALCIUM: CPT | Performed by: STUDENT IN AN ORGANIZED HEALTH CARE EDUCATION/TRAINING PROGRAM

## 2021-08-13 PROCEDURE — 250N000011 HC RX IP 250 OP 636: Performed by: SURGERY

## 2021-08-13 PROCEDURE — 250N000013 HC RX MED GY IP 250 OP 250 PS 637: Performed by: HOSPITALIST

## 2021-08-13 PROCEDURE — 360N000075 HC SURGERY LEVEL 2, PER MIN: Performed by: DENTIST

## 2021-08-13 PROCEDURE — 85027 COMPLETE CBC AUTOMATED: CPT | Performed by: STUDENT IN AN ORGANIZED HEALTH CARE EDUCATION/TRAINING PROGRAM

## 2021-08-13 PROCEDURE — 250N000011 HC RX IP 250 OP 636: Performed by: ANESTHESIOLOGY

## 2021-08-13 PROCEDURE — 999N000141 HC STATISTIC PRE-PROCEDURE NURSING ASSESSMENT: Performed by: DENTIST

## 2021-08-13 PROCEDURE — 250N000025 HC SEVOFLURANE, PER MIN: Performed by: DENTIST

## 2021-08-13 PROCEDURE — 0CDXXZ1 EXTRACTION OF LOWER TOOTH, MULTIPLE, EXTERNAL APPROACH: ICD-10-PCS | Performed by: DENTIST

## 2021-08-13 RX ORDER — ONDANSETRON 2 MG/ML
INJECTION INTRAMUSCULAR; INTRAVENOUS PRN
Status: DISCONTINUED | OUTPATIENT
Start: 2021-08-13 | End: 2021-08-13

## 2021-08-13 RX ORDER — CHLORHEXIDINE GLUCONATE ORAL RINSE 1.2 MG/ML
10 SOLUTION DENTAL ONCE
Status: DISCONTINUED | OUTPATIENT
Start: 2021-08-13 | End: 2021-08-13 | Stop reason: HOSPADM

## 2021-08-13 RX ORDER — ACETAMINOPHEN 325 MG/1
975 TABLET ORAL EVERY 8 HOURS
Status: COMPLETED | OUTPATIENT
Start: 2021-08-13 | End: 2021-08-16

## 2021-08-13 RX ORDER — EPHEDRINE SULFATE 50 MG/ML
INJECTION, SOLUTION INTRAMUSCULAR; INTRAVENOUS; SUBCUTANEOUS PRN
Status: DISCONTINUED | OUTPATIENT
Start: 2021-08-13 | End: 2021-08-13

## 2021-08-13 RX ORDER — SODIUM CHLORIDE, SODIUM LACTATE, POTASSIUM CHLORIDE, CALCIUM CHLORIDE 600; 310; 30; 20 MG/100ML; MG/100ML; MG/100ML; MG/100ML
INJECTION, SOLUTION INTRAVENOUS CONTINUOUS
Status: DISCONTINUED | OUTPATIENT
Start: 2021-08-13 | End: 2021-08-16

## 2021-08-13 RX ORDER — SODIUM CHLORIDE, SODIUM LACTATE, POTASSIUM CHLORIDE, CALCIUM CHLORIDE 600; 310; 30; 20 MG/100ML; MG/100ML; MG/100ML; MG/100ML
INJECTION, SOLUTION INTRAVENOUS CONTINUOUS
Status: DISCONTINUED | OUTPATIENT
Start: 2021-08-13 | End: 2021-08-13 | Stop reason: HOSPADM

## 2021-08-13 RX ORDER — ONDANSETRON 4 MG/1
4 TABLET, ORALLY DISINTEGRATING ORAL EVERY 6 HOURS PRN
Status: DISCONTINUED | OUTPATIENT
Start: 2021-08-13 | End: 2021-09-01 | Stop reason: HOSPADM

## 2021-08-13 RX ORDER — FAMOTIDINE 20 MG/1
20 TABLET, FILM COATED ORAL
Status: DISCONTINUED | OUTPATIENT
Start: 2021-08-13 | End: 2021-08-13 | Stop reason: HOSPADM

## 2021-08-13 RX ORDER — POLYETHYLENE GLYCOL 3350 17 G/17G
17 POWDER, FOR SOLUTION ORAL DAILY
Status: DISCONTINUED | OUTPATIENT
Start: 2021-08-14 | End: 2021-09-01 | Stop reason: HOSPADM

## 2021-08-13 RX ORDER — FENTANYL CITRATE 50 UG/ML
INJECTION, SOLUTION INTRAMUSCULAR; INTRAVENOUS PRN
Status: DISCONTINUED | OUTPATIENT
Start: 2021-08-13 | End: 2021-08-13

## 2021-08-13 RX ORDER — PROPOFOL 10 MG/ML
INJECTION, EMULSION INTRAVENOUS PRN
Status: DISCONTINUED | OUTPATIENT
Start: 2021-08-13 | End: 2021-08-13

## 2021-08-13 RX ORDER — MAGNESIUM HYDROXIDE 1200 MG/15ML
LIQUID ORAL PRN
Status: DISCONTINUED | OUTPATIENT
Start: 2021-08-13 | End: 2021-08-13 | Stop reason: HOSPADM

## 2021-08-13 RX ORDER — CEFAZOLIN SODIUM 2 G/100ML
2 INJECTION, SOLUTION INTRAVENOUS
Status: DISCONTINUED | OUTPATIENT
Start: 2021-08-13 | End: 2021-08-13 | Stop reason: HOSPADM

## 2021-08-13 RX ORDER — SODIUM CHLORIDE, SODIUM LACTATE, POTASSIUM CHLORIDE, CALCIUM CHLORIDE 600; 310; 30; 20 MG/100ML; MG/100ML; MG/100ML; MG/100ML
INJECTION, SOLUTION INTRAVENOUS CONTINUOUS PRN
Status: DISCONTINUED | OUTPATIENT
Start: 2021-08-13 | End: 2021-08-13

## 2021-08-13 RX ORDER — ACETAMINOPHEN 325 MG/1
650 TABLET ORAL EVERY 4 HOURS PRN
Status: DISCONTINUED | OUTPATIENT
Start: 2021-08-16 | End: 2021-09-01 | Stop reason: HOSPADM

## 2021-08-13 RX ORDER — ONDANSETRON 2 MG/ML
4 INJECTION INTRAMUSCULAR; INTRAVENOUS EVERY 30 MIN PRN
Status: DISCONTINUED | OUTPATIENT
Start: 2021-08-13 | End: 2021-08-13 | Stop reason: HOSPADM

## 2021-08-13 RX ORDER — HYDRALAZINE HYDROCHLORIDE 20 MG/ML
2.5-5 INJECTION INTRAMUSCULAR; INTRAVENOUS EVERY 10 MIN PRN
Status: DISCONTINUED | OUTPATIENT
Start: 2021-08-13 | End: 2021-08-13 | Stop reason: HOSPADM

## 2021-08-13 RX ORDER — LIDOCAINE HCL/EPINEPHRINE/PF 2%-1:200K
VIAL (ML) INJECTION PRN
Status: DISCONTINUED | OUTPATIENT
Start: 2021-08-13 | End: 2021-08-13 | Stop reason: HOSPADM

## 2021-08-13 RX ORDER — CEFAZOLIN SODIUM 2 G/100ML
2 INJECTION, SOLUTION INTRAVENOUS SEE ADMIN INSTRUCTIONS
Status: DISCONTINUED | OUTPATIENT
Start: 2021-08-13 | End: 2021-08-13 | Stop reason: HOSPADM

## 2021-08-13 RX ORDER — ONDANSETRON 2 MG/ML
4 INJECTION INTRAMUSCULAR; INTRAVENOUS EVERY 6 HOURS PRN
Status: DISCONTINUED | OUTPATIENT
Start: 2021-08-13 | End: 2021-09-01 | Stop reason: HOSPADM

## 2021-08-13 RX ORDER — HYDROMORPHONE HCL IN WATER/PF 6 MG/30 ML
0.4 PATIENT CONTROLLED ANALGESIA SYRINGE INTRAVENOUS EVERY 5 MIN PRN
Status: DISCONTINUED | OUTPATIENT
Start: 2021-08-13 | End: 2021-08-13 | Stop reason: HOSPADM

## 2021-08-13 RX ORDER — LABETALOL HYDROCHLORIDE 5 MG/ML
10 INJECTION, SOLUTION INTRAVENOUS
Status: DISCONTINUED | OUTPATIENT
Start: 2021-08-13 | End: 2021-08-13 | Stop reason: HOSPADM

## 2021-08-13 RX ORDER — FENTANYL CITRATE 0.05 MG/ML
25 INJECTION, SOLUTION INTRAMUSCULAR; INTRAVENOUS EVERY 5 MIN PRN
Status: DISCONTINUED | OUTPATIENT
Start: 2021-08-13 | End: 2021-08-13 | Stop reason: HOSPADM

## 2021-08-13 RX ORDER — LIDOCAINE 40 MG/G
CREAM TOPICAL
Status: DISCONTINUED | OUTPATIENT
Start: 2021-08-13 | End: 2021-08-13 | Stop reason: HOSPADM

## 2021-08-13 RX ORDER — LIDOCAINE 40 MG/G
CREAM TOPICAL
Status: DISCONTINUED | OUTPATIENT
Start: 2021-08-13 | End: 2021-08-15

## 2021-08-13 RX ORDER — SODIUM CHLORIDE, SODIUM LACTATE, POTASSIUM CHLORIDE, CALCIUM CHLORIDE 600; 310; 30; 20 MG/100ML; MG/100ML; MG/100ML; MG/100ML
INJECTION, SOLUTION INTRAVENOUS CONTINUOUS
Status: DISCONTINUED | OUTPATIENT
Start: 2021-08-13 | End: 2021-08-14

## 2021-08-13 RX ORDER — LIDOCAINE 40 MG/G
CREAM TOPICAL
Status: DISCONTINUED | OUTPATIENT
Start: 2021-08-13 | End: 2021-09-01 | Stop reason: HOSPADM

## 2021-08-13 RX ORDER — AMOXICILLIN 250 MG
1 CAPSULE ORAL 2 TIMES DAILY
Status: DISCONTINUED | OUTPATIENT
Start: 2021-08-13 | End: 2021-09-01 | Stop reason: HOSPADM

## 2021-08-13 RX ORDER — LIDOCAINE HYDROCHLORIDE 20 MG/ML
INJECTION, SOLUTION INFILTRATION; PERINEURAL PRN
Status: DISCONTINUED | OUTPATIENT
Start: 2021-08-13 | End: 2021-08-13

## 2021-08-13 RX ORDER — BISACODYL 10 MG
10 SUPPOSITORY, RECTAL RECTAL DAILY PRN
Status: DISCONTINUED | OUTPATIENT
Start: 2021-08-13 | End: 2021-09-01 | Stop reason: HOSPADM

## 2021-08-13 RX ORDER — PROCHLORPERAZINE MALEATE 10 MG
10 TABLET ORAL EVERY 6 HOURS PRN
Status: DISCONTINUED | OUTPATIENT
Start: 2021-08-13 | End: 2021-09-01 | Stop reason: HOSPADM

## 2021-08-13 RX ORDER — ONDANSETRON 4 MG/1
4 TABLET, ORALLY DISINTEGRATING ORAL EVERY 30 MIN PRN
Status: DISCONTINUED | OUTPATIENT
Start: 2021-08-13 | End: 2021-08-13 | Stop reason: HOSPADM

## 2021-08-13 RX ORDER — DEXAMETHASONE SODIUM PHOSPHATE 4 MG/ML
INJECTION, SOLUTION INTRA-ARTICULAR; INTRALESIONAL; INTRAMUSCULAR; INTRAVENOUS; SOFT TISSUE PRN
Status: DISCONTINUED | OUTPATIENT
Start: 2021-08-13 | End: 2021-08-13

## 2021-08-13 RX ORDER — BUPIVACAINE HYDROCHLORIDE AND EPINEPHRINE 2.5; 5 MG/ML; UG/ML
INJECTION, SOLUTION EPIDURAL; INFILTRATION; INTRACAUDAL; PERINEURAL PRN
Status: DISCONTINUED | OUTPATIENT
Start: 2021-08-13 | End: 2021-08-13 | Stop reason: HOSPADM

## 2021-08-13 RX ADMIN — BICTEGRAVIR SODIUM, EMTRICITABINE, AND TENOFOVIR ALAFENAMIDE FUMARATE 1 TABLET: 50; 200; 25 TABLET ORAL at 09:07

## 2021-08-13 RX ADMIN — FENTANYL CITRATE 25 MCG: 50 INJECTION, SOLUTION INTRAMUSCULAR; INTRAVENOUS at 18:06

## 2021-08-13 RX ADMIN — QUETIAPINE FUMARATE 25 MG: 25 TABLET ORAL at 21:23

## 2021-08-13 RX ADMIN — NAFCILLIN SODIUM 2 G: 2 INJECTION, POWDER, LYOPHILIZED, FOR SOLUTION INTRAMUSCULAR; INTRAVENOUS at 10:52

## 2021-08-13 RX ADMIN — NAFCILLIN SODIUM 2 G: 2 INJECTION, POWDER, LYOPHILIZED, FOR SOLUTION INTRAMUSCULAR; INTRAVENOUS at 13:45

## 2021-08-13 RX ADMIN — BUPRENORPHINE HYDROCHLORIDE, NALOXONE HYDROCHLORIDE 1 FILM: 4; 1 FILM, SOLUBLE BUCCAL; SUBLINGUAL at 09:07

## 2021-08-13 RX ADMIN — FENTANYL CITRATE 50 MCG: 50 INJECTION, SOLUTION INTRAMUSCULAR; INTRAVENOUS at 15:49

## 2021-08-13 RX ADMIN — NAFCILLIN SODIUM 2 G: 2 INJECTION, POWDER, LYOPHILIZED, FOR SOLUTION INTRAMUSCULAR; INTRAVENOUS at 05:59

## 2021-08-13 RX ADMIN — PROPOFOL 200 MG: 10 INJECTION, EMULSION INTRAVENOUS at 15:27

## 2021-08-13 RX ADMIN — GABAPENTIN 600 MG: 300 CAPSULE ORAL at 09:07

## 2021-08-13 RX ADMIN — NAFCILLIN SODIUM 2 G: 2 INJECTION, POWDER, LYOPHILIZED, FOR SOLUTION INTRAMUSCULAR; INTRAVENOUS at 21:15

## 2021-08-13 RX ADMIN — FENTANYL CITRATE 25 MCG: 50 INJECTION, SOLUTION INTRAMUSCULAR; INTRAVENOUS at 17:57

## 2021-08-13 RX ADMIN — SODIUM CHLORIDE, POTASSIUM CHLORIDE, SODIUM LACTATE AND CALCIUM CHLORIDE: 600; 310; 30; 20 INJECTION, SOLUTION INTRAVENOUS at 18:58

## 2021-08-13 RX ADMIN — SERTRALINE HYDROCHLORIDE 50 MG: 50 TABLET ORAL at 09:07

## 2021-08-13 RX ADMIN — SUCCINYLCHOLINE CHLORIDE 100 MG: 20 INJECTION, SOLUTION INTRAMUSCULAR; INTRAVENOUS; PARENTERAL at 15:27

## 2021-08-13 RX ADMIN — SODIUM CHLORIDE, POTASSIUM CHLORIDE, SODIUM LACTATE AND CALCIUM CHLORIDE: 600; 310; 30; 20 INJECTION, SOLUTION INTRAVENOUS at 15:22

## 2021-08-13 RX ADMIN — HYDROMORPHONE HYDROCHLORIDE 0.4 MG: 1 INJECTION, SOLUTION INTRAMUSCULAR; INTRAVENOUS; SUBCUTANEOUS at 17:29

## 2021-08-13 RX ADMIN — Medication 5 MG: at 15:39

## 2021-08-13 RX ADMIN — LIDOCAINE HYDROCHLORIDE 100 MG: 20 INJECTION, SOLUTION INFILTRATION; PERINEURAL at 15:27

## 2021-08-13 RX ADMIN — NAFCILLIN SODIUM 2 G: 2 INJECTION, POWDER, LYOPHILIZED, FOR SOLUTION INTRAMUSCULAR; INTRAVENOUS at 01:58

## 2021-08-13 RX ADMIN — ONDANSETRON 4 MG: 2 INJECTION INTRAMUSCULAR; INTRAVENOUS at 16:00

## 2021-08-13 RX ADMIN — NAFCILLIN SODIUM 2 G: 2 INJECTION, POWDER, LYOPHILIZED, FOR SOLUTION INTRAMUSCULAR; INTRAVENOUS at 18:50

## 2021-08-13 RX ADMIN — PHENYLEPHRINE HYDROCHLORIDE 100 MCG: 10 INJECTION INTRAVENOUS at 15:37

## 2021-08-13 RX ADMIN — PANTOPRAZOLE SODIUM 40 MG: 40 TABLET, DELAYED RELEASE ORAL at 09:07

## 2021-08-13 RX ADMIN — PHENYLEPHRINE HYDROCHLORIDE 50 MCG: 10 INJECTION INTRAVENOUS at 16:16

## 2021-08-13 RX ADMIN — ACETAMINOPHEN 650 MG: 325 TABLET, FILM COATED ORAL at 11:42

## 2021-08-13 RX ADMIN — ACETAMINOPHEN 975 MG: 325 TABLET, FILM COATED ORAL at 20:02

## 2021-08-13 RX ADMIN — HYDROMORPHONE HYDROCHLORIDE 0.1 MG: 1 INJECTION, SOLUTION INTRAMUSCULAR; INTRAVENOUS; SUBCUTANEOUS at 17:58

## 2021-08-13 RX ADMIN — FENTANYL CITRATE 50 MCG: 50 INJECTION, SOLUTION INTRAMUSCULAR; INTRAVENOUS at 15:27

## 2021-08-13 RX ADMIN — MIDAZOLAM 2 MG: 1 INJECTION INTRAMUSCULAR; INTRAVENOUS at 15:22

## 2021-08-13 RX ADMIN — DEXAMETHASONE SODIUM PHOSPHATE 4 MG: 4 INJECTION, SOLUTION INTRA-ARTICULAR; INTRALESIONAL; INTRAMUSCULAR; INTRAVENOUS; SOFT TISSUE at 16:00

## 2021-08-13 RX ADMIN — BUPRENORPHINE HYDROCHLORIDE, NALOXONE HYDROCHLORIDE 1 FILM: 4; 1 FILM, SOLUBLE BUCCAL; SUBLINGUAL at 13:01

## 2021-08-13 RX ADMIN — BUPRENORPHINE HYDROCHLORIDE, NALOXONE HYDROCHLORIDE 1 FILM: 4; 1 FILM, SOLUBLE BUCCAL; SUBLINGUAL at 21:15

## 2021-08-13 RX ADMIN — GABAPENTIN 600 MG: 300 CAPSULE ORAL at 21:14

## 2021-08-13 ASSESSMENT — LIFESTYLE VARIABLES: TOBACCO_USE: 1

## 2021-08-13 ASSESSMENT — ACTIVITIES OF DAILY LIVING (ADL)
ADLS_ACUITY_SCORE: 22
ADLS_ACUITY_SCORE: 21
ADLS_ACUITY_SCORE: 22

## 2021-08-13 NOTE — ANESTHESIA PREPROCEDURE EVALUATION
Anesthesia Pre-Procedure Evaluation    Patient: Manas Hernandez   MRN: [id number] : 1980        Preoperative Diagnosis: Dental abscess [K04.7]   Procedure : Procedure(s):  REMOVAL OF ALL TEETH REMAINING.     Past Medical History:   Diagnosis Date     Heroin abuse (H)      Methamphetamine abuse (H)      Polysubstance abuse (H)       Past Surgical History:   Procedure Laterality Date     AMPUTATE TOE(S) Left 2021    Procedure: partial left 3rd toe amputation.;  Surgeon: Makenzie Mcneil DPM, Podiatry/Foot and Ankle Surgery;  Location:  OR     DILATION AND CURETTAGE       TRANSESOPHAGEAL ECHOCARDIOGRAM INTRAOPERATIVE N/A 2021    Procedure: ECHOCARDIOGRAM, TRANSESOPHAGEAL, INTRAOPERATIVE;  Surgeon: GENERIC ANESTHESIA PROVIDER;  Location:  OR      No Known Allergies   Social History     Tobacco Use     Smoking status: Current Every Day Smoker     Types: Cigarettes     Tobacco comment: 1-2   Substance Use Topics     Alcohol use: No      Wt Readings from Last 1 Encounters:   21 76.8 kg (169 lb 5 oz)        Anesthesia Evaluation            ROS/MED HX  ENT/Pulmonary:     (+) tobacco use, Current use,     Neurologic:     (+) CVA, TIA,     Cardiovascular:     (+) -----valvular problems/murmurs type: MR endocarditis. Previous cardiac testing   Echo: Date: Results:  Ther apperas to be organized effusion posteriorly, measuring 1.2-1.5cm.  No evidence of tamponade.  The mitral valve inflow Doppler reveals no significant respiratory variation.  Abnormal septal motion is noted with mild flattening intermittently.Hepatic  vein doppler was suboptimal.  Small mobile vegetation noted attached to the mitral leaflet.  There is moderate to mod-severe (2-3+) mitral regurgitation. Pulmonary vein  doppler does not reveal systolic reversal.  Compared to 21, effusion size is probably stable. Degree of MR is worse,  Vegetation is smaller. The study was technically difficult.  Stress Test: Date: Results:    ECG  Reviewed: Date: Results:    Cath: Date: Results:      METS/Exercise Tolerance:     Hematologic:       Musculoskeletal:       GI/Hepatic:       Renal/Genitourinary:       Endo:       Psychiatric/Substance Use: Comment: IVDA    (+) Recreational drug usage: Meth (polysubstance abuse).    Infectious Disease: Comment: Infective endocarditis      Malignancy:       Other:            Physical Exam    Airway        Mallampati: II   TM distance: > 3 FB   Neck ROM: full   Mouth opening: > 3 cm    Respiratory Devices and Support         Dental     Comment: Poor dentition, multiple missing        Cardiovascular   cardiovascular exam normal          Pulmonary   pulmonary exam normal        breath sounds clear to auscultation           OUTSIDE LABS:  CBC:   Lab Results   Component Value Date    WBC 4.1 08/13/2021    WBC 4.7 08/11/2021    HGB 8.0 (L) 08/13/2021    HGB 8.1 (L) 08/11/2021    HCT 26.0 (L) 08/13/2021    HCT 26.1 (L) 08/11/2021     (H) 08/13/2021     (H) 08/11/2021     BMP:   Lab Results   Component Value Date     08/13/2021     08/11/2021    POTASSIUM 3.8 08/13/2021    POTASSIUM 3.9 08/11/2021    CHLORIDE 111 (H) 08/13/2021    CHLORIDE 110 (H) 08/11/2021    CO2 28 08/13/2021    CO2 27 08/11/2021    BUN 10 08/13/2021    BUN 10 08/11/2021    CR 0.69 08/13/2021    CR 0.59 08/11/2021    GLC 95 08/13/2021    GLC 96 08/11/2021     COAGS:   Lab Results   Component Value Date    INR 1.05 08/11/2021     POC:   Lab Results   Component Value Date     (H) 07/11/2021    HCG Negative 08/11/2021    HCGS Negative 07/01/2021     HEPATIC:   Lab Results   Component Value Date    ALBUMIN 2.1 (L) 07/30/2021    PROTTOTAL 8.4 07/30/2021    ALT 19 07/30/2021    AST 17 07/30/2021    ALKPHOS 117 07/30/2021    BILITOTAL 0.6 07/30/2021     OTHER:   Lab Results   Component Value Date    PH 7.49 (H) 07/09/2021    LACT 1.5 07/02/2021    A1C 5.9 (H) 07/03/2021    DEREK 8.0 (L) 08/13/2021    PHOS 4.5 07/16/2021    MAG  2.0 07/16/2021    LIPASE 23 (L) 07/01/2021    TSH 0.58 07/01/2021    .0 (H) 08/04/2021       Anesthesia Plan    ASA Status:  4   NPO Status:  NPO Appropriate    Anesthesia Type: General.     - Airway: ETT   Induction: Intravenous.   Maintenance: Balanced.        Consents    Anesthesia Plan(s) and associated risks, benefits, and realistic alternatives discussed. Questions answered and patient/representative(s) expressed understanding.     - Discussed with:  Patient      - Extended Intubation/Ventilatory Support Discussed: No.      - Patient is DNR/DNI Status: No    Use of blood products discussed: Yes.     - Discussed with: Patient.     - Consented: consented to blood products            Reason for refusal: other.     Postoperative Care    Pain management: IV analgesics, Oral pain medications, Multi-modal analgesia.   PONV prophylaxis: Ondansetron (or other 5HT-3), Dexamethasone or Solumedrol, Background Propofol Infusion     Comments:                Juno Velasco DO

## 2021-08-13 NOTE — PLAN OF CARE
DATE & TIME: 8/12/21 [id number]   Cognitive Concerns/ Orientation : A&O x4, flat affect, calm & cooperative   BEHAVIOR & AGGRESSION TOOL COLOR: Green  CIWA SCORE: NA    ABNL VS/O2: VSS on RA  MOBILITY: SBA with GB/W. Minimal weight bearing on left foot. Post-op boot when out out of bed. BLE elevated.  PAIN MANAGMENT: Pt reports 4/10 L foot pain, rest promoted   DIET: Regular. NPO at midnight, regular diet after procedures completed   BOWEL/BLADDER: Continent, up to BR   ABNL LAB/BG: Hgb 8, Plt 548  DRAIN/DEVICES: Right PICC SL with q4h Nafcillin   TELEMETRY RHYTHM: NA  SKIN: Scattered bruises and scabs. L foot dressing CDI, dressing change due 8/13  TESTS/PROCEDURES: Partial amputation on left third toe on 8/7/21. JAE and angio completed 8/12, no vegetations or paravalvular abscess seen. Potential MVR on Monday, may reschedule. Tooth extraction 8/13  D/C DATE: Pending  Discharge Barriers: Continue on antibiotics until 8/18/21  OTHER IMPORTANT INFO: Contact precautions maintained. ID, ortho, OMS, cardiothoracic surgery, and cardiology following. Neurology consulted.

## 2021-08-13 NOTE — PLAN OF CARE
Patient stable to baseline, intermittently antibiotics, PICC line, prn Seroquel and Atarax, NPO at midnight.

## 2021-08-13 NOTE — PROGRESS NOTES
"CLINICAL NUTRITION SERVICES - REASSESSMENT NOTE    Malnutrition:    (7/19)   % Weight Loss:  Weight loss does not meet criteria for malnutrition - pt states wt is still elevated from baseline   % Intake:  <75% for > 7 days (non-severe malnutrition) - on average  Subcutaneous Fat Loss:  Orbital region mild depletion and Upper arm region mild depletion  Muscle Loss:  Clavicle bone region mild depletion  Fluid Retention:  Moderate     Malnutrition Diagnosis: Non-Severe malnutrition  In Context of:  Acute illness or injury     EVALUATION OF PROGRESS TOWARD GOALS   Diet: Regular Diet (NPO today for procedure)  Intake/Tolerance:  - 100% intakes recorded for most meals.   - NPO most of yesterday and today.   - Good appetite recorded.     - No new weight since 7/16.   - Last BM 8/11    ANTHROPOMETRICS  Height: 5' 7.992\"  Weight: 169 lbs 5.01 oz (76.8 kg)   Body mass index is 25.75 kg/m .  Weight Status:  Overweight BMI 25-29.9  IBW: 63.6 kg   % IBW: 121%  Weight History: Pt reported weighing more than usual due to fluid retention. Suspect no mass losses. No new weight for ~1 month..   Wt Readings from Last 10 Encounters:   07/16/21 76.8 kg (169 lb 5 oz)   11/05/07 103.1 kg (227 lb 4 oz)   09/26/07 99.3 kg (219 lb)       ASSESSED NUTRITION NEEDS:  Dosing Weight:  67 kg (adjusted for overwt)  Energy Needs:  5879-1587 kcals (25-30 kcal/kg)  Justification: overweight    Protein Needs:   grams protein (1.2-1.5+ g pro/Kg)  Justification: several surgeries this admission, preservation of LBM    NEW FINDINGS:   8/7 - Partial amputation on left third toe  8/12 - JAE and angio: no vegetations or paravalvular abscess.   8/13 - Teeth extraction   8/16 - Potential MVR    Previous Goals:   Intake of >75% protein-dense meals BID-TID.   Evaluation: Met on averge    Previous Nutrition Diagnosis:   No nutrition diagnosis identified at this time   Evaluation: Completed    CURRENT NUTRITION DIAGNOSIS  Increased nutrient needs (protein) " related to planned surgery, partial toe amputation as evidenced by protein needs > 80g daily.     INTERVENTIONS  Recommendations / Nutrition Prescription  Diet per MD  Pt has declined supplements this admission - continue with balanced and adequate meals.   Emphasis on protein     Implementation  None new - reassessed nutrient needs.     Goals  Intake of >75% protein-dense meals BID-TID.       MONITORING AND EVALUATION:  Progress towards goals will be monitored and evaluated per protocol and Practice Guidelines    Nubia Marks RD, LD

## 2021-08-13 NOTE — PROGRESS NOTES
[facility]    Hospitalist Progress Note    Date of Service: 08/13/2021    Assessment & Plan      41 year old female with h/o untreated HIV, hepatitis C who was admitted on 7/1/2021 for MSSA mitral valve endocarditis resulting in septic shock complicated by embolic left parietal lobe infarctions and multiple peripheral emboli, and initially with encephalopathy, and shock.  She was intubated in ED on 7/1/2021 subsequently extubated 7/11/2021.  Blood cultures turned positive on hospital day 1 with MSSA.  Urine culture was positive for Klebsiella.  She was treated initially with ceftriaxone and vancomycin, and has since been narrowed to nafcillin per ID.     MSSA mitral valve endocarditis  Septic shock, resolved  Probable myopericarditis  Small pericardial effusion  *JAE 7/5 noted with small ASD, mod to large mobile vegetation (14mm long, 6mm wide) of the P2 segment of the mitral leaflet. No valve perforation; mod MR  *blood cultures on admission with MSSA; blood cultures have been negative since 7/6.   *ID following; antibiotic has been narrowed down to Nafcillin, 6 weeks of antibiotic, completion date 8/18/2021.  *limited ECHO repeated 7/15 to reassess pericardial fluid 7/15 per cardiology and noted:  organized effusion posteriorly, measuring 1.2-1.5cm, no evidence of tamponade; small mobile vegetation noted attached to the mitral leaflet, mod to severe MR; compared to 7/8/21, effusion size is probably stable, Degree of MR is worse, Vegetation is smaller.  Cardiology signed off 7/15.   *re-evaluated by CT surgery 7/15 and suggested continued antibiotic therapy at this time  *PICC in place  -- given pt's stability and cooperativity, I am consulting CT surgery today to do any appropriate work up while patient is in hospital and then arrange for follow up if that is that plan.  -- brain MRI should be ordered to rule out mycotic aneurysms or brain abscesses per CT surg  -- JAE (no vegetations)  and Cor Angio (nomral coronaries) completed 08/12    Ischemic CVA, embolic stroke due to infective endocarditis  *CT head on admission notable for new acute to subacute PCA territory ischemic stroke  *MRI 7/3 noted with progressive, multifocal, acute infarcts without MR evidence for hemorrhage or midline shift  *Patient does have small left-to-right shunt on JAE, but most likely from embolic spread from endocarditis  *was evaluated by neurology, to continue antibiotics; per neuro to avoid anticoagulation (except DVT prophylaxis dose)  Abnormal MRI  Assessment: MRI shows Multiple enhancing subacute infarcts with evidence of small volume hemorrhage, largest at the junction of the left parietal and occipital lobes which demonstrates peripheral enhancement and worsening vasogenic edema. Developing abscess(es) related to septic emboli cannot be excluded  Plan:  - Neurology re-consulted by CV surgery for cardiac clearance    Infected Tooth  Assessment: OMFS was consulted for multiple infected tooth  Plan:  - Medically cleared for removal today  - NPO    Osteomyelitis left 3rd toe s/p amputation 8/7  Necrotic wounds to Lt great toes, Lt 3rd toe and Lt lateral Heel, Rt great toe: 2/2 thrombi.  -- Podiatry took to OR 8/7 for amputation. Orthotist following up.  -- oxycodone in the short term for pain. Some RNs have not been giving this. Talked to RN today. This is temporary post op.  -- continue wound cares for other digits    Normocytic anemia, likey 2/2 chronic disease  - Hgb 7.4 yesterday -> 8.1 today  - CBC tomorrow  - consider transfusion for Hgb < 7 (or close)  - iron and ferritin wnl  - B12 and folate -> wnl    Left knee pain 2/2 OA: pt reports history of pain in that knee with activity but pain now is more acute. ROM okay. There is some warmth and swelling there.   - In light of the MSSA bacteremia, got XR and called ortho for effusion  - Per ortho no indication for intervention, though they did order a brace.  Appreciate assistance. There are following EOD.  - PT/OT    IV drug use  Polysubstance use disorder  *Chem dep consulted 7/15 but chemical dependency did not feel she is appropriate for CD assessment at this time and recommended consultation towards the end of her antibiotic course.  Reconsult CD once she is close to finishing her IV antibiotic.  *Was using IV dilaudid as well as Percocet p.o. earlier this stay pretty extensively.  Attempts made to wean off IV narcotics and taper down po narcotics though patient felt she was going through withdrawal. Psych consulted and recommended suboxone though patient declined. Psych recommended rapid taper of methadone instead.   *completed methadone taper of 30 mg on 7/21, 20 mg 7/22 and then 10 mg 7/23, then stop  -started suboxone on 7/27 - appreciate psych/addiction medicine assistance who will help with titration   - now on suboxone BID and doing very well. Reports no cravings and hopes to continue this regimen outpatient.  -Plan was to reconsult CD once she is close to finishing her IV antibiotic, but patient is not interested in treatment. She has been through inpatient and outpatient programs and not found them helpful. Also I'm not sure she could continue suboxone in treatment and I think that is probably her best chance for sobriety.  -FYI: She has a daughter who just had twins and lives with her in-laws. Daughter and daughter's mother in law live in [place] (? - pt going to confirm) and have invited patient to live there to help with babies. She thinks this is a good idea as she'll be kept busy and she gets along well with this family. She is hoping to continue follow up with addiction medicine to continue suboxone. She feels that with suboxone and family support she will do well. Will need to have addiction med follow up with her to prescribe suboxone on discharge and help her set up outpatient follow up.  -08/12 -> Pain service recommended   1.  Change Suboxone  to  4 mg qid for better analgesic effect.     2.   discontinue oxycodone   3.   Use acetaminophen for additional analgesic effect if needed.- can give as much as 1000 mg tid   (don't exceed 3000 mg total per day).      HIV, untreated PTA  - CD4 count greater than 500, viral load 3695  - Started Biktarvy 7/8; ID following  - Will need outpt follow up    Chronic back pain: pt notes it present ever since she was pregnant.   -tylenol  -added lidoderm patch 7/23  -increase gabapentin to 600 mg TID     Toxic/septic/metabolic encephalopathy, improved  Likely encephalopathy due to sepsis, drug use, CVA, prolonged ICU stay; had been pulling lines due to alterned mentation thought this all now appears resolved.        Pyelonephritis  Urine cultures positive for Klebsiella.  Completed course of treatment with ceftriaxone.  repeat urine cultures 7/10 with ESBL E coli but given unimpressive UA and no UTI symptoms ID suggest holding off on abx for ESBL UTI  -monitor for any symptoms     Acute hypoxemic respiratory failure, resolved  Intubated 7/1/2021.  Extubated 7/11/2021.  Likely related to septic shock from MSSA mitral valve endocarditis     FORD, resolved suspected due to sepsis  Hypokalemia  - on potassium replacement protocol  - will check BMP periodically     Chronic anemia  Baseline hemoglobin approximately 7.  Likely related to chronic disease, untreated HIV.  -Monitor hemoglobin periodically; stable around 9     Non severe malnutrition  -- Supplements      Prior hepatitis C infection  Antibody positive but RNA negative  -Noted    Restless legs: unclear if this is true restless leg syndrome or due to being uncomfortable  -continue mirapex prn    Major depressive disorder: pt is not suicidal but also doesn't seem to have much of a will to live based upon discussion 7/25.  I wonder if her underlying chronic pain which is difficult to treat as well as being in the hospital is contributing. Was seen by psych and declined any  medication treatment on 7/26.  patient was on zoloft for years starting at age 13. She is willing to try zoloft again.   -Started zoloft 8/1 at 25mg po and titrating up to 50mg. Pt feels this is helping. Psychiatry followed up and agreed with this medication.    DVT Prophylaxis: SCDs  Code Status: Full Code    Disposition: Expected discharge once IV abx are complete after 8/18 as having a difficult time finding placement in TCU.  She is homeless at baseline but has a plan post discharge to live with daughter and daughter's in laws. This is in the [facility] and will allow for follow up cares.    Time Spent on this Encounter   - Counseling the patient and/or family regarding: diagnostic results, prognosis, risks and benefits of treatment options, recommended follow-up and medical compliance  - Coordination of care with the: care coordinator/, nurse and patient    Eleazar Black MD  Hospitalist    Patient, family, interdisciplinary team involved in care and agrees with plan.  Total time - Greater than 35 min. More than 50% of time spent in direct patient care, care coordination, patient/caregiver counseling, and formalizing plan of care.     Interval History      Doing okay without oxycodone  Dental surgery planned for today  No CP/SOB today  No fevers or chills      Physical Exam   Temp: 98.9  F (37.2  C) Temp src: Temporal BP: 108/72 Pulse: 76   Resp: 16 SpO2: 98 % O2 Device: None (Room air)    Vitals:    07/13/21 0200 07/14/21 0445 07/16/21 0615   Weight: 89 kg (196 lb 3.4 oz) 84.2 kg (185 lb 10 oz) 76.8 kg (169 lb 5 oz)     Vital Signs with Ranges  Temp:  [97.4  F (36.3  C)-98.9  F (37.2  C)] 98.9  F (37.2  C)  Pulse:  [61-79] 76  Resp:  [16-18] 16  BP: ()/(57-72) 108/72  SpO2:  [98 %-99 %] 98 %  I/O last 3 completed shifts:  In: 760 [P.O.:360; I.V.:400]  Out: -     Constitutional: sitting up in bed, appears comfortable  Respiratory: Clear   Cardiovascular: Regular rate and rhythm.  No  murmur.  GI: Positive bowel sounds, soft, non-distended, non-tender. Hernia present  Extr:  Right foot in dressing  Neurologic: Alert, Ox3, no focal deficits    Medications       [Auto Hold] bictegravir-emtricitabine-tenofovir  1 tablet Oral Daily     [Auto Hold] buprenorphine HCl-naloxone HCl  1 Film Sublingual 4x Daily     ceFAZolin  2 g Intravenous Pre-Op/Pre-procedure x 1 dose     ceFAZolin  2 g Intravenous See Admin Instructions     chlorhexidine  10 mL Swish & Spit Once     famotidine  20 mg Oral Pre-Op/Pre-procedure x 1 dose     [Auto Hold] gabapentin  600 mg Oral TID     pump prime ([facility] formula) solution   PERFUSION On Call to OR     heparin in saline (CELL SAVER) formula   PERFUSION On Call to OR     metoprolol tartrate  12.5 mg Oral Pre-Op/Pre-procedure x 1 dose     [Auto Hold] nafcillin  2 g Intravenous Q4H     [Auto Hold] pantoprazole  40 mg Oral QAM AC     Plasma-Lyte A with additives ([facility] Cardioplegia Solution)   PERFUSION On Call to OR     [Auto Hold] polyethylene glycol  17 g Oral Daily     [Auto Hold] senna-docusate  1 tablet Oral BID     [Auto Hold] sertraline  50 mg Oral Daily     [Auto Hold] sodium chloride (PF)  10 mL Intravenous Once     [Auto Hold] sodium chloride (PF)  10 mL Intracatheter Q8H     [Auto Hold] sodium chloride (PF)  10-40 mL Intracatheter Q7 Days     sodium chloride (PF)  3 mL Intracatheter Q8H       Data   Recent Labs   Lab 08/13/21  0558 08/11/21  0614 08/09/21  0654 08/09/21  0653   WBC 4.1 4.7 4.1  --    HGB 8.0* 8.1* 7.4*  --    MCV 86 85 84  --    * 564* 493*  --    INR  --  1.05  --   --     139  --  141   POTASSIUM 3.8 3.9  --  4.1   CHLORIDE 111* 110*  --  109   CO2 28 27  --  27   BUN 10 10  --  10   CR 0.69 0.59  --  0.59   ANIONGAP <1* 2*  --  5   DEREK 8.0* 8.3*  --  8.0*   GLC 95 96  --  95       Recent Results (from the past 24 hour(s))   Cardiac Catheterization    Narrative    1.  Angiographically normal coronary arteries.

## 2021-08-13 NOTE — PROGRESS NOTES
8/13/2021 [id number]   A&O x4, flat affect, calm & cooperative   BEHAVIOR & AGGRESSION TOOL COLOR: No aggression no inappropriate behaviors.    CIWA SCORE: NA    VSS on RA   SBA with GB/W. Minimal weight bearing on left foot. Post-op boot when out out of bed. BLE elevated.   Pt reports 7/10 L foot pain, rest promoted, given tylenol for generalized pain    NPO at midnight, UNK diet status after surgery   Continent, up to BR   Right PICC SL with q4h Nafcillin   TELEMETRY RHYTHM: Normal sinus  SKIN: Scattered bruises and scabs. L foot dressing CDI, dressing changed. Right great toe dressing CDI, dressing changed, Right heel dressing CDI, dressing changed  TESTS/PROCEDURES:  Tooth extraction 8/13  D/C DATE: Pending  Discharge Barriers: Continue on antibiotics until 8/18/21  OTHER IMPORTANT INFO: Contact precautions maintained. cardiothoracic surgery, and cardiology following.

## 2021-08-13 NOTE — CONSULTS
8/13/2021      CD Consult acknowledged. Per chart review, pt does not have insurance information listed. Pt will be seen next week when insurance has been determined. CD consult can be re-ordered when pt is closer to discharge.     BIN Monsivais

## 2021-08-14 ENCOUNTER — APPOINTMENT (OUTPATIENT)
Dept: CT IMAGING | Facility: CLINIC | Age: 41
End: 2021-08-14
Attending: PHYSICIAN ASSISTANT
Payer: MEDICAID

## 2021-08-14 LAB
ABO/RH(D): ABNORMAL
ANTIBODY SCREEN: POSITIVE
ANTIBODY UNIDENTIFIED: NORMAL
DAT, ANTI-IGG, C3: ABNORMAL
GLUCOSE BLDC GLUCOMTR-MCNC: 81 MG/DL (ref 70–99)
SPECIMEN EXPIRATION DATE: ABNORMAL
SPECIMEN EXPIRATION DATE: ABNORMAL
SPECIMEN EXPIRATION DATE: NORMAL

## 2021-08-14 PROCEDURE — 70450 CT HEAD/BRAIN W/O DYE: CPT

## 2021-08-14 PROCEDURE — 250N000013 HC RX MED GY IP 250 OP 250 PS 637: Performed by: PODIATRIST

## 2021-08-14 PROCEDURE — 250N000009 HC RX 250: Performed by: INTERNAL MEDICINE

## 2021-08-14 PROCEDURE — 999N000190 HC STATISTIC VAT ROUNDS

## 2021-08-14 PROCEDURE — 86922 COMPATIBILITY TEST ANTIGLOB: CPT | Performed by: STUDENT IN AN ORGANIZED HEALTH CARE EDUCATION/TRAINING PROGRAM

## 2021-08-14 PROCEDURE — 99233 SBSQ HOSP IP/OBS HIGH 50: CPT | Performed by: PSYCHIATRY & NEUROLOGY

## 2021-08-14 PROCEDURE — 86850 RBC ANTIBODY SCREEN: CPT | Performed by: SURGERY

## 2021-08-14 PROCEDURE — 250N000011 HC RX IP 250 OP 636: Performed by: INTERNAL MEDICINE

## 2021-08-14 PROCEDURE — 86870 RBC ANTIBODY IDENTIFICATION: CPT | Performed by: INTERNAL MEDICINE

## 2021-08-14 PROCEDURE — 99222 1ST HOSP IP/OBS MODERATE 55: CPT | Performed by: PHYSICIAN ASSISTANT

## 2021-08-14 PROCEDURE — 250N000013 HC RX MED GY IP 250 OP 250 PS 637: Performed by: STUDENT IN AN ORGANIZED HEALTH CARE EDUCATION/TRAINING PROGRAM

## 2021-08-14 PROCEDURE — 86901 BLOOD TYPING SEROLOGIC RH(D): CPT | Performed by: SURGERY

## 2021-08-14 PROCEDURE — 120N000001 HC R&B MED SURG/OB

## 2021-08-14 PROCEDURE — 250N000013 HC RX MED GY IP 250 OP 250 PS 637: Performed by: HOSPITALIST

## 2021-08-14 PROCEDURE — 99207 PR MOONLIGHTING INDICATOR: CPT | Performed by: INTERNAL MEDICINE

## 2021-08-14 PROCEDURE — 99232 SBSQ HOSP IP/OBS MODERATE 35: CPT | Performed by: INTERNAL MEDICINE

## 2021-08-14 PROCEDURE — 250N000013 HC RX MED GY IP 250 OP 250 PS 637: Performed by: DENTIST

## 2021-08-14 PROCEDURE — 70498 CT ANGIOGRAPHY NECK: CPT

## 2021-08-14 PROCEDURE — 250N000009 HC RX 250: Performed by: PODIATRIST

## 2021-08-14 RX ORDER — IOPAMIDOL 755 MG/ML
70 INJECTION, SOLUTION INTRAVASCULAR ONCE
Status: COMPLETED | OUTPATIENT
Start: 2021-08-14 | End: 2021-08-14

## 2021-08-14 RX ADMIN — PANTOPRAZOLE SODIUM 40 MG: 40 TABLET, DELAYED RELEASE ORAL at 09:00

## 2021-08-14 RX ADMIN — SERTRALINE HYDROCHLORIDE 50 MG: 50 TABLET ORAL at 09:00

## 2021-08-14 RX ADMIN — IOPAMIDOL 70 ML: 755 INJECTION, SOLUTION INTRAVENOUS at 14:02

## 2021-08-14 RX ADMIN — NAFCILLIN SODIUM 2 G: 2 INJECTION, POWDER, LYOPHILIZED, FOR SOLUTION INTRAMUSCULAR; INTRAVENOUS at 11:08

## 2021-08-14 RX ADMIN — GABAPENTIN 600 MG: 300 CAPSULE ORAL at 21:48

## 2021-08-14 RX ADMIN — NAFCILLIN SODIUM 2 G: 2 INJECTION, POWDER, LYOPHILIZED, FOR SOLUTION INTRAMUSCULAR; INTRAVENOUS at 05:58

## 2021-08-14 RX ADMIN — NAFCILLIN SODIUM 2 G: 2 INJECTION, POWDER, LYOPHILIZED, FOR SOLUTION INTRAMUSCULAR; INTRAVENOUS at 14:30

## 2021-08-14 RX ADMIN — ACETAMINOPHEN 650 MG: 325 TABLET, FILM COATED ORAL at 08:59

## 2021-08-14 RX ADMIN — QUETIAPINE FUMARATE 25 MG: 25 TABLET ORAL at 09:00

## 2021-08-14 RX ADMIN — QUETIAPINE FUMARATE 25 MG: 25 TABLET ORAL at 22:46

## 2021-08-14 RX ADMIN — NAFCILLIN SODIUM 2 G: 2 INJECTION, POWDER, LYOPHILIZED, FOR SOLUTION INTRAMUSCULAR; INTRAVENOUS at 18:34

## 2021-08-14 RX ADMIN — GABAPENTIN 600 MG: 300 CAPSULE ORAL at 15:30

## 2021-08-14 RX ADMIN — ACETAMINOPHEN 975 MG: 325 TABLET, FILM COATED ORAL at 02:54

## 2021-08-14 RX ADMIN — BUPRENORPHINE HYDROCHLORIDE, NALOXONE HYDROCHLORIDE 1 FILM: 4; 1 FILM, SOLUBLE BUCCAL; SUBLINGUAL at 18:34

## 2021-08-14 RX ADMIN — NAFCILLIN SODIUM 2 G: 2 INJECTION, POWDER, LYOPHILIZED, FOR SOLUTION INTRAMUSCULAR; INTRAVENOUS at 02:55

## 2021-08-14 RX ADMIN — NAFCILLIN SODIUM 2 G: 2 INJECTION, POWDER, LYOPHILIZED, FOR SOLUTION INTRAMUSCULAR; INTRAVENOUS at 21:48

## 2021-08-14 RX ADMIN — ACETAMINOPHEN 975 MG: 325 TABLET, FILM COATED ORAL at 12:59

## 2021-08-14 RX ADMIN — GABAPENTIN 600 MG: 300 CAPSULE ORAL at 09:00

## 2021-08-14 RX ADMIN — BICTEGRAVIR SODIUM, EMTRICITABINE, AND TENOFOVIR ALAFENAMIDE FUMARATE 1 TABLET: 50; 200; 25 TABLET ORAL at 09:00

## 2021-08-14 RX ADMIN — BUPRENORPHINE HYDROCHLORIDE, NALOXONE HYDROCHLORIDE 1 FILM: 4; 1 FILM, SOLUBLE BUCCAL; SUBLINGUAL at 09:00

## 2021-08-14 RX ADMIN — BUPRENORPHINE HYDROCHLORIDE, NALOXONE HYDROCHLORIDE 1 FILM: 4; 1 FILM, SOLUBLE BUCCAL; SUBLINGUAL at 21:48

## 2021-08-14 RX ADMIN — BUPRENORPHINE HYDROCHLORIDE, NALOXONE HYDROCHLORIDE 1 FILM: 4; 1 FILM, SOLUBLE BUCCAL; SUBLINGUAL at 12:59

## 2021-08-14 RX ADMIN — SODIUM CHLORIDE 90 ML: 9 INJECTION, SOLUTION INTRAVENOUS at 14:02

## 2021-08-14 RX ADMIN — ACETAMINOPHEN 975 MG: 325 TABLET, FILM COATED ORAL at 20:03

## 2021-08-14 ASSESSMENT — ACTIVITIES OF DAILY LIVING (ADL)
ADLS_ACUITY_SCORE: 21

## 2021-08-14 NOTE — ANESTHESIA POSTPROCEDURE EVALUATION
Patient: Manas Hernandez    Procedure(s):  REMOVAL OF ALL TEETH REMAINING.    Diagnosis:Dental abscess [K04.7]  Diagnosis Additional Information: No value filed.    Anesthesia Type:  General    Note:  Disposition: Inpatient   Postop Pain Control: Uneventful            Sign Out: Well controlled pain   PONV: No   Neuro/Psych: Uneventful            Sign Out: Acceptable/Baseline neuro status   Airway/Respiratory: Uneventful            Sign Out: Acceptable/Baseline resp. status   CV/Hemodynamics: Uneventful            Sign Out: Acceptable CV status; No obvious hypovolemia; No obvious fluid overload   Other NRE: NONE   DID A NON-ROUTINE EVENT OCCUR? No           Last vitals:  Vitals Value Taken Time   /84 08/13/21 1810   Temp 36.1  C (97  F) 08/13/21 1730   Pulse 80 08/13/21 1814   Resp 10 08/13/21 1814   SpO2 97 % 08/13/21 1819   Vitals shown include unvalidated device data.    Electronically Signed By: Juno Velasco DO  August 13, 2021  7:12 PM

## 2021-08-14 NOTE — PROGRESS NOTES
CV Surgery Brief Progress Note    Manas Hernandez is a 41 year old female with a PMH of untreated HIV, hepatitis C, and IVDU who was admitted to [facility] 07/01 with septic shock due to MSSA with MV endocarditis. She was also found to have atrial septal defect with left to right shunt and moderate RV dilation. Head CT on admission showed multiple acute/sub-acute embolic infarcts. She has been treated medically with IV antibiotics, she was re-evaluated by Dr. Solares on 08/10 and felt to have recovered well enough to proceed with surgery for mitral valve replacement for active endocarditis, and surgery was planned for Monday 08/16 pending dental clearance, neuro clearance, and repeat TTE. JAE performed on 08/12 now without vegetations and moderate MR. She has undergone extraction of all her remaining teeth 08/13. MRI brain 0810 with multiple enhacing subactue infarcts, vasogenic edema, and concern for possible abscess formation; neurology following.     - Repeat JAE done 08/12 reviewed by Dr. Solares and cardiology. Given absence of vegetations and only moderate MR, she no longer has an urgent surgical indication. She may require mitral valve repair/replacement in the future if she has progressive MR, but this would be better addressed after she has undergone treatment for substance abuse and has demonstrated sobriety in the out-patient setting. She should follow up with cardiology for medical management and surveillance of MR and ASD, and can be referred back to CV surgery as needed.   - Plan discussed with patient today. CV surgery will sign off.       Valarie Miramontes PA-C  Cardiothoracic Surgery  Pager [phone]

## 2021-08-14 NOTE — CONSULTS
[facility]    Neurosurgery Consultation     Date of Admission:  7/1/2021  Date of Consult (When I saw the patient): 08/14/21    Assessment & Plan   Manas Hernandez is a 41 year old female who was admitted on 7/1/2021. I was asked to see the patient for possible brain abscess. She has history of HIV, HepC, admitted 7/1/21 with MSSA endocarditis. She was intubated for 10 days. Initial brain imaging showed what was felt at the time to be consistent with infarct but now with concern for evolving abscesses. She has been treated with ceftriaxone, vanco and now is on nafcillin. ID following.   She has had multiple other procedures while here, including toe amputation and tooth extraction. She is seen today in her bed, states she is comfortable, alert and oriented. No headaches, no visual changes.     We do recommend repeat brain MRI one week from her last one, which was 8/10.   Ordered for 8/17. If potential abscesses have not improved, then will likely recommend surgical intervention.     I have discussed the following assessment and plan with Dr. Brown, who is in agreement with the initial plan and will follow up with further consultation recommendations.    Vick Zambrano PA-C  [facility]  [address]  [address] [place] [address]    Tel [phone]  Pager [phone]        Code Status    Full Code    Reason for Consult   Reason for consult: brain abscesses    Primary Care Physician   Lisa Pollock    Chief Complaint   Possible abscess    History is obtained from the patient and electronic health record    History of Present Illness   Manas Hernandez is a 41 year old female who presents with possible brain abscess. She has history of HIV, HepC, admitted 7/1/21 with MSSA endocarditis. She was intubated for 10 days. Initial brain imaging showed what was felt at the time to be consistent with infarct but now with concern for  evolving abscesses. She has been treated with ceftriaxone, vanco and now is on nafcillin. ID following.   She has had multiple other procedures while here, including toe amputation and tooth extraction. She is seen today in her bed, states she is comfortable, alert and oriented. No headaches, no visual changes.     Past Medical History   I have reviewed this patient's medical history and updated it with pertinent information if needed.   Past Medical History:   Diagnosis Date     Heroin abuse (H)      Methamphetamine abuse (H)      Polysubstance abuse (H)        Past Surgical History   I have reviewed this patient's surgical history and updated it with pertinent information if needed.  Past Surgical History:   Procedure Laterality Date     AMPUTATE TOE(S) Left 8/7/2021    Procedure: partial left 3rd toe amputation.;  Surgeon: Makenzie Mcneil DPM, Podiatry/Foot and Ankle Surgery;  Location:  OR     DILATION AND CURETTAGE  2005     TRANSESOPHAGEAL ECHOCARDIOGRAM INTRAOPERATIVE N/A 8/12/2021    Procedure: ECHOCARDIOGRAM, TRANSESOPHAGEAL, INTRAOPERATIVE;  Surgeon: GENERIC ANESTHESIA PROVIDER;  Location:  OR       Prior to Admission Medications   None     Allergies   No Known Allergies    Social History   I have reviewed this patient's social history and updated it with pertinent information if needed. Manas Hernandez  reports that she has been smoking cigarettes. She has never used smokeless tobacco. She reports current drug use. Drugs: Amphetamines, Methamphetamines, Opiates, and Other. She reports that she does not drink alcohol.    Family History   I have reviewed this patient's family history and updated it with pertinent information if needed.   Family History   Problem Relation Age of Onset     Alcoholism Mother      Alcoholism Father      Family History Negative No family hx of        Review of Systems   10 point review of systems is negative with the exception of HPI and PMH.       Physical Exam   Temp:  "97.9  F (36.6  C) Temp src: Oral BP: 110/76 Pulse: 88   Resp: 16 SpO2: 98 % O2 Device: None (Room air) Oxygen Delivery: 2 LPM  Vital Signs with Ranges  Temp:  [97  F (36.1  C)-98.7  F (37.1  C)] 97.9  F (36.6  C)  Pulse:  [71-88] 88  Resp:  [9-19] 16  BP: (101-130)/(63-89) 110/76  SpO2:  [92 %-100 %] 98 %  169 lbs 5.01 oz     , Blood pressure 110/76, pulse 88, temperature 97.9  F (36.6  C), temperature source Oral, resp. rate 16, height 1.727 m (5' 7.99\"), weight 76.8 kg (169 lb 5 oz), SpO2 98 %, unknown if currently breastfeeding.  169 lbs 5.01 oz  HEENT:  Normocephalic, atraumatic.  PERRLA.  EOM s intact.   Neck:  Supple, non-tender, without lymphadenopathy.  Heart:  No peripheral edema  Lungs:  No SOB  Abdomen:  Soft, non-tender, non-distended.  Skin:  Warm and dry, good capillary refill.  Extremities:  Good radial and dorsalis pedis pulses bilaterally, no edema, cyanosis or clubbing.    NEUROLOGICAL EXAMINATION:     Mental status:  Alert and Oriented x 3, speech is fluent.  Cranial nerves:  II-XII intact.   Motor:  Strength is 5/5 throughout the upper and lower extremities  Sensation:  intact  Reflexes:   Negative Babinski.  Negative Clonus.      Data   All new lab and imaging data was personally reviewed by me.    CT:FINDINGS: No significant change in the previously demonstrated  peripherally enhancing complex parenchymal fluid collection centered  in the left paramedian parietal lobe (precuneus) measuring  approximately 3.0 x 2.7 x 2.5 cm. There is a similar mild to moderate  degree of surrounding vasogenic edema in the left parietal occipital  region. Mild narrowing of the occipital horn of the left lateral  ventricle. No evidence for hydrocephalus/ventricular entrapment. The  other previously demonstrated punctate foci of parenchymal enhancement  in the bilateral cervical hemispheres are not well seen on CT and are  better characterized on the previous MRI. Mild local mass effect in  the left " parieto-occipital region. No midline shift/herniation. No  definite findings of new/acute intracranial hemorrhage or extra-axial  fluid collection.     Bilateral orbits, paranasal sinuses, and mastoids are within normal    MRI:                                                                 IMPRESSION:  Multiple enhancing subacute infarcts with evidence of small volume  hemorrhage, largest at the junction of the left parietal and occipital  lobes which demonstrates peripheral enhancement and worsening  vasogenic edema. Developing abscess(es) related to septic emboli  cannot be excluded. Recommend continued close follow-up.    CBC RESULTS:   Recent Labs   Lab Test 08/13/21  0558   WBC 4.1   RBC 3.01*   HGB 8.0*   HCT 26.0*   MCV 86   MCH 26.6   MCHC 30.8*   RDW 27.5*   *     Basic Metabolic Panel:  Lab Results   Component Value Date     08/13/2021     07/10/2021      Lab Results   Component Value Date    POTASSIUM 3.8 08/13/2021    POTASSIUM 3.8 07/10/2021     Lab Results   Component Value Date    CHLORIDE 111 08/13/2021    CHLORIDE 108 07/10/2021     Lab Results   Component Value Date    DEREK 8.0 08/13/2021    DEREK 7.4 07/10/2021     Lab Results   Component Value Date    CO2 28 08/13/2021    CO2 27 07/10/2021     Lab Results   Component Value Date    BUN 10 08/13/2021    BUN 18 07/10/2021     Lab Results   Component Value Date    CR 0.69 08/13/2021    CR 0.69 07/10/2021     Lab Results   Component Value Date    GLC 81 08/14/2021    GLC 95 08/13/2021     07/10/2021     INR:  Lab Results   Component Value Date    INR 1.05 08/11/2021    INR 1.23 07/01/2021

## 2021-08-14 NOTE — PLAN OF CARE
8/13/2021 [id number]   A&O x4, flat affect, calm & cooperative   BEHAVIOR & AGGRESSION TOOL COLOR: No aggression no inappropriate behaviors.    CIWA SCORE: NA    VSS on RA   SBA with GB/W. Minimal weight bearing on left foot. Post-op boot when out out of bed. BLE elevated.   Pt reports 7/10 L foot and mouth pain, rest promoted, scheduled tylenol  Full liquids post procedure, tolerating    Continent, up to BR   Right PICC SL with q4h Nafcillin   ABNL LABS/BG: Blood antibody screen positive. POD 1 BG 81  TELEMETRY RHYTHM: Normal sinus  SKIN: Scattered bruises and scabs. L foot dressing CDI. Right great toe dressing CDI, Right heel dressing CDI.  TESTS/PROCEDURES:  Tooth extraction 8/13  D/C DATE: Pending  Discharge Barriers: Continue on antibiotics until 8/18/21  OTHER IMPORTANT INFO: Contact precautions maintained. cardiothoracic surgery, and cardiology following.

## 2021-08-14 NOTE — PROVIDER NOTIFICATION
MD Notification    Notified Person: MD    Notified Person Name: White    Notification Date/Time: 0640 8/14    Notification Interaction: [employer] webpage    Purpose of Notification: Blood antibody screen positive    Orders Received:     Comments:

## 2021-08-14 NOTE — OP NOTE
Procedure Date: 08/13/2021    SURGICAL PROCEDURE:  Removal of all remaining teeth including 2, 3, 4, 5, 6, 7, 8, 9, 10, 11, 12, 13, 19, 20, 21, 22, 23, 24, 25, 26, 28, 29, 30; thorough curettage and good hemostasis.    PREOPERATIVE DIAGNOSIS:      POSTOPERATIVE DIAGNOSIS:  with surgical correction.    INDICATIONS FOR OPERATION:  This patient is a 41-year-old female who has been admitted for assessment of her subacute bacterial endocarditis which ultimately will require mitral valve replacement.  The patient also is HIV positive, has hep C and multiple other medical issues.  Her dentition is contributing to her bacteremia and requires full mouth edentulation.  I reviewed indications, contraindications and benefits and risks with the patient and I have cleared this with the hospitalist.  She signed the informed consent and she is fully aware of details.    DESCRIPTION OF PROCEDURE:  The patient was taken to the operating room in good condition.  She underwent an atraumatic orotracheal intubation and induction, and was prepped and draped in the usual fashion for this procedure.  A throat pack was placed.  A timeout was done and a local anesthetic was given, 2% lidocaine with 1:100,000 epinephrine.  In a systematic fashion then, teeth 2, 3, 4, 5, 6, 7, 8, 9, 10, 11, 12, 13, 19, 20, 21, 22, 23, 24, 25, 26, 28, 29 and 30 were removed.  Apices were curetted out very thoroughly.  Areas were irrigated very thoroughly.  Gelfoam placed.  Appropriate suturing and alveoloplasty.  No complications.  Good hemostasis.  At the end of the case Marcaine was given in the region, 5 mL total, and the throat pack was removed, pharynx suctioned.  No complications.  A bite pack placed on the right and a bite block placed on the left.  The patient was taken to the recovery room in good condition.      Papito Pham DDS        D: 08/13/2021   T: 08/13/2021   MT: [facility]    Name:     OJAQUÍN SAAVEDRA  MRN:      [id number]        Account:         [id number]   :      1980           Procedure Date: 2021     Document: [id number]

## 2021-08-14 NOTE — PROGRESS NOTES
8/13/2021 1830- 1930   returned from PACU unit A&O x4, calm & cooperative   BEHAVIOR & AGGRESSION TOOL COLOR;green  CIWA SCORE: NA    VSS on RA, Cap[place]    SBA with GB/W. Minimal weight bearing on left foot. Post-op boot when out out of bed.   Continent, up to BR   Right PICC SL with q4h Nafcillin   TELEMETRY RHYTHM: Normal sinus  SKIN: Scattered bruises and scabs. L foot dressing CDI, Right great toe  heel dressing CDI,  TESTS/PROCEDURES:  Tooth extraction completed  D/C DATE: Pending  Discharge Barriers: Continue on antibiotics until 8/18/21  OTHER IMPORTANT INFO: Contact precautions maintained. cardiothoracic surgery, and cardiology following.

## 2021-08-14 NOTE — PROGRESS NOTES
[facility]  Hospitalist Progress Note for 8/14/2021       Assessment and Plan:   41 year old female with h/o untreated HIV, hepatitis C who was admitted on 7/1/2021 for MSSA mitral valve endocarditis resulting in septic shock complicated by embolic left parietal lobe infarctions and multiple peripheral emboli, and initially with encephalopathy, and shock.  She was intubated in ED on 7/1/2021 subsequently extubated 7/11/2021.  Blood cultures turned positive on hospital day 1 with MSSA.  Urine culture was positive for Klebsiella.  She was treated initially with ceftriaxone and vancomycin, and has since been narrowed to nafcillin per ID.     MSSA mitral valve endocarditis  Septic shock, resolved  Probable myopericarditis  Small pericardial effusion  *JAE 7/5 noted with small ASD, mod to large mobile vegetation (14mm long, 6mm wide) of the P2 segment of the mitral leaflet. No valve perforation; mod MR  *blood cultures on admission with MSSA; blood cultures have been negative since 7/6.   *ID following; antibiotic has been narrowed down to Nafcillin, 6 weeks of antibiotic, completion date 8/18/2021.  *limited ECHO repeated 7/15 to reassess pericardial fluid 7/15 per cardiology and noted:  organized effusion posteriorly, measuring 1.2-1.5cm, no evidence of tamponade; small mobile vegetation noted attached to the mitral leaflet, mod to severe MR; compared to 7/8/21, effusion size is probably stable, Degree of MR is worse, Vegetation is smaller.  Cardiology signed off 7/15.   *PICC in place  -- given pt's stability and cooperativity, I am consulting CT surgery today to do any appropriate work up while patient is in hospital and then arrange for follow up if that is that plan.  -- brain MRI should be ordered to rule out mycotic aneurysms or brain abscesses per CT surg  -- JAE (no vegetations) and Cor Angio (nomral coronaries) completed 08/12  -per cardiothoracic surgery reconsult 8/14 felt she no longer has  an urgent surgical indication,given absence of vegetations and only moderate MR on ECHO 8/12/2021.She may require mitral valve repair/replacement in the future if she has progressive MR, but this would be better addressed after she has undergone treatment for substance abuse and has demonstrated sobriety in the out-patient setting. She should follow up with cardiology for medical management and surveillance of MR and ASD, and can be referred back to CV surgery as needed.      Ischemic CVA, embolic stroke due to infective endocarditis  *CT head on admission notable for new acute to subacute PCA territory ischemic stroke  *MRI 7/3 noted with progressive, multifocal, acute infarcts without MR evidence for hemorrhage or midline shift  *Patient does have small left-to-right shunt on JAE, but most likely from embolic spread from endocarditis  *was evaluated by neurology, to continue antibiotics; per neuro to avoid anticoagulation (except DVT prophylaxis dose)    Abnormal MRI  Possible brain abscess  * MRI 8/10/2021 showed- Multiple enhancing subacute infarcts with evidence of small volume hemorrhage, largest at the junction of the left parietal and occipital lobes which demonstrates peripheral enhancement and worsening vasogenic edema. Developing abscess(es) related to septic emboli cannot be excluded  - Neurology was reconsulted 8/14, recommended to screen for mycotic aneurysm   * CTA head 8/14 -showed Complex peripherally enhancing fluid collection in the paramedian left parietal lobepatent arteries of head & neck. No evidence for dissection.  and CT head today & neurosurgery consult  * Ct head w/o contrast 8/14 showed no significant change in the complex peripherally enhancing fluid collection in the paramedian left parietal lobe with surrounding vasogenic edema. Atypical for bland evolving subacute ischemic infarct. Less likely possibility for evolving subacute hematoma, consider tissue sampling.Mild local mass effect  in the left parieto-occipital region with minimal narrowing of the occipital horn of the left lateral ventricle.No midline shift/herniation  - pt remains asymptomatic  - neurosurgery consulted for  the possible brain abscess     Infected Tooth  S/p removal of all remaining teeth on 8/13  Assessment: OMFS was consulted for multiple infected tooth  - S/p Removal of all remaining teeth including 2, 3, 4, 5, 6, 7, 8, 9, 10, 11, 12, 13, 19, 20, 21, 22, 23, 24, 25, 26, 28, 29, 30  - on full liquid diet Jaw surgery    Osteomyelitis left 3rd toe s/p amputation 8/7  Necrotic wounds to Lt great toes, Lt 3rd toe and Lt lateral Heel, Rt great toe: 2/2 thrombi.  -- Podiatry took to OR 8/7 for amputation. Orthotist following up.   -- continue wound cares for other digits     Normocytic anemia, likey 2/2 chronic disease  Chronic anemia  Baseline hemoglobin approximately 7.  Likely related to chronic disease, untreated HIV.  - Hgb 7.4 yesterday -> 8.1 today  - B12 and folate -> wnl  - consider transfusion for Hgb < 7 (or close)  - low nl ferritin- start po ferrous gluconate  - CBC on 8/16    Left knee pain 2/2 OA: pt reports history of pain in that knee with activity but pain now is more acute. ROM okay. There is some warmth and swelling there.   - In light of the MSSA bacteremia, got XR and called ortho for effusion  - Per ortho no indication for intervention, though they did order a brace. Appreciate assistance. There are following EOD.  - PT/OT     IV drug use  Polysubstance use disorder  Opiate use disorder  *Chem dep consulted 7/15 but chemical dependency did not feel she is appropriate for CD assessment at this time and recommended consultation towards the end of her antibiotic course.  Reconsult CD once she is close to finishing her IV antibiotic.  *Was using IV dilaudid as well as Percocet p.o. earlier this stay pretty extensively.  Attempts made to wean off IV narcotics and taper down po narcotics though patient felt she  was going through withdrawal. Psych consulted and recommended suboxone though patient declined. Psych recommended rapid taper of methadone instead.   *completed methadone taper of 30 mg on 7/21, 20 mg 7/22 and then 10 mg 7/23, then stop  -started suboxone on 7/27 - appreciate psych/addiction medicine assistance who will help with titration  - now on suboxone BID and doing very well. Reports no cravings and hopes to continue this regimen outpatient.  -Plan was to reconsult CD (8/13th)once she is close to finishing her IV antibiotic, but patient is not interested in treatment. She has been through inpatient and outpatient programs and not found them helpful. Also I'm not sure she could continue suboxone in treatment and I think that is probably her best chance for sobriety.  -FYI: She has a daughter who just had twins and lives with her in-laws. Daughter and daughter's mother in law live in [place] (? - pt going to confirm) and have invited patient to live there to help with babies. She thinks this is a good idea as she'll be kept busy and she gets along well with this family. She is hoping to continue follow up with addiction medicine to continue suboxone. She feels that with suboxone and family support she will do well. Will need to have addiction med follow up with her to prescribe suboxone on discharge and help her set up outpatient follow up.  -08/12 -> Pain service recommended   1.  Change Suboxone to  4 mg qid for better analgesic effect.     2.   discontinue oxycodone   3.   Use acetaminophen for additional analgesic effect if needed.- can give as much as 1000 mg tid   (don't exceed 3000 mg total per day).       HIV, untreated PTA  - CD4 count greater than 500, viral load 3695  - Started Biktarvy 7/8; ID following  - Will need outpt follow up     Chronic back pain: pt notes it present ever since she was pregnant.   -tylenol  -added lidoderm patch 7/23  -increase gabapentin to 600 mg  TID     Toxic/septic/metabolic encephalopathy, improved  Likely encephalopathy due to sepsis, drug use, CVA, prolonged ICU stay; had been pulling lines due to alterned mentation thought this all now appears resolved.      Pyelonephritis  Urine cultures positive for Klebsiella.  Completed course of treatment with ceftriaxone.  repeat urine cultures 7/10 with ESBL E coli but given unimpressive UA and no UTI symptoms ID suggest holding off on abx for ESBL UTI  -monitor for any symptoms     Acute hypoxemic respiratory failure, resolved  Intubated 7/1/2021.  Extubated 7/11/2021.  Likely related to septic shock from MSSA mitral valve endocarditis     FORD, resolved suspected due to sepsis  Hypokalemia  - on potassium replacement protocol  - will check BMP periodically     Chronic anemia  Baseline hemoglobin approximately 7.  Likely related to chronic disease, untreated HIV.  -Monitor hemoglobin periodically; stable around 8  - low nl ferritin- start po ferrous gluconate     Non severe malnutrition  -- Supplements      Prior hepatitis C infection  Antibody positive but RNA negative  -Noted     Restless legs: unclear if this is true restless leg syndrome or due to being uncomfortable  -continue mirapex prn     Major depressive disorder: pt is not suicidal but also doesn't seem to have much of a will to live based upon discussion 7/25.  I wonder if her underlying chronic pain which is difficult to treat as well as being in the hospital is contributing. Was seen by psych and declined any medication treatment on 7/26.  patient was on zoloft for years starting at age 13. She is willing to try zoloft again.   -Started zoloft 8/1 at 25mg po and titrating up to 50mg. Pt feels this is helping. Psychiatry followed up and agreed with this medication.     DVT Prophylaxis: SCDs  Code Status: Full Code     Disposition: Expected discharge once IV abx are complete after 8/18 as having a difficult time finding placement in TCU.  She is  "homeless at baseline but has a plan post discharge to live with daughter and daughter's in laws. This is in the [facility] and will allow for follow up cares.       Lucia Martinez MD.  Hospitalist [phone] (7am -6 pm)               Interval History:   no new complaints              Medications:       acetaminophen  975 mg Oral Q8H     bictegravir-emtricitabine-tenofovir  1 tablet Oral Daily     buprenorphine HCl-naloxone HCl  1 Film Sublingual 4x Daily     gabapentin  600 mg Oral TID     nafcillin  2 g Intravenous Q4H     pantoprazole  40 mg Oral QAM AC     polyethylene glycol  17 g Oral Daily     senna-docusate  1 tablet Oral BID     sertraline  50 mg Oral Daily     sodium chloride (PF)  10 mL Intracatheter Q8H     sodium chloride (PF)  10-40 mL Intracatheter Q7 Days     sodium chloride (PF)  3 mL Intracatheter Q8H     sodium chloride (PF)  3 mL Intracatheter Q8H     sodium chloride (PF)  3 mL Intracatheter Q8H     [START ON 8/16/2021] acetaminophen, acetaminophen, albuterol, bisacodyl, bisacodyl, glucose **OR** dextrose **OR** glucagon, fentaNYL, HOLD MEDICATION, hydrOXYzine, lidocaine 4%, lidocaine 4%, lidocaine 4%, lidocaine (buffered or not buffered), lidocaine (buffered or not buffered), lidocaine (buffered or not buffered), lidocaine (buffered or not buffered), magnesium hydroxide, magnesium hydroxide, midazolam, naloxone **OR** naloxone **OR** naloxone **OR** naloxone, ondansetron **OR** ondansetron, ondansetron **OR** ondansetron, prochlorperazine **OR** prochlorperazine, prochlorperazine **OR** prochlorperazine, QUEtiapine, sodium chloride (PF), sodium chloride (PF), sodium chloride (PF), sodium chloride (PF), sodium chloride (PF), sodium chloride (PF)               Physical Exam:   Blood pressure 130/89, pulse 77, temperature 97.4  F (36.3  C), temperature source Axillary, resp. rate 18, height 1.727 m (5' 7.99\"), weight 76.8 kg (169 lb 5 oz), SpO2 98 %, unknown if currently breastfeeding.  Wt " Readings from Last 4 Encounters:   21 76.8 kg (169 lb 5 oz)   07 103.1 kg (227 lb 4 oz)   07 99.3 kg (219 lb)         Vital Sign Ranges  Temperature Temp  Av.5  F (36.4  C)  Min: 96.7  F (35.9  C)  Max: 98.7  F (37.1  C)   Blood pressure Systolic (24hrs), Av , Min:101 , Max:130        Diastolic (24hrs), Av, Min:61, Max:89      Pulse Pulse  Av.1  Min: 71  Max: 85   Respirations Resp  Av.9  Min: 9  Max: 38   Pulse oximetry SpO2  Av.4 %  Min: 92 %  Max: 100 %         Intake/Output Summary (Last 24 hours) at 2021 1421  Last data filed at 2021 1106  Gross per 24 hour   Intake 750 ml   Output 600 ml   Net 150 ml       Constitutional: Awake, alert, cooperative, no apparent distress   Lungs: Clear to auscultation bilaterally, no crackles or wheezing   Cardiovascular: Regular rate and rhythm, normal S1 and S2, systolic murmur heard best at apex   Abdomen: Normal bowel sounds, soft, non-distended, non-tender   Skin: No rashes, no cyanosis, no edema.dressing over L foot               Data:   All laboratory data reviewed

## 2021-08-14 NOTE — CONSULTS
"[facility]    Stroke Progress Note    Interval EventsAsked to reevaluate patient because mitral valve replacement surgery is recommended soon but follow up brain imaging still shows small volume of intracranial hemorrhage.     In the past 48 hours blood pressure has been in the 100-130 systolic range.    The patient feels well today with no complaints other than oral pain due to recent teeth extraction.    HPI Summary  41 year old female with h/o untreated HIV, hepatitis C who was admitted on 7/1/2021 for MSSA mitral valve endocarditis resulting in septic shock complicated by embolic left parietal lobe infarctions and multiple peripheral emboli, and initially with encephalopathy, and shock.  She was intubated for a little less than two weeks. Since then she has been recuperating on the floor, with continued treatment with antibiotics and tooth extraction as well as toe amputation. ID has been following. She is on nafcillin for antibiotic currently    Impression   Abnormal brain MRI with multiple enhancing subacute infarcts, with small volume hemorrhage  - Given the time that has elapsed since these infarcts were first seen, it is probably reasonable to use anticoagulation during MVR surgery, however, since the largest lesion may be a developing abscess, recommend further workup    Plan  - Visualization of intracranial vascular imaging recommended to screen for mycotic aneurysm. Will order CTA head and CT head to be done today  - Neurosurgery consultation for the possible brain abscess    Patient Follow-up    - final recommendation pending work-up    We will continue to follow.     Alessandra James PA-C  Neurology  08/14/2021 8:45 AM  To page me or covering stroke neurology team member, click here: AMCOM   Choose \"On Call\" tab at top, then search dropdown box for \"Neurology Adult\", select location, press Enter, then look for stroke/neuro " ICU/telestroke.    ______________________________________________________    Clinically Significant Risk Factors Present on Admission                  Medications   Scheduled Meds    acetaminophen  975 mg Oral Q8H     bictegravir-emtricitabine-tenofovir  1 tablet Oral Daily     buprenorphine HCl-naloxone HCl  1 Film Sublingual 4x Daily     gabapentin  600 mg Oral TID     nafcillin  2 g Intravenous Q4H     pantoprazole  40 mg Oral QAM AC     polyethylene glycol  17 g Oral Daily     polyethylene glycol  17 g Oral Daily     senna-docusate  1 tablet Oral BID     senna-docusate  1 tablet Oral BID     sertraline  50 mg Oral Daily     sodium chloride (PF)  10 mL Intravenous Once     sodium chloride (PF)  10 mL Intracatheter Q8H     sodium chloride (PF)  10-40 mL Intracatheter Q7 Days     sodium chloride (PF)  3 mL Intracatheter Q8H     sodium chloride (PF)  3 mL Intracatheter Q8H     sodium chloride (PF)  3 mL Intracatheter Q8H       Infusion Meds    lactated ringers       lactated ringers 25 mL/hr at 08/13/21 1858       PRN Meds  [START ON 8/16/2021] acetaminophen, acetaminophen, albuterol, bisacodyl, bisacodyl, glucose **OR** dextrose **OR** glucagon, fentaNYL, HOLD MEDICATION, hydrOXYzine, lidocaine 4%, lidocaine 4%, lidocaine 4%, lidocaine (buffered or not buffered), lidocaine (buffered or not buffered), lidocaine (buffered or not buffered), lidocaine (buffered or not buffered), magnesium hydroxide, magnesium hydroxide, midazolam, naloxone **OR** naloxone **OR** naloxone **OR** naloxone, ondansetron **OR** ondansetron, ondansetron **OR** ondansetron, prochlorperazine **OR** prochlorperazine, prochlorperazine **OR** prochlorperazine, QUEtiapine, sodium chloride (PF), sodium chloride (PF), sodium chloride (PF), sodium chloride (PF), sodium chloride (PF), sodium chloride (PF)       PHYSICAL EXAMINATION  Temp:  [96.7  F (35.9  C)-98.9  F (37.2  C)] 97.4  F (36.3  C)  Pulse:  [71-85] 77  Resp:  [9-38] 18  BP:  "(101-130)/(61-89) 130/89  SpO2:  [92 %-100 %] 98 %      General Exam  General:  sitting up in chair in NAD    HEENT:  edentulous  Pulmonary:  no respiratory distress    Neuro Exam  Mental Status:  speech clear and fluent, naming and repetition normal, alert, follows all commands, oriented to self, place, situation but states moth is february. gets year right. states season is \"Almost fall\". can follow 2-step commands without errors  Cranial Nerves:  visual fields intact, PERRL, EOMI with normal smooth pursuit, facial sensation intact and symmetric, hearing not formally tested but intact to conversation, palate elevation symmetric and uvula midline, no dysarthria, shoulder shrug strong bilaterally, tongue protrusion midline, mild R facial droop   Motor:  normal muscle tone and bulk, no abnormal movements, able to move all limbs spontaneously, strength 5/5 throughout upper and lower extremities, no pronator drift, although LLE testing is limited due to boot  Reflexes:  deferred  Sensory:  light touch sensation intact and symmetric throughout upper and lower extremities  Coordination:  slow but not dysmetric in the hands  Station/Gait:  deferred      Imaging  I personally reviewed all imaging; relevant findings per HPI.     Lab Results Data   CBC  Recent Labs   Lab 08/13/21  0558 08/11/21  0614 08/09/21  0654   WBC 4.1 4.7 4.1   RBC 3.01* 3.07* 2.77*   HGB 8.0* 8.1* 7.4*   HCT 26.0* 26.1* 23.2*   * 564* 493*     Basic Metabolic Panel    Recent Labs   Lab 08/14/21  0634 08/13/21  0558 08/11/21  0614 08/09/21  0653   NA  --  139 139 141   POTASSIUM  --  3.8 3.9 4.1   CHLORIDE  --  111* 110* 109   CO2  --  28 27 27   BUN  --  10 10 10   CR  --  0.69 0.59 0.59   GLC 81 95 96 95   DEREK  --  8.0* 8.3* 8.0*     Liver Panel  No results for input(s): PROTTOTAL, ALBUMIN, BILITOTAL, ALKPHOS, AST, ALT, BILIDIRECT in the last 168 hours.  INR    Recent Labs   Lab Test 08/11/21  0614 07/01/21  1239   INR 1.05 1.23*      Lipid " Profile    Recent Labs   Lab Test 07/02/21  0435   CHOL 80   HDL 8*   LDL <1   TRIG 382*     A1C    Recent Labs   Lab Test 07/03/21  1722 07/02/21  0435   A1C 5.9* 5.7*     Troponin I  No results for input(s): TROPONIN in the last 168 hours.

## 2021-08-14 NOTE — PROGRESS NOTES
Rounding Note    HPI/Subjective:  41-year-old female post-op day 1 status-post extraction of all remaining dentition.  She was admitted for assessment of her subacute bacterial endocarditis which ultimately will require mitral valve replacement.  Concern dentition was contributing to her bacteremia.  Patient denies any issues in her mouth this morning.      Exam:    Gen: Patient resting comfortably in bed, but arousable and appropriate.  HEENT: Pupils are equal round reactive to light, extraocular muscles intact.  There is no obvious facial swelling.  Cranial nerve V and VII intact.  Intraorally, there was no active bleeding.  No atypical clotting.  All sockets hemostatic appear to be healing very well.  Gingiva is pink, perfuse throughout.  Floor of mouth and oropharynx are unremarkable.  Vitals: reviewed, stable, afebrile    Assessment: Good course, okay to proceed with cardiac surgery.      Plan:      -Please contact us with any questions.  We will sign off at this point but are available at any time.  -No longer requires gauze in mouth.  Recommend warm salt water rinses TID, soft diet.      Luis Manuel Daly D.D.S.  [facility]

## 2021-08-14 NOTE — PLAN OF CARE
8/14/2021 0700- 1930   A&O x4, flat affect, calm & cooperative   BEHAVIOR & AGGRESSION TOOL COLOR: green.    CIWA SCORE: NA    VSS on RA   SBA with GB/W. Minimal weight bearing on left foot. Post-op boot when out out of bed. BLE elevated.   Pt reports pain in lower back and mouth managed  with  tylenol    DIET:Full liquids, tolerating    Bowel/ Bladder;  Continent, up to BR   IV/ lines:Right PICC SL with q4h Nafcillin   ABNL LABS/BG: Blood antibody screen positive.   TELEMETRY RHYTHM: Normal sinus  SKIN: Scattered bruises and scabs. L foot dressing CDI. Right great toe and heel dressing CDI,.  TESTS/PROCEDURES:  Tooth extraction 8/13, CT head and neck completed  D/C DATE: Pending  Discharge Barriers: Continue on antibiotics until 8/18/21  OTHER IMPORTANT INFO: Contact precautions maintained. cardiothoracic surgery, and neurology following, neuro surgery consulted, plan for repeat MRI  brain on 8/ 17, if no improvement will likely recommend  surgical interventions.

## 2021-08-15 LAB — GLUCOSE BLDC GLUCOMTR-MCNC: 80 MG/DL (ref 70–99)

## 2021-08-15 PROCEDURE — 250N000013 HC RX MED GY IP 250 OP 250 PS 637: Performed by: STUDENT IN AN ORGANIZED HEALTH CARE EDUCATION/TRAINING PROGRAM

## 2021-08-15 PROCEDURE — 120N000001 HC R&B MED SURG/OB

## 2021-08-15 PROCEDURE — 99207 PR MOONLIGHTING INDICATOR: CPT | Performed by: INTERNAL MEDICINE

## 2021-08-15 PROCEDURE — 250N000013 HC RX MED GY IP 250 OP 250 PS 637: Performed by: HOSPITALIST

## 2021-08-15 PROCEDURE — 250N000013 HC RX MED GY IP 250 OP 250 PS 637: Performed by: PODIATRIST

## 2021-08-15 PROCEDURE — 250N000013 HC RX MED GY IP 250 OP 250 PS 637: Performed by: DENTIST

## 2021-08-15 PROCEDURE — 99232 SBSQ HOSP IP/OBS MODERATE 35: CPT | Performed by: INTERNAL MEDICINE

## 2021-08-15 PROCEDURE — 999N000190 HC STATISTIC VAT ROUNDS

## 2021-08-15 PROCEDURE — 250N000009 HC RX 250: Performed by: PODIATRIST

## 2021-08-15 RX ADMIN — NAFCILLIN SODIUM 2 G: 2 INJECTION, POWDER, LYOPHILIZED, FOR SOLUTION INTRAMUSCULAR; INTRAVENOUS at 09:15

## 2021-08-15 RX ADMIN — ACETAMINOPHEN 975 MG: 325 TABLET, FILM COATED ORAL at 11:21

## 2021-08-15 RX ADMIN — BUPRENORPHINE HYDROCHLORIDE, NALOXONE HYDROCHLORIDE 1 FILM: 4; 1 FILM, SOLUBLE BUCCAL; SUBLINGUAL at 18:17

## 2021-08-15 RX ADMIN — NAFCILLIN SODIUM 2 G: 2 INJECTION, POWDER, LYOPHILIZED, FOR SOLUTION INTRAMUSCULAR; INTRAVENOUS at 02:37

## 2021-08-15 RX ADMIN — BUPRENORPHINE HYDROCHLORIDE, NALOXONE HYDROCHLORIDE 1 FILM: 4; 1 FILM, SOLUBLE BUCCAL; SUBLINGUAL at 21:58

## 2021-08-15 RX ADMIN — HYDROXYZINE HYDROCHLORIDE 25 MG: 25 TABLET, FILM COATED ORAL at 18:24

## 2021-08-15 RX ADMIN — NAFCILLIN SODIUM 2 G: 2 INJECTION, POWDER, LYOPHILIZED, FOR SOLUTION INTRAMUSCULAR; INTRAVENOUS at 06:18

## 2021-08-15 RX ADMIN — HYDROXYZINE HYDROCHLORIDE 25 MG: 25 TABLET, FILM COATED ORAL at 02:42

## 2021-08-15 RX ADMIN — NAFCILLIN SODIUM 2 G: 2 INJECTION, POWDER, LYOPHILIZED, FOR SOLUTION INTRAMUSCULAR; INTRAVENOUS at 21:58

## 2021-08-15 RX ADMIN — ACETAMINOPHEN 975 MG: 325 TABLET, FILM COATED ORAL at 20:00

## 2021-08-15 RX ADMIN — ACETAMINOPHEN 975 MG: 325 TABLET, FILM COATED ORAL at 02:36

## 2021-08-15 RX ADMIN — NAFCILLIN SODIUM 2 G: 2 INJECTION, POWDER, LYOPHILIZED, FOR SOLUTION INTRAMUSCULAR; INTRAVENOUS at 13:42

## 2021-08-15 RX ADMIN — NAFCILLIN SODIUM 2 G: 2 INJECTION, POWDER, LYOPHILIZED, FOR SOLUTION INTRAMUSCULAR; INTRAVENOUS at 18:17

## 2021-08-15 RX ADMIN — PANTOPRAZOLE SODIUM 40 MG: 40 TABLET, DELAYED RELEASE ORAL at 09:10

## 2021-08-15 RX ADMIN — SERTRALINE HYDROCHLORIDE 50 MG: 50 TABLET ORAL at 09:10

## 2021-08-15 RX ADMIN — BICTEGRAVIR SODIUM, EMTRICITABINE, AND TENOFOVIR ALAFENAMIDE FUMARATE 1 TABLET: 50; 200; 25 TABLET ORAL at 09:10

## 2021-08-15 RX ADMIN — BUPRENORPHINE HYDROCHLORIDE, NALOXONE HYDROCHLORIDE 1 FILM: 4; 1 FILM, SOLUBLE BUCCAL; SUBLINGUAL at 13:42

## 2021-08-15 RX ADMIN — GABAPENTIN 600 MG: 300 CAPSULE ORAL at 15:51

## 2021-08-15 RX ADMIN — GABAPENTIN 600 MG: 300 CAPSULE ORAL at 21:58

## 2021-08-15 RX ADMIN — GABAPENTIN 600 MG: 300 CAPSULE ORAL at 09:10

## 2021-08-15 RX ADMIN — BUPRENORPHINE HYDROCHLORIDE, NALOXONE HYDROCHLORIDE 1 FILM: 4; 1 FILM, SOLUBLE BUCCAL; SUBLINGUAL at 09:10

## 2021-08-15 ASSESSMENT — ACTIVITIES OF DAILY LIVING (ADL)
ADLS_ACUITY_SCORE: 20
ADLS_ACUITY_SCORE: 21
ADLS_ACUITY_SCORE: 21
ADLS_ACUITY_SCORE: 20
ADLS_ACUITY_SCORE: 20
ADLS_ACUITY_SCORE: 21

## 2021-08-15 NOTE — PLAN OF CARE
8/14/2021 [id number]   A&O x4, flat affect, calm & cooperative   BEHAVIOR & AGGRESSION TOOL COLOR: Green, no aggression no inappropriate behaviors.    CIWA SCORE: NA    VSS on RA   SBA with GB/W. Minimal weight bearing on left foot. Post-op boot when out out of bed. BLE elevated.   Pt reports pain in lower back and mouth managed with ice scheduled tylenol and PRN atarax given x1   DIET:Full liquids, tolerating    Bowel/ Bladder;  Continent, up to BR   IV/ lines:Right PICC SL with q4h Nafcillin   ABNL LABS/BG: Blood antibody screen positive.   TELEMETRY RHYTHM: Normal sinus  SKIN: Scattered bruises and scabs. L foot dressing CDI. Right great toe and heel dressing CDI,.  TESTS/PROCEDURES:  Tooth extraction 8/13, CT head and neck completed 8/14, recommend repeat MRI 8/17  D/C DATE: Pending  Discharge Barriers: Continue on antibiotics until 8/18/21  OTHER IMPORTANT INFO: Contact precautions maintained. cardiothoracic surgery, and neurology following, neuro surgery consulted, plan for repeat MRI on 8/ 17, if no improvement plan for surgical interventions.

## 2021-08-15 NOTE — PROGRESS NOTES
"Chart reviewed, pt not seen today.    Per CT surgery note from yesterday after our consult was done, they are no longer considering urgent surgical intervention due to improvement in the valve seen on recent JAE.     The CT/CTA checked yesterday did not show any mycotic aneurysm or other concerning intracranial vascular abnormality.    Neurosurgery is now following for the possible brain abscess.    Will sign off  Please consult us again with any questions    Alessandra James PA-C  Vascular Neurology  08/15/2021 8:21 AM  Securely message with the Vocera Web Console (learn more here)  To page me or covering stroke neurology team member, click here: AMCOM  Choose \"On Call\" tab at top, then search dropdown box for \"Neurology Adult\" & press Enter, look for Neuro ICU/Stroke    "

## 2021-08-15 NOTE — PROGRESS NOTES
[facility]  Hospitalist Progress Note for 8/15/2021       Assessment and Plan:   41 year old female with h/o untreated HIV, hepatitis C who was admitted on 7/1/2021 for MSSA mitral valve endocarditis resulting in septic shock complicated by embolic left parietal lobe infarctions and multiple peripheral emboli, and initially with encephalopathy, and shock.  She was intubated in ED on 7/1/2021 subsequently extubated 7/11/2021.  Blood cultures turned positive on hospital day 1 with MSSA.  Urine culture was positive for Klebsiella.  She was treated initially with ceftriaxone and vancomycin, and has since been narrowed to nafcillin per ID.     MSSA mitral valve endocarditis  Septic shock, resolved  Probable myopericarditis  Small pericardial effusion  *JAE 7/5 noted with small ASD, mod to large mobile vegetation (14mm long, 6mm wide) of the P2 segment of the mitral leaflet. No valve perforation; mod MR  *blood cultures on admission with MSSA; blood cultures have been negative since 7/6.   *ID following; antibiotic has been narrowed down to Nafcillin, 6 weeks of antibiotic, completion date 8/18/2021.  *limited ECHO repeated 7/15 to reassess pericardial fluid 7/15 per cardiology and noted:  organized effusion posteriorly, measuring 1.2-1.5cm, no evidence of tamponade; small mobile vegetation noted attached to the mitral leaflet, mod to severe MR; compared to 7/8/21, effusion size is probably stable, Degree of MR is worse, Vegetation is smaller.  Cardiology signed off 7/15.   *PICC in place  -- given pt's stability and cooperativity, I am consulting CT surgery today to do any appropriate work up while patient is in hospital and then arrange for follow up if that is that plan.  -- brain MRI should be ordered to rule out mycotic aneurysms or brain abscesses per CT surg  -- JAE (no vegetations) and Cor Angio (nomral coronaries) completed 08/12  -per cardiothoracic surgery reconsult 8/14 felt she no longer has  an urgent surgical indication,given absence of vegetations and only moderate MR on ECHO 8/12/2021.She may require mitral valve repair/replacement in the future if she has progressive MR, but this would be better addressed after she has undergone treatment for substance abuse and has demonstrated sobriety in the out-patient setting. She should follow up with cardiology for medical management and surveillance of MR and ASD, and can be referred back to CV surgery as needed.   - Antibiotic plan per ID( length same as previously planned or other given possibility of brain abscess, need to discuss with ID in AM     Ischemic CVA, embolic stroke due to infective endocarditis  *CT head on admission notable for new acute to subacute PCA territory ischemic stroke  *MRI 7/3 noted with progressive, multifocal, acute infarcts without MR evidence for hemorrhage or midline shift  *Patient does have small left-to-right shunt on JAE, but most likely from embolic spread from endocarditis  *was evaluated by neurology, to continue antibiotics; per neuro to avoid anticoagulation (except DVT prophylaxis dose)    Abnormal MRI  Possible brain abscess  * MRI 8/10/2021 showed- Multiple enhancing subacute infarcts with evidence of small volume hemorrhage, largest at the junction of the left parietal and occipital lobes which demonstrates peripheral enhancement and worsening vasogenic edema. Developing abscess(es) related to septic emboli cannot be excluded  - Neurology was reconsulted 8/14, recommended to screen for mycotic aneurysm   * CTA head 8/14 -showed Complex peripherally enhancing fluid collection in the paramedian left parietal lobepatent arteries of head & neck. No evidence for dissection.  and CT head today & neurosurgery consult  * Ct head w/o contrast 8/14 showed no significant change in the complex peripherally enhancing fluid collection in the paramedian left parietal lobe with surrounding vasogenic edema. Atypical for bland  evolving subacute ischemic infarct. Less likely possibility for evolving subacute hematoma, consider tissue sampling.Mild local mass effect in the left parieto-occipital region with minimal narrowing of the occipital horn of the left lateral ventricle.No midline shift/herniation  - pt remains asymptomatic  - neurosurgery consulted 8/14th appreciated. As the pt has no neuro symptoms they have recommended repeating the brain MRI on 8/17 & if potential abscesses have not improved, then they will likely recommend surgical intervention for the possible brain abscess.     Infected Tooth  S/p removal of all remaining teeth on 8/13  Assessment: OMFS was consulted for multiple infected tooth  - S/p Removal of all remaining teeth including 2, 3, 4, 5, 6, 7, 8, 9, 10, 11, 12, 13, 19, 20, 21, 22, 23, 24, 25, 26, 28, 29, 30  - on full liquid diet - advanced to mechanical /dental soft diet on 8/15    Osteomyelitis left 3rd toe s/p amputation 8/7  Necrotic wounds to Lt great toes, Lt 3rd toe and Lt lateral Heel, Rt great toe: 2/2 thrombi.  -- Podiatry took to OR 8/7 for amputation. Orthotist following up.   -- continue wound cares for other digits     Normocytic anemia, likey 2/2 chronic disease  Chronic anemia  Baseline hemoglobin approximately 7.  Likely related to chronic disease, untreated HIV.  - Hgb 7.4 yesterday -> 8.1 today  - B12 and folate -> wnl  - consider transfusion for Hgb < 7 (or close)  - low nl ferritin- on po ferrous gluconate  - CBC on 8/16    Left knee pain 2/2 OA: pt reports history of pain in that knee with activity but pain now is more acute. ROM okay. There is some warmth and swelling there.   - In light of the MSSA bacteremia, got XR and called ortho for effusion  - Per ortho no indication for intervention, though they did order a brace. Appreciate assistance. There are following EOD.  - PT/OT     IV drug use  Polysubstance use disorder  Opiate use disorder  *Chem dep consulted 7/15 but chemical  dependency did not feel she is appropriate for CD assessment at this time and recommended consultation towards the end of her antibiotic course.  Reconsult CD once she is close to finishing her IV antibiotic.  *Was using IV dilaudid as well as Percocet p.o. earlier this stay pretty extensively.  Attempts made to wean off IV narcotics and taper down po narcotics though patient felt she was going through withdrawal. Psych consulted and recommended suboxone though patient declined. Psych recommended rapid taper of methadone instead.   *completed methadone taper of 30 mg on 7/21, 20 mg 7/22 and then 10 mg 7/23, then stop  -started suboxone on 7/27 - appreciate psych/addiction medicine assistance who will help with titration  - now on suboxone BID and doing very well. Reports no cravings and hopes to continue this regimen outpatient.  -Plan was to reconsult CD (8/13th)once she is close to finishing her IV antibiotic, but patient is not interested in treatment. She has been through inpatient and outpatient programs and not found them helpful. Also I'm not sure she could continue suboxone in treatment and I think that is probably her best chance for sobriety.  -FYI: She has a daughter who just had twins and lives with her in-laws. Daughter and daughter's mother in law live in [place] (? - pt going to confirm) and have invited patient to live there to help with babies. She thinks this is a good idea as she'll be kept busy and she gets along well with this family. She is hoping to continue follow up with addiction medicine to continue suboxone. She feels that with suboxone and family support she will do well. Will need to have addiction med follow up with her to prescribe suboxone on discharge and help her set up outpatient follow up.  -08/12 -> Pain service recommended   1. Change Suboxone to  4 mg qid for better analgesic effect.     2. discontinue oxycodone   3. to use acetaminophen for additional analgesic effect if  needed.- can give as much as 1000 mg tid   (don't exceed 3000 mg total per day).       HIV, untreated PTA  - CD4 count greater than 500, viral load 3695  - Started Biktarvy 7/8; ID following  - Will need outpt follow up     Chronic back pain: pt notes it present ever since she was pregnant.   -tylenol  -added lidoderm patch 7/23  -increase gabapentin to 600 mg TID     Toxic/septic/metabolic encephalopathy, improved  Likely encephalopathy due to sepsis, drug use, CVA, prolonged ICU stay; had been pulling lines due to alterned mentation thought this all now appears resolved.      Pyelonephritis  Urine cultures positive for Klebsiella.  Completed course of treatment with ceftriaxone.  repeat urine cultures 7/10 with ESBL E coli but given unimpressive UA and no UTI symptoms ID suggest holding off on abx for ESBL UTI  -monitor for any symptoms     Acute hypoxemic respiratory failure, resolved  Intubated 7/1/2021.  Extubated 7/11/2021.  Likely related to septic shock from MSSA mitral valve endocarditis     FORD, resolved suspected due to sepsis  Hypokalemia  - on potassium replacement protocol  - will check BMP periodically     Chronic anemia  Baseline hemoglobin approximately 7.  Likely related to chronic disease, untreated HIV.  -Monitor hemoglobin periodically; stable around 8  - low nl ferritin- start po ferrous gluconate     Non severe malnutrition  -- Supplements      Prior hepatitis C infection  Antibody positive but RNA negative  -Noted     Restless legs: unclear if this is true restless leg syndrome or due to being uncomfortable  -continue mirapex prn     Major depressive disorder: pt is not suicidal but also doesn't seem to have much of a will to live based upon discussion 7/25.  I wonder if her underlying chronic pain which is difficult to treat as well as being in the hospital is contributing. Was seen by psych and declined any medication treatment on 7/26.  patient was on zoloft for years starting at age 13.  "She is willing to try zoloft again.   -Started zoloft 8/1 at 25mg po and titrating up to 50mg. Pt feels this is helping. Psychiatry followed up and agreed with this medication.     DVT Prophylaxis: SCDs  Code Status: Full Code     Disposition: Expected discharge once IV abx are complete after 8/18 as having a difficult time finding placement in TCU.  She is homeless at baseline but has a plan post discharge to live with daughter and daughter's in laws. This is in the [facility] and will allow for follow up cares.       Lucia Martinez MD.  Hospitalist [phone] (7am -6 pm)               Interval History:   no new complaints              Medications:       acetaminophen  975 mg Oral Q8H     bictegravir-emtricitabine-tenofovir  1 tablet Oral Daily     buprenorphine HCl-naloxone HCl  1 Film Sublingual 4x Daily     gabapentin  600 mg Oral TID     nafcillin  2 g Intravenous Q4H     pantoprazole  40 mg Oral QAM AC     polyethylene glycol  17 g Oral Daily     senna-docusate  1 tablet Oral BID     sertraline  50 mg Oral Daily     sodium chloride (PF)  10 mL Intracatheter Q8H     sodium chloride (PF)  10-40 mL Intracatheter Q7 Days     sodium chloride (PF)  3 mL Intracatheter Q8H     [START ON 8/16/2021] acetaminophen, albuterol, bisacodyl, glucose **OR** dextrose **OR** glucagon, fentaNYL, HOLD MEDICATION, hydrOXYzine, lidocaine 4%, lidocaine (buffered or not buffered), magnesium hydroxide, midazolam, naloxone **OR** naloxone **OR** naloxone **OR** naloxone, ondansetron **OR** ondansetron, prochlorperazine **OR** prochlorperazine, QUEtiapine, sodium chloride (PF), sodium chloride (PF), sodium chloride (PF), sodium chloride (PF)               Physical Exam:   Blood pressure 119/88, pulse 61, temperature 97.3  F (36.3  C), temperature source Oral, resp. rate 16, height 1.727 m (5' 7.99\"), weight 76.8 kg (169 lb 5 oz), SpO2 98 %, unknown if currently breastfeeding.  Wt Readings from Last 4 Encounters:   07/16/21 76.8 " kg (169 lb 5 oz)   07 103.1 kg (227 lb 4 oz)   07 99.3 kg (219 lb)         Vital Sign Ranges  Temperature Temp  Av.5  F (36.4  C)  Min: 96.7  F (35.9  C)  Max: 98.7  F (37.1  C)   Blood pressure Systolic (24hrs), Av , Min:101 , Max:130        Diastolic (24hrs), Av, Min:61, Max:89      Pulse Pulse  Av.1  Min: 71  Max: 85   Respirations Resp  Av.9  Min: 9  Max: 38   Pulse oximetry SpO2  Av.4 %  Min: 92 %  Max: 100 %         Intake/Output Summary (Last 24 hours) at 2021 1421  Last data filed at 2021 1106  Gross per 24 hour   Intake 750 ml   Output 600 ml   Net 150 ml       Constitutional: Awake, alert, cooperative, no apparent distress   Lungs: Clear to auscultation bilaterally, no crackles or wheezing   Cardiovascular: Regular rate and rhythm, normal S1 and S2, systolic murmur heard best at apex   Abdomen: Normal bowel sounds, soft, non-distended, non-tender   Skin: No rashes, no cyanosis, no edema.dressing over L foot               Data:   All laboratory data reviewed

## 2021-08-15 NOTE — PLAN OF CARE
8/15/2021 [id number]   A&O x4, flat affect, calm & cooperative   BEHAVIOR & AGGRESSION TOOL COLOR: Green,  CIWA SCORE: NA    VSS on RA   SBA with GB/W. Minimal weight bearing on left foot. Post-op boot when out out of bed. BLE elevated.   Pt reports pain in lower back and mouth managed with ice and scheduled tylenol    DIET:soft, tolerating    Bowel/ Bladder;  Continent, up to BR   IV/ lines:Right PICC SL with q4h Nafcillin   ABNL LABS/BG:nothing new  TELEMETRY RHYTHM: sinus dysrhythmia  SKIN: Scattered bruises and scabs. L foot dressing  changed. Right great toe and heel dressings changed,.  TESTS/PROCEDURES:  Tooth extraction 8/13, CT head and neck completed 8/14, recommend repeat MRI 8/17  D/C DATE: Pending  Discharge Barriers: Continue on antibiotics until 8/18/21  OTHER IMPORTANT INFO: Contact precautions maintained. cardiothoracic surgery,  neurology, neuro surgery following plan for repeat MRI on 8/ 17, if no improvement plan for surgical interventions.

## 2021-08-16 LAB
ANION GAP SERPL CALCULATED.3IONS-SCNC: 2 MMOL/L (ref 3–14)
BUN SERPL-MCNC: 11 MG/DL (ref 7–30)
CALCIUM SERPL-MCNC: 8.6 MG/DL (ref 8.5–10.1)
CHLORIDE BLD-SCNC: 108 MMOL/L (ref 94–109)
CO2 SERPL-SCNC: 28 MMOL/L (ref 20–32)
CREAT SERPL-MCNC: 0.6 MG/DL (ref 0.52–1.04)
ERYTHROCYTE [DISTWIDTH] IN BLOOD BY AUTOMATED COUNT: 26.7 % (ref 10–15)
GFR SERPL CREATININE-BSD FRML MDRD: >90 ML/MIN/1.73M2
GLUCOSE BLD-MCNC: 100 MG/DL (ref 70–99)
HCT VFR BLD AUTO: 28.9 % (ref 35–47)
HGB BLD-MCNC: 8.9 G/DL (ref 11.7–15.7)
MCH RBC QN AUTO: 26.3 PG (ref 26.5–33)
MCHC RBC AUTO-ENTMCNC: 30.8 G/DL (ref 31.5–36.5)
MCV RBC AUTO: 85 FL (ref 78–100)
PLATELET # BLD AUTO: 606 10E3/UL (ref 150–450)
POTASSIUM BLD-SCNC: 3.8 MMOL/L (ref 3.4–5.3)
RBC # BLD AUTO: 3.39 10E6/UL (ref 3.8–5.2)
SODIUM SERPL-SCNC: 138 MMOL/L (ref 133–144)
WBC # BLD AUTO: 3.9 10E3/UL (ref 4–11)

## 2021-08-16 PROCEDURE — 250N000013 HC RX MED GY IP 250 OP 250 PS 637: Performed by: PODIATRIST

## 2021-08-16 PROCEDURE — 99207 PR CDG-CUT & PASTE-POTENTIAL IMPACT ON LEVEL: CPT | Performed by: INTERNAL MEDICINE

## 2021-08-16 PROCEDURE — 250N000013 HC RX MED GY IP 250 OP 250 PS 637: Performed by: HOSPITALIST

## 2021-08-16 PROCEDURE — 250N000009 HC RX 250: Performed by: PODIATRIST

## 2021-08-16 PROCEDURE — 250N000013 HC RX MED GY IP 250 OP 250 PS 637: Performed by: STUDENT IN AN ORGANIZED HEALTH CARE EDUCATION/TRAINING PROGRAM

## 2021-08-16 PROCEDURE — 99207 PR CDG-MDM COMPONENT: MEETS MODERATE - DOWN CODED: CPT | Performed by: INTERNAL MEDICINE

## 2021-08-16 PROCEDURE — 85027 COMPLETE CBC AUTOMATED: CPT | Performed by: INTERNAL MEDICINE

## 2021-08-16 PROCEDURE — 82310 ASSAY OF CALCIUM: CPT | Performed by: INTERNAL MEDICINE

## 2021-08-16 PROCEDURE — 250N000013 HC RX MED GY IP 250 OP 250 PS 637: Performed by: DENTIST

## 2021-08-16 PROCEDURE — 120N000001 HC R&B MED SURG/OB

## 2021-08-16 PROCEDURE — 999N000040 HC STATISTIC CONSULT NO CHARGE VASC ACCESS

## 2021-08-16 PROCEDURE — 999N000190 HC STATISTIC VAT ROUNDS

## 2021-08-16 PROCEDURE — 99232 SBSQ HOSP IP/OBS MODERATE 35: CPT | Performed by: INTERNAL MEDICINE

## 2021-08-16 RX ADMIN — BICTEGRAVIR SODIUM, EMTRICITABINE, AND TENOFOVIR ALAFENAMIDE FUMARATE 1 TABLET: 50; 200; 25 TABLET ORAL at 08:33

## 2021-08-16 RX ADMIN — BUPRENORPHINE HYDROCHLORIDE, NALOXONE HYDROCHLORIDE 1 FILM: 4; 1 FILM, SOLUBLE BUCCAL; SUBLINGUAL at 21:43

## 2021-08-16 RX ADMIN — BUPRENORPHINE HYDROCHLORIDE, NALOXONE HYDROCHLORIDE 1 FILM: 4; 1 FILM, SOLUBLE BUCCAL; SUBLINGUAL at 12:26

## 2021-08-16 RX ADMIN — HYDROXYZINE HYDROCHLORIDE 25 MG: 25 TABLET, FILM COATED ORAL at 20:25

## 2021-08-16 RX ADMIN — NAFCILLIN SODIUM 2 G: 2 INJECTION, POWDER, LYOPHILIZED, FOR SOLUTION INTRAMUSCULAR; INTRAVENOUS at 21:43

## 2021-08-16 RX ADMIN — SERTRALINE HYDROCHLORIDE 50 MG: 50 TABLET ORAL at 08:33

## 2021-08-16 RX ADMIN — NAFCILLIN SODIUM 2 G: 2 INJECTION, POWDER, LYOPHILIZED, FOR SOLUTION INTRAMUSCULAR; INTRAVENOUS at 17:53

## 2021-08-16 RX ADMIN — NAFCILLIN SODIUM 2 G: 2 INJECTION, POWDER, LYOPHILIZED, FOR SOLUTION INTRAMUSCULAR; INTRAVENOUS at 10:03

## 2021-08-16 RX ADMIN — PANTOPRAZOLE SODIUM 40 MG: 40 TABLET, DELAYED RELEASE ORAL at 08:33

## 2021-08-16 RX ADMIN — QUETIAPINE FUMARATE 25 MG: 25 TABLET ORAL at 21:49

## 2021-08-16 RX ADMIN — BUPRENORPHINE HYDROCHLORIDE, NALOXONE HYDROCHLORIDE 1 FILM: 4; 1 FILM, SOLUBLE BUCCAL; SUBLINGUAL at 08:34

## 2021-08-16 RX ADMIN — GABAPENTIN 600 MG: 300 CAPSULE ORAL at 15:27

## 2021-08-16 RX ADMIN — BUPRENORPHINE HYDROCHLORIDE, NALOXONE HYDROCHLORIDE 1 FILM: 4; 1 FILM, SOLUBLE BUCCAL; SUBLINGUAL at 17:53

## 2021-08-16 RX ADMIN — GABAPENTIN 600 MG: 300 CAPSULE ORAL at 21:43

## 2021-08-16 RX ADMIN — NAFCILLIN SODIUM 2 G: 2 INJECTION, POWDER, LYOPHILIZED, FOR SOLUTION INTRAMUSCULAR; INTRAVENOUS at 02:38

## 2021-08-16 RX ADMIN — ACETAMINOPHEN 650 MG: 325 TABLET, FILM COATED ORAL at 17:53

## 2021-08-16 RX ADMIN — ACETAMINOPHEN 650 MG: 325 TABLET, FILM COATED ORAL at 22:49

## 2021-08-16 RX ADMIN — NAFCILLIN SODIUM 2 G: 2 INJECTION, POWDER, LYOPHILIZED, FOR SOLUTION INTRAMUSCULAR; INTRAVENOUS at 13:32

## 2021-08-16 RX ADMIN — NAFCILLIN SODIUM 2 G: 2 INJECTION, POWDER, LYOPHILIZED, FOR SOLUTION INTRAMUSCULAR; INTRAVENOUS at 05:32

## 2021-08-16 RX ADMIN — ACETAMINOPHEN 975 MG: 325 TABLET, FILM COATED ORAL at 10:03

## 2021-08-16 RX ADMIN — ACETAMINOPHEN 975 MG: 325 TABLET, FILM COATED ORAL at 02:38

## 2021-08-16 RX ADMIN — GABAPENTIN 600 MG: 300 CAPSULE ORAL at 08:33

## 2021-08-16 ASSESSMENT — ACTIVITIES OF DAILY LIVING (ADL)
ADLS_ACUITY_SCORE: 20

## 2021-08-16 NOTE — PLAN OF CARE
Alert and oriented x4. VSS. Pain controled with suboxone, and tylenol. Up SBA. Voiding well. CMS intact. Dressing changed. Abx run today q4. Plan to continue to monitor tonight.

## 2021-08-16 NOTE — PROGRESS NOTES
[facility]    Infectious Disease Progress Note    Date of Service : 08/16/2021     Assessment :  1. S.aureus MSSA native mitral valve endocarditis with septic shock in the setting of IVDA complicated by embolic L parietal lobe infarction and multiple peripheral emboli.   (TTE shows a large mobile 1.4 cm vegetation on the posterior mitral valve leaflet with extension to left atrium and small ASD per cardiology notes , no valvular abscess and no additional valvular involvement. Pericardial effusion is small). No seeding on CT abdomen but has peripheral emboli.  2. Untreated HIV diagnosed 2018, CD4 count maintained at >500, VL 3,695cpies/ml. Genotype pending  3. Hepatitis C Ab+ but RNA -.   4. 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 Multiple peripheral emboli. Right finger and discoloration of left 3rd toe and multiple peripheral emboli  6. FORD related to sepsis- resolved  7. Embolic stroke (small ASD with Left to right shunt)  8. Klebsiella pneumoniae UTI treated. ESBL E.coli urinary colonization  9. Severe anemia multifactorial. Has baseline anemia, exacerbated by chronic disease  10. Polysubstance abuse  11. Splenomegaly   12. Organized pericardial effusion      Recommendations:  1. Nafcillin  2. Biktarvy  3. Concern for multiple small vessel infarct vs developing abscess on the MRI, repeat MRI to better assess the pathology, will make final plan on IV Antibiotics based on repeat imaging, was supposed to be done on 8/ 18 ( 6 weeks )   4. Will need HIV follow up after discharge    Bibi Amador MD    Interval History    Lying in the bed,  having breakfast. Pain controlled, tolerating antibiotics without side effects.    Antimicrobial therapy  7/2 Nafcillin  7/8 Biktarvy  Prior  7/4-7/7 Cipro  7/1-7/2 Vancomycin, zosyn    Physical Exam   Temp: 97.9  F (36.6  C) Temp src: Oral BP: 121/85 Pulse: 75   Resp: 16 SpO2: 98 % O2 Device: None (Room air)    Vitals:    07/13/21 0200 07/14/21 0445  07/16/21 0615   Weight: 89 kg (196 lb 3.4 oz) 84.2 kg (185 lb 10 oz) 76.8 kg (169 lb 5 oz)     Vital Signs with Ranges  Temp:  [97.7  F (36.5  C)-97.9  F (36.6  C)] 97.9  F (36.6  C)  Pulse:  [74-83] 75  Resp:  [16-18] 16  BP: (112-121)/(79-85) 121/85  SpO2:  [98 %-100 %] 98 %    GENERAL APPEARANCE: awake, alert   EYES: conjunctivae and sclerae normal  NECK: no adenopathy  RESP: lungs clear to auscultation - no rales, rhonchi or wheezes  CV: regular rates and rhythm, murmur  ABDOMEN: soft, umbilical hernia  MS: Left 3rd toe amputation  SKIN: peripheral stigmata of endocarditis with embolic phenomenon    Other:    Medications     lactated ringers 25 mL/hr at 08/13/21 1858       acetaminophen  975 mg Oral Q8H     bictegravir-emtricitabine-tenofovir  1 tablet Oral Daily     buprenorphine HCl-naloxone HCl  1 Film Sublingual 4x Daily     gabapentin  600 mg Oral TID     nafcillin  2 g Intravenous Q4H     pantoprazole  40 mg Oral QAM AC     polyethylene glycol  17 g Oral Daily     senna-docusate  1 tablet Oral BID     sertraline  50 mg Oral Daily     sodium chloride (PF)  10 mL Intracatheter Q8H     sodium chloride (PF)  10-40 mL Intracatheter Q7 Days       Data   All microbiology laboratory data reviewed.  Recent Labs   Lab Test 08/16/21  0635 08/13/21  0558 08/11/21  0614   WBC 3.9* 4.1 4.7   HGB 8.9* 8.0* 8.1*   HCT 28.9* 26.0* 26.1*   MCV 85 86 85   * 548* 564*     Recent Labs   Lab Test 08/16/21  0635 08/13/21  0558 08/11/21  0614   CR 0.60 0.69 0.59     No lab results found.  Recent Labs   Lab Test 07/10/21  2243 07/09/21  0454 07/09/21  0415 07/08/21  1417 07/08/21  0500 07/07/21  0450 07/06/21  0500 07/05/21  1232 07/05/21  1104   CULT >100,000 colonies/mL  Escherichia coli ESBL  ESBL (extended beta lactamase) producing organisms require contact precautions.  *  Critical Value/Significant Value called to and read back by  Luanne Bettencourt RN @ [phone] 7/13/21 TM.   No growth No growth No growth No growth No  growth  No growth No growth  No growth Cultured on the 2nd day of incubation:  Staphylococcus epidermidis  *  Critical Value/Significant Value, preliminary result only, called to and read back by  IVETT WARE RN 07/06/21 1258 EH.    Cultured on the 5th day of incubation:  Staphylococcus aureus  Susceptibility testing done on previous specimen  *  Critical Value/Significant Value called to and read back by  Cherelle Smith RN [phone] 7/11/21 AM   Cultured on the 2nd day of incubation:  Staphylococcus aureus  Susceptibility testing done on previous specimen  *  Critical Value/Significant Value, preliminary result only, called to and read back by  Sin Jack RN at 0448 7/7/21 hg

## 2021-08-16 NOTE — PLAN OF CARE
8/15/2021 [id number]   A&O x4, flat affect, calm & cooperative   BEHAVIOR & AGGRESSION TOOL COLOR: Green, no aggression no inappropriate behaviors.    CIWA SCORE: NA    VSS on RA   SBA with GB/W. Minimal weight bearing on left foot. Post-op boot when out out of bed. BLE elevated.   Pt reports pain in mouth managed with ice and scheduled tylenol  PRN atarax   DIET:soft, tolerating    Bowel/ Bladder;  Continent, up to BR   IV/ lines:Right PICC SL with q4h Nafcillin   ABNL LABS/BG: Blood antibody screen positive.   TELEMETRY RHYTHM: SR  SKIN: Scattered bruises and scabs. L foot dressing  changed. Right great toe and heel dressings changed,.  TESTS/PROCEDURES:  Tooth extraction 8/13, CT head and neck completed 8/14, recommend repeat MRI 8/17,  if no improvement plan for surgical interventions.  D/C DATE: Pending  Discharge Barriers: Continue on antibiotics until 8/18/21  OTHER IMPORTANT INFO: Contact precautions maintained. cardiothoracic surgery, neuro surgery following. neurology signed off.

## 2021-08-16 NOTE — PROGRESS NOTES
[facility]    Medicine Progress Note - Hospitalist Service       Date of Admission:  7/1/2021    Assessment & Plan         41 year old female with h/o untreated HIV, hepatitis C who was admitted on 7/1/2021 for MSSA mitral valve endocarditis resulting in septic shock complicated by embolic left parietal lobe infarctions and multiple peripheral emboli, and initially with encephalopathy, and shock.  She was intubated in ED on 7/1/2021 subsequently extubated 7/11/2021.  Blood cultures turned positive on hospital day 1 with MSSA.  Urine culture was positive for Klebsiella.  She was treated initially with ceftriaxone and vancomycin, and has since been narrowed to nafcillin per ID.     MSSA mitral valve endocarditis  Septic shock, resolved  Probable myopericarditis  Small pericardial effusion  *JAE 7/5 noted with small ASD, mod to large mobile vegetation (14mm long, 6mm wide) of the P2 segment of the mitral leaflet. No valve perforation; mod MR  *blood cultures on admission with MSSA; blood cultures have been negative since 7/6.   *ID following; antibiotic has been narrowed down to Nafcillin, 6 weeks of antibiotic, completion date 8/18/2021.  *limited ECHO repeated 7/15 to reassess pericardial fluid 7/15 per cardiology and noted:  organized effusion posteriorly, measuring 1.2-1.5cm, no evidence of tamponade; small mobile vegetation noted attached to the mitral leaflet, mod to severe MR; compared to 7/8/21, effusion size is probably stable, Degree of MR is worse, Vegetation is smaller.  Cardiology signed off 7/15.   *PICC in place  -- given pt's stability and cooperativity, I am consulting CT surgery today to do any appropriate work up while patient is in hospital and then arrange for follow up if that is that plan.  -- brain MRI should be ordered to rule out mycotic aneurysms or brain abscesses per CT surg  -- JAE (no vegetations) and Cor Angio (nomral coronaries) completed 08/12  -per  cardiothoracic surgery reconsult 8/14 felt she no longer has an urgent surgical indication,given absence of vegetations and only moderate MR on ECHO 8/12/2021.She may require mitral valve repair/replacement in the future if she has progressive MR, but this would be better addressed after she has undergone treatment for substance abuse and has demonstrated sobriety in the out-patient setting. She should follow up with cardiology for medical management and surveillance of MR and ASD, and can be referred back to CV surgery as needed.   - Antibiotic plan per ID( length same as previously planned or other given possibility of brain abscess)     Ischemic CVA, embolic stroke due to infective endocarditis  *CT head on admission notable for new acute to subacute PCA territory ischemic stroke  *MRI 7/3 noted with progressive, multifocal, acute infarcts without MR evidence for hemorrhage or midline shift  *Patient does have small left-to-right shunt on JAE, but most likely from embolic spread from endocarditis  *was evaluated by neurology, to continue antibiotics; per neuro to avoid anticoagulation (except DVT prophylaxis dose)     Abnormal MRI  Possible brain abscess  * MRI 8/10/2021 showed- Multiple enhancing subacute infarcts with evidence of small volume hemorrhage, largest at the junction of the left parietal and occipital lobes which demonstrates peripheral enhancement and worsening vasogenic edema. Developing abscess(es) related to septic emboli cannot be excluded  - Neurology was reconsulted 8/14, recommended to screen for mycotic aneurysm   * CTA head 8/14 -showed Complex peripherally enhancing fluid collection in the paramedian left parietal lobepatent arteries of head & neck. No evidence for dissection.  and CT head today & neurosurgery consult  * Ct head w/o contrast 8/14 showed no significant change in the complex peripherally enhancing fluid collection in the paramedian left parietal lobe with surrounding  vasogenic edema. Atypical for bland evolving subacute ischemic infarct. Less likely possibility for evolving subacute hematoma, consider tissue sampling.Mild local mass effect in the left parieto-occipital region with minimal narrowing of the occipital horn of the left lateral ventricle.No midline shift/herniation  - pt remains asymptomatic  - neurosurgery consulted 8/14th appreciated. As the pt has no neuro symptoms they have recommended repeating the brain MRI on 8/17 & if potential abscesses have not improved, then they will likely recommend surgical intervention for the possible brain abscess.     Infected Tooth  S/p removal of all remaining teeth on 8/13  Assessment: OMFS was consulted for multiple infected tooth  - S/p Removal of all remaining teeth including 2, 3, 4, 5, 6, 7, 8, 9, 10, 11, 12, 13, 19, 20, 21, 22, 23, 24, 25, 26, 28, 29, 30  - on full liquid diet - advanced to mechanical /dental soft diet on 8/15     Osteomyelitis left 3rd toe s/p amputation 8/7  Necrotic wounds to Lt great toes, Lt 3rd toe and Lt lateral Heel, Rt great toe: 2/2 thrombi.  -- Podiatry took to OR 8/7 for amputation. Orthotist following up.   -- continue wound cares for other digits     Normocytic anemia, likey 2/2 chronic disease  Chronic anemia  Baseline hemoglobin approximately 7.  Likely related to chronic disease, untreated HIV.  - Hgb 7.4 yesterday -> 8.1 today  - B12 and folate -> wnl  - consider transfusion for Hgb < 7 (or close)  - low nl ferritin- on po ferrous gluconate  - CBC on 8/16     Left knee pain 2/2 OA: pt reports history of pain in that knee with activity but pain now is more acute. ROM okay. There is some warmth and swelling there.   - In light of the MSSA bacteremia, got XR and called ortho for effusion  - Per ortho no indication for intervention, though they did order a brace. Appreciate assistance. There are following EOD.  - PT/OT     IV drug use  Polysubstance use disorder  Opiate use disorder  *Chem dep  consulted 7/15 but chemical dependency did not feel she is appropriate for CD assessment at this time and recommended consultation towards the end of her antibiotic course.  Reconsult CD once she is close to finishing her IV antibiotic.  *Was using IV dilaudid as well as Percocet p.o. earlier this stay pretty extensively.  Attempts made to wean off IV narcotics and taper down po narcotics though patient felt she was going through withdrawal. Psych consulted and recommended suboxone though patient declined. Psych recommended rapid taper of methadone instead.   *completed methadone taper of 30 mg on 7/21, 20 mg 7/22 and then 10 mg 7/23, then stop  -started suboxone on 7/27 - appreciate psych/addiction medicine assistance who will help with titration  - now on suboxone BID and doing very well. Reports no cravings and hopes to continue this regimen outpatient.  -Plan was to reconsult CD (8/13th)once she is close to finishing her IV antibiotic, but patient is not interested in treatment. She has been through inpatient and outpatient programs and not found them helpful. Also I'm not sure she could continue suboxone in treatment and I think that is probably her best chance for sobriety.  -FYI: She has a daughter who just had twins and lives with her in-laws. Daughter and daughter's mother in law live in [place] (? - pt going to confirm) and have invited patient to live there to help with babies. She thinks this is a good idea as she'll be kept busy and she gets along well with this family. She is hoping to continue follow up with addiction medicine to continue suboxone. She feels that with suboxone and family support she will do well. Will need to have addiction med follow up with her to prescribe suboxone on discharge and help her set up outpatient follow up.  -08/12 -> Pain service recommended   1. Change Suboxone to  4 mg qid for better analgesic effect.     2. discontinue oxycodone   3. to use acetaminophen for  additional analgesic effect if needed.- can give as much as 1000 mg tid   (don't exceed 3000 mg total per day).       HIV, untreated PTA  - CD4 count greater than 500, viral load 3695  - Started Biktarvy 7/8; ID following  - Will need outpt follow up     Chronic back pain: pt notes it present ever since she was pregnant.   -tylenol  -added lidoderm patch 7/23  -increase gabapentin to 600 mg TID     Toxic/septic/metabolic encephalopathy, improved  Likely encephalopathy due to sepsis, drug use, CVA, prolonged ICU stay; had been pulling lines due to alterned mentation thought this all now appears resolved.      Pyelonephritis  Urine cultures positive for Klebsiella.  Completed course of treatment with ceftriaxone.  repeat urine cultures 7/10 with ESBL E coli but given unimpressive UA and no UTI symptoms ID suggest holding off on abx for ESBL UTI  -monitor for any symptoms     Acute hypoxemic respiratory failure, resolved  Intubated 7/1/2021.  Extubated 7/11/2021.  Likely related to septic shock from MSSA mitral valve endocarditis     FORD, resolved suspected due to sepsis  Hypokalemia  - on potassium replacement protocol  - will check BMP periodically     Chronic anemia  Baseline hemoglobin approximately 7.  Likely related to chronic disease, untreated HIV.  -Monitor hemoglobin periodically; stable around 8  - low nl ferritin- start po ferrous gluconate     Non severe malnutrition  -- Supplements      Prior hepatitis C infection  Antibody positive but RNA negative  -Noted     Restless legs: unclear if this is true restless leg syndrome or due to being uncomfortable  -continue mirapex prn     Major depressive disorder: pt is not suicidal but also doesn't seem to have much of a will to live based upon discussion 7/25.  I wonder if her underlying chronic pain which is difficult to treat as well as being in the hospital is contributing. Was seen by psych and declined any medication treatment on 7/26.  patient was on zoloft  "for years starting at age 13. She is willing to try zoloft again.   -Started zoloft 8/1 at 25mg po and titrating up to 50mg. Pt feels this is helping. Psychiatry followed up and agreed with this medication.     Diet: Mechanical/Dental Soft Diet    DVT Prophylaxis: Pneumatic Compression Devices  Neri Catheter: Not present  Central Lines: PRESENT  PICC Single Lumen 08/02/21 Left Cephalic-Site Assessment: WDL  Code Status: Full Code      Disposition Plan   Expected discharge: 08/18/2021 recommended to unclear once antibiotic plan established.    She is homeless at baseline but has a plan post discharge to live with daughter and daughter's in laws. This is in the [facility] and will allow for follow up cares.    The patient's care was discussed with the Bedside Nurse, Patient and NSG Consultant.    Ashok Triplett MD, MD  Hospitalist Service  [facility]  Securely message with the Vocera Web Console (learn more here)  Text page via [employer] Paging/Directory    Clinically Significant Risk Factors Present on Admission              ______________________________________________________________________    Interval History   Denies any new complaint  Denies Chest pain/ SOB/ cough, Denies any N&V/ bowel problems  Denies urinary problems , Denies fever/chills/rigors     4 point ROS done and neg unless mentioned     Data reviewed today: I reviewed all medications, new labs and imaging results over the last 24 hours. I personally reviewed no images or EKG's today.    Physical Exam   /85 (BP Location: Left arm)   Pulse 75   Temp 97.9  F (36.6  C) (Oral)   Resp 16   Ht 1.727 m (5' 7.99\")   Wt 76.8 kg (169 lb 5 oz)   SpO2 98%   Breastfeeding Unknown   BMI 25.75 kg/m    Gen- pleasant  lying in bed  HEENT- NAD, BO  Neck- supple, no JVD elevation, no thyromegaly  CVS- I+II+ no m/r/g  RS- CTABS  Abdo- soft, no tenderness . No g/r/r, BS+ no hepatosplenomegaly   Ext- no edema   CNS- no gross " focal deficit    Data   BMPRecent Labs   Lab 08/16/21  0635 08/15/21  0609 08/14/21  0634 08/13/21  0558 08/11/21  0614     --   --  139 139   POTASSIUM 3.8  --   --  3.8 3.9   CHLORIDE 108  --   --  111* 110*   DEREK 8.6  --   --  8.0* 8.3*   CO2 28  --   --  28 27   BUN 11  --   --  10 10   CR 0.60  --   --  0.69 0.59   * 80 81 95 96     CBC  Recent Labs   Lab 08/16/21  0635 08/13/21  0558 08/11/21 0614   WBC 3.9* 4.1 4.7   RBC 3.39* 3.01* 3.07*   HGB 8.9* 8.0* 8.1*   HCT 28.9* 26.0* 26.1*   MCV 85 86 85   MCH 26.3* 26.6 26.4*   MCHC 30.8* 30.8* 31.0*   RDW 26.7* 27.5* 27.7*   * 548* 564*     INR  Recent Labs   Lab 08/11/21 0614   INR 1.05     LFTsNo lab results found in last 7 days.   PANCNo lab results found in last 7 days.

## 2021-08-16 NOTE — PROGRESS NOTES
"[facility]    Neurosurgery Progress Note    Date of Service (when I saw the patient): 08/16/2021     Assessment & Plan   Manas Hernandez is a 41 year old female who was admitted on 7/1/2021. She was admitted with a history of HIV, HepC and admitted 7/1/2021 for MSSA endocarditis. She was intubated for 10 days. Initial brain imaging showed what was felt at that time to be consistent with infarct, but now with concern for evolving abscess. She has been treated with IV antibiotics. ID is following. Today she was seen lying in bed. She has no complaints. She denies headache, dizziness, nausea/vomiting, and weakness.     Principal Problem:    Altered mental status, unspecified altered mental status type    Plan: No change in our plan. Would recommend repeat brain MRI tomorrow to evaluate if potential brain abscesses have improved. If no improvement, would likely recommend surgical intervention.      I have discussed the following assessment and plan Dr. Brown who is in agreement with initial plan and will follow up with further consultation recommendations.    Pat Weiss CNP  [facility] Neurosurgery  [place] [address]    Tel [phone]  Pager [phone]      Interval History   Stable.    Physical Exam   Temp: 97.9  F (36.6  C) Temp src: Oral BP: 121/85 Pulse: 75   Resp: 16 SpO2: 98 % O2 Device: None (Room air)    Vitals:    07/13/21 0200 07/14/21 0445 07/16/21 0615   Weight: 196 lb 3.4 oz (89 kg) 185 lb 10 oz (84.2 kg) 169 lb 5 oz (76.8 kg)     Vital Signs with Ranges  Temp:  [97.7  F (36.5  C)-97.9  F (36.6  C)] 97.9  F (36.6  C)  Pulse:  [74-83] 75  Resp:  [16-18] 16  BP: (112-121)/(79-85) 121/85  SpO2:  [98 %-100 %] 98 %  I/O last 3 completed shifts:  In: 350 [P.O.:350]  Out: -      , Blood pressure 121/85, pulse 75, temperature 97.9  F (36.6  C), temperature source Oral, resp. rate 16, height 5' 7.99\" " (1.727 m), weight 169 lb 5 oz (76.8 kg), SpO2 98 %, unknown if currently breastfeeding.  169 lbs 5.01 oz  HEENT:  Normocephalic.  PERRLA.    Heart:  No peripheral edema  Lungs:  No SOB  Skin:  Warm and dry, good capillary refill.  Extremities:  Good radial and dorsalis pedis pulses bilaterally, no edema, cyanosis or clubbing.    NEUROLOGICAL EXAMINATION:   Mental status:  Alert and Oriented x 3, speech is fluent.  Motor:  Strength is 5/5 throughout the upper and lower extremities    Medications     lactated ringers 25 mL/hr at 08/13/21 1858       bictegravir-emtricitabine-tenofovir  1 tablet Oral Daily     buprenorphine HCl-naloxone HCl  1 Film Sublingual 4x Daily     gabapentin  600 mg Oral TID     nafcillin  2 g Intravenous Q4H     pantoprazole  40 mg Oral QAM AC     polyethylene glycol  17 g Oral Daily     senna-docusate  1 tablet Oral BID     sertraline  50 mg Oral Daily     sodium chloride (PF)  10 mL Intracatheter Q8H     sodium chloride (PF)  10-40 mL Intracatheter Q7 Days       Data     CBC RESULTS:   Recent Labs   Lab Test 08/16/21  0635   WBC 3.9*   RBC 3.39*   HGB 8.9*   HCT 28.9*   MCV 85   MCH 26.3*   MCHC 30.8*   RDW 26.7*   *     Basic Metabolic Panel:  Lab Results   Component Value Date     08/16/2021     07/10/2021      Lab Results   Component Value Date    POTASSIUM 3.8 08/16/2021    POTASSIUM 3.8 07/10/2021     Lab Results   Component Value Date    CHLORIDE 108 08/16/2021    CHLORIDE 108 07/10/2021     Lab Results   Component Value Date    DEREK 8.6 08/16/2021    DEREK 7.4 07/10/2021     Lab Results   Component Value Date    CO2 28 08/16/2021    CO2 27 07/10/2021     Lab Results   Component Value Date    BUN 11 08/16/2021    BUN 18 07/10/2021     Lab Results   Component Value Date    CR 0.60 08/16/2021    CR 0.69 07/10/2021     Lab Results   Component Value Date     08/16/2021     07/10/2021     INR:  Lab Results   Component Value Date    INR 1.05 08/11/2021    INR  1.23 07/01/2021

## 2021-08-17 ENCOUNTER — APPOINTMENT (OUTPATIENT)
Dept: MRI IMAGING | Facility: CLINIC | Age: 41
End: 2021-08-17
Attending: PHYSICIAN ASSISTANT
Payer: MEDICAID

## 2021-08-17 ENCOUNTER — APPOINTMENT (OUTPATIENT)
Dept: PHYSICAL THERAPY | Facility: CLINIC | Age: 41
End: 2021-08-17
Payer: MEDICAID

## 2021-08-17 LAB
ANION GAP SERPL CALCULATED.3IONS-SCNC: 4 MMOL/L (ref 3–14)
BUN SERPL-MCNC: 12 MG/DL (ref 7–30)
CALCIUM SERPL-MCNC: 8.9 MG/DL (ref 8.5–10.1)
CHLORIDE BLD-SCNC: 109 MMOL/L (ref 94–109)
CO2 SERPL-SCNC: 26 MMOL/L (ref 20–32)
CREAT SERPL-MCNC: 0.62 MG/DL (ref 0.52–1.04)
ERYTHROCYTE [DISTWIDTH] IN BLOOD BY AUTOMATED COUNT: 26.5 % (ref 10–15)
GFR SERPL CREATININE-BSD FRML MDRD: >90 ML/MIN/1.73M2
GLUCOSE BLD-MCNC: 98 MG/DL (ref 70–99)
HCT VFR BLD AUTO: 30.3 % (ref 35–47)
HGB BLD-MCNC: 9.2 G/DL (ref 11.7–15.7)
MCH RBC QN AUTO: 26.1 PG (ref 26.5–33)
MCHC RBC AUTO-ENTMCNC: 30.4 G/DL (ref 31.5–36.5)
MCV RBC AUTO: 86 FL (ref 78–100)
PLATELET # BLD AUTO: 623 10E3/UL (ref 150–450)
POTASSIUM BLD-SCNC: 3.9 MMOL/L (ref 3.4–5.3)
RBC # BLD AUTO: 3.52 10E6/UL (ref 3.8–5.2)
SODIUM SERPL-SCNC: 139 MMOL/L (ref 133–144)
WBC # BLD AUTO: 4.3 10E3/UL (ref 4–11)

## 2021-08-17 PROCEDURE — 120N000001 HC R&B MED SURG/OB

## 2021-08-17 PROCEDURE — 97110 THERAPEUTIC EXERCISES: CPT | Mod: GP | Performed by: PHYSICAL THERAPIST

## 2021-08-17 PROCEDURE — 250N000013 HC RX MED GY IP 250 OP 250 PS 637: Performed by: PODIATRIST

## 2021-08-17 PROCEDURE — 99232 SBSQ HOSP IP/OBS MODERATE 35: CPT | Mod: 95 | Performed by: INTERNAL MEDICINE

## 2021-08-17 PROCEDURE — 250N000013 HC RX MED GY IP 250 OP 250 PS 637: Performed by: HOSPITALIST

## 2021-08-17 PROCEDURE — G0463 HOSPITAL OUTPT CLINIC VISIT: HCPCS | Mod: 25

## 2021-08-17 PROCEDURE — 97602 WOUND(S) CARE NON-SELECTIVE: CPT

## 2021-08-17 PROCEDURE — A9585 GADOBUTROL INJECTION: HCPCS | Performed by: INTERNAL MEDICINE

## 2021-08-17 PROCEDURE — 99207 PR CDG-MDM COMPONENT: MEETS MODERATE - DOWN CODED: CPT | Performed by: INTERNAL MEDICINE

## 2021-08-17 PROCEDURE — 250N000013 HC RX MED GY IP 250 OP 250 PS 637: Performed by: DENTIST

## 2021-08-17 PROCEDURE — 80048 BASIC METABOLIC PNL TOTAL CA: CPT | Performed by: INTERNAL MEDICINE

## 2021-08-17 PROCEDURE — 85027 COMPLETE CBC AUTOMATED: CPT | Performed by: INTERNAL MEDICINE

## 2021-08-17 PROCEDURE — 250N000009 HC RX 250: Performed by: PODIATRIST

## 2021-08-17 PROCEDURE — 70553 MRI BRAIN STEM W/O & W/DYE: CPT

## 2021-08-17 PROCEDURE — 97116 GAIT TRAINING THERAPY: CPT | Mod: GP | Performed by: PHYSICAL THERAPIST

## 2021-08-17 PROCEDURE — 255N000002 HC RX 255 OP 636: Performed by: INTERNAL MEDICINE

## 2021-08-17 PROCEDURE — 250N000013 HC RX MED GY IP 250 OP 250 PS 637: Performed by: STUDENT IN AN ORGANIZED HEALTH CARE EDUCATION/TRAINING PROGRAM

## 2021-08-17 PROCEDURE — 99232 SBSQ HOSP IP/OBS MODERATE 35: CPT | Performed by: INTERNAL MEDICINE

## 2021-08-17 RX ORDER — GADOBUTROL 604.72 MG/ML
8 INJECTION INTRAVENOUS ONCE
Status: COMPLETED | OUTPATIENT
Start: 2021-08-17 | End: 2021-08-17

## 2021-08-17 RX ADMIN — ACETAMINOPHEN 650 MG: 325 TABLET, FILM COATED ORAL at 14:34

## 2021-08-17 RX ADMIN — SERTRALINE HYDROCHLORIDE 50 MG: 50 TABLET ORAL at 09:16

## 2021-08-17 RX ADMIN — GABAPENTIN 600 MG: 300 CAPSULE ORAL at 16:45

## 2021-08-17 RX ADMIN — NAFCILLIN SODIUM 2 G: 2 INJECTION, POWDER, LYOPHILIZED, FOR SOLUTION INTRAMUSCULAR; INTRAVENOUS at 06:48

## 2021-08-17 RX ADMIN — NAFCILLIN SODIUM 2 G: 2 INJECTION, POWDER, LYOPHILIZED, FOR SOLUTION INTRAMUSCULAR; INTRAVENOUS at 18:29

## 2021-08-17 RX ADMIN — QUETIAPINE FUMARATE 25 MG: 25 TABLET ORAL at 22:13

## 2021-08-17 RX ADMIN — NAFCILLIN SODIUM 2 G: 2 INJECTION, POWDER, LYOPHILIZED, FOR SOLUTION INTRAMUSCULAR; INTRAVENOUS at 13:45

## 2021-08-17 RX ADMIN — BUPRENORPHINE HYDROCHLORIDE, NALOXONE HYDROCHLORIDE 1 FILM: 4; 1 FILM, SOLUBLE BUCCAL; SUBLINGUAL at 18:30

## 2021-08-17 RX ADMIN — GABAPENTIN 600 MG: 300 CAPSULE ORAL at 09:16

## 2021-08-17 RX ADMIN — NAFCILLIN SODIUM 2 G: 2 INJECTION, POWDER, LYOPHILIZED, FOR SOLUTION INTRAMUSCULAR; INTRAVENOUS at 09:49

## 2021-08-17 RX ADMIN — GADOBUTROL 8 ML: 604.72 INJECTION INTRAVENOUS at 05:49

## 2021-08-17 RX ADMIN — ACETAMINOPHEN 650 MG: 325 TABLET, FILM COATED ORAL at 22:13

## 2021-08-17 RX ADMIN — BUPRENORPHINE HYDROCHLORIDE, NALOXONE HYDROCHLORIDE 1 FILM: 4; 1 FILM, SOLUBLE BUCCAL; SUBLINGUAL at 09:16

## 2021-08-17 RX ADMIN — BICTEGRAVIR SODIUM, EMTRICITABINE, AND TENOFOVIR ALAFENAMIDE FUMARATE 1 TABLET: 50; 200; 25 TABLET ORAL at 09:15

## 2021-08-17 RX ADMIN — NAFCILLIN SODIUM 2 G: 2 INJECTION, POWDER, LYOPHILIZED, FOR SOLUTION INTRAMUSCULAR; INTRAVENOUS at 02:15

## 2021-08-17 RX ADMIN — NAFCILLIN SODIUM 2 G: 2 INJECTION, POWDER, LYOPHILIZED, FOR SOLUTION INTRAMUSCULAR; INTRAVENOUS at 22:00

## 2021-08-17 RX ADMIN — BUPRENORPHINE HYDROCHLORIDE, NALOXONE HYDROCHLORIDE 1 FILM: 4; 1 FILM, SOLUBLE BUCCAL; SUBLINGUAL at 13:45

## 2021-08-17 RX ADMIN — BUPRENORPHINE HYDROCHLORIDE, NALOXONE HYDROCHLORIDE 1 FILM: 4; 1 FILM, SOLUBLE BUCCAL; SUBLINGUAL at 22:00

## 2021-08-17 RX ADMIN — ACETAMINOPHEN 650 MG: 325 TABLET, FILM COATED ORAL at 09:16

## 2021-08-17 RX ADMIN — GABAPENTIN 600 MG: 300 CAPSULE ORAL at 22:00

## 2021-08-17 RX ADMIN — PANTOPRAZOLE SODIUM 40 MG: 40 TABLET, DELAYED RELEASE ORAL at 09:16

## 2021-08-17 ASSESSMENT — ACTIVITIES OF DAILY LIVING (ADL)
ADLS_ACUITY_SCORE: 22
ADLS_ACUITY_SCORE: 22
ADLS_ACUITY_SCORE: 20
ADLS_ACUITY_SCORE: 23
ADLS_ACUITY_SCORE: 20
ADLS_ACUITY_SCORE: 20

## 2021-08-17 NOTE — PROGRESS NOTES
Care Management Follow Up    Length of Stay (days): 47    Expected Discharge Date: 08/19/2021     Concerns to be Addressed: adjustment to diagnosis/illness, substance/tobacco abuse/use (mirtha need therapy initiated to determine disposition)     Patient plan of care discussed at interdisciplinary rounds: Yes    Anticipated Discharge Disposition:  8/31/21 following 8 weeks of IV ABX     Anticipated Discharge Services:  Suboxone clinic, Outpatient CD Treatment  Anticipated Discharge DME:        Call received from Dr. Leo [phone], psychiatry regarding outpatient planning for Suboxone follow up and outpatient CD treament options.  He states patient is agreeable to and states she has transportation to [facility] ( [facility]) scheduling [phone]. Left Message for [facility] Suboxone prog coordinator re referral [phone].  Dr. Leo noted there are two Suboxone providers there-Dr Dionte Kauffman and Dr. Rubio Mcpherson. He also states patient is agreeable to attending outpatient CD treatment at [facility] outpatient CD program in [place]Intake contact [facility] [phone]. Left Message for [facility] to call back to start the referral process. He also stated patient told him she plans to go to her Mother in law's home rather than her daughters at discharge.    Referrals Placed by CM/SW:  See above note  Private pay costs discussed: Not applicable        Sandie Au RN

## 2021-08-17 NOTE — PLAN OF CARE
Alert and oriented x 4. Able to express needs. PRN Atarax for pain in addition to scheduled suboxone. Continues with IV Abx.Per MRI, tentative plan for MRI at 0500. Will continue to monitor.

## 2021-08-17 NOTE — PLAN OF CARE
VSS on RA. Mouth pain managed with tylenol and ice. MRI completed today, Neurosurgery following. L toe dressing CDI. Tele NSR. Will continue to monitor.

## 2021-08-17 NOTE — PLAN OF CARE
Pt a/o up with sba walker and gait belt.tele nsr,vss,iv antibiotics given. Mri done with no significant change in brain abcess. Dressings c/d/I boot on voiding and eatting well. They nwill watch nabcess and await placement

## 2021-08-17 NOTE — PROGRESS NOTES
[facility]  WO Nurse Inpatient Wound Assessment     Reason for consultation: Evaluate and treat:  Lt great toes, Lt 3rd toe and Lt lateral heel, rt great toe    Assessment  Left great toe: small stable eschar  Left 3rd toe: s/p partial amp by Podiatry 8/7/21, sutured incision remains clean, dry, intact  Left lateral heel: resolving area of boggy ecchymosis and prior blister, epidermis remains intact  Right great toe: small yellow/pink open wound bed with scant eschar medially.     All wounds r/t to ischemic emboli. No local s/s infection. Plan of care updated below.  Podiatry has signed off for left foot.       Treatment Plan  Right great toe: every other day and prn:  1.  Cleanse with MicroKlenz  2.  Swab periwound with skin prep, let dry  3.  Apply pea-size amount Iodosorb gel to wound  4.  Cover with gauze pad, secure with roll gauze and/or tape    Right lateral heel: keep clean and moisturized.  Ok to cover with foam dressing for padding/protection prn.  Ensure minimal pressure to area.     Left foot: Keep foot and dressing dry.  Change prn if loose/soiled, applying betadine to great toe, adaptic to 3rd toe incision, then gauze pad, Kerlix, light ACE wrap.    Orders reviewed  Recommended provider order: n/a  WO Nurse follow-up plan: Weekly and prn   Nursing to notify the Provider(s) and re-consult the WOC Nurse if wound(s) deteriorates or new skin concern.    Patient History  According to provider note(s):    Manas Hernandez is a 41 year old female with h/o untreated HIV, hepatitis C who was admitted on 7/1/2021 for MSSA mitral valve endocarditis resulting in septic shock complicated by embolic left parietal lobe infarctions and multiple peripheral emboli.      Objective Data  Containment of urine/stool: Incontinence protocol    Active Diet Order:  Orders Placed This Encounter      Mechanical/Dental Soft Diet    Output:   No intake/output data recorded.    Risk Assessment:   Sensory  Perception: 4-->no impairment  Moisture: 4-->rarely moist  Activity: 3-->walks occasionally  Mobility: 3-->slightly limited  Nutrition: 3-->adequate  Friction and Shear: 3-->no apparent problem  Jeffy Score: 20                          Labs:   Recent Labs   Lab 08/17/21  0608 08/11/21  0614   HGB 9.2* 8.1*   INR  --  1.05   WBC 4.3 4.7       Physical Exam  Skin inspection: Bilateral feet   Wound History: all wounds r/t to ischemic emboli    #1-#2: Wound Location:  Lt great toe and Lt 3rd toe  Date of last photo: 8-17-21 8-11-21          #1: Lt great toe  Size: approx 1 x 1 x 0+cm, blackish firm developing eschar  Tunneling: NA  Undermining: NA  Palpation of the wound bed: firm  Periwound skin: intact, no erythema  Temperature: warm   Drainage: none  Odor: none  Pain: denies    #2: Lt 3rd  toe  S/p partial amp 8-7-21, incision intact, see pic above     #3:  Wound Location: Rt great tip of  toe  WO photo:8-17-21 8-11-21            Size: approx 0.4 x 1 x 0.2+cm, pink-yellow moist tissue   Tunneling: n/a  Undermining: n/a  Palpation of the wound bed: normal  Periwound skin: neighboring small wound 0.3 x 0.5 x 0.2+cm with residual eschar; plantar toe adherent scabbing   Temperature: normal  Drainage: small serosang   Odor: none  Pain: denies     #4:  Wound Location: Rt lateral heel   WO photo: 8-11-21 8/4/21      Wound bed: Intact skin with resolving purplish discoloration   Size: approx 3 x 2cm x skin intact    Tunneling: NA  Undermining: NA  Palpation of the wound bed: normal  Periwound skin: intact, no erythema, skin peeling and dry  Temperature: normal  Drainage:  none  Odor: none  Pain: Denies     Interventions  Current support surface: atmos air  Current off-loading measures: pillows and repositions self; surgical shoe for left foot  Visual inspection of wound(s) completed  Wound Care: done per plan of care  Supplies: In pt room   Education provided: Discussed with patient and nurse    Erika  Evaristo RN, CWOCN

## 2021-08-17 NOTE — PROGRESS NOTES
Addiction Medicine Follow Up      Addendum:    Infectious Disease consultant is now saying that patient should have two more weeks of IV antibioitcs, due to concern about possible CNS abscess.  Her final day of treatment will now be 9/1/21.       Will still begin setting up the discharge plans below, because there may be waiting list at the treatment program and the clinic.       PS      Tele-Visit Details    Type of service:  Video Visit    Time Service Began (time 1st connected with pt): 11:59 am    Time Service Ended (time completely finished with pt):12:15 pm    Originating Location (pt. Location): Patient Rm, [facility]    Distant Location (provider location): [facility]    Reason for Televisit: COVID 19    Mode of Communication:  Video Conference via Twiceom    Physician has received verbal consent for a video visit from the patient? Yes        Principal Problem:    MSSA Endocarditis with Mitral Valve Vegetations   Active Problems:    Anemia    Acute pyelonephritis -- Klebsiella    Altered mental status, unspecified altered mental status type    Embolic CVA Left Parietal  (septic emboli) -- 7/1/21    Septic encephalopathy    Human immunodeficiency virus (HIV) disease (H)    Homeless    Septic shock (H)    Polysubstance abuse (H)    Osteomyelitis of third toe of left foot (H)    Ischemic ulcer of toe of left foot with necrosis of bone (H)    Endocarditis of mitral valve      Subjective  Chief complaint: No change in infarct/abscess in brain    HPI: Neurosurgeon told patient that there was no need for any surgery right now, but wants her to follow up with repeat MRI in 1 wk.   CT surgery has also decided that no valve replacement is needed at this time.   Therefore, it is likely she will be discharged after she completes her antiibiotics tomorrow.       Patient has been on suoxone 4 mg qid, which she reports has helped with her pain - not sure whether it has been  "more effective than the 8 mg bid.  Still having some pain in foot where had 3rd toe amputation.          ROS:   Resp:  No cough or SOB   CV:  No sx of TURNER or edema   GI:  No N, V or diarrhea   Psych:  Mood improved, but sad today because she reports that on of her  twin grandchildren just  ? SIDS        Objective    /47 (BP Location: Left arm)   Pulse 87   Temp 98.2  F (36.8  C) (Oral)   Resp 18   Ht 1.727 m (5' 7.99\")   Wt 76.8 kg (169 lb 5 oz)   SpO2 100%   Breastfeeding Unknown   BMI 25.75 kg/m      Physical Exam per hospitalist:  Lungs clear.  Heart regular, No murmur noted;  abd non-tender.       Neuro:  Alert, oriented to person, place, time and situation.   Psych:     Cooperative     Mood:  dysthymic               Affect:  Congruent and appropriate due to grandauroman's death               Thought content:  Denies SI, HI or hallucinations               Thought processes:  Linear, relevant                Speech:  Normal                Motor:  Normal                Insight/judgement:  fair/  fair    Results  Results for orders placed or performed during the hospital encounter of 21 (from the past 24 hour(s))   MR Brain w/o & w Contrast    Narrative    EXAM: MR BRAIN W/O and W CONTRAST  LOCATION: [facility]  DATE/TIME: 2021 5:11 AM    INDICATION: Brain abscess.  COMPARISON: MRI brain 8/10/2021, head CT 2021.  CONTRAST: 8 mL Gadavist.  TECHNIQUE: Routine multiplanar multisequence head MRI without and with intravenous contrast.    FINDINGS:  INTRACRANIAL CONTENTS: Again seen is an irregular rim-enhancing process in the left parietal/occipital lobe demonstrating internal restricted diffusion. The rind of enhancement is slightly more conspicuous but the overall size is unchanged. Stable   associated edema. Stable enhancing foci in the bilateral parietal lobes. Stable irregular enhancement at the left temporal/occipital junction along the superior " margin of the tentorium. No definite new zone of enhancement. These findings are suspicious   for septic emboli and resultant abscesses. No mass, acute hemorrhage, or extra-axial fluid collections. No hydrocephalus. Normal position of the cerebellar tonsils. No pathologic contrast enhancement.    SELLA: No abnormality accounting for technique.    OSSEOUS STRUCTURES/SOFT TISSUES: Normal marrow signal. The major intracranial vascular flow voids are maintained.     ORBITS: No abnormality accounting for technique.     SINUSES/MASTOIDS: No paranasal sinus mucosal disease. No middle ear or mastoid effusion.       Impression    IMPRESSION:  1.  Overall, no definite significant interval change when compared to MRI dated 8/10/2021.         Basic metabolic panel   Result Value Ref Range    Sodium 139 133 - 144 mmol/L    Potassium 3.9 3.4 - 5.3 mmol/L    Chloride 109 94 - 109 mmol/L    Carbon Dioxide (CO2) 26 20 - 32 mmol/L    Anion Gap 4 3 - 14 mmol/L    Urea Nitrogen 12 7 - 30 mg/dL    Creatinine 0.62 0.52 - 1.04 mg/dL    Calcium 8.9 8.5 - 10.1 mg/dL    Glucose 98 70 - 99 mg/dL    GFR Estimate >90 >60 mL/min/1.73m2   CBC with platelets   Result Value Ref Range    WBC Count 4.3 4.0 - 11.0 10e3/uL    RBC Count 3.52 (L) 3.80 - 5.20 10e6/uL    Hemoglobin 9.2 (L) 11.7 - 15.7 g/dL    Hematocrit 30.3 (L) 35.0 - 47.0 %    MCV 86 78 - 100 fL    MCH 26.1 (L) 26.5 - 33.0 pg    MCHC 30.4 (L) 31.5 - 36.5 g/dL    RDW 26.5 (H) 10.0 - 15.0 %    Platelet Count 623 (H) 150 - 450 10e3/uL       Assessment and Plan:  1.  Opioid Use Disorder, severe -  On Suboxone 4 mg qid and doing well.   No sedation or constipation problems.    Wants to stay on this ttal dose, ut would prefer to go back to 8 mg bid when she leaves, as it is easier to take.   Patient says that she has not been getting four doses a day, ut MAR shows all doses given in last 3 days.   Dose may have been missed on 8/13/21.           Patient will need an OP Suboxone provider and  wants to go to [facility], where she has been before.  I know of two Suboxone preoviders there - Dr. Mcpherson and Dr. Kauffman.   Should be seen within 1 - 2 wks after discharge.   I will write discharge prescription for Suboxone.            Also, patient agrees go to culturally specific  outpatient CD program.   There is one through [facility].  [address]   This information was given to Care Manager to make arrangements.       2.    Methamphetamine Use Disorder, severe  - no specific pharmacotherapy has proven consistently effective for this substance.   Will need to rely on psychosocial treatments.       3.    Mitral valve endocarditis with brain and peripheral emboli - likely related to use of illicit substances intravenously.   Followup MRI this am looks like infarct in rain is the same size, but again thought to have evidence of being an abscess.  However, no surgery planned and patient is relieved about that.  Complete IV antibiotics.   Followup MRIs.         Awa Noriega MD  Addiction Medicine Service  [facility]   Page me (click here for Milagros Noriega)

## 2021-08-17 NOTE — PROGRESS NOTES
[facility]    Medicine Progress Note - Hospitalist Service       Date of Admission:  7/1/2021    Assessment & Plan         41 year old female with h/o untreated HIV, hepatitis C who was admitted on 7/1/2021 for MSSA mitral valve endocarditis resulting in septic shock complicated by embolic left parietal lobe infarctions and multiple peripheral emboli, and initially with encephalopathy, and shock.  She was intubated in ED on 7/1/2021 subsequently extubated 7/11/2021.  Blood cultures turned positive on hospital day 1 with MSSA.  Urine culture was positive for Klebsiella.  She was treated initially with ceftriaxone and vancomycin, and has since been narrowed to nafcillin per ID.     MSSA mitral valve endocarditis  Septic shock, resolved  Probable myopericarditis  Small pericardial effusion  *JAE 7/5 noted with small ASD, mod to large mobile vegetation (14mm long, 6mm wide) of the P2 segment of the mitral leaflet. No valve perforation; mod MR  *blood cultures on admission with MSSA; blood cultures have been negative since 7/6.   *ID following; antibiotic has been narrowed down to Nafcillin, 6 weeks of antibiotic, completion date 8/18/2021.  No increase by 2 more weeks until 09/01/2021 due to abscess on MRI  *limited ECHO repeated 7/15 to reassess pericardial fluid 7/15 per cardiology and noted:  organized effusion posteriorly, measuring 1.2-1.5cm, no evidence of tamponade; small mobile vegetation noted attached to the mitral leaflet, mod to severe MR; compared to 7/8/21, effusion size is probably stable, Degree of MR is worse, Vegetation is smaller.  Cardiology signed off 7/15.   *PICC in place  -- given pt's stability and cooperativity, I am consulting CT surgery today to do any appropriate work up while patient is in hospital and then arrange for follow up if that is that plan.  -- brain MRI should be ordered to rule out mycotic aneurysms or brain abscesses per CT surg  -- JAE (no  vegetations) and Cor Angio (nomral coronaries) completed 08/12  -per cardiothoracic surgery reconsult 8/14 felt she no longer has an urgent surgical indication,given absence of vegetations and only moderate MR on ECHO 8/12/2021.She may require mitral valve repair/replacement in the future if she has progressive MR, but this would be better addressed after she has undergone treatment for substance abuse and has demonstrated sobriety in the out-patient setting. She should follow up with cardiology for medical management and surveillance of MR and ASD, and can be referred back to CV surgery as needed.   - Antibiotic plan per ID     Ischemic CVA, embolic stroke due to infective endocarditis  *CT head on admission notable for new acute to subacute PCA territory ischemic stroke  *MRI 7/3 noted with progressive, multifocal, acute infarcts without MR evidence for hemorrhage or midline shift  *Patient does have small left-to-right shunt on JAE, but most likely from embolic spread from endocarditis  *was evaluated by neurology, to continue antibiotics; per neuro to avoid anticoagulation (except DVT prophylaxis dose)     Abnormal MRI  Possible brain abscess  * MRI 8/10/2021 showed- Multiple enhancing subacute infarcts with evidence of small volume hemorrhage, largest at the junction of the left parietal and occipital lobes which demonstrates peripheral enhancement and worsening vasogenic edema. Developing abscess(es) related to septic emboli cannot be excluded  - Neurology was reconsulted 8/14, recommended to screen for mycotic aneurysm   * CTA head 8/14 -showed Complex peripherally enhancing fluid collection in the paramedian left parietal lobepatent arteries of head & neck. No evidence for dissection.  and CT head today & neurosurgery consult  * Ct head w/o contrast 8/14 showed no significant change in the complex peripherally enhancing fluid collection in the paramedian left parietal lobe with surrounding vasogenic edema.  Atypical for bland evolving subacute ischemic infarct. Less likely possibility for evolving subacute hematoma, consider tissue sampling.Mild local mass effect in the left parieto-occipital region with minimal narrowing of the occipital horn of the left lateral ventricle.No midline shift/herniation  - pt remains asymptomatic  - neurosurgery consulted 8/14th appreciated. As the pt has no neuro symptoms they have recommended repeating the brain MRI on 8/17-reviewed.  Extend duration of antibiotics and plan for repeat MRI in 1 week's time.    Infected Tooth  S/p removal of all remaining teeth on 8/13  Assessment: OMFS was consulted for multiple infected tooth  - S/p Removal of all remaining teeth including 2, 3, 4, 5, 6, 7, 8, 9, 10, 11, 12, 13, 19, 20, 21, 22, 23, 24, 25, 26, 28, 29, 30  - on full liquid diet - advanced to mechanical /dental soft diet on 8/15     Osteomyelitis left 3rd toe s/p amputation 8/7  Necrotic wounds to Lt great toes, Lt 3rd toe and Lt lateral Heel, Rt great toe: 2/2 thrombi.  -- Podiatry took to OR 8/7 for amputation. Orthotist following up.   -- continue wound cares for other digits     Normocytic anemia, likey 2/2 chronic disease  Chronic anemia  Baseline hemoglobin approximately 7.  Likely related to chronic disease, untreated HIV.  - Hgb 7.4 yesterday -> 8.1 today  - B12 and folate -> wnl  - consider transfusion for Hgb < 7 (or close)  - low nl ferritin- on po ferrous gluconate  - CBC on 8/16     Left knee pain 2/2 OA: pt reports history of pain in that knee with activity but pain now is more acute. ROM okay. There is some warmth and swelling there.   - In light of the MSSA bacteremia, got XR and called ortho for effusion  - Per ortho no indication for intervention, though they did order a brace. Appreciate assistance. There are following EOD.  - PT/OT     IV drug use  Polysubstance use disorder  Opiate use disorder  *Chem dep consulted 7/15 but chemical dependency did not feel she is  appropriate for CD assessment at this time and recommended consultation towards the end of her antibiotic course.  Reconsult CD once she is close to finishing her IV antibiotic.  *Was using IV dilaudid as well as Percocet p.o. earlier this stay pretty extensively.  Attempts made to wean off IV narcotics and taper down po narcotics though patient felt she was going through withdrawal. Psych consulted and recommended suboxone though patient declined. Psych recommended rapid taper of methadone instead.   *completed methadone taper of 30 mg on 7/21, 20 mg 7/22 and then 10 mg 7/23, then stop  -started suboxone on 7/27 - appreciate psych/addiction medicine assistance who will help with titration  - now on suboxone BID and doing very well. Reports no cravings and hopes to continue this regimen outpatient.  -Plan was to reconsult CD (8/13th)once she is close to finishing her IV antibiotic, but patient is not interested in treatment. She has been through inpatient and outpatient programs and not found them helpful. Also I'm not sure she could continue suboxone in treatment and I think that is probably her best chance for sobriety.  -FYI: She has a daughter who just had twins and lives with her in-laws. Daughter and daughter's mother in law live in [place] (? - pt going to confirm) and have invited patient to live there to help with babies. She thinks this is a good idea as she'll be kept busy and she gets along well with this family. She is hoping to continue follow up with addiction medicine to continue suboxone. She feels that with suboxone and family support she will do well. Will need to have addiction med follow up with her to prescribe suboxone on discharge and help her set up outpatient follow up.  -08/12 -> Pain service recommended   1. Change Suboxone to  4 mg qid for better analgesic effect.     2. discontinue oxycodone   3. to use acetaminophen for additional analgesic effect if needed.- can give as much as 1000  mg tid   (don't exceed 3000 mg total per day).       HIV, untreated PTA  - CD4 count greater than 500, viral load 3695  - Started Biktarvy 7/8; ID following  - Will need outpt follow up     Chronic back pain: pt notes it present ever since she was pregnant.   -tylenol  -added lidoderm patch 7/23  -increase gabapentin to 600 mg TID     Toxic/septic/metabolic encephalopathy, improved  Likely encephalopathy due to sepsis, drug use, CVA, prolonged ICU stay; had been pulling lines due to alterned mentation thought this all now appears resolved.      Pyelonephritis  Urine cultures positive for Klebsiella.  Completed course of treatment with ceftriaxone.  repeat urine cultures 7/10 with ESBL E coli but given unimpressive UA and no UTI symptoms ID suggest holding off on abx for ESBL UTI  -monitor for any symptoms     Acute hypoxemic respiratory failure, resolved  Intubated 7/1/2021.  Extubated 7/11/2021.  Likely related to septic shock from MSSA mitral valve endocarditis     FORD, resolved suspected due to sepsis  Hypokalemia  - on potassium replacement protocol  - will check BMP periodically     Chronic anemia  Baseline hemoglobin approximately 7.  Likely related to chronic disease, untreated HIV.  -Monitor hemoglobin periodically; stable around 8  - low nl ferritin- start po ferrous gluconate     Non severe malnutrition  -- Supplements      Prior hepatitis C infection  Antibody positive but RNA negative  -Noted     Restless legs: unclear if this is true restless leg syndrome or due to being uncomfortable  -continue mirapex prn     Major depressive disorder: pt is not suicidal but also doesn't seem to have much of a will to live based upon discussion 7/25.  I wonder if her underlying chronic pain which is difficult to treat as well as being in the hospital is contributing. Was seen by psych and declined any medication treatment on 7/26.  patient was on zoloft for years starting at age 13. She is willing to try zoloft again.  "  -Started zoloft 8/1 at 25mg po and titrating up to 50mg. Pt feels this is helping. Psychiatry followed up and agreed with this medication.     Diet: Mechanical/Dental Soft Diet    DVT Prophylaxis: Pneumatic Compression Devices  Neri Catheter: Not present  Central Lines: PRESENT       Code Status: Full Code      Disposition Plan   Expected discharge: 09/01 if Brain abscess resolves.  recommended to unclear once antibiotic plan established.    She is homeless at baseline but has a plan post discharge to live with daughter and daughter's in laws. This is in the [facility] and will allow for follow up cares.    The patient's care was discussed with the Bedside Nurse, Patient and NSG Consultant.    Ashok Triplett MD, MD  Hospitalist Service  [facility]  Securely message with the Vocera Web Console (learn more here)  Text page via [employer] Paging/Directory    Clinically Significant Risk Factors Present on Admission              ______________________________________________________________________    Interval History   Denies any new complaint  Denies Chest pain/ SOB/ cough, Denies any N&V/ bowel problems  Denies urinary problems , Denies fever/chills/rigors     4 point ROS done and neg unless mentioned     Data reviewed today: I reviewed all medications, new labs and imaging results over the last 24 hours. I personally reviewed the MRI brain image(s) showing As below.    Physical Exam   /47 (BP Location: Left arm)   Pulse 87   Temp 98.2  F (36.8  C) (Oral)   Resp 18   Ht 1.727 m (5' 7.99\")   Wt 76.8 kg (169 lb 5 oz)   SpO2 100%   Breastfeeding Unknown   BMI 25.75 kg/m    Gen- pleasant  lying in bed  HEENT- NAD, BO  Neck- supple, no JVD elevation, no thyromegaly  CVS- I+II+ no m/r/g  RS- CTABS  Abdo- soft, no tenderness . No g/r/r, BS+ no hepatosplenomegaly   Ext- no edema   CNS- no gross focal deficit    Data   BMP  Recent Labs   Lab 08/17/21  0607 08/16/21  0635 " 08/15/21  0609 08/14/21  0634 08/13/21  0558 08/11/21  0614    138  --   --  139 139   POTASSIUM 3.9 3.8  --   --  3.8 3.9   CHLORIDE 109 108  --   --  111* 110*   DEREK 8.9 8.6  --   --  8.0* 8.3*   CO2 26 28  --   --  28 27   BUN 12 11  --   --  10 10   CR 0.62 0.60  --   --  0.69 0.59   GLC 98 100* 80 81 95 96     CBC  Recent Labs   Lab 08/17/21  0608 08/16/21  0635 08/13/21  0558 08/11/21 0614   WBC 4.3 3.9* 4.1 4.7   RBC 3.52* 3.39* 3.01* 3.07*   HGB 9.2* 8.9* 8.0* 8.1*   HCT 30.3* 28.9* 26.0* 26.1*   MCV 86 85 86 85   MCH 26.1* 26.3* 26.6 26.4*   MCHC 30.4* 30.8* 30.8* 31.0*   RDW 26.5* 26.7* 27.5* 27.7*   * 606* 548* 564*     INR  Recent Labs   Lab 08/11/21 0614   INR 1.05     LFTsNo lab results found in last 7 days.   PANCNo lab results found in last 7 days.

## 2021-08-17 NOTE — PROGRESS NOTES
[facility]    Infectious Disease Progress Note    Date of Service : 08/17/2021     Assessment :  1. S.aureus MSSA native mitral valve endocarditis with septic shock in the setting of IVDA complicated by embolic L parietal lobe infarction and multiple peripheral emboli.   (TTE shows a large mobile 1.4 cm vegetation on the posterior mitral valve leaflet with extension to left atrium and small ASD per cardiology notes , no valvular abscess and no additional valvular involvement. Pericardial effusion is small). No seeding on CT abdomen but has peripheral emboli.  2. Untreated HIV diagnosed 2018, CD4 count maintained at >500, VL 3,695cpies/ml. Genotype pending  3. Hepatitis C Ab+ but RNA -.   4. 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 Multiple peripheral emboli. Right finger and discoloration of left 3rd toe and multiple peripheral emboli  6. FORD related to sepsis- resolved  7. Embolic stroke (small ASD with Left to right shunt)  8. Klebsiella pneumoniae UTI treated. ESBL E.coli urinary colonization  9. Severe anemia multifactorial. Has baseline anemia, exacerbated by chronic disease  10. Polysubstance abuse  11. Splenomegaly   12. Organized pericardial effusion      Recommendations:  1. Nafcillin  2. Biktarvy  3. Concern for multiple small vessel infarct vs developing abscess on the MRI, repeat MRI noted, increase the antibiotics to 2 more weeks to make the total antibiotics duration of 8 weeks.   LOT now is 9/1/ 21  4. Will need HIV follow up after discharge    Bibi Amador MD    Interval History    Lying in the bed,  having breakfast. Pain controlled, tolerating antibiotics without side effects.    Antimicrobial therapy  7/2 Nafcillin  7/8 Biktarvy  Prior  7/4-7/7 Cipro  7/1-7/2 Vancomycin, zosyn    Physical Exam   Temp: 98.2  F (36.8  C) Temp src: Oral BP: 113/47 Pulse: 87   Resp: 18 SpO2: 100 % O2 Device: None (Room air)    Vitals:    07/13/21 0200 07/14/21 0445 07/16/21 0615    Weight: 89 kg (196 lb 3.4 oz) 84.2 kg (185 lb 10 oz) 76.8 kg (169 lb 5 oz)     Vital Signs with Ranges  Temp:  [97.7  F (36.5  C)-98.2  F (36.8  C)] 98.2  F (36.8  C)  Pulse:  [81-93] 87  Resp:  [16-18] 18  BP: (108-119)/(47-79) 113/47  SpO2:  [98 %-100 %] 100 %    GENERAL APPEARANCE: awake, alert   EYES: conjunctivae and sclerae normal  NECK: no adenopathy  RESP: lungs clear to auscultation - no rales, rhonchi or wheezes  CV: regular rates and rhythm, murmur  ABDOMEN: soft, umbilical hernia  MS: Left 3rd toe amputation  SKIN: peripheral stigmata of endocarditis with embolic phenomenon    Other:    Medications       bictegravir-emtricitabine-tenofovir  1 tablet Oral Daily     buprenorphine HCl-naloxone HCl  1 Film Sublingual 4x Daily     gabapentin  600 mg Oral TID     nafcillin  2 g Intravenous Q4H     pantoprazole  40 mg Oral QAM AC     polyethylene glycol  17 g Oral Daily     senna-docusate  1 tablet Oral BID     sertraline  50 mg Oral Daily     sodium chloride (PF)  10 mL Intracatheter Q8H     sodium chloride (PF)  10 mL Intracatheter Q8H     sodium chloride (PF)  10-40 mL Intracatheter Q7 Days       Data   All microbiology laboratory data reviewed.  Recent Labs   Lab Test 08/17/21  0608 08/16/21  0635 08/13/21  0558   WBC 4.3 3.9* 4.1   HGB 9.2* 8.9* 8.0*   HCT 30.3* 28.9* 26.0*   MCV 86 85 86   * 606* 548*     Recent Labs   Lab Test 08/17/21  0607 08/16/21  0635 08/13/21  0558   CR 0.62 0.60 0.69     No lab results found.  Recent Labs   Lab Test 07/10/21  2243 07/09/21  0454 07/09/21  0415 07/08/21  1417 07/08/21  0500 07/07/21  0450 07/06/21  0500 07/05/21  1232 07/05/21  1104   CULT >100,000 colonies/mL  Escherichia coli ESBL  ESBL (extended beta lactamase) producing organisms require contact precautions.  *  Critical Value/Significant Value called to and read back by  Luanne Bettencourt RN @ 7488 7/13/21 TM.   No growth No growth No growth No growth No growth  No growth No growth  No growth  Cultured on the 2nd day of incubation:  Staphylococcus epidermidis  *  Critical Value/Significant Value, preliminary result only, called to and read back by  IVETT WARE RN 07/06/21 1258 EH.    Cultured on the 5th day of incubation:  Staphylococcus aureus  Susceptibility testing done on previous specimen  *  Critical Value/Significant Value called to and read back by  Cherelle Smith RN 1043 7/11/21 AM   Cultured on the 2nd day of incubation:  Staphylococcus aureus  Susceptibility testing done on previous specimen  *  Critical Value/Significant Value, preliminary result only, called to and read back by  Sin Jack RN at 0448 7/7/21 hg

## 2021-08-17 NOTE — PROGRESS NOTES
[facility]    Neurosurgery  Daily Note    Assessment & Plan   Manas Hernandez is a 41 year old female who was admitted on 7/1/2021 with a history of polysubstance abuse, HIV, HepC and admitted 7/1/2021 for MSSA endocarditis with initial brain imaging showed what was felt at that time to be consistent with infarct, but now with concern for evolving abscess. She has been treated with IV antibiotics. ID is following and recommended 2 more weeks of antibiotics (end date of 9/1/2021). This AM MRI is stable. Denies headaches, vision changes, paresthesias, n/v, new weakness.    EXAM: MR BRAIN W/O and W CONTRAST  LOCATION: [facility]  DATE/TIME: 8/17/2021 5:11 AM     INDICATION: Brain abscess.  COMPARISON: MRI brain 8/10/2021, head CT 8/14/2021.  CONTRAST: 8 mL Gadavist.  TECHNIQUE: Routine multiplanar multisequence head MRI without and with intravenous contrast.     FINDINGS:  INTRACRANIAL CONTENTS: Again seen is an irregular rim-enhancing process in the left parietal/occipital lobe demonstrating internal restricted diffusion. The rind of enhancement is slightly more conspicuous but the overall size is unchanged. Stable   associated edema. Stable enhancing foci in the bilateral parietal lobes. Stable irregular enhancement at the left temporal/occipital junction along the superior margin of the tentorium. No definite new zone of enhancement. These findings are suspicious   for septic emboli and resultant abscesses. No mass, acute hemorrhage, or extra-axial fluid collections. No hydrocephalus. Normal position of the cerebellar tonsils. No pathologic contrast enhancement.     SELLA: No abnormality accounting for technique.     OSSEOUS STRUCTURES/SOFT TISSUES: Normal marrow signal. The major intracranial vascular flow voids are maintained.      ORBITS: No abnormality accounting for technique.      SINUSES/MASTOIDS: No paranasal sinus mucosal disease. No middle ear or  mastoid effusion.                                                                       IMPRESSION:  1.  Overall, no definite significant interval change when compared to MRI dated 8/10/2021.    Plan:  -No surgical intervention indicated  -Repeat brain MRI w/wo contrast in 1 weeks time per Dr. Brown (8/24/21)  -Dr. Brown in agreement with plans    -Page on call provider with questions     Principal Problem:    MSSA Endocarditis with Mitral Valve Vegetations   Active Problems:    Anemia    Acute pyelonephritis -- Klebsiella    Altered mental status, unspecified altered mental status type    Embolic CVA Left Parietal  (septic emboli) -- 7/1/21    Septic encephalopathy    Human immunodeficiency virus (HIV) disease (H)    Homeless    Septic shock (H)    Polysubstance abuse (H)    Osteomyelitis of third toe of left foot (H)    Ischemic ulcer of toe of left foot with necrosis of bone (H)    Endocarditis of mitral valve    Interval History   Stable.     Physical Exam   Temp: 98.2  F (36.8  C) Temp src: Oral BP: 113/47 Pulse: 87   Resp: 18 SpO2: 100 % O2 Device: None (Room air)    Vitals:    07/13/21 0200 07/14/21 0445 07/16/21 0615   Weight: 196 lb 3.4 oz (89 kg) 185 lb 10 oz (84.2 kg) 169 lb 5 oz (76.8 kg)     Vital Signs with Ranges  Temp:  [97.7  F (36.5  C)-98.2  F (36.8  C)] 98.2  F (36.8  C)  Pulse:  [81-93] 87  Resp:  [16-18] 18  BP: (108-119)/(47-79) 113/47  SpO2:  [98 %-100 %] 100 %  No intake/output data recorded.    Awake, alert, NAD   II-XII grossly intact  Symmetric strength throughout     Medications        bictegravir-emtricitabine-tenofovir  1 tablet Oral Daily     buprenorphine HCl-naloxone HCl  1 Film Sublingual 4x Daily     gabapentin  600 mg Oral TID     nafcillin  2 g Intravenous Q4H     pantoprazole  40 mg Oral QAM AC     polyethylene glycol  17 g Oral Daily     senna-docusate  1 tablet Oral BID     sertraline  50 mg Oral Daily     sodium chloride (PF)  10 mL Intracatheter Q8H     sodium chloride (PF)   10 mL Intracatheter Q8H     sodium chloride (PF)  10-40 mL Intracatheter Q7 Days       Plans discussed with Dr. Brown who was in agreement with plans    Nithya SANTOS [facility] Neurosurgery  [place] [address]  [place] [address]    Tel [phone]

## 2021-08-17 NOTE — PROGRESS NOTES
"SPIRITUAL HEALTH SERVICES Progress Note  [facility]    Length-of-Stay Follow Up.    Manas shared a wide-ranging narrative, including: some of her experiences living on the [address]; using as her way to forget; her love of her children and hopes that \"they don't do what I did;\" her grief that an infant grandchild just ; regret that she hasn't spoken with her grandmother in several years; connecting with her culture; her \"mother-in-law\" being a helpful influence and support.    We explored elements of her native culture and spirituality, including naming dreams and medicine men and women as possible avenues to work toward healing.     She spoke about wanting to help people and shared examples of such efforts in her past.     I offered reflective conversation and emotional support, encouraged self-care, suggested re-framings, and said a blessing.    SH remains available.    Bruna Mcrae  Chaplain Resident  "

## 2021-08-18 ENCOUNTER — APPOINTMENT (OUTPATIENT)
Dept: OCCUPATIONAL THERAPY | Facility: CLINIC | Age: 41
End: 2021-08-18
Payer: MEDICAID

## 2021-08-18 PROCEDURE — 97535 SELF CARE MNGMENT TRAINING: CPT | Mod: GO | Performed by: OCCUPATIONAL THERAPIST

## 2021-08-18 PROCEDURE — 250N000013 HC RX MED GY IP 250 OP 250 PS 637: Performed by: HOSPITALIST

## 2021-08-18 PROCEDURE — 250N000013 HC RX MED GY IP 250 OP 250 PS 637: Performed by: PODIATRIST

## 2021-08-18 PROCEDURE — 99207 PR CDG-CUT & PASTE-POTENTIAL IMPACT ON LEVEL: CPT | Performed by: INTERNAL MEDICINE

## 2021-08-18 PROCEDURE — 250N000009 HC RX 250: Performed by: PODIATRIST

## 2021-08-18 PROCEDURE — 120N000001 HC R&B MED SURG/OB

## 2021-08-18 PROCEDURE — 999N000190 HC STATISTIC VAT ROUNDS

## 2021-08-18 PROCEDURE — 250N000013 HC RX MED GY IP 250 OP 250 PS 637: Performed by: STUDENT IN AN ORGANIZED HEALTH CARE EDUCATION/TRAINING PROGRAM

## 2021-08-18 PROCEDURE — 250N000013 HC RX MED GY IP 250 OP 250 PS 637: Performed by: DENTIST

## 2021-08-18 PROCEDURE — 99232 SBSQ HOSP IP/OBS MODERATE 35: CPT | Performed by: INTERNAL MEDICINE

## 2021-08-18 RX ADMIN — GABAPENTIN 600 MG: 300 CAPSULE ORAL at 08:37

## 2021-08-18 RX ADMIN — GABAPENTIN 600 MG: 300 CAPSULE ORAL at 16:26

## 2021-08-18 RX ADMIN — BUPRENORPHINE HYDROCHLORIDE, NALOXONE HYDROCHLORIDE 1 FILM: 4; 1 FILM, SOLUBLE BUCCAL; SUBLINGUAL at 22:16

## 2021-08-18 RX ADMIN — BUPRENORPHINE HYDROCHLORIDE, NALOXONE HYDROCHLORIDE 1 FILM: 4; 1 FILM, SOLUBLE BUCCAL; SUBLINGUAL at 08:37

## 2021-08-18 RX ADMIN — BUPRENORPHINE HYDROCHLORIDE, NALOXONE HYDROCHLORIDE 1 FILM: 4; 1 FILM, SOLUBLE BUCCAL; SUBLINGUAL at 18:20

## 2021-08-18 RX ADMIN — GABAPENTIN 600 MG: 300 CAPSULE ORAL at 22:16

## 2021-08-18 RX ADMIN — NAFCILLIN SODIUM 2 G: 2 INJECTION, POWDER, LYOPHILIZED, FOR SOLUTION INTRAMUSCULAR; INTRAVENOUS at 02:31

## 2021-08-18 RX ADMIN — BICTEGRAVIR SODIUM, EMTRICITABINE, AND TENOFOVIR ALAFENAMIDE FUMARATE 1 TABLET: 50; 200; 25 TABLET ORAL at 08:37

## 2021-08-18 RX ADMIN — ACETAMINOPHEN 650 MG: 325 TABLET, FILM COATED ORAL at 18:20

## 2021-08-18 RX ADMIN — NAFCILLIN SODIUM 2 G: 2 INJECTION, POWDER, LYOPHILIZED, FOR SOLUTION INTRAMUSCULAR; INTRAVENOUS at 06:36

## 2021-08-18 RX ADMIN — NAFCILLIN SODIUM 2 G: 2 INJECTION, POWDER, LYOPHILIZED, FOR SOLUTION INTRAMUSCULAR; INTRAVENOUS at 14:15

## 2021-08-18 RX ADMIN — NAFCILLIN SODIUM 2 G: 2 INJECTION, POWDER, LYOPHILIZED, FOR SOLUTION INTRAMUSCULAR; INTRAVENOUS at 22:15

## 2021-08-18 RX ADMIN — BUPRENORPHINE HYDROCHLORIDE, NALOXONE HYDROCHLORIDE 1 FILM: 4; 1 FILM, SOLUBLE BUCCAL; SUBLINGUAL at 14:12

## 2021-08-18 RX ADMIN — SERTRALINE HYDROCHLORIDE 50 MG: 50 TABLET ORAL at 08:37

## 2021-08-18 RX ADMIN — NAFCILLIN SODIUM 2 G: 2 INJECTION, POWDER, LYOPHILIZED, FOR SOLUTION INTRAMUSCULAR; INTRAVENOUS at 10:15

## 2021-08-18 RX ADMIN — ACETAMINOPHEN 650 MG: 325 TABLET, FILM COATED ORAL at 14:15

## 2021-08-18 RX ADMIN — ACETAMINOPHEN 650 MG: 325 TABLET, FILM COATED ORAL at 02:35

## 2021-08-18 RX ADMIN — NAFCILLIN SODIUM 2 G: 2 INJECTION, POWDER, LYOPHILIZED, FOR SOLUTION INTRAMUSCULAR; INTRAVENOUS at 18:20

## 2021-08-18 RX ADMIN — QUETIAPINE FUMARATE 25 MG: 25 TABLET ORAL at 22:17

## 2021-08-18 RX ADMIN — ACETAMINOPHEN 650 MG: 325 TABLET, FILM COATED ORAL at 06:35

## 2021-08-18 RX ADMIN — PANTOPRAZOLE SODIUM 40 MG: 40 TABLET, DELAYED RELEASE ORAL at 08:37

## 2021-08-18 ASSESSMENT — ACTIVITIES OF DAILY LIVING (ADL)
ADLS_ACUITY_SCORE: 23
ADLS_ACUITY_SCORE: 22
ADLS_ACUITY_SCORE: 23
ADLS_ACUITY_SCORE: 22
ADLS_ACUITY_SCORE: 22
ADLS_ACUITY_SCORE: 23

## 2021-08-18 NOTE — PROGRESS NOTES
[facility]    Neurosurgery  Daily Progress Note    Assessment & Plan     Manas Hernandez is a 41 year old female who was admitted on 7/1/2021 with a history of polysubstance abuse, HIV, HepC and admitted 7/1/2021 for MSSA endocarditis with initial brain imaging showed what was felt at that time to be consistent with infarct, but now with concern for evolving abscess. She has been treated with IV antibiotics. ID is following and recommended 2 more weeks of antibiotics (end date of 9/1/2021). Today, patient denies headache, dizziness, nausea, vision/speech changes, or weakness. Able to move all extremities and follows commands. MRI brain completed on 8/17/21 without significant change compared to 8/10/21 study.    Plan:  - No surgical intervention at this time   - Repeat MRI brain in 1 week 8/24/21   - Activity as tolerated, continue with therapies  - Continue supportive and symptomatic treatment   - Will continue to follow     Tanya Webb CNP  [facility] Neurosurgery  [address] [address]  Tel [phone]  Pager [phone]      Interval History   Stable     Physical Exam   Temp: 97.8  F (36.6  C) Temp src: Oral BP: 118/83 Pulse: 69   Resp: 18 SpO2: 96 % O2 Device: None (Room air)    Vitals:    07/13/21 0200 07/14/21 0445 07/16/21 0615   Weight: 89 kg (196 lb 3.4 oz) 84.2 kg (185 lb 10 oz) 76.8 kg (169 lb 5 oz)     Vital Signs with Ranges  Temp:  [97.8  F (36.6  C)-98.3  F (36.8  C)] 97.8  F (36.6  C)  Pulse:  [69-96] 69  Resp:  [18] 18  BP: (115-118)/(56-83) 118/83  SpO2:  [96 %-98 %] 96 %  I/O last 3 completed shifts:  In: 220 [P.O.:120; I.V.:100]  Out: -     Mental status:  Alert and Oriented x 3, speech is fluent.  Cranial nerves:  II-XII intact.   Motor:  Moves all extremities. Strength is 5/5 throughout the upper and lower extremities  Sensation:  Intact  Coordination:  Smooth finger to nose testing.   Negative  pronator drift.      Medications        bictegravir-emtricitabine-tenofovir  1 tablet Oral Daily     buprenorphine HCl-naloxone HCl  1 Film Sublingual 4x Daily     gabapentin  600 mg Oral TID     nafcillin  2 g Intravenous Q4H     pantoprazole  40 mg Oral QAM AC     polyethylene glycol  17 g Oral Daily     senna-docusate  1 tablet Oral BID     sertraline  50 mg Oral Daily     sodium chloride (PF)  10 mL Intracatheter Q8H     sodium chloride (PF)  10 mL Intracatheter Q8H     sodium chloride (PF)  10-40 mL Intracatheter Q7 Days       Tanya Suarez [facility] Neurosurgery   [address]  [place] [address]  Tel [phone]  Pager [phone]

## 2021-08-18 NOTE — PLAN OF CARE
A&O x4, flat affect, calm & cooperative   BEHAVIOR & AGGRESSION TOOL COLOR: Green,    VSS on RA   SBA with GB/W. Minimal weight bearing on left foot. Post-op boot when out out of bed. BLE elevated.   Pt reports pain in mouth managed with ice and scheduled tylenol - orders for warm saline rinses  DIET:Mech soft, tolerating    Bowel/ Bladder;  Continent, up to BR   IV/ lines:Right PICC SL with q4h Nafcillin   ABNL LABS/BG: Blood antibody screen positive. , Hgb 9.2  TELEMETRY RHYTHM: SR  SKIN: Scattered bruises and scabs. L foot dressing  intact. Right great toe and heel dressings intact.   TESTS/PROCEDURES:  Tooth extraction 8/13, CT head and neck completed 8/14, repeat MRI today (8/17)-- continue with ABX (see neuro surgery's note)  Discharge Barriers: Continue on antibiotics until 9/1  OTHER IMPORTANT INFO: Contact precautions maintained. cardiothoracic surgery, neuro surgery following. neurology signed off.

## 2021-08-18 NOTE — PLAN OF CARE
A&O x4. VSS on RA. C/o mild pain to mouth. Managed with PRN Tylenol. Up SBA with walker to BR. Wears DH shoe when OOB. Regular diet. Tolerating well. PICC to RUE Sl-intermittent ABX. LS clear. Tele SR. BS active, + Flatus. Voiding AUO in BR. Contact precautions maintained.  Progressing toward plan of care. ABX course extended to 9/1. Discharge pending.

## 2021-08-18 NOTE — PLAN OF CARE
8/17/2021 [id number]   A&O x4, flat affect, calm & cooperative   BEHAVIOR & AGGRESSION TOOL COLOR: Green,    VSS on RA   SBA with GB/W. Minimal weight bearing on left foot. Post-op boot when out out of bed. BLE elevated.   Pt reports pain in mouth managed with ice and scheduled tylenol - orders for warm saline rinses  DIET:Mech soft, tolerating    Bowel/ Bladder;  Continent, up to BR   IV/ lines:Right PICC SL with q4h Nafcillin   ABNL LABS/BG: Blood antibody screen positive. , Hgb 9.2  TELEMETRY RHYTHM: SR  SKIN: Scattered bruises and scabs. L foot dressing  intact. Right great toe and heel dressings intact.   TESTS/PROCEDURES:  Tooth extraction 8/13, CT head and neck completed 8/14, repeat MRI today (8/17)-- continue with ABX (see neuro surgery's note)  Discharge Barriers: Continue on antibiotics until 9/1  OTHER IMPORTANT INFO: Contact precautions maintained. cardiothoracic surgery, neuro surgery following. neurology signed off.

## 2021-08-18 NOTE — PROGRESS NOTES
[facility]    Medicine Progress Note - Hospitalist Service       Date of Admission:  7/1/2021    Assessment & Plan         41 year old female with h/o untreated HIV, hepatitis C who was admitted on 7/1/2021 for MSSA mitral valve endocarditis resulting in septic shock complicated by embolic left parietal lobe infarctions and multiple peripheral emboli, and initially with encephalopathy, and shock.  She was intubated in ED on 7/1/2021 subsequently extubated 7/11/2021.  Blood cultures turned positive on hospital day 1 with MSSA.  Urine culture was positive for Klebsiella.  She was treated initially with ceftriaxone and vancomycin, and has since been narrowed to nafcillin per ID.     MSSA mitral valve endocarditis  Septic shock, resolved  Probable myopericarditis  Small pericardial effusion  *JAE 7/5 noted with small ASD, mod to large mobile vegetation (14mm long, 6mm wide) of the P2 segment of the mitral leaflet. No valve perforation; mod MR  *blood cultures on admission with MSSA; blood cultures have been negative since 7/6.   *ID following; antibiotic has been narrowed down to Nafcillin, 6 weeks of antibiotic, completion date 8/18/2021.  Now increased by 2 more weeks until 09/01/2021 due to abscess on MRI  *limited ECHO repeated 7/15 to reassess pericardial fluid 7/15 per cardiology and noted:  organized effusion posteriorly, measuring 1.2-1.5cm, no evidence of tamponade; small mobile vegetation noted attached to the mitral leaflet, mod to severe MR; compared to 7/8/21, effusion size is probably stable, Degree of MR is worse, Vegetation is smaller.  Cardiology signed off 7/15.   *PICC in place  -- given pt's stability and cooperativity, I am consulting CT surgery today to do any appropriate work up while patient is in hospital and then arrange for follow up if that is that plan.  -- brain MRI should be ordered to rule out mycotic aneurysms or brain abscesses per CT surg  -- JAE (no  vegetations) and Cor Angio (nomral coronaries) completed 08/12  -per cardiothoracic surgery reconsult 8/14 felt she no longer has an urgent surgical indication,given absence of vegetations and only moderate MR on ECHO 8/12/2021.She may require mitral valve repair/replacement in the future if she has progressive MR, but this would be better addressed after she has undergone treatment for substance abuse and has demonstrated sobriety in the out-patient setting. She should follow up with cardiology for medical management and surveillance of MR and ASD, and can be referred back to CV surgery as needed.   - Antibiotic plan per ID     Ischemic CVA, embolic stroke due to infective endocarditis  *CT head on admission notable for new acute to subacute PCA territory ischemic stroke  *MRI 7/3 noted with progressive, multifocal, acute infarcts without MR evidence for hemorrhage or midline shift  *Patient does have small left-to-right shunt on JAE, but most likely from embolic spread from endocarditis  *was evaluated by neurology, to continue antibiotics; per neuro to avoid anticoagulation (except DVT prophylaxis dose)     Abnormal MRI  Possible brain abscess  * MRI 8/10/2021 showed- Multiple enhancing subacute infarcts with evidence of small volume hemorrhage, largest at the junction of the left parietal and occipital lobes which demonstrates peripheral enhancement and worsening vasogenic edema. Developing abscess(es) related to septic emboli cannot be excluded  - Neurology was reconsulted 8/14, recommended to screen for mycotic aneurysm   * CTA head 8/14 -showed Complex peripherally enhancing fluid collection in the paramedian left parietal lobepatent arteries of head & neck. No evidence for dissection.  and CT head today & neurosurgery consult  * Ct head w/o contrast 8/14 showed no significant change in the complex peripherally enhancing fluid collection in the paramedian left parietal lobe with surrounding vasogenic edema.  Atypical for bland evolving subacute ischemic infarct. Less likely possibility for evolving subacute hematoma, consider tissue sampling.Mild local mass effect in the left parieto-occipital region with minimal narrowing of the occipital horn of the left lateral ventricle.No midline shift/herniation  - pt remains asymptomatic  - neurosurgery consulted 8/14th appreciated. As the pt has no neuro symptoms they have recommended repeating the brain MRI on 8/17-reviewed.  Extend duration of antibiotics and plan for repeat MRI in 1 week's time.    Infected Tooth  S/p removal of all remaining teeth on 8/13  Assessment: OMFS was consulted for multiple infected tooth  - S/p Removal of all remaining teeth including 2, 3, 4, 5, 6, 7, 8, 9, 10, 11, 12, 13, 19, 20, 21, 22, 23, 24, 25, 26, 28, 29, 30  - on full liquid diet - advanced to mechanical /dental soft diet on 8/15     Osteomyelitis left 3rd toe s/p amputation 8/7  Necrotic wounds to Lt great toes, Lt 3rd toe and Lt lateral Heel, Rt great toe: 2/2 thrombi.  -- Podiatry took to OR 8/7 for amputation. Orthotist following up.   -- continue wound cares for other digits     Normocytic anemia, likey 2/2 chronic disease  Chronic anemia  Baseline hemoglobin approximately 7.  Likely related to chronic disease, untreated HIV.  - Hgb 7.4 yesterday -> 8.1 today  - B12 and folate -> wnl  - consider transfusion for Hgb < 7 (or close)  - low nl ferritin- on po ferrous gluconate  - CBC on 8/16     Left knee pain 2/2 OA: pt reports history of pain in that knee with activity but pain now is more acute. ROM okay. There is some warmth and swelling there.   - In light of the MSSA bacteremia, got XR and called ortho for effusion  - Per ortho no indication for intervention, though they did order a brace. Appreciate assistance. There are following EOD.  - PT/OT     IV drug use  Polysubstance use disorder  Opiate use disorder  *Chem dep consulted 7/15 but chemical dependency did not feel she is  appropriate for CD assessment at this time and recommended consultation towards the end of her antibiotic course.  Reconsult CD once she is close to finishing her IV antibiotic.  *Was using IV dilaudid as well as Percocet p.o. earlier this stay pretty extensively.  Attempts made to wean off IV narcotics and taper down po narcotics though patient felt she was going through withdrawal. Psych consulted and recommended suboxone though patient declined. Psych recommended rapid taper of methadone instead.   *completed methadone taper of 30 mg on 7/21, 20 mg 7/22 and then 10 mg 7/23, then stop  -started suboxone on 7/27 - appreciate psych/addiction medicine assistance who will help with titration  - now on suboxone BID and doing very well. Reports no cravings and hopes to continue this regimen outpatient.  -Plan was to reconsult CD (8/13th)once she is close to finishing her IV antibiotic, but patient is not interested in treatment. She has been through inpatient and outpatient programs and not found them helpful. Also I'm not sure she could continue suboxone in treatment and I think that is probably her best chance for sobriety.  -FYI: She has a daughter who just had twins and lives with her in-laws. Daughter and daughter's mother in law live in [place] (? - pt going to confirm) and have invited patient to live there to help with babies. She thinks this is a good idea as she'll be kept busy and she gets along well with this family. She is hoping to continue follow up with addiction medicine to continue suboxone. She feels that with suboxone and family support she will do well. Will need to have addiction med follow up with her to prescribe suboxone on discharge and help her set up outpatient follow up.  -08/12 -> Pain service recommended   1. Change Suboxone to  4 mg qid for better analgesic effect.     2. discontinue oxycodone   3. to use acetaminophen for additional analgesic effect if needed.- can give as much as 1000  mg tid   (don't exceed 3000 mg total per day).       HIV, untreated PTA  - CD4 count greater than 500, viral load 3695  - Started Biktarvy 7/8; ID following  - Will need outpt follow up     Chronic back pain: pt notes it present ever since she was pregnant.   -tylenol  -added lidoderm patch 7/23  -increase gabapentin to 600 mg TID     Toxic/septic/metabolic encephalopathy, improved  Likely encephalopathy due to sepsis, drug use, CVA, prolonged ICU stay; had been pulling lines due to alterned mentation thought this all now appears resolved.      Pyelonephritis  Urine cultures positive for Klebsiella.  Completed course of treatment with ceftriaxone.  repeat urine cultures 7/10 with ESBL E coli but given unimpressive UA and no UTI symptoms ID suggest holding off on abx for ESBL UTI  -monitor for any symptoms     Acute hypoxemic respiratory failure, resolved  Intubated 7/1/2021.  Extubated 7/11/2021.  Likely related to septic shock from MSSA mitral valve endocarditis     FORD, resolved suspected due to sepsis  Hypokalemia  - on potassium replacement protocol  - will check BMP periodically     Chronic anemia  Baseline hemoglobin approximately 7.  Likely related to chronic disease, untreated HIV.  -Monitor hemoglobin periodically; stable around 8  - low nl ferritin- start po ferrous gluconate     Non severe malnutrition  -- Supplements      Prior hepatitis C infection  Antibody positive but RNA negative  -Noted     Restless legs: unclear if this is true restless leg syndrome or due to being uncomfortable  -continue mirapex prn     Major depressive disorder: pt is not suicidal but also doesn't seem to have much of a will to live based upon discussion 7/25.  I wonder if her underlying chronic pain which is difficult to treat as well as being in the hospital is contributing. Was seen by psych and declined any medication treatment on 7/26.  patient was on zoloft for years starting at age 13. She is willing to try zoloft again.  "  -Started zoloft 8/1 at 25mg po and titrating up to 50mg. Pt feels this is helping. Psychiatry followed up and agreed with this medication.     Diet: Mechanical/Dental Soft Diet    DVT Prophylaxis: Pneumatic Compression Devices  Neri Catheter: Not present  Central Lines: PRESENT  PICC Single Lumen 08/02/21 Left Cephalic-Site Assessment: WDL    Code Status: Full Code      Disposition Plan   Expected discharge: 09/01 if Brain abscess resolves.  recommended to unclear once antibiotic plan established.    She is homeless at baseline but has a plan post discharge to live with daughter and daughter's in laws. This is in the [facility] and will allow for follow up cares.    The patient's care was discussed with the Bedside Nurse, Patient and NSG Consultant.    Ashok Triplett MD, MD  Hospitalist Service  [facility]  Securely message with the Vocera Web Console (learn more here)  Text page via [employer] Paging/Directory    Clinically Significant Risk Factors Present on Admission              _\"This dictation was performed with voice recognition software and may contain errors,  omissions and inadvertent word substitution.\"     _____________________________________________________________________    Interval History   Denies any new complaint  Denies Chest pain/ SOB/ cough, Denies any N&V/ bowel problems  Denies urinary problems , Denies fever/chills/rigors     4 point ROS done and neg unless mentioned     Data reviewed today: I reviewed all medications, new labs and imaging results over the last 24 hours. I personally reviewed the MRI brain image(s) showing As below.    Physical Exam   /83 (BP Location: Left arm)   Pulse 69   Temp 97.8  F (36.6  C) (Oral)   Resp 18   Ht 1.727 m (5' 7.99\")   Wt 76.8 kg (169 lb 5 oz)   SpO2 96%   Breastfeeding Unknown   BMI 25.75 kg/m    Gen- pleasant  lying in bed  HEENT- NAD, BO  Neck- supple, no JVD elevation, no thyromegaly  CVS- I+II+ no " m/r/g  RS- CTABS  Abdo- soft, no tenderness . No g/r/r, BS+ no hepatosplenomegaly   Ext- no edema   CNS- no gross focal deficit    Data   BMP  Recent Labs   Lab 08/17/21  0607 08/16/21  0635 08/15/21  0609 08/14/21  0634 08/13/21  0558    138  --   --  139   POTASSIUM 3.9 3.8  --   --  3.8   CHLORIDE 109 108  --   --  111*   DEREK 8.9 8.6  --   --  8.0*   CO2 26 28  --   --  28   BUN 12 11  --   --  10   CR 0.62 0.60  --   --  0.69   GLC 98 100* 80 81 95     CBC  Recent Labs   Lab 08/17/21  0608 08/16/21  0635 08/13/21  0558   WBC 4.3 3.9* 4.1   RBC 3.52* 3.39* 3.01*   HGB 9.2* 8.9* 8.0*   HCT 30.3* 28.9* 26.0*   MCV 86 85 86   MCH 26.1* 26.3* 26.6   MCHC 30.4* 30.8* 30.8*   RDW 26.5* 26.7* 27.5*   * 606* 548*

## 2021-08-19 ENCOUNTER — APPOINTMENT (OUTPATIENT)
Dept: PHYSICAL THERAPY | Facility: CLINIC | Age: 41
End: 2021-08-19
Payer: MEDICAID

## 2021-08-19 PROCEDURE — 250N000013 HC RX MED GY IP 250 OP 250 PS 637: Performed by: HOSPITALIST

## 2021-08-19 PROCEDURE — 97110 THERAPEUTIC EXERCISES: CPT | Mod: GP | Performed by: PHYSICAL THERAPIST

## 2021-08-19 PROCEDURE — 250N000013 HC RX MED GY IP 250 OP 250 PS 637: Performed by: PODIATRIST

## 2021-08-19 PROCEDURE — 250N000013 HC RX MED GY IP 250 OP 250 PS 637: Performed by: DENTIST

## 2021-08-19 PROCEDURE — 97116 GAIT TRAINING THERAPY: CPT | Mod: GP | Performed by: PHYSICAL THERAPIST

## 2021-08-19 PROCEDURE — 120N000001 HC R&B MED SURG/OB

## 2021-08-19 PROCEDURE — 99232 SBSQ HOSP IP/OBS MODERATE 35: CPT | Performed by: STUDENT IN AN ORGANIZED HEALTH CARE EDUCATION/TRAINING PROGRAM

## 2021-08-19 PROCEDURE — 250N000013 HC RX MED GY IP 250 OP 250 PS 637: Performed by: STUDENT IN AN ORGANIZED HEALTH CARE EDUCATION/TRAINING PROGRAM

## 2021-08-19 PROCEDURE — 250N000009 HC RX 250: Performed by: PODIATRIST

## 2021-08-19 RX ADMIN — BUPRENORPHINE HYDROCHLORIDE, NALOXONE HYDROCHLORIDE 1 FILM: 4; 1 FILM, SOLUBLE BUCCAL; SUBLINGUAL at 22:09

## 2021-08-19 RX ADMIN — HYDROXYZINE HYDROCHLORIDE 25 MG: 25 TABLET, FILM COATED ORAL at 02:01

## 2021-08-19 RX ADMIN — BICTEGRAVIR SODIUM, EMTRICITABINE, AND TENOFOVIR ALAFENAMIDE FUMARATE 1 TABLET: 50; 200; 25 TABLET ORAL at 08:20

## 2021-08-19 RX ADMIN — BUPRENORPHINE HYDROCHLORIDE, NALOXONE HYDROCHLORIDE 1 FILM: 4; 1 FILM, SOLUBLE BUCCAL; SUBLINGUAL at 13:20

## 2021-08-19 RX ADMIN — NAFCILLIN SODIUM 2 G: 2 INJECTION, POWDER, LYOPHILIZED, FOR SOLUTION INTRAMUSCULAR; INTRAVENOUS at 18:14

## 2021-08-19 RX ADMIN — GABAPENTIN 600 MG: 300 CAPSULE ORAL at 16:31

## 2021-08-19 RX ADMIN — NAFCILLIN SODIUM 2 G: 2 INJECTION, POWDER, LYOPHILIZED, FOR SOLUTION INTRAMUSCULAR; INTRAVENOUS at 06:04

## 2021-08-19 RX ADMIN — NAFCILLIN SODIUM 2 G: 2 INJECTION, POWDER, LYOPHILIZED, FOR SOLUTION INTRAMUSCULAR; INTRAVENOUS at 22:09

## 2021-08-19 RX ADMIN — ACETAMINOPHEN 650 MG: 325 TABLET, FILM COATED ORAL at 11:28

## 2021-08-19 RX ADMIN — GABAPENTIN 600 MG: 300 CAPSULE ORAL at 22:09

## 2021-08-19 RX ADMIN — NAFCILLIN SODIUM 2 G: 2 INJECTION, POWDER, LYOPHILIZED, FOR SOLUTION INTRAMUSCULAR; INTRAVENOUS at 02:01

## 2021-08-19 RX ADMIN — PANTOPRAZOLE SODIUM 40 MG: 40 TABLET, DELAYED RELEASE ORAL at 08:20

## 2021-08-19 RX ADMIN — ACETAMINOPHEN 650 MG: 325 TABLET, FILM COATED ORAL at 16:31

## 2021-08-19 RX ADMIN — ACETAMINOPHEN 650 MG: 325 TABLET, FILM COATED ORAL at 02:00

## 2021-08-19 RX ADMIN — BUPRENORPHINE HYDROCHLORIDE, NALOXONE HYDROCHLORIDE 1 FILM: 4; 1 FILM, SOLUBLE BUCCAL; SUBLINGUAL at 18:14

## 2021-08-19 RX ADMIN — SENNOSIDES AND DOCUSATE SODIUM 1 TABLET: 8.6; 5 TABLET ORAL at 08:20

## 2021-08-19 RX ADMIN — GABAPENTIN 600 MG: 300 CAPSULE ORAL at 08:20

## 2021-08-19 RX ADMIN — SERTRALINE HYDROCHLORIDE 50 MG: 50 TABLET ORAL at 08:20

## 2021-08-19 RX ADMIN — NAFCILLIN SODIUM 2 G: 2 INJECTION, POWDER, LYOPHILIZED, FOR SOLUTION INTRAMUSCULAR; INTRAVENOUS at 13:20

## 2021-08-19 RX ADMIN — BUPRENORPHINE HYDROCHLORIDE, NALOXONE HYDROCHLORIDE 1 FILM: 4; 1 FILM, SOLUBLE BUCCAL; SUBLINGUAL at 08:21

## 2021-08-19 RX ADMIN — NAFCILLIN SODIUM 2 G: 2 INJECTION, POWDER, LYOPHILIZED, FOR SOLUTION INTRAMUSCULAR; INTRAVENOUS at 10:09

## 2021-08-19 ASSESSMENT — ACTIVITIES OF DAILY LIVING (ADL)
ADLS_ACUITY_SCORE: 22

## 2021-08-19 NOTE — PROGRESS NOTES
"CLINICAL NUTRITION SERVICES - REASSESSMENT NOTE    Recommendations Ordered by Registered Dietitian (RD):   - Schedule Ensure BID between meals   - Add thera vit M daily - unable to order due to med contraindication   - Weigh patient    Malnutrition:   (7/19)   % Weight Loss:  Weight loss does not meet criteria for malnutrition - pt states wt is still elevated from baseline   % Intake:  <75% for > 7 days (non-severe malnutrition) - on average  Subcutaneous Fat Loss:  Orbital region mild depletion and Upper arm region mild depletion  Muscle Loss:  Clavicle bone region mild depletion  Fluid Retention:  Moderate     Malnutrition Diagnosis: Non-Severe malnutrition  In Context of:  Acute illness or injury     EVALUATION OF PROGRESS TOWARD GOALS   Diet: Mechanical/Dental Soft diet    Intake/Tolerance:  - Pt is tolerating % of meals ordered.   - Appetite ranges fair-good.   - Patient has been ordering Ensure Enlive up to 2x daily w/ meals.   - Pt seen in room: she reports fair appetite, \"eating OK\". Asks for Ensure BID - likes vanilla and chocolate.     - Last recorded BM 8/15  - No new weight since 7/16    ASSESSED NUTRITION NEEDS:  Dosing Weight:  67 kg (adjusted for overwt)  Energy Needs:  5984-6043 kcals (25-30 kcal/kg)  Justification: overweight    Protein Needs:   grams protein (1.2-1.5+ g pro/Kg)  Justification: several surgeries this admission, preservation of LBM    NEW FINDINGS:   8/17 - WOCN  Left great toe: small stable eschar  Left 3rd toe: s/p partial amp by Podiatry 8/7/21, sutured incision remains clean, dry, intact  Left lateral heel: resolving area of boggy ecchymosis and prior blister, epidermis remains intact  Right great toe: small yellow/pink open wound bed with scant eschar medially.     8/17 Brain MRI - no significant changes since 8/10 MRI.     ABX to continue through 9/1    CV surgery not indicated at this time - JAE on 8/12 shows no vegetations     Previous Goals:   Intake of >75% " protein-dense meals BID-TID.   Evaluation: Met    Previous Nutrition Diagnosis:   Increased nutrient needs (protein) related to planned surgery, partial toe amputation as evidenced by protein needs > 80g daily.  Evaluation: No change, updated based on plan    CURRENT NUTRITION DIAGNOSIS  Increased nutrient needs (protein) related to prolonged admission, partial toe amputation, skin integrity as evidenced by protein needs assessed at >80 g protein.     INTERVENTIONS  Recommendations / Nutrition Prescription  - Ensure Enlive BID between meals   - Add thera vit M daily   - Weigh patient     Implementation  Medical Food Supplement, Multivitamin/Mineral and Collaboration and Referral of Nutrition care    Goals  Intake of >/=75% meals TID + 1-2 supplements daily.       MONITORING AND EVALUATION:  Progress towards goals will be monitored and evaluated per protocol and Practice Guidelines    Nubia Marks RD,   [facility] [place], 68, 57, MH   Pager: [phone]  Weekend Pager: [phone]

## 2021-08-19 NOTE — PROGRESS NOTES
[facility]    Medicine Progress Note - Hospitalist Service       Date of Admission:  7/1/2021    Assessment & Plan         41 year old female with h/o untreated HIV, hepatitis C who was admitted on 7/1/2021 for MSSA mitral valve endocarditis resulting in septic shock complicated by embolic left parietal lobe infarctions and multiple peripheral emboli, and initially with encephalopathy, and shock.  She was intubated in ED on 7/1/2021 subsequently extubated 7/11/2021.  Blood cultures turned positive on hospital day 1 with MSSA.  Urine culture was positive for Klebsiella.  She was treated initially with ceftriaxone and vancomycin, and has since been narrowed to nafcillin per ID.     MSSA mitral valve endocarditis  Septic shock, resolved  Probable myopericarditis  Small pericardial effusion  *JAE 7/5 noted with small ASD, mod to large mobile vegetation (14mm long, 6mm wide) of the P2 segment of the mitral leaflet. No valve perforation; mod MR  *blood cultures on admission with MSSA; blood cultures have been negative since 7/6.   *ID following; antibiotic has been narrowed down to Nafcillin, 6 weeks of antibiotic, completion date 8/18/2021.  Now increased by 2 more weeks until 09/01/2021 due to abscess on MRI  *limited ECHO repeated 7/15 to reassess pericardial fluid 7/15 per cardiology and noted:  organized effusion posteriorly, measuring 1.2-1.5cm, no evidence of tamponade; small mobile vegetation noted attached to the mitral leaflet, mod to severe MR; compared to 7/8/21, effusion size is probably stable, Degree of MR is worse, Vegetation is smaller.  Cardiology signed off 7/15.   *PICC in place  -- given pt's stability and cooperativity, I am consulting CT surgery today to do any appropriate work up while patient is in hospital and then arrange for follow up if that is that plan.  -- brain MRI should be ordered to rule out mycotic aneurysms or brain abscesses per CT surg  -- JAE (no  vegetations) and Cor Angio (nomral coronaries) completed 08/12  -per cardiothoracic surgery reconsult 8/14 felt she no longer has an urgent surgical indication,given absence of vegetations and only moderate MR on ECHO 8/12/2021.She may require mitral valve repair/replacement in the future if she has progressive MR, but this would be better addressed after she has undergone treatment for substance abuse and has demonstrated sobriety in the out-patient setting. She should follow up with cardiology for medical management and surveillance of MR and ASD, and can be referred back to CV surgery as needed.   - Antibiotic plan per ID     Ischemic CVA, embolic stroke due to infective endocarditis  *CT head on admission notable for new acute to subacute PCA territory ischemic stroke  *MRI 7/3 noted with progressive, multifocal, acute infarcts without MR evidence for hemorrhage or midline shift  *Patient does have small left-to-right shunt on JAE, but most likely from embolic spread from endocarditis  *was evaluated by neurology, to continue antibiotics; per neuro to avoid anticoagulation (except DVT prophylaxis dose)     Abnormal MRI  Possible brain abscess  * MRI 8/10/2021 showed- Multiple enhancing subacute infarcts with evidence of small volume hemorrhage, largest at the junction of the left parietal and occipital lobes which demonstrates peripheral enhancement and worsening vasogenic edema. Developing abscess(es) related to septic emboli cannot be excluded  - Neurology was reconsulted 8/14, recommended to screen for mycotic aneurysm   * CTA head 8/14 -showed Complex peripherally enhancing fluid collection in the paramedian left parietal lobepatent arteries of head & neck. No evidence for dissection.  and CT head today & neurosurgery consult  * Ct head w/o contrast 8/14 showed no significant change in the complex peripherally enhancing fluid collection in the paramedian left parietal lobe with surrounding vasogenic edema.  Atypical for bland evolving subacute ischemic infarct. Less likely possibility for evolving subacute hematoma, consider tissue sampling.Mild local mass effect in the left parieto-occipital region with minimal narrowing of the occipital horn of the left lateral ventricle.No midline shift/herniation  - pt remains asymptomatic  - neurosurgery consulted 8/14th appreciated. As the pt has no neuro symptoms they have recommended repeating the brain MRI on 8/17-reviewed.  Extend duration of antibiotics and plan for repeat MRI in 1 week's time.    Infected Tooth  S/p removal of all remaining teeth on 8/13  Assessment: OMFS was consulted for multiple infected tooth  - S/p Removal of all remaining teeth including 2, 3, 4, 5, 6, 7, 8, 9, 10, 11, 12, 13, 19, 20, 21, 22, 23, 24, 25, 26, 28, 29, 30  - on full liquid diet - advanced to mechanical /dental soft diet on 8/15     Osteomyelitis left 3rd toe s/p amputation 8/7  Necrotic wounds to Lt great toes, Lt 3rd toe and Lt lateral Heel, Rt great toe: 2/2 thrombi.  -- Podiatry took to OR 8/7 for amputation. Orthotist following up.   -- continue wound cares for other digits     Normocytic anemia, likey 2/2 chronic disease  Chronic anemia  Baseline hemoglobin approximately 7.  Likely related to chronic disease, untreated HIV.  - Hgb 7.4 yesterday -> 8.1 today  - B12 and folate -> wnl  - consider transfusion for Hgb < 7 (or close)  - low nl ferritin- on po ferrous gluconate  - CBC on 8/16     Left knee pain 2/2 OA: pt reports history of pain in that knee with activity but pain now is more acute. ROM okay. There is some warmth and swelling there.   - In light of the MSSA bacteremia, got XR and called ortho for effusion  - Per ortho no indication for intervention, though they did order a brace. Appreciate assistance. There are following EOD.  - PT/OT     IV drug use  Polysubstance use disorder  Opiate use disorder  *Chem dep consulted 7/15 but chemical dependency did not feel she is  appropriate for CD assessment at this time and recommended consultation towards the end of her antibiotic course.  Reconsult CD once she is close to finishing her IV antibiotic.  *Was using IV dilaudid as well as Percocet p.o. earlier this stay pretty extensively.  Attempts made to wean off IV narcotics and taper down po narcotics though patient felt she was going through withdrawal. Psych consulted and recommended suboxone though patient declined. Psych recommended rapid taper of methadone instead.   *completed methadone taper of 30 mg on 7/21, 20 mg 7/22 and then 10 mg 7/23, then stop  -started suboxone on 7/27 - appreciate psych/addiction medicine assistance who will help with titration  - now on suboxone BID and doing very well. Reports no cravings and hopes to continue this regimen outpatient.  -Plan was to reconsult CD (8/13th)once she is close to finishing her IV antibiotic, but patient is not interested in treatment. She has been through inpatient and outpatient programs and not found them helpful. Also I'm not sure she could continue suboxone in treatment and I think that is probably her best chance for sobriety.  -FYI: She has a daughter who just had twins and lives with her in-laws. Daughter and daughter's mother in law live in [place] (? - pt going to confirm) and have invited patient to live there to help with babies. She thinks this is a good idea as she'll be kept busy and she gets along well with this family. She is hoping to continue follow up with addiction medicine to continue suboxone. She feels that with suboxone and family support she will do well. Will need to have addiction med follow up with her to prescribe suboxone on discharge and help her set up outpatient follow up.  -08/12 -> Pain service recommended   1. Change Suboxone to  4 mg qid for better analgesic effect.     2. discontinue oxycodone   3. to use acetaminophen for additional analgesic effect if needed.- can give as much as 1000  mg tid   (don't exceed 3000 mg total per day).       HIV, untreated PTA  - CD4 count greater than 500, viral load 3695  - Started Biktarvy 7/8; ID following  - Will need outpt follow up     Chronic back pain: pt notes it present ever since she was pregnant.   -tylenol  -added lidoderm patch 7/23  -increase gabapentin to 600 mg TID     Toxic/septic/metabolic encephalopathy, improved  Likely encephalopathy due to sepsis, drug use, CVA, prolonged ICU stay; had been pulling lines due to alterned mentation thought this all now appears resolved.      Pyelonephritis  Urine cultures positive for Klebsiella.  Completed course of treatment with ceftriaxone.  repeat urine cultures 7/10 with ESBL E coli but given unimpressive UA and no UTI symptoms ID suggest holding off on abx for ESBL UTI  -monitor for any symptoms     Acute hypoxemic respiratory failure, resolved  Intubated 7/1/2021.  Extubated 7/11/2021.  Likely related to septic shock from MSSA mitral valve endocarditis     FORD, resolved suspected due to sepsis  Hypokalemia  - on potassium replacement protocol  - will check BMP periodically     Chronic anemia  Baseline hemoglobin approximately 7.  Likely related to chronic disease, untreated HIV.  -Monitor hemoglobin periodically; stable around 8  - low nl ferritin- start po ferrous gluconate     Non severe malnutrition  -- Supplements      Prior hepatitis C infection  Antibody positive but RNA negative  -Noted     Restless legs: unclear if this is true restless leg syndrome or due to being uncomfortable  -continue mirapex prn     Major depressive disorder: pt is not suicidal but also doesn't seem to have much of a will to live based upon discussion 7/25.  I wonder if her underlying chronic pain which is difficult to treat as well as being in the hospital is contributing. Was seen by psych and declined any medication treatment on 7/26.  patient was on zoloft for years starting at age 13. She is willing to try zoloft again.  "  -Started zoloft 8/1 at 25mg po and titrating up to 50mg. Pt feels this is helping. Psychiatry followed up and agreed with this medication.     Diet: Mechanical/Dental Soft Diet  Snacks/Supplements Adult: Ensure Enlive; Between Meals    DVT Prophylaxis: Pneumatic Compression Devices  Neri Catheter: Not present  Central Lines: PRESENT  PICC Single Lumen 08/02/21 Left Cephalic-Site Assessment: WDL    Code Status: Full Code      Disposition Plan   Expected discharge: 09/01 if Brain abscess resolves.  recommended to unclear once antibiotic plan established.    She is homeless at baseline but has a plan post discharge to live with daughter and daughter's in laws. This is in the [facility] area and will allow for follow up cares.    The patient's care was discussed with the Bedside Nurse, Patient and NSG Consultant.    Eleazar Black MD  Hospitalist Service  [facility]  Securely message with the Vocera Web Console (learn more here)  Text page via [employer] Paging/Directory    Clinically Significant Risk Factors Present on Admission              _\"This dictation was performed with voice recognition software and may contain errors,  omissions and inadvertent word substitution.\"     _____________________________________________________________________    Interval History   Denies any new complaint  Denies Chest pain/ SOB/ cough, Denies any N&V/ bowel problems  Denies urinary problems , Denies fever/chills/rigors     4 point ROS done and neg unless mentioned     Data reviewed today: I reviewed all medications, new labs and imaging results over the last 24 hours. I personally reviewed the MRI brain image(s) showing As below.    Physical Exam   /78 (BP Location: Left arm)   Pulse 71   Temp 98.5  F (36.9  C) (Oral)   Resp 16   Ht 1.727 m (5' 7.99\")   Wt 76.8 kg (169 lb 5 oz)   SpO2 98%   Breastfeeding Unknown   BMI 25.75 kg/m    Gen- pleasant  lying in bed  HEENT- NAD, BO  Neck- supple, no " JVD elevation, no thyromegaly  CVS- I+II+ no m/r/g  RS- CTABS  Abdo- soft, no tenderness . No g/r/r, BS+ no hepatosplenomegaly   Ext- no edema   CNS- no gross focal deficit    Data   BMP  Recent Labs   Lab 08/17/21  0607 08/16/21  0635 08/15/21  0609 08/14/21  0634 08/13/21  0558    138  --   --  139   POTASSIUM 3.9 3.8  --   --  3.8   CHLORIDE 109 108  --   --  111*   DEREK 8.9 8.6  --   --  8.0*   CO2 26 28  --   --  28   BUN 12 11  --   --  10   CR 0.62 0.60  --   --  0.69   GLC 98 100* 80 81 95     CBC  Recent Labs   Lab 08/17/21  0608 08/16/21  0635 08/13/21  0558   WBC 4.3 3.9* 4.1   RBC 3.52* 3.39* 3.01*   HGB 9.2* 8.9* 8.0*   HCT 30.3* 28.9* 26.0*   MCV 86 85 86   MCH 26.1* 26.3* 26.6   MCHC 30.4* 30.8* 30.8*   RDW 26.5* 26.7* 27.5*   * 606* 548*

## 2021-08-19 NOTE — PLAN OF CARE
DATE & TIME: 8/19/21 [id number]    Cognitive Concerns/ Orientation : A/O x4   BEHAVIOR & AGGRESSION TOOL COLOR: Green  CIWA SCORE: NA   ABNL VS/O2: VSS, RA  MOBILITY: SB, GB, Walker  PAIN MANAGMENT: PRN Tylenol/ ice packs for foot and mouth pain  DIET: [facility] soft  BOWEL/BLADDER: continent, up to BR   ABNL LAB/BG:   DRAIN/DEVICES: PICC    TELEMETRY RHYTHM: 1st Degree AV Block; patient has no c/o symptoms  SKIN: Dressing to left foot CDI  TESTS/PROCEDURES: follow-up brain MRI 8/17 stable, repeat 8/24  D/C DATE: Pending  Discharge Barriers: IV abx through 9/1  OTHER IMPORTANT INFO: ID, neurosurg following

## 2021-08-19 NOTE — PLAN OF CARE
DATE & TIME: 8/18/21 1900 - 0730    Cognitive Concerns/ Orientation : A/O x4   BEHAVIOR & AGGRESSION TOOL COLOR: Green  CIWA SCORE: NA   ABNL VS/O2: VSS, RA  MOBILITY: SB, GB, Walker  PAIN MANAGMENT: PRNs APAP & Atarax & ice packs for foot and mouth pain  DIET: [facility] soft  BOWEL/BLADDER: continent, up to BR  ABNL LAB/BG:   DRAIN/DEVICES: PICC   TELEMETRY RHYTHM: SR  SKIN: Dressing to left foot CDI  TESTS/PROCEDURES: follow-up brain MRI 8/17 stable, repeat 8/24  D/C DATE: Pending  Discharge Barriers: IV abx through 9/1  OTHER IMPORTANT INFO: ID, neurosurg following

## 2021-08-20 ENCOUNTER — APPOINTMENT (OUTPATIENT)
Dept: OCCUPATIONAL THERAPY | Facility: CLINIC | Age: 41
End: 2021-08-20
Payer: MEDICAID

## 2021-08-20 ENCOUNTER — APPOINTMENT (OUTPATIENT)
Dept: PHYSICAL THERAPY | Facility: CLINIC | Age: 41
End: 2021-08-20
Payer: MEDICAID

## 2021-08-20 PROCEDURE — 250N000013 HC RX MED GY IP 250 OP 250 PS 637: Performed by: PODIATRIST

## 2021-08-20 PROCEDURE — 999N000190 HC STATISTIC VAT ROUNDS

## 2021-08-20 PROCEDURE — 97116 GAIT TRAINING THERAPY: CPT | Mod: GP | Performed by: PHYSICAL THERAPIST

## 2021-08-20 PROCEDURE — 120N000001 HC R&B MED SURG/OB

## 2021-08-20 PROCEDURE — 250N000013 HC RX MED GY IP 250 OP 250 PS 637: Performed by: HOSPITALIST

## 2021-08-20 PROCEDURE — 250N000013 HC RX MED GY IP 250 OP 250 PS 637: Performed by: STUDENT IN AN ORGANIZED HEALTH CARE EDUCATION/TRAINING PROGRAM

## 2021-08-20 PROCEDURE — 250N000009 HC RX 250: Performed by: PODIATRIST

## 2021-08-20 PROCEDURE — 97530 THERAPEUTIC ACTIVITIES: CPT | Mod: GP | Performed by: PHYSICAL THERAPIST

## 2021-08-20 PROCEDURE — 250N000013 HC RX MED GY IP 250 OP 250 PS 637: Performed by: DENTIST

## 2021-08-20 PROCEDURE — 97535 SELF CARE MNGMENT TRAINING: CPT | Mod: GO

## 2021-08-20 PROCEDURE — 99232 SBSQ HOSP IP/OBS MODERATE 35: CPT | Performed by: STUDENT IN AN ORGANIZED HEALTH CARE EDUCATION/TRAINING PROGRAM

## 2021-08-20 RX ADMIN — ACETAMINOPHEN 650 MG: 325 TABLET, FILM COATED ORAL at 16:23

## 2021-08-20 RX ADMIN — BUPRENORPHINE HYDROCHLORIDE, NALOXONE HYDROCHLORIDE 1 FILM: 4; 1 FILM, SOLUBLE BUCCAL; SUBLINGUAL at 21:38

## 2021-08-20 RX ADMIN — SERTRALINE HYDROCHLORIDE 50 MG: 50 TABLET ORAL at 08:33

## 2021-08-20 RX ADMIN — NAFCILLIN SODIUM 2 G: 2 INJECTION, POWDER, LYOPHILIZED, FOR SOLUTION INTRAMUSCULAR; INTRAVENOUS at 10:22

## 2021-08-20 RX ADMIN — BUPRENORPHINE HYDROCHLORIDE, NALOXONE HYDROCHLORIDE 1 FILM: 4; 1 FILM, SOLUBLE BUCCAL; SUBLINGUAL at 14:21

## 2021-08-20 RX ADMIN — GABAPENTIN 600 MG: 300 CAPSULE ORAL at 16:19

## 2021-08-20 RX ADMIN — GABAPENTIN 600 MG: 300 CAPSULE ORAL at 08:32

## 2021-08-20 RX ADMIN — BUPRENORPHINE HYDROCHLORIDE, NALOXONE HYDROCHLORIDE 1 FILM: 4; 1 FILM, SOLUBLE BUCCAL; SUBLINGUAL at 08:34

## 2021-08-20 RX ADMIN — NAFCILLIN SODIUM 2 G: 2 INJECTION, POWDER, LYOPHILIZED, FOR SOLUTION INTRAMUSCULAR; INTRAVENOUS at 14:21

## 2021-08-20 RX ADMIN — GABAPENTIN 600 MG: 300 CAPSULE ORAL at 21:41

## 2021-08-20 RX ADMIN — NAFCILLIN SODIUM 2 G: 2 INJECTION, POWDER, LYOPHILIZED, FOR SOLUTION INTRAMUSCULAR; INTRAVENOUS at 01:24

## 2021-08-20 RX ADMIN — PANTOPRAZOLE SODIUM 40 MG: 40 TABLET, DELAYED RELEASE ORAL at 08:32

## 2021-08-20 RX ADMIN — BUPRENORPHINE HYDROCHLORIDE, NALOXONE HYDROCHLORIDE 1 FILM: 4; 1 FILM, SOLUBLE BUCCAL; SUBLINGUAL at 17:47

## 2021-08-20 RX ADMIN — BICTEGRAVIR SODIUM, EMTRICITABINE, AND TENOFOVIR ALAFENAMIDE FUMARATE 1 TABLET: 50; 200; 25 TABLET ORAL at 08:33

## 2021-08-20 RX ADMIN — ACETAMINOPHEN 650 MG: 325 TABLET, FILM COATED ORAL at 21:46

## 2021-08-20 RX ADMIN — NAFCILLIN SODIUM 2 G: 2 INJECTION, POWDER, LYOPHILIZED, FOR SOLUTION INTRAMUSCULAR; INTRAVENOUS at 06:18

## 2021-08-20 RX ADMIN — QUETIAPINE FUMARATE 25 MG: 25 TABLET ORAL at 21:47

## 2021-08-20 RX ADMIN — NAFCILLIN SODIUM 2 G: 2 INJECTION, POWDER, LYOPHILIZED, FOR SOLUTION INTRAMUSCULAR; INTRAVENOUS at 17:47

## 2021-08-20 RX ADMIN — NAFCILLIN SODIUM 2 G: 2 INJECTION, POWDER, LYOPHILIZED, FOR SOLUTION INTRAMUSCULAR; INTRAVENOUS at 21:38

## 2021-08-20 RX ADMIN — ACETAMINOPHEN 650 MG: 325 TABLET, FILM COATED ORAL at 08:33

## 2021-08-20 ASSESSMENT — MIFFLIN-ST. JEOR: SCORE: 1551.64

## 2021-08-20 ASSESSMENT — ACTIVITIES OF DAILY LIVING (ADL)
ADLS_ACUITY_SCORE: 22
ADLS_ACUITY_SCORE: 22
ADLS_ACUITY_SCORE: 23
ADLS_ACUITY_SCORE: 23
ADLS_ACUITY_SCORE: 22
ADLS_ACUITY_SCORE: 22

## 2021-08-20 NOTE — PLAN OF CARE
DATE & TIME: 8/19/21 0850 - 5315  Cognitive Concerns/ Orientation : A/O x4; lethargic  BEHAVIOR & AGGRESSION TOOL COLOR: Green  CIWA SCORE: NA   ABNL VS/O2: VSS, RA  MOBILITY: SB, GB, Walker  PAIN MANAGMENT: PRN Tylenol; ice packs for foot and mouth pain  DIET: [facility] soft- good appetite   BOWEL/BLADDER: continent, up to BR   ABNL LAB/BG: hgb 9.2 on 08/17  DRAIN/DEVICES: PICC    TELEMETRY RHYTHM: NSR  SKIN: New Dressing applied to left and right foot. Orders to change every other day  TESTS/PROCEDURES: follow-up brain MRI 8/17 stable, repeat 8/24  D/C DATE: Pending  Discharge Barriers: IV abx through 9/1  OTHER IMPORTANT INFO: ID, neurosurg following; pt seems depressed today; lethargic at times

## 2021-08-20 NOTE — PROGRESS NOTES
[facility]    Medicine Progress Note - Hospitalist Service       Date of Admission:  7/1/2021    Assessment & Plan         41 year old female with h/o untreated HIV, hepatitis C who was admitted on 7/1/2021 for MSSA mitral valve endocarditis resulting in septic shock complicated by embolic left parietal lobe infarctions and multiple peripheral emboli, and initially with encephalopathy, and shock.  She was intubated in ED on 7/1/2021 subsequently extubated 7/11/2021.  Blood cultures turned positive on hospital day 1 with MSSA.  Urine culture was positive for Klebsiella.  She was treated initially with ceftriaxone and vancomycin, and has since been narrowed to nafcillin per ID.     MSSA mitral valve endocarditis  Septic shock, resolved  Probable myopericarditis  Small pericardial effusion  *JAE 7/5 noted with small ASD, mod to large mobile vegetation (14mm long, 6mm wide) of the P2 segment of the mitral leaflet. No valve perforation; mod MR  *blood cultures on admission with MSSA; blood cultures have been negative since 7/6.   *ID following; antibiotic has been narrowed down to Nafcillin, 6 weeks of antibiotic, completion date 8/18/2021.  Now increased by 2 more weeks until 09/01/2021 due to abscess on MRI  *limited ECHO repeated 7/15 to reassess pericardial fluid 7/15 per cardiology and noted:  organized effusion posteriorly, measuring 1.2-1.5cm, no evidence of tamponade; small mobile vegetation noted attached to the mitral leaflet, mod to severe MR; compared to 7/8/21, effusion size is probably stable, Degree of MR is worse, Vegetation is smaller.  Cardiology signed off 7/15.   *PICC in place  -- given pt's stability and cooperativity, I am consulting CT surgery today to do any appropriate work up while patient is in hospital and then arrange for follow up if that is that plan.  -- brain MRI should be ordered to rule out mycotic aneurysms or brain abscesses per CT surg  -- JAE (no  vegetations) and Cor Angio (nomral coronaries) completed 08/12  -per cardiothoracic surgery reconsult 8/14 felt she no longer has an urgent surgical indication,given absence of vegetations and only moderate MR on ECHO 8/12/2021.She may require mitral valve repair/replacement in the future if she has progressive MR, but this would be better addressed after she has undergone treatment for substance abuse and has demonstrated sobriety in the out-patient setting. She should follow up with cardiology for medical management and surveillance of MR and ASD, and can be referred back to CV surgery as needed.   - Antibiotic plan per ID     Ischemic CVA, embolic stroke due to infective endocarditis  *CT head on admission notable for new acute to subacute PCA territory ischemic stroke  *MRI 7/3 noted with progressive, multifocal, acute infarcts without MR evidence for hemorrhage or midline shift  *Patient does have small left-to-right shunt on JAE, but most likely from embolic spread from endocarditis  *was evaluated by neurology, to continue antibiotics; per neuro to avoid anticoagulation (except DVT prophylaxis dose)     Abnormal MRI  Possible brain abscess  * MRI 8/10/2021 showed- Multiple enhancing subacute infarcts with evidence of small volume hemorrhage, largest at the junction of the left parietal and occipital lobes which demonstrates peripheral enhancement and worsening vasogenic edema. Developing abscess(es) related to septic emboli cannot be excluded  - Neurology was reconsulted 8/14, recommended to screen for mycotic aneurysm   * CTA head 8/14 -showed Complex peripherally enhancing fluid collection in the paramedian left parietal lobepatent arteries of head & neck. No evidence for dissection.  and CT head today & neurosurgery consult  * Ct head w/o contrast 8/14 showed no significant change in the complex peripherally enhancing fluid collection in the paramedian left parietal lobe with surrounding vasogenic edema.  Atypical for bland evolving subacute ischemic infarct. Less likely possibility for evolving subacute hematoma, consider tissue sampling.Mild local mass effect in the left parieto-occipital region with minimal narrowing of the occipital horn of the left lateral ventricle.No midline shift/herniation  - pt remains asymptomatic  - neurosurgery consulted 8/14th appreciated. As the pt has no neuro symptoms they have recommended repeating the brain MRI on 8/17-reviewed.  Extend duration of antibiotics and plan for repeat MRI in 1 week's time.    Infected Tooth  S/p removal of all remaining teeth on 8/13  Assessment: OMFS was consulted for multiple infected tooth  - S/p Removal of all remaining teeth including 2, 3, 4, 5, 6, 7, 8, 9, 10, 11, 12, 13, 19, 20, 21, 22, 23, 24, 25, 26, 28, 29, 30  - on full liquid diet - advanced to mechanical /dental soft diet on 8/15     Osteomyelitis left 3rd toe s/p amputation 8/7  Necrotic wounds to Lt great toes, Lt 3rd toe and Lt lateral Heel, Rt great toe: 2/2 thrombi.  -- Podiatry took to OR 8/7 for amputation. Orthotist following up.   -- continue wound cares for other digits     Normocytic anemia, likey 2/2 chronic disease  Chronic anemia  Baseline hemoglobin approximately 7.  Likely related to chronic disease, untreated HIV.  - Hgb 7.4 yesterday -> 8.1 today  - B12 and folate -> wnl  - consider transfusion for Hgb < 7 (or close)  - low nl ferritin- on po ferrous gluconate  - CBC on 8/16     Left knee pain 2/2 OA: pt reports history of pain in that knee with activity but pain now is more acute. ROM okay. There is some warmth and swelling there.   - In light of the MSSA bacteremia, got XR and called ortho for effusion  - Per ortho no indication for intervention, though they did order a brace. Appreciate assistance. There are following EOD.  - PT/OT     IV drug use  Polysubstance use disorder  Opiate use disorder  *Chem dep consulted 7/15 but chemical dependency did not feel she is  appropriate for CD assessment at this time and recommended consultation towards the end of her antibiotic course.  Reconsult CD once she is close to finishing her IV antibiotic.  *Was using IV dilaudid as well as Percocet p.o. earlier this stay pretty extensively.  Attempts made to wean off IV narcotics and taper down po narcotics though patient felt she was going through withdrawal. Psych consulted and recommended suboxone though patient declined. Psych recommended rapid taper of methadone instead.   *completed methadone taper of 30 mg on 7/21, 20 mg 7/22 and then 10 mg 7/23, then stop  -started suboxone on 7/27 - appreciate psych/addiction medicine assistance who will help with titration  - now on suboxone BID and doing very well. Reports no cravings and hopes to continue this regimen outpatient.  -Plan was to reconsult CD (8/13th)once she is close to finishing her IV antibiotic, but patient is not interested in treatment. She has been through inpatient and outpatient programs and not found them helpful. Also I'm not sure she could continue suboxone in treatment and I think that is probably her best chance for sobriety.  -FYI: She has a daughter who just had twins and lives with her in-laws. Daughter and daughter's mother in law live in [place] (? - pt going to confirm) and have invited patient to live there to help with babies. She thinks this is a good idea as she'll be kept busy and she gets along well with this family. She is hoping to continue follow up with addiction medicine to continue suboxone. She feels that with suboxone and family support she will do well. Will need to have addiction med follow up with her to prescribe suboxone on discharge and help her set up outpatient follow up.  -08/12 -> Pain service recommended   1. Change Suboxone to  4 mg qid for better analgesic effect.     2. discontinue oxycodone   3. to use acetaminophen for additional analgesic effect if needed.- can give as much as 1000  mg tid   (don't exceed 3000 mg total per day).       HIV, untreated PTA  - CD4 count greater than 500, viral load 3695  - Started Biktarvy 7/8; ID following  - Will need outpt follow up     Chronic back pain: pt notes it present ever since she was pregnant.   -tylenol  -added lidoderm patch 7/23  -increase gabapentin to 600 mg TID     Toxic/septic/metabolic encephalopathy, improved  Likely encephalopathy due to sepsis, drug use, CVA, prolonged ICU stay; had been pulling lines due to alterned mentation thought this all now appears resolved.      Pyelonephritis  Urine cultures positive for Klebsiella.  Completed course of treatment with ceftriaxone.  repeat urine cultures 7/10 with ESBL E coli but given unimpressive UA and no UTI symptoms ID suggest holding off on abx for ESBL UTI  -monitor for any symptoms     Acute hypoxemic respiratory failure, resolved  Intubated 7/1/2021.  Extubated 7/11/2021.  Likely related to septic shock from MSSA mitral valve endocarditis     FORD, resolved suspected due to sepsis  Hypokalemia  - on potassium replacement protocol  - will check BMP periodically     Chronic anemia  Baseline hemoglobin approximately 7.  Likely related to chronic disease, untreated HIV.  -Monitor hemoglobin periodically; stable around 8  - low nl ferritin- start po ferrous gluconate     Non severe malnutrition  -- Supplements      Prior hepatitis C infection  Antibody positive but RNA negative  -Noted     Restless legs: unclear if this is true restless leg syndrome or due to being uncomfortable  -continue mirapex prn     Major depressive disorder: pt is not suicidal but also doesn't seem to have much of a will to live based upon discussion 7/25.  I wonder if her underlying chronic pain which is difficult to treat as well as being in the hospital is contributing. Was seen by psych and declined any medication treatment on 7/26.  patient was on zoloft for years starting at age 13. She is willing to try zoloft again.  "  -Started zoloft 8/1 at 25mg po and titrating up to 50mg. Pt feels this is helping. Psychiatry followed up and agreed with this medication.     Diet: Mechanical/Dental Soft Diet  Snacks/Supplements Adult: Ensure Enlive; Between Meals    DVT Prophylaxis: Pneumatic Compression Devices  Neri Catheter: Not present  Central Lines: PRESENT  PICC Single Lumen 08/02/21 Left Cephalic-Site Assessment: WDL    Code Status: Full Code      Disposition Plan   Expected discharge: 09/01 if Brain abscess resolves.  recommended to unclear once antibiotic plan established.    She is homeless at baseline but has a plan post discharge to live with daughter and daughter's in laws. This is in the [facility] area and will allow for follow up cares.    The patient's care was discussed with the Bedside Nurse, Patient and NSG Consultant.    Eleazar Black MD  Hospitalist Service  [facility]  Securely message with the Vocera Web Console (learn more here)  Text page via [employer] Paging/Directory    Clinically Significant Risk Factors Present on Admission              _\"This dictation was performed with voice recognition software and may contain errors,  omissions and inadvertent word substitution.\"     _____________________________________________________________________    Interval History   Denies any new complaint  Denies Chest pain/ SOB/ cough  Denies any N&V/ bowel problems  Feels fatigued  Denies urinary problems   Denies fever/chills/rigors     4 point ROS done and neg unless mentioned     Data reviewed today: I reviewed all medications, new labs and imaging results over the last 24 hours. I personally reviewed the MRI brain image(s) showing As below.    Physical Exam   /89 (BP Location: Left arm)   Pulse 88   Temp 97.6  F (36.4  C) (Axillary)   Resp 18   Ht 1.727 m (5' 7.99\")   Wt 82.8 kg (182 lb 9.6 oz)   SpO2 95%   Breastfeeding Unknown   BMI 27.77 kg/m    Gen- pleasant  lying in bed  HEENT- NAD, " BO  Neck- supple, no JVD elevation, no thyromegaly  CVS- I+II+ no m/r/g  RS- CTABS  Abdo- soft, no tenderness . No g/r/r, BS+ no hepatosplenomegaly   Ext- no edema   CNS- no gross focal deficit    Data   BMP  Recent Labs   Lab 08/17/21  0607 08/16/21  0635 08/15/21  0609 08/14/21  0634    138  --   --    POTASSIUM 3.9 3.8  --   --    CHLORIDE 109 108  --   --    DEREK 8.9 8.6  --   --    CO2 26 28  --   --    BUN 12 11  --   --    CR 0.62 0.60  --   --    GLC 98 100* 80 81     CBC  Recent Labs   Lab 08/17/21  0608 08/16/21  0635   WBC 4.3 3.9*   RBC 3.52* 3.39*   HGB 9.2* 8.9*   HCT 30.3* 28.9*   MCV 86 85   MCH 26.1* 26.3*   MCHC 30.4* 30.8*   RDW 26.5* 26.7*   * 606*

## 2021-08-20 NOTE — PLAN OF CARE
DATE & TIME: 8/19/21 2390 - 4545  Cognitive Concerns/ Orientation : A/O x4   BEHAVIOR & AGGRESSION TOOL COLOR: Green  CIWA SCORE: NA   ABNL VS/O2: VSS, RA  MOBILITY: SB, GB, Walker  PAIN MANAGMENT: ice packs for foot and mouth pain  DIET: [facility] soft- good appetite   BOWEL/BLADDER: continent, up to BR   ABNL LAB/BG: hgb 9.2  DRAIN/DEVICES: PICC    TELEMETRY RHYTHM: SR  SKIN: Dressing to left foot CDI. Orders to change every other day  TESTS/PROCEDURES: follow-up brain MRI 8/17 stable, repeat 8/24  D/C DATE: Pending  Discharge Barriers: IV abx through 9/1  OTHER IMPORTANT INFO: ID, neurosurg following

## 2021-08-21 LAB — SARS-COV-2 RNA RESP QL NAA+PROBE: NEGATIVE

## 2021-08-21 PROCEDURE — 250N000013 HC RX MED GY IP 250 OP 250 PS 637: Performed by: PODIATRIST

## 2021-08-21 PROCEDURE — 120N000001 HC R&B MED SURG/OB

## 2021-08-21 PROCEDURE — 99232 SBSQ HOSP IP/OBS MODERATE 35: CPT | Performed by: STUDENT IN AN ORGANIZED HEALTH CARE EDUCATION/TRAINING PROGRAM

## 2021-08-21 PROCEDURE — 999N000190 HC STATISTIC VAT ROUNDS

## 2021-08-21 PROCEDURE — 250N000009 HC RX 250: Performed by: PODIATRIST

## 2021-08-21 PROCEDURE — 87635 SARS-COV-2 COVID-19 AMP PRB: CPT | Performed by: STUDENT IN AN ORGANIZED HEALTH CARE EDUCATION/TRAINING PROGRAM

## 2021-08-21 PROCEDURE — 250N000013 HC RX MED GY IP 250 OP 250 PS 637: Performed by: STUDENT IN AN ORGANIZED HEALTH CARE EDUCATION/TRAINING PROGRAM

## 2021-08-21 PROCEDURE — 250N000013 HC RX MED GY IP 250 OP 250 PS 637: Performed by: HOSPITALIST

## 2021-08-21 PROCEDURE — 250N000013 HC RX MED GY IP 250 OP 250 PS 637: Performed by: DENTIST

## 2021-08-21 RX ADMIN — BUPRENORPHINE HYDROCHLORIDE, NALOXONE HYDROCHLORIDE 1 FILM: 4; 1 FILM, SOLUBLE BUCCAL; SUBLINGUAL at 08:59

## 2021-08-21 RX ADMIN — GABAPENTIN 600 MG: 300 CAPSULE ORAL at 16:08

## 2021-08-21 RX ADMIN — BUPRENORPHINE HYDROCHLORIDE, NALOXONE HYDROCHLORIDE 1 FILM: 4; 1 FILM, SOLUBLE BUCCAL; SUBLINGUAL at 17:49

## 2021-08-21 RX ADMIN — NAFCILLIN SODIUM 2 G: 2 INJECTION, POWDER, LYOPHILIZED, FOR SOLUTION INTRAMUSCULAR; INTRAVENOUS at 14:18

## 2021-08-21 RX ADMIN — ACETAMINOPHEN 650 MG: 325 TABLET, FILM COATED ORAL at 16:08

## 2021-08-21 RX ADMIN — NAFCILLIN SODIUM 2 G: 2 INJECTION, POWDER, LYOPHILIZED, FOR SOLUTION INTRAMUSCULAR; INTRAVENOUS at 17:49

## 2021-08-21 RX ADMIN — BICTEGRAVIR SODIUM, EMTRICITABINE, AND TENOFOVIR ALAFENAMIDE FUMARATE 1 TABLET: 50; 200; 25 TABLET ORAL at 08:59

## 2021-08-21 RX ADMIN — SERTRALINE HYDROCHLORIDE 50 MG: 50 TABLET ORAL at 09:00

## 2021-08-21 RX ADMIN — PANTOPRAZOLE SODIUM 40 MG: 40 TABLET, DELAYED RELEASE ORAL at 08:59

## 2021-08-21 RX ADMIN — BUPRENORPHINE HYDROCHLORIDE, NALOXONE HYDROCHLORIDE 1 FILM: 4; 1 FILM, SOLUBLE BUCCAL; SUBLINGUAL at 22:03

## 2021-08-21 RX ADMIN — ACETAMINOPHEN 650 MG: 325 TABLET, FILM COATED ORAL at 22:10

## 2021-08-21 RX ADMIN — GABAPENTIN 600 MG: 300 CAPSULE ORAL at 22:03

## 2021-08-21 RX ADMIN — QUETIAPINE FUMARATE 25 MG: 25 TABLET ORAL at 22:10

## 2021-08-21 RX ADMIN — GABAPENTIN 600 MG: 300 CAPSULE ORAL at 08:59

## 2021-08-21 RX ADMIN — NAFCILLIN SODIUM 2 G: 2 INJECTION, POWDER, LYOPHILIZED, FOR SOLUTION INTRAMUSCULAR; INTRAVENOUS at 22:03

## 2021-08-21 RX ADMIN — NAFCILLIN SODIUM 2 G: 2 INJECTION, POWDER, LYOPHILIZED, FOR SOLUTION INTRAMUSCULAR; INTRAVENOUS at 02:03

## 2021-08-21 RX ADMIN — NAFCILLIN SODIUM 2 G: 2 INJECTION, POWDER, LYOPHILIZED, FOR SOLUTION INTRAMUSCULAR; INTRAVENOUS at 10:20

## 2021-08-21 RX ADMIN — BUPRENORPHINE HYDROCHLORIDE, NALOXONE HYDROCHLORIDE 1 FILM: 4; 1 FILM, SOLUBLE BUCCAL; SUBLINGUAL at 13:10

## 2021-08-21 RX ADMIN — NAFCILLIN SODIUM 2 G: 2 INJECTION, POWDER, LYOPHILIZED, FOR SOLUTION INTRAMUSCULAR; INTRAVENOUS at 06:11

## 2021-08-21 ASSESSMENT — ACTIVITIES OF DAILY LIVING (ADL)
ADLS_ACUITY_SCORE: 22
ADLS_ACUITY_SCORE: 23

## 2021-08-21 NOTE — PLAN OF CARE
DATE & TIME: 8/20/21 3405-0799  Cognitive Concerns/ Orientation : A/O x4, calm/cooperative  BEHAVIOR & AGGRESSION TOOL COLOR: Green   ABNL VS/O2: VSS on RA  MOBILITY: SB, GB, Walker. Ambulated in [place] with PT this shift  PAIN MANAGMENT: PRN Tylenol given x2, on scheduled suboxone  DIET: mech soft- good appetite   BOWEL/BLADDER: continent, up to BR   ABNL LAB/BG: no labs drawn today  DRAIN/DEVICES: PICC; IV abx q4h  TELEMETRY RHYTHM: tele d/c  SKIN:  Bilateral foot wound dressings changed in day shift.  TESTS/PROCEDURES: follow-up brain MRI scheduled for 8/24  D/C DATE: Pending  Discharge Barriers: IV abx through 9/1  OTHER IMPORTANT INFO: PT/OT/WOC consulted

## 2021-08-21 NOTE — PLAN OF CARE
DATE & TIME: 8/20/21 8153-7029  Cognitive Concerns/ Orientation : A/O x4  BEHAVIOR & AGGRESSION TOOL COLOR: Green   ABNL VS/O2: VSS on RA  MOBILITY: SBA GB, Walker. Ambulated in [place] with PT in evening.  PAIN MANAGMENT: denies  DIET: mech soft- good appetite   BOWEL/BLADDER: continent, up to BR   ABNL LAB/BG: no labs drawn today  DRAIN/DEVICES: PICC    TELEMETRY RHYTHM: tele d/c  SKIN:  Bilateral foot wound dressings changed in day shift.  TESTS/PROCEDURES: follow-up brain MRI scheduled for 8/24  D/C DATE: Pending  Discharge Barriers: IV abx through 9/1  OTHER IMPORTANT INFO: PT/OT/WOC consulted

## 2021-08-21 NOTE — PROGRESS NOTES
[facility]    Medicine Progress Note - Hospitalist Service       Date of Admission:  7/1/2021    Assessment & Plan         41 year old female with h/o untreated HIV, hepatitis C who was admitted on 7/1/2021 for MSSA mitral valve endocarditis resulting in septic shock complicated by embolic left parietal lobe infarctions and multiple peripheral emboli, and initially with encephalopathy, and shock.  She was intubated in ED on 7/1/2021 subsequently extubated 7/11/2021.  Blood cultures turned positive on hospital day 1 with MSSA.  Urine culture was positive for Klebsiella.  She was treated initially with ceftriaxone and vancomycin, and has since been narrowed to nafcillin per ID.     MSSA mitral valve endocarditis  Septic shock, resolved  Probable myopericarditis  Small pericardial effusion  *JAE 7/5 noted with small ASD, mod to large mobile vegetation (14mm long, 6mm wide) of the P2 segment of the mitral leaflet. No valve perforation; mod MR  *blood cultures on admission with MSSA; blood cultures have been negative since 7/6.   *ID following; antibiotic has been narrowed down to Nafcillin, 6 weeks of antibiotic, completion date 8/18/2021.  Now increased by 2 more weeks until 09/01/2021 due to abscess on MRI  *limited ECHO repeated 7/15 to reassess pericardial fluid 7/15 per cardiology and noted:  organized effusion posteriorly, measuring 1.2-1.5cm, no evidence of tamponade; small mobile vegetation noted attached to the mitral leaflet, mod to severe MR; compared to 7/8/21, effusion size is probably stable, Degree of MR is worse, Vegetation is smaller.  Cardiology signed off 7/15.   *PICC in place  -- given pt's stability and cooperativity, I am consulting CT surgery today to do any appropriate work up while patient is in hospital and then arrange for follow up if that is that plan.  -- brain MRI should be ordered to rule out mycotic aneurysms or brain abscesses per CT surg  -- JAE (no  vegetations) and Cor Angio (nomral coronaries) completed 08/12  -per cardiothoracic surgery reconsult 8/14 felt she no longer has an urgent surgical indication,given absence of vegetations and only moderate MR on ECHO 8/12/2021.She may require mitral valve repair/replacement in the future if she has progressive MR, but this would be better addressed after she has undergone treatment for substance abuse and has demonstrated sobriety in the out-patient setting. She should follow up with cardiology for medical management and surveillance of MR and ASD, and can be referred back to CV surgery as needed.   - Antibiotic plan per ID     Ischemic CVA, embolic stroke due to infective endocarditis  *CT head on admission notable for new acute to subacute PCA territory ischemic stroke  *MRI 7/3 noted with progressive, multifocal, acute infarcts without MR evidence for hemorrhage or midline shift  *Patient does have small left-to-right shunt on JAE, but most likely from embolic spread from endocarditis  *was evaluated by neurology, to continue antibiotics; per neuro to avoid anticoagulation (except DVT prophylaxis dose)     Abnormal MRI  Possible brain abscess  * MRI 8/10/2021 showed- Multiple enhancing subacute infarcts with evidence of small volume hemorrhage, largest at the junction of the left parietal and occipital lobes which demonstrates peripheral enhancement and worsening vasogenic edema. Developing abscess(es) related to septic emboli cannot be excluded  - Neurology was reconsulted 8/14, recommended to screen for mycotic aneurysm   * CTA head 8/14 -showed Complex peripherally enhancing fluid collection in the paramedian left parietal lobepatent arteries of head & neck. No evidence for dissection.  and CT head today & neurosurgery consult  * Ct head w/o contrast 8/14 showed no significant change in the complex peripherally enhancing fluid collection in the paramedian left parietal lobe with surrounding vasogenic edema.  Atypical for bland evolving subacute ischemic infarct. Less likely possibility for evolving subacute hematoma, consider tissue sampling.Mild local mass effect in the left parieto-occipital region with minimal narrowing of the occipital horn of the left lateral ventricle.No midline shift/herniation  - pt remains asymptomatic  - neurosurgery consulted 8/14th appreciated. As the pt has no neuro symptoms they have recommended repeating the brain MRI on 8/17-reviewed.  - NSG -> No surgical intervention at this time   - Repeat MRI brain in 1 week 8/24/21   - Activity as tolerated, continue with therapies  - Continue supportive and symptomatic treatment     Infected Tooth  S/p removal of all remaining teeth on 8/13  Assessment: OMFS was consulted for multiple infected tooth  - S/p Removal of all remaining teeth including 2, 3, 4, 5, 6, 7, 8, 9, 10, 11, 12, 13, 19, 20, 21, 22, 23, 24, 25, 26, 28, 29, 30  - on full liquid diet - advanced to mechanical /dental soft diet on 8/15     Osteomyelitis left 3rd toe s/p amputation 8/7  Necrotic wounds to Lt great toes, Lt 3rd toe and Lt lateral Heel, Rt great toe: 2/2 thrombi.  -- Podiatry took to OR 8/7 for amputation. Orthotist following up.   -- continue wound cares for other digits     Normocytic anemia, likey 2/2 chronic disease  Chronic anemia  Baseline hemoglobin approximately 7.  Likely related to chronic disease, untreated HIV.  - Hgb 7.4 yesterday -> 8.1 today  - B12 and folate -> wnl  - consider transfusion for Hgb < 7 (or close)  - low nl ferritin- on po ferrous gluconate  - CBC on 8/16     Left knee pain 2/2 OA: pt reports history of pain in that knee with activity but pain now is more acute. ROM okay. There is some warmth and swelling there.   - In light of the MSSA bacteremia, got XR and called ortho for effusion  - Per ortho no indication for intervention, though they did order a brace. Appreciate assistance. There are following EOD.  - PT/OT     IV drug  use  Polysubstance use disorder  Opiate use disorder  *Chem dep consulted 7/15 but chemical dependency did not feel she is appropriate for CD assessment at this time and recommended consultation towards the end of her antibiotic course.  Reconsult CD once she is close to finishing her IV antibiotic.  *Was using IV dilaudid as well as Percocet p.o. earlier this stay pretty extensively.  Attempts made to wean off IV narcotics and taper down po narcotics though patient felt she was going through withdrawal. Psych consulted and recommended suboxone though patient declined. Psych recommended rapid taper of methadone instead.   *completed methadone taper of 30 mg on 7/21, 20 mg 7/22 and then 10 mg 7/23, then stop  -started suboxone on 7/27 - appreciate psych/addiction medicine assistance who will help with titration  - now on suboxone BID and doing very well. Reports no cravings and hopes to continue this regimen outpatient.  -Plan was to reconsult CD (8/13th)once she is close to finishing her IV antibiotic, but patient is not interested in treatment. She has been through inpatient and outpatient programs and not found them helpful. Also I'm not sure she could continue suboxone in treatment and I think that is probably her best chance for sobriety.  -FYI: She has a daughter who just had twins and lives with her in-laws. Daughter and daughter's mother in law live in [place] (? - pt going to confirm) and have invited patient to live there to help with babies. She thinks this is a good idea as she'll be kept busy and she gets along well with this family. She is hoping to continue follow up with addiction medicine to continue suboxone. She feels that with suboxone and family support she will do well. Will need to have addiction med follow up with her to prescribe suboxone on discharge and help her set up outpatient follow up.  Plan:  - 08/12 -> Pain service recommended  - Change Suboxone to  4 mg qid for better analgesic  effect.    - discontinue oxycodone  - to use acetaminophen for additional analgesic effect if needed.- can give as much as 1000 mg tid   (don't exceed 3000 mg total per day).       HIV, untreated PTA  - CD4 count greater than 500, viral load 3695  - Started Biktarvy 7/8; ID following  - Will need outpt follow up     Chronic back pain: pt notes it present ever since she was pregnant.   -tylenol  -added lidoderm patch 7/23  -increase gabapentin to 600 mg TID     Toxic/septic/metabolic encephalopathy, improved  Likely encephalopathy due to sepsis, drug use, CVA, prolonged ICU stay; had been pulling lines due to alterned mentation thought this all now appears resolved.      Pyelonephritis  Urine cultures positive for Klebsiella.  Completed course of treatment with ceftriaxone.  repeat urine cultures 7/10 with ESBL E coli but given unimpressive UA and no UTI symptoms ID suggest holding off on abx for ESBL UTI  -monitor for any symptoms     Acute hypoxemic respiratory failure, resolved  Intubated 7/1/2021.  Extubated 7/11/2021.  Likely related to septic shock from MSSA mitral valve endocarditis     FORD, resolved suspected due to sepsis  Hypokalemia  - on potassium replacement protocol  - will check BMP periodically     Chronic anemia  Baseline hemoglobin approximately 7.  Likely related to chronic disease, untreated HIV.  -Monitor hemoglobin periodically; stable around 8  - low nl ferritin- start po ferrous gluconate     Non severe malnutrition  -- Supplements      Prior hepatitis C infection  Antibody positive but RNA negative  -Noted     Restless legs: unclear if this is true restless leg syndrome or due to being uncomfortable  -continue mirapex prn     Major depressive disorder: pt is not suicidal but also doesn't seem to have much of a will to live based upon discussion 7/25.  I wonder if her underlying chronic pain which is difficult to treat as well as being in the hospital is contributing. Was seen by psych and  "declined any medication treatment on 7/26.  patient was on zoloft for years starting at age 13. She is willing to try zoloft again.   -Started zoloft 8/1 at 25mg po and titrating up to 50mg. Pt feels this is helping. Psychiatry followed up and agreed with this medication.     Diet: Mechanical/Dental Soft Diet  Snacks/Supplements Adult: Ensure Enlive; Between Meals    DVT Prophylaxis: Pneumatic Compression Devices  Neri Catheter: Not present  Central Lines: PRESENT  PICC Single Lumen 08/02/21 Left Cephalic-Site Assessment: WDL    Code Status: Full Code      Disposition Plan   Expected discharge: 09/01 if Brain abscess resolves.  recommended to unclear once antibiotic plan established.    She is homeless at baseline but has a plan post discharge to live with daughter and daughter's in laws. This is in the [facility] and will allow for follow up cares.    The patient's care was discussed with the Bedside Nurse, Patient and NSG Consultant.    Eleazar Black MD  Hospitalist Service  [facility]  Securely message with the Vocera Web Console (learn more here)  Text page via SharedReviews Paging/Directory    Clinically Significant Risk Factors Present on Admission              _\"This dictation was performed with voice recognition software and may contain errors,  omissions and inadvertent word substitution.\"     _____________________________________________________________________    Interval History      Denies any new complaint  Denies Chest pain/ SOB/ cough  Denies any N&V/ bowel problems  Feels fatigued  Denies urinary problems  Denies fever/chills/rigors     Data reviewed today: I reviewed all medications, new labs and imaging results over the last 24 hours. I personally reviewed the MRI brain image(s) showing As below.    Physical Exam   /69 (BP Location: Left arm)   Pulse 79   Temp 98.1  F (36.7  C) (Oral)   Resp 18   Ht 1.727 m (5' 7.99\")   Wt 82.8 kg (182 lb 9.6 oz)   SpO2 100%   " Breastfeeding Unknown   BMI 27.77 kg/m    Gen- pleasant  lying in bed  HEENT- NAD, BO  Neck- supple, no JVD elevation, no thyromegaly  CVS- I+II+ no m/r/g  RS- CTABS  Abdo- soft, no tenderness . No g/r/r, BS+ no hepatosplenomegaly   Ext- no edema   CNS- no gross focal deficit    Data   BMP  Recent Labs   Lab 08/17/21  0607 08/16/21  0635 08/15/21  0609    138  --    POTASSIUM 3.9 3.8  --    CHLORIDE 109 108  --    DEREK 8.9 8.6  --    CO2 26 28  --    BUN 12 11  --    CR 0.62 0.60  --    GLC 98 100* 80     CBC  Recent Labs   Lab 08/17/21  0608 08/16/21  0635   WBC 4.3 3.9*   RBC 3.52* 3.39*   HGB 9.2* 8.9*   HCT 30.3* 28.9*   MCV 86 85   MCH 26.1* 26.3*   MCHC 30.4* 30.8*   RDW 26.5* 26.7*   * 606*

## 2021-08-21 NOTE — PLAN OF CARE
DATE & TIME: 8/21/321 4450-3182  Cognitive Concerns/ Orientation : A/O x4  BEHAVIOR & AGGRESSION TOOL COLOR: Green   ABNL VS/O2: VSS on RA  MOBILITY: SBA GB, Walker. Ambulated in [place] x 1 and up in chair.  She would like to shower this evening  PAIN MANAGMENT: denies  DIET: [facility] soft- good appetite; taking supplements  BOWEL/BLADDER: continent, up to BR   ABNL LAB/BG: no labs drawn today  DRAIN/DEVICES: PICC ANDRIA   TELEMETRY RHYTHM: tele d/c  SKIN:  Bilateral foot wound dressings clean/dry/intact  TESTS/PROCEDURES: follow-up brain MRI scheduled for 8/24  D/C DATE: Pending  Discharge Barriers: IV abx through 9/1  OTHER IMPORTANT INFO: Neurosurgery following; PT/OT/WOC consulted

## 2021-08-21 NOTE — PROGRESS NOTES
[facility]     Neurosurgery  Daily Progress Note        Assessment & Plan      Manas Hernandez is a 41 year old female who was admitted on 7/1/2021 with a history of polysubstance abuse, HIV, HepC and admitted 7/1/2021 for MSSA endocarditis with initial brain imaging showed what was felt at that time to be consistent with infarct, but now with concern for evolving abscess. She has been treated with IV antibiotics. ID is following and recommended 2 more weeks of antibiotics (end date of 9/1/2021). She states she's doing well today and denies complains.  MRI brain completed on 8/17/21 without significant change compared to 8/10/21 study.     Plan:  - No surgical intervention at this time   - Repeat MRI brain in 1 week 8/24/21   - Activity as tolerated, continue with therapies  - Continue supportive and symptomatic treatment   - Will continue to follow     Lisha Benz MPAS  [facility] Neurosurgery  [address]  [address] [place] [id number]    Tel [phone]  Pager [phone]

## 2021-08-22 ENCOUNTER — APPOINTMENT (OUTPATIENT)
Dept: PHYSICAL THERAPY | Facility: CLINIC | Age: 41
End: 2021-08-22
Payer: MEDICAID

## 2021-08-22 LAB — ANTIBODY ID: NORMAL

## 2021-08-22 PROCEDURE — 99207 PR CDG-MDM COMPONENT: MEETS MODERATE - UP CODED: CPT | Performed by: INTERNAL MEDICINE

## 2021-08-22 PROCEDURE — 250N000013 HC RX MED GY IP 250 OP 250 PS 637: Performed by: STUDENT IN AN ORGANIZED HEALTH CARE EDUCATION/TRAINING PROGRAM

## 2021-08-22 PROCEDURE — 250N000013 HC RX MED GY IP 250 OP 250 PS 637: Performed by: PODIATRIST

## 2021-08-22 PROCEDURE — 999N000190 HC STATISTIC VAT ROUNDS

## 2021-08-22 PROCEDURE — 250N000009 HC RX 250: Performed by: PODIATRIST

## 2021-08-22 PROCEDURE — 97116 GAIT TRAINING THERAPY: CPT | Mod: GP

## 2021-08-22 PROCEDURE — 99232 SBSQ HOSP IP/OBS MODERATE 35: CPT | Performed by: INTERNAL MEDICINE

## 2021-08-22 PROCEDURE — 250N000013 HC RX MED GY IP 250 OP 250 PS 637: Performed by: HOSPITALIST

## 2021-08-22 PROCEDURE — 99207 PR MOONLIGHTING INDICATOR: CPT | Performed by: INTERNAL MEDICINE

## 2021-08-22 PROCEDURE — 120N000001 HC R&B MED SURG/OB

## 2021-08-22 PROCEDURE — 250N000013 HC RX MED GY IP 250 OP 250 PS 637: Performed by: DENTIST

## 2021-08-22 RX ADMIN — BICTEGRAVIR SODIUM, EMTRICITABINE, AND TENOFOVIR ALAFENAMIDE FUMARATE 1 TABLET: 50; 200; 25 TABLET ORAL at 08:50

## 2021-08-22 RX ADMIN — GABAPENTIN 600 MG: 300 CAPSULE ORAL at 15:45

## 2021-08-22 RX ADMIN — NAFCILLIN SODIUM 2 G: 2 INJECTION, POWDER, LYOPHILIZED, FOR SOLUTION INTRAMUSCULAR; INTRAVENOUS at 02:00

## 2021-08-22 RX ADMIN — BUPRENORPHINE HYDROCHLORIDE, NALOXONE HYDROCHLORIDE 1 FILM: 4; 1 FILM, SOLUBLE BUCCAL; SUBLINGUAL at 08:50

## 2021-08-22 RX ADMIN — SERTRALINE HYDROCHLORIDE 50 MG: 50 TABLET ORAL at 08:50

## 2021-08-22 RX ADMIN — BUPRENORPHINE HYDROCHLORIDE, NALOXONE HYDROCHLORIDE 1 FILM: 4; 1 FILM, SOLUBLE BUCCAL; SUBLINGUAL at 13:12

## 2021-08-22 RX ADMIN — ACETAMINOPHEN 650 MG: 325 TABLET, FILM COATED ORAL at 02:26

## 2021-08-22 RX ADMIN — NAFCILLIN SODIUM 2 G: 2 INJECTION, POWDER, LYOPHILIZED, FOR SOLUTION INTRAMUSCULAR; INTRAVENOUS at 18:33

## 2021-08-22 RX ADMIN — GABAPENTIN 600 MG: 300 CAPSULE ORAL at 21:52

## 2021-08-22 RX ADMIN — ACETAMINOPHEN 650 MG: 325 TABLET, FILM COATED ORAL at 21:52

## 2021-08-22 RX ADMIN — ACETAMINOPHEN 650 MG: 325 TABLET, FILM COATED ORAL at 14:51

## 2021-08-22 RX ADMIN — NAFCILLIN SODIUM 2 G: 2 INJECTION, POWDER, LYOPHILIZED, FOR SOLUTION INTRAMUSCULAR; INTRAVENOUS at 21:52

## 2021-08-22 RX ADMIN — QUETIAPINE FUMARATE 25 MG: 25 TABLET ORAL at 21:52

## 2021-08-22 RX ADMIN — NAFCILLIN SODIUM 2 G: 2 INJECTION, POWDER, LYOPHILIZED, FOR SOLUTION INTRAMUSCULAR; INTRAVENOUS at 10:21

## 2021-08-22 RX ADMIN — GABAPENTIN 600 MG: 300 CAPSULE ORAL at 08:50

## 2021-08-22 RX ADMIN — NAFCILLIN SODIUM 2 G: 2 INJECTION, POWDER, LYOPHILIZED, FOR SOLUTION INTRAMUSCULAR; INTRAVENOUS at 06:02

## 2021-08-22 RX ADMIN — BUPRENORPHINE HYDROCHLORIDE, NALOXONE HYDROCHLORIDE 1 FILM: 4; 1 FILM, SOLUBLE BUCCAL; SUBLINGUAL at 21:52

## 2021-08-22 RX ADMIN — NAFCILLIN SODIUM 2 G: 2 INJECTION, POWDER, LYOPHILIZED, FOR SOLUTION INTRAMUSCULAR; INTRAVENOUS at 14:09

## 2021-08-22 RX ADMIN — BUPRENORPHINE HYDROCHLORIDE, NALOXONE HYDROCHLORIDE 1 FILM: 4; 1 FILM, SOLUBLE BUCCAL; SUBLINGUAL at 18:33

## 2021-08-22 RX ADMIN — PANTOPRAZOLE SODIUM 40 MG: 40 TABLET, DELAYED RELEASE ORAL at 08:50

## 2021-08-22 ASSESSMENT — ACTIVITIES OF DAILY LIVING (ADL)
ADLS_ACUITY_SCORE: 23

## 2021-08-22 NOTE — PLAN OF CARE
DATE & TIME: 8/21/321 1207-7856  Cognitive Concerns/ Orientation : A/O x4  BEHAVIOR & AGGRESSION TOOL COLOR: Green   ABNL VS/O2: VSS on RA  MOBILITY: SBA GB, Walker. Steady, moving well. Ambulated in [place] x 1 and up in chair x1.    PAIN MANAGMENT: c/o generalized back pain, given PRN tylenol. On scheduled suboxone  DIET: mech soft- good appetite  BOWEL/BLADDER: continent, up to BR   ABNL LAB/BG: no labs drawn today  DRAIN/DEVICES: PICC ANDRIA - blood return noted  TELEMETRY RHYTHM: n/a  SKIN:  Bilateral foot wound dressings clean/dry/intact  TESTS/PROCEDURES: follow-up brain MRI scheduled for 8/24  D/C DATE: Pending  Discharge Barriers: IV abx through 9/1  OTHER IMPORTANT INFO: Neurosurgery following; PT/OT/WOC consulted. Given NS for mouth flushes. Was going to take a shower this shift, but pt fell asleep. Said she will wait until tomorrow to shower.

## 2021-08-22 NOTE — PROGRESS NOTES
[facility]  Hospitalist Progress Note for 8/22/2021       Assessment and Plan:   41 year old female with h/o untreated HIV, hepatitis C who was admitted on 7/1/2021 for MSSA mitral valve endocarditis resulting in septic shock complicated by embolic left parietal lobe infarctions and multiple peripheral emboli, and initially with encephalopathy, and shock.  She was intubated in ED on 7/1/2021 subsequently extubated 7/11/2021.  Blood cultures turned positive on hospital day 1 with MSSA.  Urine culture was positive for Klebsiella.  She was treated initially with ceftriaxone and vancomycin, and has since been narrowed to nafcillin per ID.     MSSA mitral valve endocarditis  Septic shock, resolved  Probable myopericarditis  Small pericardial effusion  *JAE 7/5 noted with small ASD, mod to large mobile vegetation (14mm long, 6mm wide) of the P2 segment of the mitral leaflet. No valve perforation; mod MR  *blood cultures on admission with MSSA; blood cultures have been negative since 7/6.   *ID following; antibiotic has been narrowed down to Nafcillin,duration extended from 8/18 to 9/1 due  abscess on MRI  *limited ECHO repeated 7/15 to reassess pericardial fluid 7/15 per cardiology and noted:  organized effusion posteriorly, measuring 1.2-1.5cm, no evidence of tamponade; small mobile vegetation noted attached to the mitral leaflet, mod to severe MR; compared to 7/8/21, effusion size is probably stable, Degree of MR is worse, Vegetation is smaller.  Cardiology signed off 7/15.   *PICC in place  -- JAE (no vegetations) and Cor Angio (nomral coronaries) completed 08/12  - per cardiothoracic surgery reconsult 8/14 felt she no longer has an urgent surgical indication,given absence of vegetations and only moderate MR on ECHO 8/12/2021.She may require mitral valve repair/replacement in the future if she has progressive MR, but this would be better addressed after she has undergone treatment for substance abuse  and has demonstrated sobriety in the out-patient setting. She should follow up with cardiology for medical management and surveillance of MR and ASD, and can be referred back to CV surgery as needed.   - Antibiotic plan per ID     Ischemic CVA, embolic stroke due to infective endocarditis  *CT head on admission notable for new acute to subacute PCA territory ischemic stroke  *MRI 7/3 noted with progressive, multifocal, acute infarcts without MR evidence for hemorrhage or midline shift  *Patient does have small left-to-right shunt on JAE, but most likely from embolic spread from endocarditis  *was evaluated by neurology, to continue antibiotics; per neuro to avoid anticoagulation (except DVT prophylaxis dose)     Abnormal MRI  Possible brain abscess  * MRI 8/10/2021 showed- Multiple enhancing subacute infarcts with evidence of small volume hemorrhage, largest at the junction of the left parietal and occipital lobes which demonstrates peripheral enhancement and worsening vasogenic edema. Developing abscess(es) related to septic emboli cannot be excluded  - Neurology was reconsulted 8/14, recommended to screen for mycotic aneurysm   * CTA head 8/14 -showed Complex peripherally enhancing fluid collection in the paramedian left parietal lobepatent arteries of head & neck. No evidence for dissection.  and CT head today & neurosurgery consult  * Ct head w/o contrast 8/14 showed no significant change in the complex peripherally enhancing fluid collection in the paramedian left parietal lobe with surrounding vasogenic edema. Atypical for bland evolving subacute ischemic infarct. Less likely possibility for evolving subacute hematoma, consider tissue sampling.Mild local mass effect in the left parieto-occipital region with minimal narrowing of the occipital horn of the left lateral ventricle.No midline shift/herniation  - pt remains asymptomatic  - neurosurgery consulted on 8/14th appreciated. As the pt has no neuro symptoms  they  recommended repeating the brain MRI on 8/17  * MRI head on 8/17 reported as no definite significant interval change when compared to MRI dated 8/10/2021.  - NSG following & recommended no surgical intervention at this time   - Repeat MRI brain in 1 week -8/24/21   - Activity as tolerated, continue with therapies  - Continue supportive and symptomatic treatment      Infected Tooth  S/p removal of all remaining teeth on 8/13  Assessment: OMFS was consulted for multiple infected tooth  - S/p Removal of all remaining teeth including 2, 3, 4, 5, 6, 7, 8, 9, 10, 11, 12, 13, 19, 20, 21, 22, 23, 24, 25, 26, 28, 29, 30  - on full liquid diet - advanced to mechanical /dental soft diet on 8/15     Osteomyelitis left 3rd toe s/p amputation 8/7  Necrotic wounds to Lt great toes, Lt 3rd toe and Lt lateral Heel, Rt great toe: 2/2 thrombi.  -- Podiatry took to OR 8/7 for amputation. Orthotist following up.   -- continue wound cares for other digits     Normocytic anemia, likey 2/2 chronic disease  Chronic anemia  Baseline hemoglobin approximately 7.  Likely related to chronic disease, untreated HIV.  - Hgb 7.4 yesterday -> 8.1 today  - B12 and folate -> wnl  - consider transfusion for Hgb < 7 (or close)  - low nl ferritin- on po ferrous gluconate  - CBC stable, last checked on 8/17     Left knee pain 2/2 OA: pt reports history of pain in that knee with activity but pain now is more acute. ROM okay. There is some warmth and swelling there.   - In light of the MSSA bacteremia, got XR and called ortho for effusion  - Per ortho no indication for intervention, though they did order a brace. Appreciate assistance. There are following EOD.  - PT/OT     IV drug use  Polysubstance use disorder  Opiate use disorder  *Chem dep consulted 7/15 but chemical dependency did not feel she is appropriate for CD assessment at this time and recommended consultation towards the end of her antibiotic course.  Reconsult CD once she is close to  finishing her IV antibiotic.  *Was using IV dilaudid as well as Percocet p.o. earlier this stay pretty extensively.  Attempts made to wean off IV narcotics and taper down po narcotics though patient felt she was going through withdrawal. Psych consulted and recommended suboxone though patient declined. Psych recommended rapid taper of methadone instead.   *completed methadone taper of 30 mg on 7/21, 20 mg 7/22 and then 10 mg 7/23, then stop  -started suboxone on 7/27 - appreciate psych/addiction medicine assistance who will help with titration  - now on suboxone BID and doing very well. Reports no cravings and hopes to continue this regimen outpatient.  -Plan was to reconsult CD (8/13th)once she is close to finishing her IV antibiotic, but patient is not interested in treatment. She has been through inpatient and outpatient programs and not found them helpful. Also I'm not sure she could continue suboxone in treatment and I think that is probably her best chance for sobriety.  -FYI: She has a daughter who just had twins and lives with her in-laws. Daughter and daughter's mother in law live in [place] (? - pt going to confirm) and have invited patient to live there to help with babies. She thinks this is a good idea as she'll be kept busy and she gets along well with this family. She is hoping to continue follow up with addiction medicine to continue suboxone. She feels that with suboxone and family support she will do well. Will need to have addiction med follow up with her to prescribe suboxone on discharge and help her set up outpatient follow up.  -08/12 -> Pain service recommended   1. Change Suboxone to  4 mg qid for better analgesic effect.     2. discontinue oxycodone   3. to use acetaminophen for additional analgesic effect if needed.- can give as much as 1000 mg tid   (don't exceed 3000 mg total per day).       HIV, untreated PTA  - CD4 count greater than 500, viral load 3695  - Started Biktarvy 7/8; ID  following  - Will need outpt follow up     Chronic back pain: pt notes it present ever since she was pregnant. Improved on current medications  - on increased gabapentin to 600 mg TID, prn tylenol     Toxic/septic/metabolic encephalopathy, improved  Likely encephalopathy due to sepsis, drug use, CVA, prolonged ICU stay; had been pulling lines due to alterned mentation thought this all now appears resolved.      Pyelonephritis  Urine cultures positive for Klebsiella.  Completed course of treatment with ceftriaxone.  repeat urine cultures 7/10 with ESBL E coli but given unimpressive UA and no UTI symptoms ID suggest holding off on abx for ESBL UTI  -monitor for any symptoms     Acute hypoxemic respiratory failure, resolved  Intubated 7/1/2021.  Extubated 7/11/2021.  Likely related to septic shock from MSSA mitral valve endocarditis     FORD, resolved suspected due to sepsis  Hypokalemia  - on potassium replacement protocol  - will check BMP periodically     Chronic anemia  Baseline hemoglobin approximately 7.  Likely related to chronic disease, untreated HIV.  -Monitor hemoglobin periodically; stable around 8  - low nl ferritin- start po ferrous gluconate     Non severe malnutrition  -- Supplements      Prior hepatitis C infection  Antibody positive but RNA negative  -Noted     Restless legs: unclear if this is true restless leg syndrome or due to being uncomfortable  -continue mirapex prn     Major depressive disorder: pt is not suicidal but also doesn't seem to have much of a will to live based upon discussion 7/25.  I wonder if her underlying chronic pain which is difficult to treat as well as being in the hospital is contributing. Was seen by psych and declined any medication treatment on 7/26.  patient was on zoloft for years starting at age 13. She is willing to try zoloft again.   -Started zoloft 8/1 at 25mg po and titrating up to 50mg. Pt feels this is helping. Psychiatry followed up and agreed with this  "medication.    Diet: Mechanical/Dental Soft Diet    DVT Prophylaxis: Pneumatic Compression Devices  Neri Catheter: Not present  Central Lines: PRESENT        Code Status: Full Code       Disposition Plan   Expected discharge:  if Brain abscess resolves.  recommended to unclear once antibiotic plan established.    She is homeless at baseline but has a plan post discharge to live with daughter and daughter's in laws. This is in the [facility] and will allow for follow up cares.  SW following      Lucia Martinez MD.  Hospitalist [phone] (7am -6 pm)               Interval History:   no complaints              Medications:       bictegravir-emtricitabine-tenofovir  1 tablet Oral Daily     buprenorphine HCl-naloxone HCl  1 Film Sublingual 4x Daily     gabapentin  600 mg Oral TID     nafcillin  2 g Intravenous Q4H     pantoprazole  40 mg Oral QAM AC     polyethylene glycol  17 g Oral Daily     senna-docusate  1 tablet Oral BID     sertraline  50 mg Oral Daily     sodium chloride (PF)  10 mL Intracatheter Q8H     sodium chloride (PF)  10-40 mL Intracatheter Q7 Days     acetaminophen, albuterol, bisacodyl, glucose **OR** dextrose **OR** glucagon, hydrOXYzine, lidocaine 4%, lidocaine (buffered or not buffered), magnesium hydroxide, naloxone **OR** naloxone **OR** naloxone **OR** naloxone, ondansetron **OR** ondansetron, prochlorperazine **OR** prochlorperazine, QUEtiapine, sodium chloride (PF), sodium chloride (PF), sodium chloride (PF)               Physical Exam:   Blood pressure 120/73, pulse 72, temperature 98.1  F (36.7  C), temperature source Oral, resp. rate 18, height 1.727 m (5' 7.99\"), weight 82.8 kg (182 lb 9.6 oz), SpO2 98 %, unknown if currently breastfeeding.  Wt Readings from Last 4 Encounters:   21 82.8 kg (182 lb 9.6 oz)   07 103.1 kg (227 lb 4 oz)   07 99.3 kg (219 lb)         Vital Sign Ranges  Temperature Temp  Av.2  F (36.8  C)  Min: 97.9  F (36.6  C)  Max: 98.5 "  F (36.9  C)   Blood pressure Systolic (24hrs), Av , Min:103 , Max:121        Diastolic (24hrs), Av, Min:64, Max:73      Pulse Pulse  Av  Min: 72  Max: 85   Respirations Resp  Av  Min: 18  Max: 18   Pulse oximetry SpO2  Av.7 %  Min: 97 %  Max: 98 %         Intake/Output Summary (Last 24 hours) at 2021 1258  Last data filed at 2021 1000  Gross per 24 hour   Intake 1240 ml   Output --   Net 1240 ml       Constitutional: Awake, alert, cooperative, no apparent distress   Lungs: Clear to auscultation bilaterally, no crackles or wheezing   Cardiovascular: Regular rate and rhythm, normal S1 and S2, and no murmur noted   Abdomen: Normal bowel sounds, soft, non-distended, non-tender   Skin: No rashes, no cyanosis, no edema   Neuro:                Data:   All laboratory data reviewed

## 2021-08-22 NOTE — PLAN OF CARE
DATE & TIME: 8/21/321 3639-1700  Cognitive Concerns/ Orientation : A/O x4  BEHAVIOR & AGGRESSION TOOL COLOR: Green   ABNL VS/O2: VSS on RA  MOBILITY: SBA GB, Walker. Steady, moving well. Ambulated in [place] x 1 and up in chair x1 yesterday.  PAIN MANAGMENT: c/o generalized back pain, given PRN tylenol. On scheduled suboxone.  DIET: [facility] soft- good appetite  BOWEL/BLADDER: continent, up to BR   ABNL LAB/BG: no labs drawn today  DRAIN/DEVICES: PICC ANDRIA - blood return noted  TELEMETRY RHYTHM: n/a  SKIN:  Bilateral foot wound dressings clean/dry/intact  TESTS/PROCEDURES: follow-up brain MRI scheduled for 8/24  D/C DATE: Pending  Discharge Barriers: IV abx through 9/1  OTHER IMPORTANT INFO: Neurosurgery following; PT/OT/WOC consulted. Weekly asymptomatic COVID test neg. Given NS for mouth flushes

## 2021-08-22 NOTE — PLAN OF CARE
DATE & TIME: 8/22/21 6600-1171  Cognitive Concerns/ Orientation : A/O x4  BEHAVIOR & AGGRESSION TOOL COLOR: Green   ABNL VS/O2: VSS on RA  MOBILITY: SBA GB, Walker. Uses leg brace with long walks. Steady, moving well. Ambulated to/from shower.    PAIN MANAGMENT: Denies pain; On scheduled suboxone.  DIET: mech soft- good appetite; also taking supplements  BOWEL/BLADDER: continent, up to BR   ABNL LAB/BG: no labs drawn today  DRAIN/DEVICES: PICC ANDRIA -  TELEMETRY RHYTHM: n/a  SKIN:  Bilateral foot wound dressings changed  TESTS/PROCEDURES: follow-up brain MRI scheduled for 8/24  D/C DATE: Pending  Discharge Barriers: IV abx through 9/1  OTHER IMPORTANT INFO: Neurosurgery following; PT/OT/WOC consulted. NS for mouth rinses

## 2021-08-23 ENCOUNTER — APPOINTMENT (OUTPATIENT)
Dept: PHYSICAL THERAPY | Facility: CLINIC | Age: 41
End: 2021-08-23
Payer: MEDICAID

## 2021-08-23 ENCOUNTER — APPOINTMENT (OUTPATIENT)
Dept: OCCUPATIONAL THERAPY | Facility: CLINIC | Age: 41
End: 2021-08-23
Payer: MEDICAID

## 2021-08-23 PROCEDURE — 999N000190 HC STATISTIC VAT ROUNDS

## 2021-08-23 PROCEDURE — 120N000001 HC R&B MED SURG/OB

## 2021-08-23 PROCEDURE — 99207 PR CDG-CUT & PASTE-POTENTIAL IMPACT ON LEVEL: CPT | Performed by: INTERNAL MEDICINE

## 2021-08-23 PROCEDURE — 250N000009 HC RX 250: Performed by: PODIATRIST

## 2021-08-23 PROCEDURE — 250N000013 HC RX MED GY IP 250 OP 250 PS 637: Performed by: PODIATRIST

## 2021-08-23 PROCEDURE — 999N000040 HC STATISTIC CONSULT NO CHARGE VASC ACCESS

## 2021-08-23 PROCEDURE — 97530 THERAPEUTIC ACTIVITIES: CPT | Mod: GP | Performed by: PHYSICAL THERAPIST

## 2021-08-23 PROCEDURE — 99232 SBSQ HOSP IP/OBS MODERATE 35: CPT | Performed by: INTERNAL MEDICINE

## 2021-08-23 PROCEDURE — 250N000013 HC RX MED GY IP 250 OP 250 PS 637: Performed by: HOSPITALIST

## 2021-08-23 PROCEDURE — G0463 HOSPITAL OUTPT CLINIC VISIT: HCPCS

## 2021-08-23 PROCEDURE — 250N000013 HC RX MED GY IP 250 OP 250 PS 637: Performed by: STUDENT IN AN ORGANIZED HEALTH CARE EDUCATION/TRAINING PROGRAM

## 2021-08-23 PROCEDURE — 97535 SELF CARE MNGMENT TRAINING: CPT | Mod: GO | Performed by: OCCUPATIONAL THERAPIST

## 2021-08-23 PROCEDURE — 97116 GAIT TRAINING THERAPY: CPT | Mod: GP | Performed by: PHYSICAL THERAPIST

## 2021-08-23 PROCEDURE — 250N000013 HC RX MED GY IP 250 OP 250 PS 637: Performed by: DENTIST

## 2021-08-23 PROCEDURE — 97110 THERAPEUTIC EXERCISES: CPT | Mod: GP | Performed by: PHYSICAL THERAPIST

## 2021-08-23 RX ADMIN — GABAPENTIN 600 MG: 300 CAPSULE ORAL at 16:45

## 2021-08-23 RX ADMIN — ACETAMINOPHEN 650 MG: 325 TABLET, FILM COATED ORAL at 21:05

## 2021-08-23 RX ADMIN — BUPRENORPHINE HYDROCHLORIDE, NALOXONE HYDROCHLORIDE 1 FILM: 4; 1 FILM, SOLUBLE BUCCAL; SUBLINGUAL at 09:05

## 2021-08-23 RX ADMIN — QUETIAPINE FUMARATE 25 MG: 25 TABLET ORAL at 21:06

## 2021-08-23 RX ADMIN — NAFCILLIN SODIUM 2 G: 2 INJECTION, POWDER, LYOPHILIZED, FOR SOLUTION INTRAMUSCULAR; INTRAVENOUS at 06:36

## 2021-08-23 RX ADMIN — NAFCILLIN SODIUM 2 G: 2 INJECTION, POWDER, LYOPHILIZED, FOR SOLUTION INTRAMUSCULAR; INTRAVENOUS at 13:49

## 2021-08-23 RX ADMIN — NAFCILLIN SODIUM 2 G: 2 INJECTION, POWDER, LYOPHILIZED, FOR SOLUTION INTRAMUSCULAR; INTRAVENOUS at 21:07

## 2021-08-23 RX ADMIN — NAFCILLIN SODIUM 2 G: 2 INJECTION, POWDER, LYOPHILIZED, FOR SOLUTION INTRAMUSCULAR; INTRAVENOUS at 02:07

## 2021-08-23 RX ADMIN — BUPRENORPHINE HYDROCHLORIDE, NALOXONE HYDROCHLORIDE 1 FILM: 4; 1 FILM, SOLUBLE BUCCAL; SUBLINGUAL at 21:05

## 2021-08-23 RX ADMIN — PANTOPRAZOLE SODIUM 40 MG: 40 TABLET, DELAYED RELEASE ORAL at 09:05

## 2021-08-23 RX ADMIN — BICTEGRAVIR SODIUM, EMTRICITABINE, AND TENOFOVIR ALAFENAMIDE FUMARATE 1 TABLET: 50; 200; 25 TABLET ORAL at 09:05

## 2021-08-23 RX ADMIN — BUPRENORPHINE HYDROCHLORIDE, NALOXONE HYDROCHLORIDE 1 FILM: 4; 1 FILM, SOLUBLE BUCCAL; SUBLINGUAL at 13:18

## 2021-08-23 RX ADMIN — SERTRALINE HYDROCHLORIDE 50 MG: 50 TABLET ORAL at 09:05

## 2021-08-23 RX ADMIN — ACETAMINOPHEN 650 MG: 325 TABLET, FILM COATED ORAL at 16:45

## 2021-08-23 RX ADMIN — GABAPENTIN 600 MG: 300 CAPSULE ORAL at 21:06

## 2021-08-23 RX ADMIN — ACETAMINOPHEN 650 MG: 325 TABLET, FILM COATED ORAL at 09:13

## 2021-08-23 RX ADMIN — GABAPENTIN 600 MG: 300 CAPSULE ORAL at 09:05

## 2021-08-23 RX ADMIN — NAFCILLIN SODIUM 2 G: 2 INJECTION, POWDER, LYOPHILIZED, FOR SOLUTION INTRAMUSCULAR; INTRAVENOUS at 18:06

## 2021-08-23 RX ADMIN — NAFCILLIN SODIUM 2 G: 2 INJECTION, POWDER, LYOPHILIZED, FOR SOLUTION INTRAMUSCULAR; INTRAVENOUS at 09:52

## 2021-08-23 RX ADMIN — BUPRENORPHINE HYDROCHLORIDE, NALOXONE HYDROCHLORIDE 1 FILM: 4; 1 FILM, SOLUBLE BUCCAL; SUBLINGUAL at 18:06

## 2021-08-23 ASSESSMENT — ACTIVITIES OF DAILY LIVING (ADL)
ADLS_ACUITY_SCORE: 20
ADLS_ACUITY_SCORE: 21
ADLS_ACUITY_SCORE: 23
ADLS_ACUITY_SCORE: 21
ADLS_ACUITY_SCORE: 22
ADLS_ACUITY_SCORE: 22

## 2021-08-23 NOTE — PROGRESS NOTES
[facility]  Hospitalist Progress Note for 8/22/2021       Assessment and Plan:   41 year old female with h/o untreated HIV, hepatitis C who was admitted on 7/1/2021 for MSSA mitral valve endocarditis resulting in septic shock complicated by embolic left parietal lobe infarctions and multiple peripheral emboli, and initially with encephalopathy, and shock.  She was intubated in ED on 7/1/2021 subsequently extubated 7/11/2021.  Blood cultures turned positive on hospital day 1 with MSSA.  Urine culture was positive for Klebsiella.  She was treated initially with ceftriaxone and vancomycin, and has since been narrowed to nafcillin per ID.     MSSA mitral valve endocarditis  Septic shock, resolved  Probable myopericarditis  Small pericardial effusion  *JAE 7/5 noted with small ASD, mod to large mobile vegetation (14mm long, 6mm wide) of the P2 segment of the mitral leaflet. No valve perforation; mod MR  *blood cultures on admission with MSSA; blood cultures have been negative since 7/6.   *ID following; antibiotic has been narrowed down to Nafcillin,duration extended from 8/18 to 9/1 due  abscess on MRI  *limited ECHO repeated 7/15 to reassess pericardial fluid 7/15 per cardiology and noted:  organized effusion posteriorly, measuring 1.2-1.5cm, no evidence of tamponade; small mobile vegetation noted attached to the mitral leaflet, mod to severe MR; compared to 7/8/21, effusion size is probably stable, Degree of MR is worse, Vegetation is smaller.  Cardiology signed off 7/15.   *PICC in place  -- JAE (no vegetations) and Cor Angio (nomral coronaries) completed 08/12  - per cardiothoracic surgery reconsult 8/14 felt she no longer has an urgent surgical indication,given absence of vegetations and only moderate MR on ECHO 8/12/2021.She may require mitral valve repair/replacement in the future if she has progressive MR, but this would be better addressed after she has undergone treatment for substance abuse  and has demonstrated sobriety in the out-patient setting. She should follow up with cardiology for medical management and surveillance of MR and ASD, and can be referred back to CV surgery as needed.   - Antibiotic plan per ID     Ischemic CVA, embolic stroke due to infective endocarditis  *CT head on admission notable for new acute to subacute PCA territory ischemic stroke  *MRI 7/3 noted with progressive, multifocal, acute infarcts without MR evidence for hemorrhage or midline shift  *Patient does have small left-to-right shunt on JAE, but most likely from embolic spread from endocarditis  *was evaluated by neurology, to continue antibiotics; per neuro to avoid anticoagulation (except DVT prophylaxis dose)     Abnormal MRI  Possible brain abscess  * MRI 8/10/2021 showed- Multiple enhancing subacute infarcts with evidence of small volume hemorrhage, largest at the junction of the left parietal and occipital lobes which demonstrates peripheral enhancement and worsening vasogenic edema. Developing abscess(es) related to septic emboli cannot be excluded  - Neurology was reconsulted 8/14, recommended to screen for mycotic aneurysm   * CTA head 8/14 -showed Complex peripherally enhancing fluid collection in the paramedian left parietal lobepatent arteries of head & neck. No evidence for dissection.  and CT head today & neurosurgery consult  * Ct head w/o contrast 8/14 showed no significant change in the complex peripherally enhancing fluid collection in the paramedian left parietal lobe with surrounding vasogenic edema. Atypical for bland evolving subacute ischemic infarct. Less likely possibility for evolving subacute hematoma, consider tissue sampling.Mild local mass effect in the left parieto-occipital region with minimal narrowing of the occipital horn of the left lateral ventricle.No midline shift/herniation  - pt remains asymptomatic  - neurosurgery consulted on 8/14th appreciated. As the pt has no neuro symptoms  they  recommended repeating the brain MRI on 8/17  * MRI head on 8/17 reported as no definite significant interval change when compared to MRI dated 8/10/2021.  - NSG following & recommended no surgical intervention at this time   - Repeat MRI brain  -8/24/21   - Activity as tolerated, continue with therapies  - Continue supportive and symptomatic treatment      Infected Tooth  S/p removal of all remaining teeth on 8/13  Assessment: OMFS was consulted for multiple infected tooth  - S/p Removal of all remaining teeth including 2, 3, 4, 5, 6, 7, 8, 9, 10, 11, 12, 13, 19, 20, 21, 22, 23, 24, 25, 26, 28, 29, 30  - on full liquid diet - advanced to mechanical /dental soft diet on 8/15     Osteomyelitis left 3rd toe s/p amputation 8/7  Necrotic wounds to Lt great toes, Lt 3rd toe and Lt lateral Heel, Rt great toe: 2/2 thrombi.  -- Podiatry took to OR 8/7 for amputation. Orthotist following up.   -- continue wound cares for other digits     Normocytic anemia, likey 2/2 chronic disease  Chronic anemia  Baseline hemoglobin approximately 7.  Likely related to chronic disease, untreated HIV.  - Hgb 7.4 yesterday -> 8.1 today  - B12 and folate -> wnl  - consider transfusion for Hgb < 7 (or close)  - low nl ferritin- on po ferrous gluconate  - CBC stable, last checked on 8/17     Left knee pain 2/2 OA: pt reports history of pain in that knee with activity but pain now is more acute. ROM okay. There is some warmth and swelling there.   - In light of the MSSA bacteremia, got XR and called ortho for effusion  - Per ortho no indication for intervention, though they did order a brace. Appreciate assistance. There are following EOD.  - PT/OT     IV drug use  Polysubstance use disorder  Opiate use disorder  *Chem dep consulted 7/15 but chemical dependency did not feel she is appropriate for CD assessment at this time and recommended consultation towards the end of her antibiotic course.  Reconsult CD once she is close to finishing her  IV antibiotic.  *Was using IV dilaudid as well as Percocet p.o. earlier this stay pretty extensively.  Attempts made to wean off IV narcotics and taper down po narcotics though patient felt she was going through withdrawal. Psych consulted and recommended suboxone though patient declined. Psych recommended rapid taper of methadone instead.   *completed methadone taper of 30 mg on 7/21, 20 mg 7/22 and then 10 mg 7/23, then stop  -started suboxone on 7/27 - appreciate psych/addiction medicine assistance who will help with titration  - now on suboxone BID and doing very well. Reports no cravings and hopes to continue this regimen outpatient.  -Plan was to reconsult CD (8/13th)once she is close to finishing her IV antibiotic, but patient is not interested in treatment. She has been through inpatient and outpatient programs and not found them helpful. Also I'm not sure she could continue suboxone in treatment and I think that is probably her best chance for sobriety.  -FYI: She has a daughter who just had twins and lives with her in-laws. Daughter and daughter's mother in law live in [place] (? - pt going to confirm) and have invited patient to live there to help with babies. She thinks this is a good idea as she'll be kept busy and she gets along well with this family. She is hoping to continue follow up with addiction medicine to continue suboxone. She feels that with suboxone and family support she will do well. Will need to have addiction med follow up with her to prescribe suboxone on discharge and help her set up outpatient follow up.  -08/12 -> Pain service recommended   1. Change Suboxone to  4 mg qid for better analgesic effect.     2. discontinue oxycodone   3. to use acetaminophen for additional analgesic effect if needed.- can give as much as 1000 mg tid   (don't exceed 3000 mg total per day).       HIV, untreated PTA  - CD4 count greater than 500, viral load 3695  - Started Biktarvy 7/8; ID following  -  Will need outpt follow up     Chronic back pain: pt notes it present ever since she was pregnant. Improved on current medications  - on increased gabapentin to 600 mg TID, prn tylenol     Toxic/septic/metabolic encephalopathy, improved  Likely encephalopathy due to sepsis, drug use, CVA, prolonged ICU stay; had been pulling lines due to alterned mentation thought this all now appears resolved.      Pyelonephritis  Urine cultures positive for Klebsiella.  Completed course of treatment with ceftriaxone.  repeat urine cultures 7/10 with ESBL E coli but given unimpressive UA and no UTI symptoms ID suggest holding off on abx for ESBL UTI  -monitor for any symptoms     Acute hypoxemic respiratory failure, resolved  Intubated 7/1/2021.  Extubated 7/11/2021.  Likely related to septic shock from MSSA mitral valve endocarditis     FORD, resolved suspected due to sepsis  Hypokalemia  - on potassium replacement protocol  - will check BMP periodically     Chronic anemia  Baseline hemoglobin approximately 7.  Likely related to chronic disease, untreated HIV.  -Monitor hemoglobin periodically; stable around 8  - low nl ferritin- start po ferrous gluconate     Non severe malnutrition  -- Supplements      Prior hepatitis C infection  Antibody positive but RNA negative  -Noted     Restless legs: unclear if this is true restless leg syndrome or due to being uncomfortable  -continue mirapex prn     Major depressive disorder: pt is not suicidal but also doesn't seem to have much of a will to live based upon discussion 7/25.  I wonder if her underlying chronic pain which is difficult to treat as well as being in the hospital is contributing. Was seen by psych and declined any medication treatment on 7/26.  patient was on zoloft for years starting at age 13. She is willing to try zoloft again.   -Started zoloft 8/1 at 25mg po and titrating up to 50mg. Pt feels this is helping. Psychiatry followed up and agreed with this medication.    Code  "Status: Full Code       Disposition Plan   Expected discharge: 09/01 if Brain abscess resolves.  recommended to unclear once antibiotic plan established.    She is homeless at baseline but has a plan post discharge to live with daughter and daughter's in laws. This is in the [facility] and will allow for follow up cares.  SW following    Ashok Triplett MD, FACP   Internal Medicine/ Hospitalist (Pager- [phone])                Interval History:   no new complaints    Denies Chest pain/ SOB/ cough, Denies any N&V/ bowel problems  Denies urinary problems , Denies fever/chills/rigors                 Medications:       bictegravir-emtricitabine-tenofovir  1 tablet Oral Daily     buprenorphine HCl-naloxone HCl  1 Film Sublingual 4x Daily     gabapentin  600 mg Oral TID     nafcillin  2 g Intravenous Q4H     pantoprazole  40 mg Oral QAM AC     polyethylene glycol  17 g Oral Daily     senna-docusate  1 tablet Oral BID     sertraline  50 mg Oral Daily     sodium chloride (PF)  10 mL Intracatheter Q8H     sodium chloride (PF)  10-40 mL Intracatheter Q7 Days     acetaminophen, albuterol, bisacodyl, glucose **OR** dextrose **OR** glucagon, hydrOXYzine, lidocaine 4%, lidocaine (buffered or not buffered), magnesium hydroxide, naloxone **OR** naloxone **OR** naloxone **OR** naloxone, ondansetron **OR** ondansetron, prochlorperazine **OR** prochlorperazine, QUEtiapine, sodium chloride (PF), sodium chloride (PF), sodium chloride (PF)               Physical Exam:   Blood pressure 107/71, pulse 66, temperature 98.1  F (36.7  C), temperature source Oral, resp. rate 18, height 1.727 m (5' 7.99\"), weight 82.8 kg (182 lb 9.6 oz), SpO2 98 %, unknown if currently breastfeeding.  Wt Readings from Last 4 Encounters:   08/20/21 82.8 kg (182 lb 9.6 oz)   11/05/07 103.1 kg (227 lb 4 oz)   09/26/07 99.3 kg (219 lb)     /71 (BP Location: Left arm)   Pulse 66   Temp 98.1  F (36.7  C) (Oral)   Resp 18   Ht 1.727 m (5' 7.99\")   Wt 82.8 kg " (182 lb 9.6 oz)   SpO2 98%   Breastfeeding Unknown   BMI 27.77 kg/m    Gen- pleasant lying in bed  HEENT- NAD, BO  Neck- supple, no JVD elevation, no thyromegaly  CVS- I+II+ no m/r/g  RS- CTAB  Abdo- soft, no tenderness . No g/r/r  Ext- no edema   CNS- no gross focal deficit           Data:   All laboratory data reviewed  BMPRecent Labs   Lab 08/17/21  0607      POTASSIUM 3.9   CHLORIDE 109   DEREK 8.9   CO2 26   BUN 12   CR 0.62   GLC 98     CBC  Recent Labs   Lab 08/17/21  0608   WBC 4.3   RBC 3.52*   HGB 9.2*   HCT 30.3*   MCV 86   MCH 26.1*   MCHC 30.4*   RDW 26.5*   *     INRNo lab results found in last 7 days.  LFTsNo lab results found in last 7 days.   PANCNo lab results found in last 7 days.

## 2021-08-23 NOTE — PLAN OF CARE
DATE & TIME: 8/23/21 5005-7240  Cognitive Concerns/ Orientation : A/O x4  BEHAVIOR & AGGRESSION TOOL COLOR: Green   ABNL VS/O2: VSS on RA  MOBILITY: SBA GB, Walker. Uses leg brace with long walks. Steady, moving well.   PAIN MANAGMENT: Denies  DIET: [facility] soft- good appetite; also taking supplements  BOWEL/BLADDER: continent, up to BR  ABNL LAB/BG: no labs drawn today  DRAIN/DEVICES: PICC ANDRIA  TELEMETRY RHYTHM: n/a  SKIN:  Bilateral foot wound dressings CDI  TESTS/PROCEDURES: follow-up brain MRI scheduled for 8/24  D/C DATE: Pending  Discharge Barriers: IV abx through 9/1  OTHER IMPORTANT INFO: Neurosurgery following; PT/OT/WOC consulted. NS for mouth rinses in bathroom. Slept well between cares.

## 2021-08-23 NOTE — PROGRESS NOTES
"[facility]    Neurosurgery Progress Note    Date of Service (when I saw the patient): 08/23/2021     Assessment & Plan   Manas Hernandez is a 41 year old female who was admitted on 7/1/2021. She was admitted with a history of HIV, HepC and admitted 7/1/2021 for MSSA endocarditis. Initial brain imaging showed what was felt at that time to be consistent with infarct, but now with concern for evolving abscess. She has been treated with IV antibiotics. ID is following. Today she was seen lying in bed. She has no complaints. She denies headache, dizziness, nausea/vomiting, and weakness.     Principal Problem:    Altered mental status, unspecified altered mental status type    Plan: No change in our plan. Would recommend repeat brain MRI tomorrow to evaluate if potential brain abscesses have improved. Will continue to follow.    Pat Weiss CNP  [facility] Neurosurgery  [facility]  [address]  [address]  [place] [address]    Tel [phone]  Pager [phone]      Interval History   Stable.    Physical Exam   Temp: 98.1  F (36.7  C) Temp src: Oral BP: 107/71 Pulse: 66   Resp: 18 SpO2: 98 % O2 Device: None (Room air)    Vitals:    07/14/21 0445 07/16/21 0615 08/20/21 0100   Weight: 185 lb 10 oz (84.2 kg) 169 lb 5 oz (76.8 kg) 182 lb 9.6 oz (82.8 kg)     Vital Signs with Ranges  Temp:  [97.9  F (36.6  C)-98.3  F (36.8  C)] 98.1  F (36.7  C)  Pulse:  [66-74] 66  Resp:  [18] 18  BP: (105-108)/(63-71) 107/71  SpO2:  [98 %] 98 %  I/O last 3 completed shifts:  In: 1560 [P.O.:1360; I.V.:200]  Out: -      , Blood pressure 107/71, pulse 66, temperature 98.1  F (36.7  C), temperature source Oral, resp. rate 18, height 5' 7.99\" (1.727 m), weight 182 lb 9.6 oz (82.8 kg), SpO2 98 %, unknown if currently breastfeeding.  182 lbs 9.6 oz  HEENT:  Normocephalic.  PERRLA.    Heart:  No peripheral edema  Lungs:  No SOB  Skin:  Warm and dry, good capillary " refill.  Extremities:  Good radial and dorsalis pedis pulses bilaterally, no edema, cyanosis or clubbing.    NEUROLOGICAL EXAMINATION:   Mental status:  Alert and Oriented x 3, speech is fluent.  Motor:  Strength is 5/5 throughout the upper and lower extremities    Medications       bictegravir-emtricitabine-tenofovir  1 tablet Oral Daily     buprenorphine HCl-naloxone HCl  1 Film Sublingual 4x Daily     gabapentin  600 mg Oral TID     nafcillin  2 g Intravenous Q4H     pantoprazole  40 mg Oral QAM AC     polyethylene glycol  17 g Oral Daily     senna-docusate  1 tablet Oral BID     sertraline  50 mg Oral Daily     sodium chloride (PF)  10 mL Intracatheter Q8H     sodium chloride (PF)  10-40 mL Intracatheter Q7 Days       Data     CBC RESULTS:   Recent Labs   Lab Test 08/16/21  0635   WBC 3.9*   RBC 3.39*   HGB 8.9*   HCT 28.9*   MCV 85   MCH 26.3*   MCHC 30.8*   RDW 26.7*   *     Basic Metabolic Panel:  Lab Results   Component Value Date     08/16/2021     07/10/2021      Lab Results   Component Value Date    POTASSIUM 3.8 08/16/2021    POTASSIUM 3.8 07/10/2021     Lab Results   Component Value Date    CHLORIDE 108 08/16/2021    CHLORIDE 108 07/10/2021     Lab Results   Component Value Date    DEREK 8.6 08/16/2021    DEREK 7.4 07/10/2021     Lab Results   Component Value Date    CO2 28 08/16/2021    CO2 27 07/10/2021     Lab Results   Component Value Date    BUN 11 08/16/2021    BUN 18 07/10/2021     Lab Results   Component Value Date    CR 0.60 08/16/2021    CR 0.69 07/10/2021     Lab Results   Component Value Date     08/16/2021     07/10/2021     INR:  Lab Results   Component Value Date    INR 1.05 08/11/2021    INR 1.23 07/01/2021

## 2021-08-23 NOTE — PLAN OF CARE
DATE & TIME: 8/22/21 2090-6869  Cognitive Concerns/ Orientation : A/O x4  BEHAVIOR & AGGRESSION TOOL COLOR: Green   ABNL VS/O2: VSS on RA  MOBILITY: SBA GB, Walker. Uses leg brace with long walks. Steady, moving well.   PAIN MANAGMENT:C/o mild back pain; On scheduled suboxone. Given tylenol x1.  DIET: [facility] soft- good appetite; also taking supplements  BOWEL/BLADDER: continent, up to BR   ABNL LAB/BG: no labs drawn today  DRAIN/DEVICES: PICC ANDRIA  TELEMETRY RHYTHM: n/a  SKIN:  Bilateral foot wound dressings CDI  TESTS/PROCEDURES: follow-up brain MRI scheduled for 8/24  D/C DATE: Pending  Discharge Barriers: IV abx through 9/1  OTHER IMPORTANT INFO: Neurosurgery following; PT/OT/WOC consulted. NS for mouth rinses in bathroom. PRN seroquel given at HS

## 2021-08-23 NOTE — PLAN OF CARE
DATE & TIME: 8/23/21 6513-9957  Cognitive Concerns/ Orientation : A/O x4  BEHAVIOR & AGGRESSION TOOL COLOR: Green   ABNL VS/O2: VSS on RA  MOBILITY: independent in room with cane; surgical shoe on left;  uses leg brace with long walks. Steady, moving well.  Seen by PT recommend up ad doreen indep.  PAIN MANAGMENT: Denies  DIET: [facility] soft- good appetite; also taking supplements; s/p teeth extraction on 8/13 - soft foods.  BOWEL/BLADDER: continent, up to BR  ABNL LAB/BG: no labs drawn today  DRAIN/DEVICES: PICC ANDRIA for Nafcillin q4h  TELEMETRY RHYTHM: n/a  SKIN:  Bilateral foot wounds; WOC nurse changed dressings; R foot/toe dressing was left off  TESTS/PROCEDURES: follow-up brain MRI scheduled for 8/24  D/C DATE: Pending  Discharge Barriers: IV abx through 9/1  OTHER IMPORTANT INFO: Neurosurgery following; PT/OT/WOC consulted. NS for mouth rinses at bedside..

## 2021-08-23 NOTE — PROGRESS NOTES
[facility]  WO Nurse Inpatient Wound Assessment     Reason for consultation: Evaluate and treat:  Lt great toes, Lt 3rd toe and Lt lateral heel, rt great toe    Assessment  Left great toe: small stable eschar  Left 3rd toe: s/p partial amp by Podiatry 8/7/21, sutured incision remains clean, dry, intact  Left lateral heel: resolved  Right great toe: Re-epithealization maintaining small area of firm eschar     All wounds r/t to ischemic emboli. No local s/s infection. Plan of care updated below.  Podiatry has signed off for left foot.       Treatment Plan  R great toe wound: Daily  Cleanse with betadine. Ensure socks are worn at all time.    Right lateral heel: keep clean and moisturized.  Ok to cover with foam dressing for padding/protection prn.  Ensure minimal pressure to area.     Left foot: Keep foot and dressing dry.  Change prn if loose/soiled, applying betadine to great toe, adaptic to 3rd toe incision, then gauze pad, Kerlix, light ACE wrap.    Orders reviewed  Recommended provider order: n/a  WO Nurse follow-up plan: Weekly and prn   Nursing to notify the Provider(s) and re-consult the WO Nurse if wound(s) deteriorates or new skin concern.    Patient History  According to provider note(s):    Manas Hernandez is a 41 year old female with h/o untreated HIV, hepatitis C who was admitted on 7/1/2021 for MSSA mitral valve endocarditis resulting in septic shock complicated by embolic left parietal lobe infarctions and multiple peripheral emboli.      Objective Data  Containment of urine/stool: Incontinence protocol    Active Diet Order:  Orders Placed This Encounter      Mechanical/Dental Soft Diet    Output:   I/O last 3 completed shifts:  In: 1560 [P.O.:1360; I.V.:200]  Out: -     Risk Assessment:   Sensory Perception: 4-->no impairment  Moisture: 4-->rarely moist  Activity: 3-->walks occasionally  Mobility: 4-->no limitation  Nutrition: 4-->excellent  Friction and Shear: 3-->no  apparent problem  Jeffy Score: 22                          Labs:   Recent Labs   Lab 08/17/21  0608   HGB 9.2*   WBC 4.3       Physical Exam  Skin inspection: Bilateral feet   Wound History: all wounds r/t to ischemic emboli    #1-#2: Wound Location:  Lt great toe and Lt 3rd toe  Date of last photo: 8/23/21 8-17-21      #1: Lt great toe  Size: approx 1 x 1 x 0+cm, blackish firm  eschar  Tunneling: NA  Undermining: NA  Palpation of the wound bed: firm  Periwound skin: intact, no erythema  Temperature: warm   Drainage: none  Odor: none  Pain: denies    #2: Lt 3rd  toe  S/p partial amp 8-7-21, incision intact, see pic above     #3:  Wound Location: Rt great tip of  toe  WO photo:8/23/21 8-17-21      Size: approx 0.3 x 0.3cm of firm brown eschar  Tunneling: n/a  Undermining: n/a  Palpation of the wound bed: normal  Periwound skin: consistent with surrounding skin tissue with some betadine staining  Temperature: normal  Drainage: none  Odor: none  Pain: denies     #4:  Wound Location: Rt lateral heel   WO photo: 8-11-21 8/4/21      Wound bed: Resolved intact skin without discoloration. Palpation normal    Interventions  Current support surface: atmos air  Current off-loading measures: pillows and repositions self; surgical shoe for left foot  Visual inspection of wound(s) completed  Wound Care: done per plan of care  Supplies: In pt room   Education provided: Discussed with patient    Erika Loera RN, CWOCN

## 2021-08-24 ENCOUNTER — APPOINTMENT (OUTPATIENT)
Dept: PHYSICAL THERAPY | Facility: CLINIC | Age: 41
End: 2021-08-24
Payer: MEDICAID

## 2021-08-24 ENCOUNTER — APPOINTMENT (OUTPATIENT)
Dept: MRI IMAGING | Facility: CLINIC | Age: 41
End: 2021-08-24
Attending: PHYSICIAN ASSISTANT
Payer: MEDICAID

## 2021-08-24 LAB
ALBUMIN SERPL-MCNC: 2 G/DL (ref 3.4–5)
ALP SERPL-CCNC: 87 U/L (ref 40–150)
ALT SERPL W P-5'-P-CCNC: 15 U/L (ref 0–50)
ANION GAP SERPL CALCULATED.3IONS-SCNC: 2 MMOL/L (ref 3–14)
AST SERPL W P-5'-P-CCNC: 19 U/L (ref 0–45)
BILIRUB SERPL-MCNC: 0.6 MG/DL (ref 0.2–1.3)
BUN SERPL-MCNC: 12 MG/DL (ref 7–30)
CALCIUM SERPL-MCNC: 8.6 MG/DL (ref 8.5–10.1)
CHLORIDE BLD-SCNC: 108 MMOL/L (ref 94–109)
CO2 SERPL-SCNC: 28 MMOL/L (ref 20–32)
CREAT SERPL-MCNC: 0.61 MG/DL (ref 0.52–1.04)
CRP SERPL-MCNC: 6 MG/L (ref 0–8)
ERYTHROCYTE [DISTWIDTH] IN BLOOD BY AUTOMATED COUNT: 25.1 % (ref 10–15)
GFR SERPL CREATININE-BSD FRML MDRD: >90 ML/MIN/1.73M2
GLUCOSE BLD-MCNC: 92 MG/DL (ref 70–99)
HCT VFR BLD AUTO: 29.2 % (ref 35–47)
HGB BLD-MCNC: 9 G/DL (ref 11.7–15.7)
MCH RBC QN AUTO: 26.8 PG (ref 26.5–33)
MCHC RBC AUTO-ENTMCNC: 30.8 G/DL (ref 31.5–36.5)
MCV RBC AUTO: 87 FL (ref 78–100)
PLATELET # BLD AUTO: 414 10E3/UL (ref 150–450)
POTASSIUM BLD-SCNC: 3.7 MMOL/L (ref 3.4–5.3)
PROT SERPL-MCNC: 7.9 G/DL (ref 6.8–8.8)
RBC # BLD AUTO: 3.36 10E6/UL (ref 3.8–5.2)
SODIUM SERPL-SCNC: 138 MMOL/L (ref 133–144)
WBC # BLD AUTO: 3.4 10E3/UL (ref 4–11)

## 2021-08-24 PROCEDURE — 255N000002 HC RX 255 OP 636: Performed by: INTERNAL MEDICINE

## 2021-08-24 PROCEDURE — 120N000001 HC R&B MED SURG/OB

## 2021-08-24 PROCEDURE — 250N000013 HC RX MED GY IP 250 OP 250 PS 637: Performed by: HOSPITALIST

## 2021-08-24 PROCEDURE — 250N000009 HC RX 250: Performed by: PODIATRIST

## 2021-08-24 PROCEDURE — A9585 GADOBUTROL INJECTION: HCPCS | Performed by: INTERNAL MEDICINE

## 2021-08-24 PROCEDURE — 250N000013 HC RX MED GY IP 250 OP 250 PS 637: Performed by: PODIATRIST

## 2021-08-24 PROCEDURE — 80053 COMPREHEN METABOLIC PANEL: CPT | Performed by: INTERNAL MEDICINE

## 2021-08-24 PROCEDURE — 86140 C-REACTIVE PROTEIN: CPT | Performed by: INTERNAL MEDICINE

## 2021-08-24 PROCEDURE — 97116 GAIT TRAINING THERAPY: CPT | Mod: GP | Performed by: PHYSICAL THERAPIST

## 2021-08-24 PROCEDURE — 99207 PR CDG-MDM COMPONENT: MEETS MODERATE - DOWN CODED: CPT | Performed by: INTERNAL MEDICINE

## 2021-08-24 PROCEDURE — 82040 ASSAY OF SERUM ALBUMIN: CPT | Performed by: INTERNAL MEDICINE

## 2021-08-24 PROCEDURE — 99232 SBSQ HOSP IP/OBS MODERATE 35: CPT | Performed by: INTERNAL MEDICINE

## 2021-08-24 PROCEDURE — 999N000190 HC STATISTIC VAT ROUNDS

## 2021-08-24 PROCEDURE — 70553 MRI BRAIN STEM W/O & W/DYE: CPT

## 2021-08-24 PROCEDURE — 97530 THERAPEUTIC ACTIVITIES: CPT | Mod: GP | Performed by: PHYSICAL THERAPIST

## 2021-08-24 PROCEDURE — 250N000013 HC RX MED GY IP 250 OP 250 PS 637: Performed by: STUDENT IN AN ORGANIZED HEALTH CARE EDUCATION/TRAINING PROGRAM

## 2021-08-24 PROCEDURE — 85027 COMPLETE CBC AUTOMATED: CPT | Performed by: INTERNAL MEDICINE

## 2021-08-24 RX ORDER — GADOBUTROL 604.72 MG/ML
8 INJECTION INTRAVENOUS ONCE
Status: COMPLETED | OUTPATIENT
Start: 2021-08-24 | End: 2021-08-24

## 2021-08-24 RX ADMIN — NAFCILLIN SODIUM 2 G: 2 INJECTION, POWDER, LYOPHILIZED, FOR SOLUTION INTRAMUSCULAR; INTRAVENOUS at 21:06

## 2021-08-24 RX ADMIN — GABAPENTIN 600 MG: 300 CAPSULE ORAL at 16:29

## 2021-08-24 RX ADMIN — BICTEGRAVIR SODIUM, EMTRICITABINE, AND TENOFOVIR ALAFENAMIDE FUMARATE 1 TABLET: 50; 200; 25 TABLET ORAL at 09:09

## 2021-08-24 RX ADMIN — BUPRENORPHINE HYDROCHLORIDE, NALOXONE HYDROCHLORIDE 1 FILM: 4; 1 FILM, SOLUBLE BUCCAL; SUBLINGUAL at 18:44

## 2021-08-24 RX ADMIN — SERTRALINE HYDROCHLORIDE 50 MG: 50 TABLET ORAL at 09:09

## 2021-08-24 RX ADMIN — NAFCILLIN SODIUM 2 G: 2 INJECTION, POWDER, LYOPHILIZED, FOR SOLUTION INTRAMUSCULAR; INTRAVENOUS at 18:44

## 2021-08-24 RX ADMIN — GABAPENTIN 600 MG: 300 CAPSULE ORAL at 09:09

## 2021-08-24 RX ADMIN — BUPRENORPHINE HYDROCHLORIDE, NALOXONE HYDROCHLORIDE 1 FILM: 4; 1 FILM, SOLUBLE BUCCAL; SUBLINGUAL at 21:06

## 2021-08-24 RX ADMIN — GADOBUTROL 8 ML: 604.72 INJECTION INTRAVENOUS at 15:11

## 2021-08-24 RX ADMIN — NAFCILLIN SODIUM 2 G: 2 INJECTION, POWDER, LYOPHILIZED, FOR SOLUTION INTRAMUSCULAR; INTRAVENOUS at 09:10

## 2021-08-24 RX ADMIN — NAFCILLIN SODIUM 2 G: 2 INJECTION, POWDER, LYOPHILIZED, FOR SOLUTION INTRAMUSCULAR; INTRAVENOUS at 05:17

## 2021-08-24 RX ADMIN — NAFCILLIN SODIUM 2 G: 2 INJECTION, POWDER, LYOPHILIZED, FOR SOLUTION INTRAMUSCULAR; INTRAVENOUS at 01:30

## 2021-08-24 RX ADMIN — NAFCILLIN SODIUM 2 G: 2 INJECTION, POWDER, LYOPHILIZED, FOR SOLUTION INTRAMUSCULAR; INTRAVENOUS at 13:52

## 2021-08-24 RX ADMIN — GABAPENTIN 600 MG: 300 CAPSULE ORAL at 21:06

## 2021-08-24 RX ADMIN — QUETIAPINE FUMARATE 25 MG: 25 TABLET ORAL at 21:06

## 2021-08-24 RX ADMIN — PANTOPRAZOLE SODIUM 40 MG: 40 TABLET, DELAYED RELEASE ORAL at 09:09

## 2021-08-24 RX ADMIN — BUPRENORPHINE HYDROCHLORIDE, NALOXONE HYDROCHLORIDE 1 FILM: 4; 1 FILM, SOLUBLE BUCCAL; SUBLINGUAL at 13:52

## 2021-08-24 RX ADMIN — BUPRENORPHINE HYDROCHLORIDE, NALOXONE HYDROCHLORIDE 1 FILM: 4; 1 FILM, SOLUBLE BUCCAL; SUBLINGUAL at 09:09

## 2021-08-24 ASSESSMENT — ACTIVITIES OF DAILY LIVING (ADL)
ADLS_ACUITY_SCORE: 16
ADLS_ACUITY_SCORE: 17
ADLS_ACUITY_SCORE: 16
ADLS_ACUITY_SCORE: 20
ADLS_ACUITY_SCORE: 17
ADLS_ACUITY_SCORE: 16

## 2021-08-24 NOTE — PLAN OF CARE
Occupational Therapy Discharge Summary    Reason for therapy discharge:    Discharged to transitional care facility.    Progress towards therapy goal(s). See goals on Care Plan in [facility] electronic health record for goal details.  Goals partially met.  Barriers to achieving goals:   discharge from facility.    Therapy recommendation(s):    Continued therapy is recommended.  Rationale/Recommendations:  Pt limited by activity tolerance, functional mobility and cognition. Would benefit from continued skilled OT services to improve (I) with ADLs and functinal mobility to PLOF. Pt wanting to d/c home. if home, recommend 24 hr superivison and A with all I/ADLs and functional mobility safety, ie med mgmt, cooking, etc.  Recommend home OT/PT/RN. Pt progressing with activity tolerance but would continue to recommend supportive environment.

## 2021-08-24 NOTE — PROGRESS NOTES
CLINICAL NUTRITION SERVICES - REASSESSMENT NOTE    Malnutrition:   (7/19)   % Weight Loss:  Weight loss does not meet criteria for malnutrition - pt states wt is still elevated from baseline   % Intake:  <75% for > 7 days (non-severe malnutrition) - on average  Subcutaneous Fat Loss:  Orbital region mild depletion and Upper arm region mild depletion  Muscle Loss:  Clavicle bone region mild depletion  Fluid Retention:  Moderate     Malnutrition Diagnosis: Non-Severe malnutrition  In Context of:  Acute illness or injury     EVALUATION OF PROGRESS TOWARD GOALS   Diet: Mechanical/Dental Soft Diet  Ensure BID  Intake/Tolerance:   - Eating well since last assessment on 8/19. Pt is tolerating % of her meals. She tells me she is eating fine, and continues to like the Ensure supplements BID. She requests only the vanilla flavor be sent. She drinks about half of each bottle.   - Wt was 82.8 kg on 8/20    ASSESSED NUTRITION NEEDS:  Dosing Weight:  67 kg (adjusted for overwt)  Energy Needs:  3875-7734 kcals (25-30 kcal/kg)  Justification: overweight    Protein Needs:   grams protein (1.2-1.5+ g pro/Kg)  Justification: several surgeries this admission, preservation of LBM    NEW FINDINGS:   8/23 WOCN -   Left great toe: small stable eschar  Left 3rd toe: s/p partial amp by Podiatry 8/7/21, sutured incision remains clean, dry, intact  Left lateral heel: resolved  Right great toe: Re-epithealization maintaining small area of firm eschar     Previous Goals:   Intake of >/=75% meals TID + 1-2 supplements daily.   Evaluation: Met    Previous Nutrition Diagnosis:   Increased nutrient needs (protein) related to prolonged admission, partial toe amputation, skin integrity as evidenced by protein needs assessed at >80 g protein.   Evaluation: No change      CURRENT NUTRITION DIAGNOSIS  Increased nutrient needs (protein) related to prolonged admission, partial toe amputation, skin integrity as evidenced by protein needs  assessed at >80 g protein.     INTERVENTIONS  Recommendations / Nutrition Prescription  Mechanical/Dental Soft  Ensure BID between meals - Vanilla only     Implementation  No change    Goals  Intake of >75% meals TID on average + 1-2 supplements.       MONITORING AND EVALUATION:  Progress towards goals will be monitored and evaluated per protocol and Practice Guidelines    Nubia Marks RD, LD  [facility] [place], 68, 57, [facility]   Pager: [phone]  Weekend Pager: [phone]

## 2021-08-24 NOTE — PLAN OF CARE
DATE & TIME: 8/23/21 7265-9027  Cognitive Concerns/ Orientation : A&O x4, calm and cooperative   BEHAVIOR & AGGRESSION TOOL COLOR: Green   ABNL VS/O2: VSS on RA  MOBILITY: Independent in room with cane. Surgical shoe to wear on left, uses leg brace with long walks.   PAIN MANAGMENT: Denies  DIET: Mech soft, s/p teeth extraction on 8/13, good appetite. Also taking supplements.  BOWEL/BLADDER: Continent, up to BR  ABNL LAB/BG: no labs drawn today  DRAIN/DEVICES: PICC RUE for Nafcillin q4h  TELEMETRY RHYTHM: N/A  SKIN:  Bilateral foot wounds, change per WOC orders. L foot dressing CDI, R open to air.   TESTS/PROCEDURES: Follow-up brain MRI scheduled for 8/24.  D/C DATE: 9/1 if brain abscess resolved and finished with abx.   Discharge Barriers: IV abx through 9/1  OTHER IMPORTANT INFO: Neurosurgery following, PT/OT/WOC. NS for mouth rinses at bedside. LS clear, BS active x4. Contact precautions maintained.

## 2021-08-24 NOTE — PLAN OF CARE
DATE & TIME: 8/23/21 2340-3574  Cognitive Concerns/ Orientation : A/O x4  BEHAVIOR & AGGRESSION TOOL COLOR: Green   ABNL VS/O2: VSS on RA  MOBILITY: Independent in room with cane. Surgical shoe to wear on left, uses leg brace with long walks.   PAIN MANAGMENT: Denies  [facility]13, good appetite. Also taking supplements.  BOWEL/BLADDER: Continent, up to BR  ABNL LAB/BG: no labs drawn today  DRAIN/DEVICES: PICC ANDRIA for Nafcillin q4h  TELEMETRY RHYTHM: n/a  SKIN:  Bilateral foot wounds, change per WOC orders. L foot dressing CDI, R open to air.   TESTS/PROCEDURES: Follow-up brain MRI scheduled tomorrow 8/24.  D/C DATE: 9/1 if brain abscess resolved and finished with abx.   Discharge Barriers: IV abx through 9/1  OTHER IMPORTANT INFO: Neurosurgery following, PT/OT/WOC. NS for mouth rinses at bedside.

## 2021-08-24 NOTE — PROGRESS NOTES
[facility]    Infectious Disease Progress Note    Date of Service : 08/24/2021     Assessment :  1. S.aureus MSSA native mitral valve endocarditis with septic shock in the setting of IVDA complicated by embolic L parietal lobe infarction,  CNS emboli and abscesses and multiple peripheral emboli.   (TTE shows a large mobile 1.4 cm vegetation on the posterior mitral valve leaflet with extension to left atrium and small ASD per cardiology notes , no valvular abscess and no additional valvular involvement. Pericardial effusion is small). No seeding on CT abdomen but has peripheral emboli.  2. Untreated HIV diagnosed 2018, CD4 count maintained at >500, VL 3,695cpies/ml. Genotype pending  3. Hepatitis C Ab+ but RNA -.   4. 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 Multiple peripheral emboli. Right finger and discoloration of left 3rd toe and multiple peripheral emboli  6. FORD related to sepsis- resolved  7. Embolic stroke (small ASD with Left to right shunt)  8. Klebsiella pneumoniae UTI treated. ESBL E.coli urinary colonization  9. Severe anemia multifactorial. Has baseline anemia, exacerbated by chronic disease  10. Polysubstance abuse  11. Splenomegaly   12. Organized pericardial effusion      Recommendations:  1. Nafcillin  2. Biktarvy  3. Check CBC with diff -developing leukopenia which may be antibiotic associated  4. Multiple CNS emboli with abscesses on repeat MRI . Antibiotic duration extended to 8 weeks. End date 9/1/ 21  5. Will need HIV follow up after discharge  ID will follow weekly until discharge    Marisol Matos MD    Interval History   Laying in bed, feels ok. No new complaints, tolerating antibiotics without side effects.    Antimicrobial therapy  7/2 Nafcillin  7/8 Biktarvy  Prior  7/4-7/7 Cipro  7/1-7/2 Vancomycin, zosyn    Physical Exam   Temp: 98.4  F (36.9  C) Temp src: Oral BP: 110/66 Pulse: 67   Resp: 18 SpO2: 100 % O2 Device: None (Room air)    Vitals:    07/14/21  0445 07/16/21 0615 08/20/21 0100   Weight: 84.2 kg (185 lb 10 oz) 76.8 kg (169 lb 5 oz) 82.8 kg (182 lb 9.6 oz)     Vital Signs with Ranges  Temp:  [98.4  F (36.9  C)] 98.4  F (36.9  C)  Pulse:  [67-84] 67  Resp:  [16-18] 18  BP: (104-110)/(64-73) 110/66  SpO2:  [96 %-100 %] 100 %    GENERAL APPEARANCE: awake, alert   EYES: conjunctivae and sclerae normal  NECK: no adenopathy  RESP: lungs clear to auscultation - no rales, rhonchi or wheezes  CV: regular rates and rhythm, murmur  ABDOMEN: soft, umbilical hernia  MS: Left 3rd toe amputation  SKIN: peripheral stigmata of endocarditis with embolic phenomenon     Other:    Medications       bictegravir-emtricitabine-tenofovir  1 tablet Oral Daily     buprenorphine HCl-naloxone HCl  1 Film Sublingual 4x Daily     gabapentin  600 mg Oral TID     nafcillin  2 g Intravenous Q4H     pantoprazole  40 mg Oral QAM AC     polyethylene glycol  17 g Oral Daily     senna-docusate  1 tablet Oral BID     sertraline  50 mg Oral Daily     sodium chloride (PF)  10 mL Intracatheter Q8H     sodium chloride (PF)  10-40 mL Intracatheter Q7 Days       Data   All microbiology laboratory data reviewed.  Recent Labs   Lab Test 08/24/21  0831 08/17/21  0608 08/16/21  0635   WBC 3.4* 4.3 3.9*   HGB 9.0* 9.2* 8.9*   HCT 29.2* 30.3* 28.9*   MCV 87 86 85    623* 606*     Recent Labs   Lab Test 08/24/21  0831 08/17/21  0607 08/16/21  0635   CR 0.61 0.62 0.60     No lab results found.  Recent Labs   Lab Test 07/10/21  2243 07/09/21  0454 07/09/21  0415 07/08/21  1417 07/08/21  0500 07/07/21  0450 07/06/21  0500 07/05/21  1232 07/05/21  1104   CULT >100,000 colonies/mL  Escherichia coli ESBL  ESBL (extended beta lactamase) producing organisms require contact precautions.  *  Critical Value/Significant Value called to and read back by  Luanne Bettencourt RN @ 0671 7/13/21 TM.   No growth No growth No growth No growth No growth  No growth No growth  No growth Cultured on the 2nd day of  incubation:  Staphylococcus epidermidis  *  Critical Value/Significant Value, preliminary result only, called to and read back by  IVETT WARE RN 07/06/21 1258 EH.    Cultured on the 5th day of incubation:  Staphylococcus aureus  Susceptibility testing done on previous specimen  *  Critical Value/Significant Value called to and read back by  Cherelle Smith RN 1043 7/11/21 AM   Cultured on the 2nd day of incubation:  Staphylococcus aureus  Susceptibility testing done on previous specimen  *  Critical Value/Significant Value, preliminary result only, called to and read back by  Sin Jack RN at 0448 7/7/21 hg       8/17 MRI brain  EXAM: MR BRAIN W/O and W CONTRAST  LOCATION: [facility]  DATE/TIME: 8/17/2021 5:11 AM     INDICATION: Brain abscess.  COMPARISON: MRI brain 8/10/2021, head CT 8/14/2021.  CONTRAST: 8 mL Gadavist.  TECHNIQUE: Routine multiplanar multisequence head MRI without and with intravenous contrast.     FINDINGS:  INTRACRANIAL CONTENTS: Again seen is an irregular rim-enhancing process in the left parietal/occipital lobe demonstrating internal restricted diffusion. The rind of enhancement is slightly more conspicuous but the overall size is unchanged. Stable   associated edema. Stable enhancing foci in the bilateral parietal lobes. Stable irregular enhancement at the left temporal/occipital junction along the superior margin of the tentorium. No definite new zone of enhancement. These findings are suspicious   for septic emboli and resultant abscesses. No mass, acute hemorrhage, or extra-axial fluid collections. No hydrocephalus. Normal position of the cerebellar tonsils. No pathologic contrast enhancement.     SELLA: No abnormality accounting for technique.     OSSEOUS STRUCTURES/SOFT TISSUES: Normal marrow signal. The major intracranial vascular flow voids are maintained.      ORBITS: No abnormality accounting for technique.      SINUSES/MASTOIDS: No paranasal sinus  mucosal disease. No middle ear or mastoid effusion.                                                                       IMPRESSION:  1.  Overall, no definite significant interval change when compared to MRI dated 8/10/2021.

## 2021-08-24 NOTE — PROGRESS NOTES
ADDENDUM    MRI reviewed myself as well as with Dr. Brown. Dr. Brown recommends continuing antibiotics and repeat MRI in 2 weeks time.   This was relayed to RN.     EXAM: MR BRAIN W/O and W CONTRAST  LOCATION: [facility]  DATE/TIME: 8/17/2021 5:11 AM     INDICATION: Brain abscess.  COMPARISON: MRI brain 8/10/2021, head CT 8/14/2021.  CONTRAST: 8 mL Gadavist.  TECHNIQUE: Routine multiplanar multisequence head MRI without and with intravenous contrast.     FINDINGS:  INTRACRANIAL CONTENTS: Again seen is an irregular rim-enhancing process in the left parietal/occipital lobe demonstrating internal restricted diffusion. The rind of enhancement is slightly more conspicuous but the overall size is unchanged. Stable   associated edema. Stable enhancing foci in the bilateral parietal lobes. Stable irregular enhancement at the left temporal/occipital junction along the superior margin of the tentorium. No definite new zone of enhancement. These findings are suspicious   for septic emboli and resultant abscesses. No mass, acute hemorrhage, or extra-axial fluid collections. No hydrocephalus. Normal position of the cerebellar tonsils. No pathologic contrast enhancement.     SELLA: No abnormality accounting for technique.     OSSEOUS STRUCTURES/SOFT TISSUES: Normal marrow signal. The major intracranial vascular flow voids are maintained.      ORBITS: No abnormality accounting for technique.      SINUSES/MASTOIDS: No paranasal sinus mucosal disease. No middle ear or mastoid effusion.                                                                       IMPRESSION:  1.  Overall, no definite significant interval change when compared to MRI dated 8/10/2021.          [facility]    Neurosurgery  Daily Note    Assessment & Plan   Manas Hernandez is a 41 year old female who was admitted on 7/1/2021. She was admitted with a history of HIV, HepC and admitted 7/1/2021 for MSSA  endocarditis. Initial brain imaging showed what was felt at that time to be consistent with infarct, but now with concern for evolving abscess. She has been treated with IV antibiotics. ID is following. Denies headache, vision changes, weakness, paresthesias. Has been tolerating PO intake.     PLAN:  Obtain repeat MRI today   ABX per ID  Plans reviewed with Dr. 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   Physical Exam   Temp: 98.4  F (36.9  C) Temp src: Oral BP: 110/66 Pulse: 67   Resp: 18 SpO2: 100 % O2 Device: None (Room air)    Vitals:    07/14/21 0445 07/16/21 0615 08/20/21 0100   Weight: 185 lb 10 oz (84.2 kg) 169 lb 5 oz (76.8 kg) 182 lb 9.6 oz (82.8 kg)     Vital Signs with Ranges  Temp:  [98.4  F (36.9  C)] 98.4  F (36.9  C)  Pulse:  [67-84] 67  Resp:  [16-18] 18  BP: (104-110)/(64-73) 110/66  SpO2:  [96 %-100 %] 100 %  I/O last 3 completed shifts:  In: 1160 [P.O.:960; I.V.:200]  Out: -     Awake, alert, nad   II-XII grossly intact  DAILEY symmetrically     Medications        bictegravir-emtricitabine-tenofovir  1 tablet Oral Daily     buprenorphine HCl-naloxone HCl  1 Film Sublingual 4x Daily     gabapentin  600 mg Oral TID     nafcillin  2 g Intravenous Q4H     pantoprazole  40 mg Oral QAM AC     polyethylene glycol  17 g Oral Daily     senna-docusate  1 tablet Oral BID     sertraline  50 mg Oral Daily     sodium chloride (PF)  10 mL Intracatheter Q8H     sodium chloride (PF)  10-40 mL  Intracatheter Q7 Days       Plans discussed with Dr. Skinner who was in agreement with plans    Nithya SANTOS [facility] Neurosurgery  [address] [address]    Tel [phone]

## 2021-08-24 NOTE — PLAN OF CARE
DATE & TIME: 8/24/21 (8781-9003)  Cognitive Concerns/ Orientation : A&O x4, calm and cooperative   BEHAVIOR & AGGRESSION TOOL COLOR: Green   ABNL VS/O2: VSS on RA  MOBILITY: Independent in room with cane. Surgical shoe to wear on left, uses leg brace with long walks.   PAIN MANAGMENT: Denies  DIET: Mech soft, s/p teeth extraction on 8/13, good appetite. Also taking supplements.  BOWEL/BLADDER: Continent, up to BR  ABNL LAB/BG: Hemoglobin 9.0  DRAIN/DEVICES: PICC RUE for Nafcillin q4h  TELEMETRY RHYTHM: N/A  SKIN:  Bilateral foot wounds, L foot dressing changed this shift, R open to air.   TESTS/PROCEDURES: Follow-up brain MRI scheduled for 8/24.  D/C DATE: 9/1 if brain abscess resolved and finished with abx.   Discharge Barriers: IV abx through 9/1  OTHER IMPORTANT INFO: Neurosurgery following, PT/OT/WOC. LS clear, BS active x4. Contact precautions maintained.

## 2021-08-24 NOTE — PROGRESS NOTES
[facility]  Hospitalist Progress Note for 8/22/2021       Assessment and Plan:   41 year old female with h/o untreated HIV, hepatitis C who was admitted on 7/1/2021 for MSSA mitral valve endocarditis resulting in septic shock complicated by embolic left parietal lobe infarctions and multiple peripheral emboli, and initially with encephalopathy, and shock.  She was intubated in ED on 7/1/2021 subsequently extubated 7/11/2021.  Blood cultures turned positive on hospital day 1 with MSSA.  Urine culture was positive for Klebsiella.  She was treated initially with ceftriaxone and vancomycin, and has since been narrowed to nafcillin per ID.     MSSA mitral valve endocarditis  Septic shock, resolved  Probable myopericarditis  Small pericardial effusion  *JAE 7/5 noted with small ASD, mod to large mobile vegetation (14mm long, 6mm wide) of the P2 segment of the mitral leaflet. No valve perforation; mod MR  *blood cultures on admission with MSSA; blood cultures have been negative since 7/6.   *ID following; antibiotic has been narrowed down to Nafcillin,duration extended from 8/18 to 9/1 due  abscess on MRI  *limited ECHO repeated 7/15 to reassess pericardial fluid 7/15 per cardiology and noted:  organized effusion posteriorly, measuring 1.2-1.5cm, no evidence of tamponade; small mobile vegetation noted attached to the mitral leaflet, mod to severe MR; compared to 7/8/21, effusion size is probably stable, Degree of MR is worse, Vegetation is smaller.  Cardiology signed off 7/15.   *PICC in place  -- JAE (no vegetations) and Cor Angio (nomral coronaries) completed 08/12  - per cardiothoracic surgery reconsult 8/14 felt she no longer has an urgent surgical indication,given absence of vegetations and only moderate MR on ECHO 8/12/2021.She may require mitral valve repair/replacement in the future if she has progressive MR, but this would be better addressed after she has undergone treatment for substance abuse  and has demonstrated sobriety in the out-patient setting. She should follow up with cardiology for medical management and surveillance of MR and ASD, and can be referred back to CV surgery as needed.   - Antibiotic plan per ID     Ischemic CVA, embolic stroke due to infective endocarditis  *CT head on admission notable for new acute to subacute PCA territory ischemic stroke  *MRI 7/3 noted with progressive, multifocal, acute infarcts without MR evidence for hemorrhage or midline shift  *Patient does have small left-to-right shunt on JAE, but most likely from embolic spread from endocarditis  *was evaluated by neurology, to continue antibiotics; per neuro to avoid anticoagulation (except DVT prophylaxis dose)     Abnormal MRI  Possible brain abscess  * MRI 8/10/2021 showed- Multiple enhancing subacute infarcts with evidence of small volume hemorrhage, largest at the junction of the left parietal and occipital lobes which demonstrates peripheral enhancement and worsening vasogenic edema. Developing abscess(es) related to septic emboli cannot be excluded  - Neurology was reconsulted 8/14, recommended to screen for mycotic aneurysm   * CTA head 8/14 -showed Complex peripherally enhancing fluid collection in the paramedian left parietal lobepatent arteries of head & neck. No evidence for dissection.  and CT head today & neurosurgery consult  * Ct head w/o contrast 8/14 showed no significant change in the complex peripherally enhancing fluid collection in the paramedian left parietal lobe with surrounding vasogenic edema. Atypical for bland evolving subacute ischemic infarct. Less likely possibility for evolving subacute hematoma, consider tissue sampling.Mild local mass effect in the left parieto-occipital region with minimal narrowing of the occipital horn of the left lateral ventricle.No midline shift/herniation  - pt remains asymptomatic  - neurosurgery consulted on 8/14th appreciated. As the pt has no neuro symptoms  they  recommended repeating the brain MRI on 8/17  * MRI head on 8/17 reported as no definite significant interval change when compared to MRI dated 8/10/2021.  - NSG following & recommended no surgical intervention at this time   - A/W Repeat MRI brain  -8/24/21   - Activity as tolerated, continue with therapies  - Continue supportive and symptomatic treatment      Infected Tooth  S/p removal of all remaining teeth on 8/13  Assessment: OMFS was consulted for multiple infected tooth  - S/p Removal of all remaining teeth including 2, 3, 4, 5, 6, 7, 8, 9, 10, 11, 12, 13, 19, 20, 21, 22, 23, 24, 25, 26, 28, 29, 30  - on full liquid diet - advanced to mechanical /dental soft diet on 8/15     Osteomyelitis left 3rd toe s/p amputation 8/7  Necrotic wounds to Lt great toes, Lt 3rd toe and Lt lateral Heel, Rt great toe: 2/2 thrombi.  -- Podiatry took to OR 8/7 for amputation. Orthotist following up.   -- continue wound cares for other digits     Normocytic anemia, likey 2/2 chronic disease  Chronic anemia  Baseline hemoglobin approximately 7.  Likely related to chronic disease, untreated HIV.  - Hgb 7.4 yesterday -> 8.1 today  - B12 and folate -> wnl  - consider transfusion for Hgb < 7 (or close)  - low nl ferritin- on po ferrous gluconate  - CBC stable, last checked on 8/17     Left knee pain 2/2 OA: pt reports history of pain in that knee with activity but pain now is more acute. ROM okay. There is some warmth and swelling there.   - In light of the MSSA bacteremia, got XR and called ortho for effusion  - Per ortho no indication for intervention, though they did order a brace. Appreciate assistance. There are following EOD.  - PT/OT     IV drug use  Polysubstance use disorder  Opiate use disorder  *Chem dep consulted 7/15 but chemical dependency did not feel she is appropriate for CD assessment at this time and recommended consultation towards the end of her antibiotic course.  Reconsult CD once she is close to finishing  her IV antibiotic.  *Was using IV dilaudid as well as Percocet p.o. earlier this stay pretty extensively.  Attempts made to wean off IV narcotics and taper down po narcotics though patient felt she was going through withdrawal. Psych consulted and recommended suboxone though patient declined. Psych recommended rapid taper of methadone instead.   *completed methadone taper of 30 mg on 7/21, 20 mg 7/22 and then 10 mg 7/23, then stop  -started suboxone on 7/27 - appreciate psych/addiction medicine assistance who will help with titration  - now on suboxone BID and doing very well. Reports no cravings and hopes to continue this regimen outpatient.  -Plan was to reconsult CD (8/13th)once she is close to finishing her IV antibiotic, but patient is not interested in treatment. She has been through inpatient and outpatient programs and not found them helpful. Also I'm not sure she could continue suboxone in treatment and I think that is probably her best chance for sobriety.  -FYI: She has a daughter who just had twins and lives with her in-laws. Daughter and daughter's mother in law live in [place] (? - pt going to confirm) and have invited patient to live there to help with babies. She thinks this is a good idea as she'll be kept busy and she gets along well with this family. She is hoping to continue follow up with addiction medicine to continue suboxone. She feels that with suboxone and family support she will do well. Will need to have addiction med follow up with her to prescribe suboxone on discharge and help her set up outpatient follow up.  -08/12 -> Pain service recommended   1. Change Suboxone to  4 mg qid for better analgesic effect.     2. discontinue oxycodone   3. to use acetaminophen for additional analgesic effect if needed.- can give as much as 1000 mg tid   (don't exceed 3000 mg total per day).       HIV, untreated PTA  - CD4 count greater than 500, viral load 3695  - Started Biktarvy 7/8; ID  following  - Will need outpt follow up     Chronic back pain: pt notes it present ever since she was pregnant. Improved on current medications  - on increased gabapentin to 600 mg TID, prn tylenol     Toxic/septic/metabolic encephalopathy, improved  Likely encephalopathy due to sepsis, drug use, CVA, prolonged ICU stay; had been pulling lines due to alterned mentation thought this all now appears resolved.      Pyelonephritis  Urine cultures positive for Klebsiella.  Completed course of treatment with ceftriaxone.  repeat urine cultures 7/10 with ESBL E coli but given unimpressive UA and no UTI symptoms ID suggest holding off on abx for ESBL UTI  -monitor for any symptoms     Acute hypoxemic respiratory failure, resolved  Intubated 7/1/2021.  Extubated 7/11/2021.  Likely related to septic shock from MSSA mitral valve endocarditis     FORD, resolved suspected due to sepsis  Hypokalemia  - on potassium replacement protocol  - will check BMP periodically     Chronic anemia  Baseline hemoglobin approximately 7.  Likely related to chronic disease, untreated HIV.  -Monitor hemoglobin periodically; stable around 8  - low nl ferritin- start po ferrous gluconate     Non severe malnutrition  -- Supplements      Prior hepatitis C infection  Antibody positive but RNA negative  -Noted     Restless legs: unclear if this is true restless leg syndrome or due to being uncomfortable  -continue mirapex prn     Major depressive disorder: pt is not suicidal but also doesn't seem to have much of a will to live based upon discussion 7/25.  I wonder if her underlying chronic pain which is difficult to treat as well as being in the hospital is contributing. Was seen by psych and declined any medication treatment on 7/26.  patient was on zoloft for years starting at age 13. She is willing to try zoloft again.   -Started zoloft 8/1 at 25mg po and titrating up to 50mg. Pt feels this is helping. Psychiatry followed up and agreed with this  "medication.    Code Status: Full Code       Disposition Plan   Expected discharge: 09/01 if Brain abscess resolves.  recommended to unclear once antibiotic plan established.    She is homeless at baseline but has a plan post discharge to live with daughter and daughter's in laws. This is in the [facility] and will allow for follow up cares.  SW following    Ashok Triplett MD, FACP   Internal Medicine/ Hospitalist (Pager- [phone])                Interval History:   no new complaints    Denies Chest pain/ SOB/ cough, Denies any N&V/ bowel problems  Denies urinary problems , Denies fever/chills/rigors                 Medications:       bictegravir-emtricitabine-tenofovir  1 tablet Oral Daily     buprenorphine HCl-naloxone HCl  1 Film Sublingual 4x Daily     gabapentin  600 mg Oral TID     nafcillin  2 g Intravenous Q4H     pantoprazole  40 mg Oral QAM AC     polyethylene glycol  17 g Oral Daily     senna-docusate  1 tablet Oral BID     sertraline  50 mg Oral Daily     sodium chloride (PF)  10 mL Intracatheter Q8H     sodium chloride (PF)  10-40 mL Intracatheter Q7 Days     acetaminophen, albuterol, bisacodyl, glucose **OR** dextrose **OR** glucagon, hydrOXYzine, lidocaine 4%, lidocaine (buffered or not buffered), magnesium hydroxide, naloxone **OR** naloxone **OR** naloxone **OR** naloxone, ondansetron **OR** ondansetron, prochlorperazine **OR** prochlorperazine, QUEtiapine, sodium chloride (PF), sodium chloride (PF), sodium chloride (PF)               Physical Exam:   Blood pressure 130/75, pulse 78, temperature 98.1  F (36.7  C), temperature source Oral, resp. rate 18, height 1.727 m (5' 7.99\"), weight 82.8 kg (182 lb 9.6 oz), SpO2 98 %, unknown if currently breastfeeding.  Wt Readings from Last 4 Encounters:   08/20/21 82.8 kg (182 lb 9.6 oz)   11/05/07 103.1 kg (227 lb 4 oz)   09/26/07 99.3 kg (219 lb)     /75 (BP Location: Left arm)   Pulse 78   Temp 98.1  F (36.7  C) (Oral)   Resp 18   Ht 1.727 m (5' " "7.99\")   Wt 82.8 kg (182 lb 9.6 oz)   SpO2 98%   Breastfeeding Unknown   BMI 27.77 kg/m    Gen- pleasant lying in bed  HEENT- NAD, BO  Neck- supple, no JVD elevation, no thyromegaly  CVS- I+II+ no m/r/g  RS- CTAB  Abdo- soft, no tenderness . No g/r/r  Ext- no edema   CNS- no gross focal deficit           Data:   All laboratory data reviewed  BMP  Recent Labs   Lab 08/24/21  0831      POTASSIUM 3.7   CHLORIDE 108   DEREK 8.6   CO2 28   BUN 12   CR 0.61   GLC 92     CBC  Recent Labs   Lab 08/24/21  0831   WBC 3.4*   RBC 3.36*   HGB 9.0*   HCT 29.2*   MCV 87   MCH 26.8   MCHC 30.8*   RDW 25.1*        INRNo lab results found in last 7 days.  LFTs  Recent Labs   Lab 08/24/21  0831   ALKPHOS 87   AST 19   ALT 15   BILITOTAL 0.6   PROTTOTAL 7.9   ALBUMIN 2.0*      PANCNo lab results found in last 7 days.    "

## 2021-08-25 ENCOUNTER — APPOINTMENT (OUTPATIENT)
Dept: PHYSICAL THERAPY | Facility: CLINIC | Age: 41
End: 2021-08-25
Payer: MEDICAID

## 2021-08-25 LAB
BASOPHILS # BLD MANUAL: 0.1 10E3/UL (ref 0–0.2)
BASOPHILS NFR BLD MANUAL: 3 %
ELLIPTOCYTES BLD QL SMEAR: SLIGHT
EOSINOPHIL # BLD MANUAL: 0.2 10E3/UL (ref 0–0.7)
EOSINOPHIL NFR BLD MANUAL: 4 %
ERYTHROCYTE [DISTWIDTH] IN BLOOD BY AUTOMATED COUNT: 24.8 % (ref 10–15)
GLUCOSE BLDC GLUCOMTR-MCNC: 105 MG/DL (ref 70–99)
HCT VFR BLD AUTO: 31.1 % (ref 35–47)
HGB BLD-MCNC: 9.7 G/DL (ref 11.7–15.7)
LYMPHOCYTES # BLD MANUAL: 2.2 10E3/UL (ref 0.8–5.3)
LYMPHOCYTES NFR BLD MANUAL: 55 %
MCH RBC QN AUTO: 26.9 PG (ref 26.5–33)
MCHC RBC AUTO-ENTMCNC: 31.2 G/DL (ref 31.5–36.5)
MCV RBC AUTO: 86 FL (ref 78–100)
MONOCYTES # BLD MANUAL: 0.4 10E3/UL (ref 0–1.3)
MONOCYTES NFR BLD MANUAL: 9 %
NEUTROPHILS # BLD MANUAL: 1.2 10E3/UL (ref 1.6–8.3)
NEUTROPHILS NFR BLD MANUAL: 29 %
PLAT MORPH BLD: ABNORMAL
PLATELET # BLD AUTO: 418 10E3/UL (ref 150–450)
RBC # BLD AUTO: 3.6 10E6/UL (ref 3.8–5.2)
RBC MORPH BLD: ABNORMAL
WBC # BLD AUTO: 4 10E3/UL (ref 4–11)

## 2021-08-25 PROCEDURE — 97116 GAIT TRAINING THERAPY: CPT | Mod: GP | Performed by: PHYSICAL THERAPIST

## 2021-08-25 PROCEDURE — 250N000013 HC RX MED GY IP 250 OP 250 PS 637: Performed by: PODIATRIST

## 2021-08-25 PROCEDURE — 250N000009 HC RX 250: Performed by: PODIATRIST

## 2021-08-25 PROCEDURE — 99207 PR CDG-MDM COMPONENT: MEETS MODERATE - DOWN CODED: CPT | Performed by: INTERNAL MEDICINE

## 2021-08-25 PROCEDURE — 250N000013 HC RX MED GY IP 250 OP 250 PS 637: Performed by: HOSPITALIST

## 2021-08-25 PROCEDURE — 250N000013 HC RX MED GY IP 250 OP 250 PS 637: Performed by: INTERNAL MEDICINE

## 2021-08-25 PROCEDURE — 99232 SBSQ HOSP IP/OBS MODERATE 35: CPT | Performed by: INTERNAL MEDICINE

## 2021-08-25 PROCEDURE — 120N000001 HC R&B MED SURG/OB

## 2021-08-25 PROCEDURE — 97530 THERAPEUTIC ACTIVITIES: CPT | Mod: GP | Performed by: PHYSICAL THERAPIST

## 2021-08-25 PROCEDURE — 250N000013 HC RX MED GY IP 250 OP 250 PS 637: Performed by: DENTIST

## 2021-08-25 PROCEDURE — 85027 COMPLETE CBC AUTOMATED: CPT | Performed by: SPECIALIST

## 2021-08-25 PROCEDURE — 250N000013 HC RX MED GY IP 250 OP 250 PS 637: Performed by: STUDENT IN AN ORGANIZED HEALTH CARE EDUCATION/TRAINING PROGRAM

## 2021-08-25 PROCEDURE — 99232 SBSQ HOSP IP/OBS MODERATE 35: CPT | Mod: 95 | Performed by: INTERNAL MEDICINE

## 2021-08-25 RX ORDER — BUPRENORPHINE 2 MG/1
4 TABLET SUBLINGUAL DAILY
Status: DISCONTINUED | OUTPATIENT
Start: 2021-08-26 | End: 2021-08-30

## 2021-08-25 RX ORDER — BUPRENORPHINE 8 MG/1
8 TABLET SUBLINGUAL 2 TIMES DAILY
Status: DISCONTINUED | OUTPATIENT
Start: 2021-08-26 | End: 2021-08-30

## 2021-08-25 RX ADMIN — BICTEGRAVIR SODIUM, EMTRICITABINE, AND TENOFOVIR ALAFENAMIDE FUMARATE 1 TABLET: 50; 200; 25 TABLET ORAL at 09:35

## 2021-08-25 RX ADMIN — BUPRENORPHINE HYDROCHLORIDE, NALOXONE HYDROCHLORIDE 1 FILM: 4; 1 FILM, SOLUBLE BUCCAL; SUBLINGUAL at 09:35

## 2021-08-25 RX ADMIN — PANTOPRAZOLE SODIUM 40 MG: 40 TABLET, DELAYED RELEASE ORAL at 09:35

## 2021-08-25 RX ADMIN — NAFCILLIN SODIUM 2 G: 2 INJECTION, POWDER, LYOPHILIZED, FOR SOLUTION INTRAMUSCULAR; INTRAVENOUS at 06:59

## 2021-08-25 RX ADMIN — BUPRENORPHINE HYDROCHLORIDE, NALOXONE HYDROCHLORIDE 1 FILM: 4; 1 FILM, SOLUBLE BUCCAL; SUBLINGUAL at 21:36

## 2021-08-25 RX ADMIN — NAFCILLIN SODIUM 2 G: 2 INJECTION, POWDER, LYOPHILIZED, FOR SOLUTION INTRAMUSCULAR; INTRAVENOUS at 21:32

## 2021-08-25 RX ADMIN — SENNOSIDES AND DOCUSATE SODIUM 1 TABLET: 8.6; 5 TABLET ORAL at 21:36

## 2021-08-25 RX ADMIN — NAFCILLIN SODIUM 2 G: 2 INJECTION, POWDER, LYOPHILIZED, FOR SOLUTION INTRAMUSCULAR; INTRAVENOUS at 02:49

## 2021-08-25 RX ADMIN — NAFCILLIN SODIUM 2 G: 2 INJECTION, POWDER, LYOPHILIZED, FOR SOLUTION INTRAMUSCULAR; INTRAVENOUS at 14:13

## 2021-08-25 RX ADMIN — BUPRENORPHINE HYDROCHLORIDE, NALOXONE HYDROCHLORIDE 1 FILM: 4; 1 FILM, SOLUBLE BUCCAL; SUBLINGUAL at 14:00

## 2021-08-25 RX ADMIN — ACETAMINOPHEN 650 MG: 325 TABLET, FILM COATED ORAL at 16:14

## 2021-08-25 RX ADMIN — GABAPENTIN 600 MG: 300 CAPSULE ORAL at 09:35

## 2021-08-25 RX ADMIN — GABAPENTIN 600 MG: 300 CAPSULE ORAL at 16:11

## 2021-08-25 RX ADMIN — BUPRENORPHINE HYDROCHLORIDE, NALOXONE HYDROCHLORIDE 1 FILM: 4; 1 FILM, SOLUBLE BUCCAL; SUBLINGUAL at 18:22

## 2021-08-25 RX ADMIN — GABAPENTIN 600 MG: 300 CAPSULE ORAL at 21:35

## 2021-08-25 RX ADMIN — SERTRALINE HYDROCHLORIDE 50 MG: 50 TABLET ORAL at 09:35

## 2021-08-25 RX ADMIN — NAFCILLIN SODIUM 2 G: 2 INJECTION, POWDER, LYOPHILIZED, FOR SOLUTION INTRAMUSCULAR; INTRAVENOUS at 09:36

## 2021-08-25 RX ADMIN — NAFCILLIN SODIUM 2 G: 2 INJECTION, POWDER, LYOPHILIZED, FOR SOLUTION INTRAMUSCULAR; INTRAVENOUS at 18:22

## 2021-08-25 ASSESSMENT — ACTIVITIES OF DAILY LIVING (ADL)
ADLS_ACUITY_SCORE: 16

## 2021-08-25 NOTE — PROGRESS NOTES
Addiction Medicine Follow Up    Tele-Visit Details    Type of service:  Video Visit    Time Service Began (time 1st connected with pt):162    Time Service Ended (time completely finished with pt): 1633    Originating Location (pt. Location): Patient Rm, [facility]    Distant Location (provider location): [facility]    Reason for Televisit: COVID 19    Mode of Communication:  Video Conference via [employer]    Physician has received verbal consent for a video visit from the patient? Yes        Principal Problem:    MSSA Endocarditis with Mitral Valve Vegetations   Active Problems:    Anemia    Acute pyelonephritis -- Klebsiella    Altered mental status, unspecified altered mental status type    Embolic CVA Left Parietal  (septic emboli) -- 21    Septic encephalopathy    Human immunodeficiency virus (HIV) disease (H)    Homeless    Septic shock (H)    Polysubstance abuse (H)    Osteomyelitis of third toe of left foot (H)    Ischemic ulcer of toe of left foot with necrosis of bone (H)    Endocarditis of mitral valve      Subjective  Chief complaint: Wants to go home    HPI: Patient is being treated for MSSA mitral valve endocarditis and presumed septic emboli to brain/ abscess in left parietal/occiptial junction.  On week 7 of IV antibiotics.  They were extended because of the brain involvement.   Has moderate MV regurgitation, but not symptomatic at this time.   She has no focal neurologic findings.   Also has peripheral ischemic lesions, one of which required a partial amputation of L 3rd toe.       Has been on buprenorphine 4 mg qid to try to improve pain control.   However, her pain is much improved and she notes some cravings in between doses.    Patient again mentions that her  granddaughter ( 1 of twins)   last week.  She is said and says she wants to be able to be there to help her daughter.        ROS:  No SOB or chest pain.     She is cooperative and  "interactive.     Admits she has been thinking about leaving hospital AMA       .  Medication List Choices:   Current Facility-Administered Medications   Medication     acetaminophen (TYLENOL) tablet 650 mg     albuterol (PROVENTIL) neb solution 2.5 mg     bictegravir-emtricitabine-tenofovir (BIKTARVY) -25 MG per tablet 1 tablet     bisacodyl (DULCOLAX) Suppository 10 mg     [START ON 8/26/2021] buprenorphine (SUBUTEX) sublingual tablet 4 mg     [START ON 8/26/2021] buprenorphine (SUBUTEX) sublingual tablet 8 mg     buprenorphine HCl-naloxone HCl (SUBOXONE) 4-1 MG per film 1 Film     glucose gel 15-30 g    Or     dextrose 50 % injection 25-50 mL    Or     glucagon injection 1 mg     gabapentin (NEURONTIN) capsule 600 mg     hydrOXYzine (ATARAX) tablet 25 mg     lidocaine (LMX4) cream     lidocaine 1 % 0.1-1 mL     magnesium hydroxide (MILK OF MAGNESIA) suspension 30 mL     nafcillin IV 2 g vial to attach to  ml bag     naloxone (NARCAN) injection 0.2 mg    Or     naloxone (NARCAN) injection 0.4 mg    Or     naloxone (NARCAN) injection 0.2 mg    Or     naloxone (NARCAN) injection 0.4 mg     ondansetron (ZOFRAN-ODT) ODT tab 4 mg    Or     ondansetron (ZOFRAN) injection 4 mg     pantoprazole (PROTONIX) EC tablet 40 mg     polyethylene glycol (MIRALAX) Packet 17 g     prochlorperazine (COMPAZINE) injection 10 mg    Or     prochlorperazine (COMPAZINE) tablet 10 mg     QUEtiapine (SEROquel) tablet 25 mg     senna-docusate (SENOKOT-S/PERICOLACE) 8.6-50 MG per tablet 1 tablet     sertraline (ZOLOFT) tablet 50 mg     sodium chloride (PF) 0.9% PF flush 10 mL     sodium chloride (PF) 0.9% PF flush 10-20 mL     sodium chloride (PF) 0.9% PF flush 10-40 mL     sodium chloride (PF) 0.9% PF flush 10-40 mL     sodium chloride (PF) 0.9% PF flush 3 mL       Objective    BP 92/51 (BP Location: Right arm)   Pulse 79   Temp 97.9  F (36.6  C) (Oral)   Resp 15   Ht 1.727 m (5' 7.99\")   Wt 82.8 kg (182 lb 9.6 oz)   SpO2 95% "   Breastfeeding Unknown   BMI 27.77 kg/m      Has been afebrile for some time.        Physical Exam per hospitalist:  No cardiac murmur reported,   No JVD elevation.   Lungs clear.       Skin:  No diaphoresis or gooseflesh   Neuro:  Alert, oriented x 4.  No tremor   Psych:     Cooperative     Mood:  some dysthymia               Affect:  Congruent, more full               Thought content:  Denies SI, HI or hallucinations               Thought processes:  Linear, appropriate                Speech:  Normal                Motor:  Normal                Insight/judgement:  improved/  improved    Results  Lab Results   Component Value Date    WBC 4.0 08/25/2021    HGB 9.7 (L) 08/25/2021    HCT 31.1 (L) 08/25/2021    MCV 86 08/25/2021     08/25/2021     Lab Results   Component Value Date    CR 0.61 08/24/2021     Lab Results   Component Value Date     08/24/2021    POTASSIUM 3.7 08/24/2021    CHLORIDE 108 08/24/2021    CO2 28 08/24/2021     (H) 08/25/2021     Lab Results   Component Value Date     (H) 08/25/2021     Lab Results   Component Value Date    AST 19 08/24/2021    ALT 15 08/24/2021    ALKPHOS 87 08/24/2021    BILITOTAL 0.6 08/24/2021         Assessment and Plan:  1.   Opioid Use Disorder, severe - with IVDU  - now on buprenorphine and toleraing well.   However, notes that has some increase in cravings before next dose and that they don't always give dose on time.    Discussed going up on dose, as well as consolidating the doses, as higher dose will last longer and in anticipation of discharging in next week or so.   Recommended 8 mg at 8 AM and evening with 4 mg in mid-day for total dose of 20 mg daily and patient agrees to that.     Has Senna-Docusate and miralax available prn but has not been using  them.           Plan [facility] and to get Sub[facility]one through [facility].           Would like to get these scheduled, but it  is unclear when patient will be done with antibiotics.      2.  Methamphetamine Use Disorder, severe -  No pharmacotherapy has been consistently effective for this substance.       3.  MSSA Endocarditis on Mitral valve - with emboli to fingers and toes and to brain - likely due to patent foramen ovale    Will probably need Echocardiogram for follow up of the MV regurgitation in 3 - 6 mos.  Neurosurgery wants to  Continue antibiotics and check another MRI of brain in two weeks, although yesterdays MRI did show some decrease in size of the presumed abscess.     On nafcillin q 4 hrs.         Awa Noriega MD  Addiction Medicine Service  [facility]   Page me (click here for Milagros Noriega)

## 2021-08-25 NOTE — PLAN OF CARE
DATE & TIME: 8/24-25/21 [id number]  Cognitive Concerns/ Orientation : A&O x4  BEHAVIOR & AGGRESSION TOOL COLOR: Green   ABNL VS/O2: VSS on RA  MOBILITY: Independent in room with cane. Surgical shoe to wear on left, uses leg brace with long walks.   PAIN MANAGMENT: Denies  DIET: Mech soft, s/p teeth extraction on 8/13, good appetite. Also taking supplements.  BOWEL/BLADDER: Continent B/B, up to BR  ABNL LAB/BG: Hemoglobin 9.7  DRAIN/DEVICES: PICC RUE for Nafcillin q4h  TELEMETRY RHYTHM: N/A  SKIN: Bilateral foot wounds, L foot dressing CDI, R open to air.   TESTS/PROCEDURES: Follow-up brain MRI yesterday. Neuro said looks stable and will do a repeat scan in 2 weeks.   D/C DATE: 9/1 if brain abscess resolved and finished with abx.   Discharge Barriers: IV abx through 9/1  OTHER IMPORTANT INFO: Neurosurgery following, PT/OT/WOC. LS clear, BS active x4. Contact precautions maintained.

## 2021-08-25 NOTE — PROGRESS NOTES
[facility]  Hospitalist Progress Note for 8/22/2021       Assessment and Plan:   41 year old female with h/o untreated HIV, hepatitis C who was admitted on 7/1/2021 for MSSA mitral valve endocarditis resulting in septic shock complicated by embolic left parietal lobe infarctions and multiple peripheral emboli, and initially with encephalopathy, and shock.  She was intubated in ED on 7/1/2021 subsequently extubated 7/11/2021.  Blood cultures turned positive on hospital day 1 with MSSA.  Urine culture was positive for Klebsiella.  She was treated initially with ceftriaxone and vancomycin, and has since been narrowed to nafcillin per ID.     MSSA mitral valve endocarditis  Septic shock, resolved  Probable myopericarditis  Small pericardial effusion  *JAE 7/5 noted with small ASD, mod to large mobile vegetation (14mm long, 6mm wide) of the P2 segment of the mitral leaflet. No valve perforation; mod MR  *blood cultures on admission with MSSA; blood cultures have been negative since 7/6.   *ID following; antibiotic has been narrowed down to Nafcillin,duration extended from 8/18 to 9/1 due  abscess on MRI  *limited ECHO repeated 7/15 to reassess pericardial fluid 7/15 per cardiology and noted:  organized effusion posteriorly, measuring 1.2-1.5cm, no evidence of tamponade; small mobile vegetation noted attached to the mitral leaflet, mod to severe MR; compared to 7/8/21, effusion size is probably stable, Degree of MR is worse, Vegetation is smaller.  Cardiology signed off 7/15.   *PICC in place  -- JAE (no vegetations) and Cor Angio (nomral coronaries) completed 08/12  - per cardiothoracic surgery reconsult 8/14 felt she no longer has an urgent surgical indication,given absence of vegetations and only moderate MR on ECHO 8/12/2021.She may require mitral valve repair/replacement in the future if she has progressive MR, but this would be better addressed after she has undergone treatment for substance abuse  and has demonstrated sobriety in the out-patient setting. She should follow up with cardiology for medical management and surveillance of MR and ASD, and can be referred back to CV surgery as needed.   - Antibiotic plan per ID     Ischemic CVA, embolic stroke due to infective endocarditis  *CT head on admission notable for new acute to subacute PCA territory ischemic stroke  *MRI 7/3 noted with progressive, multifocal, acute infarcts without MR evidence for hemorrhage or midline shift  *Patient does have small left-to-right shunt on JAE, but most likely from embolic spread from endocarditis  *was evaluated by neurology, to continue antibiotics; per neuro to avoid anticoagulation (except DVT prophylaxis dose)     Abnormal MRI  Possible brain abscess  * MRI 8/10/2021 showed- Multiple enhancing subacute infarcts with evidence of small volume hemorrhage, largest at the junction of the left parietal and occipital lobes which demonstrates peripheral enhancement and worsening vasogenic edema. Developing abscess(es) related to septic emboli cannot be excluded  - Neurology was reconsulted 8/14, recommended to screen for mycotic aneurysm   * CTA head 8/14 -showed Complex peripherally enhancing fluid collection in the paramedian left parietal lobepatent arteries of head & neck. No evidence for dissection.  and CT head today & neurosurgery consult  * Ct head w/o contrast 8/14 showed no significant change in the complex peripherally enhancing fluid collection in the paramedian left parietal lobe with surrounding vasogenic edema. Atypical for bland evolving subacute ischemic infarct. Less likely possibility for evolving subacute hematoma, consider tissue sampling.Mild local mass effect in the left parieto-occipital region with minimal narrowing of the occipital horn of the left lateral ventricle.No midline shift/herniation  - pt remains asymptomatic  - neurosurgery consulted on 8/14th appreciated. As the pt has no neuro symptoms  they  recommended repeating the brain MRI on 8/17  * MRI head on 8/17 reported as no definite significant interval change when compared to MRI dated 8/10/2021.  - NSG following & recommended no surgical intervention at this time   - Repeat MRI brain  -8/24/21  reviewed and is with persistent for brain abscess.  Await further neurosurgery recommendations  - Activity as tolerated, continue with therapies  - Continue supportive and symptomatic treatment      Infected Tooth  S/p removal of all remaining teeth on 8/13  Assessment: OMFS was consulted for multiple infected tooth  - S/p Removal of all remaining teeth including 2, 3, 4, 5, 6, 7, 8, 9, 10, 11, 12, 13, 19, 20, 21, 22, 23, 24, 25, 26, 28, 29, 30  - on full liquid diet - advanced to mechanical /dental soft diet on 8/15     Osteomyelitis left 3rd toe s/p amputation 8/7  Necrotic wounds to Lt great toes, Lt 3rd toe and Lt lateral Heel, Rt great toe: 2/2 thrombi.  -- Podiatry took to OR 8/7 for amputation. Orthotist following up.   -- continue wound cares for other digits     Normocytic anemia, likey 2/2 chronic disease  Chronic anemia  Baseline hemoglobin approximately 7.  Likely related to chronic disease, untreated HIV.  - Hgb 7.4 yesterday -> 8.1 today  - B12 and folate -> wnl  - consider transfusion for Hgb < 7 (or close)  - low nl ferritin- on po ferrous gluconate  - CBC stable, last checked on 8/17     Left knee pain 2/2 OA: pt reports history of pain in that knee with activity but pain now is more acute. ROM okay. There is some warmth and swelling there.   - In light of the MSSA bacteremia, got XR and called ortho for effusion  - Per ortho no indication for intervention, though they did order a brace. Appreciate assistance. There are following EOD.  - PT/OT     IV drug use  Polysubstance use disorder  Opiate use disorder  *Chem dep consulted 7/15 but chemical dependency did not feel she is appropriate for CD assessment at this time and recommended  consultation towards the end of her antibiotic course.  Reconsult CD once she is close to finishing her IV antibiotic.  *Was using IV dilaudid as well as Percocet p.o. earlier this stay pretty extensively.  Attempts made to wean off IV narcotics and taper down po narcotics though patient felt she was going through withdrawal. Psych consulted and recommended suboxone though patient declined. Psych recommended rapid taper of methadone instead.   *completed methadone taper of 30 mg on 7/21, 20 mg 7/22 and then 10 mg 7/23, then stop  -started suboxone on 7/27 - appreciate psych/addiction medicine assistance who will help with titration  - now on suboxone BID and doing very well. Reports no cravings and hopes to continue this regimen outpatient.  -Plan was to reconsult CD (8/13th)once she is close to finishing her IV antibiotic, but patient is not interested in treatment. She has been through inpatient and outpatient programs and not found them helpful. Also I'm not sure she could continue suboxone in treatment and I think that is probably her best chance for sobriety.  -FYI: She has a daughter who just had twins and lives with her in-laws. Daughter and daughter's mother in law live in [place] (? - pt going to confirm) and have invited patient to live there to help with babies. She thinks this is a good idea as she'll be kept busy and she gets along well with this family. She is hoping to continue follow up with addiction medicine to continue suboxone. She feels that with suboxone and family support she will do well. Will need to have addiction med follow up with her to prescribe suboxone on discharge and help her set up outpatient follow up.  -08/12 -> Pain service recommended   1. Change Suboxone to  4 mg qid for better analgesic effect.     2. discontinue oxycodone   3. to use acetaminophen for additional analgesic effect if needed.- can give as much as 1000 mg tid   (don't exceed 3000 mg total per day).        HIV, untreated PTA  - CD4 count greater than 500, viral load 3695  - Started Biktarvy 7/8; ID following  - Will need outpt follow up     Chronic back pain: pt notes it present ever since she was pregnant. Improved on current medications  - on increased gabapentin to 600 mg TID, prn tylenol     Toxic/septic/metabolic encephalopathy, improved  Likely encephalopathy due to sepsis, drug use, CVA, prolonged ICU stay; had been pulling lines due to alterned mentation thought this all now appears resolved.      Pyelonephritis  Urine cultures positive for Klebsiella.  Completed course of treatment with ceftriaxone.  repeat urine cultures 7/10 with ESBL E coli but given unimpressive UA and no UTI symptoms ID suggest holding off on abx for ESBL UTI  -monitor for any symptoms     Acute hypoxemic respiratory failure, resolved  Intubated 7/1/2021.  Extubated 7/11/2021.  Likely related to septic shock from MSSA mitral valve endocarditis     FORD, resolved suspected due to sepsis  Hypokalemia  - on potassium replacement protocol  - will check BMP periodically     Chronic anemia  Baseline hemoglobin approximately 7.  Likely related to chronic disease, untreated HIV.  -Monitor hemoglobin periodically; stable around 8  - low nl ferritin- start po ferrous gluconate     Non severe malnutrition  -- Supplements      Prior hepatitis C infection  Antibody positive but RNA negative  -Noted     Restless legs: unclear if this is true restless leg syndrome or due to being uncomfortable  -continue mirapex prn     Major depressive disorder: pt is not suicidal but also doesn't seem to have much of a will to live based upon discussion 7/25.  I wonder if her underlying chronic pain which is difficult to treat as well as being in the hospital is contributing. Was seen by psych and declined any medication treatment on 7/26.  patient was on zoloft for years starting at age 13. She is willing to try zoloft again.   -Started zoloft 8/1 at 25mg po  "and titrating up to 50mg. Pt feels this is helping. Psychiatry followed up and agreed with this medication.    Code Status: Full Code       Disposition Plan   Expected discharge: 09/01 if Brain abscess resolves.  recommended to unclear once antibiotic plan established.    She is homeless at baseline but has a plan post discharge to live with daughter and daughter's in laws. This is in the [facility] and will allow for follow up cares.  SW following    Ashok Triplett MD, FACP   Internal Medicine/ Hospitalist (Pager- [phone])                Interval History:   no new complaints    Denies Chest pain/ SOB/ cough, Denies any N&V/ bowel problems  Denies urinary problems , Denies fever/chills/rigors                 Medications:       bictegravir-emtricitabine-tenofovir  1 tablet Oral Daily     buprenorphine HCl-naloxone HCl  1 Film Sublingual 4x Daily     gabapentin  600 mg Oral TID     nafcillin  2 g Intravenous Q4H     pantoprazole  40 mg Oral QAM AC     polyethylene glycol  17 g Oral Daily     senna-docusate  1 tablet Oral BID     sertraline  50 mg Oral Daily     sodium chloride (PF)  10 mL Intracatheter Q8H     sodium chloride (PF)  10-40 mL Intracatheter Q7 Days     acetaminophen, albuterol, bisacodyl, glucose **OR** dextrose **OR** glucagon, hydrOXYzine, lidocaine 4%, lidocaine (buffered or not buffered), magnesium hydroxide, naloxone **OR** naloxone **OR** naloxone **OR** naloxone, ondansetron **OR** ondansetron, prochlorperazine **OR** prochlorperazine, QUEtiapine, sodium chloride (PF), sodium chloride (PF), sodium chloride (PF)               Physical Exam:   Blood pressure 92/51, pulse 79, temperature 97.9  F (36.6  C), temperature source Oral, resp. rate 15, height 1.727 m (5' 7.99\"), weight 82.8 kg (182 lb 9.6 oz), SpO2 95 %, unknown if currently breastfeeding.  Wt Readings from Last 4 Encounters:   08/20/21 82.8 kg (182 lb 9.6 oz)   11/05/07 103.1 kg (227 lb 4 oz)   09/26/07 99.3 kg (219 lb)     BP 92/51 (BP " "Location: Right arm)   Pulse 79   Temp 97.9  F (36.6  C) (Oral)   Resp 15   Ht 1.727 m (5' 7.99\")   Wt 82.8 kg (182 lb 9.6 oz)   SpO2 95%   Breastfeeding Unknown   BMI 27.77 kg/m    Gen- pleasant lying in bed  HEENT- NAD, BO  Neck- supple, no JVD elevation, no thyromegaly  CVS- I+II+ no m/r/g  RS- CTAB  Abdo- soft, no tenderness . No g/r/r  Ext- no edema          Data:   All laboratory data reviewed  BMP  Recent Labs   Lab 08/25/21  1010 08/24/21  0831   NA  --  138   POTASSIUM  --  3.7   CHLORIDE  --  108   DEREK  --  8.6   CO2  --  28   BUN  --  12   CR  --  0.61   * 92     CBC  Recent Labs   Lab 08/25/21  0003 08/24/21  0831   WBC 4.0 3.4*   RBC 3.60* 3.36*   HGB 9.7* 9.0*   HCT 31.1* 29.2*   MCV 86 87   MCH 26.9 26.8   MCHC 31.2* 30.8*   RDW 24.8* 25.1*    414     INRNo lab results found in last 7 days.  LFTs  Recent Labs   Lab 08/24/21  0831   ALKPHOS 87   AST 19   ALT 15   BILITOTAL 0.6   PROTTOTAL 7.9   ALBUMIN 2.0*   MRI BRAIN WITHOUT AND WITH CONTRAST  8/24/2021 3:52 PM     IMPRESSION:   1. Over serial exams, there has been slight decrease in size of the   previously demonstrated peripheral enhancing centrally diffusion   restricting parenchymal lesion in the left paramedian   parieto-occipital region, as described. This continues to be worrisome   for possible cerebral abscess. Unchanged surrounding vasogenic edema   and mild local mass effect. No midline shift/herniation.   2. Continued expected evolution of the scattered subacute-to-chronic   infarcts, as described. No acute infarct, new intracranial hemorrhage,   or other definite acute process.     "

## 2021-08-25 NOTE — PLAN OF CARE
DATE & TIME: 8/25/21 4727-7181  Cognitive Concerns/ Orientation : A&O x4  BEHAVIOR & AGGRESSION TOOL COLOR: Green             ABNL VS/O2: VSS on RA  MOBILITY: Independent in room with cane. Surgical shoe to wear on left, uses leg brace with long walks.   PAIN MANAGMENT: Denies  DIET: Mech soft, s/p teeth extraction on 8/13, good appetite. Also taking supplements.  BOWEL/BLADDER: Continent B/B, up to BR  ABNL LAB/BG: WBC , Hemoglobin 9.7  DRAIN/DEVICES: PICC RUE for Nafcillin q4h  TELEMETRY RHYTHM: N/A  SKIN: Bilateral foot wounds, L foot dressing Changed, R open to air.   TESTS/PROCEDURES: Follow-up brain MRI yesterday. Neuro said looks stable and will do a repeat scan in 2 weeks.   D/C DATE: 9/1 if brain abscess resolved and finished with abx.   Discharge Barriers: IV abx through 9/1  OTHER IMPORTANT INFO: Neurosurgery following, PT/OT/WOC. LS clear, BS active x4. Contact precautions maintained.

## 2021-08-25 NOTE — PLAN OF CARE
DATE & TIME: 8/24/21 8698-8266  Cognitive Concerns/ Orientation : A&O x4, calm and cooperative.  BEHAVIOR & AGGRESSION TOOL COLOR: Green   ABNL VS/O2: VSS on RA  MOBILITY: Independent in room with cane. Surgical shoe to wear on left, uses leg brace with long walks.   PAIN MANAGMENT: Denies  DIET: Mech soft, s/p teeth extraction on 8/13, good appetite. Also taking supplements.  BOWEL/BLADDER: Continent, up to BR  ABNL LAB/BG: WBC 3.4, Hemoglobin 9.0  DRAIN/DEVICES: PICC RUE for Nafcillin q4h  TELEMETRY RHYTHM: N/A  SKIN: Bilateral foot wounds, L foot dressing CDI, R open to air.   TESTS/PROCEDURES: Follow-up brain MRI today. Neuro said looks stable and will do a repeat scan in 2 weeks.   D/C DATE: 9/1 if brain abscess resolved and finished with abx.   Discharge Barriers: IV abx through 9/1  OTHER IMPORTANT INFO: Neurosurgery following, PT/OT/WOC. LS clear, BS active x4. Contact precautions maintained.

## 2021-08-25 NOTE — PLAN OF CARE
DATE & TIME: 8/25/21 ([id number])  Cognitive Concerns/ Orientation : A&O x4  BEHAVIOR & AGGRESSION TOOL COLOR: Green   ABNL VS/O2: VSS on RA  MOBILITY: Independent in room with cane. Surgical shoe to wear on left, uses leg brace with long walks.   PAIN MANAGMENT: Denies  DIET: Mech soft, s/p teeth extraction on 8/13, good appetite. Also taking supplements.  BOWEL/BLADDER: Continent B/B, up to BR  ABNL LAB/BG: WBC , Hemoglobin 9.7  DRAIN/DEVICES: PICC RUE for Nafcillin q4h  TELEMETRY RHYTHM: N/A  SKIN: Bilateral foot wounds, L foot dressing Changed, R open to air.   TESTS/PROCEDURES: Follow-up brain MRI yesterday. Neuro said looks stable and will do a repeat scan in 2 weeks.   D/C DATE: 9/1 if brain abscess resolved and finished with abx.   Discharge Barriers: IV abx through 9/1  OTHER IMPORTANT INFO: Neurosurgery following, PT/OT/WOC. LS clear, BS active x4. Contact precautions maintained.

## 2021-08-26 ENCOUNTER — APPOINTMENT (OUTPATIENT)
Dept: PHYSICAL THERAPY | Facility: CLINIC | Age: 41
End: 2021-08-26
Payer: MEDICAID

## 2021-08-26 PROCEDURE — 250N000013 HC RX MED GY IP 250 OP 250 PS 637: Performed by: DENTIST

## 2021-08-26 PROCEDURE — 250N000009 HC RX 250: Performed by: PODIATRIST

## 2021-08-26 PROCEDURE — 97110 THERAPEUTIC EXERCISES: CPT | Mod: GP | Performed by: PHYSICAL THERAPIST

## 2021-08-26 PROCEDURE — 250N000013 HC RX MED GY IP 250 OP 250 PS 637: Performed by: PODIATRIST

## 2021-08-26 PROCEDURE — 250N000013 HC RX MED GY IP 250 OP 250 PS 637: Performed by: INTERNAL MEDICINE

## 2021-08-26 PROCEDURE — 97116 GAIT TRAINING THERAPY: CPT | Mod: GP | Performed by: PHYSICAL THERAPIST

## 2021-08-26 PROCEDURE — 120N000001 HC R&B MED SURG/OB

## 2021-08-26 PROCEDURE — 99207 PR CDG-MDM COMPONENT: MEETS MODERATE - DOWN CODED: CPT | Performed by: INTERNAL MEDICINE

## 2021-08-26 PROCEDURE — 999N000190 HC STATISTIC VAT ROUNDS

## 2021-08-26 PROCEDURE — 250N000013 HC RX MED GY IP 250 OP 250 PS 637: Performed by: HOSPITALIST

## 2021-08-26 PROCEDURE — 99232 SBSQ HOSP IP/OBS MODERATE 35: CPT | Performed by: INTERNAL MEDICINE

## 2021-08-26 PROCEDURE — 999N000040 HC STATISTIC CONSULT NO CHARGE VASC ACCESS

## 2021-08-26 RX ADMIN — GABAPENTIN 600 MG: 300 CAPSULE ORAL at 21:46

## 2021-08-26 RX ADMIN — NAFCILLIN SODIUM 2 G: 2 INJECTION, POWDER, LYOPHILIZED, FOR SOLUTION INTRAMUSCULAR; INTRAVENOUS at 18:20

## 2021-08-26 RX ADMIN — NAFCILLIN SODIUM 2 G: 2 INJECTION, POWDER, LYOPHILIZED, FOR SOLUTION INTRAMUSCULAR; INTRAVENOUS at 06:29

## 2021-08-26 RX ADMIN — PANTOPRAZOLE SODIUM 40 MG: 40 TABLET, DELAYED RELEASE ORAL at 09:38

## 2021-08-26 RX ADMIN — BUPRENORPHINE HYDROCHLORIDE 8 MG: 8 TABLET SUBLINGUAL at 09:38

## 2021-08-26 RX ADMIN — ACETAMINOPHEN 650 MG: 325 TABLET, FILM COATED ORAL at 10:23

## 2021-08-26 RX ADMIN — NAFCILLIN SODIUM 2 G: 2 INJECTION, POWDER, LYOPHILIZED, FOR SOLUTION INTRAMUSCULAR; INTRAVENOUS at 09:40

## 2021-08-26 RX ADMIN — ACETAMINOPHEN 650 MG: 325 TABLET, FILM COATED ORAL at 22:29

## 2021-08-26 RX ADMIN — BICTEGRAVIR SODIUM, EMTRICITABINE, AND TENOFOVIR ALAFENAMIDE FUMARATE 1 TABLET: 50; 200; 25 TABLET ORAL at 09:37

## 2021-08-26 RX ADMIN — QUETIAPINE FUMARATE 25 MG: 25 TABLET ORAL at 21:47

## 2021-08-26 RX ADMIN — NAFCILLIN SODIUM 2 G: 2 INJECTION, POWDER, LYOPHILIZED, FOR SOLUTION INTRAMUSCULAR; INTRAVENOUS at 01:55

## 2021-08-26 RX ADMIN — SERTRALINE HYDROCHLORIDE 50 MG: 50 TABLET ORAL at 09:38

## 2021-08-26 RX ADMIN — NAFCILLIN SODIUM 2 G: 2 INJECTION, POWDER, LYOPHILIZED, FOR SOLUTION INTRAMUSCULAR; INTRAVENOUS at 21:46

## 2021-08-26 RX ADMIN — NAFCILLIN SODIUM 2 G: 2 INJECTION, POWDER, LYOPHILIZED, FOR SOLUTION INTRAMUSCULAR; INTRAVENOUS at 13:39

## 2021-08-26 RX ADMIN — BUPRENORPHINE HYDROCHLORIDE 8 MG: 8 TABLET SUBLINGUAL at 21:47

## 2021-08-26 RX ADMIN — GABAPENTIN 600 MG: 300 CAPSULE ORAL at 09:37

## 2021-08-26 RX ADMIN — GABAPENTIN 600 MG: 300 CAPSULE ORAL at 16:14

## 2021-08-26 RX ADMIN — BUPRENORPHINE HYDROCHLORIDE 4 MG: 2 TABLET SUBLINGUAL at 13:39

## 2021-08-26 ASSESSMENT — ACTIVITIES OF DAILY LIVING (ADL)
ADLS_ACUITY_SCORE: 16

## 2021-08-26 NOTE — PLAN OF CARE
DATE & TIME: 8/25/21 1900-2300   Cognitive Concerns/ Orientation : A&Ox4  BEHAVIOR & AGGRESSION TOOL COLOR: Green  CIWA SCORE: N/A  ABNL VS/O2: VSS on RA  MOBILITY: Ind in room with cane. Surgical shoes to be worn when out of bed.   PAIN MANAGMENT: Denied  DIET: Mechanical soft diet  BOWEL/BLADDER: Continent   ABNL LAB/BG: Hemoglobin 9.0  DRAIN/DEVICES: PICC on right upper arm  TELEMETRY RHYTHM: N/A  SKIN: BLE foot wounds. L foot dressing CDI  TESTS/PROCEDURES: N/A  D/C DATE: Discharge pending

## 2021-08-26 NOTE — PROGRESS NOTES
[facility]  Hospitalist Progress Note for 8/22/2021       Assessment and Plan:   41 year old female with h/o untreated HIV, hepatitis C who was admitted on 7/1/2021 for MSSA mitral valve endocarditis resulting in septic shock complicated by embolic left parietal lobe infarctions and multiple peripheral emboli, and initially with encephalopathy, and shock.  She was intubated in ED on 7/1/2021 subsequently extubated 7/11/2021.  Blood cultures turned positive on hospital day 1 with MSSA.  Urine culture was positive for Klebsiella.  She was treated initially with ceftriaxone and vancomycin, and has since been narrowed to nafcillin per ID.     MSSA mitral valve endocarditis  Septic shock, resolved  Probable myopericarditis  Small pericardial effusion  *JAE 7/5 noted with small ASD, mod to large mobile vegetation (14mm long, 6mm wide) of the P2 segment of the mitral leaflet. No valve perforation; mod MR  *blood cultures on admission with MSSA; blood cultures have been negative since 7/6.   *ID following; antibiotic has been narrowed down to Nafcillin,duration extended from 8/18 to 9/1 due  abscess on MRI  *limited ECHO repeated 7/15 to reassess pericardial fluid 7/15 per cardiology and noted:  organized effusion posteriorly, measuring 1.2-1.5cm, no evidence of tamponade; small mobile vegetation noted attached to the mitral leaflet, mod to severe MR; compared to 7/8/21, effusion size is probably stable, Degree of MR is worse, Vegetation is smaller.  Cardiology signed off 7/15.   *PICC in place  -- JAE (no vegetations) and Cor Angio (nomral coronaries) completed 08/12  - per cardiothoracic surgery reconsult 8/14 felt she no longer has an urgent surgical indication,given absence of vegetations and only moderate MR on ECHO 8/12/2021.She may require mitral valve repair/replacement in the future if she has progressive MR, but this would be better addressed after she has undergone treatment for substance abuse  and has demonstrated sobriety in the out-patient setting. She should follow up with cardiology for medical management and surveillance of MR and ASD, and can be referred back to CV surgery as needed.   - Antibiotic plan per ID     Ischemic CVA, embolic stroke due to infective endocarditis  *CT head on admission notable for new acute to subacute PCA territory ischemic stroke  *MRI 7/3 noted with progressive, multifocal, acute infarcts without MR evidence for hemorrhage or midline shift  *Patient does have small left-to-right shunt on JAE, but most likely from embolic spread from endocarditis  *was evaluated by neurology, to continue antibiotics; per neuro to avoid anticoagulation (except DVT prophylaxis dose)     Abnormal MRI  Possible brain abscess  * MRI 8/10/2021 showed- Multiple enhancing subacute infarcts with evidence of small volume hemorrhage, largest at the junction of the left parietal and occipital lobes which demonstrates peripheral enhancement and worsening vasogenic edema. Developing abscess(es) related to septic emboli cannot be excluded  - Neurology was reconsulted 8/14, recommended to screen for mycotic aneurysm   * CTA head 8/14 -showed Complex peripherally enhancing fluid collection in the paramedian left parietal lobepatent arteries of head & neck. No evidence for dissection.  and CT head today & neurosurgery consult  * Ct head w/o contrast 8/14 showed no significant change in the complex peripherally enhancing fluid collection in the paramedian left parietal lobe with surrounding vasogenic edema. Atypical for bland evolving subacute ischemic infarct. Less likely possibility for evolving subacute hematoma, consider tissue sampling.Mild local mass effect in the left parieto-occipital region with minimal narrowing of the occipital horn of the left lateral ventricle.No midline shift/herniation  - pt remains asymptomatic  - neurosurgery consulted on 8/14th appreciated. As the pt has no neuro symptoms  they  recommended repeating the brain MRI on 8/17  * MRI head on 8/17 reported as no definite significant interval change when compared to MRI dated 8/10/2021.  - NSG following & recommended no surgical intervention at this time   - Repeat MRI brain  -8/24/21  reviewed and shows persistent brain abscess.  Await further neurosurgery recommendations   - Activity as tolerated, continue with therapies  - Continue supportive and symptomatic treatment      Infected Tooth  S/p removal of all remaining teeth on 8/13  Assessment: OMFS was consulted for multiple infected tooth  - S/p Removal of all remaining teeth including 2, 3, 4, 5, 6, 7, 8, 9, 10, 11, 12, 13, 19, 20, 21, 22, 23, 24, 25, 26, 28, 29, 30  - on full liquid diet - advanced to mechanical /dental soft diet on 8/15     Osteomyelitis left 3rd toe s/p amputation 8/7  Necrotic wounds to Lt great toes, Lt 3rd toe and Lt lateral Heel, Rt great toe: 2/2 thrombi.  -- Podiatry took to OR 8/7 for amputation. Orthotist following up.   -- continue wound cares for other digits     Normocytic anemia, likey 2/2 chronic disease  Chronic anemia  Baseline hemoglobin approximately 7.  Likely related to chronic disease, untreated HIV.  - Hgb 7.4 yesterday -> 8.1 today  - B12 and folate -> wnl  - consider transfusion for Hgb < 7 (or close)  - low nl ferritin- on po ferrous gluconate  - CBC stable, last checked on 8/17     Left knee pain 2/2 OA: pt reports history of pain in that knee with activity but pain now is more acute. ROM okay. There is some warmth and swelling there.   - In light of the MSSA bacteremia, got XR and called ortho for effusion  - Per ortho no indication for intervention, though they did order a brace. Appreciate assistance. There are following EOD.  - PT/OT     IV drug use  Polysubstance use disorder  Opiate use disorder  *Chem dep consulted 7/15 but chemical dependency did not feel she is appropriate for CD assessment at this time and recommended consultation  towards the end of her antibiotic course.  Reconsult CD once she is close to finishing her IV antibiotic.  *Was using IV dilaudid as well as Percocet p.o. earlier this stay pretty extensively.  Attempts made to wean off IV narcotics and taper down po narcotics though patient felt she was going through withdrawal. Psych consulted and recommended suboxone though patient declined. Psych recommended rapid taper of methadone instead.   *completed methadone taper of 30 mg on 7/21, 20 mg 7/22 and then 10 mg 7/23, then stop  -started suboxone on 7/27 - appreciate psych/addiction medicine assistance who will help with titration    -Plan was to reconsult CD (8/13th)once she is close to finishing her IV antibiotic, but patient is not interested in treatment. She has been through inpatient and outpatient programs and not found them helpful. Also I'm not sure she could continue suboxone in treatment and I think that is probably her best chance for sobriety.  -FYI: She has a daughter who just had twins and lives with her in-laws. Daughter and daughter's mother in law live in [place] (? - pt going to confirm) and have invited patient to live there to help with babies. She thinks this is a good idea as she'll be kept busy and she gets along well with this family. She is hoping to continue follow up with addiction medicine to continue suboxone. She feels that with suboxone and family support she will do well. Will need to have addiction med follow up with her to prescribe suboxone on discharge and help her set up outpatient follow up.  -08/12 -> Pain service recommended   1. Change Suboxone to  4 mg qid for better analgesic effect.     2. discontinue oxycodone   3. to use acetaminophen for additional analgesic effect if needed.- can give as much as 1000 mg tid   (don't exceed 3000 mg total per day).       8/25: Suboxone adjusted by Addiction team    HIV, untreated PTA  - CD4 count greater than 500, viral load 3695  - Started  Biktarvy 7/8; ID following  - Will need outpt follow up     Chronic back pain: pt notes it present ever since she was pregnant. Improved on current medications  - on increased gabapentin to 600 mg TID, prn tylenol     Toxic/septic/metabolic encephalopathy, improved  Likely encephalopathy due to sepsis, drug use, CVA, prolonged ICU stay; had been pulling lines due to alterned mentation thought this all now appears resolved.      Pyelonephritis  Urine cultures positive for Klebsiella.  Completed course of treatment with ceftriaxone.  repeat urine cultures 7/10 with ESBL E coli but given unimpressive UA and no UTI symptoms ID suggest holding off on abx for ESBL UTI  -monitor for any symptoms     Acute hypoxemic respiratory failure, resolved  Intubated 7/1/2021.  Extubated 7/11/2021.  Likely related to septic shock from MSSA mitral valve endocarditis     FORD, resolved suspected due to sepsis  Hypokalemia  - on potassium replacement protocol  - will check BMP periodically     Chronic anemia  Baseline hemoglobin approximately 7.  Likely related to chronic disease, untreated HIV.  -Monitor hemoglobin periodically; stable around 8  - low nl ferritin- start po ferrous gluconate     Non severe malnutrition  -- Supplements      Prior hepatitis C infection  Antibody positive but RNA negative  -Noted     Restless legs: unclear if this is true restless leg syndrome or due to being uncomfortable  -continue mirapex prn     Major depressive disorder: pt is not suicidal but also doesn't seem to have much of a will to live based upon discussion 7/25.  I wonder if her underlying chronic pain which is difficult to treat as well as being in the hospital is contributing. Was seen by psych and declined any medication treatment on 7/26.  patient was on zoloft for years starting at age 13. She is willing to try zoloft again.   -Started zoloft 8/1 at 25mg po and titrating up to 50mg. Pt feels this is helping. Psychiatry followed up and agreed  "with this medication.    Code Status: Full Code       Disposition Plan   Expected discharge: 09/01 if Brain abscess resolves.  recommended to unclear once antibiotic plan established.    She is homeless at baseline but has a plan post discharge to live with daughter and daughter's in laws. This is in the [facility] and will allow for follow up cares.  SW following    Ashok Triplett MD, FACP   Internal Medicine/ Hospitalist (Pager- [phone])                Interval History:   no new complaints    Denies Chest pain/ SOB/ cough, Denies any N&V/ bowel problems  Denies urinary problems , Denies fever/chills/rigors                 Medications:       bictegravir-emtricitabine-tenofovir  1 tablet Oral Daily     buprenorphine  4 mg Sublingual Daily     buprenorphine  8 mg Sublingual BID     gabapentin  600 mg Oral TID     nafcillin  2 g Intravenous Q4H     pantoprazole  40 mg Oral QAM AC     polyethylene glycol  17 g Oral Daily     senna-docusate  1 tablet Oral BID     sertraline  50 mg Oral Daily     sodium chloride (PF)  10 mL Intracatheter Q8H     sodium chloride (PF)  10-40 mL Intracatheter Q7 Days     acetaminophen, albuterol, bisacodyl, glucose **OR** dextrose **OR** glucagon, hydrOXYzine, lidocaine 4%, lidocaine (buffered or not buffered), magnesium hydroxide, naloxone **OR** naloxone **OR** naloxone **OR** naloxone, ondansetron **OR** ondansetron, prochlorperazine **OR** prochlorperazine, QUEtiapine, sodium chloride (PF), sodium chloride (PF), sodium chloride (PF)               Physical Exam:   Blood pressure 100/66, pulse 76, temperature 98.1  F (36.7  C), temperature source Oral, resp. rate 16, height 1.727 m (5' 7.99\"), weight 82.8 kg (182 lb 9.6 oz), SpO2 98 %, unknown if currently breastfeeding.  Wt Readings from Last 4 Encounters:   08/20/21 82.8 kg (182 lb 9.6 oz)   11/05/07 103.1 kg (227 lb 4 oz)   09/26/07 99.3 kg (219 lb)     /66 (BP Location: Left arm)   Pulse 76   Temp 98.1  F (36.7  C) (Oral)   " "Resp 16   Ht 1.727 m (5' 7.99\")   Wt 82.8 kg (182 lb 9.6 oz)   SpO2 98%   Breastfeeding Unknown   BMI 27.77 kg/m    Gen- pleasant lying in bed  HEENT- NAD, BO  Neck- supple, no JVD elevation, no thyromegaly  CVS- I+II+ no m/r/g  RS- CTAB  Abdo- soft, no tenderness . No g/r/r  Ext- no edema          Data:   All laboratory data reviewed  BMP  Recent Labs   Lab 08/25/21  1010 08/24/21  0831   NA  --  138   POTASSIUM  --  3.7   CHLORIDE  --  108   DEREK  --  8.6   CO2  --  28   BUN  --  12   CR  --  0.61   * 92     CBC  Recent Labs   Lab 08/25/21  0003 08/24/21  0831   WBC 4.0 3.4*   RBC 3.60* 3.36*   HGB 9.7* 9.0*   HCT 31.1* 29.2*   MCV 86 87   MCH 26.9 26.8   MCHC 31.2* 30.8*   RDW 24.8* 25.1*    414     INRNo lab results found in last 7 days.  LFTs  Recent Labs   Lab 08/24/21  0831   ALKPHOS 87   AST 19   ALT 15   BILITOTAL 0.6   PROTTOTAL 7.9   ALBUMIN 2.0*   MRI BRAIN WITHOUT AND WITH CONTRAST  8/24/2021 3:52 PM     IMPRESSION:   1. Over serial exams, there has been slight decrease in size of the   previously demonstrated peripheral enhancing centrally diffusion   restricting parenchymal lesion in the left paramedian   parieto-occipital region, as described. This continues to be worrisome   for possible cerebral abscess. Unchanged surrounding vasogenic edema   and mild local mass effect. No midline shift/herniation.   2. Continued expected evolution of the scattered subacute-to-chronic   infarcts, as described. No acute infarct, new intracranial hemorrhage,   or other definite acute process.     "

## 2021-08-26 NOTE — PLAN OF CARE
DATE & TIME: 8/25/21-8/26/21 [id number]  Cognitive Concerns/ Orientation : A&O x4   BEHAVIOR & AGGRESSION TOOL COLOR: Green             ABNL VS/O2: VSS on RA ex soft BPs  MOBILITY: Independent in room with cane. Surgical shoe to wear on left, uses leg brace with long walks.   PAIN MANAGMENT: Denies  DIET: Mech soft, s/p teeth extraction on 8/13. Also taking supplements.  BOWEL/BLADDER: Continent B/B, up to BR  ABNL LAB/BG: NA  DRAIN/DEVICES: PICC RUE for Nafcillin q4h  TELEMETRY RHYTHM: N/A  SKIN: Bilateral foot wounds, WOC orders.   TESTS/PROCEDURES: Follow-up brain MRI 8/24/21. Neuro said looks stable and will do a repeat scan in 2 weeks.   D/C DATE: 9/1 if brain abscess resolved and finished with abx.   Discharge Barriers: IV abx through 9/1  OTHER IMPORTANT INFO: Neurosurgery following, PT/OT/WOC. LS clear, BS active x4. Contact precautions maintained.    Non-compliance Neuropathy

## 2021-08-26 NOTE — PLAN OF CARE
DATE & TIME: 8/26/21 ([id number])  Cognitive Concerns/ Orientation : A&O x4   BEHAVIOR & AGGRESSION TOOL COLOR: Green             ABNL VS/O2: VSS on RA ex soft BPs  MOBILITY: Independent in room with cane. Surgical shoe to wear on left, uses leg brace with long walks.   PAIN MANAGMENT: C/o back pain, tylenol given with relief   DIET: Mech soft, s/p teeth extraction on 8/13. Also taking supplements.  BOWEL/BLADDER: Continent B/B, up to BR  ABNL LAB/BG: NA  DRAIN/DEVICES: PICC RUE for Nafcillin q4h  TELEMETRY RHYTHM: N/A  SKIN: Bilateral foot wounds, WOC orders, changed dressing this shift.   TESTS/PROCEDURES: Follow-up brain MRI 8/24/21. Neuro said looks stable and will do a repeat scan in 2 weeks.   D/C DATE: 9/1 if brain abscess resolved and finished with abx.   Discharge Barriers: IV abx through 9/1  OTHER IMPORTANT INFO: Neurosurgery following, PT/OT/WOC. LS clear, BS active x4. Contact precautions maintained.

## 2021-08-27 ENCOUNTER — APPOINTMENT (OUTPATIENT)
Dept: OCCUPATIONAL THERAPY | Facility: CLINIC | Age: 41
End: 2021-08-27
Payer: MEDICAID

## 2021-08-27 ENCOUNTER — APPOINTMENT (OUTPATIENT)
Dept: PHYSICAL THERAPY | Facility: CLINIC | Age: 41
End: 2021-08-27
Payer: MEDICAID

## 2021-08-27 PROCEDURE — 97110 THERAPEUTIC EXERCISES: CPT | Mod: GP | Performed by: PHYSICAL THERAPIST

## 2021-08-27 PROCEDURE — 250N000013 HC RX MED GY IP 250 OP 250 PS 637: Performed by: DENTIST

## 2021-08-27 PROCEDURE — 99232 SBSQ HOSP IP/OBS MODERATE 35: CPT | Performed by: INTERNAL MEDICINE

## 2021-08-27 PROCEDURE — 250N000009 HC RX 250: Performed by: PODIATRIST

## 2021-08-27 PROCEDURE — 97116 GAIT TRAINING THERAPY: CPT | Mod: GP | Performed by: PHYSICAL THERAPIST

## 2021-08-27 PROCEDURE — 120N000001 HC R&B MED SURG/OB

## 2021-08-27 PROCEDURE — 999N000190 HC STATISTIC VAT ROUNDS

## 2021-08-27 PROCEDURE — 250N000013 HC RX MED GY IP 250 OP 250 PS 637: Performed by: PODIATRIST

## 2021-08-27 PROCEDURE — 97535 SELF CARE MNGMENT TRAINING: CPT | Mod: GO | Performed by: OCCUPATIONAL THERAPIST

## 2021-08-27 PROCEDURE — 250N000013 HC RX MED GY IP 250 OP 250 PS 637: Performed by: INTERNAL MEDICINE

## 2021-08-27 PROCEDURE — 250N000013 HC RX MED GY IP 250 OP 250 PS 637: Performed by: HOSPITALIST

## 2021-08-27 RX ADMIN — ACETAMINOPHEN 650 MG: 325 TABLET, FILM COATED ORAL at 17:36

## 2021-08-27 RX ADMIN — BUPRENORPHINE HYDROCHLORIDE 8 MG: 8 TABLET SUBLINGUAL at 20:24

## 2021-08-27 RX ADMIN — BUPRENORPHINE HYDROCHLORIDE 4 MG: 2 TABLET SUBLINGUAL at 14:09

## 2021-08-27 RX ADMIN — NAFCILLIN SODIUM 2 G: 2 INJECTION, POWDER, LYOPHILIZED, FOR SOLUTION INTRAMUSCULAR; INTRAVENOUS at 17:36

## 2021-08-27 RX ADMIN — GABAPENTIN 600 MG: 300 CAPSULE ORAL at 09:53

## 2021-08-27 RX ADMIN — NAFCILLIN SODIUM 2 G: 2 INJECTION, POWDER, LYOPHILIZED, FOR SOLUTION INTRAMUSCULAR; INTRAVENOUS at 21:39

## 2021-08-27 RX ADMIN — QUETIAPINE FUMARATE 25 MG: 25 TABLET ORAL at 20:24

## 2021-08-27 RX ADMIN — GABAPENTIN 600 MG: 300 CAPSULE ORAL at 17:36

## 2021-08-27 RX ADMIN — BICTEGRAVIR SODIUM, EMTRICITABINE, AND TENOFOVIR ALAFENAMIDE FUMARATE 1 TABLET: 50; 200; 25 TABLET ORAL at 09:53

## 2021-08-27 RX ADMIN — NAFCILLIN SODIUM 2 G: 2 INJECTION, POWDER, LYOPHILIZED, FOR SOLUTION INTRAMUSCULAR; INTRAVENOUS at 09:55

## 2021-08-27 RX ADMIN — SERTRALINE HYDROCHLORIDE 50 MG: 50 TABLET ORAL at 09:53

## 2021-08-27 RX ADMIN — NAFCILLIN SODIUM 2 G: 2 INJECTION, POWDER, LYOPHILIZED, FOR SOLUTION INTRAMUSCULAR; INTRAVENOUS at 14:10

## 2021-08-27 RX ADMIN — NAFCILLIN SODIUM 2 G: 2 INJECTION, POWDER, LYOPHILIZED, FOR SOLUTION INTRAMUSCULAR; INTRAVENOUS at 05:28

## 2021-08-27 RX ADMIN — BUPRENORPHINE HYDROCHLORIDE 8 MG: 8 TABLET SUBLINGUAL at 09:53

## 2021-08-27 RX ADMIN — ACETAMINOPHEN 650 MG: 325 TABLET, FILM COATED ORAL at 09:53

## 2021-08-27 RX ADMIN — GABAPENTIN 600 MG: 300 CAPSULE ORAL at 21:39

## 2021-08-27 RX ADMIN — NAFCILLIN SODIUM 2 G: 2 INJECTION, POWDER, LYOPHILIZED, FOR SOLUTION INTRAMUSCULAR; INTRAVENOUS at 01:53

## 2021-08-27 RX ADMIN — PANTOPRAZOLE SODIUM 40 MG: 40 TABLET, DELAYED RELEASE ORAL at 09:53

## 2021-08-27 ASSESSMENT — ACTIVITIES OF DAILY LIVING (ADL)
ADLS_ACUITY_SCORE: 16

## 2021-08-27 NOTE — PLAN OF CARE
DATE & TIME: 8/26/21 [id number]  Cognitive Concerns/ Orientation : A&O x4   BEHAVIOR & AGGRESSION TOOL COLOR: Green             ABNL VS/O2: VSS on RA ex soft BPs  MOBILITY: Independent in room with cane. Surgical shoe to wear on left, uses leg brace with long walks.   PAIN MANAGMENT: Denies  DIET: Mech soft, s/p teeth extraction on 8/13. Also taking supplements.  BOWEL/BLADDER: Continent B/B, up to BR  ABNL LAB/BG: NA  DRAIN/DEVICES: PICC RUE for Nafcillin q4h  TELEMETRY RHYTHM: N/A  SKIN: Bilateral foot wounds, WOC orders.   TESTS/PROCEDURES: Follow-up brain MRI 8/24/21. Neuro said looks stable and will do a repeat scan in 2 weeks.   D/C DATE: 9/1 if brain abscess resolved and finished with abx.   Discharge Barriers: IV abx through 9/1  OTHER IMPORTANT INFO: Neurosurgery following, PT/OT/WOC. LS clear, BS active x4. Contact precautions maintained.

## 2021-08-27 NOTE — PROGRESS NOTES
[facility]  Hospitalist Progress Note    Admit Date:  7/1/2021  Date of Service (when I saw the patient): 08/27/2021   Provider:  Kathleen Chavez, DO    Assessment & Plan   Manas Hernandez is a 41 year old female with h/o untreated HIV, hepatitis C who was admitted on 7/1/2021 for MSSA mitral valve endocarditis resulting in septic shock complicated by embolic left parietal lobe infarctions and multiple peripheral emboli, and initially with encephalopathy, and shock.  She was intubated in ED on 7/1/2021 subsequently extubated 7/11/2021.  Blood cultures turned positive on hospital day 1 with MSSA.  Urine culture was positive for Klebsiella.  She was treated initially with ceftriaxone and vancomycin, and has since been narrowed to nafcillin per ID.    Problem List:    1. MSSA mitral valve endocarditis  Septic shock, resolved  Probable myopericarditis  Small pericardial effusion  *JAE 7/5 noted with small ASD, mod to large mobile vegetation (14mm long, 6mm wide) of the P2 segment of the mitral leaflet. No valve perforation; mod MR  *blood cultures on admission with MSSA; blood cultures have been negative since 7/6.   *ID following; antibiotic has been narrowed down to Nafcillin,duration extended from 8/18 to 9/1 due  abscess on MRI  *limited ECHO repeated 7/15 to reassess pericardial fluid 7/15 per cardiology and noted:  organized effusion posteriorly, measuring 1.2-1.5cm, no evidence of tamponade; small mobile vegetation noted attached to the mitral leaflet, mod to severe MR; compared to 7/8/21, effusion size is probably stable, Degree of MR is worse, Vegetation is smaller.  Cardiology signed off 7/15.   *PICC in place  -- JAE (no vegetations) and Cor Angio (nomral coronaries) completed 08/12  - per cardiothoracic surgery reconsult 8/14 felt she no longer has an urgent surgical indication,given absence of vegetations and only moderate MR on ECHO 8/12/2021.She may require mitral valve  repair/replacement in the future if she has progressive MR, but this would be better addressed after she has undergone treatment for substance abuse and has demonstrated sobriety in the out-patient setting. She should follow up with cardiology for medical management and surveillance of MR and ASD, and can be referred back to CV surgery as needed.   - Antibiotic plan per ID     2. Ischemic CVA, embolic stroke due to infective endocarditis  *CT head on admission notable for new acute to subacute PCA territory ischemic stroke  *MRI 7/3 noted with progressive, multifocal, acute infarcts without MR evidence for hemorrhage or midline shift  *Patient does have small left-to-right shunt on JAE, but most likely from embolic spread from endocarditis  *was evaluated by neurology, to continue antibiotics; per neuro to avoid anticoagulation (except DVT prophylaxis dose)     3. Abnormal MRI brain  Possible brain abscess  * MRI 8/10/2021 showed- Multiple enhancing subacute infarcts with evidence of small volume hemorrhage, largest at the junction of the left parietal and occipital lobes which demonstrates peripheral enhancement and worsening vasogenic edema. Developing abscess(es) related to septic emboli cannot be excluded  - Neurology was reconsulted 8/14, recommended to screen for mycotic aneurysm   * CTA head 8/14 -showed Complex peripherally enhancing fluid collection in the paramedian left parietal lobepatent arteries of head & neck. No evidence for dissection.  and CT head today & neurosurgery consult  * Ct head w/o contrast 8/14 showed no significant change in the complex peripherally enhancing fluid collection in the paramedian left parietal lobe with surrounding vasogenic edema. Atypical for bland evolving subacute ischemic infarct. Less likely possibility for evolving subacute hematoma, consider tissue sampling.Mild local mass effect in the left parieto-occipital region with minimal narrowing of the occipital horn of  the left lateral ventricle.No midline shift/herniation  - pt remains asymptomatic  - neurosurgery consulted on 8/14th appreciated. As the pt has no neuro symptoms they  recommended repeating the brain MRI on 8/17  * MRI head on 8/17 reported as no definite significant interval change when compared to MRI dated 8/10/2021.  - NSG following & recommended no surgical intervention at this time     - Repeat MRI brain  -8/24/21  reviewed and shows persistent brain abscess.    Neurosurgery recommending continuing IV abx and repeating MRI in another 2 wks time    ID recommends extending antibiotic duration to 8 weeks with end date 9/1/21  - Activity as tolerated, continue with therapies  - Continue supportive and symptomatic treatment      4. Infected Tooth  S/p removal of all remaining teeth on 8/13  Assessment: OMFS was consulted for multiple infected tooth  - S/p Removal of all remaining teeth including 2, 3, 4, 5, 6, 7, 8, 9, 10, 11, 12, 13, 19, 20, 21, 22, 23, 24, 25, 26, 28, 29, 30  - on full liquid diet - advanced to mechanical /dental soft diet on 8/15     5. Osteomyelitis left 3rd toe s/p amputation 8/7  Necrotic wounds to Lt great toes, Lt 3rd toe and Lt lateral Heel, Rt great toe: 2/2 thrombi.  -- Podiatry took to OR 8/7 for amputation. Orthotist following up.   -- continue wound cares for other digits     6. Normocytic anemia, likey 2/2 chronic disease  Chronic anemia  Baseline hemoglobin approximately 7.  Likely related to chronic disease, untreated HIV.  - Hgb improved to 9.7 (8/25/21)  - B12 and folate -> wnl  - consider transfusion for Hgb < 7 (or close)  - low nl ferritin- on po ferrous gluconate  - CBC stable, last checked on 8/17     7. Left knee pain 2/2 OA: pt reports history of pain in that knee with activity but pain now is more acute. ROM okay. There is some warmth and swelling there.   - In light of the MSSA bacteremia, got XR and called ortho for effusion  - Per ortho (7/1/21) no indication for  intervention, though they did order a brace. Appreciate assistance. There are following EOD.  - PT/OT     8. IV drug use  Polysubstance use disorder  Opiate use disorder  *Chem dep consulted 7/15 but chemical dependency did not feel she is appropriate for CD assessment at this time and recommended consultation towards the end of her antibiotic course.  Reconsult CD once she is close to finishing her IV antibiotic.  *Was using IV dilaudid as well as Percocet p.o. earlier this stay pretty extensively.  Attempts made to wean off IV narcotics and taper down po narcotics though patient felt she was going through withdrawal. Psych consulted and recommended suboxone though patient declined. Psych recommended rapid taper of methadone instead.   *completed methadone taper of 30 mg on 7/21, 20 mg 7/22 and then 10 mg 7/23, then stop  -started suboxone on 7/27 - appreciate psych/addiction medicine assistance who will help with titration     -Plan was to reconsult CD (8/13th)once she is close to finishing her IV antibiotic, but patient is not interested in treatment. She has been through inpatient and outpatient programs and not found them helpful. Also I'm not sure she could continue suboxone in treatment and I think that is probably her best chance for sobriety.  -FYI: She has a daughter who just had twins and lives with her in-laws. Daughter and daughter's mother in law live in [place] (? - pt going to confirm) and have invited patient to live there to help with babies. She thinks this is a good idea as she'll be kept busy and she gets along well with this family. She is hoping to continue follow up with addiction medicine to continue suboxone. She feels that with suboxone and family support she will do well. Will need to have addiction med follow up with her to prescribe suboxone on discharge and help her set up outpatient follow up.  -08/12 -> Pain service recommended   1. Change Suboxone to  4 mg qid for better analgesic  effect.     2. discontinue oxycodone   3. to use acetaminophen for additional analgesic effect if needed.- can give as much as 1000 mg tid   (don't exceed 3000 mg total per day).       8/25: Suboxone adjusted by Addiction team     9. HIV, untreated PTA  - CD4 count greater than 500, viral load 3695  - Started Biktarvy 7/8; ID following  - Will need outpt follow up     10. Chronic back pain: pt notes it present ever since she was pregnant. Improved on current medications  - on increased gabapentin to 600 mg TID, prn tylenol     11. Toxic/septic/metabolic encephalopathy, improved  Likely encephalopathy due to sepsis, drug use, CVA, prolonged ICU stay; had been pulling lines due to alterned mentation thought this all now appears resolved.      12. Pyelonephritis  Urine cultures positive for Klebsiella.  Completed course of treatment with ceftriaxone.  repeat urine cultures 7/10 with ESBL E coli but given unimpressive UA and no UTI symptoms ID suggest holding off on abx for ESBL UTI  -monitor for any symptoms     13. Acute hypoxemic respiratory failure, resolved  Intubated 7/1/2021.  Extubated 7/11/2021.  Likely related to septic shock from MSSA mitral valve endocarditis     14. FORD, resolved suspected due to sepsis  Hypokalemia  - on potassium replacement protocol  - will check BMP periodically     15. Chronic anemia  Baseline hemoglobin approximately 7.  Likely related to chronic disease, untreated HIV.  -Monitor hemoglobin periodically; stable around 8  - low nl ferritin- start po ferrous gluconate     16. Non severe malnutrition  -- Supplements      17. Prior hepatitis C infection  Antibody positive but RNA negative  -Noted     18. Restless legs: unclear if this is true restless leg syndrome or due to being uncomfortable  -continue mirapex prn     19. Major depressive disorder: pt is not suicidal but also doesn't seem to have much of a will to live based upon discussion 7/25.  I wonder if her underlying chronic pain  "which is difficult to treat as well as being in the hospital is contributing. Was seen by psych and declined any medication treatment on 7/26.  patient was on zoloft for years starting at age 13. She is willing to try zoloft again.   -Started zoloft 8/1 at 25mg po and titrating up to 50mg. Pt feels this is helping. Psychiatry followed up and agreed with this medication.     Code Status: Full Code       Disposition Plan   Expected discharge: 09/01 if Brain abscess resolves.  recommended to unclear once antibiotic plan established.    She is homeless at baseline but has a plan post discharge to live with daughter and daughter's in laws. This is in the [facility] and will allow for follow up cares.  SW following    Diet: Mechanical/Dental Soft Diet  Snacks/Supplements Adult: Ensure Enlive; Between Meals    DVT Prophylaxis: Pneumatic Compression Devices; needs to get up out of bed  Neri Catheter: Not present  Code Status: Full Code      Disposition Plan   Entered: Kathleen Chavez DO 08/27/2021, 7:12 AM       The patient's care was discussed with the Patient.    Interval History   \"I am tired\".  Denies current HA, N/V.  No F/C overnight.  No new concerns voiced per nursing staff.    -Data reviewed today: I reviewed all new labs and imaging results over the last 24 hours. I personally reviewed no images or EKG's today.    Physical Exam   Temp: 97.7  F (36.5  C) Temp src: Oral BP: 97/62 Pulse: 63   Resp: 16 SpO2: 97 % O2 Device: None (Room air)    Vitals:    07/14/21 0445 07/16/21 0615 08/20/21 0100   Weight: 84.2 kg (185 lb 10 oz) 76.8 kg (169 lb 5 oz) 82.8 kg (182 lb 9.6 oz)     Vital Signs with Ranges  Temp:  [97.7  F (36.5  C)-98.1  F (36.7  C)] 97.7  F (36.5  C)  Pulse:  [63-76] 63  Resp:  [16] 16  BP: ()/(50-66) 97/62  SpO2:  [97 %-98 %] 97 %  I/O last 3 completed shifts:  In: 480 [P.O.:480]  Out: -     GEN:  Sleeping comfortably this am but easily arousable, comfortable, no overt respiratory " distress.  HEENT:  Normocephalic/atraumatic, no scleral icterus, no nasal discharge, mouth and membranes appear fairly  CV:  Regular rate and rhythm, no clear loud murmur to ausc.  S1 + S2 noted, no S3 or S4.  LUNGS:  Clear to auscultation ant/lat bilaterally.  No rales/rhonchi/wheezing auscultated bilaterally.  No costal retractions bilaterally.  Symmetric chest rise on inhalation noted.  ABD:  Active bowel sounds, soft, non-tender, mildly distended.  No clear rebound/guarding/rigidity.  No masses palpated.  No obvious HSM to exam.  EXT:  No pretibial edema or cyanosis bilaterally. Left toe amputation site visualized are clean and dry - gauze dressing in place. No clear joint synovitis noted.  No calf-tenderness or asymmetry noted.  SKIN:  Dry to touch, no rashes or jaundice noted.  PSYCH:  Calm and not agitated  NEURO:  No tremors at rest, sleepy but easily arousable. Minimally verbal to me this am.    Data   Labs:  No results for input(s): CULT in the last 168 hours.  Recent Labs   Lab 08/25/21  1010 08/24/21  0831   NA  --  138   POTASSIUM  --  3.7   CHLORIDE  --  108   CO2  --  28   ANIONGAP  --  2*   * 92   BUN  --  12   CR  --  0.61   GFRESTIMATED  --  >90   DEREK  --  8.6     Recent Labs   Lab 08/25/21  0003 08/24/21  0831   WBC 4.0 3.4*   HGB 9.7* 9.0*   HCT 31.1* 29.2*   MCV 86 87    414     No results for input(s): TSH in the last 168 hours.  No results for input(s): COLOR, APPEARANCE, URINEGLC, URINEBILI, URINEKETONE, SG, UBLD, URINEPH, PROTEIN, UROBILINOGEN, NITRITE, LEUKEST, RBCU, WBCU in the last 168 hours.   Recent Imaging:   No results found for this or any previous visit (from the past 24 hour(s)).    Medications       bictegravir-emtricitabine-tenofovir  1 tablet Oral Daily     buprenorphine  4 mg Sublingual Daily     buprenorphine  8 mg Sublingual BID     gabapentin  600 mg Oral TID     nafcillin  2 g Intravenous Q4H     pantoprazole  40 mg Oral QAM AC     polyethylene glycol  17 g  Oral Daily     senna-docusate  1 tablet Oral BID     sertraline  50 mg Oral Daily     sodium chloride (PF)  10 mL Intracatheter Q8H     sodium chloride (PF)  10-40 mL Intracatheter Q7 Days

## 2021-08-27 NOTE — PROGRESS NOTES
DATE & TIME: 8/26/21 1900-2300  Cognitive Concerns/ Orientation : A&O x4   BEHAVIOR & AGGRESSION TOOL COLOR: Green             ABNL VS/O2: VSS on RA ex soft BPs  MOBILITY: Independent in room with cane. Surgical shoe to wear on left, uses leg brace with long walks.   PAIN MANAGMENT: C/o toe pain, tylenol given with relief   DIET: Mech soft, s/p teeth extraction on 8/13. Also taking supplements.  BOWEL/BLADDER: Continent B/B, up to BR  ABNL LAB/BG: NA  DRAIN/DEVICES: PICC RUE for Nafcillin q4h  TELEMETRY RHYTHM: N/A  SKIN: Bilateral foot wounds, WOC orders  TESTS/PROCEDURES: Follow-up brain MRI 8/24/21. Neuro said looks stable and will do a repeat scan in 2 weeks.   D/C DATE: 9/1 if brain abscess resolved and finished with abx.   Discharge Barriers: IV abx through 9/1  OTHER IMPORTANT INFO: Neurosurgery following, PT/OT/WOC. LS clear, BS active x4. Contact precautions maintained.

## 2021-08-27 NOTE — PLAN OF CARE
DATE & TIME: 8/27/21 9009-0914  Cognitive Concerns/ Orientation : A&O x4   BEHAVIOR & AGGRESSION TOOL COLOR: Green             ABNL VS/O2: VSS on RA ex soft BPs  MOBILITY: Independent in room with cane. Surgical shoe to wear on left, uses leg brace with long walks.   PAIN MANAGMENT: PRN Tylenol given x1 for back and foot pain.   DIET: Mech soft, s/p teeth extraction on 8/13. Also taking supplements.  BOWEL/BLADDER: Continent B/B, up to BR  ABNL LAB/BG: NA  DRAIN/DEVICES: PICC RUE for Nafcillin q4h  TELEMETRY RHYTHM: N/A  SKIN: Bilateral foot wounds, L foot dressing CDI, R open to air.  TESTS/PROCEDURES: Follow-up brain MRI 8/24/21. Neuro said looks stable and will do a repeat scan in 2 weeks.   D/C DATE: 9/1 if brain abscess resolved and finished with abx.   Discharge Barriers: IV abx through 9/1  OTHER IMPORTANT INFO: Neurosurgery following, PT/OT/WOC. LS clear, BS active x4. Contact precautions maintained.

## 2021-08-28 ENCOUNTER — APPOINTMENT (OUTPATIENT)
Dept: PHYSICAL THERAPY | Facility: CLINIC | Age: 41
End: 2021-08-28
Payer: MEDICAID

## 2021-08-28 LAB — SARS-COV-2 RNA RESP QL NAA+PROBE: NEGATIVE

## 2021-08-28 PROCEDURE — 250N000009 HC RX 250: Performed by: PODIATRIST

## 2021-08-28 PROCEDURE — 99232 SBSQ HOSP IP/OBS MODERATE 35: CPT | Performed by: INTERNAL MEDICINE

## 2021-08-28 PROCEDURE — 99207 PR CDG-CUT & PASTE-POTENTIAL IMPACT ON LEVEL: CPT | Performed by: INTERNAL MEDICINE

## 2021-08-28 PROCEDURE — 999N000190 HC STATISTIC VAT ROUNDS

## 2021-08-28 PROCEDURE — 250N000013 HC RX MED GY IP 250 OP 250 PS 637: Performed by: INTERNAL MEDICINE

## 2021-08-28 PROCEDURE — 250N000013 HC RX MED GY IP 250 OP 250 PS 637: Performed by: PODIATRIST

## 2021-08-28 PROCEDURE — 120N000001 HC R&B MED SURG/OB

## 2021-08-28 PROCEDURE — 97116 GAIT TRAINING THERAPY: CPT | Mod: GP | Performed by: PHYSICAL THERAPIST

## 2021-08-28 PROCEDURE — 250N000013 HC RX MED GY IP 250 OP 250 PS 637: Performed by: HOSPITALIST

## 2021-08-28 PROCEDURE — 87635 SARS-COV-2 COVID-19 AMP PRB: CPT | Performed by: INTERNAL MEDICINE

## 2021-08-28 RX ADMIN — BUPRENORPHINE HYDROCHLORIDE 8 MG: 8 TABLET SUBLINGUAL at 21:22

## 2021-08-28 RX ADMIN — NAFCILLIN SODIUM 2 G: 2 INJECTION, POWDER, LYOPHILIZED, FOR SOLUTION INTRAMUSCULAR; INTRAVENOUS at 02:20

## 2021-08-28 RX ADMIN — QUETIAPINE FUMARATE 25 MG: 25 TABLET ORAL at 21:22

## 2021-08-28 RX ADMIN — NAFCILLIN SODIUM 2 G: 2 INJECTION, POWDER, LYOPHILIZED, FOR SOLUTION INTRAMUSCULAR; INTRAVENOUS at 09:29

## 2021-08-28 RX ADMIN — NAFCILLIN SODIUM 2 G: 2 INJECTION, POWDER, LYOPHILIZED, FOR SOLUTION INTRAMUSCULAR; INTRAVENOUS at 21:21

## 2021-08-28 RX ADMIN — BUPRENORPHINE HYDROCHLORIDE 4 MG: 2 TABLET SUBLINGUAL at 14:03

## 2021-08-28 RX ADMIN — NAFCILLIN SODIUM 2 G: 2 INJECTION, POWDER, LYOPHILIZED, FOR SOLUTION INTRAMUSCULAR; INTRAVENOUS at 06:08

## 2021-08-28 RX ADMIN — GABAPENTIN 600 MG: 300 CAPSULE ORAL at 08:52

## 2021-08-28 RX ADMIN — BUPRENORPHINE HYDROCHLORIDE 8 MG: 8 TABLET SUBLINGUAL at 08:52

## 2021-08-28 RX ADMIN — NAFCILLIN SODIUM 2 G: 2 INJECTION, POWDER, LYOPHILIZED, FOR SOLUTION INTRAMUSCULAR; INTRAVENOUS at 14:03

## 2021-08-28 RX ADMIN — PANTOPRAZOLE SODIUM 40 MG: 40 TABLET, DELAYED RELEASE ORAL at 08:52

## 2021-08-28 RX ADMIN — GABAPENTIN 600 MG: 300 CAPSULE ORAL at 21:22

## 2021-08-28 RX ADMIN — NAFCILLIN SODIUM 2 G: 2 INJECTION, POWDER, LYOPHILIZED, FOR SOLUTION INTRAMUSCULAR; INTRAVENOUS at 17:14

## 2021-08-28 RX ADMIN — SERTRALINE HYDROCHLORIDE 50 MG: 50 TABLET ORAL at 08:52

## 2021-08-28 RX ADMIN — BICTEGRAVIR SODIUM, EMTRICITABINE, AND TENOFOVIR ALAFENAMIDE FUMARATE 1 TABLET: 50; 200; 25 TABLET ORAL at 08:52

## 2021-08-28 RX ADMIN — GABAPENTIN 600 MG: 300 CAPSULE ORAL at 17:14

## 2021-08-28 ASSESSMENT — ACTIVITIES OF DAILY LIVING (ADL)
ADLS_ACUITY_SCORE: 15
ADLS_ACUITY_SCORE: 16
ADLS_ACUITY_SCORE: 16
ADLS_ACUITY_SCORE: 15
ADLS_ACUITY_SCORE: 16
ADLS_ACUITY_SCORE: 15

## 2021-08-28 NOTE — PLAN OF CARE
Cognitive Concerns/ Orientation : A&Ox4. Calm and cooperative. Rounded on freq, calls appropriately.   BEHAVIOR & AGGRESSION TOOL COLOR: Green             ABNL VS/O2: VSS with BP in the low 100's, on RA. LS clear.   MOBILITY: Independent in room with cane. Surgical shoe to wear on left, uses leg brace with longer walks.   PAIN MANAGMENT: PRN Tylenol given x1 for back and foot pain.   DIET: Mech soft, s/p teeth extraction on 8/13. Good appetite and fluid intake.   BOWEL/BLADDER: Continent B/B, using bathroom.   ABNL LAB/BG: NA  DRAIN/DEVICES: PICC RUE for Nafcillin q4h. Flushes well. Blood return very positional.   TELEMETRY RHYTHM: N/A  SKIN: Bilateral foot wounds, L foot dressing CDI, R open to air.  TESTS/PROCEDURES: Follow-up brain MRI 8/24/21. Neuro said looks stable and will do a repeat scan in 2 weeks.   D/C DATE: 9/1 if brain abscess resolved and finished with abx.   Discharge Barriers: IV abx needed through 9/1.   OTHER IMPORTANT INFO: Neurosurgery following, PT/OT/WOC. LS clear, BS active x4. Contact precautions maintained. Calls as needed. Rounded on freq.

## 2021-08-28 NOTE — PROGRESS NOTES
[facility]  Hospitalist Progress Note for 8/22/2021       Assessment and Plan:   41 year old female with h/o untreated HIV, hepatitis C who was admitted on 7/1/2021 for MSSA mitral valve endocarditis resulting in septic shock complicated by embolic left parietal lobe infarctions and multiple peripheral emboli, and initially with encephalopathy, and shock.  She was intubated in ED on 7/1/2021 subsequently extubated 7/11/2021.  Blood cultures turned positive on hospital day 1 with MSSA.  Urine culture was positive for Klebsiella.  She was treated initially with ceftriaxone and vancomycin, and has since been narrowed to nafcillin per ID.     MSSA mitral valve endocarditis  Septic shock, resolved  Probable myopericarditis  Small pericardial effusion  *JAE 7/5 noted with small ASD, mod to large mobile vegetation (14mm long, 6mm wide) of the P2 segment of the mitral leaflet. No valve perforation; mod MR  *blood cultures on admission with MSSA; blood cultures have been negative since 7/6.   *ID following; antibiotic has been narrowed down to Nafcillin,duration extended due  To abscess on MRI  *limited ECHO repeated 7/15 to reassess pericardial fluid 7/15 per cardiology and noted:  organized effusion posteriorly, measuring 1.2-1.5cm, no evidence of tamponade; small mobile vegetation noted attached to the mitral leaflet, mod to severe MR; compared to 7/8/21, effusion size is probably stable, Degree of MR is worse, Vegetation is smaller.  Cardiology signed off 7/15.   *PICC in place  -- JAE (no vegetations) and Cor Angio (nomral coronaries) completed 08/12  - per cardiothoracic surgery reconsult 8/14 felt she no longer has an urgent surgical indication,given absence of vegetations and only moderate MR on ECHO 8/12/2021.She may require mitral valve repair/replacement in the future if she has progressive MR, but this would be better addressed after she has undergone treatment for substance abuse and has  demonstrated sobriety in the out-patient setting. She should follow up with cardiology for medical management and surveillance of MR and ASD, and can be referred back to CV surgery as needed.   - Antibiotic plan per ID     Ischemic CVA, embolic stroke due to infective endocarditis  *CT head on admission notable for new acute to subacute PCA territory ischemic stroke  *MRI 7/3 noted with progressive, multifocal, acute infarcts without MR evidence for hemorrhage or midline shift  *Patient does have small left-to-right shunt on JAE, but most likely from embolic spread from endocarditis  *was evaluated by neurology, to continue antibiotics; per neuro to avoid anticoagulation (except DVT prophylaxis dose)     Abnormal MRI  Possible brain abscess  * MRI 8/10/2021 showed- Multiple enhancing subacute infarcts with evidence of small volume hemorrhage, largest at the junction of the left parietal and occipital lobes which demonstrates peripheral enhancement and worsening vasogenic edema. Developing abscess(es) related to septic emboli cannot be excluded  - Neurology was reconsulted 8/14, recommended to screen for mycotic aneurysm   * CTA head 8/14 -showed Complex peripherally enhancing fluid collection in the paramedian left parietal lobepatent arteries of head & neck. No evidence for dissection.  and CT head today & neurosurgery consult  * Ct head w/o contrast 8/14 showed no significant change in the complex peripherally enhancing fluid collection in the paramedian left parietal lobe with surrounding vasogenic edema. Atypical for bland evolving subacute ischemic infarct. Less likely possibility for evolving subacute hematoma, consider tissue sampling.Mild local mass effect in the left parieto-occipital region with minimal narrowing of the occipital horn of the left lateral ventricle.No midline shift/herniation  - pt remains asymptomatic  - neurosurgery consulted on 8/14th appreciated. As the pt has no neuro symptoms they   recommended repeating the brain MRI on 8/17  * MRI head on 8/17 reported as no definite significant interval change when compared to MRI dated 8/10/2021.  - NSG following & recommended no surgical intervention at this time   - Repeat MRI brain  -8/24/21  reviewed and shows persistent brain abscess.  Neurosurgery recommending continuing IV abx and repeating MRI in another 2 wks time   - Activity as tolerated, continue with therapies  - Continue supportive and symptomatic treatment      Infected Tooth  S/p removal of all remaining teeth on 8/13  Assessment: OMFS was consulted for multiple infected tooth  - S/p Removal of all remaining teeth including 2, 3, 4, 5, 6, 7, 8, 9, 10, 11, 12, 13, 19, 20, 21, 22, 23, 24, 25, 26, 28, 29, 30  - on full liquid diet - advanced to mechanical /dental soft diet on 8/15     Osteomyelitis left 3rd toe s/p amputation 8/7  Necrotic wounds to Lt great toes, Lt 3rd toe and Lt lateral Heel, Rt great toe: 2/2 thrombi.  -- Podiatry took to OR 8/7 for amputation. Orthotist following up.   -- continue wound cares for other digits     Normocytic anemia, likey 2/2 chronic disease  Chronic anemia  Baseline hemoglobin approximately 7.  Likely related to chronic disease, untreated HIV.  - Hgb 7.4 yesterday -> 8.1 today  - B12 and folate -> wnl  - consider transfusion for Hgb < 7 (or close)  - low nl ferritin- on po ferrous gluconate  - CBC stable, last checked on 8/17     Left knee pain 2/2 OA: pt reports history of pain in that knee with activity but pain now is more acute. ROM okay. There is some warmth and swelling there.   - In light of the MSSA bacteremia, got XR and called ortho for effusion  - Per ortho no indication for intervention, though they did order a brace. Appreciate assistance. There are following EOD.  - PT/OT     IV drug use  Polysubstance use disorder  Opiate use disorder  *Chem dep consulted 7/15 but chemical dependency did not feel she is appropriate for CD assessment at this  time and recommended consultation towards the end of her antibiotic course.  Reconsult CD once she is close to finishing her IV antibiotic.  *Was using IV dilaudid as well as Percocet p.o. earlier this stay pretty extensively.  Attempts made to wean off IV narcotics and taper down po narcotics though patient felt she was going through withdrawal. Psych consulted and recommended suboxone though patient declined. Psych recommended rapid taper of methadone instead.   *completed methadone taper of 30 mg on 7/21, 20 mg 7/22 and then 10 mg 7/23, then stop  -started suboxone on 7/27 - appreciate psych/addiction medicine assistance who will help with titration    -Plan was to reconsult CD (8/13th)once she is close to finishing her IV antibiotic, but patient is not interested in treatment. She has been through inpatient and outpatient programs and not found them helpful. Also I'm not sure she could continue suboxone in treatment and I think that is probably her best chance for sobriety.  -FYI: She has a daughter who just had twins and lives with her in-laws. Daughter and daughter's mother in law live in [place] (? - pt going to confirm) and have invited patient to live there to help with babies. She thinks this is a good idea as she'll be kept busy and she gets along well with this family. She is hoping to continue follow up with addiction medicine to continue suboxone. She feels that with suboxone and family support she will do well. Will need to have addiction med follow up with her to prescribe suboxone on discharge and help her set up outpatient follow up.  -08/12 -> Pain service recommended   1. Change Suboxone to  4 mg qid for better analgesic effect.     2. discontinue oxycodone   3. to use acetaminophen for additional analgesic effect if needed.- can give as much as 1000 mg tid   (don't exceed 3000 mg total per day).       8/25: Suboxone adjusted by Addiction team    HIV, untreated PTA  - CD4 count greater than  500, viral load 3695  - Started Biktarvy 7/8; ID following  - Will need outpt follow up     Chronic back pain: pt notes it present ever since she was pregnant. Improved on current medications  - on increased gabapentin to 600 mg TID, prn tylenol     Toxic/septic/metabolic encephalopathy, improved  Likely encephalopathy due to sepsis, drug use, CVA, prolonged ICU stay; had been pulling lines due to alterned mentation thought this all now appears resolved.      Pyelonephritis  Urine cultures positive for Klebsiella.  Completed course of treatment with ceftriaxone.  repeat urine cultures 7/10 with ESBL E coli but given unimpressive UA and no UTI symptoms ID suggest holding off on abx for ESBL UTI  -monitor for any symptoms     Acute hypoxemic respiratory failure, resolved  Intubated 7/1/2021.  Extubated 7/11/2021.  Likely related to septic shock from MSSA mitral valve endocarditis     FORD, resolved suspected due to sepsis  Hypokalemia  - on potassium replacement protocol  - will check BMP periodically     Chronic anemia  Baseline hemoglobin approximately 7.  Likely related to chronic disease, untreated HIV.  -Monitor hemoglobin periodically; stable around 8  - low nl ferritin- start po ferrous gluconate     Non severe malnutrition  -- Supplements      Prior hepatitis C infection  Antibody positive but RNA negative  -Noted     Restless legs: unclear if this is true restless leg syndrome or due to being uncomfortable  -continue mirapex prn     Major depressive disorder: pt is not suicidal but also doesn't seem to have much of a will to live based upon discussion 7/25.  I wonder if her underlying chronic pain which is difficult to treat as well as being in the hospital is contributing. Was seen by psych and declined any medication treatment on 7/26.  patient was on zoloft for years starting at age 13. She is willing to try zoloft again.   -Started zoloft 8/1 at 25mg po and titrating up to 50mg. Pt feels this is helping.  "Psychiatry followed up and agreed with this medication.    Code Status: Full Code       Disposition Plan   Expected discharge: 09/01 if Brain abscess resolves.  recommended to unclear once antibiotic plan established.    She is homeless at baseline but has a plan post discharge to live with daughter and daughter's in laws. This is in the [facility] and will allow for follow up cares.  SW following    Ashok Triplett MD, FACP   Internal Medicine/ Hospitalist (Pager- [phone])                Interval History:   no new complaints    Denies Chest pain/ SOB/ cough, Denies any N&V/ bowel problems  Denies urinary problems , Denies fever/chills/rigors                 Medications:       bictegravir-emtricitabine-tenofovir  1 tablet Oral Daily     buprenorphine  4 mg Sublingual Daily     buprenorphine  8 mg Sublingual BID     gabapentin  600 mg Oral TID     nafcillin  2 g Intravenous Q4H     pantoprazole  40 mg Oral QAM AC     polyethylene glycol  17 g Oral Daily     senna-docusate  1 tablet Oral BID     sertraline  50 mg Oral Daily     sodium chloride (PF)  10 mL Intracatheter Q8H     sodium chloride (PF)  10-40 mL Intracatheter Q7 Days     acetaminophen, albuterol, bisacodyl, glucose **OR** dextrose **OR** glucagon, hydrOXYzine, lidocaine 4%, lidocaine (buffered or not buffered), magnesium hydroxide, naloxone **OR** naloxone **OR** naloxone **OR** naloxone, ondansetron **OR** ondansetron, prochlorperazine **OR** prochlorperazine, QUEtiapine, sodium chloride (PF), sodium chloride (PF), sodium chloride (PF)               Physical Exam:   Blood pressure 97/62, pulse 68, temperature 98.1  F (36.7  C), temperature source Oral, resp. rate 16, height 1.727 m (5' 7.99\"), weight 82.8 kg (182 lb 9.6 oz), SpO2 97 %, unknown if currently breastfeeding.  Wt Readings from Last 4 Encounters:   08/20/21 82.8 kg (182 lb 9.6 oz)   11/05/07 103.1 kg (227 lb 4 oz)   09/26/07 99.3 kg (219 lb)     BP 97/62 (BP Location: Right arm)   Pulse 68   " "Temp 98.1  F (36.7  C) (Oral)   Resp 16   Ht 1.727 m (5' 7.99\")   Wt 82.8 kg (182 lb 9.6 oz)   SpO2 97%   Breastfeeding Unknown   BMI 27.77 kg/m    Gen- pleasant lying in bed  HEENT- NAD, BO  Neck- supple, no JVD elevation  CVS- I+II+ no m/r/g  RS- CTAB  Abdo- soft, no tenderness . No g/r/r  Ext- no edema          Data:   All laboratory data reviewed  BMP  Recent Labs   Lab 08/25/21  1010 08/24/21  0831   NA  --  138   POTASSIUM  --  3.7   CHLORIDE  --  108   DEREK  --  8.6   CO2  --  28   BUN  --  12   CR  --  0.61   * 92     CBC  Recent Labs   Lab 08/25/21  0003 08/24/21  0831   WBC 4.0 3.4*   RBC 3.60* 3.36*   HGB 9.7* 9.0*   HCT 31.1* 29.2*   MCV 86 87   MCH 26.9 26.8   MCHC 31.2* 30.8*   RDW 24.8* 25.1*    414     INRNo lab results found in last 7 days.  LFTs  Recent Labs   Lab 08/24/21  0831   ALKPHOS 87   AST 19   ALT 15   BILITOTAL 0.6   PROTTOTAL 7.9   ALBUMIN 2.0*   MRI BRAIN WITHOUT AND WITH CONTRAST  8/24/2021 3:52 PM     IMPRESSION:   1. Over serial exams, there has been slight decrease in size of the   previously demonstrated peripheral enhancing centrally diffusion   restricting parenchymal lesion in the left paramedian   parieto-occipital region, as described. This continues to be worrisome   for possible cerebral abscess. Unchanged surrounding vasogenic edema   and mild local mass effect. No midline shift/herniation.   2. Continued expected evolution of the scattered subacute-to-chronic   infarcts, as described. No acute infarct, new intracranial hemorrhage,   or other definite acute process.     "

## 2021-08-28 NOTE — PROGRESS NOTES
DATE & TIME: 8/28/21 6740-3730  Cognitive Concerns/ Orientation : A&Ox4.   BEHAVIOR & AGGRESSION TOOL COLOR: Green             ABNL VS/O2: BP in the low 100's; asymptomatic, Other VSS on RA.   MOBILITY: Independent in room with cane.   PAIN MANAGMENT: Denied pain  DIET: Mech soft, s/p teeth extraction on 8/13. Good appetite and fluid intake.   BOWEL/BLADDER: Continent B/B, using bathroom.   ABNL LAB/BG: NA  DRAIN/DEVICES: PICC RUE for Nafcillin q4h.   TELEMETRY RHYTHM: N/A  SKIN: Bilateral big toe wounds. Scabs on both toes. Left foot dressing CDI, Right open to air.  TESTS/PROCEDURES: Follow-up brain MRI 8/24/21. Per Neuro looks stable and will do a repeat scan in 2 weeks.   D/C DATE: 9/1 if brain abscess resolved and finished with abx.   Discharge Barriers: IV abx needed through 9/1.   OTHER IMPORTANT INFO: Contact precautions for ESBL. Neurosurgery following, PT/OT/WOC.

## 2021-08-28 NOTE — PLAN OF CARE
Cognitive Concerns/ Orientation : A&Ox4. Calm and cooperative, calls appropriately.   BEHAVIOR & AGGRESSION TOOL COLOR: Green             ABNL VS/O2: BP in the low 100's; asymptomatic, Other VSS on RA.   MOBILITY: Independent in room with cane. Surgical shoe to wear on left, uses leg brace with longer walks.   PAIN MANAGMENT: Denied pain  DIET: Mech soft, s/p teeth extraction on 8/13. Good appetite and fluid intake.   BOWEL/BLADDER: Continent B/B, using bathroom.   ABNL LAB/BG: NA  DRAIN/DEVICES: PICC RUE for Nafcillin q4h. Flushes well. Blood return very positional.   TELEMETRY RHYTHM: N/A  SKIN: Bilateral foot wounds, L foot dressing CDI, R open to air.  TESTS/PROCEDURES: Follow-up brain MRI 8/24/21. Per Neuro looks stable and will do a repeat scan in 2 weeks.   D/C DATE: 9/1 if brain abscess resolved and finished with abx.   Discharge Barriers: IV abx needed through 9/1.   OTHER IMPORTANT INFO: Neurosurgery following, PT/OT/WOC. Contact precautions maintained.

## 2021-08-29 ENCOUNTER — APPOINTMENT (OUTPATIENT)
Dept: PHYSICAL THERAPY | Facility: CLINIC | Age: 41
End: 2021-08-29
Payer: MEDICAID

## 2021-08-29 PROCEDURE — 250N000013 HC RX MED GY IP 250 OP 250 PS 637: Performed by: HOSPITALIST

## 2021-08-29 PROCEDURE — 250N000013 HC RX MED GY IP 250 OP 250 PS 637: Performed by: PODIATRIST

## 2021-08-29 PROCEDURE — 99207 PR CDG-CUT & PASTE-POTENTIAL IMPACT ON LEVEL: CPT | Performed by: INTERNAL MEDICINE

## 2021-08-29 PROCEDURE — 250N000009 HC RX 250: Performed by: PODIATRIST

## 2021-08-29 PROCEDURE — 97116 GAIT TRAINING THERAPY: CPT | Mod: GP

## 2021-08-29 PROCEDURE — 120N000001 HC R&B MED SURG/OB

## 2021-08-29 PROCEDURE — 250N000013 HC RX MED GY IP 250 OP 250 PS 637: Performed by: INTERNAL MEDICINE

## 2021-08-29 PROCEDURE — 250N000013 HC RX MED GY IP 250 OP 250 PS 637: Performed by: DENTIST

## 2021-08-29 PROCEDURE — 99232 SBSQ HOSP IP/OBS MODERATE 35: CPT | Performed by: INTERNAL MEDICINE

## 2021-08-29 PROCEDURE — 999N000190 HC STATISTIC VAT ROUNDS

## 2021-08-29 RX ADMIN — BICTEGRAVIR SODIUM, EMTRICITABINE, AND TENOFOVIR ALAFENAMIDE FUMARATE 1 TABLET: 50; 200; 25 TABLET ORAL at 08:03

## 2021-08-29 RX ADMIN — BUPRENORPHINE HYDROCHLORIDE 4 MG: 2 TABLET SUBLINGUAL at 14:18

## 2021-08-29 RX ADMIN — GABAPENTIN 600 MG: 300 CAPSULE ORAL at 18:02

## 2021-08-29 RX ADMIN — SERTRALINE HYDROCHLORIDE 50 MG: 50 TABLET ORAL at 08:03

## 2021-08-29 RX ADMIN — ACETAMINOPHEN 650 MG: 325 TABLET, FILM COATED ORAL at 08:14

## 2021-08-29 RX ADMIN — BUPRENORPHINE HYDROCHLORIDE 8 MG: 8 TABLET SUBLINGUAL at 08:03

## 2021-08-29 RX ADMIN — GABAPENTIN 600 MG: 300 CAPSULE ORAL at 08:03

## 2021-08-29 RX ADMIN — PANTOPRAZOLE SODIUM 40 MG: 40 TABLET, DELAYED RELEASE ORAL at 08:03

## 2021-08-29 RX ADMIN — GABAPENTIN 600 MG: 300 CAPSULE ORAL at 21:12

## 2021-08-29 RX ADMIN — NAFCILLIN SODIUM 2 G: 2 INJECTION, POWDER, LYOPHILIZED, FOR SOLUTION INTRAMUSCULAR; INTRAVENOUS at 18:02

## 2021-08-29 RX ADMIN — BUPRENORPHINE HYDROCHLORIDE 8 MG: 8 TABLET SUBLINGUAL at 21:12

## 2021-08-29 RX ADMIN — NAFCILLIN SODIUM 2 G: 2 INJECTION, POWDER, LYOPHILIZED, FOR SOLUTION INTRAMUSCULAR; INTRAVENOUS at 05:54

## 2021-08-29 RX ADMIN — NAFCILLIN SODIUM 2 G: 2 INJECTION, POWDER, LYOPHILIZED, FOR SOLUTION INTRAMUSCULAR; INTRAVENOUS at 10:11

## 2021-08-29 RX ADMIN — NAFCILLIN SODIUM 2 G: 2 INJECTION, POWDER, LYOPHILIZED, FOR SOLUTION INTRAMUSCULAR; INTRAVENOUS at 01:27

## 2021-08-29 RX ADMIN — NAFCILLIN SODIUM 2 G: 2 INJECTION, POWDER, LYOPHILIZED, FOR SOLUTION INTRAMUSCULAR; INTRAVENOUS at 21:12

## 2021-08-29 RX ADMIN — NAFCILLIN SODIUM 2 G: 2 INJECTION, POWDER, LYOPHILIZED, FOR SOLUTION INTRAMUSCULAR; INTRAVENOUS at 14:18

## 2021-08-29 ASSESSMENT — ACTIVITIES OF DAILY LIVING (ADL)
ADLS_ACUITY_SCORE: 15

## 2021-08-29 NOTE — PLAN OF CARE
DATE & TIME: 08/29/2021 [id number]    Cognitive Concerns/ Orientation : A&Ox4, flat affect   BEHAVIOR & AGGRESSION TOOL COLOR: Green   ABNL VS/O2: VSS on RA, soft BP in 90s   MOBILITY: Ind in room with cane   PAIN MANAGMENT: Denied pain   DIET: Mechanical soft/dental diet   BOWEL/BLADDER: Cont B/B  ABNL LAB/BG: N/A  DRAIN/DEVICES: R PICC SL with int abx   TELEMETRY RHYTHM: N/A  SKIN: Bilateral great toe wounds, scabs on toe, L foot dressing CDI, R foot open to air   TESTS/PROCEDURES: N/A  D/C DAY/GOALS/PLACE: 9/1 if brain abscess resolves  OTHER IMPORTANT INFO: Contact precautions for ESBL, Neurosurgery, PT/OT/WOC following

## 2021-08-29 NOTE — PROGRESS NOTES
DATE & TIME: 08/29/2021 4122-5257    Cognitive Concerns/ Orientation : A&Ox4  BEHAVIOR & AGGRESSION TOOL COLOR: Green   ABNL VS/O2: VSS on RA, soft BPs  MOBILITY: Ind in room with cane   PAIN MANAGMENT: Had pain in left toe, PRN tylenol given 1x  DIET: Mechanical soft/dental diet   BOWEL/BLADDER: Cont B/B  ABNL LAB/BG: N/A  DRAIN/DEVICES: R PICC SL with int abx   TELEMETRY RHYTHM: N/A  SKIN: Bilateral great toe wounds, scabs on toe, L foot dressing CDI, R foot open to air   TESTS/PROCEDURES: N/A  D/C DAY/GOALS/PLACE: 9/1 if brain abscess resolves  OTHER IMPORTANT INFO: Contact precautions for ESBL, Neurosurgery, PT/OT/WOC following

## 2021-08-29 NOTE — PLAN OF CARE
Cognitive Concerns/ Orientation : A&Ox4. Flat affect. Calls as needed.   BEHAVIOR & AGGRESSION TOOL COLOR: Green             ABNL VS/O2: VSS with BP in the low 110's. On RA. LS clear.   MOBILITY: Independent in room with cane. Using bathroom. Moving around freq.   PAIN MANAGMENT: Denied pain  DIET: Mech soft, s/p teeth extraction on 8/13. Good appetite and fluid intake.   BOWEL/BLADDER: Continent B/B, using bathroom.   ABNL LAB/BG: Covid-19 negative 8/28.   DRAIN/DEVICES: PICC RUE for Nafcillin q4h. WDL with positional/difficult blood return.  TELEMETRY RHYTHM: N/A  SKIN: Bilateral big toe wounds. Scabs on both toes. Left foot dressing CDI, Right open to air.  TESTS/PROCEDURES: Follow-up brain MRI 8/24/21. Per Neuro looks stable and will do a repeat scan in 2 weeks.   D/C DATE: 9/1 if brain abscess resolved and finished with abx.   Discharge Barriers: IV abx needed through 9/1.   OTHER IMPORTANT INFO: Contact precautions for ESBL. Neurosurgery following, PT/OT/WOC. Calls as needed. Calm and cooperative with cares.

## 2021-08-29 NOTE — PROGRESS NOTES
[facility]  Hospitalist Progress Note for 8/22/2021       Assessment and Plan:   41 year old female with h/o untreated HIV, hepatitis C who was admitted on 7/1/2021 for MSSA mitral valve endocarditis resulting in septic shock complicated by embolic left parietal lobe infarctions and multiple peripheral emboli, and initially with encephalopathy, and shock.  She was intubated in ED on 7/1/2021 subsequently extubated 7/11/2021.  Blood cultures turned positive on hospital day 1 with MSSA.  Urine culture was positive for Klebsiella.  She was treated initially with ceftriaxone and vancomycin, and has since been narrowed to nafcillin per ID.     MSSA mitral valve endocarditis  Septic shock, resolved  Probable myopericarditis  Small pericardial effusion  *JAE 7/5 noted with small ASD, mod to large mobile vegetation (14mm long, 6mm wide) of the P2 segment of the mitral leaflet. No valve perforation; mod MR  *blood cultures on admission with MSSA; blood cultures have been negative since 7/6.   *ID following; antibiotic has been narrowed down to Nafcillin,duration extended due  To abscess on MRI  *limited ECHO repeated 7/15 to reassess pericardial fluid 7/15 per cardiology and noted:  organized effusion posteriorly, measuring 1.2-1.5cm, no evidence of tamponade; small mobile vegetation noted attached to the mitral leaflet, mod to severe MR; compared to 7/8/21, effusion size is probably stable, Degree of MR is worse, Vegetation is smaller.  Cardiology signed off 7/15.   *PICC in place  -- JAE (no vegetations) and Cor Angio (nomral coronaries) completed 08/12  - per cardiothoracic surgery reconsult 8/14 felt she no longer has an urgent surgical indication,given absence of vegetations and only moderate MR on ECHO 8/12/2021.She may require mitral valve repair/replacement in the future if she has progressive MR, but this would be better addressed after she has undergone treatment for substance abuse and has  demonstrated sobriety in the out-patient setting. She should follow up with cardiology for medical management and surveillance of MR and ASD, and can be referred back to CV surgery as needed.   - Antibiotic plan per ID     Ischemic CVA, embolic stroke due to infective endocarditis  *CT head on admission notable for new acute to subacute PCA territory ischemic stroke  *MRI 7/3 noted with progressive, multifocal, acute infarcts without MR evidence for hemorrhage or midline shift  *Patient does have small left-to-right shunt on JAE, but most likely from embolic spread from endocarditis  *was evaluated by neurology, to continue antibiotics; per neuro to avoid anticoagulation (except DVT prophylaxis dose)     Abnormal MRI  Possible brain abscess  * MRI 8/10/2021 showed- Multiple enhancing subacute infarcts with evidence of small volume hemorrhage, largest at the junction of the left parietal and occipital lobes which demonstrates peripheral enhancement and worsening vasogenic edema. Developing abscess(es) related to septic emboli cannot be excluded  - Neurology was reconsulted 8/14, recommended to screen for mycotic aneurysm   * CTA head 8/14 -showed Complex peripherally enhancing fluid collection in the paramedian left parietal lobepatent arteries of head & neck. No evidence for dissection.  and CT head today & neurosurgery consult  * Ct head w/o contrast 8/14 showed no significant change in the complex peripherally enhancing fluid collection in the paramedian left parietal lobe with surrounding vasogenic edema. Atypical for bland evolving subacute ischemic infarct. Less likely possibility for evolving subacute hematoma, consider tissue sampling.Mild local mass effect in the left parieto-occipital region with minimal narrowing of the occipital horn of the left lateral ventricle.No midline shift/herniation  - pt remains asymptomatic  - neurosurgery consulted on 8/14th appreciated. As the pt has no neuro symptoms they   recommended repeating the brain MRI on 8/17  * MRI head on 8/17 reported as no definite significant interval change when compared to MRI dated 8/10/2021.  - NSG following & recommended no surgical intervention at this time   - Repeat MRI brain  -8/24/21  reviewed and shows persistent brain abscess.  Neurosurgery recommending continuing IV abx and repeating MRI in another 2 wks time   - Activity as tolerated, continue with therapies  - Continue supportive and symptomatic treatment      Infected Tooth  S/p removal of all remaining teeth on 8/13  Assessment: OMFS was consulted for multiple infected tooth  - S/p Removal of all remaining teeth including 2, 3, 4, 5, 6, 7, 8, 9, 10, 11, 12, 13, 19, 20, 21, 22, 23, 24, 25, 26, 28, 29, 30  - on full liquid diet - advanced to mechanical /dental soft diet on 8/15     Osteomyelitis left 3rd toe s/p amputation 8/7  Necrotic wounds to Lt great toes, Lt 3rd toe and Lt lateral Heel, Rt great toe: 2/2 thrombi.  -- Podiatry took to OR 8/7 for amputation.    -- continue wound cares for other digits     Normocytic anemia, likey 2/2 chronic disease  Chronic anemia  Baseline hemoglobin approximately 7.  Likely related to chronic disease, untreated HIV.  - B12 and folate -> wnl  - consider transfusion for Hgb < 7 (or close)  - low nl ferritin- on po ferrous gluconate     Left knee pain 2/2 OA: pt reports history of pain in that knee with activity but pain now is more acute. ROM okay. There is some warmth and swelling there.   - In light of the MSSA bacteremia, got XR and called ortho for effusion  - Per ortho no indication for intervention, though they did order a brace. Appreciate assistance. There are following EOD.  - PT/OT     IV drug use  Polysubstance use disorder  Opiate use disorder  *Chem dep consulted 7/15 but chemical dependency did not feel she is appropriate for CD assessment at this time and recommended consultation towards the end of her antibiotic course.  Reconsult CD  once she is close to finishing her IV antibiotic.  *Was using IV dilaudid as well as Percocet p.o. earlier this stay pretty extensively.  Attempts made to wean off IV narcotics and taper down po narcotics though patient felt she was going through withdrawal. Psych consulted and recommended suboxone though patient declined. Psych recommended rapid taper of methadone instead.   *completed methadone taper of 30 mg on 7/21, 20 mg 7/22 and then 10 mg 7/23, then stop  -started suboxone on 7/27 - appreciate psych/addiction medicine assistance who will help with titration    -Plan was to reconsult CD (8/13th)once she is close to finishing her IV antibiotic, but patient is not interested in treatment. She has been through inpatient and outpatient programs and not found them helpful. Also I'm not sure she could continue suboxone in treatment and I think that is probably her best chance for sobriety.  -FYI: She has a daughter who just had twins and lives with her in-laws. Daughter and daughter's mother in law live in [place] (? - pt going to confirm) and have invited patient to live there to help with babies. She thinks this is a good idea as she'll be kept busy and she gets along well with this family. She is hoping to continue follow up with addiction medicine to continue suboxone. She feels that with suboxone and family support she will do well. Will need to have addiction med follow up with her to prescribe suboxone on discharge and help her set up outpatient follow up.  -08/12 -> Pain service recommended   1. Change Suboxone to  4 mg qid for better analgesic effect.     2. discontinue oxycodone   3. to use acetaminophen for additional analgesic effect if needed.- can give as much as 1000 mg tid   (don't exceed 3000 mg total per day).       8/25: Suboxone adjusted by Addiction team    HIV, untreated PTA  - CD4 count greater than 500, viral load 3695  - Started Biktarvy 7/8; ID following  - Will need outpt follow  up     Chronic back pain: pt notes it present ever since she was pregnant. Improved on current medications  - on increased gabapentin to 600 mg TID, prn tylenol     Toxic/septic/metabolic encephalopathy, improved  Likely encephalopathy due to sepsis, drug use, CVA, prolonged ICU stay; had been pulling lines due to alterned mentation thought this all now appears resolved.      Pyelonephritis  Urine cultures positive for Klebsiella.  Completed course of treatment with ceftriaxone.  repeat urine cultures 7/10 with ESBL E coli but given unimpressive UA and no UTI symptoms ID suggest holding off on abx for ESBL UTI  -monitor for any symptoms     Acute hypoxemic respiratory failure, resolved  Intubated 7/1/2021.  Extubated 7/11/2021.  Likely related to septic shock from MSSA mitral valve endocarditis     FORD, resolved suspected due to sepsis  Hypokalemia  - on potassium replacement protocol  - will check BMP periodically     Chronic anemia  Baseline hemoglobin approximately 7.  Likely related to chronic disease, untreated HIV.  -Monitor hemoglobin periodically; stable around 8  - low nl ferritin- start po ferrous gluconate     Non severe malnutrition  -- Supplements      Prior hepatitis C infection  Antibody positive but RNA negative  -Noted     Restless legs: unclear if this is true restless leg syndrome or due to being uncomfortable  -continue mirapex prn     Major depressive disorder: pt is not suicidal but also doesn't seem to have much of a will to live based upon discussion 7/25.  I wonder if her underlying chronic pain which is difficult to treat as well as being in the hospital is contributing. Was seen by psych and declined any medication treatment on 7/26.  patient was on zoloft for years starting at age 13. She is willing to try zoloft again.   -Started zoloft 8/1 at 25mg po and titrating up to 50mg. Pt feels this is helping. Psychiatry followed up and agreed with this medication.    Code Status: Full Code   "     Disposition Plan   Expected discharge: 09/01 if Brain abscess resolves.  recommended to unclear once antibiotic plan established.    She is homeless at baseline but has a plan post discharge to live with daughter and daughter's in laws. This is in the [facility] and will allow for follow up cares.  SW following    Ashok Triplett MD, FACP   Internal Medicine/ Hospitalist (Pager- [phone])                Interval History:   no new complaints    Denies Chest pain/ SOB/ cough, Denies any N&V/ bowel problems  Denies urinary problems , Denies fever/chills/rigors                 Medications:       bictegravir-emtricitabine-tenofovir  1 tablet Oral Daily     buprenorphine  4 mg Sublingual Daily     buprenorphine  8 mg Sublingual BID     gabapentin  600 mg Oral TID     nafcillin  2 g Intravenous Q4H     pantoprazole  40 mg Oral QAM AC     polyethylene glycol  17 g Oral Daily     senna-docusate  1 tablet Oral BID     sertraline  50 mg Oral Daily     sodium chloride (PF)  10 mL Intracatheter Q8H     sodium chloride (PF)  10-40 mL Intracatheter Q7 Days     acetaminophen, albuterol, bisacodyl, glucose **OR** dextrose **OR** glucagon, hydrOXYzine, lidocaine 4%, lidocaine (buffered or not buffered), magnesium hydroxide, naloxone **OR** naloxone **OR** naloxone **OR** naloxone, ondansetron **OR** ondansetron, prochlorperazine **OR** prochlorperazine, QUEtiapine, sodium chloride (PF), sodium chloride (PF), sodium chloride (PF)               Physical Exam:   Blood pressure 104/62, pulse 65, temperature 97.5  F (36.4  C), temperature source Oral, resp. rate 16, height 1.727 m (5' 7.99\"), weight 82.8 kg (182 lb 9.6 oz), SpO2 95 %, unknown if currently breastfeeding.  Wt Readings from Last 4 Encounters:   08/20/21 82.8 kg (182 lb 9.6 oz)   11/05/07 103.1 kg (227 lb 4 oz)   09/26/07 99.3 kg (219 lb)     /62 (BP Location: Left arm)   Pulse 65   Temp 97.5  F (36.4  C) (Oral)   Resp 16   Ht 1.727 m (5' 7.99\")   Wt 82.8 kg " (182 lb 9.6 oz)   SpO2 95%   Breastfeeding Unknown   BMI 27.77 kg/m    Gen- pleasant lying in bed  HEENT- NAD, BO  Neck- supple, no JVD elevation  CVS- I+II+ no m/r/g  RS- CTAB  Abdo- soft, no tenderness . No g/r/r  Ext- no edema   Left foot covered with dressing.         Data:   All laboratory data reviewed  BMP  Recent Labs   Lab 08/25/21  1010 08/24/21  0831   NA  --  138   POTASSIUM  --  3.7   CHLORIDE  --  108   EDREK  --  8.6   CO2  --  28   BUN  --  12   CR  --  0.61   * 92     CBC  Recent Labs   Lab 08/25/21  0003 08/24/21  0831   WBC 4.0 3.4*   RBC 3.60* 3.36*   HGB 9.7* 9.0*   HCT 31.1* 29.2*   MCV 86 87   MCH 26.9 26.8   MCHC 31.2* 30.8*   RDW 24.8* 25.1*    414     INRNo lab results found in last 7 days.  LFTs  Recent Labs   Lab 08/24/21  0831   ALKPHOS 87   AST 19   ALT 15   BILITOTAL 0.6   PROTTOTAL 7.9   ALBUMIN 2.0*   MRI BRAIN WITHOUT AND WITH CONTRAST  8/24/2021 3:52 PM     IMPRESSION:   1. Over serial exams, there has been slight decrease in size of the   previously demonstrated peripheral enhancing centrally diffusion   restricting parenchymal lesion in the left paramedian   parieto-occipital region, as described. This continues to be worrisome   for possible cerebral abscess. Unchanged surrounding vasogenic edema   and mild local mass effect. No midline shift/herniation.   2. Continued expected evolution of the scattered subacute-to-chronic   infarcts, as described. No acute infarct, new intracranial hemorrhage,   or other definite acute process.

## 2021-08-30 ENCOUNTER — APPOINTMENT (OUTPATIENT)
Dept: PHYSICAL THERAPY | Facility: CLINIC | Age: 41
End: 2021-08-30
Payer: MEDICAID

## 2021-08-30 ENCOUNTER — APPOINTMENT (OUTPATIENT)
Dept: OCCUPATIONAL THERAPY | Facility: CLINIC | Age: 41
End: 2021-08-30
Payer: MEDICAID

## 2021-08-30 LAB
ALBUMIN SERPL-MCNC: 2.2 G/DL (ref 3.4–5)
ALP SERPL-CCNC: 78 U/L (ref 40–150)
ALT SERPL W P-5'-P-CCNC: 17 U/L (ref 0–50)
ANION GAP SERPL CALCULATED.3IONS-SCNC: 2 MMOL/L (ref 3–14)
AST SERPL W P-5'-P-CCNC: 18 U/L (ref 0–45)
BILIRUB SERPL-MCNC: 0.6 MG/DL (ref 0.2–1.3)
BUN SERPL-MCNC: 14 MG/DL (ref 7–30)
CALCIUM SERPL-MCNC: 8.6 MG/DL (ref 8.5–10.1)
CHLORIDE BLD-SCNC: 109 MMOL/L (ref 94–109)
CO2 SERPL-SCNC: 28 MMOL/L (ref 20–32)
CREAT SERPL-MCNC: 0.62 MG/DL (ref 0.52–1.04)
CRP SERPL-MCNC: 4.9 MG/L (ref 0–8)
ERYTHROCYTE [DISTWIDTH] IN BLOOD BY AUTOMATED COUNT: 23.9 % (ref 10–15)
GFR SERPL CREATININE-BSD FRML MDRD: >90 ML/MIN/1.73M2
GLUCOSE BLD-MCNC: 91 MG/DL (ref 70–99)
HCT VFR BLD AUTO: 27.6 % (ref 35–47)
HGB BLD-MCNC: 8.6 G/DL (ref 11.7–15.7)
MCH RBC QN AUTO: 26.9 PG (ref 26.5–33)
MCHC RBC AUTO-ENTMCNC: 31.2 G/DL (ref 31.5–36.5)
MCV RBC AUTO: 86 FL (ref 78–100)
PLATELET # BLD AUTO: 302 10E3/UL (ref 150–450)
POTASSIUM BLD-SCNC: 3.7 MMOL/L (ref 3.4–5.3)
PROT SERPL-MCNC: 7.6 G/DL (ref 6.8–8.8)
RBC # BLD AUTO: 3.2 10E6/UL (ref 3.8–5.2)
SODIUM SERPL-SCNC: 139 MMOL/L (ref 133–144)
WBC # BLD AUTO: 3.2 10E3/UL (ref 4–11)

## 2021-08-30 PROCEDURE — 97116 GAIT TRAINING THERAPY: CPT | Mod: GP

## 2021-08-30 PROCEDURE — 250N000013 HC RX MED GY IP 250 OP 250 PS 637: Performed by: PODIATRIST

## 2021-08-30 PROCEDURE — 80053 COMPREHEN METABOLIC PANEL: CPT | Performed by: INTERNAL MEDICINE

## 2021-08-30 PROCEDURE — 86140 C-REACTIVE PROTEIN: CPT | Performed by: INTERNAL MEDICINE

## 2021-08-30 PROCEDURE — 97535 SELF CARE MNGMENT TRAINING: CPT | Mod: GO | Performed by: OCCUPATIONAL THERAPIST

## 2021-08-30 PROCEDURE — 120N000001 HC R&B MED SURG/OB

## 2021-08-30 PROCEDURE — 250N000013 HC RX MED GY IP 250 OP 250 PS 637: Performed by: INTERNAL MEDICINE

## 2021-08-30 PROCEDURE — 250N000013 HC RX MED GY IP 250 OP 250 PS 637: Performed by: DENTIST

## 2021-08-30 PROCEDURE — 250N000009 HC RX 250: Performed by: PODIATRIST

## 2021-08-30 PROCEDURE — 250N000013 HC RX MED GY IP 250 OP 250 PS 637: Performed by: HOSPITALIST

## 2021-08-30 PROCEDURE — 82040 ASSAY OF SERUM ALBUMIN: CPT | Performed by: INTERNAL MEDICINE

## 2021-08-30 PROCEDURE — 999N000190 HC STATISTIC VAT ROUNDS

## 2021-08-30 PROCEDURE — 85027 COMPLETE CBC AUTOMATED: CPT | Performed by: INTERNAL MEDICINE

## 2021-08-30 RX ORDER — BUPRENORPHINE 8 MG/1
8 TABLET SUBLINGUAL 3 TIMES DAILY
Status: DISCONTINUED | OUTPATIENT
Start: 2021-08-30 | End: 2021-09-01 | Stop reason: HOSPADM

## 2021-08-30 RX ADMIN — PANTOPRAZOLE SODIUM 40 MG: 40 TABLET, DELAYED RELEASE ORAL at 09:38

## 2021-08-30 RX ADMIN — QUETIAPINE FUMARATE 25 MG: 25 TABLET ORAL at 22:15

## 2021-08-30 RX ADMIN — NAFCILLIN SODIUM 2 G: 2 INJECTION, POWDER, LYOPHILIZED, FOR SOLUTION INTRAMUSCULAR; INTRAVENOUS at 05:42

## 2021-08-30 RX ADMIN — BUPRENORPHINE HYDROCHLORIDE 8 MG: 8 TABLET SUBLINGUAL at 16:07

## 2021-08-30 RX ADMIN — GABAPENTIN 600 MG: 300 CAPSULE ORAL at 22:15

## 2021-08-30 RX ADMIN — NAFCILLIN SODIUM 2 G: 2 INJECTION, POWDER, LYOPHILIZED, FOR SOLUTION INTRAMUSCULAR; INTRAVENOUS at 02:08

## 2021-08-30 RX ADMIN — GABAPENTIN 600 MG: 300 CAPSULE ORAL at 16:07

## 2021-08-30 RX ADMIN — ACETAMINOPHEN 650 MG: 325 TABLET, FILM COATED ORAL at 22:15

## 2021-08-30 RX ADMIN — SERTRALINE HYDROCHLORIDE 50 MG: 50 TABLET ORAL at 09:38

## 2021-08-30 RX ADMIN — NAFCILLIN SODIUM 2 G: 2 INJECTION, POWDER, LYOPHILIZED, FOR SOLUTION INTRAMUSCULAR; INTRAVENOUS at 14:09

## 2021-08-30 RX ADMIN — BUPRENORPHINE HYDROCHLORIDE 8 MG: 8 TABLET SUBLINGUAL at 22:15

## 2021-08-30 RX ADMIN — GABAPENTIN 600 MG: 300 CAPSULE ORAL at 09:37

## 2021-08-30 RX ADMIN — BICTEGRAVIR SODIUM, EMTRICITABINE, AND TENOFOVIR ALAFENAMIDE FUMARATE 1 TABLET: 50; 200; 25 TABLET ORAL at 09:38

## 2021-08-30 RX ADMIN — NAFCILLIN SODIUM 2 G: 2 INJECTION, POWDER, LYOPHILIZED, FOR SOLUTION INTRAMUSCULAR; INTRAVENOUS at 22:17

## 2021-08-30 RX ADMIN — BUPRENORPHINE HYDROCHLORIDE 8 MG: 8 TABLET SUBLINGUAL at 09:38

## 2021-08-30 RX ADMIN — NAFCILLIN SODIUM 2 G: 2 INJECTION, POWDER, LYOPHILIZED, FOR SOLUTION INTRAMUSCULAR; INTRAVENOUS at 17:37

## 2021-08-30 RX ADMIN — BUPRENORPHINE HYDROCHLORIDE 4 MG: 2 TABLET SUBLINGUAL at 14:09

## 2021-08-30 RX ADMIN — NAFCILLIN SODIUM 2 G: 2 INJECTION, POWDER, LYOPHILIZED, FOR SOLUTION INTRAMUSCULAR; INTRAVENOUS at 09:49

## 2021-08-30 ASSESSMENT — ACTIVITIES OF DAILY LIVING (ADL)
ADLS_ACUITY_SCORE: 15
ADLS_ACUITY_SCORE: 16
ADLS_ACUITY_SCORE: 15
ADLS_ACUITY_SCORE: 14
ADLS_ACUITY_SCORE: 16
ADLS_ACUITY_SCORE: 16

## 2021-08-30 NOTE — PLAN OF CARE
DATE & TIME: 08/29/2021 3 p to 11 p   Cognitive Concerns/ Orientation : A&Ox4  BEHAVIOR & AGGRESSION TOOL COLOR: Flat, calm and cooperative  ABNL VS/O2: VSS on RA, soft Bps at times  MOBILITY: Independent  in room with cane   PAIN MANAGMENT: [place] this shift, scheduled   DIET: Mechanical soft/dental diet   BOWEL/BLADDER: Cont B/B  ABNL LAB/BG: N/A  DRAIN/DEVICES: R PICC SL with int abx   TELEMETRY RHYTHM: N/A  SKIN: Bilateral great toe wounds, scabs on toe, L foot dressing CDI, R foot open to air   TESTS/PROCEDURES: N/A  D/C DAY/GOALS/PLACE: once Antibiotics treatment is completed, to home of family  OTHER IMPORTANT INFO: Contact precautions for ESBL, Neurosurgery, PT/OT/WOC following

## 2021-08-30 NOTE — PROGRESS NOTES
[facility]  Hospitalist Progress Note for 8/22/2021       Assessment and Plan:   41 year old female with h/o untreated HIV, hepatitis C who was admitted on 7/1/2021 for MSSA mitral valve endocarditis resulting in septic shock complicated by embolic left parietal lobe infarctions and multiple peripheral emboli, and initially with encephalopathy, and shock.  She was intubated in ED on 7/1/2021 subsequently extubated 7/11/2021.  Blood cultures turned positive on hospital day 1 with MSSA.  Urine culture was positive for Klebsiella.  She was treated initially with ceftriaxone and vancomycin, and has since been narrowed to nafcillin per ID.     MSSA mitral valve endocarditis  Septic shock, resolved  Probable myopericarditis  Small pericardial effusion  *JAE 7/5 noted with small ASD, mod to large mobile vegetation (14mm long, 6mm wide) of the P2 segment of the mitral leaflet. No valve perforation; mod MR  *blood cultures on admission with MSSA; blood cultures have been negative since 7/6.   *ID following; antibiotic has been narrowed down to Nafcillin,duration extended due  To abscess on MRI  *limited ECHO repeated 7/15 to reassess pericardial fluid 7/15 per cardiology and noted:  organized effusion posteriorly, measuring 1.2-1.5cm, no evidence of tamponade; small mobile vegetation noted attached to the mitral leaflet, mod to severe MR; compared to 7/8/21, effusion size is probably stable, Degree of MR is worse, Vegetation is smaller.  Cardiology signed off 7/15.   *PICC in place  -- JAE (no vegetations) and Cor Angio (nomral coronaries) completed 08/12  - per cardiothoracic surgery reconsult 8/14 felt she no longer has an urgent surgical indication,given absence of vegetations and only moderate MR on ECHO 8/12/2021.She may require mitral valve repair/replacement in the future if she has progressive MR, but this would be better addressed after she has undergone treatment for substance abuse and has  demonstrated sobriety in the out-patient setting. She should follow up with cardiology for medical management and surveillance of MR and ASD, and can be referred back to CV surgery as needed.   - Antibiotic plan per ID     Ischemic CVA, embolic stroke due to infective endocarditis  *CT head on admission notable for new acute to subacute PCA territory ischemic stroke  *MRI 7/3 noted with progressive, multifocal, acute infarcts without MR evidence for hemorrhage or midline shift  *Patient does have small left-to-right shunt on JAE, but most likely from embolic spread from endocarditis  *was evaluated by neurology, to continue antibiotics; per neuro to avoid anticoagulation (except DVT prophylaxis dose)     Abnormal MRI  Possible brain abscess  * MRI 8/10/2021 showed- Multiple enhancing subacute infarcts with evidence of small volume hemorrhage, largest at the junction of the left parietal and occipital lobes which demonstrates peripheral enhancement and worsening vasogenic edema. Developing abscess(es) related to septic emboli cannot be excluded  - Neurology was reconsulted 8/14, recommended to screen for mycotic aneurysm   * CTA head 8/14 -showed Complex peripherally enhancing fluid collection in the paramedian left parietal lobepatent arteries of head & neck. No evidence for dissection.  and CT head today & neurosurgery consult  * Ct head w/o contrast 8/14 showed no significant change in the complex peripherally enhancing fluid collection in the paramedian left parietal lobe with surrounding vasogenic edema. Atypical for bland evolving subacute ischemic infarct. Less likely possibility for evolving subacute hematoma, consider tissue sampling.Mild local mass effect in the left parieto-occipital region with minimal narrowing of the occipital horn of the left lateral ventricle.No midline shift/herniation  - pt remains asymptomatic  - neurosurgery consulted on 8/14th appreciated. As the pt has no neuro symptoms they   recommended repeating the brain MRI on 8/17  * MRI head on 8/17 reported as no definite significant interval change when compared to MRI dated 8/10/2021.  - NSG following & recommended no surgical intervention at this time   - Repeat MRI brain  -8/24/21  reviewed and shows persistent brain abscess.  Neurosurgery recommending continuing IV abx and repeating MRI in another 2 wks time   - Activity as tolerated, continue with therapies  - Continue supportive and symptomatic treatment      Infected Tooth  S/p removal of all remaining teeth on 8/13  Assessment: OMFS was consulted for multiple infected tooth  - S/p Removal of all remaining teeth including 2, 3, 4, 5, 6, 7, 8, 9, 10, 11, 12, 13, 19, 20, 21, 22, 23, 24, 25, 26, 28, 29, 30  - on full liquid diet - advanced to mechanical /dental soft diet on 8/15     Osteomyelitis left 3rd toe s/p amputation 8/7  Necrotic wounds to Lt great toes, Lt 3rd toe and Lt lateral Heel, Rt great toe: 2/2 thrombi.  -- Podiatry took to OR 8/7 for amputation.    -- continue wound cares for other digits     Normocytic anemia, likey 2/2 chronic disease  Chronic anemia  Baseline hemoglobin approximately 7.  Likely related to chronic disease, untreated HIV.  - B12 and folate -> wnl  - consider transfusion for Hgb < 7 (or close)  - low nl ferritin- on po ferrous gluconate     Intermittent low WBC: Could be related to her HIV.  Need to consider medication induced as well  Continue to monitor closely    Left knee pain 2/2 OA: pt reports history of pain in that knee with activity but pain now is more acute. ROM okay. There is some warmth and swelling there.   - In light of the MSSA bacteremia, got XR and called ortho for effusion  - Per ortho no indication for intervention, though they did order a brace. Appreciate assistance. There are following EOD.  - PT/OT     IV drug use  Polysubstance use disorder  Opiate use disorder  *Chem dep consulted 7/15 but chemical dependency did not feel she is  appropriate for CD assessment at this time and recommended consultation towards the end of her antibiotic course.  Reconsult CD once she is close to finishing her IV antibiotic.  *Was using IV dilaudid as well as Percocet p.o. earlier this stay pretty extensively.  Attempts made to wean off IV narcotics and taper down po narcotics though patient felt she was going through withdrawal. Psych consulted and recommended suboxone though patient declined. Psych recommended rapid taper of methadone instead.   *completed methadone taper of 30 mg on 7/21, 20 mg 7/22 and then 10 mg 7/23, then stop  -started suboxone on 7/27 - appreciate psych/addiction medicine assistance who will help with titration    -Plan was to reconsult CD (8/13th)once she is close to finishing her IV antibiotic, but patient is not interested in treatment. She has been through inpatient and outpatient programs and not found them helpful. Also I'm not sure she could continue suboxone in treatment and I think that is probably her best chance for sobriety.  -FYI: She has a daughter who just had twins and lives with her in-laws. Daughter and daughter's mother in law live in [place] (? - pt going to confirm) and have invited patient to live there to help with babies. She thinks this is a good idea as she'll be kept busy and she gets along well with this family. She is hoping to continue follow up with addiction medicine to continue suboxone. She feels that with suboxone and family support she will do well. Will need to have addiction med follow up with her to prescribe suboxone on discharge and help her set up outpatient follow up.  -08/12 -> Pain service recommended   1. Change Suboxone to  4 mg qid for better analgesic effect.     2. discontinue oxycodone   3. to use acetaminophen for additional analgesic effect if needed.- can give as much as 1000 mg tid   (don't exceed 3000 mg total per day).       8/25: Suboxone adjusted by Addiction  team    HIV, untreated PTA  - CD4 count greater than 500, viral load 3695  - Started Biktarvy 7/8; ID following  - Will need outpt follow up     Chronic back pain: pt notes it present ever since she was pregnant. Improved on current medications  - on increased gabapentin to 600 mg TID, prn tylenol     Toxic/septic/metabolic encephalopathy, improved  Likely encephalopathy due to sepsis, drug use, CVA, prolonged ICU stay; had been pulling lines due to alterned mentation thought this all now appears resolved.      Pyelonephritis  Urine cultures positive for Klebsiella.  Completed course of treatment with ceftriaxone.  repeat urine cultures 7/10 with ESBL E coli but given unimpressive UA and no UTI symptoms ID suggest holding off on abx for ESBL UTI  -monitor for any symptoms     Acute hypoxemic respiratory failure, resolved  Intubated 7/1/2021.  Extubated 7/11/2021.  Likely related to septic shock from MSSA mitral valve endocarditis     FORD, resolved suspected due to sepsis  Hypokalemia  - on potassium replacement protocol  - will check BMP periodically     Chronic anemia  Baseline hemoglobin approximately 7.  Likely related to chronic disease, untreated HIV.  -Monitor hemoglobin periodically; stable around 8  - low nl ferritin- start po ferrous gluconate     Non severe malnutrition  -- Supplements      Prior hepatitis C infection  Antibody positive but RNA negative  -Noted     Restless legs: unclear if this is true restless leg syndrome or due to being uncomfortable  -continue mirapex prn     Major depressive disorder: pt is not suicidal but also doesn't seem to have much of a will to live based upon discussion 7/25.  I wonder if her underlying chronic pain which is difficult to treat as well as being in the hospital is contributing. Was seen by psych and declined any medication treatment on 7/26.  patient was on zoloft for years starting at age 13. She is willing to try zoloft again.   -Started zoloft 8/1 at 25mg po  "and titrating up to 50mg. Pt feels this is helping. Psychiatry followed up and agreed with this medication.    Code Status: Full Code       Disposition Plan   Expected discharge: 09/01 if Brain abscess resolves.  recommended to unclear once antibiotic plan established.    She is homeless at baseline but has a plan post discharge to live with daughter and daughter's in laws. This is in the [facility] and will allow for follow up cares.  SW following    Ashok Triplett MD, FACP   Internal Medicine/ Hospitalist (Pager- [phone])                Interval History:   no new complaints    Denies Chest pain/ SOB/ cough, Denies any N&V/ bowel problems  Denies urinary problems , Denies fever/chills/rigors                 Medications:       bictegravir-emtricitabine-tenofovir  1 tablet Oral Daily     buprenorphine  4 mg Sublingual Daily     buprenorphine  8 mg Sublingual BID     gabapentin  600 mg Oral TID     nafcillin  2 g Intravenous Q4H     pantoprazole  40 mg Oral QAM AC     polyethylene glycol  17 g Oral Daily     senna-docusate  1 tablet Oral BID     sertraline  50 mg Oral Daily     sodium chloride (PF)  10 mL Intracatheter Q8H     sodium chloride (PF)  10-40 mL Intracatheter Q7 Days     acetaminophen, albuterol, bisacodyl, glucose **OR** dextrose **OR** glucagon, hydrOXYzine, lidocaine 4%, lidocaine (buffered or not buffered), magnesium hydroxide, naloxone **OR** naloxone **OR** naloxone **OR** naloxone, ondansetron **OR** ondansetron, prochlorperazine **OR** prochlorperazine, QUEtiapine, sodium chloride (PF), sodium chloride (PF), sodium chloride (PF)               Physical Exam:   Blood pressure 110/52, pulse 81, temperature 98  F (36.7  C), temperature source Axillary, resp. rate 18, height 1.727 m (5' 7.99\"), weight 82.8 kg (182 lb 9.6 oz), SpO2 98 %, unknown if currently breastfeeding.  Wt Readings from Last 4 Encounters:   08/20/21 82.8 kg (182 lb 9.6 oz)   11/05/07 103.1 kg (227 lb 4 oz)   09/26/07 99.3 kg (219 " "lb)     /52 (BP Location: Right arm)   Pulse 81   Temp 98  F (36.7  C) (Axillary)   Resp 18   Ht 1.727 m (5' 7.99\")   Wt 82.8 kg (182 lb 9.6 oz)   SpO2 98%   Breastfeeding Unknown   BMI 27.77 kg/m    Gen- pleasant lying in bed  HEENT- NAD, BO  Neck- supple, no JVD elevation  CVS- I+II+ no m/r/g  RS- CTAB  Abdo- soft, no tenderness . No g/r/r  Ext- no edema   Left foot covered with dressing.         Data:   All laboratory data reviewed  BMP  Recent Labs   Lab 08/30/21  0948 08/25/21  1010 08/24/21  0831     --  138   POTASSIUM 3.7  --  3.7   CHLORIDE 109  --  108   DEREK 8.6  --  8.6   CO2 28  --  28   BUN 14  --  12   CR 0.62  --  0.61   GLC 91 105* 92     CBC  Recent Labs   Lab 08/30/21  0948 08/25/21  0003 08/24/21  0831   WBC 3.2* 4.0 3.4*   RBC 3.20* 3.60* 3.36*   HGB 8.6* 9.7* 9.0*   HCT 27.6* 31.1* 29.2*   MCV 86 86 87   MCH 26.9 26.9 26.8   MCHC 31.2* 31.2* 30.8*   RDW 23.9* 24.8* 25.1*    418 414     INRNo lab results found in last 7 days.  LFTs  Recent Labs   Lab 08/30/21  0948 08/24/21  0831   ALKPHOS 78 87   AST 18 19   ALT 17 15   BILITOTAL 0.6 0.6   PROTTOTAL 7.6 7.9   ALBUMIN 2.2* 2.0*   MRI BRAIN WITHOUT AND WITH CONTRAST  8/24/2021 3:52 PM     IMPRESSION:   1. Over serial exams, there has been slight decrease in size of the   previously demonstrated peripheral enhancing centrally diffusion   restricting parenchymal lesion in the left paramedian   parieto-occipital region, as described. This continues to be worrisome   for possible cerebral abscess. Unchanged surrounding vasogenic edema   and mild local mass effect. No midline shift/herniation.   2. Continued expected evolution of the scattered subacute-to-chronic   infarcts, as described. No acute infarct, new intracranial hemorrhage,   or other definite acute process.     "

## 2021-08-30 NOTE — PLAN OF CARE
DATE & TIME: 8/29/21 4232-8353    Cognitive Concerns/ Orientation : Pt A/Ox4   BEHAVIOR & AGGRESSION TOOL COLOR: Green   ABNL VS/O2: VSS on RA  MOBILITY: Independent with cane  PAIN MANAGMENT: Denies  DIET: Mechanical soft  BOWEL/BLADDER: Continent  DRAIN/DEVICES: R PICC  SKIN: Bilateral toe wounds. L foot dressing, CDI.  D/C DATE: Discharge pending progress  OTHER IMPORTANT INFO: Contact precautions. Neurosurgery following.

## 2021-08-30 NOTE — PLAN OF CARE
Occupational Therapy Discharge Summary    Reason for therapy discharge:    All goals and outcomes met, no further needs identified.    Progress towards therapy goal(s). See goals on Care Plan in [facility] electronic health record for goal details.  Goals met    Therapy recommendation(s):    Continued therapy is recommended.  Rationale/Recommendations:  Home OT to address higher level cognitive skills.

## 2021-08-30 NOTE — PLAN OF CARE
Physical Therapy Discharge Summary    Reason for therapy discharge:    All goals and outcomes met, no further needs identified.    Progress towards therapy goal(s). See goals on Care Plan in [facility] electronic health record for goal details.  Goals met    Therapy recommendation(s):    Continued therapy is recommended.  Rationale/Recommendations:  home vs OPPT for higher level balance and rehab for knee pain.

## 2021-08-31 PROCEDURE — 99232 SBSQ HOSP IP/OBS MODERATE 35: CPT | Mod: 95 | Performed by: INTERNAL MEDICINE

## 2021-08-31 PROCEDURE — 250N000013 HC RX MED GY IP 250 OP 250 PS 637: Performed by: PODIATRIST

## 2021-08-31 PROCEDURE — 120N000001 HC R&B MED SURG/OB

## 2021-08-31 PROCEDURE — 250N000009 HC RX 250: Performed by: PODIATRIST

## 2021-08-31 PROCEDURE — 250N000013 HC RX MED GY IP 250 OP 250 PS 637: Performed by: HOSPITALIST

## 2021-08-31 PROCEDURE — 99232 SBSQ HOSP IP/OBS MODERATE 35: CPT | Performed by: INTERNAL MEDICINE

## 2021-08-31 PROCEDURE — 99207 PR CDG-MDM COMPONENT: MEETS MODERATE - DOWN CODED: CPT | Performed by: INTERNAL MEDICINE

## 2021-08-31 PROCEDURE — 250N000013 HC RX MED GY IP 250 OP 250 PS 637: Performed by: INTERNAL MEDICINE

## 2021-08-31 PROCEDURE — 999N000190 HC STATISTIC VAT ROUNDS

## 2021-08-31 RX ADMIN — NAFCILLIN SODIUM 2 G: 2 INJECTION, POWDER, LYOPHILIZED, FOR SOLUTION INTRAMUSCULAR; INTRAVENOUS at 22:02

## 2021-08-31 RX ADMIN — SERTRALINE HYDROCHLORIDE 50 MG: 50 TABLET ORAL at 08:56

## 2021-08-31 RX ADMIN — BUPRENORPHINE HYDROCHLORIDE 8 MG: 8 TABLET SUBLINGUAL at 15:58

## 2021-08-31 RX ADMIN — PANTOPRAZOLE SODIUM 40 MG: 40 TABLET, DELAYED RELEASE ORAL at 08:56

## 2021-08-31 RX ADMIN — NAFCILLIN SODIUM 2 G: 2 INJECTION, POWDER, LYOPHILIZED, FOR SOLUTION INTRAMUSCULAR; INTRAVENOUS at 06:49

## 2021-08-31 RX ADMIN — QUETIAPINE FUMARATE 25 MG: 25 TABLET ORAL at 22:01

## 2021-08-31 RX ADMIN — GABAPENTIN 600 MG: 300 CAPSULE ORAL at 22:01

## 2021-08-31 RX ADMIN — NAFCILLIN SODIUM 2 G: 2 INJECTION, POWDER, LYOPHILIZED, FOR SOLUTION INTRAMUSCULAR; INTRAVENOUS at 18:28

## 2021-08-31 RX ADMIN — NAFCILLIN SODIUM 2 G: 2 INJECTION, POWDER, LYOPHILIZED, FOR SOLUTION INTRAMUSCULAR; INTRAVENOUS at 10:02

## 2021-08-31 RX ADMIN — BUPRENORPHINE HYDROCHLORIDE 8 MG: 8 TABLET SUBLINGUAL at 22:02

## 2021-08-31 RX ADMIN — NAFCILLIN SODIUM 2 G: 2 INJECTION, POWDER, LYOPHILIZED, FOR SOLUTION INTRAMUSCULAR; INTRAVENOUS at 14:07

## 2021-08-31 RX ADMIN — NAFCILLIN SODIUM 2 G: 2 INJECTION, POWDER, LYOPHILIZED, FOR SOLUTION INTRAMUSCULAR; INTRAVENOUS at 02:40

## 2021-08-31 RX ADMIN — GABAPENTIN 600 MG: 300 CAPSULE ORAL at 15:58

## 2021-08-31 RX ADMIN — GABAPENTIN 600 MG: 300 CAPSULE ORAL at 08:56

## 2021-08-31 RX ADMIN — BUPRENORPHINE HYDROCHLORIDE 8 MG: 8 TABLET SUBLINGUAL at 08:57

## 2021-08-31 RX ADMIN — BICTEGRAVIR SODIUM, EMTRICITABINE, AND TENOFOVIR ALAFENAMIDE FUMARATE 1 TABLET: 50; 200; 25 TABLET ORAL at 08:56

## 2021-08-31 ASSESSMENT — ACTIVITIES OF DAILY LIVING (ADL)
ADLS_ACUITY_SCORE: 16

## 2021-08-31 NOTE — PLAN OF CARE
DATE & TIME: 8/30/21 0170-2667  Cognitive Concerns/ Orientation : A&O x4   BEHAVIOR & AGGRESSION TOOL COLOR: Green      ABNL VS/O2: VSS on RA ex soft BPs  MOBILITY: Independent with cane. Surgical shoe to wear on left, uses leg brace with long walks.   PAIN MANAGMENT: PRN Tylenol given x1 for foot pain.   DIET: [facility] soft  BOWEL/BLADDER: Continent B/B, up to BR  ABNL LAB/BG: na  DRAIN/DEVICES: PICC RUE for Nafcillin q4h  TELEMETRY RHYTHM: na  SKIN: Bilateral foot wounds, L toe dressing CDI, R open to air.  TESTS/PROCEDURES: Brain MRI 8/24/21, neuro said looks stable and will do a repeat scan in 2 weeks.   D/C DATE: 9/1 if brain abscess resolved and finished with abx.   Discharge Barriers: IV abx through 9/1  OTHER IMPORTANT INFO: Neurosurgery following, PT/OT/WOC. Contact precautions maintained.

## 2021-08-31 NOTE — PLAN OF CARE
DATE & TIME: 8/30/21, 6130 - 2451    Cognitive Concerns/ Orientation : A&O x 4   BEHAVIOR & AGGRESSION TOOL COLOR: Green  CIWA SCORE: NA   ABNL VS/O2: VSS on room air  MOBILITY: Independent in room with cane  PAIN MANAGMENT: Denied  DIET: Mechanical/Dental soft  BOWEL/BLADDER: Continent  ABNL LAB/BG: NA  DRAIN/DEVICES: Right PICC SL  TELEMETRY RHYTHM: NA  SKIN: Dressing to left toe CDI, right open to air  TESTS/PROCEDURES: NA  D/C DATE:Possible on 9/1/21 if brain abscess resolve and finish with antibiotic.   OTHER IMPORTANT INFO: Neurosurgery, PT/OT/WOC following. Contact precautions maintained

## 2021-08-31 NOTE — PROGRESS NOTES
Addiction Medicine Follow Up    Tele-Visit Details    Type of service:  Video Visit    Time Service Began (time 1st connected with pt): 1400    Time Service Ended (time completely finished with pt): 1418    Originating Location (pt. Location): Patient Rm, [facility]    Distant Location (provider location): [facility]    Reason for Televisit: COVID 19    Mode of Communication:  Video Conference via [employer]    Physician has received verbal consent for a video visit from the patient? Yes        Principal Problem:    MSSA Endocarditis with Mitral Valve Vegetations   Active Problems:    Anemia    Acute pyelonephritis -- Klebsiella    Altered mental status, unspecified altered mental status type    Embolic CVA Left Parietal  (septic emboli) -- 7/1/21    Septic encephalopathy    Human immunodeficiency virus (HIV) disease (H)    Homeless    Septic shock (H)    Polysubstance abuse (H)    Osteomyelitis of third toe of left foot (H)    Ischemic ulcer of toe of left foot with necrosis of bone (H)    Endocarditis of mitral valve      Subjective  Chief complaint: still some cravings    HPI: Patient has been on buprenorphine 8 - 4- 8 mg for the last 5 days and tolerating without oversedation.   Does report some constipation.   Still notes some cravings/ dreams of using,  But no withdrawal symptoms.       She has been on Nafcillin for endocarditis, septic emboli and possible brain abscess.   This is scheduled to be completed on 9/1, but patient is not sure whether it will get extended.   Has moderate mitral valve regurgitation.     ROS:   Resp:  No SOB   CV:  No chest pain   GI:  No nausea or vomiting    Psych:  less depressed on sertraline    .  Medication List Choices:   Current Facility-Administered Medications   Medication     acetaminophen (TYLENOL) tablet 650 mg     albuterol (PROVENTIL) neb solution 2.5 mg     bictegravir-emtricitabine-tenofovir (BIKTARVY) -25 MG per tablet  "1 tablet     bisacodyl (DULCOLAX) Suppository 10 mg     buprenorphine (SUBUTEX) sublingual tablet 8 mg     glucose gel 15-30 g    Or     dextrose 50 % injection 25-50 mL    Or     glucagon injection 1 mg     gabapentin (NEURONTIN) capsule 600 mg     hydrOXYzine (ATARAX) tablet 25 mg     lidocaine (LMX4) cream     lidocaine 1 % 0.1-1 mL     magnesium hydroxide (MILK OF MAGNESIA) suspension 30 mL     nafcillin IV 2 g vial to attach to  ml bag     naloxone (NARCAN) injection 0.2 mg    Or     naloxone (NARCAN) injection 0.4 mg    Or     naloxone (NARCAN) injection 0.2 mg    Or     naloxone (NARCAN) injection 0.4 mg     ondansetron (ZOFRAN-ODT) ODT tab 4 mg    Or     ondansetron (ZOFRAN) injection 4 mg     pantoprazole (PROTONIX) EC tablet 40 mg     polyethylene glycol (MIRALAX) Packet 17 g     prochlorperazine (COMPAZINE) injection 10 mg    Or     prochlorperazine (COMPAZINE) tablet 10 mg     QUEtiapine (SEROquel) tablet 25 mg     senna-docusate (SENOKOT-S/PERICOLACE) 8.6-50 MG per tablet 1 tablet     sertraline (ZOLOFT) tablet 50 mg     sodium chloride (PF) 0.9% PF flush 10 mL     sodium chloride (PF) 0.9% PF flush 10-20 mL     sodium chloride (PF) 0.9% PF flush 10-40 mL     sodium chloride (PF) 0.9% PF flush 10-40 mL     sodium chloride (PF) 0.9% PF flush 3 mL       Objective    BP 93/53 (BP Location: Left arm)   Pulse 64   Temp 97.9  F (36.6  C) (Oral)   Resp 16   Ht 1.727 m (5' 7.99\")   Wt 82.8 kg (182 lb 9.6 oz)   SpO2 98%   Breastfeeding Unknown   BMI 27.77 kg/m      Physical Exam per hospitalist:     Neuro:   Skin:   Psych:     Cooperative     Mood:  Euthymic               Affect:  Congruent               Thought content:  No SI, HI or hallucinations               Thought processes:  Linear                Speech:  Normal                Motor:  Normal                Insight/judgement:  good    Results  reviewed    Assessment and Plan:  1.  Opiate Use Disorder, severe - last use on 7/1/21.   On " Suboxone 20 mg per day, but still with some cravings.  Will increase to 8 mg tid and plan to discontinue on that dose.   Patient reports she can go to live with her mother-in -law in [place] and has agreed to attend [facility].  She will go to [facility] clinic for her Suboxone followup.    Discussed with Care Manager and she is arranging these appointments.  I will write discharge script for the buprenorphine, as soon as I know when her appt. Is att [facility].     2.  Methamphetamine Use Disorder -  CD treatment planned as OP.      3.  S/p Mitral valve Endocarditis, with septic emboli to brain, lungs, and peripheral digits.   Has received 8 wks of IV antibiotics,Patient will call when needed need monitoring of the mitral valve regurgitation and may need valve replacement in the future.      Awa Noriega MD  Addiction Medicine Service  [facility]   Page me (click here for Milagros Noriega)

## 2021-08-31 NOTE — PROGRESS NOTES
[facility]    Infectious Disease Progress Note    Date of Service : 08/31/2021     Assessment :  1. S.aureus MSSA native mitral valve endocarditis in the setting of IVDA complicated by embolic L parietal lobe infarction,  CNS emboli and abscesses and multiple peripheral emboli. Treated with prolonged 8 week IV antibiotic therapy with Nafcillin  (7/1 JAE showed a large mobile 1.4 cm vegetation on the posterior mitral valve leaflet  , no valvular abscess and no additional valvular involvement. ). Repeat JAE on 8/12 showed resolution of mitral valve vegetation.  2. Untreated HIV diagnosed 2018, CD4 count maintained at >500, VL 3,695cpies/ml. Genotype 7/7 without major resistance mutations  3. Hepatitis C Ab+ but RNA -.   4. 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 Multiple peripheral emboli.   6. FORD related to sepsis- resolved  7. Embolic stroke (small ASD with Left to right shunt)  8. Klebsiella pneumoniae UTI treated. ESBL E.coli urinary colonization  9. Severe anemia multifactorial. Has baseline anemia, exacerbated by chronic disease  10. Polysubstance abuse  11. Splenomegaly     Recommendations:  1. Nafcillin until 9/1 to complete 8 weeks of IV antibiotic therapy  2. Will need ongoing treatment for HIV with Biktarvy   3. Transition to oral Augmentin 875 mg PO BID at discharge, please give prescription for 4 weeks at discharge  4. Will need MRI brain W/ WO contrast in 4 weeks to FUP on brain abscesses and antibiotic duration will need to be re-addressed at that time based on MRI findings  5. Will need HIV follow up after discharge    Given patient's social situation with homelessness and lack of insurance unclear where she will follow for HIV. [place] [facility] schedule FUP with me in 4 weeks with MRI for brain abscess FUP     Marisol Matos MD    Interval History   No new complaints, resting comfortably, looking forward to discharge soon    Antimicrobial therapy  7/2 Nafcillin  7/8  Nathanielktarvy  Prior  7/4-7/7 Cipro  7/1-7/2 Vancomycin, zosyn    Physical Exam   Temp: 97.9  F (36.6  C) Temp src: Oral BP: 93/53 Pulse: 64   Resp: 16 SpO2: 98 % O2 Device: None (Room air)    Vitals:    07/14/21 0445 07/16/21 0615 08/20/21 0100   Weight: 84.2 kg (185 lb 10 oz) 76.8 kg (169 lb 5 oz) 82.8 kg (182 lb 9.6 oz)     Vital Signs with Ranges  Temp:  [97.9  F (36.6  C)-98.1  F (36.7  C)] 97.9  F (36.6  C)  Pulse:  [64-66] 64  Resp:  [16-18] 16  BP: ()/(51-60) 93/53  SpO2:  [98 %-100 %] 98 %    GENERAL APPEARANCE: awake, alert   EYES: conjunctivae and sclerae normal  NECK: no adenopathy  RESP: lungs clear to auscultation - no rales, rhonchi or wheezes  CV: regular rates and rhythm, murmur  ABDOMEN: soft, umbilical hernia  MS: Left 3rd toe amputation  SKIN:no rash    Other:    Medications       bictegravir-emtricitabine-tenofovir  1 tablet Oral Daily     buprenorphine  8 mg Sublingual TID     gabapentin  600 mg Oral TID     nafcillin  2 g Intravenous Q4H     pantoprazole  40 mg Oral QAM AC     polyethylene glycol  17 g Oral Daily     senna-docusate  1 tablet Oral BID     sertraline  50 mg Oral Daily     sodium chloride (PF)  10 mL Intracatheter Q8H     sodium chloride (PF)  10-40 mL Intracatheter Q7 Days       Data   All microbiology laboratory data reviewed.  Recent Labs   Lab Test 08/30/21  0948 08/25/21  0003 08/24/21  0831   WBC 3.2* 4.0 3.4*   HGB 8.6* 9.7* 9.0*   HCT 27.6* 31.1* 29.2*   MCV 86 86 87    418 414     Recent Labs   Lab Test 08/30/21  0948 08/24/21  0831 08/17/21  0607   CR 0.62 0.61 0.62     No lab results found.  Recent Labs   Lab Test 07/10/21  2243 07/09/21  0454 07/09/21  0415 07/08/21  1417 07/08/21  0500 07/07/21  0450 07/06/21  0500 07/05/21  1232 07/05/21  1104   CULT >100,000 colonies/mL  Escherichia coli ESBL  ESBL (extended beta lactamase) producing organisms require contact precautions.  *  Critical Value/Significant Value called to and read back by  Luanne Bettencourt RN @  2200 7/13/21 TM.   No growth No growth No growth No growth No growth  No growth No growth  No growth Cultured on the 2nd day of incubation:  Staphylococcus epidermidis  *  Critical Value/Significant Value, preliminary result only, called to and read back by  IVETT WARE RN 07/06/21 1258 EH.    Cultured on the 5th day of incubation:  Staphylococcus aureus  Susceptibility testing done on previous specimen  *  Critical Value/Significant Value called to and read back by  Cherelle Smith RN 1043 7/11/21 AM   Cultured on the 2nd day of incubation:  Staphylococcus aureus  Susceptibility testing done on previous specimen  *  Critical Value/Significant Value, preliminary result only, called to and read back by  Sin Jack RN at 0448 7/7/21 hg       Imaging  8/24 MRI brain W/WO contrast  MRI BRAIN WITHOUT AND WITH CONTRAST  8/24/2021 3:52 PM      HISTORY:  Brain mass, cerebral abscess, follow-up.      TECHNIQUE:  Multiplanar, multisequence MRI of the brain without and  with 8 mL Gadavist.      COMPARISON: MRI of the brain 8/17/2021.      FINDINGS: Over serial examinations, there has been slight decrease in  size of the previously demonstrated peripherally enhancing,  essentially heterogeneously T2 hyperintense, diffusion restricting  lesion centered in the paramedial left parietooccipital region. This  lesion (including the peripherally enhancing and centrally  nonenhancing components) now measures 3.0 x 2.8 x 2.8 cm compared to  3.3 x 3.0 x 2.9 cm on MRI brain exam of 8/17/2021, and 3.5 x 3.1 x 3.1  cm on MRI brain exam of 8/10/2021. The vasogenic edema in the left  parieto-occipital region has not significantly changed from most  recent MRI. As before, thin rim of susceptibility related signal loss  along the periphery of the lesion.     Unchanged punctate scattered presumed cerebral microhemorrhages, most  pronounced in the left superior parietal region. Evolving scattered  subacute-to-chronic small  cortical/subcortical infarcts with  associated cortical laminar necrosis in the bilateral cerebral  hemispheres manifesting as foci of irregular enhancement (for example  see series 13 image 23) do not appear significantly changed. Unchanged  zone of subacute infarction in the left inferolateral occipital lobe.  Punctate subacute to chronic appearing infarcts in the cerebellar  hemispheres, better seen/characterized on previous exams.     The ventricles are normal in size and configuration. Unchanged mild  local mass effect in the left parieto-occipital region. No midline  shift/herniation. No acute infarct or intracranial hemorrhage.  Unchanged probable small arachnoid cyst in the anterior aspect of the  left middle cranial fossa.     Orbits appear normal. Mild scattered paranasal sinus mucosal  thickening. No other significant change.                                                                      IMPRESSION:  1. Over serial exams, there has been slight decrease in size of the  previously demonstrated peripheral enhancing centrally diffusion  restricting parenchymal lesion in the left paramedian  parieto-occipital region, as described. This continues to be worrisome  for possible cerebral abscess. Unchanged surrounding vasogenic edema  and mild local mass effect. No midline shift/herniation.  2. Continued expected evolution of the scattered subacute-to-chronic  infarcts, as described. No acute infarct, new intracranial hemorrhage,  or other definite acute process.      8/12 JAE  Interpretation Summary     The visual ejection fraction is 60-65%.  The right ventricle is moderately dilated.  The right ventricular systolic function is mild to moderately reduced.  The left atrial appendage was well visualized and free of thrombus.  The mitral valve leaflets are mildly thickened.  There is no vegetation seen on the mitral valve.  There is moderate (2+) mitral regurgitation

## 2021-08-31 NOTE — PROGRESS NOTES
[facility]  Hospitalist Progress Note for 8/22/2021       Assessment and Plan:   41 year old female with h/o untreated HIV, hepatitis C who was admitted on 7/1/2021 for MSSA mitral valve endocarditis resulting in septic shock complicated by embolic left parietal lobe infarctions and multiple peripheral emboli, and initially with encephalopathy, and shock.  She was intubated in ED on 7/1/2021 subsequently extubated 7/11/2021.  Blood cultures turned positive on hospital day 1 with MSSA.  Urine culture was positive for Klebsiella.  She was treated initially with ceftriaxone and vancomycin, and has since been narrowed to nafcillin per ID.     MSSA mitral valve endocarditis  Septic shock, resolved  Probable myopericarditis  Small pericardial effusion  *JAE 7/5 noted with small ASD, mod to large mobile vegetation (14mm long, 6mm wide) of the P2 segment of the mitral leaflet. No valve perforation; mod MR  *blood cultures on admission with MSSA; blood cultures have been negative since 7/6.   *ID following; antibiotic has been narrowed down to Nafcillin,duration extended due  To abscess on MRI  *limited ECHO repeated 7/15 to reassess pericardial fluid 7/15 per cardiology and noted:  organized effusion posteriorly, measuring 1.2-1.5cm, no evidence of tamponade; small mobile vegetation noted attached to the mitral leaflet, mod to severe MR; compared to 7/8/21, effusion size is probably stable, Degree of MR is worse, Vegetation is smaller.  Cardiology signed off 7/15.   *PICC in place  -- JAE (no vegetations) and Cor Angio (nomral coronaries) completed 08/12  - per cardiothoracic surgery reconsult 8/14 felt she no longer has an urgent surgical indication,given absence of vegetations and only moderate MR on ECHO 8/12/2021.She may require mitral valve repair/replacement in the future if she has progressive MR, but this would be better addressed after she has undergone treatment for substance abuse and has  "demonstrated sobriety in the out-patient setting. She should follow up with cardiology for medical management and surveillance of MR and ASD, and can be referred back to CV surgery as needed.   - Antibiotic plan per ID. ? \"Nafcillin until 9/1 to complete 8 weeks of IV antibiotic therapy  2. Will need ongoing treatment for HIV with Biktarvy   3. Transition to oral Augmentin 875 mg PO BID at discharge, please give prescription for 4 weeks at discharge  4. Will need MRI brain W/ WO contrast in 4 weeks to FUP on brain abscesses and antibiotic duration will need to be re-addressed at that time based on MRI findings\"     -We will request follow-up by neurosurgery today to confirm final plan to coordinate disposition     Ischemic CVA, embolic stroke due to infective endocarditis  *CT head on admission notable for new acute to subacute PCA territory ischemic stroke  *MRI 7/3 noted with progressive, multifocal, acute infarcts without MR evidence for hemorrhage or midline shift  *Patient does have small left-to-right shunt on JAE, but most likely from embolic spread from endocarditis  *was evaluated by neurology, to continue antibiotics; per neuro to avoid anticoagulation (except DVT prophylaxis dose)     Abnormal MRI  Possible brain abscess  * MRI 8/10/2021 showed- Multiple enhancing subacute infarcts with evidence of small volume hemorrhage, largest at the junction of the left parietal and occipital lobes which demonstrates peripheral enhancement and worsening vasogenic edema. Developing abscess(es) related to septic emboli cannot be excluded  - Neurology was reconsulted 8/14, recommended to screen for mycotic aneurysm   * CTA head 8/14 -showed Complex peripherally enhancing fluid collection in the paramedian left parietal lobepatent arteries of head & neck. No evidence for dissection.  and CT head today & neurosurgery consult  * Ct head w/o contrast 8/14 showed no significant change in the complex peripherally enhancing " fluid collection in the paramedian left parietal lobe with surrounding vasogenic edema. Atypical for bland evolving subacute ischemic infarct. Less likely possibility for evolving subacute hematoma, consider tissue sampling.Mild local mass effect in the left parieto-occipital region with minimal narrowing of the occipital horn of the left lateral ventricle.No midline shift/herniation  - pt remains asymptomatic  - neurosurgery consulted on 8/14th appreciated. As the pt has no neuro symptoms they  recommended repeating the brain MRI on 8/17  * MRI head on 8/17 reported as no definite significant interval change when compared to MRI dated 8/10/2021.  - NSG following & recommended no surgical intervention at this time   - Repeat MRI brain  -8/24/21  reviewed and shows persistent brain abscess.  Neurosurgery recommending continuing IV abx and repeating MRI in another 2 wks time   - Activity as tolerated, continue with therapies  - Continue supportive and symptomatic treatment      Infected Tooth  S/p removal of all remaining teeth on 8/13  Assessment: OMFS was consulted for multiple infected tooth  - S/p Removal of all remaining teeth including 2, 3, 4, 5, 6, 7, 8, 9, 10, 11, 12, 13, 19, 20, 21, 22, 23, 24, 25, 26, 28, 29, 30  - on full liquid diet - advanced to mechanical /dental soft diet on 8/15     Osteomyelitis left 3rd toe s/p amputation 8/7  Necrotic wounds to Lt great toes, Lt 3rd toe and Lt lateral Heel, Rt great toe: 2/2 thrombi.  -- Podiatry took to OR 8/7 for amputation.    -- continue wound cares for other digits     Normocytic anemia, likey 2/2 chronic disease  Chronic anemia  Baseline hemoglobin approximately 7.  Likely related to chronic disease, untreated HIV.  - B12 and folate -> wnl  - consider transfusion for Hgb < 7 (or close)  - low nl ferritin- on po ferrous gluconate     Intermittent low WBC: Could be related to her HIV.  Need to consider medication induced as well  Continue to monitor  closely    Left knee pain 2/2 OA: pt reports history of pain in that knee with activity but pain now is more acute. ROM okay. There is some warmth and swelling there.   - In light of the MSSA bacteremia, got XR and called ortho for effusion  - Per ortho no indication for intervention, though they did order a brace. Appreciate assistance. There are following EOD.  - PT/OT     IV drug use  Polysubstance use disorder  Opiate use disorder  *Chem dep consulted 7/15 but chemical dependency did not feel she is appropriate for CD assessment at this time and recommended consultation towards the end of her antibiotic course.  Reconsult CD once she is close to finishing her IV antibiotic.  *Was using IV dilaudid as well as Percocet p.o. earlier this stay pretty extensively.  Attempts made to wean off IV narcotics and taper down po narcotics though patient felt she was going through withdrawal. Psych consulted and recommended suboxone though patient declined. Psych recommended rapid taper of methadone instead.   *completed methadone taper of 30 mg on 7/21, 20 mg 7/22 and then 10 mg 7/23, then stop  -started suboxone on 7/27 - appreciate psych/addiction medicine assistance who will help with titration    -Plan was to reconsult CD (8/13th)once she is close to finishing her IV antibiotic, but patient is not interested in treatment. She has been through inpatient and outpatient programs and not found them helpful. Also I'm not sure she could continue suboxone in treatment and I think that is probably her best chance for sobriety.  -FYI: She has a daughter who just had twins and lives with her in-laws. Daughter and daughter's mother in law live in [place] (? - pt going to confirm) and have invited patient to live there to help with babies. She thinks this is a good idea as she'll be kept busy and she gets along well with this family. She is hoping to continue follow up with addiction medicine to continue suboxone. She feels that  with suboxone and family support she will do well. Will need to have addiction med follow up with her to prescribe suboxone on discharge and help her set up outpatient follow up.  -08/12 -> Pain service recommended   1. Change Suboxone to  4 mg qid for better analgesic effect.     2. discontinue oxycodone   3. to use acetaminophen for additional analgesic effect if needed.- can give as much as 1000 mg tid   (don't exceed 3000 mg total per day).       8/31: Suboxone adjusted by Addiction team (see note for details)    HIV, untreated PTA  - CD4 count greater than 500, viral load 3695  - Started Biktarvy 7/8; ID following  - Will need outpt follow up     Chronic back pain: pt notes it present ever since she was pregnant. Improved on current medications  - on increased gabapentin to 600 mg TID, prn tylenol     Toxic/septic/metabolic encephalopathy, improved  Likely encephalopathy due to sepsis, drug use, CVA, prolonged ICU stay; had been pulling lines due to alterned mentation thought this all now appears resolved.      Pyelonephritis  Urine cultures positive for Klebsiella.  Completed course of treatment with ceftriaxone.  repeat urine cultures 7/10 with ESBL E coli but given unimpressive UA and no UTI symptoms ID suggest holding off on abx for ESBL UTI  -monitor for any symptoms     Acute hypoxemic respiratory failure, resolved  Intubated 7/1/2021.  Extubated 7/11/2021.  Likely related to septic shock from MSSA mitral valve endocarditis     FORD, resolved suspected due to sepsis  Hypokalemia  - on potassium replacement protocol  - will check BMP periodically     Chronic anemia  Baseline hemoglobin approximately 7.  Likely related to chronic disease, untreated HIV.  -Monitor hemoglobin periodically; stable around 8  - low nl ferritin- start po ferrous gluconate     Non severe malnutrition  -- Supplements      Prior hepatitis C infection  Antibody positive but RNA negative  -Noted     Restless legs: unclear if this is  true restless leg syndrome or due to being uncomfortable  -continue mirapex prn     Major depressive disorder: pt is not suicidal but also doesn't seem to have much of a will to live based upon discussion 7/25.  I wonder if her underlying chronic pain which is difficult to treat as well as being in the hospital is contributing. Was seen by psych and declined any medication treatment on 7/26.  patient was on zoloft for years starting at age 13. She is willing to try zoloft again.   -Started zoloft 8/1 at 25mg po and titrating up to 50mg. Pt feels this is helping. Psychiatry followed up and agreed with this medication.    Code Status: Full Code       Disposition Plan   Expected discharge: 09/01 if Brain abscess resolves.  recommended to unclear once antibiotic plan established.    She is homeless at baseline but has a plan post discharge to live with daughter and daughter's in laws. This is in the [facility] and will allow for follow up cares.  SW following    Ashok Triplett MD, FACP   Internal Medicine/ Hospitalist (Pager- [phone])                Interval History:   no new complaints    Denies Chest pain/ SOB/ cough, Denies any N&V/ bowel problems  Denies urinary problems , Denies fever/chills/rigors                 Medications:       bictegravir-emtricitabine-tenofovir  1 tablet Oral Daily     buprenorphine  8 mg Sublingual TID     gabapentin  600 mg Oral TID     nafcillin  2 g Intravenous Q4H     pantoprazole  40 mg Oral QAM AC     polyethylene glycol  17 g Oral Daily     senna-docusate  1 tablet Oral BID     sertraline  50 mg Oral Daily     sodium chloride (PF)  10 mL Intracatheter Q8H     sodium chloride (PF)  10-40 mL Intracatheter Q7 Days     acetaminophen, albuterol, bisacodyl, glucose **OR** dextrose **OR** glucagon, hydrOXYzine, lidocaine 4%, lidocaine (buffered or not buffered), magnesium hydroxide, naloxone **OR** naloxone **OR** naloxone **OR** naloxone, ondansetron **OR** ondansetron, prochlorperazine  "**OR** prochlorperazine, QUEtiapine, sodium chloride (PF), sodium chloride (PF), sodium chloride (PF)               Physical Exam:   Blood pressure 93/53, pulse 64, temperature 97.9  F (36.6  C), temperature source Oral, resp. rate 16, height 1.727 m (5' 7.99\"), weight 82.8 kg (182 lb 9.6 oz), SpO2 98 %, unknown if currently breastfeeding.  Wt Readings from Last 4 Encounters:   08/20/21 82.8 kg (182 lb 9.6 oz)   11/05/07 103.1 kg (227 lb 4 oz)   09/26/07 99.3 kg (219 lb)     BP 93/53 (BP Location: Left arm)   Pulse 64   Temp 97.9  F (36.6  C) (Oral)   Resp 16   Ht 1.727 m (5' 7.99\")   Wt 82.8 kg (182 lb 9.6 oz)   SpO2 98%   Breastfeeding Unknown   BMI 27.77 kg/m    Gen- pleasant lying in bed  HEENT- NAD, BO  Neck- supple, no JVD elevation  CVS- I+II+ no m/r/g  RS- CTAB  Abdo- soft, no tenderness . No g/r/r  Ext- no edema   Left foot covered with dressing.         Data:   All laboratory data reviewed  BMP  Recent Labs   Lab 08/30/21  0948 08/25/21  1010     --    POTASSIUM 3.7  --    CHLORIDE 109  --    DEREK 8.6  --    CO2 28  --    BUN 14  --    CR 0.62  --    GLC 91 105*     CBC  Recent Labs   Lab 08/30/21  0948 08/25/21  0003   WBC 3.2* 4.0   RBC 3.20* 3.60*   HGB 8.6* 9.7*   HCT 27.6* 31.1*   MCV 86 86   MCH 26.9 26.9   MCHC 31.2* 31.2*   RDW 23.9* 24.8*    418     INRNo lab results found in last 7 days.  LFTs  Recent Labs   Lab 08/30/21  0948   ALKPHOS 78   AST 18   ALT 17   BILITOTAL 0.6   PROTTOTAL 7.6   ALBUMIN 2.2*   MRI BRAIN WITHOUT AND WITH CONTRAST  8/24/2021 3:52 PM     IMPRESSION:   1. Over serial exams, there has been slight decrease in size of the   previously demonstrated peripheral enhancing centrally diffusion   restricting parenchymal lesion in the left paramedian   parieto-occipital region, as described. This continues to be worrisome   for possible cerebral abscess. Unchanged surrounding vasogenic edema   and mild local mass effect. No midline shift/herniation.   2. " Continued expected evolution of the scattered subacute-to-chronic   infarcts, as described. No acute infarct, new intracranial hemorrhage,   or other definite acute process.

## 2021-08-31 NOTE — PLAN OF CARE
DATE & TIME: 8/31/21 6216-3058                   Cognitive Concerns/ Orientation : A&O x 4   BEHAVIOR & AGGRESSION TOOL COLOR: Green  CIWA SCORE: NA    ABNL VS/O2: VSS on room air  MOBILITY: Independent in room with cane and surgical shoe  PAIN MANAGMENT: Denied  DIET: Mechanical/Dental soft; good intake  BOWEL/BLADDER: Continent  ABNL LAB/BG: NA  DRAIN/DEVICES: Right PICC SL  TELEMETRY RHYTHM: NA  SKIN: Dressing changed to left foot, right great toe open to air  TESTS/PROCEDURES: NA  D/C DATE:Possible on 9/1/21 on home abx; pending consult from neurosurgery.  OTHER IMPORTANT INFO: Contact precautions maintained

## 2021-09-01 ENCOUNTER — TELEPHONE (OUTPATIENT)
Dept: FAMILY MEDICINE | Facility: CLINIC | Age: 41
End: 2021-09-01

## 2021-09-01 VITALS
SYSTOLIC BLOOD PRESSURE: 98 MMHG | OXYGEN SATURATION: 96 % | BODY MASS INDEX: 27.68 KG/M2 | HEART RATE: 79 BPM | RESPIRATION RATE: 16 BRPM | TEMPERATURE: 98 F | WEIGHT: 182.6 LBS | HEIGHT: 68 IN | DIASTOLIC BLOOD PRESSURE: 60 MMHG

## 2021-09-01 PROCEDURE — 999N000040 HC STATISTIC CONSULT NO CHARGE VASC ACCESS

## 2021-09-01 PROCEDURE — 999N000190 HC STATISTIC VAT ROUNDS

## 2021-09-01 PROCEDURE — 250N000013 HC RX MED GY IP 250 OP 250 PS 637: Performed by: PODIATRIST

## 2021-09-01 PROCEDURE — 250N000013 HC RX MED GY IP 250 OP 250 PS 637: Performed by: HOSPITALIST

## 2021-09-01 PROCEDURE — 250N000013 HC RX MED GY IP 250 OP 250 PS 637: Performed by: INTERNAL MEDICINE

## 2021-09-01 PROCEDURE — 250N000009 HC RX 250: Performed by: PODIATRIST

## 2021-09-01 PROCEDURE — 99239 HOSP IP/OBS DSCHRG MGMT >30: CPT | Performed by: STUDENT IN AN ORGANIZED HEALTH CARE EDUCATION/TRAINING PROGRAM

## 2021-09-01 RX ORDER — PANTOPRAZOLE SODIUM 40 MG/1
40 TABLET, DELAYED RELEASE ORAL
Qty: 30 TABLET | Refills: 0 | Status: SHIPPED | OUTPATIENT
Start: 2021-09-02

## 2021-09-01 RX ORDER — GABAPENTIN 300 MG/1
600 CAPSULE ORAL 3 TIMES DAILY
Qty: 180 CAPSULE | Refills: 0 | Status: SHIPPED | OUTPATIENT
Start: 2021-09-01 | End: 2021-10-01

## 2021-09-01 RX ORDER — BUPRENORPHINE AND NALOXONE 8; 2 MG/1; MG/1
1 FILM, SOLUBLE BUCCAL; SUBLINGUAL 3 TIMES DAILY
Qty: 24 FILM | Refills: 0 | Status: SHIPPED | OUTPATIENT
Start: 2021-09-01

## 2021-09-01 RX ADMIN — NAFCILLIN SODIUM 2 G: 2 INJECTION, POWDER, LYOPHILIZED, FOR SOLUTION INTRAMUSCULAR; INTRAVENOUS at 09:42

## 2021-09-01 RX ADMIN — GABAPENTIN 600 MG: 300 CAPSULE ORAL at 08:03

## 2021-09-01 RX ADMIN — GABAPENTIN 600 MG: 300 CAPSULE ORAL at 15:49

## 2021-09-01 RX ADMIN — BUPRENORPHINE HYDROCHLORIDE 8 MG: 8 TABLET SUBLINGUAL at 15:49

## 2021-09-01 RX ADMIN — NAFCILLIN SODIUM 2 G: 2 INJECTION, POWDER, LYOPHILIZED, FOR SOLUTION INTRAMUSCULAR; INTRAVENOUS at 13:45

## 2021-09-01 RX ADMIN — SERTRALINE HYDROCHLORIDE 50 MG: 50 TABLET ORAL at 08:03

## 2021-09-01 RX ADMIN — BICTEGRAVIR SODIUM, EMTRICITABINE, AND TENOFOVIR ALAFENAMIDE FUMARATE 1 TABLET: 50; 200; 25 TABLET ORAL at 08:03

## 2021-09-01 RX ADMIN — NAFCILLIN SODIUM 2 G: 2 INJECTION, POWDER, LYOPHILIZED, FOR SOLUTION INTRAMUSCULAR; INTRAVENOUS at 06:10

## 2021-09-01 RX ADMIN — PANTOPRAZOLE SODIUM 40 MG: 40 TABLET, DELAYED RELEASE ORAL at 08:03

## 2021-09-01 RX ADMIN — BUPRENORPHINE HYDROCHLORIDE 8 MG: 8 TABLET SUBLINGUAL at 08:03

## 2021-09-01 RX ADMIN — NAFCILLIN SODIUM 2 G: 2 INJECTION, POWDER, LYOPHILIZED, FOR SOLUTION INTRAMUSCULAR; INTRAVENOUS at 02:00

## 2021-09-01 ASSESSMENT — ACTIVITIES OF DAILY LIVING (ADL)
ADLS_ACUITY_SCORE: 16

## 2021-09-01 NOTE — PROGRESS NOTES
Care Management Discharge Note    Discharge Date: 09/01/2021       Discharge Disposition: home with MIL      Discharge Services:  None    Discharge DME:  [facility]    Discharge Transportation:Daughter  Private pay costs discussed: Not applicable      Education Provided on the Discharge Plan:  Yes  Persons Notified of Discharge Plans: Message left with patents Mother-in-law [phone]  Patient/Family in Agreement with the Plan:  yes    Handoff Referral Completed: Yes    Additional Information:  Patient discharging home to family. Patient stating she is independent and will be able to manage at home. Per bedside nurse patient is competent in doing dressing changes. Patient states she does not have the desire to use drugs and hopes to continue it. Voice message left for   [facility] outpatient CD program in [place]-Intake contact Genevieve [phone].  X3. They have not contacted patient yet. Writer requested  CD counsilor to provide resources for patient.  Appointment for Primary,ID Neurosurgery Suboxone Clinic and MR completed and on AVS.        Rosalina Wallace RN  Inpatient Care Coordinator  [facility]/Eveline  # [phone]

## 2021-09-01 NOTE — TELEPHONE ENCOUNTER
Reason for Call:  Other appointment    Detailed comments: Eveline called and would like patient to have a f/u next week at  Clinic for Endocarditis and est new care.  Please contact patient.  Thank you.    Phone Number Patient can be reached at: Home number on file [phone] (home)    Best Time: any    Can we leave a detailed message on this number? YES    Call taken on 9/1/2021 at 12:52 PM by Josefina He

## 2021-09-01 NOTE — DISCHARGE INSTRUCTIONS
Wound care EVERY OTHER DAY     Comments: Right great toe: Dily   Cleanse with betadine. No need to cover. Ensure wearing clean socks     Right lateral heel: keep clean and moisturized.  Ok to cover with foam dressing for padding/protection prn.  Ensure minimal pressure to area.     Left foot: Keep foot and dressing dry.  Change prn if loose/soiled, applying betadine to great toe, adaptic to 3rd toe incision, then gauze pad, Kerlix, light ACE wrap.

## 2021-09-01 NOTE — DISCHARGE SUMMARY
[facility]  Hospitalist Discharge Summary      Date of Admission:  7/1/2021  Date of Discharge:  9/1/2021  Discharging Provider: Eleazar Black MD      Discharge Diagnoses     MSSA mitral valve endocarditis  Septic shock    Follow-ups Needed After Discharge   Follow-up Appointments     Follow-up and recommended labs and tests       Follow up with Dr. Mcneil in 2 weeks from discharge from hospital.         For appointments, questions or concerns: call: [phone]    Office fax number:  [phone]         Follow-up and recommended labs and tests       Appointment with Dr. Terrence Shaw  9/7 at 3:40 pm  ([facility])  [address] [place], [place] [id number] [phone]         Follow-up and recommended labs and tests       Follow up with primary care provider, Lisa Pollock, within 7 days for   hospital follow- up.  No follow up labs or test are needed.         Follow-up and recommended labs and tests       Appointment with DR DANIELS  (Infectious Disease) 9/30 at 10 am.  Address       [facility]. [address] [place] [address]   [id number]  T: [phone]            Unresulted Labs Ordered in the Past 30 Days of this Admission     No orders found from 6/1/2021 to 7/2/2021.        Discharge Disposition     Discharged to home  Condition at discharge: Stable    Hospital Course      41 year old female with h/o untreated HIV, hepatitis C who was admitted on 7/1/2021 for MSSA mitral valve endocarditis resulting in septic shock complicated by embolic left parietal lobe infarctions and multiple peripheral emboli, and initially with encephalopathy, and shock.  She was intubated in ED on 7/1/2021 subsequently extubated 7/11/2021.  Blood cultures turned positive on hospital day 1 with MSSA.  Urine culture was positive for Klebsiella.  She was treated initially with ceftriaxone and vancomycin, and has since been narrowed to nafcillin per ID.     MSSA mitral valve endocarditis  Septic  "shock, resolved  Probable myopericarditis  Small pericardial effusion  *JAE 7/5 noted with small ASD, mod to large mobile vegetation (14mm long, 6mm wide) of the P2 segment of the mitral leaflet. No valve perforation; mod MR  *blood cultures on admission with MSSA; blood cultures have been negative since 7/6.   *ID following; antibiotic has been narrowed down to Nafcillin,duration extended due  To abscess on MRI  *limited ECHO repeated 7/15 to reassess pericardial fluid 7/15 per cardiology and noted:  organized effusion posteriorly, measuring 1.2-1.5cm, no evidence of tamponade; small mobile vegetation noted attached to the mitral leaflet, mod to severe MR; compared to 7/8/21, effusion size is probably stable, Degree of MR is worse, Vegetation is smaller.  Cardiology signed off 7/15.   *PICC in place  -- JAE (no vegetations) and Cor Angio (nomral coronaries) completed 08/12  - per cardiothoracic surgery reconsult 8/14 felt she no longer has an urgent surgical indication,given absence of vegetations and only moderate MR on ECHO 8/12/2021.She may require mitral valve repair/replacement in the future if she has progressive MR, but this would be better addressed after she has undergone treatment for substance abuse and has demonstrated sobriety in the out-patient setting. She should follow up with cardiology for medical management and surveillance of MR and ASD, and can be referred back to CV surgery as needed.   Plan:  2. Will need ongoing treatment for HIV with Biktarvy   3. Transition to oral Augmentin 875 mg PO BID at discharge, please give prescription for 4 weeks at discharge  4. 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  *CT head on admission notable for new acute to subacute PCA territory ischemic stroke  *MRI 7/3 noted with " progressive, multifocal, acute infarcts without MR evidence for hemorrhage or midline shift  *Patient does have small left-to-right shunt on JAE, but most likely from embolic spread from endocarditis  *was evaluated by neurology, to continue antibiotics; per neuro to avoid anticoagulation (except DVT prophylaxis dose)     Abnormal MRI  Possible brain abscess  * MRI 8/10/2021 showed- Multiple enhancing subacute infarcts with evidence of small volume hemorrhage, largest at the junction of the left parietal and occipital lobes which demonstrates peripheral enhancement and worsening vasogenic edema. Developing abscess(es) related to septic emboli cannot be excluded  - Neurology was reconsulted 8/14, recommended to screen for mycotic aneurysm   * CTA head 8/14 -showed Complex peripherally enhancing fluid collection in the paramedian left parietal lobepatent arteries of head & neck. No evidence for dissection.  and CT head today & neurosurgery consult  * Ct head w/o contrast 8/14 showed no significant change in the complex peripherally enhancing fluid collection in the paramedian left parietal lobe with surrounding vasogenic edema. Atypical for bland evolving subacute ischemic infarct. Less likely possibility for evolving subacute hematoma, consider tissue sampling.Mild local mass effect in the left parieto-occipital region with minimal narrowing of the occipital horn of the left lateral ventricle.No midline shift/herniation  - pt remains asymptomatic  - neurosurgery consulted on 8/14th appreciated. As the pt has no neuro symptoms they  recommended repeating the brain MRI on 8/17  * MRI head on 8/17 reported as no definite significant interval change when compared to MRI dated 8/10/2021.  - NSG following & recommended no surgical intervention at this time   - Repeat MRI brain  -8/24/21  reviewed and shows persistent brain abscess.  Neurosurgery recommending continuing IV abx and repeating MRI in another 2 wks  time   - Activity as tolerated, continue with therapies  - Continue supportive and symptomatic treatment      Infected Tooth  S/p removal of all remaining teeth on 8/13  Assessment: OMFS was consulted for multiple infected tooth  - S/p Removal of all remaining teeth including 2, 3, 4, 5, 6, 7, 8, 9, 10, 11, 12, 13, 19, 20, 21, 22, 23, 24, 25, 26, 28, 29, 30  - on full liquid diet - advanced to mechanical /dental soft diet on 8/15     Osteomyelitis left 3rd toe s/p amputation 8/7  Necrotic wounds to Lt great toes, Lt 3rd toe and Lt lateral Heel, Rt great toe: 2/2 thrombi.  -- Podiatry took to OR 8/7 for amputation.    -- continue wound cares for other digits     Normocytic anemia, likey 2/2 chronic disease  Chronic anemia  Baseline hemoglobin approximately 7.  Likely related to chronic disease, untreated HIV.  - B12 and folate -> wnl  - consider transfusion for Hgb < 7 (or close)  - low nl ferritin- on po ferrous gluconate     Intermittent low WBC: Could be related to her HIV.  Need to consider medication induced as well  Continue to monitor closely     Left knee pain 2/2 OA: pt reports history of pain in that knee with activity but pain now is more acute. ROM okay. There is some warmth and swelling there.   - In light of the MSSA bacteremia, got XR and called ortho for effusion  - Per ortho no indication for intervention, though they did order a brace. Appreciate assistance. There are following EOD.  - PT/OT     IV drug use  Polysubstance use disorder  Opiate use disorder  *Chem dep consulted 7/15 but chemical dependency did not feel she is appropriate for CD assessment at this time and recommended consultation towards the end of her antibiotic course.  Reconsult CD once she is close to finishing her IV antibiotic.  *Was using IV dilaudid as well as Percocet p.o. earlier this stay pretty extensively.  Attempts made to wean off IV narcotics and taper down po narcotics though patient felt she was going through  withdrawal. Psych consulted and recommended suboxone though patient declined. Psych recommended rapid taper of methadone instead.   *completed methadone taper of 30 mg on 7/21, 20 mg 7/22 and then 10 mg 7/23, then stop  -started suboxone on 7/27 - appreciate psych/addiction medicine assistance who will help with titration     -Plan was to reconsult CD (8/13th)once she is close to finishing her IV antibiotic, but patient is not interested in treatment. She has been through inpatient and outpatient programs and not found them helpful. Also I'm not sure she could continue suboxone in treatment and I think that is probably her best chance for sobriety.  -FYI: She has a daughter who just had twins and lives with her in-laws. Daughter and daughter's mother in law live in [place] (? - pt going to confirm) and have invited patient to live there to help with babies. She thinks this is a good idea as she'll be kept busy and she gets along well with this family. She is hoping to continue follow up with addiction medicine to continue suboxone. She feels that with suboxone and family support she will do well. Will need to have addiction med follow up with her to prescribe suboxone on discharge and help her set up outpatient follow up.  -08/12 -> Pain service recommended   1. Change Suboxone to  4 mg qid for better analgesic effect.     2. discontinue oxycodone    HIV, untreated PTA  - CD4 count greater than 500, viral load 3695  - Started Biktarvy 7/8; ID following  - Will need outpt follow up     Chronic back pain: pt notes it present ever since she was pregnant. Improved on current medications  - on increased gabapentin to 600 mg TID, prn tylenol     Toxic/septic/metabolic encephalopathy, improved  Likely encephalopathy due to sepsis, drug use, CVA, prolonged ICU stay; had been pulling lines due to alterned mentation thought this all now appears resolved.      Pyelonephritis  Urine cultures positive for Klebsiella.   Completed course of treatment with ceftriaxone.  repeat urine cultures 7/10 with ESBL E coli but given unimpressive UA and no UTI symptoms ID suggest holding off on abx for ESBL UTI     Acute hypoxemic respiratory failure, resolved  Intubated 7/1/2021.  Extubated 7/11/2021.  Likely related to septic shock from MSSA mitral valve endocarditis     FORD, resolved suspected due to sepsis  Hypokalemia  - on potassium replacement protocol  - will check BMP periodically     Chronic anemia  Baseline hemoglobin approximately 7.  Likely related to chronic disease, untreated HIV.  -Monitor hemoglobin periodically; stable around 8  - low nl ferritin- start po ferrous gluconate     Non severe malnutrition  -- Supplements      Prior hepatitis C infection  Antibody positive but RNA negative  -Noted     Restless legs: unclear if this is true restless leg syndrome or due to being uncomfortable  -continue mirapex prn     Major depressive disorder: pt is not suicidal but also doesn't seem to have much of a will to live based upon discussion 7/25.  I wonder if her underlying chronic pain which is difficult to treat as well as being in the hospital is contributing. Was seen by psych and declined any medication treatment on 7/26.  patient was on zoloft for years starting at age 13. She is willing to try zoloft again.   -Started zoloft 8/1 at 25mg po and titrating up to 50mg. Pt feels this is helping. Psychiatry followed up and agreed with this medication.    Consultations This Hospital Stay   PHARMACY TO DOSE VANCO  VASCULAR ACCESS ADULT IP CONSULT  NEUROLOGY IP CONSULT  NUTRITION SERVICES ADULT IP CONSULT  INFECTIOUS DISEASES IP CONSULT  CARDIOLOGY IP CONSULT  CARDIOTHORACIC SURGERY IP CONSULT  PHARMACY IP CONSULT  SOCIAL WORK IP CONSULT  VASCULAR ACCESS ADULT IP CONSULT  PHYSICAL THERAPY ADULT IP CONSULT  OCCUPATIONAL THERAPY ADULT IP CONSULT  SPEECH LANGUAGE PATH ADULT IP CONSULT  VASCULAR ACCESS ADULT IP CONSULT  CHEMICAL DEPENDENCY  IP CONSULT  CARDIOTHORACIC SURGERY IP CONSULT  CARE MANAGEMENT / SOCIAL WORK IP CONSULT  VASCULAR ACCESS ADULT IP CONSULT  PSYCHIATRY IP CONSULT  PSYCHIATRY IP CONSULT  VASCULAR ACCESS ADULT IP CONSULT  VASCULAR ACCESS ADULT IP CONSULT  WOUND OSTOMY CONTINENCE NURSE  IP CONSULT  ORTHOPEDIC SURGERY IP CONSULT  PSYCHIATRY IP CONSULT  VASCULAR ACCESS ADULT IP CONSULT  ORTHOSIS EXTREMITY LOWER REFERRAL IP CONSULT  PODIATRY IP CONSULT  ORTHOSIS EXTREMITY LOWER REFERRAL IP CONSULT  PHYSICAL THERAPY ADULT IP CONSULT  CARDIOTHORACIC SURGERY IP CONSULT  CHEMICAL DEPENDENCY IP CONSULT  CARE MANAGEMENT / SOCIAL WORK IP CONSULT  ORAL SURGERY IP CONSULT  PHARMACY IP CONSULT  PHARMACY IP CONSULT  NEUROLOGY IP CONSULT  RESPIRATORY CARE IP CONSULT  NEUROLOGY IP CONSULT  NEUROSURGERY IP CONSULT  NEUROSURGERY IP CONSULT  CHEMICAL DEPENDENCY IP CONSULT  SMOKING CESSATION PROGRAM IP CONSULT    Code Status   Full Code    Time Spent on this Encounter   I, Eleazar Black MD, personally saw the patient today and spent greater than 30 minutes discharging this patient.       Eleazar Black MD  [place] [address] [facility]  [address] [place] [address]  Phone: [phone]  ______________________________________________________________________    Physical Exam   Vital Signs: Temp: 97.6  F (36.4  C) Temp src: Oral BP: (!) 89/69 Pulse: 69   Resp: 16 SpO2: 100 % O2 Device: None (Room air)    Weight: 182 lbs 9.6 oz       Primary Care Physician   Lisa Pollock    Discharge Orders      MR Brain w/o & w Contrast     Follow-up and recommended labs and tests     Follow up with Dr. Mcneil in 2 weeks from discharge from hospital.         For appointments, questions or concerns: call: [phone]    Office fax number:  [phone]     Activity    Your activity upon discharge: minimal heel weight bearing in post op shoe     Wound care and dressings    Instructions to care for your wound at home:    Keep foot and dressing  dry. Bag up if you shower. Will do dressing change at first office visit.     Follow-up and recommended labs and tests     Appointment with Dr. Terrence Shaw  9/7 at 3:40 pm  ([facility])  [address] [place] [address] [phone]     Reason for your hospital stay    You had an infection that needed prolonged antibiotics     Follow-up and recommended labs and tests     Follow up with primary care provider, Lisa Pollock, within 7 days for hospital follow- up.  No follow up labs or test are needed.     Activity    Your activity upon discharge: activity as tolerated     Follow-up and recommended labs and tests     Appointment with DR DANIELS  (Infectious Disease) 9/30 at 10 am.  Address       [facility]. [address] [place] [id number]  T: [phone]     Diet    Follow this diet upon discharge: Orders Placed This Encounter      Snacks/Supplements Adult: Ensure Enlive; Between Meals      Mechanical/Dental Soft Diet       Significant Results and Procedures   Most Recent 3 CBC's:  Recent Labs   Lab Test 08/30/21  0948 08/25/21  0003 08/24/21  0831   WBC 3.2* 4.0 3.4*   HGB 8.6* 9.7* 9.0*   MCV 86 86 87    418 414     Most Recent 3 BMP's:  Recent Labs   Lab Test 08/30/21  0948 08/25/21  1010 08/24/21  0831 08/17/21  0607     --  138 139   POTASSIUM 3.7  --  3.7 3.9   CHLORIDE 109  --  108 109   CO2 28  --  28 26   BUN 14  --  12 12   CR 0.62  --  0.61 0.62   ANIONGAP 2*  --  2* 4   DEREK 8.6  --  8.6 8.9   GLC 91 105* 92 98     Most Recent 2 LFT's:  Recent Labs   Lab Test 08/30/21  0948 08/24/21  0831   AST 18 19   ALT 17 15   ALKPHOS 78 87   BILITOTAL 0.6 0.6     Most Recent 3 INR's:  Recent Labs   Lab Test 08/11/21  0614 07/01/21  1239   INR 1.05 1.23*     Most Recent 6 Bacteria Isolates From Any Culture (See EPIC Reports for Culture Details):  Recent Labs   Lab Test 07/10/21  2243 07/09/21  0454 07/09/21  0415 07/08/21  1417 07/08/21  0500 07/07/21  0450   CULT  >100,000 colonies/mL  Escherichia coli ESBL  ESBL (extended beta lactamase) producing organisms require contact precautions.  *  Critical Value/Significant Value called to and read back by  Luanne Bettencourt, RN @ 2206 7/13/21 TM.   No growth No growth No growth No growth No growth  No growth     Most Recent Urinalysis:  Recent Labs   Lab Test 08/10/21  1549   COLOR Light Yellow   APPEARANCE Clear   URINEGLC Negative   URINEBILI Negative   URINEKETONE Negative   SG 1.020   UBLD Moderate*   URINEPH 6.0   PROTEIN Negative   NITRITE Negative   LEUKEST Moderate*   RBCU 14*   WBCU 28*   ,   Results for orders placed or performed during the hospital encounter of 07/01/21   CT Chest/Abdomen/Pelvis w Contrast    Narrative    CT CHEST/ABDOMEN/PELVIS WITH CONTRAST 7/1/2021 1:48 PM    CLINICAL HISTORY: Fever of unknown origin.    TECHNIQUE: CT scan of the chest, abdomen, and pelvis was performed  following injection of IV contrast. Multiplanar reformats were  obtained. Dose reduction techniques were used.   CONTRAST:  125 mL Isovue-370   COMPARISON: None.    FINDINGS: Multiple images were degraded due to patient motion  artifact.    LUNGS AND PLEURA: No pleural effusions. Mild scarring and/or  atelectasis at both lung bases. The lungs are otherwise clear where  visualized.    MEDIASTINUM/AXILLAE: No enlarged lymph nodes are identified. There is  a small pericardial effusion.    CORONARY ARTERY CALCIFICATION: None.    HEPATOBILIARY: The gallbladder appears contracted, and may contain  small gallstones. No hepatic masses are seen.    PANCREAS: Normal.    SPLEEN: Mild splenomegaly. The spleen measures up to 15.2 cm in  length.    ADRENAL GLANDS: Normal.    KIDNEYS/BLADDER: Patchy hypoenhancement in the mid and upper pole of  the right kidney posteriorly is suspicious for pyelonephritis. The  left kidney is unremarkable. No hydronephrosis. Neri catheter in the  bladder. Small amount of air within the urinary bladder may be  related  to the presence of the Neri catheter.    BOWEL: No bowel obstruction. No convincing evidence for colitis or  diverticulitis. Unremarkable appendix.    PELVIC ORGANS: Unremarkable.    LYMPH NODES: No enlarged lymph nodes are identified in the abdomen or  pelvis.    VASCULATURE: Unremarkable.    ADDITIONAL FINDINGS: Moderate-sized fat-containing paraumbilical  hernia.    MUSCULOSKELETAL: Degenerative changes in the lower lumbar spine.  Thoracic curve, convex left.      Impression    IMPRESSION:   1.  Patchy hypoenhancement in the mid and upper poles of the right  kidney posteriorly, suspicious for pyelonephritis.  2.  Moderate-sized fat-containing paraumbilical hernia.  3.  Mild splenomegaly.  4.  There are possible small gallstones within a contracted  gallbladder.    EJANETTE AMAYA MD          SYSTEM ID:  [id number]   CT Head w/o Contrast    Narrative    CT SCAN OF THE HEAD WITHOUT CONTRAST   7/1/2021 1:47 PM     HISTORY: Delirium; altered mental status    TECHNIQUE:  Axial images of the head and coronal reformations without  IV contrast material.  Radiation dose for this scan was reduced using  automated exposure control, adjustment of the mA and/or kV according  to patient size, or iterative reconstruction technique.    COMPARISON: None.    FINDINGS: There is a focal 2 cm area of hypodensity involving gray and  white matter in the medial left parietal lobe consistent with acute to  subacute ischemic infarct. There is a minimal incidental arachnoid  cyst in the anterior portion of left middle cranial fossa. Ventricles  and subarachnoid spaces are otherwise within normal limits. There is  no evidence for intracranial hemorrhage, significant mass effect, or  skull fracture. Exam is mildly limited by motion artifact. Visualized  paranasal sinuses and mastoid air cells are clear.      Impression    IMPRESSION: Focal hypodensity in the medial left parietal lobe  consistent with acute to subacute ischemic  infarct.    BRIT MARIANO MD          SYSTEM ID:  [id number]   Soft tissue neck CT w contrast    Narrative    CT SCAN OF THE NECK WITH CONTRAST  7/1/2021 1:50 PM     HISTORY: Evaluate abscess or deep space infection.    TECHNIQUE:  Axial images and coronal reformations.  A total of 125 mL  Isovue-370 was used for both the CAP and STN IV.  Radiation dose for  this scan was reduced using automated exposure control, adjustment of  the mA and/or kV according to patient size, or iterative  reconstruction technique.    COMPARISON: None.    FINDINGS: Exam is limited due to patient motion artifact. The  visualized paranasal sinuses and mastoid air cells are clear. The  visualized orbits are normal. The pharynx, larynx, and subglottic  trachea are normal. The thyroid gland and salivary glands appear  normal. There is no evidence for any lymphadenopathy. Vascular  structures unremarkable. Some mild degenerative changes are seen in  the lower cervical spine. No abnormal fluid collections are present.  Jugular veins are very prominent, but this is felt to be within the  normal limits. Lung apices are clear.      Impression    IMPRESSION: Negative neck CT with contrast. Exam slightly limited by  motion artifact.    BRIT MARIANO MD          SYSTEM ID:  [id number]   XR Chest Port 1 View    Narrative    CHEST ONE VIEW PORTABLE    7/1/2021 2:33 PM     HISTORY: Post intubation    COMPARISON: CT chest, abdomen and pelvis 7/1/2021.      Impression    IMPRESSION: Portable chest. Lungs are hypoaerated, but otherwise  clear. Heart is enlarged. No pneumothorax. No definite pleural  effusions. Endotracheal tube terminates approximately 2 cm above the  arvind. Nasogastric tube extends below the left hemidiaphragm  presumably in the stomach.    RADHA PHILLIPS MD          SYSTEM ID:  [id number]   US Abdomen Limited    Narrative    US ABDOMEN LIMITED 7/3/2021 4:53 PM    CLINICAL HISTORY: Elevated direct bilirubin. Assess for obstruction.    TECHNIQUE:  Limited abdominal ultrasound.    COMPARISON: None.    FINDINGS:    GALLBLADDER: There is shadowing in the region of the gallbladder, it  is unclear if this is air within bowel or shadowing in the wall of the  gallbladder versus stones.    BILE DUCTS: There is extrahepatic biliary dilatation. The common duct  measures 9 mm.    LIVER: No focal lesions.    RIGHT KIDNEY: No hydronephrosis.    PANCREAS: The visualized portions of the pancreas are normal.    Some suspected adenopathy is seen in the region of the kenny hepatis  measuring up to 2.7 cm. No ascites.      Impression    IMPRESSION:  1.  Biliary dilatation of uncertain etiology.  2.  Possible adenopathy in the kenny hepatis.  3.  Nonspecific shadowing in the expected location of the gallbladder.    PABLO XAVIER MD          SYSTEM ID:  [id number]   MR Brain w/o & w Contrast    Narrative    MRI BRAIN WITHOUT AND WITH CONTRAST  7/3/2021 3:23 PM     HISTORY:  Stroke, follow up.     TECHNIQUE:  Multiplanar, multisequence MRI of the brain without and  with 9 mL Gadavist     COMPARISON: Head CT 7/1/2021     FINDINGS: Interval increase in size of left parietal lobe. Acute  infarct noted measuring 2.7 cm in diameter, previously measuring 2 cm  in diameter. Interval development of a left temporal occipital acute  infarct measuring 4.2 x 2.9 cm transverse noted. Tiny, punctate acute  cerebellar infarcts are present bilaterally measuring 2 to 3 mm in  diameter. Small right occipital cortical infarct noted measuring 1 cm  in diameter. There are some smaller occipital white matter infarcts  measuring 2 to 3 mm in diameter. Development of small, superior  parietal cortical infarct/ischemia measuring up to 1.5 cm on the left  and 1 cm on the right noted. There are also some small punctate  infarcts in the border zone white matter of both hemispheres. Tiny  acute infarct in the left caudate head. Several of these very small  infarcts are too small to be detected by CT. No MR  evidence for  hemorrhagic transformation. No midline shift. There is no abnormal  intracranial enhancement. Negative for hydrocephalus or midline shift.  Brainstem unremarkable. Normal sulci. Basal cisterns intact. Small  left middle fossa arachnoid cyst without appreciable mass effect.    The facial structures appear normal. The major arterial T2 flow voids  at the base of the brain appear patent.     Mild paranasal sinus membrane thickening without air-fluid levels.  Scattered mastoid membrane thickening and fluid bilaterally.  Endotracheal tube is present.      Impression    IMPRESSION:  Progressive, multifocal, acute infarcts without MR  evidence for hemorrhage or midline shift. Numerous small infarcts are  present and may be below the limits of detection by CT.      CYNTHIA MELISSA MD          SYSTEM ID:  [id number]   CTA Head Neck with Contrast    Narrative    CT ANGIOGRAM OF THE HEAD AND NECK WITH CONTRAST  7/3/2021 2:41 PM     HISTORY: Stroke/TIA, assess intracranial arteries. Stroke/TIA, assess  extracranial arteries. Endocarditis.    TECHNIQUE:  CT angiography with an injection of 70 mL Isovue-370 IV  with scans through the head and neck. Images were transferred to a  separate 3-D workstation where multiplanar reformations and 3-D images  were created. Estimates of carotid stenoses are made relative to the  distal internal carotid artery diameters except as noted. Radiation  dose for this scan was reduced using automated exposure control,  adjustment of the mA and/or kV according to patient size, or iterative  reconstruction technique.    COMPARISON: Head CT 7/1/2021.     CT HEAD FINDINGS: No contrast enhancing lesions. Parenchymal  hypodensities in the left posterotemporal and left parietal lobes.     CT ANGIOGRAM HEAD FINDINGS:  The major intracranial arteries including  the proximal branches of the anterior cerebral, middle cerebral, and  posterior cerebral arteries appear patent without vascular  cutoff. No  aneurysm identified. No significant stenosis. Venous circulation is  unremarkable.     CT ANGIOGRAM NECK FINDINGS:   Normal origin of the great vessels from the aortic arch.     Right carotid artery: The right common and internal carotid arteries  are patent. No significant stenosis or atherosclerotic disease in the  carotid artery.     Left carotid artery: The left common and internal carotid arteries are  patent. No significant stenosis or atherosclerotic disease in the  carotid artery.     Vertebral arteries: Vertebral arteries are patent without evidence of  dissection. No significant stenosis.     Other findings: Streaky opacities in the superior segments of the left  lower lobe. Endotracheal and orogastric tubes are present. Extensive  periodontal disease and dental caries noted.       Impression    IMPRESSION: Patent arteries in the head and neck without vascular  cutoff. No evidence of dissection. No aneurysm identified. No  significant stenosis.  CTA and MRA findings called to [facility] in the [facility] ICU  on the day of the exam at 1645 hours.    CYNTHIA MELISSA MD          SYSTEM ID:  [id number]   US Extremity Non Vascular Right    Narrative    US EXTREMITY NON VASCULAR RIGHT 7/6/2021 11:39 AM     HISTORY: Right 4th finger concern for septic arthritis/osteomyelitis-  Please do US right hand 4th finger.     FINDINGS: Sonography was performed in the area of clinical concern  (fourth finger).      Impression    IMPRESSION: No fluid collection or significant joint effusion is  appreciated sonographically. Clinical follow-up recommended. If  clinical suspicion persists, MRI could be performed.    GILLES SHIRLEY MD         SYSTEM ID:  [id number]   US Lower Extremity Venous Duplex Left    Narrative    US LOWER EXTREMITY VENOUS DUPLEX LEFT 7/9/2021 4:16 PM    CLINICAL HISTORY: Increased unilateral LE swelling; Evaluate for DVT.    TECHNIQUE: Venous Duplex ultrasound of the left lower extremity  with  and without compression, augmentation and duplex. Color flow and  spectral Doppler with waveform analysis performed.    COMPARISON: None.    FINDINGS: Exam includes the common femoral, femoral, popliteal, and  contralateral common femoral veins as well as segmentally visualized  deep calf veins and greater saphenous vein.     LEFT: No deep vein thrombosis. No superficial thrombophlebitis. No  popliteal cyst.      Impression    IMPRESSION:  No deep venous thrombosis in the left lower extremity.    NATHANAEL MICHAUD MD         SYSTEM ID:  [id number]   XR Chest Port 1 View    Narrative    CHEST PORTABLE ONE VIEW   7/10/2021 4:21 PM     HISTORY: New onset low grade fever.    COMPARISON: Chest x-rays dated 7/1/2021.      Impression    IMPRESSION:  Endotracheal tube is in stable and satisfactory position.  Enteric sump is again projected over the mediastinum with tip off the  inferior aspect of the image, similar to the prior study. Left basilar  opacity could represent infiltrate, atelectasis, effusion, and/or  scarring. Right lung is grossly clear. There is slight increased  aeration of lungs as compared to the prior study. No other changes.    NATHANAEL MICHAUD MD         SYSTEM ID:  [id number]   CT Chest (PE) Abdomen Pelvis w Contrast    Narrative    CT CHEST PE ABDOMEN AND PELVIS WITH CONTRAST 7/30/2021 7:31 PM    CLINICAL HISTORY: Respiratory failure. Septic shock. Infective  endocarditis. Back pain.    TECHNIQUE: CT angiogram chest and routine CT abdomen pelvis with IV  contrast. Arterial phase through the chest and venous phase through  the abdomen and pelvis. 2D and 3D MIP reconstructions were preformed  by the CT technologist. Dose reduction techniques were used.   CONTRAST: 85mL Isovue-370  COMPARISON: None.    FINDINGS:  ANGIOGRAM CHEST: There is suboptimal opacification of the pulmonary  arteries, however, the central pulmonary arteries are moderately well  opacified, and no pulmonary emboli are identified.  Thoracic aorta is  negative for dissection. There is cardiac enlargement.    LUNGS AND PLEURA: No pleural effusions. There is mild atelectasis at  both lung bases posteriorly, greater on the left.    MEDIASTINUM/AXILLAE: No enlarged lymph nodes are identified in the  chest. Small amount of pericardial fluid is noted.    CORONARY ARTERY CALCIFICATION: None.    HEPATOBILIARY: The gallbladder appears contracted. No hepatic masses  are seen.    PANCREAS: Normal.    SPLEEN: Spleen size upper limits of normal. The spleen has decreased  slightly in size since the previous exam. The spleen measures  approximately 13.7 cm in length on today's exam. No focal splenic  lesions are seen.    ADRENAL GLANDS: Normal.    KIDNEYS/BLADDER: Patchy hypoenhancement in the mid and upper poles of  the right kidney are again noted. The left kidney is unremarkable. No  hydronephrosis.    BOWEL: No bowel obstruction. Scattered colonic diverticulosis. No  convincing evidence for colitis or diverticulitis. Unremarkable  appendix.    PELVIC ORGANS: Unremarkable.    LYMPH NODES: No enlarged lymph nodes are identified in the abdomen or  pelvis.    VASCULATURE: Unremarkable.    ADDITIONAL FINDINGS: Moderate-sized fat-containing paraumbilical  hernia, unchanged.    MUSCULOSKELETAL: Degenerative changes in the lower lumbar spine and  sacroiliac joints.      Impression    IMPRESSION:  1.  There is suboptimal opacification of the pulmonary arteries,  however the central pulmonary arteries are moderately well opacified,  and no large central pulmonary emboli are seen.  2.  Patchy hypoenhancement in the mid and upper poles of the right  kidney are again noted, and could again represent pyelonephritis,  although areas of evolving renal infarction could also possibly have  this appearance.  3.  Moderate-sized fat-containing paraumbilical hernia.    JEANETTE AMAYA MD         SYSTEM ID:  [id number]   US Lower Extremity Venous Duplex Bilateral    Narrative     EXAM: US LOWER EXTREMITY VENOUS DUPLEX BILATERAL  LOCATION: [facility]  DATE/TIME: 7/30/2021 11:38 PM    INDICATION: positive d-dimer, unable to complete CT chest for PE (due to IV access)  COMPARISON: None.  TECHNIQUE: Venous Duplex ultrasound of bilateral lower extremities with and without compression, augmentation and duplex. Color flow and spectral Doppler with waveform analysis performed.    FINDINGS: Exam includes the common femoral, femoral, popliteal veins as well as segmentally visualized deep calf veins and greater saphenous vein.     RIGHT: No deep vein thrombosis. No superficial thrombophlebitis. No popliteal cyst.    LEFT: No deep vein thrombosis. No superficial thrombophlebitis. No popliteal cyst.      Impression    IMPRESSION:  1.  No deep venous thrombosis in the bilateral lower extremities.   XR Knee Left 1/2 Views    Narrative    EXAM: XR KNEE LEFT 1/2 VIEWS  LOCATION: [facility]  DATE/TIME: 8/1/2021 1:46 PM    INDICATION: Knee pain and swelling, bacteremia.  COMPARISON: None.      Impression    IMPRESSION:     1.  Moderate knee joint effusion, nonspecific for etiology. If there is clinical concern for infection, would recommend arthrocentesis.    2.  Moderate-severe tricompartmental degenerative arthritis, greatest in the lateral and patellofemoral compartments. No fracture. Normal joint alignment.   XR Chest Port 1 View    Narrative    CHEST ONE VIEW PORTABLE   8/4/2021 9:41 PM     HISTORY: Chest pain, shortness of breath    COMPARISON: 7/30/2021.      Impression    IMPRESSION: Retrocardiac airspace opacity may represent atelectasis or  pneumonia. The right lung is clear. No pneumothorax or pleural  effusion. There is a right-sided PICC line with the tip at the  approximate level of the atrial caval junction.    RICKY PITTS MD         SYSTEM ID:  [id number]   XR Toe Left G/E 2 Views    Narrative    TOE LEFT TWO OR MORE VIEWS August 5, 2021  11:16 AM     HISTORY: Left third toe pain. Possible septic emboli.    COMPARISON: None.      Impression    IMPRESSION: There is lytic destructive change dorsal aspect tuft  distal phalanx third toe suspicious for osteomyelitis. There is soft  tissue swelling in this region as well from soft tissue infection.    JT EUCEDA MD         SYSTEM ID:  [id number]   X-ray lt Foot 3 vw port: In PACU    Narrative    XR FOOT PORT LEFT 3 VIEWS 8/7/2021 8:40 AM    HISTORY: post op state, left foot pain.    COMPARISON: None.    FINDINGS:   Partial amputation of the second toe at the level of the PIP joint. No  fracture. No osteomyelitis. No degenerative changes.    BREE GALLEGOS MD         SYSTEM ID:  [id number]   CT Dental wo Contrast    Narrative    CT OF THE FACE WITHOUT CONTRAST 8/10/2021 3:19 PM     COMPARISON: None    HISTORY: Pre op MVR . Endocarditis.    TECHNIQUE:  Helical thin-section axial CT images of the face were  acquired without contrast. Coronal reconstructions were created from  the axial source data.      Impression    IMPRESSION: There are multiple dental caries throughout the majority  of the remaining maxillary and mandibular teeth. Many teeth are  missing. There is periodontal disease surrounding the roots of teeth  #18, 28, 24, 25, 27 and 28 in the mandible and around the roots of all  remaining maxillary teeth. No fractures. No acute sinusitis.      Radiation dose for this scan was reduced using automated exposure  control, adjustment of the mA and/or kV according to patient size, or  iterative reconstruction technique    ALISA CRAFT MD         SYSTEM ID:  [id number]   MR Brain w/o & w Contrast    Narrative    MRI BRAIN WITHOUT AND WITH CONTRAST  8/10/2021 3:10 PM    HISTORY:  Stroke, follow up     TECHNIQUE:  Multiplanar, multisequence MRI of the brain without and  with 7 mL Gadavist     COMPARISON: Head MRI 7/3/2021, head CT 7/1/2021    FINDINGS:  Multiple subacute infarcts are present scattered  throughout the  bilateral cerebral hemispheres. The largest involves the left  posterior hemisphere at the junction of the parietal and occipital  lobes which demonstrates marked peripheral enhancement and areas of  central T1 hyperintense signal and subtle susceptibility related  signal loss. Expansile T2 hypertense signal surrounding this lesion  has increased compared to the prior exam. Diffusion-weighted imaging  at this lesion demonstrates a combination of peripheral and central  diffusion restriction.    Additional areas of diffusion restriction and enhancement are present  within the more superior left parietal lobe, left temporal occipital  junction, and left occipital lobe. Small areas of enhancement within  the right parietal lobe and right occipital lobe at sites of prior  diffusion restriction. Intrinsic T1 hyperintense signal is present  corresponding to signal abnormalities within the left superior  parietal lobe and left occipital lobe. Susceptibility imaging  demonstrates small areas of susceptibility signal loss corresponding  to the areas of diffusion or section enhancement within the left  parietal lobe and left occipital lobe, unchanged compared to the prior  exam.    Multiple subtle subacute cerebellar infarcts. Small old left  cerebellar infarct. No new areas of diffusion restriction are  identified. Incidental presumed arachnoid cyst within the left  anterior middle cranial fossa. Parenchyma is otherwise unremarkable.    Marrow signal is within normal limits. The visualized tympanic  cavities, mastoid cavities, and paranasal sinuses are unremarkable.      Impression    IMPRESSION:  Multiple enhancing subacute infarcts with evidence of small volume  hemorrhage, largest at the junction of the left parietal and occipital  lobes which demonstrates peripheral enhancement and worsening  vasogenic edema. Developing abscess(es) related to septic emboli  cannot be excluded. Recommend continued close  follow-up.    SYEDA JIMÉNEZ MD         SYSTEM ID:  [id number]   CTA Head Neck with Contrast    Narrative    CT ANGIOGRAM OF THE HEAD AND NECK WITH CONTRAST  8/14/2021 2:22 PM     HISTORY: 41-year-old female with history of untreated HIV, hepatitis  C, admitted with mitral valve endocarditis and septic shock  complicated by embolic infarcts and peripheral emboli. Evaluate  vasculature to assess for the possibility of mycotic aneurysm.     TECHNIQUE:  CT angiography with an injection of 70 mL Isovue-370      IV with scans through the head and neck. Images were transferred to a  separate 3-D workstation where multiplanar reformations and 3-D images  were created. Estimates of carotid stenoses are made relative to the  distal internal carotid artery diameters except as noted. Radiation  dose for this scan was reduced using automated exposure control,  adjustment of the mA and/or kV according to patient size, or iterative  reconstruction technique.    COMPARISON: CT head of same date. CTA head and neck 7/3/2021.     CT HEAD FINDINGS: Again seen is a complex peripherally enhancing fluid  collection in the paramedian left parietal lobe measuring up to  approximately 3 cm with similar mild to moderate degree of vasogenic  edema and mild local mass effect. Please see separate report from  noncontrast head CT of same day for additional details regarding  nonvascular structural brain/intracranial findings.    CT ANGIOGRAM HEAD FINDINGS:  The major intracranial arteries including  the proximal branches of the anterior cerebral, middle cerebral, and  posterior cerebral arteries appear patent without vascular cutoff. No  aneurysm identified. No significant stenosis. Venous circulation is  unremarkable.     CT ANGIOGRAM NECK FINDINGS:   Common origin of the brachiocephalic and left common carotid arteries  from the aortic arch, a normal variant.     Right carotid artery: The right common and internal carotid arteries  are patent.  No significant stenosis or atherosclerotic disease in the  carotid artery.     Left carotid artery: The left common and internal carotid arteries are  patent. No significant stenosis or atherosclerotic disease in the  carotid artery.     Vertebral arteries: Vertebral arteries are patent without evidence of  dissection. No significant stenosis.     Other findings: Mild degenerative disc disease at C5-C6.       Impression    IMPRESSION:   1. Patent arteries in the head and neck without vascular cutoff. No  evidence of dissection. No aneurysm identified. No significant  stenosis.  2. Redemonstrated complex peripherally enhancing fluid collection in  the paramedian left parietal lobe.  Please see separate report from  noncontrast head CT of same day for additional details.     PABLO JONES MD         SYSTEM ID:  [id number]   CT Head w/o Contrast    Narrative    CT SCAN OF THE HEAD WITHOUT CONTRAST   8/14/2021 2:21 PM     HISTORY: Stroke, follow-up.    TECHNIQUE:  Axial images of the head and coronal reformations without  IV contrast material. Radiation dose for this scan was reduced using  automated exposure control, adjustment of the mA and/or kV according  to patient size, or iterative reconstruction technique.    COMPARISON: MRI of the brain dated 8/14/2021. Correlation is also made  with CT angiogram of the head and neck of 8/14/2021.    FINDINGS: No significant change in the previously demonstrated  peripherally enhancing complex parenchymal fluid collection centered  in the left paramedian parietal lobe (precuneus) measuring  approximately 3.0 x 2.7 x 2.5 cm. There is a similar mild to moderate  degree of surrounding vasogenic edema in the left parietal occipital  region. Mild narrowing of the occipital horn of the left lateral  ventricle. No evidence for hydrocephalus/ventricular entrapment. The  other previously demonstrated punctate foci of parenchymal enhancement  in the bilateral cervical hemispheres are not  well seen on CT and are  better characterized on the previous MRI. Mild local mass effect in  the left parieto-occipital region. No midline shift/herniation. No  definite findings of new/acute intracranial hemorrhage or extra-axial  fluid collection.    Bilateral orbits, paranasal sinuses, and mastoids are within normal  limits.      Impression    IMPRESSION:  1. No significant change in the previously demonstrated complex  peripherally enhancing fluid collection in the paramedian left  parietal lobe with surrounding vasogenic edema, as described. Given  the patient's clinical history, this lesion is somewhat suspicious for  a brain abscess. The appearance is atypical for bland evolving  subacute ischemic infarct. Less likely possibility would be an  evolving subacute hematoma, although the characteristics on MRI would  also be somewhat atypical for that entity. Recommend clinical  correlation and consider tissue sampling, as clinically warranted.  2. Mild local mass effect in the left parieto-occipital region with  minimal narrowing of the occipital horn of the left lateral ventricle.  No midline shift/herniation.    PABLO JONES MD         SYSTEM ID:  [id number]   MR Brain w/o & w Contrast    Narrative    EXAM: MR BRAIN W/O and W CONTRAST  LOCATION: [facility]  DATE/TIME: 8/17/2021 5:11 AM    INDICATION: Brain abscess.  COMPARISON: MRI brain 8/10/2021, head CT 8/14/2021.  CONTRAST: 8 mL Gadavist.  TECHNIQUE: Routine multiplanar multisequence head MRI without and with intravenous contrast.    FINDINGS:  INTRACRANIAL CONTENTS: Again seen is an irregular rim-enhancing process in the left parietal/occipital lobe demonstrating internal restricted diffusion. The rind of enhancement is slightly more conspicuous but the overall size is unchanged. Stable   associated edema. Stable enhancing foci in the bilateral parietal lobes. Stable irregular enhancement at the left temporal/occipital junction  along the superior margin of the tentorium. No definite new zone of enhancement. These findings are suspicious   for septic emboli and resultant abscesses. No mass, acute hemorrhage, or extra-axial fluid collections. No hydrocephalus. Normal position of the cerebellar tonsils. No pathologic contrast enhancement.    SELLA: No abnormality accounting for technique.    OSSEOUS STRUCTURES/SOFT TISSUES: Normal marrow signal. The major intracranial vascular flow voids are maintained.     ORBITS: No abnormality accounting for technique.     SINUSES/MASTOIDS: No paranasal sinus mucosal disease. No middle ear or mastoid effusion.       Impression    IMPRESSION:  1.  Overall, no definite significant interval change when compared to MRI dated 8/10/2021.         MR Brain w/o & w Contrast    Narrative    MRI BRAIN WITHOUT AND WITH CONTRAST  8/24/2021 3:52 PM     HISTORY:  Brain mass, cerebral abscess, follow-up.     TECHNIQUE:  Multiplanar, multisequence MRI of the brain without and  with 8 mL Gadavist.     COMPARISON: MRI of the brain 8/17/2021.     FINDINGS: Over serial examinations, there has been slight decrease in  size of the previously demonstrated peripherally enhancing,  essentially heterogeneously T2 hyperintense, diffusion restricting  lesion centered in the paramedial left parietooccipital region. This  lesion (including the peripherally enhancing and centrally  nonenhancing components) now measures 3.0 x 2.8 x 2.8 cm compared to  3.3 x 3.0 x 2.9 cm on MRI brain exam of 8/17/2021, and 3.5 x 3.1 x 3.1  cm on MRI brain exam of 8/10/2021. The vasogenic edema in the left  parieto-occipital region has not significantly changed from most  recent MRI. As before, thin rim of susceptibility related signal loss  along the periphery of the lesion.    Unchanged punctate scattered presumed cerebral microhemorrhages, most  pronounced in the left superior parietal region. Evolving scattered  subacute-to-chronic small  cortical/subcortical infarcts with  associated cortical laminar necrosis in the bilateral cerebral  hemispheres manifesting as foci of irregular enhancement (for example  see series 13 image 23) do not appear significantly changed. Unchanged  zone of subacute infarction in the left inferolateral occipital lobe.  Punctate subacute to chronic appearing infarcts in the cerebellar  hemispheres, better seen/characterized on previous exams.    The ventricles are normal in size and configuration. Unchanged mild  local mass effect in the left parieto-occipital region. No midline  shift/herniation. No acute infarct or intracranial hemorrhage.  Unchanged probable small arachnoid cyst in the anterior aspect of the  left middle cranial fossa.    Orbits appear normal. Mild scattered paranasal sinus mucosal  thickening. No other significant change.      Impression    IMPRESSION:  1. Over serial exams, there has been slight decrease in size of the  previously demonstrated peripheral enhancing centrally diffusion  restricting parenchymal lesion in the left paramedian  parieto-occipital region, as described. This continues to be worrisome  for possible cerebral abscess. Unchanged surrounding vasogenic edema  and mild local mass effect. No midline shift/herniation.  2. Continued expected evolution of the scattered subacute-to-chronic  infarcts, as described. No acute infarct, new intracranial hemorrhage,  or other definite acute process.      PABLO JONES MD         SYSTEM ID:  [id number]   Echocardiogram Complete    Narrative    [id number]  [id number]  [id number]  [id number]^CARYN^ADÁN^GIRISH     [facility]  Echocardiography Laboratory  [address] [place] [address]     Name: JOAQUÍN SAAVEDRA  MRN: [id number]  : 1980  Study Date: 2021 10:51 AM  Age: 41 yrs  Gender: Female  Patient Location: [facility]  Reason For Study: Endocarditis  Ordering Physician: ADÁN RUBIN  Referring Physician:  Lisa Pollock  Performed By: Melvin Corrales     BSA: 2.0 m2  Height: 68 in  Weight: 193 lb  HR: 92  BP: 125/66 mmHg  ______________________________________________________________________________  Procedure  Complete Portable Echo Adult.  ______________________________________________________________________________  Interpretation Summary     Left ventricular systolic function is normal.  The visual ejection fraction is 55-60%.  The right ventricle is mildly dilated.  The right ventricular systolic function is normal.  There is a mobile linear echodensity measuring 1.4 cm x 0.6 cm on the atrial  side of the posterior mitral valve leaflet.  Other valve structures appear normal without significant dysfunction or  obvious valvular vegetations.  The inferior pericardium appears moderately thickened and echobright which may  represent active pericarditis.  Large localized pericardial effusion (2.7 cm) posteriorly/laterally with  mobile free floating echodense material which could represent fibrinous  stranding in setting of inflammatory pericarditis or infectious material if  infectious pericardial effusion.  Dilation of the inferior vena cava is present with abnormal respiratory  variation in diameter.  Small left pleural effusion     No prior for comparison.  ______________________________________________________________________________  Left Ventricle  The left ventricle is normal in size. There is normal left ventricular wall  thickness. Left ventricular systolic function is normal. The visual ejection  fraction is 55-60%. Left ventricular diastolic function is normal.     Right Ventricle  The right ventricle is mildly dilated. The right ventricular systolic function  is normal.     Atria  The left atrium is severely dilated. The right atrium is mildly dilated.     Mitral Valve  Vegetation on mitral valve. There is a mobile linear echodensity measuring 1.4  cm x 0.6 cm on the atrial side of the posterior mitral  valve leaflet. There is  mild to moderate (1-2+) mitral regurgitation.     Tricuspid Valve  Normal tricuspid valve. There is mild (1+) tricuspid regurgitation. The right  ventricular systolic pressure is approximated at 25.8 mmHg plus the right  atrial pressure.     Vessels  The aortic root is normal size. Normal size ascending aorta. Dilation of the  inferior vena cava is present with abnormal respiratory variation in diameter.     Pericardium  The pericardium appears moderately thickened. Large localized pericardial  effusion (2.7 cm) posteriorly/laterally with mobile free floating echodense  material which could represent fibrinous stranding in setting of inflammatory  pericarditis or infectious material if infectious pericardial effusion. Small  left pleural effusion.  ______________________________________________________________________________  MMode/2D Measurements & Calculations  IVSd: 1.0 cm  LVIDd: 4.7 cm  LVIDs: 3.4 cm  LVPWd: 1.3 cm  FS: 27.6 %  LV mass(C)d: 198.2 grams  LV mass(C)dI: 98.4 grams/m2  LA dimension: 4.1 cm  asc Aorta Diam: 2.8 cm  LVOT diam: 1.9 cm  LVOT area: 2.9 cm2  LA Volume (BP): 104.0 ml     LA Volume Index (BP): 51.7 ml/m2  RWT: 0.56     Doppler Measurements & Calculations  MV E max franchesca: 85.6 cm/sec  MV A max franchesca: 102.8 cm/sec  MV E/A: 0.83  MV dec slope: 841.2 cm/sec2  PA acc time: 0.08 sec  TR max franchesca: 253.8 cm/sec  TR max P.8 mmHg  E/E' av.3  Lateral E/e': 7.5  Medial E/e': 11.1     ______________________________________________________________________________  Report approved by: Rashi Welch 2021 01:03 PM         Transesophageal Echocardiogram    Narrative    [id number]  [id number]  [id number]^FRANK^SYEDA^ROSIO     [facility]  Echocardiography Laboratory  [address] [place] [address]     Name: JOAQUÍN SAAVEDRA  MRN: [id number]  : 1980  Study Date: 2021 10:26 AM  Age: 41 yrs  Gender: Female  Patient  Location: [facility]  Reason For Study: Endocarditis  Ordering Physician: SYEDA MARIE  Referring Physician: SYEDA MARIE  Performed By: [facility] Tamara Miramontes     BSA: 2.1 m2  Height: 68 in  Weight: 212 lb  HR: 75  ______________________________________________________________________________  Procedure  Complete Portable JAE Adult.  ______________________________________________________________________________  Interpretation Summary     1. The left ventricle is normal in structure, function and size. The visual  ejection fraction is estimated at 60%.  2. The right ventricle is normal in structure, function and size.  3. Small atrial septal defect, secundum type 5mm in diameter. Left to right  shunt. Rim appears adequate in size for closure device.  4. Moderate to large mobile vegetation (14mm long, 6mm wide) of the P2 segment  of the mitral leaflet. No valve perforation. There is moderate (2+) mitral  regurgitation.  5. Small pericardial effusion     Echo from 7-2-21 showed EF 55%, MV vegetation similar to above, 1-2+ MR.  ______________________________________________________________________________  JAE  Consent to the procedure was obtained prior to sedation. The transesophageal  probe was passed without difficulty. There were no complications associated  with this procedure. Patient was intubated and sedated in the ICU.     Left Ventricle  The left ventricle is normal in structure, function and size. The visual  ejection fraction is estimated at 60%. Normal left ventricular wall motion.     Right Ventricle  The right ventricle is normal in structure, function and size.     Atria  Small atrial septal defect, secundum type. 5mm in diameter. Left to right  shunt. Rim appears adequate in size for closure device. No thrombus is  detected in the left atrial appendage.     Mitral Valve  Moderate to large mobile vegetation (14mm long, 6mm wide) of the P2 segment of  the mitral leaflet. No valve perforation. There is  moderate (2+) mitral  regurgitation.     Tricuspid Valve  There is mild (1+) tricuspid regurgitation. Right ventricular systolic  pressure could not be approximated due to inadequate tricuspid regurgitation.     Aortic Valve  Normal tricuspid aortic valve.     Pulmonic Valve  The pulmonic valve is normal in structure and function.     Vessels  Normal ascending, transverse (arch), and descending aorta. Normal pulmonary  vein velocity.     Pericardial/Pleural  Small pericardial effusion.     Rhythm  Sinus rhythm was noted.  ______________________________________________________________________________  MMode/2D Measurements & Calculations  asc Aorta Diam: 3.4 cm     ______________________________________________________________________________  Report approved by: Rashi Bhardwaj 2021 01:09 PM         Echocardiogram Limited     Value    LVEF  55-60%    Narrative    [id number]  [facility]  [id number]  [id number]^KHADIJAH^VERONICA^KATT     [facility]  Echocardiography Laboratory  [address] [place] [address]     Name: JOAQUÍN SAAVEDRA  MRN: [id number]  : 1980  Study Date: 2021 09:42 AM  Age: 41 yrs  Gender: Female  Patient Location: [facility]  Reason For Study: Pericardial Effusion  Ordering Physician: VERONICA LUCIO  Referring Physician: Lisa Pollock  Performed By: Herberth Vides RDCS     BSA: 2.1 m2  Height: 68 in  Weight: 220 lb  HR: 68  BP: 134/70 mmHg  ______________________________________________________________________________  Procedure  Limited Portable Echo Adult.  ______________________________________________________________________________  Interpretation Summary     Small pericardial effusion  There are no echocardiographic indications of cardiac tamponade. The  pericardial effusion may be slightly less than previously and appears to be  organizing.  The visual ejection fraction is 55-60%.  Left ventricular systolic function is normal.  The right  ventricle is mild to moderately dilated.  The right ventricular systolic function is normal.  There is a mobile mass measuring 1.5 cm attached to the atrial side of the  posterior leaflet of the mitral valve. This was noted on 7/5/2021  Left to right atrial shunt, small  ______________________________________________________________________________  Left Ventricle  The left ventricle is normal in size. The visual ejection fraction is 55-60%.  Left ventricular systolic function is normal.     Right Ventricle  The right ventricle is mild to moderately dilated. The right ventricular  systolic function is normal.     Atria  Left to right atrial shunt, small. Small secundum ASD noted on JAE previously.     Mitral Valve  There is a mobile mass measuring 1.5 cm attached to the atrial side of the  posterior leaflet of the mitral valve. This was noted on 7/5/2021. There is  mild to moderate (1-2+) mitral regurgitation. The mitral regurgitant jet is  anteriorly directed, which is consistent with posterior leaflet pathology.     Tricuspid Valve  There is mild (1+) tricuspid regurgitation. The right ventricular systolic  pressure is approximated at 32.5 mmHg plus the right atrial pressure.     Aortic Valve  The aortic valve is trileaflet. There is trivial trileaflet aortic sclerosis.  There is trace aortic regurgitation. No hemodynamically significant valvular  aortic stenosis.     Pulmonic Valve  There is trace pulmonic valvular regurgitation.     Vessels  Unable to assess mean RA pressure given the patient is on a ventilator.     Pericardium  Small pericardial effusion. The pericardial effusion measures 1.73 cm at the  base of the inferolateral wall, 0.717 cm at the RV free wall, 1.59 cm at the  basal anterolateral wall. There are no echocardiographic indications of  cardiac tamponade. Moderate left pleural effusion.     Rhythm  Sinus rhythm was  noted.  ______________________________________________________________________________  Doppler Measurements & Calculations  TR max franchesca: 285.0 cm/sec  TR max P.5 mmHg     ______________________________________________________________________________  Report approved by: Rashi Hernandez 2021 11:20 AM         Echocardiogram Limited     Value    LVEF  55-60%    Narrative    [id number]  [id number]  [id number]  [id number]^MICHELLE^EDWIN^ANJANA     [facility]  Echocardiography Laboratory  [address] [place] [id number]     Name: JOAQUÍN SAAVEDRA  MRN: [id number]  : 1980  Study Date: 07/15/2021 01:31 PM  Age: 41 yrs  Gender: Female  Patient Location: [facility]  Reason For Study: Pericardial Disease  Ordering Physician: EDWIN MARTINEZ  Referring Physician: Lisa Pollock  Performed By: Cali Martinez RDCS     BSA: 2.0 m2  Height: 68 in  Weight: 185 lb  HR: 86  BP: 128/85 mmHg  ______________________________________________________________________________  Procedure  Limited Portable Echo Adult.  ______________________________________________________________________________  Interpretation Summary     Ther apperas to be organized effusion posteriorly, measuring 1.2-1.5cm.  No evidence of tamponade.  The mitral valve inflow Doppler reveals no significant respiratory variation.  Abnormal septal motion is noted with mild flattening intermittently.Hepatic  vein doppler was suboptimal.  Small mobile vegetation noted attached to the mitral leaflet.  There is moderate to mod-severe (2-3+) mitral regurgitation. Pulmonary vein  doppler does not reveal systolic reversal.  Compared to 21, effusion size is probably stable. Degree of MR is worse,  Vegetation is smaller. The study was technically difficult.  ______________________________________________________________________________  Left Ventricle  The left ventricle is normal in size. Left ventricular systolic function is  normal. The  visual ejection fraction is 55-60%. No regional wall motion  abnormalities noted.     Right Ventricle  The right ventricle is normal size. The right ventricular systolic function is  normal.     Mitral Valve  The mitral valve leaflets are mildly thickened. There is moderate to mod-  severe (2-3+) mitral regurgitation. Small mobile vegetation notes attached to  the mitrl leaflet.     Tricuspid Valve  There is mild (1+) tricuspid regurgitation. The right ventricular systolic  pressure is approximated at 22.4 mmHg plus the right atrial pressure. IVC  diameter <2.1 cm collapsing >50% with sniff suggests a normal RA pressure of 3  mmHg.     Aortic Valve  The aortic valve is trileaflet. There is trace aortic regurgitation. No aortic  stenosis is present.     Pericardium  The mitral valve inflow Doppler reveals no significant respiratory variation.  A loculated pericardial effusion is noted. Small right pleural effusion. Small  left pleural effusion. (organized).  ______________________________________________________________________________  Doppler Measurements & Calculations  MV E max franchesca: 105.0 cm/sec  MV A max franchesca: 86.3 cm/sec  MV E/A: 1.2  MV dec slope: 622.1 cm/sec2  TR max franchesca: 236.6 cm/sec  TR max P.4 mmHg  E/E' avg: 10.0  Lateral E/e': 7.4  Medial E/e': 12.5     ______________________________________________________________________________  Report approved by: Rashi Renee 07/15/2021 03:03 PM         Transesophageal Echocardiogram     Value    LVEF  60-65%    Narrative    [id number]  [id number]  [id number]^JORGE^ISABELLE^DOUGLAS     [facility]  Echocardiography Laboratory  [address] [place] [id number]     Name: JOAQUÍN SAAVEDRA  MRN: [id number]  : 1980  Study Date: 2021 01:00 PM  Age: 41 yrs  Gender: Female  Patient Location: [facility]  Reason For Study: Mitral Valve Disorder  Ordering Physician: ISABELLE PATEL  Referring Physician: ISABELLE PATEL  DOUGLAS  Performed By: Serina Chacko RDCS     BSA: 1.9 m2  Height: 68 in  Weight: 169 lb  HR: 71  ______________________________________________________________________________  Procedure  Complete JAE Adult. JAE Probe #[id number] was also used during the procedure.  Anesthesia for sedation and endotracheal intubation.  ______________________________________________________________________________  Interpretation Summary     The visual ejection fraction is 60-65%.  The right ventricle is moderately dilated.  The right ventricular systolic function is mild to moderately reduced.  The left atrial appendage was well visualized and free of thrombus.  The mitral valve leaflets are mildly thickened.  There is no vegetation seen on the mitral valve.  There is moderate (2+) mitral regurgitation.     Compared to previous echos the vegetation is now absent. No paravalvular  abscess noted.     There appears to moderate, at most moderately-severe mitral regurgitation with  no evidence of left or right pulmonary vein systolic reversal.     Moderate atrial septal defect, secundum type measures 7mm across with left to  right shunt.  ______________________________________________________________________________  JAE  Prior to the exam, the oral cavity was checked and no overcrowding was noted.  Consent to the procedure was obtained prior to sedation. The transesophageal  probe was passed without difficulty. There were no complications associated  with this procedure.     Left Ventricle  The left ventricle is normal in size. There is normal left ventricular wall  thickness. The visual ejection fraction is 60-65%. No regional wall motion  abnormalities noted.     Right Ventricle  The right ventricle is moderately dilated. The right ventricular systolic  function is mild to moderately reduced.     Atria  The left atrium is moderate to severely dilated. The right atrium is moderate  to severely dilated. Moderate atrial septal defect,  secundum type. The left  atrial appendage was well visualized and free of thrombus.     Mitral Valve  The mitral valve leaflets are mildly thickened. There is no vegetation seen on  the mitral valve. There is moderate (2+) mitral regurgitation.     Tricuspid Valve  The tricuspid valve is normal in structure and function. There is mild (1+)  tricuspid regurgitation.     Aortic Valve  Normal tricuspid aortic valve. No aortic regurgitation is present.     Pulmonic Valve  The pulmonic valve is normal in structure and function. There is trace  pulmonic valvular regurgitation.     Vessels  The aortic root is normal size. Normal size ascending aorta.     Pericardial/Pleural  Trivial pericardial effusion.  ______________________________________________________________________________  Report approved by: Rashi Blanco 08/12/2021 03:15 PM     ______________________________________________________________________________      Cardiac Catheterization    Narrative    1.  Angiographically normal coronary arteries.       Discharge Medications   Current Discharge Medication List      START taking these medications    Details   amoxicillin-clavulanate (AUGMENTIN) 875-125 MG tablet Take 1 tablet by mouth 2 times daily for 28 days  Qty: 56 tablet, Refills: 0    Associated Diagnoses: Septic shock (H)      bictegravir-emtricitabine-tenofovir (BIKTARVY) -25 MG per tablet Take 1 tablet by mouth daily for 14 days  Qty: 14 tablet, Refills: 0    Associated Diagnoses: Human immunodeficiency virus (HIV) disease (H)      buprenorphine HCl-naloxone HCl (SUBOXONE) 8-2 MG per film Place 1 Film under the tongue 3 times daily  Qty: 24 Film, Refills: 0    Comments: CATALINA:[id number]  Associated Diagnoses: Opioid use disorder, severe, dependence (H)      gabapentin (NEURONTIN) 300 MG capsule Take 2 capsules (600 mg) by mouth 3 times daily  Qty: 180 capsule, Refills: 0    Associated Diagnoses: Polysubstance abuse (H)      pantoprazole  (PROTONIX) 40 MG EC tablet Take 1 tablet (40 mg) by mouth every morning (before breakfast)  Qty: 30 tablet, Refills: 0    Associated Diagnoses: Human immunodeficiency virus (HIV) disease (H)           Allergies   No Known Allergies

## 2021-09-01 NOTE — PLAN OF CARE
DATE & TIME: 8/31/21, 8374 - 8449    Cognitive Concerns/ Orientation : A&O x 4   BEHAVIOR & AGGRESSION TOOL COLOR: Green   ABNL VS/O2: VSS on room air  MOBILITY: Independent in room with cane. Left surgical shoe when ambulating  PAIN MANAGMENT: Denied  DIET: Mechanical/dental soft  BOWEL/BLADDER: Continent  ABNL LAB/BG: NA  DRAIN/DEVICES: Right PICC SL  TELEMETRY RHYTHM: NA  SKIN: Bilateral foot wounds. Dressing to left toe CDI, right open to air  TESTS/PROCEDURES: NA  D/C DATE: 9/1/21, ID ok with discharge. Neurosurgery re-consulted, still needs to see patient  OTHER IMPORTANT INFO: ID, PT, OT and WOC following. Contact precautions maintained

## 2021-09-01 NOTE — PLAN OF CARE
Cognitive Concerns/ Orientation : A&Ox4   BEHAVIOR & AGGRESSION TOOL COLOR: Green     ABNL VS/O2: VSS on room air  MOBILITY: Independent in room with cane. Left surgical shoe when ambulating  PAIN MANAGMENT: Denied  DIET: Mechanical/dental soft  BOWEL/BLADDER: Continent  ABNL LAB/BG: NA  DRAIN/DEVICES: Right PICC SL  TELEMETRY RHYTHM: NA  SKIN: Bilateral foot wounds. Dressing to left toe changed. Right open to air  TESTS/PROCEDURES: NA  D/C DATE: 9/1/21, ID ok with discharge. N  OTHER IMPORTANT INFO: ID, PT, OT and WOC following. Contact precautions maintained. Discharging later today, meds to be filled here. PICC to removed after las tdose of abx.

## 2021-09-01 NOTE — PROGRESS NOTES
Neurosurgery Progress Note    Chart reviewed with Dr. Brown. Plan for repeat brain MRI in 4 weeks. ID planning for continued antibiotics and 4 week follow-up appt. Will schedule 4 week follow-up appt with NSG as well. Please contact with any further questions.    Tanya Webb CNP  [facility] Neurosurgery  [address] [address]  Tel [phone]  Pager [phone]

## 2021-09-01 NOTE — PLAN OF CARE
DATE & TIME: 8/31/21 [id number]  Cognitive Concerns/ Orientation : A&O x4   BEHAVIOR & AGGRESSION TOOL COLOR: Green      ABNL VS/O2: VSS on RA ex soft BPs  MOBILITY: Independent with cane. Surgical shoe to wear on left.  PAIN MANAGMENT: PRN Tylenol given x1 for foot pain.   DIET: [facility] soft  BOWEL/BLADDER: Continent B/B, up to BR  ABNL LAB/BG: na  DRAIN/DEVICES: PICC RUE for Nafcillin q4h  TELEMETRY RHYTHM: na  SKIN: Bilateral foot wounds, L toe dressing CDI, R open to air.  TESTS/PROCEDURES: Brain MRI 8/24/21, neuro said looks stable and will do a repeat scan in 2 weeks.   D/C DATE: 9/1 (last day of IV abx). ID ok with discharge, neurosurgery stills needs to see patient. Safe disposition plan as well.   Discharge Barriers: IV abx through 9/1  OTHER IMPORTANT INFO: ID and Neurosurgery following, PT/OT/WOC. Contact precautions maintained.

## 2021-09-01 NOTE — PROGRESS NOTES
Patient has been discharged 9/1/2021 @ 5:14 pm. Discharge instructions and education reviewed, medication schedule and follow up appts discussed. Teach back completed. Pt has no questions. All belongings sent with pt.

## 2021-09-02 ENCOUNTER — PATIENT OUTREACH (OUTPATIENT)
Dept: CARE COORDINATION | Facility: CLINIC | Age: 41
End: 2021-09-02

## 2021-09-02 ENCOUNTER — TELEPHONE (OUTPATIENT)
Dept: CARDIOLOGY | Facility: CLINIC | Age: 41
End: 2021-09-02

## 2021-09-02 DIAGNOSIS — Z71.89 OTHER SPECIFIED COUNSELING: ICD-10-CM

## 2021-09-02 DIAGNOSIS — I38 ENDOCARDITIS: Primary | ICD-10-CM

## 2021-09-02 NOTE — PROGRESS NOTES
Clinic Care Coordination Contact  [facility]/Voicemail       Clinical Data: Care Coordinator Outreach  Outreach attempted x 1.  CTA unable to leave message on patient's voicemail with call back information.  Plan: Care Coordinator will try to reach patient again in 1-2 business days.    JON Joyce  [phone]  [facility]

## 2021-09-02 NOTE — TELEPHONE ENCOUNTER
"Patient admitted to [facility] for prolonged 2 month hospitalization 7/1/21-9/1/21 and requiring complex medical care. PMH: IVDU, polysubstance abuse, prior hx of Hepatitis C, untreated HIV, malnutrition, homelessness. Extensive ICU admission with ID consulting for IV ABX management. Per Serene Farris' IP note, \"Patient is a 41-year-old IV drug abuser, with significantly unfortunate social situation, with possible recent miscarriage, was residing in a homeless community and brought in with altered mental status. Found to have severe sepsis with MSSA bacteremia, mitral valve endocarditis, embolic stroke, evidence of pyelonephritis on CT abdomen, anemia with a hemoglobin of 6.4, metabolic derangements (significant hypokalemia, creatinine 1.5, hypoalbuminemia anemia, elevated alkaline phosphatase).\" JAE 7/5 noted with small ASD, mod to large mobile vegetation (14 mm long, 6 mm wide) of the P2 segment of the mitral leaflet. No valve perforation; mod MR. 8/12/21: Coronary angiogram via RFA showed normal coronary arteries. Repeat JAE on 8/12/21 showed no vegetation. CV surgery consulted and no urgent surgery required at this time secondary no vegetations present. Pt declined treatment. Discharged to home taking ABX, Suboxone, Biktarvy, Neurontin and Protonix. Orders placed for cardiology DEEPTI and cardiologist f/u as per Dr. Bishop's recommendation below. Writer attempted to call patient to discuss any post hospital d/c questions she may have, review medication changes, and confirm f/u appts, but no answer and goes to immediate phone disconnection. Will try back at a later date. RN will inform patient that she needs to be scheduled for cardiology f/u as ordered-scheduling phone number to be provided. GUCCI Rosales RN.         "

## 2021-09-03 NOTE — PROGRESS NOTES
Clinic Care Coordination Contact  [facility]/Voicemail       Clinical Data: Care Coordinator Outreach  Outreach attempted x 2.  CTA unabale to leave message on patient's voicemail with call back information.    Plan: Care Coordinator will do no further outreaches at this time.    JON Joyce  [phone]  [facility]

## 2021-09-10 NOTE — TELEPHONE ENCOUNTER
Second attempt at trying to contact pt, and phone again, does not ring and immediately disconnects. Will close this encounter. GUCCI Rosales RN.

## 2021-10-14 PROCEDURE — 86901 BLOOD TYPING SEROLOGIC RH(D): CPT | Performed by: PATHOLOGY

## 2021-10-14 PROCEDURE — 86970 RBC PRETX INCUBATJ W/CHEMICL: CPT | Performed by: PATHOLOGY

## 2021-10-14 PROCEDURE — 86870 RBC ANTIBODY IDENTIFICATION: CPT | Performed by: PATHOLOGY

## 2021-10-14 PROCEDURE — 84999 UNLISTED CHEMISTRY PROCEDURE: CPT | Performed by: PATHOLOGY

## 2021-10-14 PROCEDURE — 86860 RBC ANTIBODY ELUTION: CPT | Performed by: PATHOLOGY

## 2021-10-14 PROCEDURE — 86900 BLOOD TYPING SEROLOGIC ABO: CPT | Performed by: PATHOLOGY

## 2021-10-14 PROCEDURE — 86880 COOMBS TEST DIRECT: CPT | Performed by: PATHOLOGY

## 2021-11-10 LAB
Lab: NORMAL
PERFORMING LABORATORY: NORMAL
TEST NAME: NORMAL

## 2021-11-18 NOTE — CODE/RAPID RESPONSE
Metformin 500 mg in am AND 1500 IN pm  BS readings in 2 weeks ,BB and BD   [facility]    RRT Note  7/30/2021   Time Called: 453pm    RRT called for: Chest pain    Assessment & Plan   IMPRESSION & PLAN:    Manas Hernandez is a 41 year old female w/ PMH of Untreated HIV, HCV who was admitted on 7/1/2021 for septic shock and acute respiratory failure secondary to MSSA mitral valve endocarditis with multiple peripheral emboli and resultant embolic stroke.  Course is also included acute kidney injury, pyelonephritis and small pericardial effusion.  She is on long-term IV antibiotics with nafcillin.  She has been rapidly tapered off of opioids and recently started Suboxone.    This afternoon shortly after moving from chair to bed patient reported mid chest pain also points to the bottom of her chest/top of her abdomen.  She is mildly dyspneic with movement complains of mild pleurisy described the pain as just hurting 10/10 in severity.  She reports that it radiates to her right side.  There is no nausea or diaphoresis she is not had any prior similar episode.    VS: Heart rate is 104, SPO2 100% on room air, /82  immuncompromised: Yes    Impression  Chest pain, atypical  Differential diagnosis:  -has Probable myopericarditis as per most recent progress note  -can't rule out new embolic site (ie pulm or liver, already has embolic strokes)  -PE  -ACS a bit less likely   -in absence of other gi symptoms (recent BM, no nausea) less likely gi etioogy, denies symptoms of dyspepia    INTERVENTIONS:  -stat ekg, shows new TWI in III and avF and V5 & V6, previously had TWI in V2-V4  -stat CMP,  troponin  -ct chest abd w/ IV contrast to r/o PE and/or other possible embolic site  -recently tapered off of opioid analgesics so will re-dose toradol, patient recently received  Acetaminophen  -We will also add topical agent  -Telemetry monitoring    Working diagnosis: Possible pulmonary infarct versus PE in the setting of MSSA endocarditis    At the end of the RRT patient was  headed for CT scan, pain is now 7 out of 10 with not having received analgesics, patient jaspreet hemodynamically stable    disposition continue current level of care    Discussed with and defer further cares to rounding hospitalist attending physician      Code Status: Full Code    Allergies   No Known Allergies    Physical Exam   Vital Signs with Ranges:  Temp:  [98  F (36.7  C)-98.4  F (36.9  C)] 98.4  F (36.9  C)  Pulse:  [] 102  Resp:  [16-18] 18  BP: (115-132)/(79-88) 132/88  SpO2:  [99 %-100 %] 100 %  I/O last 3 completed shifts:  In: 1680 [P.O.:1380; I.V.:300]  Out: -     Constitutional: vs as above  General:  adult pt lying in bed without acute distress but slightly tearful  Neuro:  face symmetric, speech fluent awake, able to cooperate with exam   eyes defer   head, ENT & mouth: NC/AT,  mouth moist oral mucosa  Neck: supple  CV S1S2, sinus tachycardia, no murmurs  resp: CTAB upper lobes  gi:normoactive bowel sounds, soft, nontender, nondisteded  Ext: no edema  Skin: no rashes on exposed skin  Lymph:defer  Musculoskeletal no bony joint deformities    Data     EKG:  Interpreted by STEFANIE Morgan CNP  Time reviewed: 500pm  Symptoms at time of EKG: chest pain   Rhythm: normal sinus   Rate: 100-110  Axis: Left Axis Deviation  Ectopy: none  Conduction: normal  ST Segments/ T Waves: T wave inversion III, aVF, V2 to V6  Q Waves: none  Comparison to prior: Very similar to study from 7/2/2021 where she also had T wave inversions in leads V2 and V3, T wave inversions on 7/30/2021 in 3, aVF, V4 5 and 6 are all available    CBC with Diff:  Recent Labs   Lab Test 07/28/21  0611 07/19/21  1104 07/01/21  1839 07/01/21  1239   WBC  --  8.7   < > 21.5*   HGB  --  9.8*   < > 6.8*   MCV  --  82   < > 67*    757*   < > 153   INR  --   --   --  1.23*    < > = values in this interval not displayed.        Comprehensive Metabolic Panel:  Recent Labs   Lab 07/28/21  0204 07/26/21  1020   NA  --  140    POTASSIUM  --  4.6   CHLORIDE  --  114*   CO2  --  25   ANIONGAP  --  1*   * 73   BUN  --  16   CR  --  0.49*   GFRESTIMATED  --  >90   DEREK  --  8.4*       Time Spent on this Encounter   I spent 35 minutes (455 -530pm) of critical care time on the unit/floor managing the care of Manas Hernandez. Upon evaluation, this patient had a high probability of imminent or life-threatening deterioration due to chest pain evaluation, which required my direct attention, intervention, and personal management including evaluation of electrocardiogram 100% of my time was spent at the bedside counseling the patient and/or coordinating care regarding services listed in this note.    Rachelle Arndt, [facility]  Hospitalist [place] [facility]  [phone]

## 2022-09-12 NOTE — PROGRESS NOTES
[facility]    Infectious Disease Progress Note    Date of Service : 08/02/2021     Assessment :  1. S.aureus MSSA native mitral valve endocarditis with septic shock in the setting of IVDA complicated by embolic L parietal lobe infarction and multiple peripheral emboli.   (TTE shows a large mobile 1.4 cm vegetation on the posterior mitral valve leaflet with extension to left atrium and small ASD per cardiology notes , no valvular abscess and no additional valvular involvement. Pericardial effusion is small). No seeding on CT abdomen but has peripheral emboli.  2. Untreated HIV diagnosed 2018, CD4 count maintained at >500, VL 3,695cpies/ml. Genotype pending  3. Hepatitis C Ab+ but RNA -.   4. Severe sepsis requiring pressor support and multiorgan failure. Off pressors now  5. Multiple peripheral emboli. Right finger swelling and discoloration of left 3rd toe. US right finger without evidence of infection but sub-optimal study. Appears stable  6. FORD related to sepsis- resolved  7. Embolic stroke (small ASD with Left to right shunt)  8. Klebsiella pneumoniae UTI treated. ESBL E.coli urinary colonization  9. Severe anemia multifactorial. Has baseline anemia, exacerbated by chronic disease  10. Polysubstance abuse  11. Splenomegaly   12. Organized pericardial effusion      Recommendations:  1. Nafcillin  2. Biktarvy  3. Treat with IV antibiotics for 6 weeks until 8/18/2021 .   4. Will need HIV follow up after discharge    Marisol Matos MD    Interval History   No new complaints, back pain sable, afebrile, tolerating antibiotics without side effects. No nausea, vomiting or diarrhea.    Antimicrobial therapy  7/2 Nafcillin  7/8 Biktarvy  Prior  7/4-7/7 Cipro  7/1-7/2 Vancomycin, zosyn    Physical Exam   Temp: 99.2  F (37.3  C) Temp src: Oral BP: 112/67 Pulse: 98   Resp: 18 SpO2: 96 % O2 Device: None (Room air)    Vitals:    07/13/21 0200 07/14/21 0445 07/16/21 0615   Weight: 89 kg (196 lb 3.4 oz)  84.2 kg (185 lb 10 oz) 76.8 kg (169 lb 5 oz)     Vital Signs with Ranges  Temp:  [98.1  F (36.7  C)-99.2  F (37.3  C)] 99.2  F (37.3  C)  Pulse:  [] 98  Resp:  [18-19] 18  BP: (112-123)/(67-81) 112/67  SpO2:  [96 %-100 %] 96 %    GENERAL APPEARANCE: awake, alert   EYES: conjunctivae and sclerae normal  NECK: no adenopathy  RESP: lungs clear to auscultation - no rales, rhonchi or wheezes  CV: regular rates and rhythm, murmur  ABDOMEN: soft, umbilical hernia  MS: extremities normal- no gross deformities noted  SKIN: right finger with swelling and redness, multiple osler's nodes and splinter hemorrhages, multiple embolic infarcts on toes, black discoloration of left toe    Other:    Medications       bictegravir-emtricitabine-tenofovir  1 tablet Oral Daily     buprenorphine HCl-naloxone HCl  1 Film Sublingual Daily     buprenorphine HCl-naloxone HCl  1 Film Sublingual Daily     gabapentin  600 mg Oral TID     lidocaine  2-3 patch Transdermal Q24H     lidocaine   Transdermal Q8H     multivitamin w/minerals  1 tablet Oral Daily     nafcillin  2 g Intravenous Q4H     pantoprazole  40 mg Oral QAM AC     sertraline  25 mg Oral QPM     sodium chloride (PF)  10 mL Intracatheter Q8H       Data   All microbiology laboratory data reviewed.  Recent Labs   Lab Test 07/30/21  1856 07/28/21  0611 07/25/21  1017 07/19/21  1104 07/15/21  0009   WBC 8.4  --   --  8.7 7.5   HGB 9.1*  --   --  9.8* 8.9*   HCT 30.5*  --   --  31.9* 29.6*   MCV 86  --   --  82 80    372 447 757* 859*     Recent Labs   Lab Test 07/30/21  1856 07/26/21  1020 07/19/21  1104   CR 0.63 0.49* 0.53     No lab results found.  Recent Labs   Lab Test 07/10/21  2243 07/09/21  0454 07/09/21  0415 07/08/21  1417 07/08/21  0500 07/07/21  0450 07/06/21  0500 07/05/21  1232 07/05/21  1104   CULT >100,000 colonies/mL  Escherichia coli ESBL  ESBL (extended beta lactamase) producing organisms require contact precautions.  *  Critical Value/Significant Value  Date Of Previous Biopsy (Optional): 8/9/22 called to and read back by  Luanne Bettencourt RN @ 2200 7/13/21 TM.   No growth No growth No growth No growth No growth  No growth No growth  No growth Cultured on the 2nd day of incubation:  Staphylococcus epidermidis  *  Critical Value/Significant Value, preliminary result only, called to and read back by  IVETT WARE RN 07/06/21 1258 EH.    Cultured on the 5th day of incubation:  Staphylococcus aureus  Susceptibility testing done on previous specimen  *  Critical Value/Significant Value called to and read back by  Cherelle Smith RN 1043 7/11/21 AM   Cultured on the 2nd day of incubation:  Staphylococcus aureus  Susceptibility testing done on previous specimen  *  Critical Value/Significant Value, preliminary result only, called to and read back by  Sin Jack RN at 0448 7/7/21 hg

## 2023-07-19 NOTE — PLAN OF CARE
DATE & TIME: 8/19/21 [id number]   Cognitive Concerns/ Orientation : A/O x4   BEHAVIOR & AGGRESSION TOOL COLOR: Green  CIWA SCORE: NA   ABNL VS/O2: VSS, RA  MOBILITY: SBA, GB, Walker  PAIN MANAGMENT: PRN Tylenol/ ice packs for foot and mouth pain  DIET: [facility] soft- good appetite   BOWEL/BLADDER: continent, up to BR   ABNL LAB/BG: hgb 9.2  DRAIN/DEVICES: PICC    TELEMETRY RHYTHM: SR w/ 1st Degree AV Block; patient has no c/o symptoms  SKIN: Dressing to left foot CDI changed this PM. Orders to change every other day  TESTS/PROCEDURES: follow-up brain MRI 8/17 stable, repeat 8/24  D/C DATE: Pending  Discharge Barriers: IV abx through 9/1  OTHER IMPORTANT INFO: ID, neurosurg following     Estlander Flap (Upper To Lower Lip) Text: The defect of the lower lip was assessed and measured.  Given the location and size of the defect, an Estlander flap was deemed most appropriate. Using a sterile surgical marker, an appropriate Estlander flap was measured and drawn on the upper lip. Local anesthesia was then infiltrated. A scalpel was then used to incise the lateral aspect of the flap, through skin, muscle and mucosa, leaving the flap pedicled medially.  The flap was then rotated and positioned to fill the lower lip defect.  The flap was then sutured into place with a three layer technique, closing the orbicularis oris muscle layer with subcutaneous buried sutures, followed by a mucosal layer and an epidermal layer.

## 2023-08-30 NOTE — ANESTHESIA CARE TRANSFER NOTE
Patient: Manas Hernandez    Procedure(s):  REMOVAL OF ALL TEETH REMAINING.    Diagnosis: Dental abscess [K04.7]  Diagnosis Additional Information: No value filed.    Anesthesia Type:   General     Note:      Level of Consciousness: awake  Oxygen Supplementation: face mask    Independent Airway: airway patency satisfactory and stable        Patient transferred to: PACU  Comments: Neuromuscular blockade not used after succinylcholine for intubation, spontaneous return of TOF 4/4 with sustained tetany, spontaneous respirations, adequate tidal volumes, followed commands to voice, oropharynx suctioned with soft flexible catheter, extubated atraumatically, extubated with suction, airway patent after extubation.  Oxygen via facemask at 8 liters per minute to PACU. Oxygen tubing connected to wall O2 in PACU, SpO2, NiBP, and EKG monitors and alarms on and functioning, Haily Hugger warmer connected to patient gown, report on patient's clinical status given to PACU RN, RN questions answered.     Handoff Report: Identifed the Patient, Identified the Reponsible Provider, Reviewed the pertinent medical history, Discussed the surgical course, Reviewed Intra-OP anesthesia mangement and issues during anesthesia, Set expectations for post-procedure period and Allowed opportunity for questions and acknowledgement of understanding      Electronically Signed By: STEFANIE Carmona CRNA  August 13, 2021  4:27 PM   Patient missed appointment yesterday for knee injections      I called patients daughter to reschedule her appointment. Daughter (Maddie) states that the patient is currently admitted at [facility] due to UTI. Patient states the daughter will call back when she is ready

## (undated) DEVICE — PACK EXTREMITY SOP15EXFSD

## (undated) DEVICE — CATH DIAG 4FR JL 4.5 538417

## (undated) DEVICE — ESU GROUND PAD E7506

## (undated) DEVICE — GLOVE PROTEXIS W/NEU-THERA 8.0  2D73TE80

## (undated) DEVICE — CAST PADDING 6" STERILE 9046S

## (undated) DEVICE — ESU PENCIL W/HOLSTER E2350H

## (undated) DEVICE — DRAPE STERI TOWEL LG 1010

## (undated) DEVICE — TOTE ANGIO CORP PC15AT SAN32CC83O

## (undated) DEVICE — GLOVE PROTEXIS BLUE W/NEU-THERA 6.5  2D73EB65

## (undated) DEVICE — SPONGE SURGIFOAM 12 1972

## (undated) DEVICE — DRSG GAUZE 4X4" 3033

## (undated) DEVICE — DRSG ADAPTIC 3X8" 6113

## (undated) DEVICE — CATH ANGIO INFINITI 3DRC 4FRX100CM 538476

## (undated) DEVICE — SOL NACL 0.9% IRRIG 1000ML BOTTLE 2F7124

## (undated) DEVICE — GLOVE PROTEXIS W/NEU-THERA 6.5  2D73TE65

## (undated) DEVICE — MEDITRACE MULTIFUNTION ADULT RADIOTRANSPARENT ELECTRODE FOR ZOLL

## (undated) DEVICE — CAST PADDING 4" STERILE 9044S

## (undated) DEVICE — INTRODUCER CATH VASC 5FRX10CM  MPIS-501-NT-U-SST

## (undated) DEVICE — INTRO SHEATH 4FRX10CM PINNACLE RSS402

## (undated) DEVICE — BLADE KNIFE SURG 15 371115

## (undated) DEVICE — GOWN LG DISP 9515

## (undated) DEVICE — GLOVE PROTEXIS POWDER FREE 6.5 ORTHOPEDIC 2D73ET65

## (undated) DEVICE — NDL 25GA 1.5" 305127

## (undated) DEVICE — BLADE SAW SAGITTAL 25.5X9.5X.4MM FINE LINVATEC 5023-138

## (undated) DEVICE — SOL WATER IRRIG 1000ML BOTTLE 2F7114

## (undated) DEVICE — SYR 10ML LL W/O NDL 302995

## (undated) DEVICE — DRSG KERLIX 4 1/2"X4YDS ROLL 6715

## (undated) DEVICE — INTRO GLIDESHEATH SLENDER 6FR 10X45CM 60-1060

## (undated) DEVICE — KIT HAND CONTROL ANGIOTOUCH ACIST 65CM AT-P65

## (undated) DEVICE — DEFIB PRO-PADZ LVP LQD GEL ADULT 8900-2105-01

## (undated) DEVICE — NDL PERC ENTRY THINWALL 18GA 7.0" G00166

## (undated) DEVICE — ESU ELEC NDL 1" COATED/INSULATED E1465

## (undated) DEVICE — MANIFOLD KIT ANGIO AUTOMATED 014613

## (undated) DEVICE — GLOVE PROTEXIS MICRO 7.0  2D73PM70

## (undated) DEVICE — ESU GROUND PAD UNIVERSAL W/O CORD

## (undated) DEVICE — PACK HEAD NECK SEN15HNFSF

## (undated) DEVICE — PREP SKIN SCRUB TRAY 4461A

## (undated) DEVICE — LINEN TOWEL PACK X5 5464

## (undated) DEVICE — DRSG KERLIX FLUFFS X5

## (undated) DEVICE — SU VICRYL 4-0 PS-2 18" UND J496H

## (undated) DEVICE — SU PROLENE 3-0 PS-2 18" 8687H

## (undated) DEVICE — SUCTION CANISTER MEDIVAC LINER 3000ML W/LID 65651-530

## (undated) RX ORDER — FENTANYL CITRATE 50 UG/ML
INJECTION, SOLUTION INTRAMUSCULAR; INTRAVENOUS
Status: DISPENSED
Start: 2021-08-10

## (undated) RX ORDER — LIDOCAINE HYDROCHLORIDE 20 MG/ML
INJECTION, SOLUTION EPIDURAL; INFILTRATION; INTRACAUDAL; PERINEURAL
Status: DISPENSED
Start: 2021-08-07

## (undated) RX ORDER — HEPARIN SODIUM 1000 [USP'U]/ML
INJECTION, SOLUTION INTRAVENOUS; SUBCUTANEOUS
Status: DISPENSED
Start: 2021-08-12

## (undated) RX ORDER — FENTANYL CITRATE 0.05 MG/ML
INJECTION, SOLUTION INTRAMUSCULAR; INTRAVENOUS
Status: DISPENSED
Start: 2021-08-13

## (undated) RX ORDER — FENTANYL CITRATE 50 UG/ML
INJECTION, SOLUTION INTRAMUSCULAR; INTRAVENOUS
Status: DISPENSED
Start: 2021-08-13

## (undated) RX ORDER — HEPARIN SODIUM 1000 [USP'U]/ML
INJECTION, SOLUTION INTRAVENOUS; SUBCUTANEOUS
Status: DISPENSED
Start: 2021-08-10

## (undated) RX ORDER — HYDROMORPHONE HYDROCHLORIDE 1 MG/ML
INJECTION, SOLUTION INTRAMUSCULAR; INTRAVENOUS; SUBCUTANEOUS
Status: DISPENSED
Start: 2021-08-13

## (undated) RX ORDER — FENTANYL CITRATE 50 UG/ML
INJECTION, SOLUTION INTRAMUSCULAR; INTRAVENOUS
Status: DISPENSED
Start: 2021-08-12

## (undated) RX ORDER — LIDOCAINE HYDROCHLORIDE 10 MG/ML
INJECTION, SOLUTION EPIDURAL; INFILTRATION; INTRACAUDAL; PERINEURAL
Status: DISPENSED
Start: 2021-08-12

## (undated) RX ORDER — LIDOCAINE HYDROCHLORIDE 10 MG/ML
INJECTION, SOLUTION INFILTRATION; PERINEURAL
Status: DISPENSED
Start: 2021-08-07

## (undated) RX ORDER — PROPOFOL 10 MG/ML
INJECTION, EMULSION INTRAVENOUS
Status: DISPENSED
Start: 2021-08-07

## (undated) RX ORDER — VERAPAMIL HYDROCHLORIDE 2.5 MG/ML
INJECTION, SOLUTION INTRAVENOUS
Status: DISPENSED
Start: 2021-08-12

## (undated) RX ORDER — FENTANYL CITRATE 50 UG/ML
INJECTION, SOLUTION INTRAMUSCULAR; INTRAVENOUS
Status: DISPENSED
Start: 2021-08-07

## (undated) RX ORDER — BUPIVACAINE HYDROCHLORIDE AND EPINEPHRINE 2.5; 5 MG/ML; UG/ML
INJECTION, SOLUTION EPIDURAL; INFILTRATION; INTRACAUDAL; PERINEURAL
Status: DISPENSED
Start: 2021-08-13

## (undated) RX ORDER — LIDOCAINE HCL/EPINEPHRINE/PF 2%-1:200K
VIAL (ML) INJECTION
Status: DISPENSED
Start: 2021-08-13

## (undated) RX ORDER — KETOROLAC TROMETHAMINE 30 MG/ML
INJECTION, SOLUTION INTRAMUSCULAR; INTRAVENOUS
Status: DISPENSED
Start: 2021-08-07

## (undated) RX ORDER — CHLORHEXIDINE GLUCONATE ORAL RINSE 1.2 MG/ML
SOLUTION DENTAL
Status: DISPENSED
Start: 2021-08-13

## (undated) RX ORDER — LIDOCAINE HYDROCHLORIDE 10 MG/ML
INJECTION, SOLUTION EPIDURAL; INFILTRATION; INTRACAUDAL; PERINEURAL
Status: DISPENSED
Start: 2021-08-10

## (undated) RX ORDER — HEPARIN SODIUM 200 [USP'U]/100ML
INJECTION, SOLUTION INTRAVENOUS
Status: DISPENSED
Start: 2021-08-12

## (undated) RX ORDER — BUPIVACAINE HYDROCHLORIDE AND EPINEPHRINE 5; 5 MG/ML; UG/ML
INJECTION, SOLUTION EPIDURAL; INTRACAUDAL; PERINEURAL
Status: DISPENSED
Start: 2021-08-07

## (undated) RX ORDER — NITROGLYCERIN 5 MG/ML
VIAL (ML) INTRAVENOUS
Status: DISPENSED
Start: 2021-08-12

## (undated) RX ORDER — ONDANSETRON 2 MG/ML
INJECTION INTRAMUSCULAR; INTRAVENOUS
Status: DISPENSED
Start: 2021-08-07

## (undated) RX ORDER — HEPARIN SODIUM 200 [USP'U]/100ML
INJECTION, SOLUTION INTRAVENOUS
Status: DISPENSED
Start: 2021-08-10

## (undated) RX ORDER — BUPIVACAINE HYDROCHLORIDE 5 MG/ML
INJECTION, SOLUTION EPIDURAL; INTRACAUDAL
Status: DISPENSED
Start: 2021-08-07